# Patient Record
Sex: MALE | Race: WHITE | NOT HISPANIC OR LATINO | Employment: OTHER | URBAN - METROPOLITAN AREA
[De-identification: names, ages, dates, MRNs, and addresses within clinical notes are randomized per-mention and may not be internally consistent; named-entity substitution may affect disease eponyms.]

---

## 2017-02-28 ENCOUNTER — AMBULATORY - HEALTHEAST (OUTPATIENT)
Dept: CARDIOLOGY | Facility: CLINIC | Age: 77
End: 2017-02-28

## 2017-02-28 DIAGNOSIS — Z95.810 ICD (IMPLANTABLE CARDIOVERTER-DEFIBRILLATOR) IN PLACE: ICD-10-CM

## 2017-03-02 ENCOUNTER — OFFICE VISIT - HEALTHEAST (OUTPATIENT)
Dept: CARDIOLOGY | Facility: CLINIC | Age: 77
End: 2017-03-02

## 2017-03-02 DIAGNOSIS — I42.9 CARDIOMYOPATHY (H): ICD-10-CM

## 2017-03-02 ASSESSMENT — MIFFLIN-ST. JEOR: SCORE: 1449.69

## 2017-03-08 ENCOUNTER — COMMUNICATION - HEALTHEAST (OUTPATIENT)
Dept: CARDIOLOGY | Facility: CLINIC | Age: 77
End: 2017-03-08

## 2017-03-08 DIAGNOSIS — I42.9 CARDIOMYOPATHY (H): ICD-10-CM

## 2017-03-10 ENCOUNTER — HOSPITAL ENCOUNTER (OUTPATIENT)
Dept: CARDIOLOGY | Facility: CLINIC | Age: 77
Discharge: HOME | End: 2017-03-10
Attending: INTERNAL MEDICINE

## 2017-03-10 DIAGNOSIS — I42.9 CARDIOMYOPATHY (H): ICD-10-CM

## 2017-03-10 LAB
AORTIC ROOT: 3.8 CM
AORTIC ROOT: 3.8 CM
AR DECEL SLOPE: 2010 MM/S2
AR PEAK VELOCITY: 376 CM/S
AV REGURGITANT PEAK GRADIENT: 56.6 MMHG
AV REGURGITATION PRESSURE HALF TIME: 560 MS
BSA FOR ECHO PROCEDURE: 1.92 M2
CV BLOOD PRESSURE: NORMAL MMHG
CV ECHO HEIGHT: 70 IN
CV ECHO WEIGHT: 165 LBS
DOP CALC LVOT AREA: 4.91 CM2
DOP CALC LVOT DIAMETER: 2.5 CM
DOP CALC LVOT PEAK VEL: 71.4 CM/S
DOP CALC LVOT STROKE VOLUME: 79 CM3
DOP CALCLVOT PEAK VEL VTI: 16.1 CM
EJECTION FRACTION: 39 % (ref 55–75)
FRACTIONAL SHORTENING: 11.9 % (ref 28–44)
INTERVENTRICULAR SEPTUM IN END DIASTOLE: 1.4 CM (ref 0.6–1)
IVS/PW RATIO: 0.9
LA AREA 1: 15.4 CM2
LA AREA 2: 19.2 CM2
LEFT ATRIUM LENGTH: 4.83 CM
LEFT ATRIUM SIZE: 3.9 CM
LEFT ATRIUM TO AORTIC ROOT RATIO: 1.03 NO UNITS
LEFT ATRIUM VOLUME INDEX: 27.1 ML/M2
LEFT ATRIUM VOLUME: 52 CM3
LEFT VENTRICLE CARDIAC INDEX: 2.8 L/MIN/M2
LEFT VENTRICLE CARDIAC OUTPUT: 5.4 L/MIN
LEFT VENTRICLE DIASTOLIC VOLUME INDEX: 64.6 CM3/M2 (ref 34–74)
LEFT VENTRICLE DIASTOLIC VOLUME: 124 CM3 (ref 62–150)
LEFT VENTRICLE HEART RATE: 68 BPM
LEFT VENTRICLE MASS INDEX: 206.6 G/M2
LEFT VENTRICLE SYSTOLIC VOLUME INDEX: 39.6 CM3/M2 (ref 11–31)
LEFT VENTRICLE SYSTOLIC VOLUME: 76 CM3 (ref 21–61)
LEFT VENTRICULAR INTERNAL DIMENSION IN DIASTOLE: 5.9 CM (ref 4.2–5.8)
LEFT VENTRICULAR INTERNAL DIMENSION IN SYSTOLE: 5.2 CM (ref 2.5–4)
LEFT VENTRICULAR MASS: 396.7 G
LEFT VENTRICULAR OUTFLOW TRACT MEAN GRADIENT: 1 MMHG
LEFT VENTRICULAR OUTFLOW TRACT MEAN VELOCITY: 50.5 CM/S
LEFT VENTRICULAR OUTFLOW TRACT PEAK GRADIENT: 2 MMHG
LEFT VENTRICULAR POSTERIOR WALL IN END DIASTOLE: 1.5 CM (ref 0.6–1)
LV STROKE VOLUME INDEX: 41.1 ML/M2
MITRAL REGURGITANT VELOCITY TIME INTEGRAL: 219 CM
MITRAL VALVE E/A RATIO: 0.7
MR FLOW: 20 CM3
MR MEAN GRADIENT: 70 MMHG
MR MEAN VELOCITY: 384 CM/S
MR PEAK GRADIENT: 111.5 MMHG
MR PISA EROA: 0.1 CM2
MR PISA RADIUS: 0.5 CM
MR PISA VN NYQUIST: 30.8 CM/S
MV AVERAGE E/E' RATIO: 9.4 CM/S
MV DECELERATION TIME: 190 MS
MV E'TISSUE VEL-LAT: 6.14 CM/S
MV E'TISSUE VEL-MED: 4.68 CM/S
MV LATERAL E/E' RATIO: 8.3
MV MEDIAL E/E' RATIO: 10.9
MV PEAK A VELOCITY: 71.1 CM/S
MV PEAK E VELOCITY: 50.8 CM/S
MV REGURGITANT VOLUME: 20.1 CC
NUC REST DIASTOLIC VOLUME INDEX: 2640 LBS
NUC REST SYSTOLIC VOLUME INDEX: 70 IN
PISA MR PEAK VEL: 528 CM/S
RIGHT VENTRICULAR INTERNAL DIMENSION IN DYSTOLE: 2.7 CM
TRICUSPID REGURGITATION PEAK PRESSURE GRADIENT: 19.5 MMHG
TRICUSPID VALVE ANULAR PLANE SYSTOLIC EXCURSION: 1.1 CM
TRICUSPID VALVE PEAK REGURGITANT VELOCITY: 221 CM/S

## 2017-03-10 ASSESSMENT — MIFFLIN-ST. JEOR: SCORE: 1449.69

## 2017-06-06 ENCOUNTER — AMBULATORY - HEALTHEAST (OUTPATIENT)
Dept: CARDIOLOGY | Facility: CLINIC | Age: 77
End: 2017-06-06

## 2017-06-06 DIAGNOSIS — Z95.810 ICD (IMPLANTABLE CARDIOVERTER-DEFIBRILLATOR) IN PLACE: ICD-10-CM

## 2017-06-06 LAB — HCC DEVICE COMMENTS: NORMAL

## 2017-07-18 ENCOUNTER — AMBULATORY - HEALTHEAST (OUTPATIENT)
Dept: CARDIOLOGY | Facility: CLINIC | Age: 77
End: 2017-07-18

## 2017-07-18 DIAGNOSIS — Z95.810 ICD (IMPLANTABLE CARDIOVERTER-DEFIBRILLATOR), DUAL, IN SITU: ICD-10-CM

## 2017-07-18 LAB — HCC DEVICE COMMENTS: NORMAL

## 2017-07-18 ASSESSMENT — MIFFLIN-ST. JEOR: SCORE: 1416.12

## 2017-10-16 ENCOUNTER — AMBULATORY - HEALTHEAST (OUTPATIENT)
Dept: CARDIOLOGY | Facility: CLINIC | Age: 77
End: 2017-10-16

## 2017-10-16 DIAGNOSIS — Z95.810 ICD (IMPLANTABLE CARDIOVERTER-DEFIBRILLATOR), DUAL, IN SITU: ICD-10-CM

## 2017-10-16 LAB — HCC DEVICE COMMENTS: NORMAL

## 2017-12-29 ENCOUNTER — COMMUNICATION - HEALTHEAST (OUTPATIENT)
Dept: CARDIOLOGY | Facility: CLINIC | Age: 77
End: 2017-12-29

## 2017-12-29 DIAGNOSIS — I42.9 CARDIOMYOPATHY (H): ICD-10-CM

## 2018-01-24 ENCOUNTER — AMBULATORY - HEALTHEAST (OUTPATIENT)
Dept: CARDIOLOGY | Facility: CLINIC | Age: 78
End: 2018-01-24

## 2018-01-24 DIAGNOSIS — Z95.810 ICD (IMPLANTABLE CARDIOVERTER-DEFIBRILLATOR), DUAL, IN SITU: ICD-10-CM

## 2018-01-24 LAB — HCC DEVICE COMMENTS: NORMAL

## 2018-02-16 ENCOUNTER — RECORDS - HEALTHEAST (OUTPATIENT)
Dept: LAB | Facility: CLINIC | Age: 78
End: 2018-02-16

## 2018-02-16 LAB
ALBUMIN SERPL-MCNC: 3.3 G/DL (ref 3.5–5)
ALP SERPL-CCNC: 92 U/L (ref 45–120)
ALT SERPL W P-5'-P-CCNC: 27 U/L (ref 0–45)
ANION GAP SERPL CALCULATED.3IONS-SCNC: 9 MMOL/L (ref 5–18)
AST SERPL W P-5'-P-CCNC: 26 U/L (ref 0–40)
BILIRUB SERPL-MCNC: 1.1 MG/DL (ref 0–1)
BUN SERPL-MCNC: 15 MG/DL (ref 8–28)
CALCIUM SERPL-MCNC: 8.8 MG/DL (ref 8.5–10.5)
CHLORIDE BLD-SCNC: 108 MMOL/L (ref 98–107)
CHOLEST SERPL-MCNC: 139 MG/DL
CO2 SERPL-SCNC: 26 MMOL/L (ref 22–31)
CREAT SERPL-MCNC: 1.06 MG/DL (ref 0.7–1.3)
FASTING STATUS PATIENT QL REPORTED: YES
GFR SERPL CREATININE-BSD FRML MDRD: >60 ML/MIN/1.73M2
GLUCOSE BLD-MCNC: 104 MG/DL (ref 70–125)
HDLC SERPL-MCNC: 33 MG/DL
LDLC SERPL CALC-MCNC: 93 MG/DL
POTASSIUM BLD-SCNC: 3.6 MMOL/L (ref 3.5–5)
PROT SERPL-MCNC: 6.4 G/DL (ref 6–8)
SODIUM SERPL-SCNC: 143 MMOL/L (ref 136–145)
TRIGL SERPL-MCNC: 67 MG/DL
VIT B12 SERPL-MCNC: 218 PG/ML (ref 213–816)

## 2018-03-02 ENCOUNTER — AMBULATORY - HEALTHEAST (OUTPATIENT)
Dept: CARDIOLOGY | Facility: CLINIC | Age: 78
End: 2018-03-02

## 2018-03-02 ENCOUNTER — RECORDS - HEALTHEAST (OUTPATIENT)
Dept: ADMINISTRATIVE | Facility: OTHER | Age: 78
End: 2018-03-02

## 2018-03-08 ENCOUNTER — OFFICE VISIT - HEALTHEAST (OUTPATIENT)
Dept: CARDIOLOGY | Facility: CLINIC | Age: 78
End: 2018-03-08

## 2018-03-08 DIAGNOSIS — I25.5 ISCHEMIC CARDIOMYOPATHY: ICD-10-CM

## 2018-03-08 ASSESSMENT — MIFFLIN-ST. JEOR: SCORE: 1413.4

## 2018-03-12 ENCOUNTER — HOSPITAL ENCOUNTER (OUTPATIENT)
Dept: CARDIOLOGY | Facility: CLINIC | Age: 78
Discharge: HOME | End: 2018-03-12
Attending: INTERNAL MEDICINE

## 2018-03-12 DIAGNOSIS — I25.5 ISCHEMIC CARDIOMYOPATHY: ICD-10-CM

## 2018-03-12 LAB
AORTIC ROOT: 4.1 CM
AORTIC VALVE MEAN VELOCITY: 158 CM/S
AR DECEL SLOPE: 1730 MM/S2
AR PEAK VELOCITY: 354 CM/S
ASCENDING AORTA: 3.6 CM
AV AR END DIASTOLIC VELOCITY: 246 CM/S
AV AR END DIASTOLIC VELOCITY: 254 CM/S
AV AR END DIASTOLIC VELOCITY: 261 CM/S
AV CUSP SEPERATION: 1.1 CM
AV CUSP SEPERATION: 1.1 CM
AV DIMENSIONLESS INDEX VTI: 0.3
AV MEAN GRADIENT: 11 MMHG
AV PEAK GRADIENT: 16.6 MMHG
AV REGURGITANT PEAK GRADIENT: 50.1 MMHG
AV REGURGITATION PRESSURE HALF TIME: 654 MS
AV VALVE AREA: 1.4 CM2
AV VELOCITY RATIO: 0.3
BSA FOR ECHO PROCEDURE: 1.87 M2
CV ECHO HEIGHT: 70 IN
CV ECHO WEIGHT: 157 LBS
DOP CALC AO PEAK VEL: 204 CM/S
DOP CALC AO VTI: 42.7 CM
DOP CALC LVOT AREA: 4.15 CM2
DOP CALC LVOT DIAMETER: 2.3 CM
DOP CALC LVOT PEAK VEL: 68.9 CM/S
DOP CALC LVOT STROKE VOLUME: 58.1 CM3
DOP CALC MV VTI: 24 CM
DOP CALCLVOT PEAK VEL VTI: 14 CM
EJECTION FRACTION: 31 % (ref 55–75)
FRACTIONAL SHORTENING: 6.3 % (ref 28–44)
INTERVENTRICULAR SEPTUM IN END DIASTOLE: 1.4 CM (ref 0.6–1)
IVS/PW RATIO: 1.3
LA AREA 1: 16.8 CM2
LA AREA 2: 18.9 CM2
LEFT ATRIUM LENGTH: 5.29 CM
LEFT ATRIUM SIZE: 3.9 CM
LEFT ATRIUM TO AORTIC ROOT RATIO: 0.97 NO UNITS
LEFT ATRIUM VOLUME INDEX: 27.3 ML/M2
LEFT ATRIUM VOLUME: 51 ML
LEFT VENTRICLE CARDIAC INDEX: 2.2 L/MIN/M2
LEFT VENTRICLE CARDIAC OUTPUT: 4.1 L/MIN
LEFT VENTRICLE DIASTOLIC VOLUME INDEX: 68.4 CM3/M2 (ref 34–74)
LEFT VENTRICLE DIASTOLIC VOLUME: 128 CM3 (ref 62–150)
LEFT VENTRICLE HEART RATE: 71 BPM
LEFT VENTRICLE MASS INDEX: 192.3 G/M2
LEFT VENTRICLE SYSTOLIC VOLUME INDEX: 47.1 CM3/M2 (ref 11–31)
LEFT VENTRICLE SYSTOLIC VOLUME: 88 CM3 (ref 21–61)
LEFT VENTRICULAR INTERNAL DIMENSION IN DIASTOLE: 6.3 CM (ref 4.2–5.8)
LEFT VENTRICULAR INTERNAL DIMENSION IN SYSTOLE: 5.9 CM (ref 2.5–4)
LEFT VENTRICULAR MASS: 359.5 G
LEFT VENTRICULAR OUTFLOW TRACT MEAN GRADIENT: 1 MMHG
LEFT VENTRICULAR OUTFLOW TRACT MEAN VELOCITY: 51 CM/S
LEFT VENTRICULAR OUTFLOW TRACT PEAK GRADIENT: 2 MMHG
LEFT VENTRICULAR POSTERIOR WALL IN END DIASTOLE: 1.1 CM (ref 0.6–1)
LV STROKE VOLUME INDEX: 31.1 ML/M2
MITRAL REGURGITANT VELOCITY TIME INTEGRAL: 203 CM
MITRAL VALVE DECELERATION SLOPE: 2650 MM/S2
MITRAL VALVE E/A RATIO: 0.6
MITRAL VALVE MEAN INFLOW VELOCITY: 47.8 CM/S
MITRAL VALVE PEAK VELOCITY: 86.2 CM/S
MITRAL VALVE PRESSURE HALF-TIME: 70 MS
MR MEAN GRADIENT: 68 MMHG
MR MEAN VELOCITY: 387 CM/S
MR PEAK GRADIENT: 104.4 MMHG
MV AREA VTI: 2.42 CM2
MV AVERAGE E/E' RATIO: 8.7 CM/S
MV DECELERATION TIME: 211 MS
MV E'TISSUE VEL-LAT: 5.26 CM/S
MV E'TISSUE VEL-MED: 3.7 CM/S
MV LATERAL E/E' RATIO: 7.4
MV MEAN GRADIENT: 1 MMHG
MV MEDIAL E/E' RATIO: 10.5
MV PEAK A VELOCITY: 69.6 CM/S
MV PEAK E VELOCITY: 39 CM/S
MV PEAK GRADIENT: 3 MMHG
MV VALVE AREA BY CONTINUITY EQUATION: 2.4 CM2
MV VALVE AREA PRESSURE 1/2 METHOD: 3.1 CM2
NUC REST DIASTOLIC VOLUME INDEX: 2512 LBS
NUC REST SYSTOLIC VOLUME INDEX: 70 IN
PISA MR PEAK VEL: 511 CM/S
TRICUSPID REGURGITATION PEAK PRESSURE GRADIENT: 18.8 MMHG
TRICUSPID VALVE ANULAR PLANE SYSTOLIC EXCURSION: 1.3 CM
TRICUSPID VALVE PEAK REGURGITANT VELOCITY: 217 CM/S

## 2018-03-12 ASSESSMENT — MIFFLIN-ST. JEOR: SCORE: 1413.4

## 2018-04-30 ENCOUNTER — AMBULATORY - HEALTHEAST (OUTPATIENT)
Dept: CARDIOLOGY | Facility: CLINIC | Age: 78
End: 2018-04-30

## 2018-04-30 DIAGNOSIS — Z95.810 ICD (IMPLANTABLE CARDIOVERTER-DEFIBRILLATOR), DUAL, IN SITU: ICD-10-CM

## 2018-05-01 LAB
HCC DEVICE COMMENTS: NORMAL
HCC DEVICE IMPLANTING PROVIDER: NORMAL
HCC DEVICE MANUFACTURE: NORMAL
HCC DEVICE MODEL: NORMAL
HCC DEVICE SERIAL NUMBER: NORMAL
HCC DEVICE TYPE: NORMAL

## 2018-07-17 ENCOUNTER — AMBULATORY - HEALTHEAST (OUTPATIENT)
Dept: CARDIOLOGY | Facility: CLINIC | Age: 78
End: 2018-07-17

## 2018-07-17 DIAGNOSIS — Z95.810 ICD (IMPLANTABLE CARDIOVERTER-DEFIBRILLATOR), DUAL, IN SITU: ICD-10-CM

## 2018-07-17 ASSESSMENT — MIFFLIN-ST. JEOR: SCORE: 1386.18

## 2018-07-19 ENCOUNTER — COMMUNICATION - HEALTHEAST (OUTPATIENT)
Dept: CARDIOLOGY | Facility: CLINIC | Age: 78
End: 2018-07-19

## 2018-07-19 DIAGNOSIS — I42.9 CARDIOMYOPATHY (H): ICD-10-CM

## 2018-09-30 ENCOUNTER — AMBULATORY - HEALTHEAST (OUTPATIENT)
Dept: CARDIOLOGY | Facility: CLINIC | Age: 78
End: 2018-09-30

## 2018-09-30 DIAGNOSIS — Z95.810 ICD (IMPLANTABLE CARDIOVERTER-DEFIBRILLATOR), DUAL, IN SITU: ICD-10-CM

## 2018-10-01 ENCOUNTER — COMMUNICATION - HEALTHEAST (OUTPATIENT)
Dept: CARDIOLOGY | Facility: CLINIC | Age: 78
End: 2018-10-01

## 2018-10-01 ENCOUNTER — SURGERY - HEALTHEAST (OUTPATIENT)
Dept: CARDIOLOGY | Facility: CLINIC | Age: 78
End: 2018-10-01

## 2018-10-01 ENCOUNTER — AMBULATORY - HEALTHEAST (OUTPATIENT)
Dept: CARDIOLOGY | Facility: CLINIC | Age: 78
End: 2018-10-01

## 2018-10-01 DIAGNOSIS — Z45.02 ICD (IMPLANTABLE CARDIOVERTER-DEFIBRILLATOR) BATTERY DEPLETION: ICD-10-CM

## 2018-10-03 ENCOUNTER — COMMUNICATION - HEALTHEAST (OUTPATIENT)
Dept: CARDIOLOGY | Facility: CLINIC | Age: 78
End: 2018-10-03

## 2018-10-05 ENCOUNTER — RECORDS - HEALTHEAST (OUTPATIENT)
Dept: LAB | Facility: CLINIC | Age: 78
End: 2018-10-05

## 2018-10-05 LAB
ALBUMIN SERPL-MCNC: 3.4 G/DL (ref 3.5–5)
ALP SERPL-CCNC: 93 U/L (ref 45–120)
ALT SERPL W P-5'-P-CCNC: 23 U/L (ref 0–45)
ANION GAP SERPL CALCULATED.3IONS-SCNC: 12 MMOL/L (ref 5–18)
AST SERPL W P-5'-P-CCNC: 24 U/L (ref 0–40)
BASOPHILS # BLD AUTO: 0.1 THOU/UL (ref 0–0.2)
BASOPHILS NFR BLD AUTO: 2 % (ref 0–2)
BILIRUB SERPL-MCNC: 0.9 MG/DL (ref 0–1)
BUN SERPL-MCNC: 13 MG/DL (ref 8–28)
CALCIUM SERPL-MCNC: 9.3 MG/DL (ref 8.5–10.5)
CHLORIDE BLD-SCNC: 107 MMOL/L (ref 98–107)
CHOLEST SERPL-MCNC: 134 MG/DL
CO2 SERPL-SCNC: 25 MMOL/L (ref 22–31)
CREAT SERPL-MCNC: 0.96 MG/DL (ref 0.7–1.3)
EOSINOPHIL # BLD AUTO: 0.3 THOU/UL (ref 0–0.4)
EOSINOPHIL NFR BLD AUTO: 3 % (ref 0–6)
ERYTHROCYTE [DISTWIDTH] IN BLOOD BY AUTOMATED COUNT: 13.7 % (ref 11–14.5)
FASTING STATUS PATIENT QL REPORTED: NO
GFR SERPL CREATININE-BSD FRML MDRD: >60 ML/MIN/1.73M2
GLUCOSE BLD-MCNC: 93 MG/DL (ref 70–125)
HCT VFR BLD AUTO: 45.1 % (ref 40–54)
HDLC SERPL-MCNC: 34 MG/DL
HGB BLD-MCNC: 14.9 G/DL (ref 14–18)
LDLC SERPL CALC-MCNC: 88 MG/DL
LYMPHOCYTES # BLD AUTO: 1.6 THOU/UL (ref 0.8–4.4)
LYMPHOCYTES NFR BLD AUTO: 18 % (ref 20–40)
MCH RBC QN AUTO: 30.1 PG (ref 27–34)
MCHC RBC AUTO-ENTMCNC: 33 G/DL (ref 32–36)
MCV RBC AUTO: 91 FL (ref 80–100)
MONOCYTES # BLD AUTO: 0.6 THOU/UL (ref 0–0.9)
MONOCYTES NFR BLD AUTO: 7 % (ref 2–10)
NEUTROPHILS # BLD AUTO: 6 THOU/UL (ref 2–7.7)
NEUTROPHILS NFR BLD AUTO: 70 % (ref 50–70)
PLATELET # BLD AUTO: 234 THOU/UL (ref 140–440)
PMV BLD AUTO: 11.6 FL (ref 8.5–12.5)
POTASSIUM BLD-SCNC: 3.9 MMOL/L (ref 3.5–5)
PROT SERPL-MCNC: 6.2 G/DL (ref 6–8)
PSA SERPL-MCNC: 0.4 NG/ML (ref 0–6.5)
RBC # BLD AUTO: 4.95 MILL/UL (ref 4.4–6.2)
SODIUM SERPL-SCNC: 144 MMOL/L (ref 136–145)
TRIGL SERPL-MCNC: 61 MG/DL
VIT B12 SERPL-MCNC: 217 PG/ML (ref 213–816)
WBC: 8.6 THOU/UL (ref 4–11)

## 2018-10-08 ENCOUNTER — AMBULATORY - HEALTHEAST (OUTPATIENT)
Dept: CARDIOLOGY | Facility: CLINIC | Age: 78
End: 2018-10-08

## 2018-10-11 ENCOUNTER — AMBULATORY - HEALTHEAST (OUTPATIENT)
Dept: CARDIOLOGY | Facility: CLINIC | Age: 78
End: 2018-10-11

## 2018-10-15 ENCOUNTER — SURGERY - HEALTHEAST (OUTPATIENT)
Dept: CARDIOLOGY | Facility: CLINIC | Age: 78
End: 2018-10-15

## 2018-10-15 ASSESSMENT — MIFFLIN-ST. JEOR: SCORE: 1397.52

## 2018-10-22 ENCOUNTER — AMBULATORY - HEALTHEAST (OUTPATIENT)
Dept: CARDIOLOGY | Facility: CLINIC | Age: 78
End: 2018-10-22

## 2018-10-22 DIAGNOSIS — Z95.810 ICD (IMPLANTABLE CARDIOVERTER-DEFIBRILLATOR), DUAL, IN SITU: ICD-10-CM

## 2018-10-22 ASSESSMENT — MIFFLIN-ST. JEOR: SCORE: 1407.5

## 2019-01-22 ENCOUNTER — COMMUNICATION - HEALTHEAST (OUTPATIENT)
Dept: CARDIOLOGY | Facility: CLINIC | Age: 79
End: 2019-01-22

## 2019-01-22 DIAGNOSIS — I42.9 CARDIOMYOPATHY (H): ICD-10-CM

## 2019-01-29 ENCOUNTER — AMBULATORY - HEALTHEAST (OUTPATIENT)
Dept: CARDIOLOGY | Facility: CLINIC | Age: 79
End: 2019-01-29

## 2019-01-29 DIAGNOSIS — Z95.810 ICD (IMPLANTABLE CARDIOVERTER-DEFIBRILLATOR), DUAL, IN SITU: ICD-10-CM

## 2019-03-29 ENCOUNTER — AMBULATORY - HEALTHEAST (OUTPATIENT)
Dept: CARDIOLOGY | Facility: CLINIC | Age: 79
End: 2019-03-29

## 2019-03-29 ENCOUNTER — RECORDS - HEALTHEAST (OUTPATIENT)
Dept: ADMINISTRATIVE | Facility: OTHER | Age: 79
End: 2019-03-29

## 2019-04-03 ENCOUNTER — RECORDS - HEALTHEAST (OUTPATIENT)
Dept: ADMINISTRATIVE | Facility: OTHER | Age: 79
End: 2019-04-03

## 2019-04-03 ENCOUNTER — AMBULATORY - HEALTHEAST (OUTPATIENT)
Dept: CARDIOLOGY | Facility: CLINIC | Age: 79
End: 2019-04-03

## 2019-04-15 ENCOUNTER — AMBULATORY - HEALTHEAST (OUTPATIENT)
Dept: CARDIOLOGY | Facility: CLINIC | Age: 79
End: 2019-04-15

## 2019-04-15 DIAGNOSIS — Z95.810 ICD (IMPLANTABLE CARDIOVERTER-DEFIBRILLATOR), DUAL, IN SITU: ICD-10-CM

## 2019-04-15 ASSESSMENT — MIFFLIN-ST. JEOR: SCORE: 1381.65

## 2019-04-24 ENCOUNTER — OFFICE VISIT - HEALTHEAST (OUTPATIENT)
Dept: CARDIOLOGY | Facility: CLINIC | Age: 79
End: 2019-04-24

## 2019-04-24 DIAGNOSIS — I25.5 ISCHEMIC CARDIOMYOPATHY: ICD-10-CM

## 2019-04-24 ASSESSMENT — MIFFLIN-ST. JEOR: SCORE: 1395.25

## 2019-04-26 ENCOUNTER — COMMUNICATION - HEALTHEAST (OUTPATIENT)
Dept: CARDIOLOGY | Facility: CLINIC | Age: 79
End: 2019-04-26

## 2019-04-26 DIAGNOSIS — I42.9 CARDIOMYOPATHY (H): ICD-10-CM

## 2019-05-01 ENCOUNTER — HOSPITAL ENCOUNTER (OUTPATIENT)
Dept: CARDIOLOGY | Facility: CLINIC | Age: 79
Discharge: HOME | End: 2019-05-01
Attending: INTERNAL MEDICINE

## 2019-05-01 DIAGNOSIS — I25.5 ISCHEMIC CARDIOMYOPATHY: ICD-10-CM

## 2019-05-01 LAB
AORTIC ROOT: 3.7 CM
AORTIC VALVE MEAN VELOCITY: 155 CM/S
AR DECEL SLOPE: 1650 MM/S2
AR PEAK VELOCITY: 350 CM/S
AV DIMENSIONLESS INDEX VTI: 0.4
AV MEAN GRADIENT: 11 MMHG
AV PEAK GRADIENT: 18.7 MMHG
AV REGURGITANT PEAK GRADIENT: 49 MMHG
AV REGURGITATION PRESSURE HALF TIME: 622 MS
AV VALVE AREA: 1.5 CM2
AV VELOCITY RATIO: 0.3
BSA FOR ECHO PROCEDURE: 1.85 M2
CV BLOOD PRESSURE: ABNORMAL MMHG
CV ECHO HEIGHT: 70 IN
CV ECHO WEIGHT: 153 LBS
DOP CALC AO PEAK VEL: 216 CM/S
DOP CALC AO VTI: 46.8 CM
DOP CALC LVOT AREA: 4.15 CM2
DOP CALC LVOT DIAMETER: 2.3 CM
DOP CALC LVOT PEAK VEL: 75.2 CM/S
DOP CALC LVOT STROKE VOLUME: 69.3 CM3
DOP CALCLVOT PEAK VEL VTI: 16.7 CM
EJECTION FRACTION: 35 % (ref 55–75)
FRACTIONAL SHORTENING: 5.3 % (ref 28–44)
INTERVENTRICULAR SEPTUM IN END DIASTOLE: 1.6 CM (ref 0.6–1)
IVS/PW RATIO: 1.2
LA AREA 1: 28.8 CM2
LA AREA 2: 27.2 CM2
LEFT ATRIUM LENGTH: 5.51 CM
LEFT ATRIUM SIZE: 4.3 CM
LEFT ATRIUM TO AORTIC ROOT RATIO: 1.13 NO UNITS
LEFT ATRIUM VOLUME INDEX: 65.3 ML/M2
LEFT ATRIUM VOLUME: 120.8 ML
LEFT VENTRICLE CARDIAC INDEX: 2.2 L/MIN/M2
LEFT VENTRICLE CARDIAC OUTPUT: 4.2 L/MIN
LEFT VENTRICLE DIASTOLIC VOLUME INDEX: 133 CM3/M2 (ref 34–74)
LEFT VENTRICLE DIASTOLIC VOLUME: 246 CM3 (ref 62–150)
LEFT VENTRICLE HEART RATE: 60 BPM
LEFT VENTRICLE MASS INDEX: 203.1 G/M2
LEFT VENTRICLE SYSTOLIC VOLUME INDEX: 87 CM3/M2 (ref 11–31)
LEFT VENTRICLE SYSTOLIC VOLUME: 161 CM3 (ref 21–61)
LEFT VENTRICULAR INTERNAL DIMENSION IN DIASTOLE: 5.7 CM (ref 4.2–5.8)
LEFT VENTRICULAR INTERNAL DIMENSION IN SYSTOLE: 5.4 CM (ref 2.5–4)
LEFT VENTRICULAR MASS: 375.7 G
LEFT VENTRICULAR OUTFLOW TRACT MEAN GRADIENT: 1 MMHG
LEFT VENTRICULAR OUTFLOW TRACT MEAN VELOCITY: 51.1 CM/S
LEFT VENTRICULAR OUTFLOW TRACT PEAK GRADIENT: 2 MMHG
LEFT VENTRICULAR POSTERIOR WALL IN END DIASTOLE: 1.3 CM (ref 0.6–1)
LV STROKE VOLUME INDEX: 37.5 ML/M2
MITRAL REGURGITANT VELOCITY TIME INTEGRAL: 215 CM
MITRAL VALVE E/A RATIO: 0.6
MR FLOW: 58 CM3
MR MEAN GRADIENT: 74 MMHG
MR MEAN VELOCITY: 408 CM/S
MR PEAK GRADIENT: 118.8 MMHG
MR PISA EROA: 0.3 CM2
MR PISA RADIUS: 0.8 CM
MR PISA VN NYQUIST: 36.6 CM/S
MV AVERAGE E/E' RATIO: 9.3 CM/S
MV DECELERATION TIME: 208 MS
MV E'TISSUE VEL-LAT: 5.85 CM/S
MV E'TISSUE VEL-MED: 3.8 CM/S
MV LATERAL E/E' RATIO: 7.7
MV MEDIAL E/E' RATIO: 11.8
MV PEAK A VELOCITY: 71.3 CM/S
MV PEAK E VELOCITY: 44.9 CM/S
MV REGURGITANT VOLUME: 58 CC
NUC REST DIASTOLIC VOLUME INDEX: 2448 LBS
NUC REST SYSTOLIC VOLUME INDEX: 70 IN
PISA MR PEAK VEL: 545 CM/S
TRICUSPID REGURGITATION PEAK PRESSURE GRADIENT: 20.1 MMHG
TRICUSPID VALVE ANULAR PLANE SYSTOLIC EXCURSION: 1.6 CM
TRICUSPID VALVE PEAK REGURGITANT VELOCITY: 224 CM/S

## 2019-05-01 ASSESSMENT — MIFFLIN-ST. JEOR: SCORE: 1395.25

## 2019-07-16 ENCOUNTER — AMBULATORY - HEALTHEAST (OUTPATIENT)
Dept: CARDIOLOGY | Facility: CLINIC | Age: 79
End: 2019-07-16

## 2019-07-16 DIAGNOSIS — Z95.810 ICD (IMPLANTABLE CARDIOVERTER-DEFIBRILLATOR), DUAL, IN SITU: ICD-10-CM

## 2019-10-16 ENCOUNTER — AMBULATORY - HEALTHEAST (OUTPATIENT)
Dept: CARDIOLOGY | Facility: CLINIC | Age: 79
End: 2019-10-16

## 2019-10-16 DIAGNOSIS — Z95.810 ICD (IMPLANTABLE CARDIOVERTER-DEFIBRILLATOR), DUAL, IN SITU: ICD-10-CM

## 2019-12-06 ENCOUNTER — RECORDS - HEALTHEAST (OUTPATIENT)
Dept: LAB | Facility: CLINIC | Age: 79
End: 2019-12-06

## 2019-12-06 LAB
ALBUMIN SERPL-MCNC: 3.6 G/DL (ref 3.5–5)
ALP SERPL-CCNC: 80 U/L (ref 45–120)
ALT SERPL W P-5'-P-CCNC: 18 U/L (ref 0–45)
ANION GAP SERPL CALCULATED.3IONS-SCNC: 10 MMOL/L (ref 5–18)
AST SERPL W P-5'-P-CCNC: 17 U/L (ref 0–40)
BILIRUB SERPL-MCNC: 0.9 MG/DL (ref 0–1)
BUN SERPL-MCNC: 10 MG/DL (ref 8–28)
CALCIUM SERPL-MCNC: 9 MG/DL (ref 8.5–10.5)
CHLORIDE BLD-SCNC: 105 MMOL/L (ref 98–107)
CHOLEST SERPL-MCNC: 150 MG/DL
CO2 SERPL-SCNC: 26 MMOL/L (ref 22–31)
CREAT SERPL-MCNC: 0.96 MG/DL (ref 0.7–1.3)
FASTING STATUS PATIENT QL REPORTED: NO
GFR SERPL CREATININE-BSD FRML MDRD: >60 ML/MIN/1.73M2
GLUCOSE BLD-MCNC: 84 MG/DL (ref 70–125)
HDLC SERPL-MCNC: 36 MG/DL
LDLC SERPL CALC-MCNC: 100 MG/DL
POTASSIUM BLD-SCNC: 4 MMOL/L (ref 3.5–5)
PROT SERPL-MCNC: 6.1 G/DL (ref 6–8)
PSA SERPL-MCNC: 0.4 NG/ML (ref 0–6.5)
SODIUM SERPL-SCNC: 141 MMOL/L (ref 136–145)
TRIGL SERPL-MCNC: 70 MG/DL
VIT B12 SERPL-MCNC: <146 PG/ML (ref 213–816)

## 2019-12-09 ENCOUNTER — TRANSFERRED RECORDS (OUTPATIENT)
Dept: HEALTH INFORMATION MANAGEMENT | Facility: CLINIC | Age: 79
End: 2019-12-09

## 2019-12-31 ENCOUNTER — AMBULATORY - HEALTHEAST (OUTPATIENT)
Dept: CARDIOLOGY | Facility: CLINIC | Age: 79
End: 2019-12-31

## 2019-12-31 ENCOUNTER — COMMUNICATION - HEALTHEAST (OUTPATIENT)
Dept: CARDIOLOGY | Facility: CLINIC | Age: 79
End: 2019-12-31

## 2019-12-31 DIAGNOSIS — Z95.810 ICD (IMPLANTABLE CARDIOVERTER-DEFIBRILLATOR), DUAL, IN SITU: ICD-10-CM

## 2019-12-31 DIAGNOSIS — I25.5 ISCHEMIC CARDIOMYOPATHY: ICD-10-CM

## 2019-12-31 DIAGNOSIS — I48.91 ATRIAL FIBRILLATION (H): ICD-10-CM

## 2020-01-02 ENCOUNTER — SURGERY - HEALTHEAST (OUTPATIENT)
Dept: CARDIOLOGY | Facility: CLINIC | Age: 80
End: 2020-01-02

## 2020-01-02 ENCOUNTER — AMBULATORY - HEALTHEAST (OUTPATIENT)
Dept: CARDIOLOGY | Facility: CLINIC | Age: 80
End: 2020-01-02

## 2020-01-02 DIAGNOSIS — I42.9 CARDIOMYOPATHY (H): ICD-10-CM

## 2020-01-20 ENCOUNTER — RECORDS - HEALTHEAST (OUTPATIENT)
Dept: ADMINISTRATIVE | Facility: OTHER | Age: 80
End: 2020-01-20

## 2020-01-20 ENCOUNTER — AMBULATORY - HEALTHEAST (OUTPATIENT)
Dept: CARDIOLOGY | Facility: CLINIC | Age: 80
End: 2020-01-20

## 2020-01-22 ENCOUNTER — AMBULATORY - HEALTHEAST (OUTPATIENT)
Dept: CARDIOLOGY | Facility: CLINIC | Age: 80
End: 2020-01-22

## 2020-01-22 ENCOUNTER — RECORDS - HEALTHEAST (OUTPATIENT)
Dept: RADIOLOGY | Facility: CLINIC | Age: 80
End: 2020-01-22

## 2020-01-22 ENCOUNTER — AMBULATORY - HEALTHEAST (OUTPATIENT)
Dept: NEUROSURGERY | Facility: CLINIC | Age: 80
End: 2020-01-22

## 2020-01-22 ENCOUNTER — RECORDS - HEALTHEAST (OUTPATIENT)
Dept: ADMINISTRATIVE | Facility: OTHER | Age: 80
End: 2020-01-22

## 2020-01-22 DIAGNOSIS — Z95.810 ICD (IMPLANTABLE CARDIOVERTER-DEFIBRILLATOR), DUAL, IN SITU: ICD-10-CM

## 2020-01-22 DIAGNOSIS — I48.0 PAROXYSMAL ATRIAL FIBRILLATION (H): ICD-10-CM

## 2020-01-22 DIAGNOSIS — I25.5 ISCHEMIC CARDIOMYOPATHY: ICD-10-CM

## 2020-01-22 DIAGNOSIS — I25.118 CORONARY ARTERY DISEASE OF NATIVE HEART WITH STABLE ANGINA PECTORIS, UNSPECIFIED VESSEL OR LESION TYPE (H): ICD-10-CM

## 2020-01-27 ENCOUNTER — AMBULATORY - HEALTHEAST (OUTPATIENT)
Dept: CARDIOLOGY | Facility: CLINIC | Age: 80
End: 2020-01-27

## 2020-01-27 ENCOUNTER — OFFICE VISIT - HEALTHEAST (OUTPATIENT)
Dept: NEUROSURGERY | Facility: CLINIC | Age: 80
End: 2020-01-27

## 2020-01-27 DIAGNOSIS — Z95.810 ICD (IMPLANTABLE CARDIOVERTER-DEFIBRILLATOR), DUAL, IN SITU: ICD-10-CM

## 2020-01-27 DIAGNOSIS — R52 PAIN: ICD-10-CM

## 2020-01-27 DIAGNOSIS — I25.118 CORONARY ARTERY DISEASE OF NATIVE HEART WITH STABLE ANGINA PECTORIS, UNSPECIFIED VESSEL OR LESION TYPE (H): ICD-10-CM

## 2020-01-27 DIAGNOSIS — I48.0 PAROXYSMAL ATRIAL FIBRILLATION (H): ICD-10-CM

## 2020-01-27 ASSESSMENT — MIFFLIN-ST. JEOR
SCORE: 1386.18
SCORE: 1386.18

## 2020-01-28 LAB
ATRIAL RATE - MUSE: 68 BPM
DIASTOLIC BLOOD PRESSURE - MUSE: NORMAL
INTERPRETATION ECG - MUSE: NORMAL
P AXIS - MUSE: 62 DEGREES
PR INTERVAL - MUSE: 168 MS
QRS DURATION - MUSE: 134 MS
QT - MUSE: 484 MS
QTC - MUSE: 514 MS
R AXIS - MUSE: 78 DEGREES
SYSTOLIC BLOOD PRESSURE - MUSE: NORMAL
T AXIS - MUSE: -45 DEGREES
VENTRICULAR RATE- MUSE: 68 BPM

## 2020-01-29 ENCOUNTER — RECORDS - HEALTHEAST (OUTPATIENT)
Dept: LAB | Facility: CLINIC | Age: 80
End: 2020-01-29

## 2020-01-29 LAB
ANION GAP SERPL CALCULATED.3IONS-SCNC: 7 MMOL/L (ref 5–18)
BUN SERPL-MCNC: 16 MG/DL (ref 8–28)
CALCIUM SERPL-MCNC: 8.8 MG/DL (ref 8.5–10.5)
CHLORIDE BLD-SCNC: 109 MMOL/L (ref 98–107)
CO2 SERPL-SCNC: 28 MMOL/L (ref 22–31)
CREAT SERPL-MCNC: 0.95 MG/DL (ref 0.7–1.3)
GFR SERPL CREATININE-BSD FRML MDRD: >60 ML/MIN/1.73M2
GLUCOSE BLD-MCNC: 87 MG/DL (ref 70–125)
POTASSIUM BLD-SCNC: 3.6 MMOL/L (ref 3.5–5)
SODIUM SERPL-SCNC: 144 MMOL/L (ref 136–145)
VIT B12 SERPL-MCNC: 613 PG/ML (ref 213–816)

## 2020-01-31 ENCOUNTER — HOSPITAL ENCOUNTER (OUTPATIENT)
Dept: RADIOLOGY | Facility: HOSPITAL | Age: 80
Discharge: HOME | End: 2020-01-31
Attending: SURGERY

## 2020-01-31 DIAGNOSIS — R52 PAIN: ICD-10-CM

## 2020-02-17 ENCOUNTER — AMBULATORY - HEALTHEAST (OUTPATIENT)
Dept: CARDIOLOGY | Facility: CLINIC | Age: 80
End: 2020-02-17

## 2020-02-17 ENCOUNTER — RECORDS - HEALTHEAST (OUTPATIENT)
Dept: ADMINISTRATIVE | Facility: OTHER | Age: 80
End: 2020-02-17

## 2020-02-21 ENCOUNTER — HOSPITAL ENCOUNTER (OUTPATIENT)
Dept: PHYSICAL MEDICINE AND REHAB | Facility: CLINIC | Age: 80
Discharge: HOME | End: 2020-02-21
Attending: SURGERY

## 2020-02-21 DIAGNOSIS — M48.02 FORAMINAL STENOSIS OF CERVICAL REGION: ICD-10-CM

## 2020-02-21 DIAGNOSIS — R20.2 PARESTHESIA: ICD-10-CM

## 2020-02-21 DIAGNOSIS — M62.81 GENERALIZED MUSCLE WEAKNESS: ICD-10-CM

## 2020-02-21 DIAGNOSIS — M25.50 POLYARTHRALGIA: ICD-10-CM

## 2020-02-24 ENCOUNTER — AMBULATORY - HEALTHEAST (OUTPATIENT)
Dept: CARDIOLOGY | Facility: CLINIC | Age: 80
End: 2020-02-24

## 2020-02-24 ENCOUNTER — OFFICE VISIT - HEALTHEAST (OUTPATIENT)
Dept: PHYSICAL THERAPY | Facility: REHABILITATION | Age: 80
End: 2020-02-24

## 2020-02-24 DIAGNOSIS — I50.22 CHRONIC SYSTOLIC CONGESTIVE HEART FAILURE (H): ICD-10-CM

## 2020-02-24 DIAGNOSIS — I25.118 CORONARY ARTERY DISEASE OF NATIVE HEART WITH STABLE ANGINA PECTORIS, UNSPECIFIED VESSEL OR LESION TYPE (H): ICD-10-CM

## 2020-02-24 DIAGNOSIS — Z95.810 ICD (IMPLANTABLE CARDIOVERTER-DEFIBRILLATOR), DUAL, IN SITU: ICD-10-CM

## 2020-02-24 DIAGNOSIS — I48.0 PAROXYSMAL ATRIAL FIBRILLATION (H): ICD-10-CM

## 2020-02-24 DIAGNOSIS — M25.522 ARTHRALGIA OF LEFT UPPER ARM: ICD-10-CM

## 2020-02-24 DIAGNOSIS — M62.81 GENERALIZED MUSCLE WEAKNESS: ICD-10-CM

## 2020-02-24 DIAGNOSIS — M25.521 ARTHRALGIA OF RIGHT UPPER ARM: ICD-10-CM

## 2020-02-24 DIAGNOSIS — I71.40 ABDOMINAL AORTIC ANEURYSM (AAA) WITHOUT RUPTURE (H): ICD-10-CM

## 2020-02-24 DIAGNOSIS — Z74.09 IMPAIRED FUNCTIONAL MOBILITY, BALANCE, GAIT, AND ENDURANCE: ICD-10-CM

## 2020-02-24 DIAGNOSIS — I73.9 PVD (PERIPHERAL VASCULAR DISEASE) (H): ICD-10-CM

## 2020-02-24 DIAGNOSIS — I42.9 CARDIOMYOPATHY (H): ICD-10-CM

## 2020-02-24 DIAGNOSIS — I49.3 PVC'S (PREMATURE VENTRICULAR CONTRACTIONS): ICD-10-CM

## 2020-02-24 ASSESSMENT — MIFFLIN-ST. JEOR: SCORE: 1377.11

## 2020-02-25 ENCOUNTER — AMBULATORY - HEALTHEAST (OUTPATIENT)
Dept: LAB | Facility: HOSPITAL | Age: 80
End: 2020-02-25

## 2020-02-25 ENCOUNTER — COMMUNICATION - HEALTHEAST (OUTPATIENT)
Dept: PHYSICAL MEDICINE AND REHAB | Facility: CLINIC | Age: 80
End: 2020-02-25

## 2020-02-25 DIAGNOSIS — M25.50 POLYARTHRALGIA: ICD-10-CM

## 2020-02-25 LAB
C REACTIVE PROTEIN LHE: 0.1 MG/DL (ref 0–0.8)
ERYTHROCYTE [SEDIMENTATION RATE] IN BLOOD BY WESTERGREN METHOD: 11 MM/HR (ref 0–15)

## 2020-03-02 ENCOUNTER — OFFICE VISIT - HEALTHEAST (OUTPATIENT)
Dept: PHYSICAL THERAPY | Facility: REHABILITATION | Age: 80
End: 2020-03-02

## 2020-03-02 DIAGNOSIS — M62.81 GENERALIZED MUSCLE WEAKNESS: ICD-10-CM

## 2020-03-02 DIAGNOSIS — M25.521 ARTHRALGIA OF RIGHT UPPER ARM: ICD-10-CM

## 2020-03-02 DIAGNOSIS — M25.522 ARTHRALGIA OF LEFT UPPER ARM: ICD-10-CM

## 2020-03-02 DIAGNOSIS — Z74.09 IMPAIRED FUNCTIONAL MOBILITY, BALANCE, GAIT, AND ENDURANCE: ICD-10-CM

## 2020-03-05 ENCOUNTER — OFFICE VISIT - HEALTHEAST (OUTPATIENT)
Dept: PHYSICAL THERAPY | Facility: REHABILITATION | Age: 80
End: 2020-03-05

## 2020-03-05 DIAGNOSIS — M62.81 GENERALIZED MUSCLE WEAKNESS: ICD-10-CM

## 2020-03-05 DIAGNOSIS — Z74.09 IMPAIRED FUNCTIONAL MOBILITY, BALANCE, GAIT, AND ENDURANCE: ICD-10-CM

## 2020-03-05 DIAGNOSIS — M25.521 ARTHRALGIA OF RIGHT UPPER ARM: ICD-10-CM

## 2020-03-05 DIAGNOSIS — M25.522 ARTHRALGIA OF LEFT UPPER ARM: ICD-10-CM

## 2020-03-09 ENCOUNTER — OFFICE VISIT - HEALTHEAST (OUTPATIENT)
Dept: PHYSICAL THERAPY | Facility: REHABILITATION | Age: 80
End: 2020-03-09

## 2020-03-09 DIAGNOSIS — M62.81 GENERALIZED MUSCLE WEAKNESS: ICD-10-CM

## 2020-03-09 DIAGNOSIS — Z74.09 IMPAIRED FUNCTIONAL MOBILITY, BALANCE, GAIT, AND ENDURANCE: ICD-10-CM

## 2020-03-09 DIAGNOSIS — M25.522 ARTHRALGIA OF LEFT UPPER ARM: ICD-10-CM

## 2020-03-09 DIAGNOSIS — M25.521 ARTHRALGIA OF RIGHT UPPER ARM: ICD-10-CM

## 2020-03-11 ENCOUNTER — OFFICE VISIT - HEALTHEAST (OUTPATIENT)
Dept: PHYSICAL THERAPY | Facility: REHABILITATION | Age: 80
End: 2020-03-11

## 2020-03-11 DIAGNOSIS — Z74.09 IMPAIRED FUNCTIONAL MOBILITY, BALANCE, GAIT, AND ENDURANCE: ICD-10-CM

## 2020-03-11 DIAGNOSIS — M25.521 ARTHRALGIA OF RIGHT UPPER ARM: ICD-10-CM

## 2020-03-11 DIAGNOSIS — M62.81 GENERALIZED MUSCLE WEAKNESS: ICD-10-CM

## 2020-03-11 DIAGNOSIS — M25.522 ARTHRALGIA OF LEFT UPPER ARM: ICD-10-CM

## 2020-03-16 ENCOUNTER — OFFICE VISIT - HEALTHEAST (OUTPATIENT)
Dept: PHYSICAL THERAPY | Facility: REHABILITATION | Age: 80
End: 2020-03-16

## 2020-03-16 DIAGNOSIS — M25.522 ARTHRALGIA OF LEFT UPPER ARM: ICD-10-CM

## 2020-03-16 DIAGNOSIS — M62.81 GENERALIZED MUSCLE WEAKNESS: ICD-10-CM

## 2020-03-16 DIAGNOSIS — M25.521 ARTHRALGIA OF RIGHT UPPER ARM: ICD-10-CM

## 2020-03-16 DIAGNOSIS — Z74.09 IMPAIRED FUNCTIONAL MOBILITY, BALANCE, GAIT, AND ENDURANCE: ICD-10-CM

## 2020-03-18 ENCOUNTER — COMMUNICATION - HEALTHEAST (OUTPATIENT)
Dept: PHYSICAL THERAPY | Facility: REHABILITATION | Age: 80
End: 2020-03-18

## 2020-03-23 ENCOUNTER — COMMUNICATION - HEALTHEAST (OUTPATIENT)
Dept: PHYSICAL THERAPY | Facility: REHABILITATION | Age: 80
End: 2020-03-23

## 2020-04-20 ENCOUNTER — COMMUNICATION - HEALTHEAST (OUTPATIENT)
Dept: CARDIOLOGY | Facility: CLINIC | Age: 80
End: 2020-04-20

## 2020-04-21 ENCOUNTER — AMBULATORY - HEALTHEAST (OUTPATIENT)
Dept: CARDIOLOGY | Facility: CLINIC | Age: 80
End: 2020-04-21

## 2020-04-21 DIAGNOSIS — I48.0 PAROXYSMAL ATRIAL FIBRILLATION (H): ICD-10-CM

## 2020-04-21 DIAGNOSIS — I50.22 CHRONIC SYSTOLIC CONGESTIVE HEART FAILURE (H): ICD-10-CM

## 2020-04-21 LAB
ANION GAP SERPL CALCULATED.3IONS-SCNC: 9 MMOL/L (ref 5–18)
BUN SERPL-MCNC: 13 MG/DL (ref 8–28)
CALCIUM SERPL-MCNC: 9 MG/DL (ref 8.5–10.5)
CHLORIDE BLD-SCNC: 107 MMOL/L (ref 98–107)
CO2 SERPL-SCNC: 27 MMOL/L (ref 22–31)
CREAT SERPL-MCNC: 1.08 MG/DL (ref 0.7–1.3)
ERYTHROCYTE [DISTWIDTH] IN BLOOD BY AUTOMATED COUNT: 14.4 % (ref 11–14.5)
GFR SERPL CREATININE-BSD FRML MDRD: >60 ML/MIN/1.73M2
GLUCOSE BLD-MCNC: 73 MG/DL (ref 70–125)
HCT VFR BLD AUTO: 48.3 % (ref 40–54)
HGB BLD-MCNC: 15.1 G/DL (ref 14–18)
MCH RBC QN AUTO: 29.1 PG (ref 27–34)
MCHC RBC AUTO-ENTMCNC: 31.3 G/DL (ref 32–36)
MCV RBC AUTO: 93 FL (ref 80–100)
PLATELET # BLD AUTO: 262 THOU/UL (ref 140–440)
PMV BLD AUTO: 11.6 FL (ref 8.5–12.5)
POTASSIUM BLD-SCNC: 3.5 MMOL/L (ref 3.5–5)
RBC # BLD AUTO: 5.19 MILL/UL (ref 4.4–6.2)
SODIUM SERPL-SCNC: 143 MMOL/L (ref 136–145)
WBC: 9.2 THOU/UL (ref 4–11)

## 2020-05-01 ENCOUNTER — COMMUNICATION - HEALTHEAST (OUTPATIENT)
Dept: PHYSICAL MEDICINE AND REHAB | Facility: CLINIC | Age: 80
End: 2020-05-01

## 2020-05-12 ENCOUNTER — HOSPITAL ENCOUNTER (OUTPATIENT)
Dept: PHYSICAL MEDICINE AND REHAB | Facility: CLINIC | Age: 80
Discharge: HOME | End: 2020-05-12
Attending: PHYSICIAN ASSISTANT

## 2020-05-12 DIAGNOSIS — R20.2 PARESTHESIA: ICD-10-CM

## 2020-05-12 DIAGNOSIS — M48.02 FORAMINAL STENOSIS OF CERVICAL REGION: ICD-10-CM

## 2020-05-12 DIAGNOSIS — M25.50 POLYARTHRALGIA: ICD-10-CM

## 2020-06-01 ENCOUNTER — RECORDS - HEALTHEAST (OUTPATIENT)
Dept: ADMINISTRATIVE | Facility: OTHER | Age: 80
End: 2020-06-01

## 2020-06-03 ENCOUNTER — AMBULATORY - HEALTHEAST (OUTPATIENT)
Dept: CARDIOLOGY | Facility: CLINIC | Age: 80
End: 2020-06-03

## 2020-06-03 DIAGNOSIS — I25.5 ISCHEMIC CARDIOMYOPATHY: ICD-10-CM

## 2020-06-03 DIAGNOSIS — Z95.810 ICD (IMPLANTABLE CARDIOVERTER-DEFIBRILLATOR), DUAL, IN SITU: ICD-10-CM

## 2020-06-04 ENCOUNTER — OFFICE VISIT - HEALTHEAST (OUTPATIENT)
Dept: CARDIOLOGY | Facility: CLINIC | Age: 80
End: 2020-06-04

## 2020-06-04 DIAGNOSIS — Z95.810 ICD (IMPLANTABLE CARDIOVERTER-DEFIBRILLATOR), DUAL, IN SITU: ICD-10-CM

## 2020-06-04 DIAGNOSIS — I48.0 PAROXYSMAL ATRIAL FIBRILLATION (H): ICD-10-CM

## 2020-06-04 DIAGNOSIS — I71.40 ABDOMINAL AORTIC ANEURYSM (AAA) WITHOUT RUPTURE (H): ICD-10-CM

## 2020-06-04 DIAGNOSIS — I25.118 CORONARY ARTERY DISEASE OF NATIVE HEART WITH STABLE ANGINA PECTORIS, UNSPECIFIED VESSEL OR LESION TYPE (H): ICD-10-CM

## 2020-06-05 ENCOUNTER — OFFICE VISIT - HEALTHEAST (OUTPATIENT)
Dept: CARDIOLOGY | Facility: CLINIC | Age: 80
End: 2020-06-05

## 2020-06-05 ENCOUNTER — AMBULATORY - HEALTHEAST (OUTPATIENT)
Dept: CARDIOLOGY | Facility: CLINIC | Age: 80
End: 2020-06-05

## 2020-06-05 ENCOUNTER — COMMUNICATION - HEALTHEAST (OUTPATIENT)
Dept: CARDIOLOGY | Facility: CLINIC | Age: 80
End: 2020-06-05

## 2020-06-05 DIAGNOSIS — Z95.810 ICD (IMPLANTABLE CARDIOVERTER-DEFIBRILLATOR), DUAL, IN SITU: ICD-10-CM

## 2020-06-05 DIAGNOSIS — I48.0 PAROXYSMAL ATRIAL FIBRILLATION (H): ICD-10-CM

## 2020-06-05 DIAGNOSIS — I50.22 CHRONIC SYSTOLIC CONGESTIVE HEART FAILURE (H): ICD-10-CM

## 2020-06-05 DIAGNOSIS — I25.118 CORONARY ARTERY DISEASE OF NATIVE HEART WITH STABLE ANGINA PECTORIS, UNSPECIFIED VESSEL OR LESION TYPE (H): ICD-10-CM

## 2020-06-05 DIAGNOSIS — I71.40 ABDOMINAL AORTIC ANEURYSM (AAA) WITHOUT RUPTURE (H): ICD-10-CM

## 2020-06-05 ASSESSMENT — MIFFLIN-ST. JEOR: SCORE: 1358.97

## 2020-06-23 ENCOUNTER — HOSPITAL ENCOUNTER (OUTPATIENT)
Dept: PHYSICAL MEDICINE AND REHAB | Facility: CLINIC | Age: 80
Discharge: HOME | End: 2020-06-23
Attending: PHYSICIAN ASSISTANT

## 2020-06-23 ENCOUNTER — COMMUNICATION - HEALTHEAST (OUTPATIENT)
Dept: PHYSICAL MEDICINE AND REHAB | Facility: CLINIC | Age: 80
End: 2020-06-23

## 2020-06-23 DIAGNOSIS — R20.2 PARESTHESIA: ICD-10-CM

## 2020-06-23 DIAGNOSIS — R20.0 ARM NUMBNESS: ICD-10-CM

## 2020-06-29 ENCOUNTER — OFFICE VISIT (OUTPATIENT)
Dept: NEUROLOGY | Facility: CLINIC | Age: 80
End: 2020-06-29
Payer: MEDICARE

## 2020-06-29 ENCOUNTER — RECORDS - HEALTHEAST (OUTPATIENT)
Dept: ADMINISTRATIVE | Facility: OTHER | Age: 80
End: 2020-06-29

## 2020-06-29 VITALS — BODY MASS INDEX: 20.76 KG/M2 | WEIGHT: 145 LBS | HEIGHT: 70 IN

## 2020-06-29 DIAGNOSIS — R20.0 NUMBNESS: Primary | ICD-10-CM

## 2020-06-29 DIAGNOSIS — M54.12 CERVICAL RADICULOPATHY: ICD-10-CM

## 2020-06-29 PROCEDURE — 99205 OFFICE O/P NEW HI 60 MIN: CPT | Mod: 95 | Performed by: PSYCHIATRY & NEUROLOGY

## 2020-06-29 RX ORDER — GABAPENTIN 300 MG/1
900 CAPSULE ORAL 2 TIMES DAILY
COMMUNITY
Start: 2019-12-06

## 2020-06-29 RX ORDER — LORATADINE 10 MG/1
TABLET ORAL
COMMUNITY
Start: 2020-04-10 | End: 2020-09-04

## 2020-06-29 RX ORDER — ATORVASTATIN CALCIUM 80 MG/1
80 TABLET, FILM COATED ORAL DAILY
COMMUNITY
End: 2022-09-01

## 2020-06-29 RX ORDER — ASPIRIN 81 MG/1
81 TABLET ORAL DAILY
COMMUNITY
Start: 2020-02-05 | End: 2020-07-13

## 2020-06-29 RX ORDER — FLUTICASONE PROPIONATE 50 MCG
SPRAY, SUSPENSION (ML) NASAL
COMMUNITY
Start: 2020-04-10 | End: 2020-09-07

## 2020-06-29 RX ORDER — ACETAMINOPHEN 325 MG/1
650 TABLET ORAL EVERY 6 HOURS PRN
COMMUNITY

## 2020-06-29 RX ORDER — MAGNESIUM 200 MG
TABLET ORAL
COMMUNITY
Start: 2019-12-09 | End: 2022-10-10

## 2020-06-29 RX ORDER — SOTALOL HYDROCHLORIDE 80 MG/1
80 TABLET ORAL 2 TIMES DAILY
COMMUNITY
Start: 2020-01-22 | End: 2021-08-03

## 2020-06-29 RX ORDER — TAMSULOSIN HYDROCHLORIDE 0.4 MG/1
0.4 CAPSULE ORAL EVERY EVENING
COMMUNITY
Start: 2018-10-06

## 2020-06-29 ASSESSMENT — MIFFLIN-ST. JEOR: SCORE: 1363.97

## 2020-06-29 NOTE — NURSING NOTE
Computer video visit [email or web address]  Chief Complaint   Patient presents with     Arm Pain     Hand Pain     Martina Munoz on 6/29/2020 at 7:29 AM

## 2020-06-29 NOTE — PROGRESS NOTES
"NEUROLOGY OUTPATIENT CONSULT NOTE (VIDEO)  Jun 29, 2020     CHIEF COMPLAINT/REASON FOR VISIT/REASON FOR CONSULT  Patient presents with:  Arm Pain  Hand Pain    REASON FOR CONSULTATION- Numbness    REFERRAL SOURCE  Dr. Dewey Rowland  CC Dr. Dewey Rowland    Video Visit Consent  Patient is being evaluated via a billable video visit. The patient has been notified of following:   \"This video visit will be conducted via a call between you and your physician/provider. We have found that certain health care needs can be provided without the need for an in-person physical exam. This service lets us provide the care you need with a video conversation. If a prescription is necessary we can send it directly to your pharmacy. If lab work is needed we can place an order for that and you can then stop by our lab to have the test done at a later time.  If during the course of the call the physician/provider feels a video visit is not appropriate, you will not be charged for this service.  Physician has received verbal consent for a Video Visit from the patient? YES  Patient would like the video invitation sent by: Email/SMS    Video Visit Details  Type of service: Video Visit  Video Start Time: 8:00  Video End Time (time video stopped): 8:25  Originating Location (pt. Location): Patient's Home  Distant Location (provider location): [facility] [place]   Mode of Communication: Video Conference via [employer]      HISTORY OF PRESENT ILLNESS  Daniel Alamo is a 80 year old male who comes to the Neurology clinic for evaluation of numbness in the hand.  He reports that symptoms have been going on for several years.  He was previously evaluated and told there was nothing that can be done.  Symptoms have progressed since then.  Reports that the numbness is present in all 5 fingers mainly in the fingertips on both sides.  In addition to that he has elbow and shoulder/arm pain on both sides.  Touching the elbow " or the shoulder region does not cause any pain.  The pain is more of a pins-and-needles/tingling pain.  Denies any sharp shooting pain down the arm.  Denies any neck pain.  He is previously tried gabapentin which does help.  He did try epidural injections once several years ago and his symptoms completely resolved though they did come back 1 day later.  He denies any numbness in his feet.  Denies any balance issues.  MRI is not possible due to defibrillator.  Patient is recently had a CT of cervical spine.  Denies any weakness or any other associated symptoms though per neurosurgery notes he had a lot of atrophy in his hands.    Previous history is reviewed and this is unchanged.    PAST MEDICAL/SURGICAL HISTORY  Significant for defibrillator, anticoagulation for some heart disease is not sure, recent AAA repair, hyperlipidemia    FAMILY HISTORY  Family History   Problem Relation Age of Onset     Cancer Mother      Heart Disease Father    Negative for neurological conditions    SOCIAL HISTORY  Social History     Tobacco Use     Smoking status: Current Every Day Smoker     Packs/day: 0.50     Smokeless tobacco: Never Used   Substance Use Topics     Alcohol use: Yes     Comment: Glass of wine daily.     Drug use: Never       SYSTEMS REVIEW  Twelve-system ROS was done and other than the HPI this was negative    MEDICATIONS  acetaminophen (TYLENOL) 325 MG tablet, Take 650 mg by mouth  apixaban ANTICOAGULANT (ELIQUIS ANTICOAGULANT) 5 MG tablet, 5 mg 2 times daily   aspirin 81 MG EC tablet, Take 81 mg by mouth daily   atorvastatin (LIPITOR) 80 MG tablet, Take 80 mg by mouth daily   Cyanocobalamin (B-12) 1000 MCG SUBL, daily at 2 pm   fluticasone (FLONASE) 50 MCG/ACT nasal spray, as directed  gabapentin (NEURONTIN) 300 MG capsule, Take 900 mg by mouth 2 times daily   loratadine (CLARITIN) 10 MG tablet, 1 tab(s)  sotalol (BETAPACE) 80 MG tablet, Take 80 mg by mouth 2 times daily   tamsulosin (FLOMAX) 0.4 MG capsule, TAKE  "ONE CAPSULE BY MOUTH ONE TIME DAILY    No current facility-administered medications on file prior to visit.        PHYSICAL EXAMINATION  VITALS: Ht 1.778 m (5' 10\")   Wt 65.8 kg (145 lb)   BMI 20.81 kg/m    Exam was limited due to video encounter.    Vitals-Unable to do on video  GENERAL -Health appearing, No apparent distress  EYES- No scleral icterus, no eyelid droop, Pupils symmetric  HEENT - Normocephalic, atraumatic, Hearing grossly intact; Oral mucosa moist and pink in color. External Ears and nose intact.   Neck - soft/flexible with normal ROM on visual inspection.  PULM - Good spontaneous respiratory effort;  CV- No edema on visual inspection  MSK- Gait - see Neuro section; Strength and tone- see Neuro section; Range of motion grossly intact.  Psych- Normal mood and affect. Good judgment and insight.     Neurological  Mental status - Patient is awake and oriented. Attention span is normal. Language is fluent and follows commands appropriately.   Cranial nerves - Pupils are symmetric; EOMI, NLF symmetric  Motor - There is no pronator drift. Antigravity in all 4 ext.  Tone - No evidence of rigidity on visual inspection. No tremor.  Reflexes - Unable to do on video  Sensation - Unable to do on Video  Coordination - Finger to nose without dysmetria.   Gait and station - Romberg is negative. Gait is steady      DIAGNOSTICS  CT cervical spine (Done at [facility])  Images reviewed.  Report is not available to me.  Will request report.  Report was available later: Shows moderate spinal stenosis C4-C5.  There is mild right and moderate left C3-C4 and moderate right and severe left C4-C5 and moderate bilateral C5-C6 and moderate right and mild to moderate left C6-C7 neuroforaminal stenosis.    Patient unable to do MRI because of defibrillator    CT myelogram in 2010 showed some central stenosis at C4-C5, foraminal stenosis that is moderately severe at C5-C6 and C6-C7 on the right and on the left at C3-C4, " C5-C6, C6-C7    EMG  Comment EMG: Normal study  1. Normal needle EMG bilateral upper extremities.    Interpretation: Normal study    EMG in 2010 was negative  OUTSIDE RECORDS  Outside referral notes were reviewed and pertinent information has been summarized in the HPI.  Neurosurgery notes also reviewed.    IMPRESSION/REPORT/PLAN  Numbness  Cervical radiculopathy    This is a 80 year old male with bilateral hand numbness and degeneration of cervical spine.  Clinically patient's symptoms are suggestive of being related to possible cervical radiculopathy given that his symptoms improved completely with epidural injections.  Imaging does not completely explain the numbness that he has.  Unfortunately I am unable to examine him completely rule out other causes.  Dr. Florentino's exam was reviewed.  Patient's numbness would appear to be lower motor neuron due to the atrophy and lack of hyperreflexia.  Since epidural injections did help in the past I would recommend repeating those.  I will check blood work to look for other causes of numbness possibly peripheral neuropathy that spreading to the hands.  I can see him back after the blood work if safe from the pandemic point of view.  If all testing is negative would refer him back to the spine center for further management of cervical degeneration.      -     Vitamin B12  -     Vitamin B1 whole blood  -     TSH with free T4 reflex  -     Protein electrophoresis  -     Methylmalonic Acid  -     Lyme Disease Kiya with reflex to WB Serum  -     Glucose  -     CK total  -     Anti Nuclear Kiya IgG by IFA with Reflex    Aaron Machuca MD  Neurologist  [facility]  Tel:- [phone]    This note was dictated using voice recognition software.  Any grammatical or context distortions are unintentional and inherent to the software.

## 2020-06-29 NOTE — LETTER
"    6/29/2020         RE: Daniel Alamo  [address] [place] [address]        Dear Colleague,    Thank you for referring your patient, Daniel Alamo, to the [facility] [place]. Please see a copy of my visit note below.    NEUROLOGY OUTPATIENT CONSULT NOTE (VIDEO)  Jun 29, 2020     CHIEF COMPLAINT/REASON FOR VISIT/REASON FOR CONSULT  Patient presents with:  Arm Pain  Hand Pain    REASON FOR CONSULTATION- Numbness    REFERRAL SOURCE  Dr. Dewey Rowland  CC Dr. Dewey Rowland    Video Visit Consent  Patient is being evaluated via a billable video visit. The patient has been notified of following:   \"This video visit will be conducted via a call between you and your physician/provider. We have found that certain health care needs can be provided without the need for an in-person physical exam. This service lets us provide the care you need with a video conversation. If a prescription is necessary we can send it directly to your pharmacy. If lab work is needed we can place an order for that and you can then stop by our lab to have the test done at a later time.  If during the course of the call the physician/provider feels a video visit is not appropriate, you will not be charged for this service.  Physician has received verbal consent for a Video Visit from the patient? YES  Patient would like the video invitation sent by: Email/SMS    Video Visit Details  Type of service: Video Visit  Video Start Time: 8:00  Video End Time (time video stopped): 8:25  Originating Location (pt. Location): Patient's Home  Distant Location (provider location): [facility] [place]   Mode of Communication: Video Conference via [employer]      HISTORY OF PRESENT ILLNESS  Daniel Alamo is a 80 year old male who comes to the Neurology clinic for evaluation of numbness in the hand.  He reports that symptoms have been going on for several years.  He was previously evaluated and told there was " nothing that can be done.  Symptoms have progressed since then.  Reports that the numbness is present in all 5 fingers mainly in the fingertips on both sides.  In addition to that he has elbow and shoulder/arm pain on both sides.  Touching the elbow or the shoulder region does not cause any pain.  The pain is more of a pins-and-needles/tingling pain.  Denies any sharp shooting pain down the arm.  Denies any neck pain.  He is previously tried gabapentin which does help.  He did try epidural injections once several years ago and his symptoms completely resolved though they did come back 1 day later.  He denies any numbness in his feet.  Denies any balance issues.  MRI is not possible due to defibrillator.  Patient is recently had a CT of cervical spine.  Denies any weakness or any other associated symptoms though per neurosurgery notes he had a lot of atrophy in his hands.    Previous history is reviewed and this is unchanged.    PAST MEDICAL/SURGICAL HISTORY  Significant for defibrillator, anticoagulation for some heart disease is not sure, recent AAA repair, hyperlipidemia    FAMILY HISTORY  Family History   Problem Relation Age of Onset     Cancer Mother      Heart Disease Father    Negative for neurological conditions    SOCIAL HISTORY  Social History     Tobacco Use     Smoking status: Current Every Day Smoker     Packs/day: 0.50     Smokeless tobacco: Never Used   Substance Use Topics     Alcohol use: Yes     Comment: Glass of wine daily.     Drug use: Never       SYSTEMS REVIEW  Twelve-system ROS was done and other than the HPI this was negative    MEDICATIONS  acetaminophen (TYLENOL) 325 MG tablet, Take 650 mg by mouth  apixaban ANTICOAGULANT (ELIQUIS ANTICOAGULANT) 5 MG tablet, 5 mg 2 times daily   aspirin 81 MG EC tablet, Take 81 mg by mouth daily   atorvastatin (LIPITOR) 80 MG tablet, Take 80 mg by mouth daily   Cyanocobalamin (B-12) 1000 MCG SUBL, daily at 2 pm   fluticasone (FLONASE) 50 MCG/ACT nasal  "spray, as directed  gabapentin (NEURONTIN) 300 MG capsule, Take 900 mg by mouth 2 times daily   loratadine (CLARITIN) 10 MG tablet, 1 tab(s)  sotalol (BETAPACE) 80 MG tablet, Take 80 mg by mouth 2 times daily   tamsulosin (FLOMAX) 0.4 MG capsule, TAKE ONE CAPSULE BY MOUTH ONE TIME DAILY    No current facility-administered medications on file prior to visit.        PHYSICAL EXAMINATION  VITALS: Ht 1.778 m (5' 10\")   Wt 65.8 kg (145 lb)   BMI 20.81 kg/m    Exam was limited due to video encounter.    Vitals-Unable to do on video  GENERAL -Health appearing, No apparent distress  EYES- No scleral icterus, no eyelid droop, Pupils symmetric  HEENT - Normocephalic, atraumatic, Hearing grossly intact; Oral mucosa moist and pink in color. External Ears and nose intact.   Neck - soft/flexible with normal ROM on visual inspection.  PULM - Good spontaneous respiratory effort;  CV- No edema on visual inspection  MSK- Gait - see Neuro section; Strength and tone- see Neuro section; Range of motion grossly intact.  Psych- Normal mood and affect. Good judgment and insight.     Neurological  Mental status - Patient is awake and oriented. Attention span is normal. Language is fluent and follows commands appropriately.   Cranial nerves - Pupils are symmetric; EOMI, NLF symmetric  Motor - There is no pronator drift. Antigravity in all 4 ext.  Tone - No evidence of rigidity on visual inspection. No tremor.  Reflexes - Unable to do on video  Sensation - Unable to do on Video  Coordination - Finger to nose without dysmetria.   Gait and station - Romberg is negative. Gait is steady      DIAGNOSTICS  CT cervical spine (Done at [facility])  Images reviewed.  Report is not available to me.  Will request report.  Report was available later: Shows moderate spinal stenosis C4-C5.  There is mild right and moderate left C3-C4 and moderate right and severe left C4-C5 and moderate bilateral C5-C6 and moderate right and mild to moderate left " C6-C7 neuroforaminal stenosis.    Patient unable to do MRI because of defibrillator    CT myelogram in 2010 showed some central stenosis at C4-C5, foraminal stenosis that is moderately severe at C5-C6 and C6-C7 on the right and on the left at C3-C4, C5-C6, C6-C7    EMG  Comment EMG: Normal study  1. Normal needle EMG bilateral upper extremities.    Interpretation: Normal study    EMG in 2010 was negative  OUTSIDE RECORDS  Outside referral notes were reviewed and pertinent information has been summarized in the HPI.  Neurosurgery notes also reviewed.    IMPRESSION/REPORT/PLAN  Numbness  Cervical radiculopathy    This is a 80 year old male with bilateral hand numbness and degeneration of cervical spine.  Clinically patient's symptoms are suggestive of being related to possible cervical radiculopathy given that his symptoms improved completely with epidural injections.  Imaging does not completely explain the numbness that he has.  Unfortunately I am unable to examine him completely rule out other causes.  Dr. Florentino's exam was reviewed.  Patient's numbness would appear to be lower motor neuron due to the atrophy and lack of hyperreflexia.  Since epidural injections did help in the past I would recommend repeating those.  I will check blood work to look for other causes of numbness possibly peripheral neuropathy that spreading to the hands.  I can see him back after the blood work if safe from the pandemic point of view.  If all testing is negative would refer him back to the spine center for further management of cervical degeneration.      -     Vitamin B12  -     Vitamin B1 whole blood  -     TSH with free T4 reflex  -     Protein electrophoresis  -     Methylmalonic Acid  -     Lyme Disease Kiya with reflex to WB Serum  -     Glucose  -     CK total  -     Anti Nuclear Kiya IgG by IFA with Reflex    Aaron Machuca MD  Neurologist  [facility]  Tel:- [phone]    This note was  dictated using voice recognition software.  Any grammatical or context distortions are unintentional and inherent to the software.      Again, thank you for allowing me to participate in the care of your patient.        Sincerely,        Aaron Machuca MD

## 2020-07-09 ENCOUNTER — RECORDS - HEALTHEAST (OUTPATIENT)
Dept: LAB | Facility: HOSPITAL | Age: 80
End: 2020-07-09

## 2020-07-09 LAB
CK SERPL-CCNC: 46 U/L (ref 30–190)
FASTING STATUS PATIENT QL REPORTED: YES
GLUCOSE BLD-MCNC: 100 MG/DL (ref 70–125)
TSH SERPL DL<=0.005 MIU/L-ACNC: 0.75 UIU/ML (ref 0.3–5)
VIT B12 SERPL-MCNC: 254 PG/ML (ref 213–816)

## 2020-07-10 LAB
ALBUMIN PERCENT: 60.8 % (ref 51–67)
ALBUMIN SERPL ELPH-MCNC: 3.7 G/DL (ref 3.2–4.7)
ALPHA 1 PERCENT: 2.3 % (ref 2–4)
ALPHA 2 PERCENT: 10.8 % (ref 5–13)
ALPHA1 GLOB SERPL ELPH-MCNC: 0.1 G/DL (ref 0.1–0.3)
ALPHA2 GLOB SERPL ELPH-MCNC: 0.7 G/DL (ref 0.4–0.9)
ANA SER QL: 0.3 U
B BURGDOR IGG+IGM SER QL: 0.02 INDEX VALUE
B-GLOBULIN SERPL ELPH-MCNC: 0.7 G/DL (ref 0.7–1.2)
BETA PERCENT: 11.7 % (ref 10–17)
GAMMA GLOB SERPL ELPH-MCNC: 0.9 G/DL (ref 0.6–1.4)
GAMMA GLOBULIN PERCENT: 14.4 % (ref 9–20)
PATH ICD:: NORMAL
PROT PATTERN SERPL ELPH-IMP: NORMAL
PROT SERPL-MCNC: 6.1 G/DL (ref 6–8)
REVIEWING PATHOLOGIST: NORMAL

## 2020-07-11 LAB — METHYLMALONATE SERPL-SCNC: 0.23 UMOL/L (ref 0–0.4)

## 2020-07-12 LAB — VIT B1 PYROPHOSHATE BLD-SCNC: 137 NMOL/L (ref 70–180)

## 2020-07-13 ENCOUNTER — OFFICE VISIT (OUTPATIENT)
Dept: NEUROLOGY | Facility: CLINIC | Age: 80
End: 2020-07-13
Payer: MEDICARE

## 2020-07-13 VITALS
BODY MASS INDEX: 20.33 KG/M2 | WEIGHT: 142 LBS | DIASTOLIC BLOOD PRESSURE: 79 MMHG | HEART RATE: 65 BPM | SYSTOLIC BLOOD PRESSURE: 144 MMHG | HEIGHT: 70 IN

## 2020-07-13 DIAGNOSIS — M54.12 CERVICAL RADICULOPATHY: ICD-10-CM

## 2020-07-13 DIAGNOSIS — R20.0 NUMBNESS: Primary | ICD-10-CM

## 2020-07-13 PROCEDURE — 99214 OFFICE O/P EST MOD 30 MIN: CPT | Performed by: PSYCHIATRY & NEUROLOGY

## 2020-07-13 ASSESSMENT — MIFFLIN-ST. JEOR: SCORE: 1350.36

## 2020-07-13 NOTE — NURSING NOTE
Chief Complaint   Patient presents with     Follow Up     Numbness     Dorothy Noland on 7/13/2020 at 10:38 AM

## 2020-07-13 NOTE — LETTER
7/13/2020         RE: Daniel Alamo  [address] [facility] [address]        Dear Colleague,    Thank you for referring your patient, Daniel Alamo, to the [facility] NEUROLOGY [place]. Please see a copy of my visit note below.    NEUROLOGY OUTPATIENT PROGRESS NOTE   Jul 13, 2020     CHIEF COMPLAINT/REASON FOR VISIT/REASON FOR CONSULT  Patient presents with:  Follow Up  Numbness    REASON FOR CONSULTATION- Numbness    REFERRAL SOURCE  Dr. eDwey Rowland  CC Dr. Dewey Rowland    HISTORY OF PRESENT ILLNESS  Daniel Alamo is a 80 year old male who comes to the Neurology clinic for evaluation of numbness in the hand.  He reports that symptoms have been going on for several years.  He was previously evaluated and told there was nothing that can be done.  Symptoms have progressed since then.  Reports that the numbness is present in all 5 fingers mainly in the fingertips on both sides.  In addition to that he has elbow and shoulder/arm pain on both sides.  Touching the elbow or the shoulder region does not cause any pain.  The pain is more of a pins-and-needles/tingling pain.  Denies any sharp shooting pain down the arm.  Denies any neck pain.  He is previously tried gabapentin which does help.  He did try epidural injections once several years ago and his symptoms completely resolved though they did come back 1 day later.  He denies any numbness in his feet.  Denies any balance issues.  MRI is not possible due to defibrillator.  Patient is recently had a CT of cervical spine.  Denies any weakness or any other associated symptoms though per neurosurgery notes he had a lot of atrophy in his hands.    > Patient returns today.  Reports numbness and tingling is intermittent and is present in both hands.  Denies numbness anywhere else.  Denies numbness in his feet.  Gabapentin has not helped.  Epidural steroids only helped for 1 day.  Patient reports no weakness.  Does complain of some balance issues  "that he has to hold the railing.  Blood work did show low vitamin B12.  He does take tablets.  His primary care doctor has recommended shots.  He denies any difficulty with feeling the floor.  Reports no other new symptoms.    Previous history is reviewed and this is unchanged.    PAST MEDICAL/SURGICAL HISTORY  Significant for defibrillator, anticoagulation for some heart disease is not sure, recent AAA repair, hyperlipidemia    FAMILY HISTORY  Family History   Problem Relation Age of Onset     Cancer Mother      Heart Disease Father    Negative for neurological conditions    SOCIAL HISTORY  Social History     Tobacco Use     Smoking status: Current Every Day Smoker     Packs/day: 0.50     Smokeless tobacco: Never Used   Substance Use Topics     Alcohol use: Yes     Comment: Glass of wine daily.     Drug use: Never       SYSTEMS REVIEW  Twelve-system ROS was done and other than the HPI this was negative    MEDICATIONS  acetaminophen (TYLENOL) 325 MG tablet, Take 650 mg by mouth  apixaban ANTICOAGULANT (ELIQUIS ANTICOAGULANT) 5 MG tablet, 5 mg 2 times daily   aspirin 81 MG EC tablet, Take 81 mg by mouth daily   atorvastatin (LIPITOR) 80 MG tablet, Take 80 mg by mouth daily   Cyanocobalamin (B-12) 1000 MCG SUBL, daily at 2 pm   fluticasone (FLONASE) 50 MCG/ACT nasal spray, as directed  gabapentin (NEURONTIN) 300 MG capsule, Take 900 mg by mouth 2 times daily   loratadine (CLARITIN) 10 MG tablet, 1 tab(s)  sotalol (BETAPACE) 80 MG tablet, Take 80 mg by mouth 2 times daily   tamsulosin (FLOMAX) 0.4 MG capsule, TAKE ONE CAPSULE BY MOUTH ONE TIME DAILY    No current facility-administered medications on file prior to visit.        PHYSICAL EXAMINATION  VITALS: BP (!) 144/79   Pulse 65   Ht 1.778 m (5' 10\")   Wt 64.4 kg (142 lb)   BMI 20.37 kg/m    GENERAL: Healthy appearing, alert, no acute distress, normal habitus.  CARDIOVASCULAR: Extremities warm and well-perfused.  EYES: Funduscopic exam does not reveal any " papilledema.   NEUROLOGICAL:  Patient is awake and oriented to self, place and time.  Attention span is normal.  Memory is grossly intact.  Language is fluent and follows commands appropriately.  Appropriate fund of knowledge. Cranial nerves 2-12 are intact. There is no pronator drift.  Motor exam shows 5/5 strength in all extremities.  Tone is symmetric bilaterally in upper and lower extremities.  Reflexes are symmetric and absent in upper extremities and lower extremities. Sensory exam is grossly intact to light touch, pin prick and vibration.  Pinprick does increase in intensity as it go up.  Finger to nose and heel to shin is without dysmetria.  Romberg is negative though he is unsteady.  Gait is normal and the patient is able to do tandem walk and walk on toes and heels.         DIAGNOSTICS  CT cervical spine (Done at [facility])  Images reviewed.  Report is not available to me.  Will request report.  Report was available later: Shows moderate spinal stenosis C4-C5.  There is mild right and moderate left C3-C4 and moderate right and severe left C4-C5 and moderate bilateral C5-C6 and moderate right and mild to moderate left C6-C7 neuroforaminal stenosis.    Patient unable to do MRI because of defibrillator    CT myelogram in 2010 showed some central stenosis at C4-C5, foraminal stenosis that is moderately severe at C5-C6 and C6-C7 on the right and on the left at C3-C4, C5-C6, C6-C7    EMG  Comment EMG: Normal study  1. Normal needle EMG bilateral upper extremities.    Interpretation: Normal study    EMG in 2010 was negative  OUTSIDE RECORDS  Outside referral notes were reviewed and pertinent information has been summarized in the HPI.  Neurosurgery notes also reviewed.    Blood work  Component      Latest Ref Rng & Units 7/9/2020   Albumin %      51.0 - 67.0 % 60.8   Albumin       3.2 - 4.7 g/dL 3.7   Alpha 1 %      2.0 - 4.0 % 2.3   Alpha 1      0.1 - 0.3 g/dL 0.1   Alpha 2 %      5.0 - 13.0 % 10.8    Alpha 2      0.4 - 0.9 g/dL 0.7   % Beta      10.0 - 17.0 % 11.7   Beta      0.7 - 1.2 g/dL 0.7   Gamma Globulin %      9.0 - 20.0 % 14.4   Gamma Globulin      0.6 - 1.4 g/dL 0.9   ELP Comment       Unremarkable protein electrophoresis.   Protein, Total      6.0 - 8.0 g/dL 6.1   Path ICD:       R20.0   Interpreted By:       Clovis Avendaño MD   Glucose      70 - 125 mg/dL 100   Patient Fasting > 8hrs?       Yes   CINDY Screen [place]      <=2.9 U 0.3   Vitamin B-12      213 - 816 pg/mL 254   Vitamin B1, Whole Blood      70 - 180 nmol/L 137   TSH      0.30 - 5.00 uIU/mL 0.75   MMA Serum/Plasma, Vitamin B12 Status      0.00 - 0.40 umol/L 0.23   CK, Total      30 - 190 U/L 46   Lyme Antibody Cascade      <0.90 Index Value 0.02       IMPRESSION/REPORT/PLAN  Numbness  Cervical radiculopathy  ?  Peripheral neuropathy    This is a 80 year old male with bilateral hand numbness and degeneration of cervical spine.  Clinically patient's symptoms are suggestive of being related to possible cervical radiculopathy given that his symptoms improved completely with epidural injections.  He does have neuroforaminal stenosis on the CT myelogram.  MRI is not possible.  EMG of the upper extremities does not show any evidence of radiculopathy.  One possibility could be a peripheral neuropathy though he does not complain of numbness in his legs.  He does have absent reflexes and an unsteady Romberg.  I will check an EMG of his lower extremities to see if there is any evidence of neuropathy.  He will get B12 injections through his primary care doctor.  Would recommend repeating the epidural injection if the EMG of the lower extremities is negative.  If all testing is negative would refer him back to the spine center for further management of cervical degeneration.    -     EMG LUIS Machuca MD  Neurologist  [facility] Neurology [facility]  Tel:- [phone]    This note was dictated using voice recognition  software.  Any grammatical or context distortions are unintentional and inherent to the software.      Again, thank you for allowing me to participate in the care of your patient.        Sincerely,        Aaron Machuca MD

## 2020-07-13 NOTE — PROGRESS NOTES
NEUROLOGY OUTPATIENT PROGRESS NOTE   Jul 13, 2020     CHIEF COMPLAINT/REASON FOR VISIT/REASON FOR CONSULT  Patient presents with:  Follow Up  Numbness    REASON FOR CONSULTATION- Numbness    REFERRAL SOURCE  Dr. Dewey Rowland  CC Dr. Dewey Rowland    HISTORY OF PRESENT ILLNESS  Daniel Alamo is a 80 year old male who comes to the Neurology clinic for evaluation of numbness in the hand.  He reports that symptoms have been going on for several years.  He was previously evaluated and told there was nothing that can be done.  Symptoms have progressed since then.  Reports that the numbness is present in all 5 fingers mainly in the fingertips on both sides.  In addition to that he has elbow and shoulder/arm pain on both sides.  Touching the elbow or the shoulder region does not cause any pain.  The pain is more of a pins-and-needles/tingling pain.  Denies any sharp shooting pain down the arm.  Denies any neck pain.  He is previously tried gabapentin which does help.  He did try epidural injections once several years ago and his symptoms completely resolved though they did come back 1 day later.  He denies any numbness in his feet.  Denies any balance issues.  MRI is not possible due to defibrillator.  Patient is recently had a CT of cervical spine.  Denies any weakness or any other associated symptoms though per neurosurgery notes he had a lot of atrophy in his hands.    > Patient returns today.  Reports numbness and tingling is intermittent and is present in both hands.  Denies numbness anywhere else.  Denies numbness in his feet.  Gabapentin has not helped.  Epidural steroids only helped for 1 day.  Patient reports no weakness.  Does complain of some balance issues that he has to hold the railing.  Blood work did show low vitamin B12.  He does take tablets.  His primary care doctor has recommended shots.  He denies any difficulty with feeling the floor.  Reports no other new symptoms.    Previous history is  "reviewed and this is unchanged.    PAST MEDICAL/SURGICAL HISTORY  Significant for defibrillator, anticoagulation for some heart disease is not sure, recent AAA repair, hyperlipidemia    FAMILY HISTORY  Family History   Problem Relation Age of Onset     Cancer Mother      Heart Disease Father    Negative for neurological conditions    SOCIAL HISTORY  Social History     Tobacco Use     Smoking status: Current Every Day Smoker     Packs/day: 0.50     Smokeless tobacco: Never Used   Substance Use Topics     Alcohol use: Yes     Comment: Glass of wine daily.     Drug use: Never       SYSTEMS REVIEW  Twelve-system ROS was done and other than the HPI this was negative    MEDICATIONS  acetaminophen (TYLENOL) 325 MG tablet, Take 650 mg by mouth  apixaban ANTICOAGULANT (ELIQUIS ANTICOAGULANT) 5 MG tablet, 5 mg 2 times daily   aspirin 81 MG EC tablet, Take 81 mg by mouth daily   atorvastatin (LIPITOR) 80 MG tablet, Take 80 mg by mouth daily   Cyanocobalamin (B-12) 1000 MCG SUBL, daily at 2 pm   fluticasone (FLONASE) 50 MCG/ACT nasal spray, as directed  gabapentin (NEURONTIN) 300 MG capsule, Take 900 mg by mouth 2 times daily   loratadine (CLARITIN) 10 MG tablet, 1 tab(s)  sotalol (BETAPACE) 80 MG tablet, Take 80 mg by mouth 2 times daily   tamsulosin (FLOMAX) 0.4 MG capsule, TAKE ONE CAPSULE BY MOUTH ONE TIME DAILY    No current facility-administered medications on file prior to visit.        PHYSICAL EXAMINATION  VITALS: BP (!) 144/79   Pulse 65   Ht 1.778 m (5' 10\")   Wt 64.4 kg (142 lb)   BMI 20.37 kg/m    GENERAL: Healthy appearing, alert, no acute distress, normal habitus.  CARDIOVASCULAR: Extremities warm and well-perfused.  EYES: Funduscopic exam does not reveal any papilledema.   NEUROLOGICAL:  Patient is awake and oriented to self, place and time.  Attention span is normal.  Memory is grossly intact.  Language is fluent and follows commands appropriately.  Appropriate fund of knowledge. Cranial nerves 2-12 are " intact. There is no pronator drift.  Motor exam shows 5/5 strength in all extremities.  Tone is symmetric bilaterally in upper and lower extremities.  Reflexes are symmetric and absent in upper extremities and lower extremities. Sensory exam is grossly intact to light touch, pin prick and vibration.  Pinprick does increase in intensity as it go up.  Finger to nose and heel to shin is without dysmetria.  Romberg is negative though he is unsteady.  Gait is normal and the patient is able to do tandem walk and walk on toes and heels.         DIAGNOSTICS  CT cervical spine (Done at [facility])  Images reviewed.  Report is not available to me.  Will request report.  Report was available later: Shows moderate spinal stenosis C4-C5.  There is mild right and moderate left C3-C4 and moderate right and severe left C4-C5 and moderate bilateral C5-C6 and moderate right and mild to moderate left C6-C7 neuroforaminal stenosis.    Patient unable to do MRI because of defibrillator    CT myelogram in 2010 showed some central stenosis at C4-C5, foraminal stenosis that is moderately severe at C5-C6 and C6-C7 on the right and on the left at C3-C4, C5-C6, C6-C7    EMG  Comment EMG: Normal study  1. Normal needle EMG bilateral upper extremities.    Interpretation: Normal study    EMG in 2010 was negative  OUTSIDE RECORDS  Outside referral notes were reviewed and pertinent information has been summarized in the HPI.  Neurosurgery notes also reviewed.    Blood work  Component      Latest Ref Rng & Units 7/9/2020   Albumin %      51.0 - 67.0 % 60.8   Albumin       3.2 - 4.7 g/dL 3.7   Alpha 1 %      2.0 - 4.0 % 2.3   Alpha 1      0.1 - 0.3 g/dL 0.1   Alpha 2 %      5.0 - 13.0 % 10.8   Alpha 2      0.4 - 0.9 g/dL 0.7   % Beta      10.0 - 17.0 % 11.7   Beta      0.7 - 1.2 g/dL 0.7   Gamma Globulin %      9.0 - 20.0 % 14.4   Gamma Globulin      0.6 - 1.4 g/dL 0.9   ELP Comment       Unremarkable protein electrophoresis.   Protein,  Total      6.0 - 8.0 g/dL 6.1   Path ICD:       R20.0   Interpreted By:       Clovis Avendaño MD   Glucose      70 - 125 mg/dL 100   Patient Fasting > 8hrs?       Yes   CINDY Screen [place]      <=2.9 U 0.3   Vitamin B-12      213 - 816 pg/mL 254   Vitamin B1, Whole Blood      70 - 180 nmol/L 137   TSH      0.30 - 5.00 uIU/mL 0.75   MMA Serum/Plasma, Vitamin B12 Status      0.00 - 0.40 umol/L 0.23   CK, Total      30 - 190 U/L 46   Lyme Antibody Cascade      <0.90 Index Value 0.02       IMPRESSION/REPORT/PLAN  Numbness  Cervical radiculopathy  ?  Peripheral neuropathy    This is a 80 year old male with bilateral hand numbness and degeneration of cervical spine.  Clinically patient's symptoms are suggestive of being related to possible cervical radiculopathy given that his symptoms improved completely with epidural injections.  He does have neuroforaminal stenosis on the CT myelogram.  MRI is not possible.  EMG of the upper extremities does not show any evidence of radiculopathy.  One possibility could be a peripheral neuropathy though he does not complain of numbness in his legs.  He does have absent reflexes and an unsteady Romberg.  I will check an EMG of his lower extremities to see if there is any evidence of neuropathy.  He will get B12 injections through his primary care doctor.  Would recommend repeating the epidural injection if the EMG of the lower extremities is negative.  If all testing is negative would refer him back to the spine center for further management of cervical degeneration.    -     EMG LUIS Machuca MD  Neurologist  [facility] Neurology [facility]  Tel:- [phone]    This note was dictated using voice recognition software.  Any grammatical or context distortions are unintentional and inherent to the software.

## 2020-09-02 ENCOUNTER — AMBULATORY - HEALTHEAST (OUTPATIENT)
Dept: CARDIOLOGY | Facility: CLINIC | Age: 80
End: 2020-09-02

## 2020-09-02 DIAGNOSIS — I25.5 ISCHEMIC CARDIOMYOPATHY: ICD-10-CM

## 2020-09-02 DIAGNOSIS — Z95.810 ICD (IMPLANTABLE CARDIOVERTER-DEFIBRILLATOR), DUAL, IN SITU: ICD-10-CM

## 2020-09-04 ENCOUNTER — OFFICE VISIT (OUTPATIENT)
Dept: NEUROLOGY | Facility: CLINIC | Age: 80
End: 2020-09-04
Attending: PSYCHIATRY & NEUROLOGY
Payer: MEDICARE

## 2020-09-04 VITALS
WEIGHT: 142 LBS | HEART RATE: 55 BPM | DIASTOLIC BLOOD PRESSURE: 79 MMHG | SYSTOLIC BLOOD PRESSURE: 149 MMHG | HEIGHT: 70 IN | BODY MASS INDEX: 20.33 KG/M2

## 2020-09-04 DIAGNOSIS — R20.0 NUMBNESS: Primary | ICD-10-CM

## 2020-09-04 DIAGNOSIS — G62.9 NEUROPATHY: ICD-10-CM

## 2020-09-04 PROCEDURE — 95910 NRV CNDJ TEST 7-8 STUDIES: CPT | Performed by: PSYCHIATRY & NEUROLOGY

## 2020-09-04 PROCEDURE — 95886 MUSC TEST DONE W/N TEST COMP: CPT | Mod: RT | Performed by: PSYCHIATRY & NEUROLOGY

## 2020-09-04 PROCEDURE — 99215 OFFICE O/P EST HI 40 MIN: CPT | Performed by: PSYCHIATRY & NEUROLOGY

## 2020-09-04 ASSESSMENT — MIFFLIN-ST. JEOR: SCORE: 1350.36

## 2020-09-04 NOTE — LETTER
9/4/2020         RE: Daniel Almao  [address] [facility] [address]        Dear Colleague,    Thank you for referring your patient, Daniel Alamo, to the [facility] NEUROLOGY [place]. Please see a copy of my visit note below.    See procedure note    Again, thank you for allowing me to participate in the care of your patient.        Sincerely,        Aaron Machuca MD

## 2020-09-04 NOTE — LETTER
9/4/2020         RE: Daniel Alamo  [address] [facility] [address]        Dear Colleague,    Thank you for referring your patient, Daniel Alamo, to the [facility] NEUROLOGY [place]. Please see a copy of my visit note below.    NEUROLOGY OUTPATIENT PROGRESS NOTE   Sep 4, 2020     CHIEF COMPLAINT/REASON FOR VISIT/REASON FOR CONSULT  Patient presents with:  Follow Up  Numbness    REASON FOR CONSULTATION- Numbness    REFERRAL SOURCE  Dr. Dewey Rowland  CC Dr. Dewey Rowland    HISTORY OF PRESENT ILLNESS  Daniel Alamo is a 80 year old male who comes to the Neurology clinic for evaluation of numbness in the hand.  He reports that symptoms have been going on for several years.  He was previously evaluated and told there was nothing that can be done.  Symptoms have progressed since then.  Reports that the numbness is present in all 5 fingers mainly in the fingertips on both sides.  In addition to that he has elbow and shoulder/arm pain on both sides.  Touching the elbow or the shoulder region does not cause any pain.  The pain is more of a pins-and-needles/tingling pain.  Denies any sharp shooting pain down the arm.  Denies any neck pain.  He is previously tried gabapentin which does help.  He did try epidural injections once several years ago and his symptoms completely resolved though they did come back 1 day later.  He denies any numbness in his feet.  Denies any balance issues.  MRI is not possible due to defibrillator.  Patient is recently had a CT of cervical spine.  Denies any weakness or any other associated symptoms though per neurosurgery notes he had a lot of atrophy in his hands.    > Patient returns today.  Reports numbness and tingling is intermittent and is present in both hands.  Denies numbness anywhere else.  Denies numbness in his feet.  Gabapentin has not helped.  Epidural steroids only helped for 1 day.  Patient reports no weakness.  Does complain of some balance issues that  "he has to hold the railing.  Blood work did show low vitamin B12.  He does take tablets.  His primary care doctor has recommended shots.  He denies any difficulty with feeling the floor.  Reports no other new symptoms.    9/4/20  Patient returns to the clinic today.  Continues to complain of numbness in the hands.  Has not improved or worsened.  He did complete the EMG today.  Reports no new balance issues or any numbness in the legs.    Previous history is reviewed and this is unchanged.    PAST MEDICAL/SURGICAL HISTORY  Significant for defibrillator, anticoagulation for some heart disease is not sure, recent AAA repair, hyperlipidemia    FAMILY HISTORY  Family History   Problem Relation Age of Onset     Cancer Mother      Heart Disease Father    Negative for neurological conditions    SOCIAL HISTORY  Social History     Tobacco Use     Smoking status: Current Every Day Smoker     Packs/day: 0.50     Smokeless tobacco: Never Used   Substance Use Topics     Alcohol use: Yes     Comment: Glass of wine daily.     Drug use: Never       SYSTEMS REVIEW  Twelve-system ROS was done and other than the HPI this was negative    MEDICATIONS  acetaminophen (TYLENOL) 325 MG tablet, Take 650 mg by mouth  apixaban ANTICOAGULANT (ELIQUIS ANTICOAGULANT) 5 MG tablet, 5 mg 2 times daily   atorvastatin (LIPITOR) 80 MG tablet, Take 80 mg by mouth daily   Cyanocobalamin (B-12) 1000 MCG SUBL, daily at 2 pm   gabapentin (NEURONTIN) 300 MG capsule, Take 900 mg by mouth 2 times daily   sotalol (BETAPACE) 80 MG tablet, Take 80 mg by mouth 2 times daily   tamsulosin (FLOMAX) 0.4 MG capsule, TAKE ONE CAPSULE BY MOUTH ONE TIME DAILY    No current facility-administered medications on file prior to visit.        PHYSICAL EXAMINATION  VITALS: BP (!) 149/79   Pulse 55   Ht 1.778 m (5' 10\")   Wt 64.4 kg (142 lb)   BMI 20.37 kg/m    GENERAL: Healthy appearing, alert, no acute distress, normal habitus.  CARDIOVASCULAR: Extremities warm and " well-perfused.  EYES: Funduscopic exam does not reveal any papilledema.   NEUROLOGICAL:  Patient is awake and oriented to self, place and time.  Attention span is normal.  Memory is grossly intact.  Language is fluent and follows commands appropriately.  Appropriate fund of knowledge. Cranial nerves 2-12 are intact. There is no pronator drift.  Motor exam shows 5/5 strength in all extremities.  Tone is symmetric bilaterally in upper and lower extremities.  Reflexes are symmetric and absent in upper extremities and lower extremities. Sensory exam is grossly intact to light touch, pin prick and vibration.  Pinprick does increase in intensity as it go up.  Finger to nose and heel to shin is without dysmetria.  Romberg is negative though he is unsteady.  Gait is normal and the patient is able to do tandem walk and walk on toes and heels.     DIAGNOSTICS  CT cervical spine (Done at [facility])  Images reviewed.  Report is not available to me.  Will request report.  Report was available later: Shows moderate spinal stenosis C4-C5.  There is mild right and moderate left C3-C4 and moderate right and severe left C4-C5 and moderate bilateral C5-C6 and moderate right and mild to moderate left C6-C7 neuroforaminal stenosis.    Patient unable to do MRI because of defibrillator    CT myelogram in 2010 showed some central stenosis at C4-C5, foraminal stenosis that is moderately severe at C5-C6 and C6-C7 on the right and on the left at C3-C4, C5-C6, C6-C7    EMG BUE 2020 (Dr. Rowland)  Comment EMG: Normal study  1. Normal needle EMG bilateral upper extremities.    Interpretation: Normal study    EMG in 2010 was negative    EMG BLE 9/4/2020  CLINICAL INTERPRETATION:  This is an abnormal nerve conduction and EMG study.  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  Further clinical correlation is needed.     OUTSIDE RECORDS  Outside referral notes were reviewed and pertinent information has been  summarized in the HPI.  Neurosurgery notes also reviewed.    Blood work  Component      Latest Ref Rng & Units 7/9/2020   Albumin %      51.0 - 67.0 % 60.8   Albumin       3.2 - 4.7 g/dL 3.7   Alpha 1 %      2.0 - 4.0 % 2.3   Alpha 1      0.1 - 0.3 g/dL 0.1   Alpha 2 %      5.0 - 13.0 % 10.8   Alpha 2      0.4 - 0.9 g/dL 0.7   % Beta      10.0 - 17.0 % 11.7   Beta      0.7 - 1.2 g/dL 0.7   Gamma Globulin %      9.0 - 20.0 % 14.4   Gamma Globulin      0.6 - 1.4 g/dL 0.9   ELP Comment       Unremarkable protein electrophoresis.   Protein, Total      6.0 - 8.0 g/dL 6.1   Path ICD:       R20.0   Interpreted By:       Clovis Avendaño MD   Glucose      70 - 125 mg/dL 100   Patient Fasting > 8hrs?       Yes   CINDY Screen [place]      <=2.9 U 0.3   Vitamin B-12      213 - 816 pg/mL 254   Vitamin B1, Whole Blood      70 - 180 nmol/L 137   TSH      0.30 - 5.00 uIU/mL 0.75   MMA Serum/Plasma, Vitamin B12 Status      0.00 - 0.40 umol/L 0.23   CK, Total      30 - 190 U/L 46   Lyme Antibody Cascade      <0.90 Index Value 0.02       IMPRESSION/REPORT/PLAN  Numbness  Cervical radiculopathy  peripheral neuropathy  Low B12 level    This is a 80 year old male with bilateral hand numbness and degeneration of cervical spine.  Clinically patient's symptoms are suggestive of being related to possible cervical radiculopathy given that his symptoms improved completely with epidural injections.  He does have neuroforaminal stenosis on the CT myelogram.  MRI is not possible.  EMG of the upper extremities does not show any evidence of radiculopathy.  EMG of the lower extremities does show peripheral neuropathy.  This could be related to the patient's symptoms though I cannot say confidently.  I would encourage him to make an appointment for the epidural injection and see if that helps the numbness again.  Should follow back in the spine center.  Blood work has been negative for cause of neuropathy.  He should continue getting B12 injections  through his primary care doctor since this is on the lower end.  Can see him back in 6 months for follow-up of his neuropathy.    Over 40 minutes were spent coordinating the care for the patient today.  More than 50% of the time was spent counseling, educating the patient.  Making plan with patient.  Explaining him all the test results.    Aaron Machuca MD  Neurologist  [facility] Neurology [facility]  Tel:- [phone]    This note was dictated using voice recognition software.  Any grammatical or context distortions are unintentional and inherent to the software.      Again, thank you for allowing me to participate in the care of your patient.        Sincerely,        Aaron Mahcuca MD

## 2020-09-04 NOTE — NURSING NOTE
Chief Complaint   Patient presents with     Follow Up     Numbness     Dorothy Noland on 9/4/2020 at 10:16 AM

## 2020-09-04 NOTE — PROCEDURES
[facility]     ([facility]ly) [facility]  [address], [address]  [place] [address]  Tel: [phone]  Fax: [phone]          Full Name: Daniel Alamo Gender: Male  Patient ID: [id number] YOB: 1940      Visit Date: 9/4/2020 11:00  Age: 80 Years 6 Months Old  Interpreted By: Aaron Machuca MD   Ref Dr.: Kenna Calvillo NP  Tech:    Height: 5 feet 10 inch  Reason for referral: Evaluate bilateral lowers. c/o no compliant in lower extremities at this time.       Motor NCS      Nerve / Sites Lat Amp Dist Pablo    ms mV cm m/s   R Peroneal - EDB      Ankle 5.57 3.6 8       Fib head 13.07 3.3 28 37      Pop fossa 15.78 3.3 10 37   L Peroneal - EDB      Ankle 5.94 1.8 8       Fib head 13.80 1.7 30 38      Pop fossa 16.46 1.7 10 38   R Tibial - AH      Ankle 5.31 4.5 8       Pop fossa 16.15 4.5 40 37   L Tibial - AH      Ankle 5.16 7.7 8       Pop fossa 15.31 6.0 40 39       F  Wave      Nerve Fmin    ms   R Tibial - AH 59.53   L Tibial - AH 61.98       Sensory NCS      Nerve / Sites Onset Lat Pk Lat Amp.2-3 Dist Pablo    ms ms  V cm m/s   R Sural - Ankle (Calf)      Calf NR NR NR 14 NR   L Sural - Ankle (Calf)      Calf NR NR NR 14 NR   R Superficial peroneal - Ankle      Lat leg NR NR NR 12 NR   L Superficial peroneal - Ankle      Lat leg NR NR NR 12 NR       EMG Summary Table     Spontaneous MUAP Rcmt Note   Muscle Fib PSW Fasc IA # Amp Dur PPP Rate Type   R. Gluteus medius None None None N N N N N N N   R. Gluteus jonna None None None N N N N N N N   R. L3 paraspinal None None None N N N N N N N   R. L4 paraspinal None None None N N N N N N N   R. L5 paraspinal None None None N N N N N N N   R. S1 paraspinal None None None N N N N N N N   R. Adductor jose a None None None N N N N N N N   R. Quadriceps None None None N N N N N N N   R. Gastrocnemius (Medial head) None None None N N N N N N N   R. Tibialis anterior None None None N N N N N N N    R. Tibialis posterior None None None N N N N N N N     SUMMARY  Nerve conduction and EMG study of bilateral lower extremities shows:    Normal right peroneal distal motor latency, amplitude and borderline conduction velocity.  Normal left peroneal distal motor latency slightly decreased amplitude and conduction velocity.  Normal right tibial distal motor latency, amplitude and borderline conduction velocity.  Normal left tibial distal motor latency, amplitude and borderline conduction velocity.  Borderline bilateral tibial F latencies.  Absent bilateral sural and superficial peroneal sensory SNAP.  Monopolar needle exam is normal.      CLINICAL INTERPRETATION:  This is an abnormal nerve conduction and EMG study.  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  Further clinical correlation is needed.     Aaron Machuca MD  Neurologist  [facility] Neurology [facility]  Tel:- [phone]

## 2020-09-15 ENCOUNTER — HOSPITAL ENCOUNTER (OUTPATIENT)
Dept: PHYSICAL MEDICINE AND REHAB | Facility: CLINIC | Age: 80
Discharge: HOME | End: 2020-09-15
Attending: PHYSICIAN ASSISTANT

## 2020-09-15 ENCOUNTER — COMMUNICATION - HEALTHEAST (OUTPATIENT)
Dept: PHYSICAL MEDICINE AND REHAB | Facility: CLINIC | Age: 80
End: 2020-09-15

## 2020-09-15 ENCOUNTER — COMMUNICATION - HEALTHEAST (OUTPATIENT)
Dept: CARDIOLOGY | Facility: CLINIC | Age: 80
End: 2020-09-15

## 2020-09-15 DIAGNOSIS — M48.02 CERVICAL STENOSIS OF SPINE: ICD-10-CM

## 2020-09-15 DIAGNOSIS — M48.02 FORAMINAL STENOSIS OF CERVICAL REGION: ICD-10-CM

## 2020-09-15 DIAGNOSIS — M54.12 CERVICAL RADICULOPATHY: ICD-10-CM

## 2020-09-15 ASSESSMENT — MIFFLIN-ST. JEOR: SCORE: 1358.97

## 2020-09-29 ENCOUNTER — HOSPITAL ENCOUNTER (OUTPATIENT)
Dept: PHYSICAL MEDICINE AND REHAB | Facility: CLINIC | Age: 80
Discharge: HOME | End: 2020-09-29
Attending: PHYSICIAN ASSISTANT

## 2020-09-29 DIAGNOSIS — M54.12 CERVICAL RADICULOPATHY: ICD-10-CM

## 2020-10-01 NOTE — PROGRESS NOTES
NEUROLOGY OUTPATIENT PROGRESS NOTE   Sep 4, 2020     CHIEF COMPLAINT/REASON FOR VISIT/REASON FOR CONSULT  Patient presents with:  Follow Up  Numbness    REASON FOR CONSULTATION- Numbness    REFERRAL SOURCE  Dr. Dewey Rowland  CC Dr. Dewey Rowland    HISTORY OF PRESENT ILLNESS  Daniel Alamo is a 80 year old male who comes to the Neurology clinic for evaluation of numbness in the hand.  He reports that symptoms have been going on for several years.  He was previously evaluated and told there was nothing that can be done.  Symptoms have progressed since then.  Reports that the numbness is present in all 5 fingers mainly in the fingertips on both sides.  In addition to that he has elbow and shoulder/arm pain on both sides.  Touching the elbow or the shoulder region does not cause any pain.  The pain is more of a pins-and-needles/tingling pain.  Denies any sharp shooting pain down the arm.  Denies any neck pain.  He is previously tried gabapentin which does help.  He did try epidural injections once several years ago and his symptoms completely resolved though they did come back 1 day later.  He denies any numbness in his feet.  Denies any balance issues.  MRI is not possible due to defibrillator.  Patient is recently had a CT of cervical spine.  Denies any weakness or any other associated symptoms though per neurosurgery notes he had a lot of atrophy in his hands.    > Patient returns today.  Reports numbness and tingling is intermittent and is present in both hands.  Denies numbness anywhere else.  Denies numbness in his feet.  Gabapentin has not helped.  Epidural steroids only helped for 1 day.  Patient reports no weakness.  Does complain of some balance issues that he has to hold the railing.  Blood work did show low vitamin B12.  He does take tablets.  His primary care doctor has recommended shots.  He denies any difficulty with feeling the floor.  Reports no other new symptoms.    9/4/20  Patient returns  "to the clinic today.  Continues to complain of numbness in the hands.  Has not improved or worsened.  He did complete the EMG today.  Reports no new balance issues or any numbness in the legs.    Previous history is reviewed and this is unchanged.    PAST MEDICAL/SURGICAL HISTORY  Significant for defibrillator, anticoagulation for some heart disease is not sure, recent AAA repair, hyperlipidemia    FAMILY HISTORY  Family History   Problem Relation Age of Onset     Cancer Mother      Heart Disease Father    Negative for neurological conditions    SOCIAL HISTORY  Social History     Tobacco Use     Smoking status: Current Every Day Smoker     Packs/day: 0.50     Smokeless tobacco: Never Used   Substance Use Topics     Alcohol use: Yes     Comment: Glass of wine daily.     Drug use: Never       SYSTEMS REVIEW  Twelve-system ROS was done and other than the HPI this was negative    MEDICATIONS  acetaminophen (TYLENOL) 325 MG tablet, Take 650 mg by mouth  apixaban ANTICOAGULANT (ELIQUIS ANTICOAGULANT) 5 MG tablet, 5 mg 2 times daily   atorvastatin (LIPITOR) 80 MG tablet, Take 80 mg by mouth daily   Cyanocobalamin (B-12) 1000 MCG SUBL, daily at 2 pm   gabapentin (NEURONTIN) 300 MG capsule, Take 900 mg by mouth 2 times daily   sotalol (BETAPACE) 80 MG tablet, Take 80 mg by mouth 2 times daily   tamsulosin (FLOMAX) 0.4 MG capsule, TAKE ONE CAPSULE BY MOUTH ONE TIME DAILY    No current facility-administered medications on file prior to visit.        PHYSICAL EXAMINATION  VITALS: BP (!) 149/79   Pulse 55   Ht 1.778 m (5' 10\")   Wt 64.4 kg (142 lb)   BMI 20.37 kg/m    GENERAL: Healthy appearing, alert, no acute distress, normal habitus.  CARDIOVASCULAR: Extremities warm and well-perfused.  EYES: Funduscopic exam does not reveal any papilledema.   NEUROLOGICAL:  Patient is awake and oriented to self, place and time.  Attention span is normal.  Memory is grossly intact.  Language is fluent and follows commands appropriately.  " Appropriate fund of knowledge. Cranial nerves 2-12 are intact. There is no pronator drift.  Motor exam shows 5/5 strength in all extremities.  Tone is symmetric bilaterally in upper and lower extremities.  Reflexes are symmetric and absent in upper extremities and lower extremities. Sensory exam is grossly intact to light touch, pin prick and vibration.  Pinprick does increase in intensity as it go up.  Finger to nose and heel to shin is without dysmetria.  Romberg is negative though he is unsteady.  Gait is normal and the patient is able to do tandem walk and walk on toes and heels.     DIAGNOSTICS  CT cervical spine (Done at [facility])  Images reviewed.  Report is not available to me.  Will request report.  Report was available later: Shows moderate spinal stenosis C4-C5.  There is mild right and moderate left C3-C4 and moderate right and severe left C4-C5 and moderate bilateral C5-C6 and moderate right and mild to moderate left C6-C7 neuroforaminal stenosis.    Patient unable to do MRI because of defibrillator    CT myelogram in 2010 showed some central stenosis at C4-C5, foraminal stenosis that is moderately severe at C5-C6 and C6-C7 on the right and on the left at C3-C4, C5-C6, C6-C7    EMG BUE 2020 (Dr. Rowland)  Comment EMG: Normal study  1. Normal needle EMG bilateral upper extremities.    Interpretation: Normal study    EMG in 2010 was negative    EMG BLE 9/4/2020  CLINICAL INTERPRETATION:  This is an abnormal nerve conduction and EMG study.  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  Further clinical correlation is needed.     OUTSIDE RECORDS  Outside referral notes were reviewed and pertinent information has been summarized in the HPI.  Neurosurgery notes also reviewed.    Blood work  Component      Latest Ref Rng & Units 7/9/2020   Albumin %      51.0 - 67.0 % 60.8   Albumin       3.2 - 4.7 g/dL 3.7   Alpha 1 %      2.0 - 4.0 % 2.3   Alpha 1      0.1 - 0.3 g/dL 0.1    Alpha 2 %      5.0 - 13.0 % 10.8   Alpha 2      0.4 - 0.9 g/dL 0.7   % Beta      10.0 - 17.0 % 11.7   Beta      0.7 - 1.2 g/dL 0.7   Gamma Globulin %      9.0 - 20.0 % 14.4   Gamma Globulin      0.6 - 1.4 g/dL 0.9   ELP Comment       Unremarkable protein electrophoresis.   Protein, Total      6.0 - 8.0 g/dL 6.1   Path ICD:       R20.0   Interpreted By:       Clovis Avendaño MD   Glucose      70 - 125 mg/dL 100   Patient Fasting > 8hrs?       Yes   CINDY Screen [place]      <=2.9 U 0.3   Vitamin B-12      213 - 816 pg/mL 254   Vitamin B1, Whole Blood      70 - 180 nmol/L 137   TSH      0.30 - 5.00 uIU/mL 0.75   MMA Serum/Plasma, Vitamin B12 Status      0.00 - 0.40 umol/L 0.23   CK, Total      30 - 190 U/L 46   Lyme Antibody Cascade      <0.90 Index Value 0.02       IMPRESSION/REPORT/PLAN  Numbness  Cervical radiculopathy  peripheral neuropathy  Low B12 level    This is a 80 year old male with bilateral hand numbness and degeneration of cervical spine.  Clinically patient's symptoms are suggestive of being related to possible cervical radiculopathy given that his symptoms improved completely with epidural injections.  He does have neuroforaminal stenosis on the CT myelogram.  MRI is not possible.  EMG of the upper extremities does not show any evidence of radiculopathy.  EMG of the lower extremities does show peripheral neuropathy.  This could be related to the patient's symptoms though I cannot say confidently.  I would encourage him to make an appointment for the epidural injection and see if that helps the numbness again.  Should follow back in the spine center.  Blood work has been negative for cause of neuropathy.  He should continue getting B12 injections through his primary care doctor since this is on the lower end.  Can see him back in 6 months for follow-up of his neuropathy.    Over 40 minutes were spent coordinating the care for the patient today.  More than 50% of the time was spent counseling,  educating the patient.  Making plan with patient.  Explaining him all the test results.    Aaron Machuca MD  Neurologist  [facility]  Tel:- [phone]    This note was dictated using voice recognition software.  Any grammatical or context distortions are unintentional and inherent to the software.

## 2020-10-13 ENCOUNTER — HOSPITAL ENCOUNTER (OUTPATIENT)
Dept: PHYSICAL MEDICINE AND REHAB | Facility: CLINIC | Age: 80
Discharge: HOME | End: 2020-10-13
Attending: PHYSICIAN ASSISTANT

## 2020-10-13 DIAGNOSIS — M48.02 CERVICAL STENOSIS OF SPINE: ICD-10-CM

## 2020-10-13 DIAGNOSIS — R20.2 PARESTHESIA: ICD-10-CM

## 2020-10-13 DIAGNOSIS — M48.02 FORAMINAL STENOSIS OF CERVICAL REGION: ICD-10-CM

## 2020-10-13 ASSESSMENT — MIFFLIN-ST. JEOR: SCORE: 1358.97

## 2020-12-09 ENCOUNTER — AMBULATORY - HEALTHEAST (OUTPATIENT)
Dept: CARDIOLOGY | Facility: CLINIC | Age: 80
End: 2020-12-09

## 2020-12-09 DIAGNOSIS — I25.5 ISCHEMIC CARDIOMYOPATHY: ICD-10-CM

## 2020-12-09 DIAGNOSIS — Z95.810 ICD (IMPLANTABLE CARDIOVERTER-DEFIBRILLATOR), DUAL, IN SITU: ICD-10-CM

## 2020-12-10 ENCOUNTER — AMBULATORY - HEALTHEAST (OUTPATIENT)
Dept: CARDIOLOGY | Facility: CLINIC | Age: 80
End: 2020-12-10

## 2020-12-10 DIAGNOSIS — I50.22 CHRONIC SYSTOLIC CONGESTIVE HEART FAILURE (H): ICD-10-CM

## 2020-12-10 DIAGNOSIS — I49.3 PVC'S (PREMATURE VENTRICULAR CONTRACTIONS): ICD-10-CM

## 2020-12-10 DIAGNOSIS — I48.0 PAROXYSMAL ATRIAL FIBRILLATION (H): ICD-10-CM

## 2020-12-10 DIAGNOSIS — Z95.810 ICD (IMPLANTABLE CARDIOVERTER-DEFIBRILLATOR), DUAL, IN SITU: ICD-10-CM

## 2020-12-23 ENCOUNTER — COMMUNICATION - HEALTHEAST (OUTPATIENT)
Dept: CARDIOLOGY | Facility: CLINIC | Age: 80
End: 2020-12-23

## 2021-01-04 ENCOUNTER — HEALTH MAINTENANCE LETTER (OUTPATIENT)
Age: 81
End: 2021-01-04

## 2021-01-06 ENCOUNTER — RECORDS - HEALTHEAST (OUTPATIENT)
Dept: LAB | Facility: CLINIC | Age: 81
End: 2021-01-06

## 2021-01-06 LAB
ANION GAP SERPL CALCULATED.3IONS-SCNC: 9 MMOL/L (ref 5–18)
BUN SERPL-MCNC: 13 MG/DL (ref 8–28)
CALCIUM SERPL-MCNC: 8.6 MG/DL (ref 8.5–10.5)
CHLORIDE BLD-SCNC: 108 MMOL/L (ref 98–107)
CO2 SERPL-SCNC: 25 MMOL/L (ref 22–31)
CREAT SERPL-MCNC: 0.97 MG/DL (ref 0.7–1.3)
GFR SERPL CREATININE-BSD FRML MDRD: >60 ML/MIN/1.73M2
GLUCOSE BLD-MCNC: 88 MG/DL (ref 70–125)
POTASSIUM BLD-SCNC: 4.1 MMOL/L (ref 3.5–5)
SODIUM SERPL-SCNC: 142 MMOL/L (ref 136–145)
VIT B12 SERPL-MCNC: 214 PG/ML (ref 213–816)

## 2021-01-07 ENCOUNTER — HOSPITAL ENCOUNTER (OUTPATIENT)
Dept: CARDIOLOGY | Facility: HOSPITAL | Age: 81
Discharge: HOME | End: 2021-01-07
Attending: INTERNAL MEDICINE

## 2021-01-07 ENCOUNTER — AMBULATORY - HEALTHEAST (OUTPATIENT)
Dept: CARDIOLOGY | Facility: CLINIC | Age: 81
End: 2021-01-07

## 2021-01-07 LAB
AORTIC ROOT: 3.9 CM
AORTIC VALVE MEAN VELOCITY: 146 CM/S
AR DECEL SLOPE: 1880 MM/S2
AR PEAK VELOCITY: 406 CM/S
ASCENDING AORTA: 3.6 CM
AV DIMENSIONLESS INDEX VTI: 0.3
AV MEAN GRADIENT: 10 MMHG
AV PEAK GRADIENT: 19.5 MMHG
AV REGURGITANT PEAK GRADIENT: 65.9 MMHG
AV REGURGITATION PRESSURE HALF TIME: 556 MS
AV VALVE AREA: 1.8 CM2
AV VELOCITY RATIO: 0.3
BSA FOR ECHO PROCEDURE: 1.83 M2
CV BLOOD PRESSURE: ABNORMAL MMHG
CV ECHO HEIGHT: 70 IN
CV ECHO WEIGHT: 150 LBS
DOP CALC AO PEAK VEL: 221 CM/S
DOP CALC AO VTI: 50.9 CM
DOP CALC LVOT AREA: 5.72 CM2
DOP CALC LVOT DIAMETER: 2.7 CM
DOP CALC LVOT PEAK VEL: 72.9 CM/S
DOP CALC LVOT STROKE VOLUME: 92.1 CM3
DOP CALCLVOT PEAK VEL VTI: 16.1 CM
ECHO EJECTION FRACTION ESTIMATED: 25 %
EJECTION FRACTION: 33 % (ref 55–75)
FRACTIONAL SHORTENING: 10.8 % (ref 28–44)
INTERVENTRICULAR SEPTUM IN END DIASTOLE: 1 CM (ref 0.6–1)
IVS/PW RATIO: 0.8
LA AREA 1: 20.8 CM2
LA AREA 2: 21.5 CM2
LEFT ATRIUM LENGTH: 6.4 CM
LEFT ATRIUM SIZE: 3.4 CM
LEFT ATRIUM TO AORTIC ROOT RATIO: 0.87 NO UNITS
LEFT ATRIUM VOLUME INDEX: 32.5 ML/M2
LEFT ATRIUM VOLUME: 59.4 ML
LEFT VENTRICLE CARDIAC INDEX: 3.5 L/MIN/M2
LEFT VENTRICLE CARDIAC OUTPUT: 6.4 L/MIN
LEFT VENTRICLE DIASTOLIC VOLUME INDEX: 113.1 CM3/M2 (ref 34–74)
LEFT VENTRICLE DIASTOLIC VOLUME: 207 CM3 (ref 62–150)
LEFT VENTRICLE HEART RATE: 69 BPM
LEFT VENTRICLE MASS INDEX: 185.3 G/M2
LEFT VENTRICLE SYSTOLIC VOLUME INDEX: 76 CM3/M2 (ref 11–31)
LEFT VENTRICLE SYSTOLIC VOLUME: 139 CM3 (ref 21–61)
LEFT VENTRICULAR INTERNAL DIMENSION IN DIASTOLE: 6.5 CM (ref 4.2–5.8)
LEFT VENTRICULAR INTERNAL DIMENSION IN SYSTOLE: 5.8 CM (ref 2.5–4)
LEFT VENTRICULAR MASS: 339.1 G
LEFT VENTRICULAR OUTFLOW TRACT MEAN GRADIENT: 1 MMHG
LEFT VENTRICULAR OUTFLOW TRACT MEAN VELOCITY: 42.5 CM/S
LEFT VENTRICULAR OUTFLOW TRACT PEAK GRADIENT: 2 MMHG
LEFT VENTRICULAR POSTERIOR WALL IN END DIASTOLE: 1.3 CM (ref 0.6–1)
LV STROKE VOLUME INDEX: 50.3 ML/M2
MITRAL REGURGITANT VELOCITY TIME INTEGRAL: 260 CM
MITRAL VALVE E/A RATIO: 0.7
MR FLOW: 31 CM3
MR MEAN GRADIENT: 80 MMHG
MR MEAN VELOCITY: 418 CM/S
MR PEAK GRADIENT: 126.8 MMHG
MR PISA EROA: 0.1 CM2
MR PISA RADIUS: 0.6 CM
MR PISA VN NYQUIST: 30.8 CM/S
MV AVERAGE E/E' RATIO: 9.5 CM/S
MV DECELERATION TIME: 264 MS
MV E'TISSUE VEL-LAT: 5.56 CM/S
MV E'TISSUE VEL-MED: 3.8 CM/S
MV LATERAL E/E' RATIO: 8
MV MEDIAL E/E' RATIO: 11.7
MV PEAK A VELOCITY: 67.6 CM/S
MV PEAK E VELOCITY: 44.4 CM/S
MV REGURGITANT VOLUME: 32.2 CC
NUC REST DIASTOLIC VOLUME INDEX: 2400 LBS
NUC REST SYSTOLIC VOLUME INDEX: 70 IN
PISA MR PEAK VEL: 563 CM/S
PR MAX PG: 4 MMHG
PR PEAK VELOCITY: 102 CM/S
SARS-COV-2 PCR COMMENT: NORMAL
SARS-COV-2 RNA SPEC QL NAA+PROBE: NEGATIVE
SARS-COV-2 VIRUS SPECIMEN SOURCE: NORMAL
TRICUSPID REGURGITATION PEAK PRESSURE GRADIENT: 22.3 MMHG
TRICUSPID VALVE PEAK REGURGITANT VELOCITY: 236 CM/S

## 2021-01-07 ASSESSMENT — MIFFLIN-ST. JEOR: SCORE: 1381.65

## 2021-02-22 ENCOUNTER — IMMUNIZATION (OUTPATIENT)
Dept: NURSING | Facility: CLINIC | Age: 81
End: 2021-02-22
Payer: MEDICARE

## 2021-02-22 PROCEDURE — 91300 PR COVID VAC PFIZER DIL RECON 30 MCG/0.3 ML IM: CPT

## 2021-02-22 PROCEDURE — 0001A PR COVID VAC PFIZER DIL RECON 30 MCG/0.3 ML IM: CPT

## 2021-03-10 ENCOUNTER — AMBULATORY - HEALTHEAST (OUTPATIENT)
Dept: CARDIOLOGY | Facility: CLINIC | Age: 81
End: 2021-03-10

## 2021-03-10 DIAGNOSIS — Z95.810 ICD (IMPLANTABLE CARDIOVERTER-DEFIBRILLATOR), DUAL, IN SITU: ICD-10-CM

## 2021-03-10 DIAGNOSIS — I48.0 PAROXYSMAL ATRIAL FIBRILLATION (H): ICD-10-CM

## 2021-03-15 ENCOUNTER — IMMUNIZATION (OUTPATIENT)
Dept: NURSING | Facility: CLINIC | Age: 81
End: 2021-03-15
Attending: FAMILY MEDICINE
Payer: MEDICARE

## 2021-03-15 PROCEDURE — 91300 PR COVID VAC PFIZER DIL RECON 30 MCG/0.3 ML IM: CPT

## 2021-03-15 PROCEDURE — 0002A PR COVID VAC PFIZER DIL RECON 30 MCG/0.3 ML IM: CPT

## 2021-05-27 NOTE — PROGRESS NOTES
In clinic device check with Device RN.  Please see link for full device report.  Patient was informed of results and next follow up during today's visit.     Appointment with Dr. Kohler next week.

## 2021-05-28 NOTE — PROGRESS NOTES
[facility] Note    Assessment / Plan:    Coronary artery disease: Status post prior surgical revascularization about 25 years ago with a LIMA to the LAD and a vein graft to an obtuse marginal. Currently doing well with no anginal symptoms. Last stress test in August 2014 showed some agustin-infarct apical ischemia and an inferior infarct overall no change from prior. Continue aspirin and Toprol-XL as well as statin therapy.       Cardiomyopathy: Last ejection fraction reported at 30% March 2018. This has been stable for a number of years. He has had no recent heart failure exacerbations. His beta blockade will be continued but has been difficult to titrate up further because of his relative hypotension, bradycradia and fatigue. He is also not on an ACE inhibitor because of his hypotension.     He does have a dual-chamber ICD in place that was implanted several years ago.       Overall doing reasonably well from the cardiac standpoint. He has been asked to continue his current medical regimen, stay active and follow-up in about a year. However he knows to call sooner than that if he has any changes or worsening symptoms.     A repeat echocardiogram will be obtained to follow-up on his known cardiomyopathy, especially since he did complain of some increasing fatigue at today's visit.      Thank you for the opportunity to participate in the care of Daniel Alamo. Please do not hesitate to call with any questions or concerns regarding his cardiovascular status    ______________________________________________________________________    Subjective:    It was a pleasure to see Daniel Alamo at the [facility] in follow-up for his coronary artery disease and cardiomyopathy .       Daniel Alamo is a pleasant 79 y.o. male with with established coronary artery disease having had an inferior myocardial infarction approximately 25 years ago. He was treated at that time initially with lytic therapy  and subsequently underwent surgical revascularization receiving a LIMA to the LAD and a vein graft to an obvious marginal. His right coronary artery was not grafted, it appears, because of his infarct.       At today's visit, he states that he is generally feeling well.  He does however acknowledge some worsening fatigue, and may be a decline in his functional capacity over the past year. He denies any chest discomfort or shortness of breath with activity. He is also not noticed any palpitations, orthopnea, PND or syncope.       Overall while he has had to slow down somewhat, it appears that he is reasonably satisfied with his current functional capacity.   ______________________________________________________________________    Problem List:  Patient Active Problem List   Diagnosis     Ischemic Cardiomyopathy     Atrial Fibrillation     Dyslipidemia     Coronary artery disease of native heart with stable angina pectoris, unspecified vessel or lesion type (H)     ICD (implantable cardioverter-defibrillator), dual, in situ     Chronic systolic congestive heart failure (H)       Medical History:  Past Medical History:   Diagnosis Date     Coronary artery disease      VT (ventricular tachycardia) (H) 2004       Surgical History:  Past Surgical History:   Procedure Laterality Date     CORONARY ARTERY BYPASS GRAFT       icd       INSERT / REPLACE / REMOVE PACEMAKER       WI CABG, VEIN, SINGLE      Description: CABG (CABG);  Recorded: 10/03/2012;  Comments: LIMA to LAD, SVG to OM2, SVG to RCA       Social History:  Social History     Socioeconomic History     Marital status:      Spouse name: Not on file     Number of children: Not on file     Years of education: Not on file     Highest education level: Not on file   Occupational History     Not on file   Social Needs     Financial resource strain: Not on file     Food insecurity:     Worry: Not on file     Inability: Not on file     Transportation needs:      Medical: Not on file     Non-medical: Not on file   Tobacco Use     Smoking status: Current Every Day Smoker     Packs/day: 0.50     Years: 30.00     Pack years: 15.00     Smokeless tobacco: Never Used   Substance and Sexual Activity     Alcohol use: Yes     Alcohol/week: 0.6 oz     Types: 1 Glasses of wine per week     Drug use: No     Sexual activity: Not on file   Lifestyle     Physical activity:     Days per week: Not on file     Minutes per session: Not on file     Stress: Not on file   Relationships     Social connections:     Talks on phone: Not on file     Gets together: Not on file     Attends Rastafari service: Not on file     Active member of club or organization: Not on file     Attends meetings of clubs or organizations: Not on file     Relationship status: Not on file     Intimate partner violence:     Fear of current or ex partner: Not on file     Emotionally abused: Not on file     Physically abused: Not on file     Forced sexual activity: Not on file   Other Topics Concern     Not on file   Social History Narrative     Not on file       Review of Systems: 12 organ system review done and negative except as noted in the HPI.    Family History:  Negative for premature coronary artery disease    Allergies:  Allergies   Allergen Reactions     Carvedilol Other (See Comments)     Upset stomach       Medications:  Current Outpatient Medications   Medication Sig Dispense Refill     aspirin 325 MG tablet Take 325 mg by mouth daily.       atorvastatin (LIPITOR) 80 MG tablet Take 80 mg by mouth daily.       gabapentin (NEURONTIN) 800 MG tablet Take 800 mg by mouth 2 (two) times a day.  4     metoprolol succinate (TOPROL-XL) 25 MG TAKE ONE AND ONE-HALF TABLETS BY MOUTH AT BEDTIME  135 tablet 0     tamsulosin (FLOMAX) 0.4 mg cap Take 0.4 mg by mouth daily.  4     No current facility-administered medications for this visit.        Objective:   Vital signs:  /78 (Patient Site: Right Arm, Patient Position:  "Sitting, Cuff Size: Adult Regular)   Pulse (!) 56   Resp 16   Ht 5' 10\" (1.778 m)   Wt 153 lb (69.4 kg)   BMI 21.95 kg/m        Physical Exam:    GENERAL APPEARANCE: Alert, cooperative and in no acute distress.  HEENT: No scleral icterus. No Xanthelasma. Oral mucuos membranes pink and moist.  NECK: No JVD. Thyroid not visualized  CHEST: clear to auscultation  CARDIOVASCULAR: S1, S2 regular. No significant murmur noted.   PULSES: Radial and posterior tibial pulses are intact and symmetric.   ABDOMEN: Nontender. BS+.   EXTREMITIES: No cyanosis, clubbing or edema.  SKIN: Warm, well perfused  NEURO: Grossly nonfocal    Lab Results:  LIPIDS:  Lab Results   Component Value Date    CHOL 134 10/05/2018    CHOL 139 02/16/2018    CHOL 143 10/28/2016     Lab Results   Component Value Date    HDL 34 (L) 10/05/2018    HDL 33 (L) 02/16/2018    HDL 35 (L) 10/28/2016     Lab Results   Component Value Date    LDLCALC 88 10/05/2018    LDLCALC 93 02/16/2018    LDLCALC 92 10/28/2016     Lab Results   Component Value Date    TRIG 61 10/05/2018    TRIG 67 02/16/2018    TRIG 78 10/28/2016     No components found for: CHOLHDL    BMP:  Lab Results   Component Value Date    CREATININE 0.90 10/15/2018    BUN 12 10/15/2018     10/15/2018    K 3.8 10/15/2018     10/15/2018    CO2 29 10/15/2018         ECG independently reviewed      SHAKA ACEVEDO MD  [facility]              "

## 2021-05-28 NOTE — PATIENT INSTRUCTIONS - HE
It was good to see you in clinic today.    We are glad that you are continuing to feel well.      As we discussed, it would be safe for you to reduce your aspirin to a baby aspirin (81mg)  Daily.    You can stay on your other medications, and call anytime with questions or concerns. We will also obtain an echocardiogram to check your heart function.    Han Kohler MD

## 2021-05-29 ENCOUNTER — RECORDS - HEALTHEAST (OUTPATIENT)
Dept: ADMINISTRATIVE | Facility: CLINIC | Age: 81
End: 2021-05-29

## 2021-05-30 VITALS — HEIGHT: 70 IN | BODY MASS INDEX: 23.62 KG/M2 | WEIGHT: 165 LBS

## 2021-05-30 VITALS — WEIGHT: 165 LBS | BODY MASS INDEX: 23.62 KG/M2 | HEIGHT: 70 IN

## 2021-05-31 VITALS — HEIGHT: 70 IN | BODY MASS INDEX: 22.56 KG/M2 | WEIGHT: 157.6 LBS

## 2021-06-01 VITALS — HEIGHT: 70 IN | WEIGHT: 151 LBS | BODY MASS INDEX: 21.62 KG/M2

## 2021-06-01 VITALS — WEIGHT: 157 LBS | HEIGHT: 70 IN | BODY MASS INDEX: 22.48 KG/M2

## 2021-06-01 VITALS — WEIGHT: 157 LBS | BODY MASS INDEX: 22.48 KG/M2 | HEIGHT: 70 IN

## 2021-06-02 VITALS — HEIGHT: 70 IN | BODY MASS INDEX: 21.98 KG/M2 | WEIGHT: 153.5 LBS

## 2021-06-02 VITALS — BODY MASS INDEX: 21.9 KG/M2 | WEIGHT: 153 LBS | HEIGHT: 70 IN

## 2021-06-02 VITALS — BODY MASS INDEX: 22.29 KG/M2 | WEIGHT: 155.7 LBS | HEIGHT: 70 IN

## 2021-06-02 VITALS — WEIGHT: 150 LBS | HEIGHT: 70 IN | BODY MASS INDEX: 21.47 KG/M2

## 2021-06-03 VITALS — WEIGHT: 153 LBS | HEIGHT: 70 IN | BODY MASS INDEX: 21.9 KG/M2

## 2021-06-04 VITALS
HEIGHT: 70 IN | SYSTOLIC BLOOD PRESSURE: 128 MMHG | OXYGEN SATURATION: 98 % | BODY MASS INDEX: 21.62 KG/M2 | DIASTOLIC BLOOD PRESSURE: 80 MMHG | HEART RATE: 63 BPM | WEIGHT: 151 LBS

## 2021-06-04 VITALS — BODY MASS INDEX: 20.37 KG/M2 | WEIGHT: 142 LBS

## 2021-06-04 VITALS — BODY MASS INDEX: 20.76 KG/M2 | HEIGHT: 70 IN | WEIGHT: 145 LBS

## 2021-06-04 VITALS
RESPIRATION RATE: 16 BRPM | SYSTOLIC BLOOD PRESSURE: 130 MMHG | BODY MASS INDEX: 21.33 KG/M2 | HEIGHT: 70 IN | HEART RATE: 60 BPM | WEIGHT: 149 LBS | DIASTOLIC BLOOD PRESSURE: 72 MMHG

## 2021-06-04 VITALS
WEIGHT: 156 LBS | DIASTOLIC BLOOD PRESSURE: 88 MMHG | HEART RATE: 60 BPM | BODY MASS INDEX: 22.38 KG/M2 | SYSTOLIC BLOOD PRESSURE: 124 MMHG | RESPIRATION RATE: 14 BRPM | OXYGEN SATURATION: 98 %

## 2021-06-04 VITALS
DIASTOLIC BLOOD PRESSURE: 80 MMHG | HEART RATE: 68 BPM | SYSTOLIC BLOOD PRESSURE: 128 MMHG | BODY MASS INDEX: 21.62 KG/M2 | WEIGHT: 151 LBS | RESPIRATION RATE: 16 BRPM | HEIGHT: 70 IN

## 2021-06-04 VITALS
WEIGHT: 145 LBS | SYSTOLIC BLOOD PRESSURE: 88 MMHG | BODY MASS INDEX: 20.76 KG/M2 | RESPIRATION RATE: 16 BRPM | DIASTOLIC BLOOD PRESSURE: 58 MMHG | HEIGHT: 70 IN | HEART RATE: 60 BPM

## 2021-06-04 VITALS — BODY MASS INDEX: 21.14 KG/M2 | WEIGHT: 147.3 LBS

## 2021-06-04 NOTE — PROGRESS NOTES
Daniela Ordoñez in the absence of Dr. Kohler would you please review the following:      Remote alert from 12/30/19 indicating increase in AF since 12/29/19. Viewable EGMs confirm AF, patient asymptomatic. Patient on ASA 81mg, would you like additional anticoagulation d/t AF?      Remote report:  Type: ICD alert remote transmission for AT/AF for 6/24hrs.  Presenting rhythm: AF VS/ 60 bpm. AF undersensed by device.   Battery/lead status: stable.  Arrhythmias: since 10/16/19; 3,119 mode switch episodes all <1min, significant increase in AF since 12/29/19, 5.5 hours on 12/30/19 and 7.6 hours on 12/31/19. Pt has hx of RA lead noise, reviewed events with tech services, confirmed AF.  5 NSVT. 15 other untreated VT episodes, at times toggling the VT zone, EGM's all appear to be AF with RVR.    Comments: appears to be normal ICD function. Routed to device RN for review.   Device/lead alerts: none. OMID ADD: See telephone note regarding AF/anticoagulation, routed to Dr. Ordoñez in Dr. Kohlers absence. Scheduled patient with Dr. Harrell d/t programming restraints regarding AF and RA lead noise, unable to adjust atrial sensitivity to sense AF d/t lead noise. Tech services recommended impedance testing during noise to test RA lead integrity. AJM/JML    Plan  Apparently started on Eliquis 5 mg twice daily  During atrial fibrillation his heart rate is elevated  Has a ICD in for many years with generator replacement October 15, 2018  Ejection fraction 20 to 25%  Probably not a candidate for left atrial appendage occlusion with poor ejection fraction    Increase metoprolol to 50 mg twice daily  Follow-up with me in device clinic  No recent ECG  Wonder about starting sotalol I believe his renal function has been normal  We will see how he does with increase in metoprolol

## 2021-06-04 NOTE — PROGRESS NOTES
Pt was called, corresponding information/recommendations reviewed, verbalized understanding, has no further questions at this time, contact information was given for further concerns/questions, RX was sent to pt pharmacy and medication list updated   Pt has apt with Dr Harrell on 1/22/20  1/3/2020 8:14 AM  Gwen Spencer RN

## 2021-06-04 NOTE — TELEPHONE ENCOUNTER
Dr. Ordoñez in the absence of Dr. Kohler would you please review the following:     Remote alert from 12/30/19 indicating increase in AF since 12/29/19. Viewable EGMs confirm AF, patient asymptomatic. Patient on ASA 81mg, would you like additional anticoagulation d/t AF?     Remote report:  Type: ICD alert remote transmission for AT/AF for 6/24hrs.  Presenting rhythm: AF VS/ 60 bpm. AF undersensed by device.   Battery/lead status: stable.  Arrhythmias: since 10/16/19; 3,119 mode switch episodes all <1min, significant increase in AF since 12/29/19, 5.5 hours on 12/30/19 and 7.6 hours on 12/31/19. Pt has hx of RA lead noise, reviewed events with tech services, confirmed AF.  5 NSVT. 15 other untreated VT episodes, at times toggling the VT zone, EGM's all appear to be AF with RVR.    Comments: appears to be normal ICD function. Routed to device RN for review.   Device/lead alerts: none. OMID ADD: See telephone note regarding AF/anticoagulation, routed to Dr. Ordoñez in Dr. Kohlers absence. Scheduled patient with Dr. Harrell d/t programming restraints regarding AF and RA lead noise, unable to adjust atrial sensitivity to sense AF d/t lead noise. Tech services recommended impedance testing during noise to test RA lead integrity. AJM/SOLIS

## 2021-06-04 NOTE — TELEPHONE ENCOUNTER
Plan will be for pt to be seen by Dr. Harrell for his device issues and Afib on 1/22/20. Will have [facility] coordinator f/u with pt after that visit to see if pt would need consult with NP in Afib clinic at [facility] pending 1/22 visit plan.

## 2021-06-04 NOTE — TELEPHONE ENCOUNTER
Called pt and discussed Dr. Ordoñez's recommendations. Pt agrees. Called pts pharmacy to give information for 30 day free trial card for Eliquis which was accepted. Informed pt to check for insurance coverage for Eliquis in the new year to make sure this medication is affordable. Pt understands to check soon just in case there is an issue with cost and if so, his cardiologist can discuss any possible alternatives. Patient verbalizes understanding and agrees with plan. No further questions or concerns at this time.

## 2021-06-05 ENCOUNTER — RECORDS - HEALTHEAST (OUTPATIENT)
Dept: CARDIOLOGY | Facility: CLINIC | Age: 81
End: 2021-06-05

## 2021-06-05 VITALS
HEART RATE: 46 BPM | RESPIRATION RATE: 16 BRPM | BODY MASS INDEX: 21.52 KG/M2 | WEIGHT: 150 LBS | DIASTOLIC BLOOD PRESSURE: 62 MMHG | SYSTOLIC BLOOD PRESSURE: 148 MMHG | OXYGEN SATURATION: 96 %

## 2021-06-05 VITALS — HEIGHT: 70 IN | BODY MASS INDEX: 21.47 KG/M2 | WEIGHT: 150 LBS

## 2021-06-05 VITALS — BODY MASS INDEX: 20.76 KG/M2 | WEIGHT: 145 LBS | HEIGHT: 70 IN

## 2021-06-05 DIAGNOSIS — I25.10 CORONARY ATHEROSCLEROSIS: ICD-10-CM

## 2021-06-05 DIAGNOSIS — I42.8 OTHER PRIMARY CARDIOMYOPATHIES: ICD-10-CM

## 2021-06-05 NOTE — PROGRESS NOTES
Signed      EKG Visit     Pt here for EKG, QTc check after starting 80mg Sotalol BID on 1/23/20     EKG shows AF with HR of 68, QT of 484, and QTc of 514  QTc within acceptable limits, pt has a paced rhythm and correction allowance has been made, pts EKG was compared to previous EKG in EMR, EKG is stable, and there has been minimal change in QTc     Pt reports tolerating medication without complaint, reviewed with pt reasoning for above medication, reviewed and confirmed pt understands current dose, administration, and frequency of medication, most common potential side effects from the medication, s/s to call the clinic with and when to seek emergency medical care  Ptwas instructed to continue current medication as prescribed, results would be sent to ordering provider for review, pt will be contacted with further changes if necessary and pt verbalized understanding   Results sent to  Dr Harrell for further review and recommendations if necessary  1/27/2020 10:28 AM  Gwen Shell RN        ECG reviewed; agree; also frequent PVCs

## 2021-06-05 NOTE — PROGRESS NOTES
Patient here with wife for cervical consult. He has Bilateral pain in arms, numbness and tingling in both hands. Has relief when puts hands behind head. No EMG, no conservative treatment.  Yany Calderón, JACKIE, 3:03 PM

## 2021-06-05 NOTE — PATIENT INSTRUCTIONS - HE
Neurology Referral Placed [phone]  EMG  X-rays  Referral to spine center for conservative treatment placed. [phone]

## 2021-06-05 NOTE — PROGRESS NOTES
In clinic device check with Device RN and Dr. Harrell.  Please see link for full device report.  Patient was informed of results and next follow up during today's visit.

## 2021-06-05 NOTE — PROGRESS NOTES
The patient is a 79-year-old male.  He complains of pain in both shoulders and both elbows and both hands.  He does not have neck pain.  The pain does not sound radicular.  It does not seem to radiate from his neck down the arms but rather affects the joints as individual separate entities.  He complains of numbness and tingling in both hands.  Affecting the first 4 digits bilateral, not so much the fifth digits.  He complains of weakness in his arms and hands.  His wife says he has weak legs and shuffling gait.  His medical history is significant.  He has a defibrillator so he is not able to get MRI's.  He is on Eliquis.  He is scheduled to have repair of an abdominal aortic aneurysm next week by Dr. Holcomb at [place].  He says he has heart failure with an ejection fraction around 20 to 25%.  No lung disease, cancer, or diabetes.  On exam he looks frail.  BMI 22.  Range of motion of the neck does not send any pain into the arms.  He does not get any joint pain with joint movement or with muscle strength testing.  His joints are not swollen or red or tender or stiff.  Strength is reasonably good.  He is atrophic all over.  He is not hyperreflexic.  There is no clonus.  Toes are downgoing.  On cervical  CT he has moderate spinal canal stenosis and multilevel foramen stenosis.  Plan x-rays of both shoulders and both elbows and both hands.  Think about tests like sed rate.  Maybe later we could do CTs of his joints if necessary.  Plan neurology consult and EMG both arms looking for cervical radiculopathy or something peripheral like a carpal tunnel.  Trial of conservative treatment at the spine center.  He is going to follow-up after his aneurysm surgery.  Patient and his wife are satisfied with the plan. Total time 45 minutes, more than 50% spent counseling and/or coordinating care.

## 2021-06-05 NOTE — PROGRESS NOTES
EKG Visit    Pt here for EKG, QTc check after starting 80mg Sotalol BID on 1/23/20    EKG shows AF with HR of 68, QT of 484, and QTc of 514  QTc within acceptable limits, pt has a paced rhythm and correction allowance has been made, pts EKG was compared to previous EKG in EMR, EKG is stable, and there has been minimal change in QTc    Pt reports tolerating medication without complaint, reviewed with pt reasoning for above medication, reviewed and confirmed pt understands current dose, administration, and frequency of medication, most common potential side effects from the medication, s/s to call the clinic with and when to seek emergency medical care  Ptwas instructed to continue current medication as prescribed, results would be sent to ordering provider for review, pt will be contacted with further changes if necessary and pt verbalized understanding   Results sent to  Dr Harrell for further review and recommendations if necessary  1/27/2020 10:28 AM  Gwen Shell RN

## 2021-06-05 NOTE — PATIENT INSTRUCTIONS - HE
"Daniel Alamo    Thank you for coming to the [facility] today for a cardiac evaluation  It was my pleasure to see you today  A good contact for any questions would be Gwen Spencer  RN @ [phone]    ICD Device interrogation shows that you have had episodes atrial fibrillation some of them have lasted for  up to 7.6 hours; and  during atrial fibrillation the heart rate is elevated  Because of the risk of blood clots and strokes you have been started on blood thinner-Eliquis 5 mg twice daily  Adjust medicines stop metoprolol and start sotalol 80 mg twice daily-called to your pharmacy upon starting the sotalol, stop metoprolol  Obtain ECG on Monday to assess  for any Sotalol effects; QTC  Cholesterol panel was good on December 6, 2019  Electrolyte panel and creatinine was normal  Echocardiogram May 7, 2019 did show some decrease in the ejection fraction now at 20-25% but the report was \"similar to March 12, 2018\"  You are scheduled for endovascular stenting of the right, left iliac arteries and the aorta at [facility] on February 2; he seems stable enough from a heart standpoint to undergo the procedure and do not need any further  Pre surgical cardiac testing  Inform the surgeon about the Eliquis anticoagulation and advice about holding the medicine prior to  surgery.  Since you are not in permanent/persistent atrial fibrillation would be safe to interrupt the Eliquis prior to  the surgery  "

## 2021-06-05 NOTE — PROGRESS NOTES
"[facility] Heart Care Note    Assessment:  1.CI ICD  initial implant 2004  2. Generator replacement November 24, 2009  ICD generator replacement October 15, 2018/[facility]  3.  Coronary artery disease with previous inferior myocardial infarction  Prior bypass surgery with left internal mammary artery to LAD, vein graft to left circumflex marginal branch  4.  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; scheduled for surgery 2-      Plan:    ICD Device interrogation shows that you have had episodes atrial fibrillation some of them have lasted for  up to 7.6 hours; and  during atrial fibrillation the heart rate is elevated  Because of the risk of blood clots and strokes you have been started on blood thinner-Eliquis 5 mg twice daily  Adjust medicines stop metoprolol and start sotalol 80 mg twice daily-called to your pharmacy upon starting the sotalol, stop metoprolol  Obtain ECG on Monday to assess  for any Sotalol effects; QTC  Cholesterol panel was good on December 6, 2019  Electrolyte panel and creatinine was normal  Echocardiogram May 7, 2019 did show some decrease in the ejection fraction now at 20-25% but the report was \"similar to March 12, 2018\"  You are scheduled for endovascular stenting of the right, left iliac arteries and the aorta at [facility] on February 2; he seems stable enough from a heart standpoint to undergo the procedure and do not need any further  Pre surgical cardiac testing  Inform the surgeon about the Eliquis anticoagulation and advice about holding the medicine prior to  surgery.  Since you are not in permanent/persistent atrial fibrillation would be safe to interrupt the Eliquis prior to  the " surgery        Subjective:    I had the opportunity to see.Daniel Alamo , who is a 79 y.o. male with a known history of ischemic cardiomyopathy  During device interrogation he was noted to have episodes of atrial fibrillation.  One episode lasted 5 and half hours another 7.6 hours during atrial fibrillation there is elevated ventricular response even to the point of getting into the VT detection zone  These episodes have been asymptomatic  No prior history of atrial fibrillation, previous cardioversions or antiarrhythmic drugs  No history of anticoagulation therapy  No history of thromboembolism blood clots TIAs  Had been on metoprolol 50 mg twice daily  He did have comprehensive blood testing December 6, 2019 that showed creatinine 0.96 electrolytes were normal  Cholesterol 150   while on high-dose atorvastatin 80 mg daily    Echocardiogram May 7, 2019 showed ejection fraction 20-25% this was felt to be similar to the echocardiogram from March 12, 2018  There is mild aortic stenosis/aortic insufficiency and moderate mitral regurgitation    He is undergone extensive evaluation is noted to have left iliac artery aneurysm, right iliac artery aneurysm and infrarenal aneurysm.  He was recommended for surgery and was anticipated to have endovascular stenting at [facility]              Problem List:  Patient Active Problem List   Diagnosis     Ischemic Cardiomyopathy     Atrial Fibrillation     Dyslipidemia     Coronary artery disease of native heart with stable angina pectoris, unspecified vessel or lesion type (H)     ICD (implantable cardioverter-defibrillator), dual, in situ     Chronic systolic congestive heart failure (H)     Ischemic cardiomyopathy     Medical History:  Past Medical History:   Diagnosis Date     Coronary artery disease      VT (ventricular tachycardia) (H) 2004     Surgical History:  Past Surgical History:   Procedure Laterality Date     CORONARY ARTERY BYPASS GRAFT       icd        INSERT / REPLACE / REMOVE PACEMAKER       MT CABG, VEIN, SINGLE      Description: CABG (CABG);  Recorded: 10/03/2012;  Comments: LIMA to LAD, SVG to OM2, SVG to RCA     Social History:  Social History     Socioeconomic History     Marital status:      Spouse name: Not on file     Number of children: Not on file     Years of education: Not on file     Highest education level: Not on file   Occupational History     Not on file   Social Needs     Financial resource strain: Not on file     Food insecurity:     Worry: Not on file     Inability: Not on file     Transportation needs:     Medical: Not on file     Non-medical: Not on file   Tobacco Use     Smoking status: Current Every Day Smoker     Packs/day: 0.50     Years: 30.00     Pack years: 15.00     Smokeless tobacco: Never Used   Substance and Sexual Activity     Alcohol use: Yes     Alcohol/week: 1.0 standard drinks     Types: 1 Glasses of wine per week     Drug use: No     Sexual activity: Not on file   Lifestyle     Physical activity:     Days per week: Not on file     Minutes per session: Not on file     Stress: Not on file   Relationships     Social connections:     Talks on phone: Not on file     Gets together: Not on file     Attends [employer] service: Not on file     Active member of club or organization: Not on file     Attends meetings of clubs or organizations: Not on file     Relationship status: Not on file     Intimate partner violence:     Fear of current or ex partner: Not on file     Emotionally abused: Not on file     Physically abused: Not on file     Forced sexual activity: Not on file   Other Topics Concern     Not on file   Social History Narrative     Not on file       Review of Systems:      General: WNL  Eyes: WNL  Ears/Nose/Throat: WNL  Lungs: WNL  Heart: WNL  Stomach: WNL  Bladder: WNL  Muscle/Joints: WNL  Skin: WNL  Nervous System: WNL  Mental Health: WNL     Blood: WNL        Family History:  No family history on  file.      Allergies:  Allergies   Allergen Reactions     Carvedilol Other (See Comments)     Upset stomach     Chloride Nausea Only       Medications:  Current Outpatient Medications   Medication Sig Dispense Refill     apixaban (ELIQUIS) 5 mg Tab tablet Take 1 tablet (5 mg total) by mouth 2 (two) times a day. 60 tablet 11     atorvastatin (LIPITOR) 80 MG tablet Take 80 mg by mouth daily.       cyanocobalamin, vitamin B-12, 1,000 mcg Subl Take 1 tablet by mouth once daily sublingually       gabapentin (NEURONTIN) 800 MG tablet Take 900 mg by mouth 2 (two) times a day.   4     metoprolol succinate (TOPROL-XL) 100 MG 24 hr tablet Take 0.5 tablets (50 mg total) by mouth 2 (two) times a day. 90 tablet 3     tamsulosin (FLOMAX) 0.4 mg cap Take 0.4 mg by mouth daily.  4     aspirin 325 MG tablet Take 325 mg by mouth daily.       No current facility-administered medications for this visit.          Surgical site  ICD is well-healed to left subclavicular region    Device interrogation  Intrinsic rhythm is normal sinus rhythm with intact AV conduction  57% atrial pacing  4% ventricular pacing  Atrial lead shows some noise pacing threshold 1.7 V at 0.7 ms and sensitivity was about 1.0 mV  RV lead is excellent  Number of episodes of atrial arrhythmias were seen number these are because of the atrial noise.  There has been episodes of atrial fibrillation as well but overall atrial for burden is less than 1%  We did make some adjustments in program PPV HORTENSIA to 65 ms and atrial sensitivity was changed to 0.5 mV    Objective:   Vital signs:  /88 (Patient Site: Right Arm, Patient Position: Sitting, Cuff Size: Adult Regular)   Pulse 60   Resp 14   Wt 156 lb (70.8 kg)   SpO2 98%   BMI 22.38 kg/m        Physical Exam:    GENERAL APPEARANCE: Alert, cooperative and in no acute distress.  HEENT: No scleral icterus. No Xanthelasma. Oral mucous membranes pink and moist.  NECK: JVP  Normal cm. No Hepatojugular reflux. Thyroid not   Palpable  CHEST: clear to auscultation and percussion  CARDIOVASCULAR: S1, S2-3 out of 6 systolic apical murmur  No diastolic murmurs     Brachial, radial  pulses are intact and symmetric.   No carotid bruits noted.  ABDOMEN: Non tender. BS+. No bruits.  EXTREMITIES: No cyanosis, clubbing or edema.    Lab Results:  LIPIDS:  Lab Results   Component Value Date    CHOL 150 12/06/2019    CHOL 134 10/05/2018    CHOL 139 02/16/2018     Lab Results   Component Value Date    HDL 36 (L) 12/06/2019    HDL 34 (L) 10/05/2018    HDL 33 (L) 02/16/2018     Lab Results   Component Value Date    LDLCALC 100 12/06/2019    LDLCALC 88 10/05/2018    LDLCALC 93 02/16/2018     Lab Results   Component Value Date    TRIG 70 12/06/2019    TRIG 61 10/05/2018    TRIG 67 02/16/2018     No components found for: CHOLHDL    BMP:  Lab Results   Component Value Date    CREATININE 0.96 12/06/2019    BUN 10 12/06/2019     12/06/2019    K 4.0 12/06/2019     12/06/2019    CO2 26 12/06/2019         This note has been dictated using voice recognition software. Any grammatical or context distortions are unintentional and inherent to the software.  Chadd Harrell MD  [facility]  [phone]

## 2021-06-06 NOTE — PROGRESS NOTES
[facility] Rehabilitation Daily Progress     Patient Name: Daniel Alamo  Date: 3/9/2020  Visit #: 4  PTA visit #:    Referral Diagnosis: Generalized muscle weakness  Referring provider: Kenna Calvillo CNP  Visit Diagnosis:     ICD-10-CM    1. Generalized muscle weakness  M62.81    2. Arthralgia of left upper arm  M25.522    3. Arthralgia of right upper arm  M25.521    4. Impaired functional mobility, balance, gait, and endurance  Z74.09        Initial Eval: Daniel Alamo is a 80 y.o. male who presents to therapy today with chief complaints of pain in shoulders and arms and weakness throughout body. Onset date of sx was about 10 years ago with pain in the hands, now moving to the elbow and shoulder right more than left.  Pt reported h/o coronary artery disease, VT.  Pain symptoms are not changed with anymovement.  Functional impairments include decreased ability to tolerate walking for long, standing for long, stairs and transfers due to generalized muscle weakness.  Pt demo's signs and sx consistent with generalized muscle weakness, arm pain is not coming from cervical involvement and is unable to be reproduced with testing or palpation on this date.   No data recorded    Past Medical History:   Diagnosis Date     Coronary artery disease      VT (ventricular tachycardia) (H) 2004     Assessment:     HEP/POC compliance is  good .  Patient is benefitting from skilled physical therapy and is making steady progress toward functional goals.  Patient is appropriate to continue with skilled physical therapy intervention, as indicated by initial plan of care.   Treatment focused on review and progression of exercise. Focus on UE strengthening and arm mobility on this date.  Progressing with therapy and tolerating exercise.    Goal Status:  Pt. will demonstrate/verbalize independence in self-management of condition in : 6 weeks  Pt. will be independent with home exercise program in : 12 weeks    Pt will: improve  30sec STS by 4 or more to decrease risk of falls, within 12 visits.  Pt will: improve ability to stand/walk for greater than 5min, within 12 visits. Improve 6min walk test distance and decrease breaks needed.      Plan / Patient Education:     Continue with initial plan of care.  Progress with home program as tolerated.  Plan for next visit: nustep, start generalized strenthening, standing exercise, ask about how arms felt at end of day  Subjective:     Pain Ratin    Patient reports that he is doing okay. Arm pain hasn't really changed at all continues to be worse at night. Everything else is doing okay. Exercises are going okay.         Objective:     Presenting to clinic with rounded shoulders and forward flexed posture. Good ambulation at this time and steadiness.     Exercise #1: bridge x10 HEP  Comment #1: SLR x10 HEP  Exercise #2: Hip abduction sidelying x10 HEP  Comment #2: STS x10 HEP  Exercise #3: Scap row x10 L3 band HEP and x3 in session  Comment #3: chin tuck and push x10 HEP and in session  Exercise #4: Scaption with 3lb weights 3x10 repetitions  Comment #4: bicep curl 3x10 repetitions  Exercise #5: aguilar shoulder ER with L3 band 3x10 repetitions    Appt time: 8511-6059    Treatment Today     TREATMENT MINUTES COMMENTS   Evaluation     Self-care/ Home management     Manual therapy     Neuromuscular Re-education     Therapeutic Activity     Therapeutic Exercises 25 See flow sheet and above, reviewed and progressed exercise, standing exercise in session.    Focused on UE exercise this session.    Nu Step WL 5, 5 min avg 58   Gait training     Modality__________________                Total 30    Blank areas are intentional and mean the treatment did not include these items.       Yumiko Davison, PT, DPT  3/9/2020

## 2021-06-06 NOTE — PROGRESS NOTES
[facility] Rehabilitation Daily Progress     Patient Name: Daniel Alamo  Date: 3/11/2020  Visit #: 5  PTA visit #:    Referral Diagnosis: Generalized muscle weakness  Referring provider: Kenna Calvillo CNP  Visit Diagnosis:     ICD-10-CM    1. Generalized muscle weakness  M62.81    2. Arthralgia of left upper arm  M25.522    3. Arthralgia of right upper arm  M25.521    4. Impaired functional mobility, balance, gait, and endurance  Z74.09        Initial Eval: Daniel Alamo is a 80 y.o. male who presents to therapy today with chief complaints of pain in shoulders and arms and weakness throughout body. Onset date of sx was about 10 years ago with pain in the hands, now moving to the elbow and shoulder right more than left.  Pt reported h/o coronary artery disease, VT.  Pain symptoms are not changed with anymovement.  Functional impairments include decreased ability to tolerate walking for long, standing for long, stairs and transfers due to generalized muscle weakness.  Pt demo's signs and sx consistent with generalized muscle weakness, arm pain is not coming from cervical involvement and is unable to be reproduced with testing or palpation on this date.   No data recorded    Past Medical History:   Diagnosis Date     Coronary artery disease      VT (ventricular tachycardia) (H) 2004     Assessment:     HEP/POC compliance is  good .  Patient is benefitting from skilled physical therapy and is making steady progress toward functional goals.  Patient is appropriate to continue with skilled physical therapy intervention, as indicated by initial plan of care.   Treatment continued to focus on strengthening and balance exercise. Patient noted increase in aguilar arm pain after working muscles last session, unsure if soreness or the pain he always has. Will continue to work on arm strength x1 per week and leg strength and balance x1 per week to see how he tolerates it. Improvement to note in LE strength and balance  today. Progressing with therapy and tolerating exercise.    Goal Status:  Pt. will demonstrate/verbalize independence in self-management of condition in : 6 weeks  Pt. will be independent with home exercise program in : 12 weeks    Pt will: improve 30sec STS by 4 or more to decrease risk of falls, within 12 visits.  Pt will: improve ability to stand/walk for greater than 5min, within 12 visits. Improve 6min walk test distance and decrease breaks needed.      Plan / Patient Education:     Continue with initial plan of care.  Progress with home program as tolerated.  Plan for next visit: nustep, start generalized strenthening, standing exercise, ask about how arms felt at end of day  Subjective:     Pain Ratin    Patient reports he is doing good. Arm pain may have been a little worse with some soreness the last time he came in. He has also been busy doing more. Exercises are going good.     Objective:     Presenting to clinic with rounded shoulders and forward flexed posture. Good ambulation at this time and steadiness.     30sec STS: 10 repetitions. (initial visit: 7)    HEP  Exercise #1: bridge x10 HEP  Comment #1: SLR x10 HEP  Exercise #2: Hip abduction sidelying x10 HEP  Comment #2: STS x10 HEP  Exercise #3: Scap row x10 L3 band HEP and x3 in session  Comment #3: chin tuck and push x10 HEP and in session  Exercise #4: Scaption with 3lb weights 3x10 repetitions  Comment #4: bicep curl 3x10 repetitions  Exercise #5: aguilar shoulder ER with L3 band 3x10 repetitions    Appt time: [id number]    Treatment Today     TREATMENT MINUTES COMMENTS   Evaluation     Self-care/ Home management     Manual therapy     Neuromuscular Re-education 13 Step ups with arm swings onto foam 2x10 aguilar    Stepping onto varrying foam levels 4x6 repetitions leading each side    NBOS on foam with head turns up and down and arm reaches [id number]   Therapeutic Activity     Therapeutic Exercises 13 See flow sheet and above, reviewed and progressed  exercise, standing exercise in session.    Side stepping with L2 band 10ft x4 aguilar    Standing exercise with band:  - hip abduction and extension 2x10 aguilar  - Standing squat with band 2x10 repetitions    Nu Step WL 5, 5 min avg 58   Gait training     Modality__________________                Total 26    Blank areas are intentional and mean the treatment did not include these items.       Yumiko Davison, PT, DPT  3/11/2020

## 2021-06-06 NOTE — PROGRESS NOTES
In clinic device check with Herminia Coleman CNP.  Please see link for full device report.  Patient was informed of results and next follow up during today's visit.

## 2021-06-06 NOTE — PROGRESS NOTES
[facility] Rehabilitation Daily Progress     Patient Name: Daniel Alamo  Date: 3/5/2020  Visit #: 3  PTA visit #:    Referral Diagnosis: Generalized muscle weakness  Referring provider: Kenna Calvillo CNP  Visit Diagnosis:     ICD-10-CM    1. Generalized muscle weakness M62.81    2. Arthralgia of left upper arm M25.522    3. Arthralgia of right upper arm M25.521    4. Impaired functional mobility, balance, gait, and endurance Z74.09        Initial Eval: Daniel Alamo is a 80 y.o. male who presents to therapy today with chief complaints of pain in shoulders and arms and weakness throughout body. Onset date of sx was about 10 years ago with pain in the hands, now moving to the elbow and shoulder right more than left.  Pt reported h/o coronary artery disease, VT.  Pain symptoms are not changed with anymovement.  Functional impairments include decreased ability to tolerate walking for long, standing for long, stairs and transfers due to generalized muscle weakness.  Pt demo's signs and sx consistent with generalized muscle weakness, arm pain is not coming from cervical involvement and is unable to be reproduced with testing or palpation on this date.   No data recorded    Past Medical History:   Diagnosis Date     Coronary artery disease      VT (ventricular tachycardia) (H) 2004     Assessment:     HEP/POC compliance is  good .  Patient is benefitting from skilled physical therapy and is making steady progress toward functional goals.  Patient is appropriate to continue with skilled physical therapy intervention, as indicated by initial plan of care.   Treatment focused on review and progression of exercise. Focus of generalized strengthening and arm mobility/strengthening, standing exercise on this date. Patient had little arm pain today. Progressing with therapy and tolerating exercise.    Goal Status:  Pt. will demonstrate/verbalize independence in self-management of condition in : 6 weeks  Pt. will be  independent with home exercise program in : 12 weeks    Pt will: improve 30sec STS by 4 or more to decrease risk of falls, within 12 visits.  Pt will: improve ability to stand/walk for greater than 5min, within 12 visits. Improve 6min walk test distance and decrease breaks needed.      Plan / Patient Education:     Continue with initial plan of care.  Progress with home program as tolerated.  Plan for next visit: nustep, start generalized strenthening, standing exercise  Subjective:     Pain Ratin    Patient reports that the pain is not bad today. Patient reports that exercises are going okay no questions.     Objective:     Presenting to clinic with rounded shoulders and forward flexed posture. Good ambulation at this time and steadiness.     Last session: 120.7meters : 2 min walk test    Exercise #1: bridge x10 HEP  Comment #1: SLR x10 HEP  Exercise #2: Hip abduction sidelying x10 HEP  Comment #2: STS x10 HEP and x3 in session  Exercise #3: Scap row x10 L3 band HEP and x3 in session  Comment #3: chin tuck and push x10 HEP  Exercise #4: side stepping L1 band 3x10 sec aguilar in session      Appt time: [id number]    Treatment Today     TREATMENT MINUTES COMMENTS   Evaluation     Self-care/ Home management     Manual therapy     Neuromuscular Re-education 5 NBOS on foam with head turns right and left     Step ups onto foam x10 right and x10 left   Therapeutic Activity     Therapeutic Exercises 25 See flow sheet and above, reviewed and progressed exercise, standing exercise in session.    Nu Step WL 5, 5 min avg 58   Gait training     Modality__________________                Total 30    Blank areas are intentional and mean the treatment did not include these items.       Yumiko Davison, PT, DPT  3/5/2020

## 2021-06-06 NOTE — PROGRESS NOTES
[facility] Rehabilitation Certification Request    February 24, 2020      Patient: Daniel Alamo  MR Number: [id number]  YOB: 1940  Date of Visit: 2/24/2020      Dear Sylvia Rodriguez PA-C    Thank you for this referral.   We are seeing Daniel Alamo for Physical Therapy of generalized weakness and pain in both arms.    Medicare and/or Medicaid requires physician review and approval of the treatment plan. Please review the plan of care and verify that you agree with the therapy plan of care by co-signing this note.      Plan of Care  Authorization / Certification Start Date: 02/24/20  Authorization / Certification End Date: 05/24/20  Authorization / Certification Number of Visits: 12  Communication with: Referral Source  Patient Related Instruction: Nature of Condition;Treatment plan and rationale;Self Care instruction;Basis of treatment;Body mechanics;Posture;Next steps;Precautions;Expected outcome  Times per Week: 1-2  Number of Weeks: 6-12  Number of Visits: 6-12  Discharge Planning: patient will discharge when goals are met or patient is independent in management of condition  Therapeutic Exercise: ROM;Stretching;Strengthening  Neuromuscular Reeducation: kinesio tape;posture;balance/proprioception;TNE;core  Manual Therapy: soft tissue mobilization;myofascial release;joint mobilization;muscle energy  Modalities: electrical stimulation;TENS;hot pack;cold pack      Goals:  Pt. will demonstrate/verbalize independence in self-management of condition in : 6 weeks  Pt. will be independent with home exercise program in : 12 weeks    Pt will: improve 30sec STS by 4 or more to decrease risk of falls, within 12 visits.  Pt will: improve ability to stand/walk for greater than 5min, within 12 visits. Improve 6min walk test distance and decrease breaks needed.        If you have any questions or concerns, please don't hesitate to call.    Sincerely,      Yumiko Davison, PT, DPT        Physician  recommendation:     ___ Follow therapist's recommendation        ___ Modify therapy      *Physician co-signature indicates they certify the need for these services furnished within this plan and while under their care.      [facility] Rehabilitation   Shoulder Initial Evaluation    Patient Name: Daniel Alamo  Date of evaluation: 2/24/2020  Referral Diagnosis: Generalized muscle weakness, polyarthralgia  Referring provider: Sylvia Rodriguez PA-C  Visit Diagnosis:     ICD-10-CM    1. Generalized muscle weakness M62.81    2. Arthralgia of left upper arm M25.522    3. Arthralgia of right upper arm M25.521    4. Impaired functional mobility, balance, gait, and endurance Z74.09        Past Medical History:   Diagnosis Date     Coronary artery disease      VT (ventricular tachycardia) (H) 2004         Assessment:        Daniel Alamo is a 80 y.o. male who presents to therapy today with chief complaints of pain in shoulders and arms and weakness throughout body. Onset date of sx was about 10 years ago with pain in the hands, now moving to the elbow and shoulder right more than left.  Pt reported h/o coronary artery disease, VT.  Pain symptoms are not changed with anymovement.  Functional impairments include decreased ability to tolerate walking for long, standing for long, stairs and transfers due to generalized muscle weakness.  Pt demo's signs and sx consistent with generalized muscle weakness, arm pain is not coming from cervical involvement and is unable to be reproduced with testing or palpation on this date.     The POC is dynamic and will be modified on an ongoing basis.  Barriers to achieving goals as noted in the assessment section may affect outcome.  Prognosis to achieve goals is  good   Pt. is appropriate for skilled PT intervention as outlined in the Plan of Care (POC).  Pt. is a good candidate for skilled PT services to improve pain levels and function.  Plan of care and goals were established in  collaboration with patient.     Goals:  Pt. will demonstrate/verbalize independence in self-management of condition in : 6 weeks  Pt. will be independent with home exercise program in : 12 weeks    Pt will: improve 30sec STS by 4 or more to decrease risk of falls, within 12 visits.  Pt will: improve ability to stand/walk for greater than 5min, within 12 visits. Improve 6min walk test distance and decrease breaks needed.      Patient's expectations/goals are realistic.    Barriers to Learning or Achieving Goals:  Chronicity of the problem.  Co-morbidities or other medical factors.  .       Plan / Patient Instructions:        Plan of Care:   Authorization / Certification Start Date: 02/24/20  Authorization / Certification End Date: 05/24/20  Authorization / Certification Number of Visits: 12  Communication with: Referral Source  Patient Related Instruction: Nature of Condition;Treatment plan and rationale;Self Care instruction;Basis of treatment;Body mechanics;Posture;Next steps;Precautions;Expected outcome  Times per Week: 1-2  Number of Weeks: 6-12  Number of Visits: 6-12  Discharge Planning: patient will discharge when goals are met or patient is independent in management of condition  Therapeutic Exercise: ROM;Stretching;Strengthening  Neuromuscular Reeducation: kinesio tape;posture;balance/proprioception;TNE;core  Manual Therapy: soft tissue mobilization;myofascial release;joint mobilization;muscle energy  Modalities: electrical stimulation;TENS;hot pack;cold pack      POC and pathology of condition were reviewed with patient.  Pt. is in agreement with the Plan of Care  A Home Exercise Program (HEP) was initiated today.  Pt. was instructed in exercises by PT and patient was given a handout with detailed instructions.    Plan for next visit: 6MWT, nustep, start generalized strenthening  .   Subjective:        Daniel Alamo  Is a 80 y.o. male who presents today for new patient evaluation of chronic bilateral arm  pain and hand paresthesias.  Patient states that he has had pain for many years.  He denies any injury or event to cause the pain.  It is gradually worsening.  He states he has never been given a firm diagnosis of what is causing his pain.  He was evaluated by Dr. Florentino.  Dr. Florentino recommended a trial of conservative treatment here as well as a neurological consultation and EMG.     Patient complains of pain in both arms.  He states he does not have any neck pain.  He feels pain in right shoulders, both elbows, and both wrists.  Patient does not feel that these pains are connected.  He states that the pains feel like separate pains.  In the shoulders the pain is located at the anterior aspect of the shoulder.  In the elbows the pain is located at the lateral aspect. He states the pain in his hands feels more diffuse and involving all fingers.  He denies any aggravating factors for the pain.  He states that his pain is alleviated only sometimes by lifting his arms above his head or supporting them.  Right-sided symptoms are worse than the left.  He has numbness and tingling in both hands which she states affects all 5 fingers, but primarily fingers 1 through 4.  The numbness and tingling is worse on the right than the left.  The numbness and tingling is constant but waxes and wanes in intensity.  He cannot identify any aggravating or alleviating factors to the numbness and tingling.  He states that his hands feel cold.  He denies color changes in the hands.  Patient states that he feels generally weak all over.  He attributes this to his heart problems.  He denies difficulty with fine motor activities.  He states that he feels slight difficulty with balance which he attributes to his weakness.  He did have a slip and fall on the ice 3 or 4 weeks ago (did not initially report this)    Patient states that he does feel generally weak after his surgery last month for abdominal aortic aneurysm.  Pain comes and goes,  more at night in bed.  Keeps him awake. Both hands on head help to relieve the pain.     Patients wife reports labored breathing especially with exertion. She also reports that he walks with his head down.   Generalized weakness to note, ware out with walking and getting out of chair.     Social information:   Living Situation: with wife, in a single family home (needing help more because of congestive heart surgery)   Occupation:retired        Equipment: none    Pain Rating:3  Pain rating at best: 1  Pain rating at worst: 4  Pain description: burning  And tingling the hands, sharp at times in the elbow and shoulder. Movement does not make a difference     Functional limitations are described as occurring with:   changing sleeping positions, feel it at night, movement does not make a difference. (still able to do everything on own)    Patient reports benefit from: nothing       Objective:      Note: Items left blank indicates the item was not performed or not indicated at the time of the evaluation.    30sec STS: 7 (age gender norm 12) high risk for falling    Shoulder/Arm/ Cervical Examination  1. Generalized muscle weakness     2. Arthralgia of left upper arm     3. Arthralgia of right upper arm     4. Impaired functional mobility, balance, gait, and endurance       Involved side: Bilateral right a little more than left, right shoulder elbow and hand, left elbow and hand    Posture Observation:      General sitting posture is  fair.  General standing posture is fair.  Cervical:  Moderate forward head  Cervical Clearing: Normal    Shoulder/Elbow ROM  No change in symptoms with arm motion  Date: 02/24/20     Shoulder and Elbow ROM ( )   AROM in degrees AROM in degrees AROM in degrees    Right Left Right Left Right Left   Shoulder Flexion (0-180 ) 170 170       Shoulder Abduction (0-180 ) 170 170       Shoulder Extension (0-60 )         Shoulder ER (0-90 ) 90 90       Shoulder IR (0-70 ) 70 70       Shoulder IR/Ext          Elbow Flexion (150 ) 145 145       Elbow Extension (0 ) 0 0         Wrist motion: WNL bilaterally    Shoulder/Elbow Strength *generalized muscle weakness to note  Date: 02/24/20     Shoulder/Elbow Strength (/5)  Manual Muscle Test (MMT) MMT MMT MMT    Right Left Right Left Right Left   Shoulder Flexion 4- 4-       Supraspinatus (thumb down- resisted flexion)         Shoulder Abduction 4- 4-       Shoulder Extension         Shoulder External Rotation         Shoulder Internal Rotation         Elbow Flexion 4- 4-       Elbow Extension 4- 4-       Other:         Other:             LE strength: Generalized muscle weakness 4-/5 throughout. Hip flexion 3+/5 bilaterally    Flexibility: WNL    Palpation: non tender to palpation unable to enhance pain    Joint Assessment:  Sternoclavicular Joint Assessment: WNL  Acromioclavicular Joint Assessment: WNL  Scapulothoracic Joint Assessment: WNL.  Glenohumeral Joint Assessment:WNL.    Shoulder Special Tests     Impingement Cluster Right (+/-) Left (+/-) Rotator Cuff Tests Right (+/-) Left (+/-)   Nisa-Jatinder - - Drop Arm Sign - -   Painful Arc - - Hornblowers (ER in horiz ab)     Neer Test - - ERLS     Horizontal Adduction Test - -      Drop arm test - -      Infraspinatus Test (ER) - -      AC Tests Right (+/-) Left (+/-) IRLS (supraspinatus)     Active Compression - - Labral Tests Right (+/-) Left (+/-)   Crossbody Adduction - - Biceps Load Test II - -   AC Resisted Extension (horiad)   Jerk Test (posterior inferior)     GH Instability Tests Right (+/-) Left (+/-) Clunk Test     Sulcus Sign - - Biceps Tests Right (+/-) Left (+/-)   Apprehension - - Speed - -   Relocation   New s     Other:   Other:     Other:   Other:       Cervical Special Tests  Cervical Special Tests Right Left UE Nerve Mobility Right Left   Cervical compression - - Median nerve - -   Cervical distraction - - Ulnar nerve - -   Spurling s test - - Radial nerve - -   Shoulder abduction sign    Thoracic outlet     Deep neck flexor endurance test weakness  Lili - -   Upper cervical rotation   Adson s     Sharper-Dion   Cervical rotation lateral flexion     Alar ligament test   Other:     Other:   Other:       Exercises:  Exercise #1: bridge x10  Comment #1: SLR x10  Exercise #2: Hip abduction sidelying x10  Comment #2: STS x10  Exercise #3: Scap row x10 L2 band  Comment #3: chin tuck and push x10      Appt time: [id number]    Treatment Today    TREATMENT MINUTES COMMENTS   Evaluation 25 Low Complexity Evaluation  - Patient educated on pathology  - Discussed POC   Self-care/ Home management     Manual therapy     Neuromuscular Re-education     Therapeutic Activity     Therapeutic Exercises 27 - Education and performance of HEP, provided pictures with written instruction.  - Patient educated on pathology  - Discussed POC   Gait training     Modality__________________                Total 52    Blank areas are intentional and mean the treatment did not include these items.     PT Evaluation Code: (Please list factors)  Patient History/Comorbidities: see PMH  Examination: See above, Shoulder eval 4+ elements  Clinical Presentation: stable  Clinical Decision Making: low    Patient History/  Comorbidities Examination  (body structures and functions, activity limitations, and/or participation restrictions) Clinical Presentation Clinical Decision Making (Complexity)   No documented Comorbidities or personal factors 1-2 Elements Stable and/or uncomplicated Low   1-2 documented comorbidities or personal factor 3 Elements Evolving clinical presentation with changing characteristics Moderate   3-4 documented comorbidities or personal factors 4 or more Unstable and unpredictable High            Yumiko Davison, PT, DPT  6:53 AM

## 2021-06-06 NOTE — TELEPHONE ENCOUNTER
----- Message from Sylvia Rodriguez PA-C sent at 2/25/2020 10:14 AM CST -----  Please call the patient let him know that his lab work was normal.  We will await the results of his EMG/neurology consultation.

## 2021-06-06 NOTE — PROGRESS NOTES
[facility] Rehabilitation Daily Progress     Patient Name: Daniel Alamo  Date: 3/16/2020  Visit #: 6  PTA visit #:    Referral Diagnosis: Generalized muscle weakness  Referring provider: Kenna Calvillo CNP  Visit Diagnosis:     ICD-10-CM    1. Generalized muscle weakness  M62.81    2. Arthralgia of left upper arm  M25.522    3. Arthralgia of right upper arm  M25.521    4. Impaired functional mobility, balance, gait, and endurance  Z74.09        Initial Eval: Daniel Alamo is a 80 y.o. male who presents to therapy today with chief complaints of pain in shoulders and arms and weakness throughout body. Onset date of sx was about 10 years ago with pain in the hands, now moving to the elbow and shoulder right more than left.  Pt reported h/o coronary artery disease, VT.  Pain symptoms are not changed with anymovement.  Functional impairments include decreased ability to tolerate walking for long, standing for long, stairs and transfers due to generalized muscle weakness.  Pt demo's signs and sx consistent with generalized muscle weakness, arm pain is not coming from cervical involvement and is unable to be reproduced with testing or palpation on this date.   No data recorded    Past Medical History:   Diagnosis Date     Coronary artery disease      VT (ventricular tachycardia) (H) 2004     Assessment:     HEP/POC compliance is  good .  Patient is benefitting from skilled physical therapy and is making steady progress toward functional goals.  Patient is appropriate to continue with skilled physical therapy intervention, as indicated by initial plan of care.   Treatment continued to focus on strengthening and balance exercise. Will continue to work on arm strength x1 per week and leg strength and balance x1 per week to see how he tolerates it. Improvement to note in UE strength and balance today. Progressing with therapy and tolerating exercise.    Goal Status:  Pt. will demonstrate/verbalize independence in  self-management of condition in : 6 weeks  Pt. will be independent with home exercise program in : 12 weeks    Pt will: improve 30sec STS by 4 or more to decrease risk of falls, within 12 visits.  Pt will: improve ability to stand/walk for greater than 5min, within 12 visits. Improve 6min walk test distance and decrease breaks needed.      Plan / Patient Education:     Continue with initial plan of care.  Progress with home program as tolerated.  Plan for next visit: nustep, start generalized strenthening, standing exercise, ask about how arms felt at end of day  Subjective:     Pain Ratinpain in elbow changes day to day most of the time at night.    Patient reports the he is doing good. Tired early in the morning until 10 am, but doing good. Today elbow on right hurts a little finger still tingling.    Objective:     Presenting to clinic with rounded shoulders and forward flexed posture. Good ambulation at this time and steadiness.     30sec STS: 10 repetitions. (initial visit: 7)    Therapeutic Exercise  Exercise #1: bridge x10 HEP  Comment #1: SLR x10 HEP  Exercise #2: Hip abduction sidelying x10 HEP  Comment #2: STS x10 HEP  Exercise #3: Scap row x10 L3 band HEP and x3 in session  Comment #3: chin tuck and lift x10 HEP and x3 in session  Exercise #4: Scaption with 5lb weights 3x10 repetitions  Comment #4: bicep curl 5lb 3x10 repetitions  Exercise #5: aguilar shoulder ER with L3 band 3x10 repetitions  Comment #5: Chair push up 3x12 in session and HEP    Appt time: [phone]    Treatment Today     TREATMENT MINUTES COMMENTS   Evaluation     Self-care/ Home management     Manual therapy     Neuromuscular Re-education 10 Tandem balance [id number] aguilar    Stepping onto varrying foam levels 4x6 repetitions leading each side    NBOS on foam with head turns up and down and arm reaches [id number]   Therapeutic Activity     Therapeutic Exercises 28 See flow sheet and above, reviewed and progressed exercise, UE exercise focus this  session. Added to and progressed HEP.    Nu Step WL 5, 6 min avg 58   Gait training     Modality__________________                Total 38    Blank areas are intentional and mean the treatment did not include these items.       Yumiko Davison, PT, DPT  3/16/2020

## 2021-06-06 NOTE — PROGRESS NOTES
[facility] Rehabilitation Daily Progress     Patient Name: Daniel Alamo  Date: 3/2/2020  Visit #: 2  PTA visit #:    Referral Diagnosis: Generalized muscle weakness  Referring provider: Kenna Calvillo CNP  Visit Diagnosis:     ICD-10-CM    1. Generalized muscle weakness M62.81    2. Arthralgia of left upper arm M25.522    3. Arthralgia of right upper arm M25.521    4. Impaired functional mobility, balance, gait, and endurance Z74.09        Initial Eval: Daniel Alamo is a 80 y.o. male who presents to therapy today with chief complaints of pain in shoulders and arms and weakness throughout body. Onset date of sx was about 10 years ago with pain in the hands, now moving to the elbow and shoulder right more than left.  Pt reported h/o coronary artery disease, VT.  Pain symptoms are not changed with anymovement.  Functional impairments include decreased ability to tolerate walking for long, standing for long, stairs and transfers due to generalized muscle weakness.  Pt demo's signs and sx consistent with generalized muscle weakness, arm pain is not coming from cervical involvement and is unable to be reproduced with testing or palpation on this date.   No data recorded    Past Medical History:   Diagnosis Date     Coronary artery disease      VT (ventricular tachycardia) (H) 2004     Assessment:     HEP/POC compliance is  good .  Patient is benefitting from skilled physical therapy and is making steady progress toward functional goals.  Patient is appropriate to continue with skilled physical therapy intervention, as indicated by initial plan of care.   Treatment focused on review and progression of exercise. Focus of generalized strengthening and arm mobility/strengthening. Patient had little arm pain today. Progressing with therapy and tolerating exercise.    Goal Status:  Pt. will demonstrate/verbalize independence in self-management of condition in : 6 weeks  Pt. will be independent with home exercise  program in : 12 weeks    Pt will: improve 30sec STS by 4 or more to decrease risk of falls, within 12 visits.  Pt will: improve ability to stand/walk for greater than 5min, within 12 visits. Improve 6min walk test distance and decrease breaks needed.      Plan / Patient Education:     Continue with initial plan of care.  Progress with home program as tolerated.  Plan for next visit: 6MWT, nustep, start generalized strenthening, standing exercise  Subjective:     Pain Ratin    Patient reports pain comes and goes in the arm generally a little better. He has been doing exercises everyday, has a few questions to make sure he is doing them right.     Objective:     Presenting to clinic with rounded shoulders and forward flexed posture. Good ambulation at this time and steadiness.     120.7meters : 2 min step     Exercise #1: bridge x10  Comment #1: SLR x10  Exercise #2: Hip abduction sidelying x10  Comment #2: STS x10  Exercise #3: Scap row x10 L3 band  Comment #3: chin tuck and push x10      Appt time: [phone]    Treatment Today     TREATMENT MINUTES COMMENTS   Evaluation     Self-care/ Home management     Manual therapy     Neuromuscular Re-education     Therapeutic Activity     Therapeutic Exercises 29 See flow sheet and above, reviewed and progressed exercise    Nu Step WL 5, 5 min avg 52   Gait training     Modality__________________                Total 29    Blank areas are intentional and mean the treatment did not include these items.       Yumiko Davison, PT, DPT  3/2/2020

## 2021-06-06 NOTE — PATIENT INSTRUCTIONS - HE
Start lisinopril 2.5 mg daily    Come back for blood work in 1 month to check your kidneys.    Your next remote device interrogation is 5/18/2020, please make an appointment to see Dr. Harrell shortly after that.

## 2021-06-06 NOTE — PROGRESS NOTES
ASSESSMENT: Daniel Alamo is a 80 y.o. male with past medical history significant for ischemic cardiomyopathy, atrial fibrillation, dyslipidemia, coronary artery disease, ICD in place, currently on Eliquis and Plavix, status post recent abdominal aortic aneurysm surgery who presents today for new patient evaluation of chronic right greater than left arm pain and bilateral hand paresthesias.  Etiology of symptoms is unclear.  CT cervical spine does show advanced cervical spondylosis with multilevel foraminal stenosis, however, patient does not have any neck pain and his pain does not sound radicular.  Pain is more discrete and localized to the shoulder, elbow, and hand joints, however, x-rays of these joints were not very impressive.  Paresthesias may be related to peripheral neuropathy versus peripheral nerve entrapment such as carpal tunnel syndrome.  It is less likely he has radiculopathy.  Strength remains intact.  He does not have any hyperreflexia or signs/symptoms of myelopathy.      NDI:  16  Who 5: 14    PLAN:  A shared decision making model was used.  The patient's values and choices were respected.  The following represents what was discussed and decided upon by the physician assistant and the patient.      1.  DIAGNOSTIC TESTS: I reviewed the CT cervical spine in detail with the patient with the help of a spine model.  I also reviewed x-rays of the shoulders, elbows, and hands.  [facility] along with a consultation with Dr. Espinosa on March 23.  We had the patient sign a release of information so that that result will be routed to me.  I did order an ESR and CRP to evaluate for possible systemic inflammatory condition contributing to joint pain.  -If the EMG and lab work-up is unremarkable, we may also want to consider a vascular etiology.  In that case, I would refer him to see Dr. Farnsworth at [facility].    2.  PHYSICAL THERAPY:  Discussed the importance  of core strengthening, ROM, stretching exercises with the patient and how each of these entities is important in decreasing pain.  Explained to the patient that the purpose of physical therapy is to teach the patient a home exercise program.  These exercises need to be performed every day in order to decrease pain and prevent future occurrences of pain.   Entered a referral for the patient begin physical therapy.  I asked the physical therapist to work on general conditioning/strengthening.  Patient states that he does feel generally weak after his surgery last month for abdominal aortic aneurysm.    3.  MEDICATIONS:    -Patient is using gabapentin 900 mg 3 times daily.  He finds this helpful.  He has been on this for some time.  -Lyrica was not any more beneficial to gabapentin and it was expensive.  -Patient has been using Aleve.  I recommended he avoid NSAIDs since he is on Eliquis and Plavix.  I suggested he use Tylenol instead.    4.  INTERVENTIONS: No interventions were ordered.  Patient will trial conservative treatment and have additional work-up first.  - Given patient's other medical problems, would like to take a conservative approach with him.  -If the EMG shows cervical radiculopathy, we could try cervical epidural steroid injection.  He was told by Dr. Florentino that he is not a good surgical candidate.    5.  PATIENT EDUCATION: Patient is in agreement the above plan.  All questions were answered.  -I told the patient that if his lab work is abnormal I will refer him to rheumatology.    6.  FOLLOW-UP:   I will see the patient back in the clinic in about 6 weeks, after he has had his EMG and consultation at neurology.  If he has questions or concerns in the meantime, he should not hesitate to call.         SUBJECTIVE:  Daniel Alamo  Is a 80 y.o. male who presents today for new patient evaluation of chronic bilateral arm pain and hand paresthesias.  Patient states that he has had pain for many years.  He  denies any injury or event to cause the pain.  It is gradually worsening.  He states he has never been given a firm diagnosis of what is causing his pain.  He was evaluated by Dr. Florentino.  Dr. Florentino recommended a trial of conservative treatment here as well as a neurological consultation and EMG.    Patient complains of pain in both arms.  He states he does not have any neck pain.  He feels pain in both shoulders, both elbows, and the wrist.  Patient does not feel that these pains are connected.  He states that the pains feel like separate pains.  In the shoulders the pain is located at the anterior aspect of the shoulder.  In the elbows the pain is located at the lateral aspect of the shoulders.  He states the pain in his hands feels more diffuse and involving all fingers.  He denies any aggravating factors for the pain.  He states that his pain is alleviated by lifting his arms above his head.  Right-sided symptoms are worse than the left.  He has numbness and tingling in both hands which she states affects all 5 fingers, but primarily fingers 1 through 4.  The numbness and tingling is worse on the right than the left.  The numbness and tingling is constant but waxes and wanes in intensity.  He cannot identify any aggravating or alleviating factors to the numbness and tingling.  He states that his hands feel cold.  He denies color changes in the hands.  Patient states that he feels generally weak all over.  He attributes this to his heart problems.  He denies difficulty with fine motor activities.  He states that he feels slight difficulty with balance which he attributes to his weakness.  He did have a slip and fall on the ice 3 or 4 weeks ago.    Patient to physical therapy for his lower back about 8 years ago which did provide some relief of pain at that time.  He states that they tried traction which was not helpful.  He had an injection into his neck (he does not remember specifics).  He states that gave  him relief of his pain for 1 day.  He has never had any spine surgery.  He does not go to a chiropractor.  He takes gabapentin 900 mg 3 times daily which is helpful.  He tried Lyrica which was no more effective and much more expensive.  He states that he uses Aleve as needed.    Current Outpatient Medications on File Prior to Encounter   Medication Sig Dispense Refill     apixaban (ELIQUIS) 5 mg Tab tablet Take 1 tablet (5 mg total) by mouth 2 (two) times a day. 60 tablet 11     atorvastatin (LIPITOR) 80 MG tablet Take 80 mg by mouth daily.       cyanocobalamin, vitamin B-12, 1,000 mcg Subl Take 1 tablet by mouth once daily sublingually       sotalol (BETAPACE) 80 MG tablet Take 1 tablet (80 mg total) by mouth 2 (two) times a day. 190 tablet 5     tamsulosin (FLOMAX) 0.4 mg cap Take 0.4 mg by mouth daily.  4     [DISCONTINUED] gabapentin (NEURONTIN) 800 MG tablet Take 900 mg by mouth 2 (two) times a day.   4     aspirin 325 MG tablet Take 82 mg by mouth daily.       clopidogreL (PLAVIX) 75 mg tablet Take 75 mg by mouth daily.       gabapentin (NEURONTIN) 300 MG capsule Take 900 mg by mouth 3 (three) times a day.       naproxen sodium (ALEVE) 220 MG tablet Take 220 mg by mouth.         Allergies   Allergen Reactions     Carvedilol Other (See Comments)     Upset stomach     Chloride Nausea Only       Past Medical History:   Diagnosis Date     Coronary artery disease      VT (ventricular tachycardia) (H) 2004        Patient Active Problem List   Diagnosis     Ischemic Cardiomyopathy     Atrial Fibrillation     Dyslipidemia     Coronary artery disease of native heart with stable angina pectoris, unspecified vessel or lesion type (H)     ICD (implantable cardioverter-defibrillator), dual, in situ     Chronic systolic congestive heart failure (H)     Ischemic cardiomyopathy       Past Surgical History:   Procedure Laterality Date     CORONARY ARTERY BYPASS GRAFT       icd       INSERT / REPLACE / REMOVE PACEMAKER       [place]  CABG, VEIN, SINGLE      Description: CABG (CABG);  Recorded: 10/03/2012;  Comments: LIMA to LAD, SVG to OM2, SVG to RCA       Family history: Mother had cancer, father had heart disease    Social History     Socioeconomic History     Marital status:      Spouse name: None     Number of children: None     Years of education: None     Highest education level: None   Occupational History     None   Social Needs     Financial resource strain: None     Food insecurity:     Worry: None     Inability: None     Transportation needs:     Medical: None     Non-medical: None   Tobacco Use     Smoking status: Current Every Day Smoker     Packs/day: 0.50     Years: 30.00     Pack years: 15.00     Smokeless tobacco: Never Used   Substance and Sexual Activity     Alcohol use: Yes     Alcohol/week: 1.0 standard drinks     Types: 1 Glasses of wine per week     Drug use: No     Sexual activity: None   Lifestyle     Physical activity:     Days per week: None     Minutes per session: None     Stress: None   Relationships     Social connections:     Talks on phone: None     Gets together: None     Attends Hindu service: None     Active member of club or organization: None     Attends meetings of clubs or organizations: None     Relationship status: None     Intimate partner violence:     Fear of current or ex partner: None     Emotionally abused: None     Physically abused: None     Forced sexual activity: None   Other Topics Concern     None   Social History Narrative     None       ROS:    Specifically negative for dysphagia, imbalance, fine motor skill difficulties, bowel/bladder dysfunction, fevers,chills, appetite changes, unexplained weight loss.   Otherwise 13 systems reviewed are negative.  Please see the patient's intake questionnaire from today for details.      OBJECTIVE:  PHYSICAL EXAMINATION:  CONSTITUTIONAL:  Vital signs as above.  No acute distress.  The patient is well nourished and well groomed.  PSYCHIATRIC:   The patient is awake, alert, oriented to person, place, time and answering questions appropriately with clear speech.    HEENT:  Normocephalic, atraumatic.  Sclera clear.    SKIN: Patient has fungal infection fingernails right hand.  LYMPH NODES:  No palpable or tender anterior/posterior cervical, submandibular, or supraclavicular lymph nodes.    MUSCLE STRENGTH:  5/5 strength for the bilateral shoulder abductors, elbow flexors/extensors, wrist extensors, finger flexors/abductors.  NEURO:  CN III-XII are grossly intact.  1+ symmetric biceps, brachioradialis, triceps reflexes bilaterally.  Sensation to light touch is subjectively diminished fingers 2 through 4 bilaterally.  Negative Nails's bilaterally.  Negative Tinel sign at the carpal tunnel bilaterally.  Negative Phalen sign bilaterally.  VASCULAR:  2/4 radial pulses bilaterally.  Fingertips are bit cool bilaterally.  Capillary refill in the upper extremities is less than 1 second.  MUSCULOSKELETAL: Ambulates with a narrow-base, nonantalgic gait.  Patient does have a head forward neck posture.  No significant tenderness palpation cervical paraspinous muscles or upper trapezius muscles.  Range of motion is mildly restricted in all directions in the cervical spine.  Patient has mild tenderness to palpation about the right acromioclavicular joint.  No significant tenderness palpation about the elbows.  Resisted wrist extension does not reproduce elbow pain.  No significant tenderness palpation about the wrist or hand joints.  Patient does not have any swollen or deformed joints.  No joint redness.    RESULTS:    I reviewed the CT cervical spine from [facility] dated December 9, 2019.  This shows advanced cervical spondylosis with smooth reversal of normal cervical lordosis.  There is severe disc degeneration C2-3 throug C5-6.  There is disc osteophyte complexes at each levels resulting in moderate spinal stenosis C4-5 and mild spinal stenosis C5-6.  At  C3-4 there is mild right and moderate left foraminal stenosis.  At C4-5 there is moderate right and severe left foraminal stenosis.  At C5-6 there is moderate bilateral foraminal stenosis.  At C6-7 there is moderate right and mild to moderate left foraminal stenosis.    EXAM: XR SHOULDER LEFT 2 OR MORE VWS  LOCATION: [facility]  DATE/TIME: 1/31/2020 10:32 AM     INDICATION: Pain, unspecified  COMPARISON: None.     IMPRESSION:   Normal joint spaces and alignment. No fracture.     EXAM: XR SHOULDER RIGHT 2 OR MORE VWS  LOCATION: [facility]  DATE/TIME: 1/31/2020 10:30 AM     INDICATION: Pain, unspecified.  COMPARISON: None.     IMPRESSION:   Normal joint spaces and alignment. No fracture. Minimal degenerative changes at the AC joint.    EXAM: XR ELBOW LEFT 3 OR MORE VWS  LOCATION: [facility]  DATE/TIME: 1/31/2020 10:31 AM     INDICATION: Pain, unspecified  COMPARISON: None.     IMPRESSION:   Normal joint spaces and alignment. No fracture or joint effusion.    EXAM: XR ELBOW RIGHT 3 OR MORE VWS  LOCATION: [facility]  DATE/TIME: 1/31/2020 10:32 AM     INDICATION: Pain, unspecified  COMPARISON: None.     IMPRESSION:   Normal joint spaces and alignment. No fracture or joint effusion.    EXAM: XR HAND LEFT 3 OR MORE VWS  LOCATION: [facility]  DATE/TIME: 1/31/2020 10:30 AM     INDICATION: Pain, unspecified  COMPARISON: None.     IMPRESSION:   Normal alignment. No fracture. Moderate degenerative changes are present at base of the thumb, first CMC joint. There is also modest degenerative changes of the distal radiocarpal joint. Remainder of digits appear within normal limits for   Age.    EXAM: XR HAND RIGHT 3 OR MORE VWS  LOCATION: [facility]  DATE/TIME: 1/31/2020 10:31 AM     INDICATION: Pain, unspecified  COMPARISON: None.     IMPRESSION:   Normal joint spaces and alignment. No acute fracture. Mild degenerative changes at the first CMC joint. Tiny accessory ossicle  distal to the ulnar styloid.

## 2021-06-06 NOTE — TELEPHONE ENCOUNTER
Spoke with patient regarding the need to cancel appointments through 3/27/2020, he verbalized understanding and is aware that he is scheduled on 4/1/2020 at 930am.

## 2021-06-07 NOTE — TELEPHONE ENCOUNTER
Wellness Screening Tool  Symptom Screening:  Do you have one of the following new symptoms:    Fever or reported chills?  No    A new cough (started within the past 14 days)?  No    Shortness of breath (started within the past 14 days) ?  No     Nausea, vomiting, or diarrhea?  No    Within the past 3 weeks, have you been exposed to someone with a known positive illness below:    COVID-19 (known or suspected)?  No    Chicken pox?  No    Mealses?  No    Pertussis?  No    Patient notified of visitor restrictions: Yes  Patient's appointment status: Patient will be seen in clinic as scheduled on 4/21/20

## 2021-06-07 NOTE — TELEPHONE ENCOUNTER
Called and spoke to patient regarding need to cancel appointments on 4/1 and 4/6/2020 due to COVID-19. Informed patient that we will call to reschedule when able, patient expressed understanding.

## 2021-06-08 NOTE — PROGRESS NOTES
[facility] Heart Care Note    Assessment:    1.BSCI ICD  initial implant 2004        Generator replacement November 24, 2009       ICD generator replacement October 15, 2018/[facility]  3.  Coronary artery disease with previous inferior myocardial infarction  Prior bypass surgery with left internal mammary artery to LAD, vein graft to left circumflex marginal branch  4.  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 male with a known history of   CAD  Was seen today for adjustment of ICD setrtings to prevent pro-arrhythmia             Problem List:  Patient Active Problem List   Diagnosis     Atrial Fibrillation     Dyslipidemia     Coronary artery disease of native heart with stable angina pectoris, unspecified vessel or lesion type (H)     ICD (implantable cardioverter-defibrillator), dual, in situ     Chronic systolic congestive heart failure (H)     Ischemic cardiomyopathy     PVD (peripheral vascular disease) (H)     Abdominal aortic aneurysm (AAA) without rupture (H)     PVC's (premature ventricular contractions)     Medical History:  Past Medical History:   Diagnosis Date     Coronary artery disease      VT (ventricular  tachycardia) (H) 2004     Surgical History:  Past Surgical History:   Procedure Laterality Date     CORONARY ARTERY BYPASS GRAFT       icd       INSERT / REPLACE / REMOVE PACEMAKER       [place] CABG, VEIN, SINGLE      Description: CABG (CABG);  Recorded: 10/03/2012;  Comments: LIMA to LAD, SVG to OM2, SVG to RCA     Social History:  Social History     Socioeconomic History     Marital status:      Spouse name: Not on file     Number of children: Not on file     Years of education: Not on file     Highest education level: Not on file   Occupational History     Not on file   Social Needs     Financial resource strain: Not on file     Food insecurity     Worry: Not on file     Inability: Not on file     Transportation needs     Medical: Not on file     Non-medical: Not on file   Tobacco Use     Smoking status: Current Every Day Smoker     Packs/day: 0.50     Years: 30.00     Pack years: 15.00     Smokeless tobacco: Never Used   Substance and Sexual Activity     Alcohol use: Yes     Alcohol/week: 1.0 standard drinks     Types: 1 Glasses of wine per week     Drug use: No     Sexual activity: Not on file   Lifestyle     Physical activity     Days per week: Not on file     Minutes per session: Not on file     Stress: Not on file   Relationships     Social connections     Talks on phone: Not on file     Gets together: Not on file     Attends Mormon service: Not on file     Active member of club or organization: Not on file     Attends meetings of clubs or organizations: Not on file     Relationship status: Not on file     Intimate partner violence     Fear of current or ex partner: Not on file     Emotionally abused: Not on file     Physically abused: Not on file     Forced sexual activity: Not on file   Other Topics Concern     Not on file   Social History Narrative     Not on file       Review of Systems:                                                   Family History:  No family history on  file.      Allergies:  Allergies   Allergen Reactions     Carvedilol Other (See Comments)     GI upset     Chloride Nausea Only       Medications:  Current Outpatient Medications   Medication Sig Dispense Refill     acetaminophen (TYLENOL) 325 MG tablet Take 650 mg by mouth every 6 (six) hours as needed for pain.       apixaban (ELIQUIS) 5 mg Tab tablet Take 1 tablet (5 mg total) by mouth 2 (two) times a day. 60 tablet 11     aspirin 81 MG EC tablet Take 1 tablet (81 mg total) by mouth daily. 30 tablet 0     atorvastatin (LIPITOR) 80 MG tablet Take 80 mg by mouth daily.       cyanocobalamin, vitamin B-12, 1,000 mcg Subl Take 1 tablet by mouth once daily sublingually       fluticasone propionate (FLONASE) 50 mcg/actuation nasal spray Apply 1-2 sprays into each nostril as needed.       gabapentin (NEURONTIN) 300 MG capsule Take 900 mg by mouth 2 (two) times a day.        lisinopriL (PRINIVIL,ZESTRIL) 2.5 MG tablet Take 1 tablet (2.5 mg total) by mouth daily. 30 tablet 2     loratadine (CLARITIN) 10 mg tablet Take 10 mg by mouth daily.       naproxen sodium (ALEVE) 220 MG tablet Take 220 mg by mouth every 12 (twelve) hours as needed.        sotalol (BETAPACE) 80 MG tablet Take 1 tablet (80 mg total) by mouth 2 (two) times a day. 190 tablet 5     tamsulosin (FLOMAX) 0.4 mg cap Take 0.4 mg by mouth daily.  4     No current facility-administered medications for this visit.          Surgical site   ICD well healed     Device interrogation  Device assessment was done yesterday and showed 10 years and 6 months left on device,  72% atrial pacing  10% ventricular pacing  No atrial fibrillation  No recurrence of ventricular tachycardia none since episode April 25, 2020    Objective:   Vital signs:  There were no vitals taken for this visit.  Patient quite slender, underweight  Alert oriented appropriate  Blood pressure 90 sitting, 80 standing right and left are the same    Physical Exam:  Quite thin     GENERAL APPEARANCE:  Alert, cooperative and in no acute distress.  HEENT: No scleral icterus. No Xanthelasma. Oral mucous membranes pink and moist.  NECK: JVP normal cm. No Hepatojugular reflux. Thyroid not  Palpable  CHEST: clear to auscultation and percussion  CARDIOVASCULAR: S1, S2 without murmur    Brachial, radial  pulses are intact and symmetric.   No carotid bruits noted.  ABDOMEN: Non tender. BS+. No bruits.  EXTREMITIES: No cyanosis, clubbing or edema.    Lab Results:  LIPIDS:  Lab Results   Component Value Date    CHOL 150 12/06/2019    CHOL 134 10/05/2018    CHOL 139 02/16/2018     Lab Results   Component Value Date    HDL 36 (L) 12/06/2019    HDL 34 (L) 10/05/2018    HDL 33 (L) 02/16/2018     Lab Results   Component Value Date    LDLCALC 100 12/06/2019    LDLCALC 88 10/05/2018    LDLCALC 93 02/16/2018     Lab Results   Component Value Date    TRIG 70 12/06/2019    TRIG 61 10/05/2018    TRIG 67 02/16/2018     No components found for: CHOLHDL    BMP:  Lab Results   Component Value Date    CREATININE 1.08 04/21/2020    BUN 13 04/21/2020     04/21/2020    K 3.5 04/21/2020     04/21/2020    CO2 27 04/21/2020         This note has been dictated using voice recognition software. Any grammatical or context distortions are unintentional and inherent to the software.  Chadd Harrell MD  [facility]  [phone]

## 2021-06-08 NOTE — PATIENT INSTRUCTIONS - HE
Daniel Alamo    Thank you for coming to the [facility] today for a cardiac evaluation  It was my pleasure to see you today  A good contact for any questions would be Gwen Spencer  RN @ [phone]    Plan:  The ICD evaluation shows the battery is excellent, should last another 10 years and 6 months  72% atrial pacing  10% ventricular pacing  Because of coincidental PVCs on atrial pacing there is unnecessary ventricular pacing on the T wave that could cause abnormal rhythms so we should reprogram   We will have you come to the pacemaker clinic in [place] to make adjustments  No atrial fibrillation-rhythm has been well controlled while taking sotalol   Plavix this has been stopped and now aspirin 81 mg daily   Eliquis 5 mg twice daily  Laboratory evaluation April 21 was excellent  Continue to have device check through transtelephonic monitoring every 3 months  Return in 1 year for comprehensive cardiac arrhythmia device assessment

## 2021-06-08 NOTE — PROGRESS NOTES
Review of Systems - History obtained from the patient  General ROS: negative  Psychological ROS: negative  Ophthalmic ROS: negative  ENT ROS: negative  Allergy and Immunology ROS: negative  Hematological and Lymphatic ROS: negative  Endocrine ROS: negative  Respiratory ROS: negative  Cardiovascular ROS: negative  Gastrointestinal ROS: negative  Genito-Urinary ROS: negative  Musculoskeletal ROS: positive for - pain in arm - bilateral  Neurological ROS: positive for - dizziness and loss of balance   Dermatological ROS: negative

## 2021-06-08 NOTE — TELEPHONE ENCOUNTER
Wellness Screening Tool  Symptom Screening:  Do you have one of the following NEW symptoms:    Fever (subjective or >100.0)?  No    A new cough?  No    Shortness of breath?  No     Chills? No     New loss of taste or smell? No     Generalized body aches? No     New persistent headache? No     New sore throat? No     Nausea, vomiting, or diarrhea?  No    Within the past 3 weeks, have you been exposed to someone with a known positive illness below:    COVID-19 (known or suspected)?  No    Chicken pox?  No    Mealses?  No    Pertussis?  No    Patient notified of visitor restrictions: Yes  Pt informed to wear a mask: Yes, has mask, will wear to appt  Pt notified if they develop any symptoms listed above, prior to their appointment, they are to call the clinic directly at [phone] for further instructions.  Yes  Patient's appointment status: Patient will be seen in clinic as scheduled on 6/5/2020      Andria Auguste RN

## 2021-06-08 NOTE — PROGRESS NOTES
"Daniel Alamo is a 80 y.o. male who is being evaluated via a billable telephone visit during the COVID-19 crisis.      The patient has been notified of following:     \"This telephone visit will be conducted via a call between you and your physician/provider. We have found that certain health care needs can be provided without the need for a physical exam.  This service lets us provide the care you need with a short phone conversation.  If a prescription is necessary we can send it directly to your pharmacy.  If lab work is needed we can place an order for that and you can then stop by our lab to have the test done at a later time.    If during the course of the call the physician/provider feels a telephone visit is not appropriate, you will not be charged for this service.\"     Patient has given verbal consent to a Telephone visit? YES    Patient would like to receive their AVS by AVS Preference: Joel.        Assessment:   Daniel Alamo is a 80 y.o. y.o. male with past medical history significant for ischemic cardiomyopathy, atrial fibrillation, dyslipidemia, coronary artery disease, ICD in place, currently on Eliquis and Plavix, status post AAA surgery who has a telephone visit today (patient unable to do video visit) regarding chronic recurring left arm pain and bilateral hand paresthesias.  Etiology of symptoms is unclear.  CT cervical spine does show advanced cervical spondylosis with multilevel foraminal stenosis, however, patient does not have any neck pain and his pain does not sound typical for radicular pain.  Pain is more discrete and localized to the shoulder, elbow, and hand joints, however, x-rays of these joints were not very impressive.  Inflammatory markers were normal.  Paresthesias may be related to peripheral neuropathy versus peripheral nerve entrapment.  Patient was supposed to have [facility], but that was canceled due to the COVID-19 " pandemic.  -On previous physical exam he did not have any hyperreflexia or signs of myelopathy.       Plan:     A shared decision making plan was used.  The patient's values and choices were respected.  The following represents what was discussed and decided upon by the physician assistant and the patient.      1.  DIAGNOSTIC TESTS: I reviewed the CT cervical spine.  I also reviewed x-rays of the shoulders, elbows, and hands.  ESR and CRP were normal.  -Patient reports that he has not been rescheduled for his EMG at neurological [facility].  I entered an order for the patient to have the EMG here at the spine center.  I explained that we are currently scheduling EMGs out to June/July because of the COVID-19 outbreak.  If he is able to reschedule his appointment at [facility] before then, he can cancel his appointment here.  2.  PHYSICAL THERAPY: Patient attended 6 sessions of physical therapy in February and March 2020 Additional sessions were canceled due to COVID-19.  He did not feel the physical therapy was helpful.  He is not doing his home exercises.  3.  MEDICATIONS: No changes are made to the patient's medications.  -Patient takes gabapentin 900 mg 2 times daily.  -Lyrica was not more beneficial than gabapentin and it was expensive.  - He also takes Tylenol as needed.  -He cannot take NSAIDs since he is on blood thinners for his heart.  4.  INTERVENTIONS: No interventions were ordered.  -Given patient's medical problems, would like to take a conservative approach.  -If EMG shows carpal tunnel syndrome, we could consider carpal tunnel injections.  - If EMG were to show cervical radiculopathy, we could try cervical epidural steroid injection.  He was told by Dr. Florentino that he is not a good surgical candidate.  5.  PATIENT EDUCATION: Patient is in agreement the above plan.  All questions were answered.  -If EMG does not explain symptoms, he may need to see rheumatology.  We may also  want to consider vascular etiology.  In that case, I would refer him to see  at [facility].  6.  FOLLOW-UP: We will follow-up after the patient has had his EMG.  If there are any questions/concerns or any significant worsening of pain prior to that time, the patient is asked to call the clinic via the nurse navigation line or via [employer].       Phone call duration: 9 minutes   (Start time: 1038, End time: 1047)    Subjective:     Daniel Alamo is a 80 y.o. male who presents today for follow-up regarding chronic bilateral arm pain and hand paresthesias.  I saw the patient in consultation February 21, 2020.  At that time referred the patient to physical therapy.  He attended 6 sessions.  He did not feel this provided any improvement in his symptoms.  The intent for today's purpose was that we could review the results of his EMG and consultation at [facility], which had been scheduled for March 23.  Unfortunately, that appointment was canceled because of COVID-19.  He denies any significant change in his symptoms since he was last seen.    Patient complains of constant numbness and tingling in all 5 fingers of both hands.  He also has intermittent pain in the bilateral shoulders, elbows, and hand joints.  He denies any neck pain.  He rates his pain today as a 1 out of 10.  At its worst it is a 4 out of 10.  At its best is a 0-10.  He denies any aggravating or alleviating factors for his symptoms.  He denies any new symptoms since he was last seen.    Patient attended 6 sessions of physical therapy.  He did not feel this was helpful.  He admits he is not doing his home exercises.  He uses gabapentin 900 mg twice daily.  He uses Tylenol as needed.    I have reviewed and updated the patient's Past Medical History, Social History, Family History and Medication List.    ALLERGIES  Carvedilol and Chloride    Review of Systems:  Positive for pain worse at night.  Pertinent negatives  include no fevers, chills, unexplained weight loss, bowel incontinence, bladder incontinence, trips, stumbles, falls.  All others reviewed and are negative.     Objective:     PSYCHIATRIC:  The patient is awake, alert, oriented to person, place and time.  The patient is answering questions appropriately with clear speech.  Normal affect.    The rest of the exam is deferred as this is a telephone visit.     RESULTS:  I reviewed the CT cervical spine from [facility] dated December 9, 2019.  This shows advanced cervical spondylosis with smooth reversal of normal cervical lordosis.  There is severe disc degeneration C2-3 throug C5-6.  There is disc osteophyte complexes at each levels resulting in moderate spinal stenosis C4-5 and mild spinal stenosis C5-6.  At C3-4 there is mild right and moderate left foraminal stenosis.  At C4-5 there is moderate right and severe left foraminal stenosis.  At C5-6 there is moderate bilateral foraminal stenosis.  At C6-7 there is moderate right and mild to moderate left foraminal stenosis.     EXAM: XR SHOULDER LEFT 2 OR MORE VWS  LOCATION: [facility]  DATE/TIME: 1/31/2020 10:32 AM     INDICATION: Pain, unspecified  COMPARISON: None.     IMPRESSION:   Normal joint spaces and alignment. No fracture.      EXAM: XR SHOULDER RIGHT 2 OR MORE VWS  LOCATION: [facility]  DATE/TIME: 1/31/2020 10:30 AM     INDICATION: Pain, unspecified.  COMPARISON: None.     IMPRESSION:   Normal joint spaces and alignment. No fracture. Minimal degenerative changes at the AC joint.     EXAM: XR ELBOW LEFT 3 OR MORE VWS  LOCATION: [facility]  DATE/TIME: 1/31/2020 10:31 AM     INDICATION: Pain, unspecified  COMPARISON: None.     IMPRESSION:   Normal joint spaces and alignment. No fracture or joint effusion.     EXAM: XR ELBOW RIGHT 3 OR MORE VWS  LOCATION: [facility]  DATE/TIME: 1/31/2020 10:32 AM     INDICATION: Pain, unspecified  COMPARISON: None.     IMPRESSION:    Normal joint spaces and alignment. No fracture or joint effusion.     EXAM: XR HAND LEFT 3 OR MORE VWS  LOCATION: [facility]  DATE/TIME: 1/31/2020 10:30 AM     INDICATION: Pain, unspecified  COMPARISON: None.     IMPRESSION:   Normal alignment. No fracture. Moderate degenerative changes are present at base of the thumb, first CMC joint. There is also modest degenerative changes of the distal radiocarpal joint. Remainder of digits appear within normal limits for   Age.     EXAM: XR HAND RIGHT 3 OR MORE VWS  LOCATION: [facility]  DATE/TIME: 1/31/2020 10:31 AM     INDICATION: Pain, unspecified  COMPARISON: None.     IMPRESSION:   Normal joint spaces and alignment. No acute fracture. Mild degenerative changes at the first CMC joint. Tiny accessory ossicle distal to the ulnar styloid.

## 2021-06-08 NOTE — PATIENT INSTRUCTIONS - HE
Daniel Alamo    Thank you for coming to the [facility] today for a cardiac evaluation  It was my pleasure to see you today  A good contact for any questions would be Gwen Spencer  RN @ [phone]    We discussed rhythm and pacing characteristics with tech services and BSI specialist  Some atrial undersensing leads to not  sensing of P waves-then there is intrinsic AV conduction; happens raymundo have AP coincident with the conducted QRS; VS is blanked and  on T wave  Programmed more sensitive P; shortened post AP blanking; changed LRL to 55  Because of low BP; stop lisinopril  Follow up in 6 months

## 2021-06-09 NOTE — PROGRESS NOTES
Type: remote ICD transmission done prior to clinic appointment with Dr Kohler on 3/2/17.  Presenting rhythm: normal sinus with rare PVC, rate 81 bpm.  Battery/lead status: stable  Arrhythmias: since 11/17/16, 31 nonsustained VT episodes, 3 VT in monitor zone, 9-29 bts. 106 AT/AF episodes, all <2 minutes, V-rates >/= 120 bpm 5-10%; review of available EGMs show these to be FFRW oversensing.   Comments: normal ICD function. AP 57%,  0%.  Device/lead alerts: has Teligen capacitor alert device. prd

## 2021-06-09 NOTE — TELEPHONE ENCOUNTER
----- Message from Sylvia Rodriguez PA-C sent at 6/23/2020 11:17 AM CDT -----  Regarding: EMG results  Please call the patient let him know that I reviewed his EMG results.  EMG was normal.  I recommend a referral to a vascular specialist for further evaluation.  Please let me know once you have spoken with the patient and I will enter the referral.  Could you also find out if the patient has seen neurology?

## 2021-06-09 NOTE — TELEPHONE ENCOUNTER
Call to pt with provider's results and recommendations. Pt stated understanding. Pt states he has not seen a neurologist yet. Referral was placed in 1/2020 by Dr. Florentino. Contact information given to pt to call and schedule.     Pt also ok with vascular referral to be placed. Pt aware order will be placed and he will be contacted to schedule.

## 2021-06-09 NOTE — PROGRESS NOTES
Patient presents at the request of Sylvia Rodriguez for a bilateral upper extremity EMG.  He has right greater than left arm numbness and tingling all digits involved radiating up to his right shoulder.  Left arm symptoms radiate to the forearm.  He is right-handed.  Chronic issue.  Muscle atrophy on exam.    EMG/NCS  results: Please see scanned full report    Comment NCS: Normal study  1.  Normal nerve conduction studies bilateral upper extremities.    Comment EMG: Normal study  1.  Normal needle EMG bilateral upper extremities.    Interpretation: Normal study    1. There is no electrodiagnostic evidence of cervical radiculopathy, brachial plexopathy, or focal neuropathy in the bilateral upper extremities.  Specifically, there is no electrodiagnostic evidence of median neuropathy at the wrist in the bilateral upper extremities.    The testing was completed in its entirety by the physician.       It was our pleasure caring for your patient today, if there any questions or concerns please do not hesitate to contact us.

## 2021-06-09 NOTE — TELEPHONE ENCOUNTER
Referral entered to see Dr. Farnsworth at [facility].  Can you please fax this external order?

## 2021-06-09 NOTE — PATIENT INSTRUCTIONS - HE
Thank you for choosing the [facility] for your EMG testing.    The ordering provider will receive your final EMG results within the next few days.  Please follow up with your provider for the results and further treatment recommendations.

## 2021-06-11 NOTE — TELEPHONE ENCOUNTER
Attempted to contact pt, detailed message discussing the below with contact information was left per pt request.  9/15/2020 1:56 PM  Gwen Shell RN

## 2021-06-11 NOTE — TELEPHONE ENCOUNTER
Order placed in Epic for C7-T1 ILESI.     Phone call to patient to discuss the injection and medical clearance to hold his Eliquis. Explained his cardiologist has given the OK to hold the Eliquis and he can now get the injection. Injection requirements reviewed with patient. Stated understanding. Transferred to scheduling to make appointment.      Patient scheduled for 9/29. Transferred back to nurse navigation line. Instructed to take last dose of Eliquis 9/26 pm; holding for 9/27 & 9/28 and will resume after inj when instructed. Stated understanding.

## 2021-06-11 NOTE — TELEPHONE ENCOUNTER
Dr. Harrell,     Patient last seen by you in 6/5 for paroxysmal afib, on sotalol and eliquis.  ChadsVasc score of 4.  Recent device check 9/2 :  Type: routine remote ICD transmission.  Presenting rhythm: AP-VS and normal sinus, rate 55 bpm with frequent PACs, FFRWs noted. Rare PVCs.   Battery/lead status: stable  Arrhythmias: since 6/5/20, 1161 mode switches, longest per logbook 5.5 minutes, available EGMs suggest FFRWs (noted in past).  Anticoagulant: Eliquis'  Comments: normal ICD function.  Device/lead alerts: none. prd    Patient having a back injection and they are looking to hold elqiuis x48 hours.    Ok to hold?  Bridge?    Thanks  Olga

## 2021-06-11 NOTE — PROGRESS NOTES
Assessment:   Daniel Alamo is a 80 y.o. y.o. male with past medical history significant for ischemic cardiomyopathy, atrial fibrillation, dyslipidemia, coronary artery disease, ICD in place, currently on Eliquis, status post AAA surgery who presents today for follow-up regarding chronic right greater than left arm pain and bilateral hand paresthesias.  Etiology of symptoms is unclear.  CT cervical spine does show advanced cervical spondylosis with multilevel foraminal stenosis, however, symptoms are atypical for cervical radicular pain.   Inflammatory markers were normal.    EMG bilateral upper extremities was normal.  EMG lower extremities showed peripheral polyneuropathy.  Patient was seen by neurology and had extensive lab work-up which was normal.  Given lack of findings to support an alternative diagnosis, I think it is reasonable to consider the cervical pathology as a source of his pain.  He did have a cervical epidural steroid injection about 10 years ago which provided 100% relief of his pain but only lasted about 1 day.  Patient's neurologist suggested trying another epidural steroid injection now.  I think this could be both diagnostic and therapeutic for the patient.        Plan:      A shared decision making plan was used.  The patient's values and choices were respected.  The following represents what was discussed and decided upon by the physician assistant and the patient.       1.  DIAGNOSTIC TESTS: I reviewed the CT cervical spine.  Reviewed the EMG bilateral upper and lower extremities. I also reviewed x-rays of the shoulders, elbows, and hands.  Reviewed lab work-up by neurology. ESR and CRP were normal.    2.  PHYSICAL THERAPY: Patient attended 6 sessions of physical therapy in February and March 2020 Additional sessions were canceled due to COVID-19.  He did not feel the physical therapy was helpful.  He is not doing his home exercises.    3.  MEDICATIONS: No changes are made to the patient's  medications.  -Patient takes gabapentin 900 mg 2 times daily.  -Lyrica was not more beneficial than gabapentin and it was expensive.  - He also takes Tylenol as needed.  -He cannot take NSAIDs since he is on blood thinners for his heart.    4.  INTERVENTIONS:  I offered a C7-T1 interlaminar epidural steroid injection.  We will need to get medical clearance for the patient to hold his Eliquis.  Patient indicated he would like to proceed with this.    5.  PATIENT EDUCATION: Patient is in agreement the above plan.  All questions were answered.    6.  FOLLOW-UP: A nurse will call the patient with an update after we contact Dr. Harrell's clinic regarding medical clearance for holding Eliquis prior to his procedure.  I will see the patient back in the clinic for a 2-week post procedure follow-up after his C7-T1 interlaminar epidural steroid injection.  If he has questions or concerns in the meantime, he should not hesitate to call.       Subjective:      Daniel Alamo is a 80 y.o. male who presents today for follow-up regarding chronic bilateral arm pain and hand paresthesias.  I last had a video visit with the patient may 12, 2020.  Since then he had an EMG bilateral upper extremities which was normal.  He saw neurology who did extensive lab work-up which was normal.  They also performed an EMG bilateral lower extremities which showed peripheral polyneuropathy.     Patient complains of  pain and numbness and tingling involving both hands, primarily fingers 2 and 3 but all 5 fingers bilaterally at times.  He also has pain about the right elbow.  He previously had right shoulder pain, but he states that is improved.  He denies any significant neck pain.  He rates his pain today as a 2 out of 10.  At its worst it is a 5-10.  At its best it is a 1-10.  Patient denies any aggravating or alleviating factors for his symptoms.  He denies any new symptoms since he was last seen.  He feels generally weak.  Denies loss of bowel or  bladder control.    Patient attended 6 sessions of physical therapy.  He did not feel this was helpful.  He admits he is not doing his home exercises.  He uses gabapentin 900 mg twice daily.  He uses Tylenol as needed.     Past medical history is reviewed and is pertinent for his consultation with neurology.     ALLERGIES  Carvedilol and Chloride     Review of Systems:  Positive for numbness/tingling, weakness, pain much worse at night, difficulty with hand skills.  Negative for loss of bowel/bladder control, inability to urinate, headache, trip/stumble/falls, difficulty swallowing, fevers, unintentional weight loss.     Objective:      CONSTITUTIONAL:  Vital signs as above.  No acute distress.  The patient is well nourished and well groomed.    PSYCHIATRIC:  The patient is awake, alert, oriented to person, place and time.  The patient is answering questions appropriately with clear speech.  Normal affect.  HEENT: Normocephalic, atraumatic.  Sclera clear.  Neck is supple.  SKIN:   Skin is clean, dry, intact without rashes.  MUSCULOSKELETAL:  Cervical range of motion is moderately restricted with extension and lateral rotation bilaterally.      No significant tenderness palpation bilateral cervical paraspinous muscles or upper trapezius muscles. 5/5 strength bilateral shoulder abductors, elbow flexors/extensors, wrist extensors, finger flexors/abductors.  No tenderness palpation about the right elbow.  Right elbow range of motion is full.  NEUROLOGIC: Reflexes are 2+ bilateral biceps, triceps, brachioradialis.  Negative Nails's bilaterally.  Subjective diminished/altered sensation fingers 2 and 3 bilaterally.        RESULTS:  I reviewed the CT cervical spine from [facility] dated December 9, 2019.  This shows advanced cervical spondylosis with smooth reversal of normal cervical lordosis.  There is severe disc degeneration C2-3 throug C5-6.  There is disc osteophyte complexes at each levels resulting in  moderate spinal stenosis C4-5 and mild spinal stenosis C5-6.  At C3-4 there is mild right and moderate left foraminal stenosis.  At C4-5 there is moderate right and severe left foraminal stenosis.  At C5-6 there is moderate bilateral foraminal stenosis.  At C6-7 there is moderate right and mild to moderate left foraminal stenosis.    EMG bilateral upper extremities June 23, 2020:  Comment NCS: Normal study  1.  Normal nerve conduction studies bilateral upper extremities.     Comment EMG: Normal study  1.  Normal needle EMG bilateral upper extremities.     Interpretation: Normal study     1. There is no electrodiagnostic evidence of cervical radiculopathy, brachial plexopathy, or focal neuropathy in the bilateral upper extremities.  Specifically, there is no electrodiagnostic evidence of median neuropathy at the wrist in the bilateral upper extremities.     EXAM: XR SHOULDER LEFT 2 OR MORE VWS  LOCATION: [facility]  DATE/TIME: 1/31/2020 10:32 AM     INDICATION: Pain, unspecified  COMPARISON: None.     IMPRESSION:   Normal joint spaces and alignment. No fracture.      EXAM: XR SHOULDER RIGHT 2 OR MORE VWS  LOCATION: [facility]  DATE/TIME: 1/31/2020 10:30 AM     INDICATION: Pain, unspecified.  COMPARISON: None.     IMPRESSION:   Normal joint spaces and alignment. No fracture. Minimal degenerative changes at the AC joint.     EXAM: XR ELBOW LEFT 3 OR MORE VWS  LOCATION: [facility]  DATE/TIME: 1/31/2020 10:31 AM     INDICATION: Pain, unspecified  COMPARISON: None.     IMPRESSION:   Normal joint spaces and alignment. No fracture or joint effusion.     EXAM: XR ELBOW RIGHT 3 OR MORE VWS  LOCATION: [facility]  DATE/TIME: 1/31/2020 10:32 AM     INDICATION: Pain, unspecified  COMPARISON: None.     IMPRESSION:   Normal joint spaces and alignment. No fracture or joint effusion.     EXAM: XR HAND LEFT 3 OR MORE VWS  LOCATION: [facility]  DATE/TIME: 1/31/2020 10:30 AM     INDICATION:  Pain, unspecified  COMPARISON: None.     IMPRESSION:   Normal alignment. No fracture. Moderate degenerative changes are present at base of the thumb, first CMC joint. There is also modest degenerative changes of the distal radiocarpal joint. Remainder of digits appear within normal limits for   Age.     EXAM: XR HAND RIGHT 3 OR MORE VWS  LOCATION: [facility]  DATE/TIME: 1/31/2020 10:31 AM     INDICATION: Pain, unspecified  COMPARISON: None.     IMPRESSION:   Normal joint spaces and alignment. No acute fracture. Mild degenerative changes at the first CMC joint. Tiny accessory ossicle distal to the ulnar styloid.

## 2021-06-11 NOTE — TELEPHONE ENCOUNTER
Received voicemail from [facility], Gwen AGEE. States pt is OK to hold Eliquis prior to procedure with no bridging needed.

## 2021-06-11 NOTE — TELEPHONE ENCOUNTER
PSP:  Sylvia ALEJANDRA  Last clinic visit:  9/15/2020  Reason for call: Medical clearance  Clinical information:  Call placed to [facility] on behalf of provider from [facility]. Message was sent to care team of Dr. Harrell to request medical clearance for pt to HOLD Eliquis for 48 hours prior to recommended C7-T1 ILESI.   Once [facility] hears back if clearance is given, pt will be contacted and scheduled for appt.

## 2021-06-11 NOTE — TELEPHONE ENCOUNTER
----- Message from Maria Del Carmen Chaudhari sent at 9/15/2020  9:54 AM CDT -----  Regarding: MA/GAG PT - HOLD AND BRIDGE ON MEDICATION  General phone call:    Caller: Kevon from [facility]     Primary cardiologist: MA/GAG     Detailed reason for call: Kevon states that pt will be having cervical injection and would like to know if it's ok to hold pt's Eloquis medication 48 prior to procedure and if there should be any bridging orders. Please return call.     Best phone number: [phone], can speak to anyone.     Best time to contact: Anytime     Ok to leave a detailed message? Yes     Device? Yes    Additional Info:

## 2021-06-11 NOTE — PATIENT INSTRUCTIONS - HE
DISCHARGE INSTRUCTIONS    During office hours (8:00 a.m.- 4:00 p.m.) questions or concerns may be answered  by calling [facility] Navigation Nurses at  [phone].  Messages received after hours will be returned the following business day.      In the case of an emergency, please dial 911 or seek assistance at the nearest Emergency Room/Urgent Care facility.     All Patients:    ? You may experience an increase in your symptoms for the first 2 days (It may take anywhere between 2 days- 2 weeks for the steroid to have maximum effect).    ? You may use ice on the injection site, as frequently as 20 minutes each hour if needed.    ? You may take your pain medicine.    ? You may continue taking your regular medication after your injection. If you have had a Medial Branch Block you may resume pain medication once your pain diary is completed.    ? You may shower. No swimming, tub bath or hot tub for 48 hours.  You may remove your bandaid/bandage as soon as you are home.    ? You may resume light activities, as tolerated.    ? Resume your usual diet as tolerated.    ? It is strongly advised that you do not drive for 1-3 hours post injection.    ? If you have had oral sedation:  Do not drive for 8 hours post injection.      ? If you have had IV sedation:  Do not drive for 24 hours post injection.  Do not operate hazardous machinery or make important personal/business decisions for 24 hours.      POSSIBLE STEROID SIDE EFFECTS (If steroid/cortisone was used for your procedure)    -If you experience these symptoms, it should only last for a short period      Swelling of the legs                Skin redness (flushing)       Mouth (oral) irritation     Blood sugar (glucose) levels              Sweats                      Mood changes    Headache    Sleeplessness         POSSIBLE PROCEDURE SIDE EFFECTS  -Call the [facility] if you are concerned    Increased Pain             Increased numbness/tingling         Nausea/Vomiting            Bruising/bleeding at site        Redness or swelling                                                Difficulty walking        Weakness             Fever greater than 100.5    *In the event of a severe headache after an epidural steroid injection that is relieved by lying down, please call the [facility] to speak with a clinical staff member*

## 2021-06-12 NOTE — PATIENT INSTRUCTIONS - HE
Some options to consider:    Referral to physical therapy to work on balance, leg strengthening.    Referral to the pain clinic for long term medical management of pain.    Following up with Dr. Florentino (neurosurgeon).    Referral to orthopedics for joint pains.    Discuss increasing your gabapentin dose with your primary care provider.    If you would like me to enter any referrals please call me at [phone].    Thank you!

## 2021-06-12 NOTE — PROGRESS NOTES
Assessment:   Daniel Alamo is a 80 y.o. y.o. male with past medical history significant for ischemic cardiomyopathy, atrial fibrillation, dyslipidemia, coronary artery disease, ICD in place, currently on Eliquis, status post AAA surgery who presents today for follow-up regarding chronic right greater than left arm pain and bilateral hand paresthesias.  Etiology of symptoms is unclear.  CT cervical spine does show advanced cervical spondylosis with multilevel foraminal stenosis, however, symptoms are atypical for cervical radicular pain.   Inflammatory markers were normal.    EMG bilateral upper extremities was normal.  EMG lower extremities showed peripheral polyneuropathy.  Patient was seen by neurology and had extensive lab work-up which was normal. Patient is status post a C7/T1 ILESI September 29, 2020 which provided 50% relief of his pain but only lasted 2 days.        Plan:      A shared decision making plan was used.  The patient's values and choices were respected.  The following represents what was discussed and decided upon by the physician assistant and the patient.       1.  DIAGNOSTIC TESTS: I reviewed the CT cervical spine.  Reviewed the EMG bilateral upper and lower extremities. I also reviewed reports of x-rays of the shoulders, elbows, and hands.  Reviewed lab work-up by neurology. ESR and CRP were normal.    2.  PHYSICAL THERAPY: Patient attended 6 sessions of physical therapy in February and March 2020 Additional sessions were canceled due to COVID-19.  He did not feel the physical therapy was helpful.  He is not doing his home exercises.  -I did offer a referral back to physical therapy for general conditioning and balance work.  He declined.    3.  MEDICATIONS: No changes are made to the patient's medications.  -Patient takes gabapentin 900 mg 2 times daily.  I told the patient he could talk to his primary care provider about increasing this dose.  -Lyrica was not more beneficial than  gabapentin and it was expensive.  - He also takes Tylenol as needed.  -He cannot take NSAIDs since he is on blood thinners for his heart.    4.  INTERVENTIONS:  No additional interventions were ordered..    5.    REFERRALS:  - I offered a referral to the pain clinic for long-term medical management of his chronic pain.  He declined.  - I told the patient he could certainly follow-up with Dr. Florentino now that he has had a trial of conservative treatment.  Patient indicates he is not interested in pursuing surgery at this time.  -I also offered a referral to orthopedics to see if they may have anything else to offer.  He declined.    6.  FOLLOW-UP: Patient to follow-up with me as needed.  If he has any questions or concerns, he should not hesitate to call.     Subjective:      Daniel Alamo is a 80 y.o. male who presents today for follow-up regarding chronic bilateral arm pain and hand paresthesias.    Patient is status post a C7-T1 interlaminar epidural steroid injection September 29, 2020.  Patient reports the injection provided 50% relief of his pain but only lasted 2 days.     Patient complains of  pain involving the right elbow radiating into the hand affecting all 5 fingers with numbness and tingling.  Also has pain and numbness and tingling in the left hand affecting all 5 fingers.  He previously had some right shoulder pain but states that that is really not bothering him anymore.  He denies any significant neck pain.  Denies weakness.  Rates his pain today is a 2 out of 10.  At its worst it is a 4-10.  He denies any aggravating or alleviating factors for his pain.  He denies any new symptoms since he was last seen.    Patient attended 6 sessions of physical therapy.  He did not feel this was helpful.  He uses gabapentin 900 mg twice daily.  He uses Tylenol as needed.     Past medical history is reviewed and is unchanged.        Review of Systems:  Positive for numbness/tingling, pain much worse at night.  Negative for weakness, difficulty with hand skills, loss of bowel/bladder control, inability to urinate, headache, trip/stumble/falls, difficulty swallowing, fevers, unintentional weight loss.     Objective:      CONSTITUTIONAL:  Vital signs as above.  No acute distress.  The patient is well nourished and well groomed.    PSYCHIATRIC:  The patient is awake, alert, oriented to person, place and time.  The patient is answering questions appropriately with clear speech.  Normal affect.  HEENT: Normocephalic, atraumatic.  Sclera clear.  Neck is supple.  SKIN:   Skin is clean, dry, intact without rashes.  MUSCULOSKELETAL:   5/5 strength bilateral shoulder abductors, elbow flexors/extensors, wrist extensors, finger flexors/abductors.    NEUROLOGIC: Negative Nails's bilaterally.  Subjective diminished/altered sensation both hands in a glove distribution.        RESULTS:  I reviewed the CT cervical spine from [facility] dated December 9, 2019.  This shows advanced cervical spondylosis with smooth reversal of normal cervical lordosis.  There is severe disc degeneration C2-3 throug C5-6.  There is disc osteophyte complexes at each levels resulting in moderate spinal stenosis C4-5 and mild spinal stenosis C5-6.  At C3-4 there is mild right and moderate left foraminal stenosis.  At C4-5 there is moderate right and severe left foraminal stenosis.  At C5-6 there is moderate bilateral foraminal stenosis.  At C6-7 there is moderate right and mild to moderate left foraminal stenosis.    EMG bilateral upper extremities June 23, 2020:  Comment NCS: Normal study  1.  Normal nerve conduction studies bilateral upper extremities.     Comment EMG: Normal study  1.  Normal needle EMG bilateral upper extremities.     Interpretation: Normal study     1. There is no electrodiagnostic evidence of cervical radiculopathy, brachial plexopathy, or focal neuropathy in the bilateral upper extremities.  Specifically, there is no  electrodiagnostic evidence of median neuropathy at the wrist in the bilateral upper extremities.     EXAM: XR SHOULDER LEFT 2 OR MORE VWS  LOCATION: [facility]  DATE/TIME: 1/31/2020 10:32 AM     INDICATION: Pain, unspecified  COMPARISON: None.     IMPRESSION:   Normal joint spaces and alignment. No fracture.      EXAM: XR SHOULDER RIGHT 2 OR MORE VWS  LOCATION: [facility]  DATE/TIME: 1/31/2020 10:30 AM     INDICATION: Pain, unspecified.  COMPARISON: None.     IMPRESSION:   Normal joint spaces and alignment. No fracture. Minimal degenerative changes at the AC joint.     EXAM: XR ELBOW LEFT 3 OR MORE VWS  LOCATION: [facility]  DATE/TIME: 1/31/2020 10:31 AM     INDICATION: Pain, unspecified  COMPARISON: None.     IMPRESSION:   Normal joint spaces and alignment. No fracture or joint effusion.     EXAM: XR ELBOW RIGHT 3 OR MORE VWS  LOCATION: [facility]  DATE/TIME: 1/31/2020 10:32 AM     INDICATION: Pain, unspecified  COMPARISON: None.     IMPRESSION:   Normal joint spaces and alignment. No fracture or joint effusion.     EXAM: XR HAND LEFT 3 OR MORE VWS  LOCATION: [facility]  DATE/TIME: 1/31/2020 10:30 AM     INDICATION: Pain, unspecified  COMPARISON: None.     IMPRESSION:   Normal alignment. No fracture. Moderate degenerative changes are present at base of the thumb, first CMC joint. There is also modest degenerative changes of the distal radiocarpal joint. Remainder of digits appear within normal limits for   Age.     EXAM: XR HAND RIGHT 3 OR MORE VWS  LOCATION: [facility]  DATE/TIME: 1/31/2020 10:31 AM     INDICATION: Pain, unspecified  COMPARISON: None.     IMPRESSION:   Normal joint spaces and alignment. No acute fracture. Mild degenerative changes at the first CMC joint. Tiny accessory ossicle distal to the ulnar styloid.

## 2021-06-13 NOTE — PROGRESS NOTES
[facility]    Assessment:  1.BSCI ICD  initial implant 2004        Generator replacement November 24, 2009       ICD generator replacement October 15, 2018/[facility]AC[facility]  3.  Coronary artery disease with previous inferior myocardial infarction  Prior bypass surgery with left internal mammary artery to LAD, vein graft to left circumflex marginal branch  4.  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;       complex stenting 2-;    Plan:    ICD evaluation shows battery should last another 10 years and 6 months  Some spurious /noise on the atrial lead but unchanged and this is satisfactory and will continue to follow  Moderate amount of atrial pacing 41% but no ventricular pacing, no ventricular tachycardia  You are scheduled for a stent intervention for your vascular system at [facility] in the near future  Let them know that you are on Eliquis  Let them know that she have a defibrillator  We will obtain echocardiogram to assess left ventricular function and degree of mitral regurgitation  Reviewed medications but made no adjustments  Return in 6 months for comprehensive cardiac arrhythmia device assessment        Subjective:    I had the opportunity to see.Daniel COLLINS Cally , who is a 80 y.o. male with a known history of advanced ischemic cardiomyopathy  Condition has not changed particularly  Continues have fatigue and exercise intolerance and becomes vague with slight exertion  He has lost quite a bit of weight but has stabilized does not have much of an appetite  Evaluated for vascular insufficiency and is scheduled for other stenting of his iliac-aortic region this will be done at [facility]  Has been on Eliquis and  tolerates medicine without bleeding problems  No awareness of palpitations no syncope no shocks from ICD  Reviewed blood work from July 9, 2020  TSH normal  CINDY screen normal  Lyme screen was normal  Electrophoresis was unremarkable  Belkys panel November 20, 2020  Creatinine 1.1      Problem List:  Patient Active Problem List   Diagnosis     Atrial Fibrillation     Dyslipidemia     Coronary artery disease of native heart with stable angina pectoris, unspecified vessel or lesion type (H)     ICD (implantable cardioverter-defibrillator), dual, in situ     Chronic systolic congestive heart failure (H)     Ischemic cardiomyopathy     PVD (peripheral vascular disease) (H)     Abdominal aortic aneurysm (AAA) without rupture (H)     PVC's (premature ventricular contractions)     Medical History:  Past Medical History:   Diagnosis Date     Coronary artery disease      VT (ventricular tachycardia) (H) 2004     Surgical History:  Past Surgical History:   Procedure Laterality Date     CORONARY ARTERY BYPASS GRAFT       icd       INSERT / REPLACE / REMOVE PACEMAKER       CO CABG, VEIN, SINGLE      Description: CABG (CABG);  Recorded: 10/03/2012;  Comments: LIMA to LAD, SVG to OM2, SVG to RCA     Social History:  Social History     Socioeconomic History     Marital status:      Spouse name: Not on file     Number of children: Not on file     Years of education: Not on file     Highest education level: Not on file   Occupational History     Not on file   Social Needs     Financial resource strain: Not on file     Food insecurity     Worry: Not on file     Inability: Not on file     Transportation needs     Medical: Not on file     Non-medical: Not on file   Tobacco Use     Smoking status: Current Every Day Smoker     Packs/day: 0.50     Years: 30.00     Pack years: 15.00     Smokeless tobacco: Never Used   Substance and Sexual Activity     Alcohol use: Yes     Alcohol/week: 1.0 standard drinks     Types: 1 Glasses of wine  per week     Drug use: No     Sexual activity: Not on file   Lifestyle     Physical activity     Days per week: Not on file     Minutes per session: Not on file     Stress: Not on file   Relationships     Social connections     Talks on phone: Not on file     Gets together: Not on file     Attends [employer] service: Not on file     Active member of club or organization: Not on file     Attends meetings of clubs or organizations: Not on file     Relationship status: Not on file     Intimate partner violence     Fear of current or ex partner: Not on file     Emotionally abused: Not on file     Physically abused: Not on file     Forced sexual activity: Not on file   Other Topics Concern     Not on file   Social History Narrative     Not on file       Review of Systems:      General: WNL  Eyes: WNL  Ears/Nose/Throat: WNL  Lungs: WNL  Heart: WNL  Stomach: WNL  Bladder: WNL  Muscle/Joints: WNL  Skin: WNL  Nervous System: WNL  Mental Health: WNL     Blood: WNL        Family History:  No family history on file.      Allergies:  Allergies   Allergen Reactions     Carvedilol Other (See Comments)     GI upset     Chloride Nausea Only       Medications:  Current Outpatient Medications   Medication Sig Dispense Refill     acetaminophen (TYLENOL) 325 MG tablet Take 650 mg by mouth every 6 (six) hours as needed for pain.       apixaban (ELIQUIS) 5 mg Tab tablet Take 1 tablet (5 mg total) by mouth 2 (two) times a day. 60 tablet 11     atorvastatin (LIPITOR) 80 MG tablet Take 80 mg by mouth daily.       gabapentin (NEURONTIN) 300 MG capsule Take 900 mg by mouth 2 (two) times a day.        ketoconazole (NIZORAL) 2 % cream daily.       sotalol (BETAPACE) 80 MG tablet Take 1 tablet (80 mg total) by mouth 2 (two) times a day. 190 tablet 5     tamsulosin (FLOMAX) 0.4 mg cap Take 0.4 mg by mouth daily.  4     cyanocobalamin, vitamin B-12, 1,000 mcg Subl Take 1 tablet by mouth once daily sublingually       lisinopriL (PRINIVIL,ZESTRIL) 2.5  MG tablet Take 1 tablet (2.5 mg total) by mouth daily. 30 tablet 2     loratadine (CLARITIN) 10 mg tablet Take 10 mg by mouth daily.       naproxen sodium (ALEVE) 220 MG tablet Take 220 mg by mouth every 12 (twelve) hours as needed.        No current facility-administered medications for this visit.          Surgical site  ICD is well-healed to left subclavicular region    Device interrogation  Battery good for another 10 years 6 months  41% atrial pacing  Almost no ventricular pacing  No ventricular tachycardia  Some far field R wave wave atrial lead and also some atrial noise-unchanged from previous  No ventricular pacing on  T wave after adjustments were made previously    Objective:   Vital signs:  /62 (Patient Site: Left Arm, Patient Position: Sitting, Cuff Size: Adult Regular)   Pulse (!) 46   Resp 16   Wt 150 lb (68 kg)   SpO2 96%   BMI 21.52 kg/m      Ectopic beats   Physical Exam:      GENERAL APPEARANCE: Alert, cooperative and in no acute distress.  HEENT: No scleral icterus. No Xanthelasma. Oral mucous membranes pink and moist.  NECK: JVP normal cm. No Hepatojugular reflux. Thyroid not  Palpable  CHEST: clear to auscultation and percussion  CARDIOVASCULAR: S1, S2 3/6 apical systolic murmur     No carotid bruits noted.  ABDOMEN: Non tender. BS+. No bruits.  No abnormal pulsations  EXTREMITIES: No cyanosis, chronic nonpitting bilateral pedal edema     Lab Results:  LIPIDS:  Lab Results   Component Value Date    CHOL 150 12/06/2019    CHOL 134 10/05/2018    CHOL 139 02/16/2018     Lab Results   Component Value Date    HDL 36 (L) 12/06/2019    HDL 34 (L) 10/05/2018    HDL 33 (L) 02/16/2018     Lab Results   Component Value Date    LDLCALC 100 12/06/2019    LDLCALC 88 10/05/2018    LDLCALC 93 02/16/2018     Lab Results   Component Value Date    TRIG 70 12/06/2019    TRIG 61 10/05/2018    TRIG 67 02/16/2018     No components found for: CHOLHDL    BMP:  Lab Results   Component Value Date    CREATININE  1.08 04/21/2020    BUN 13 04/21/2020     04/21/2020    K 3.5 04/21/2020     04/21/2020    CO2 27 04/21/2020         This note has been dictated using voice recognition software. Any grammatical or context distortions are unintentional and inherent to the software.  Chadd Harrell MD  [facility] HEART [facility]  [phone]

## 2021-06-13 NOTE — PATIENT INSTRUCTIONS - HE
Daniel Alamo    Thank you for coming to the [facility] today for a cardiac evaluation  It was my pleasure to see you today  A good contact for any questions would be Gwen Spencer  RN @ [phone]    ICD evaluation shows battery should last another 10 years and 6 months  Some spurious /noise on the atrial lead but unchanged and this is satisfactory and will continue to follow  Moderate amount of atrial pacing 41% but no ventricular pacing, no ventricular tachycardia  You are scheduled for a stent intervention for your vascular system at [facility] in the near future  Let them know that you are on Eliquis  Let them know that she have a defibrillator  We will obtain echocardiogram to assess left ventricular function and degree of mitral regurgitation  Reviewed medications but made no adjustments  Return in 6 months for comprehensive cardiac arrhythmia device assessment

## 2021-06-14 NOTE — PROGRESS NOTES
Summary      Left ventricle ejection fraction is severely decreased. The estimated left ventricular ejection fraction is 25%.    The following segments are akinetic: basal inferior, basal inferolateral, basal anterolateral, mid inferior, mid inferolateral and mid anterolateral. The following segments are hypokinetic: basal anterior, basal anteroseptal, basal inferoseptal, mid anterior, mid anteroseptal, mid inferoseptal, apical anterior, apical septal, apical inferior, apical lateral and apex.    Moderate mitral regurgitation.    Left atrial volume is mildly increased.    Normal right ventricular size and systolic function.    Mild aortic stenosis. Mild aortic regurgitation.    When compared to the previous study dated 5/1/2019, no significant change.        Echo the same  MR seems similar   OK to proceed to vascular intervention/stent at [facility]  Need to alert surgeons  about ICD

## 2021-06-14 NOTE — PROGRESS NOTES
Noted.  Phone call to patient and spoke with his wife, he is having surgery at the  today.  Reviewed unchanged results, she will relay the message to him.  Reviewed contact information for further questions or concerns.

## 2021-06-14 NOTE — TELEPHONE ENCOUNTER
PC back to [facility] with [facility]  Pt is scheduled for Echo on 1/7  Discussed per OV with Dr Harrell on 12/10, pending Echo would be ok to proceed with procedure  She will be contacting back after 1/7 to determine results and clearance after his review  Larissa    ----- Message from Kaylee Calderón sent at 12/23/2020  2:22 PM CST -----    Primary cardiologist: Dr. Harrell    Detailed reason for call: [facility] is calling, patient has surgery on Jan 12 and they can't see if he was cleared for surgery from cardiology. Please advise [phone]    Device? yes    Additional Info:

## 2021-06-16 PROBLEM — I71.40 ABDOMINAL AORTIC ANEURYSM (AAA) WITHOUT RUPTURE (H): Status: ACTIVE | Noted: 2020-02-24

## 2021-06-16 PROBLEM — I49.3 PVC'S (PREMATURE VENTRICULAR CONTRACTIONS): Status: ACTIVE | Noted: 2020-02-24

## 2021-06-16 NOTE — PROGRESS NOTES
[facility] Heart Care Note    Assessment / Plan:    Coronary artery disease: Status post prior surgical revascularization about 24 years ago with a LIMA to the LAD and a vein graft to an obtuse marginal. Currently doing well with no anginal symptoms. Last stress test in August 2014 showed some agustin-infarct apical ischemia and an inferior infarct overall no change from prior. Continue aspirin and Toprol-XL as well as statin therapy.       Cardiomyopathy: Last ejection fraction reported at 40% from his stress test. This has been stable, it appears, for a number of years. He has had no recent heart failure exacerbations. His beta blockade will be continued but cannot be titrated up further because of his relative hypotension and fatigue. He is also not on an ACE inhibitor because of his hypotension. He does have a dual-chamber ICD in place that was implanted several years ago.      A repeat echocardiogram will be obtained given some reports of increasing fatigue and his known cardiomyopathy.      Overall doing reasonably well from the cardiac standpoint. He's been asked to continue his current medical regimen, stay active and follow-up in about a year. However he knows to call sooner than that if he has any changes or worsening symptoms.       Thank you for the opportunity to participate in the care of Daniel Alamo. Please do not hesitate to call with any questions or concerns regarding his cardiovascular status    ______________________________________________________________________    Subjective:    It was a pleasure to see Daniel Alamo at the [facility] in follow-up for his coronary artery disease and cardiomyopathy .       Daniel Alamo is a pleasant 78 y.o. male with with established coronary artery disease having had an inferior myocardial infarction approximately 25 years ago. He was treated at that time initially with lytic therapy and subsequently underwent surgical revascularization  receiving a LIMA to the LAD and a vein graft to an obvious marginal. His right coronary artery was not grafted, it appears, because of his infarct.       Over the years Mr. Alamo has been followed by Dr. Cabrera, and has actually done reasonably well from the cardiac standpoint. He states that he will get fatigued after walking about half a mile. However this is not new and has not changed significantly over the past several years. He denies any chest discomfort or shortness of breath with activity. He is also not noticed any palpitations, orthopnea, PND or syncope.       Overall while he has had to slow down somewhat, it appears that he is reasonably satisfied with his current functional capacity.   ______________________________________________________________________    Problem List:  Patient Active Problem List   Diagnosis     Ischemic Cardiomyopathy     Atrial Fibrillation     Dyslipidemia     Coronary Artery Disease     ICD (implantable cardioverter-defibrillator), dual, in situ       Medical History:  Past Medical History:   Diagnosis Date     Coronary artery disease        Surgical History:  Past Surgical History:   Procedure Laterality Date     CORONARY ARTERY BYPASS GRAFT       icd       INSERT / REPLACE / REMOVE PACEMAKER       PA CABG, VEIN, SINGLE      Description: CABG (CABG);  Recorded: 10/03/2012;  Comments: LIMA to LAD, SVG to OM2, SVG to RCA       Social History:  Social History     Social History     Marital status:      Spouse name: N/A     Number of children: N/A     Years of education: N/A     Occupational History     Not on file.     Social History Main Topics     Smoking status: Current Every Day Smoker     Packs/day: 0.50     Years: 30.00     Smokeless tobacco: Never Used     Alcohol use Not on file     Drug use: No     Sexual activity: Not on file     Other Topics Concern     Not on file     Social History Narrative       Review of Systems: 12 organ system review done and negative  "except as noted in the HPI.    Family History:  Negative for premature coronary artery disease    Allergies:  Allergies   Allergen Reactions     Carvedilol        Medications:  Current Outpatient Prescriptions   Medication Sig Dispense Refill     aspirin 325 MG tablet Take 325 mg by mouth daily.       atorvastatin (LIPITOR) 80 MG tablet Take 80 mg by mouth daily.       cholecalciferol, vitamin D3, 1,000 unit tablet Take 2,000 Units by mouth daily.       gabapentin (NEURONTIN) 300 MG capsule Take 600 mg by mouth 2 (two) times a day.       metoprolol succinate (TOPROL-XL) 25 MG TAKE ONE AND 1/2 TABLET BY MOUTH AT BEDTIME 135 tablet 1     No current facility-administered medications for this visit.        Objective:   Vital signs:  /62 (Patient Site: Left Arm, Patient Position: Sitting, Cuff Size: Adult Regular)  Pulse 64  Resp 18  Ht 5' 10\" (1.778 m)  Wt 157 lb (71.2 kg)  BMI 22.53 kg/m2      Physical Exam:    GENERAL APPEARANCE: Alert, cooperative and in no acute distress.  HEENT: No scleral icterus. No Xanthelasma. Oral mucuos membranes pink and moist.  NECK: No JVD. Thyroid not visualized  CHEST: clear to auscultation  CARDIOVASCULAR: S1, S2 regular. No significant murmur noted.   PULSES: Radial and posterior tibial pulses are intact and symmetric.   ABDOMEN: Nontender. BS+.   EXTREMITIES: No cyanosis, clubbing or edema.  SKIN: Warm, well perfused  NEURO: Grossly nonfocal    Lab Results:  LIPIDS:  Lab Results   Component Value Date    CHOL 139 02/16/2018    CHOL 143 10/28/2016    CHOL 140 08/05/2015     Lab Results   Component Value Date    HDL 33 (L) 02/16/2018    HDL 35 (L) 10/28/2016    HDL 34 (L) 08/05/2015     Lab Results   Component Value Date    LDLCALC 93 02/16/2018    LDLCALC 92 10/28/2016    LDLCALC 93 08/05/2015     Lab Results   Component Value Date    TRIG 67 02/16/2018    TRIG 78 10/28/2016    TRIG 63 08/05/2015     No components found for: CHOLHDL    BMP:  Lab Results   Component Value " Date    CREATININE 1.06 02/16/2018    BUN 15 02/16/2018     02/16/2018    K 3.6 02/16/2018     (H) 02/16/2018    CO2 26 02/16/2018         ECG and Cath films independently reviewed      HSAKA ACEVEDO MD  [facility]

## 2021-06-16 NOTE — TELEPHONE ENCOUNTER
Telephone Encounter by Lorene Ambrocio RN at 12/31/2019  2:27 PM     Author: Lorene Ambrocio RN Service: -- Author Type: Registered Nurse    Filed: 12/31/2019  2:28 PM Encounter Date: 12/31/2019 Status: Signed    : Lorene Ambrocio RN (Registered Nurse)       Tyrone Ordoñez MD Marshall, Alyssa J, RN Alyssa,     My sense would be in view of his CHADSVAsc of at least 3-4, to start him on apixaban 5mg two times a day, but also initiate AF clinic f/u for Watchman candidacy evaluation.     Thx!     Tyrone      Forwarding to Heydi

## 2021-06-17 NOTE — TELEPHONE ENCOUNTER
Telephone Encounter by Gwen Shell RN at 9/15/2020  1:54 PM     Author: Gwen Shell RN Service: -- Author Type: Registered Nurse    Filed: 9/15/2020  1:54 PM Encounter Date: 9/15/2020 Status: Signed    : Gwen Shell RN (Registered Nurse)       Chadd Harrell MD Westlund, Jessica T, RN    Caller: Unspecified (Today, 10:04 AM)               Okay to hold  anticoagulants per procedure specialist  No need for bridging

## 2021-06-18 NOTE — PATIENT INSTRUCTIONS - HE
"Patient Instructions by Yumiko Davison PT at 3/16/2020  9:00 AM     Author: Yumiko Davison PT Service: -- Author Type: Physical Therapist    Filed: 3/16/2020  9:28 AM Encounter Date: 3/16/2020 Status: Signed    : Yumiko Davison PT (Physical Therapist)        DIPS IN CHAIR    While sitting in a chair with arm rests, push yourself upawards so that you lift your buttocks of the chair. Then lower down controlled back to normal seated position.     If you are unable to lift yourself up, you can perform \"pressure releases\" so that you simply push to take some weight off your buttocks.    x10-15 reps, 2-3 sets              CHIN TUCK HEAD LIFT  Perform chin tuck and then raise head up 1-2 inches off surface.  Slowly lower head raise and then relax chin tuck.    Hold 5 seconds, x10, 2-3 sets              "

## 2021-06-18 NOTE — LETTER
Letter by Jackie Thompson at      Author: Jackie Thompson Service: -- Author Type: --    Filed:  Encounter Date: 1/29/2019 Status: (Other)       Daniel Alamo  [address] [address]      January 29, 2019      Dear Mr. Alamo,    RE: Remote Results    We are writing to you regarding your recent Remote ICD check from home. Your transmission was received successfully. Battery status is satisfactory at this time.     Your results are being reviewed.    Your next device appointment will be a remote check on May 1, 2019; this will occur automatically.    To schedule or reschedule, please call [phone] and press 1.    NOTE: If you would like to do an extra transmission, please call [phone] and press 3 to speak to a nurse BEFORE transmitting. This ensures that the Device Clinic staff is aware of the reason you are sending a transmission, and can follow-up with you after it has been reviewed.    We will be checking your implanted device from home (remotely) every three months unless otherwise instructed. We will need to see you in the clinic at least once a year. You may need to be seen in the clinic sooner depending on the results of your check.    Please be aware:    The follow-up schedule is like a Physician prescription.    Your remote monitor is paired to your specific implanted device.      Sincerely,    [facility] Device [facility]

## 2021-06-18 NOTE — PATIENT INSTRUCTIONS - HE
"Patient Instructions by Yumiko Davison PT at 2/24/2020  9:00 AM     Author: Yumiko Davison PT Service: -- Author Type: Physical Therapist    Filed: 2/24/2020  9:37 AM Encounter Date: 2/24/2020 Status: Signed    : Yumiko Davison PT (Physical Therapist)        BRIDGING    While lying on your back, tighten your lower abdominals, squeeze your buttocks and then raise your buttocks off the floor/bed as creating a \"Bridge\" with your body.      STRAIGHT LEG RAISE - SLR    While lying or sitting, raise up your leg with a straight knee.  Keep the opposite knee bent with the foot planted to the ground.            HIP ABDUCTION - SIDELYING    While lying on your side, slowly raise up your top leg to the side. Keep your knee straight and maintain your toes pointed forward the entire time.     The bottom leg can be bent to stabilize your body.        SIT TO STAND - NO HANDS    Start by sitting in a chair. Scoot to the edge of the seat.  Bend forward with nose over toes, then raise up to standing without using your hands for support.        ELASTIC BAND ROWS     Holding elastic band with both hands, draw back the band as you bend your elbows. Keep your elbows near the side of your body.          CHIN TUCK - SUPINE    While lying on your back, tuck your chin towards your chest and press the back of your head into the table.    Maintain contact of head with the surface you are lying on the entire time.       Look at your hand   Extend your arm out to the side, slightly backward and down   Follow your hand with your eyes   Get a nice, easy stretch (may get a few tingles...which is OK)   Return the hand to the front of the face              "

## 2021-06-19 NOTE — LETTER
Letter by Diane Shanks RDCS at      Author: Diane Shanks RDCS Service: -- Author Type: --    Filed:  Encounter Date: 10/16/2019 Status: Signed         Daniel Alamo  [address] [address]      October 16, 2019      Dear Mr. Alamo,    RE: Remote Results    We are writing to you regarding your recent Remote ICD check from home. Your transmission was received successfully. Battery status is satisfactory at this time.     Your results are showing no significant changes.    Your next device appointment will be a remote check on January 20, 2020; this will occur automatically.    To schedule or reschedule, please call [phone] and press 1.    NOTE: If you would like to do an extra transmission, please call [phone] and press 3 to speak to a nurse BEFORE transmitting. This ensures that the Device Clinic staff is aware of the reason you are sending a transmission, and can follow-up with you after it has been reviewed.    We will be checking your implanted device from home (remotely) every three months unless otherwise instructed. We will need to see you in the clinic at least once a year. You may need to be seen in the clinic sooner depending on the results of your check.    Please be aware:    The follow-up schedule is like a Physician prescription.    Your remote monitor is paired to your specific implanted device.      Sincerely,    [facility] Device Clinic

## 2021-06-19 NOTE — LETTER
Letter by Jackie Thompson at      Author: Jackie Thompson Service: -- Author Type: --    Filed:  Encounter Date: 7/16/2019 Status: (Other)         Daniel Alamo  [address] [address]      July 16, 2019      Dear Mr. Alamo,    RE: Remote Results    We are writing to you regarding your recent Remote ICD check from home. Your transmission was received successfully. Battery status is satisfactory at this time.     Your results are showing no significant changes.    Your next device appointment will be a remote check on October 16, 2019; this will occur automatically.    To schedule or reschedule, please call [phone] and press 1.    NOTE: If you would like to do an extra transmission, please call [phone] and press 3 to speak to a nurse BEFORE transmitting. This ensures that the Device Clinic staff is aware of the reason you are sending a transmission, and can follow-up with you after it has been reviewed.    We will be checking your implanted device from home (remotely) every three months unless otherwise instructed. We will need to see you in the clinic at least once a year. You may need to be seen in the clinic sooner depending on the results of your check.    Please be aware:    The follow-up schedule is like a Physician prescription.    Your remote monitor is paired to your specific implanted device.      Sincerely,    [facility] Device Clinic

## 2021-06-20 NOTE — PROGRESS NOTES
Heart Care Device Change-Out Checklist (TRACY Checklist)    Device Data  ICD and Dual    :  [employer]  Model:  E110 TELIGEN 100  Serial Number:  [id number]    Implant Date: 11/24/2009 (by Dr. Marquez)  TRACY Date:  Low Capacitor Alert received 9/29/2018  Device Diagnosis:  Ischemic Cardiomyopathy; Primary Prevention    Device Alert(s):  Yes  Description:  [employer]/Teligen low voltage capacitor failure    Lead Data  (Print Device History and attach to this document. Enter each lead  and model number.)  Last Interrogation Date: 7/17/2018      Atrium: [employer] [address]   Lead Imp:  740Ohms, Pacing Threshold:  1.6V @ 0.5 ms and Sensing Threshold:  1.5 mV      Right Ventricle: [employer] [address] [place]   Lead Imp:  576Ohms, Pacing Threshold:  0.7V @ 0.5 ms and Sensing Threshold:  >25 mV   Shock Imp: 49 Ohms    Old Leads Present/Abandoned: No and See scanned Device Summary Data sheets for Model & Serial Number    Lead Alert(s):  No    Diagnostic Information  Intrinsic Rhythm:  NSR w/PVCs    Atrial Fibrillation:  Paroxysmal Atrial-Fib  Takes Anticoagulant? No    Pacing Percentages  Atrial Pacing 56% and Ventricle Pacing 0%  Pacemaker Dependent? No    History of VT/VF therapy    ATP: No  Appropriate Shocks:  N/A      Ejection Fraction  Last EF Date:  3/12/2018    By Echocardiogram  Last EF Measurement:  31%    Plan  Scheduled for ICD generator replacement  [employer]  No DFT  Do not anticipate any new leads or upgrades

## 2021-06-20 NOTE — PROGRESS NOTES
Pre-Intra-Post education and instructions as listed below were reviewed with Patient via phone  H&P from 10/5, scanned in [facility]  10/11/2018 2:46 PM  Gwen Spencer RN

## 2021-06-20 NOTE — PROGRESS NOTES
1940  Home:[phone] (home) Cell:There is no such number on file (mobile).  Emergency Contact: Lucero Alamo [phone]    +++Important patient information for [facility]/Cath Lab staff : DEVICE ALERT/ LOW CAPACITY ALERT-CHANGE OUT WITHIN 28 DAYS+++    [facility] EP Cath Lab Procedure Order     Device Implant/Revision:  Procedure: Generator Change Out  Device Type: Dual ICD  Device Company/Device Rep Needed for Procedure: [employer]    Diagnosis:  Device at TRACY  Anticipated Case Duration:  Standard  Scheduling Needs/Timeframe:  ASAP  Anesthesia:  Conscious Sedation- NO DFT per GAG  Research Protocol:  No    [facility] EP Cath Lab Prep   Ordering Provider: Dr Harrell  Ordering Date: 10/1/2018  Orders Status: Intial order placed and Order set placed  EP NC Contact: La Cordova RN    H&P:  Pt to schedule with PMD to complete  PCP: Kenna Calvillo [facility], [phone]    Pre-op Labs: Ordered AM of procedure    Medical Records Pertinent for Procedure:  Stress test 8-25-14, Echo 2-12-18 EF 31%, EKG 3-27-13 paced and Device Implant Record Generator change w/ [employer] 11-24-09    Patient Education:    Teach with Patient: Will be completed via phone prior to procedure, and letter was also sent to pt via mail/Favery with written pre-procedural instructions.    Risks Reviewed:     Internal Cardiac Defibrillator Check    <1% embolic event of CVA or pulmonary embolism    Possible ICD system revision    Risks associated with general anesthesia will be addressed by the Anesthesiology Department    If external defibrillation is needed, 25% risk for superficial burn.      Consent: Will be obtained in [facility] day of procedure    Pre-Procedure Instructions that were Reviewed with Patient:  NPO after midnight, Remove all jewelry prior to coming in for procedure, Shower prior to arrival, Notified patient of time and date of procedure by CV , Transportation arrangements needed s/p procedure, Post-procedure follow up process, Sedation  plan/orders and Pre-procedure letter was sent to pt by CV     Pre-Procedure Medication Instructions:  Instructions given to pt regarding anticoagulants: Pt is not on an anticoagulant- N/A  Instructions given to pt regarding antiarrhythmic medication: Beta Blocker; Pt instructed to continue medication prior to procedure  Instructions for medication, other than anticoagulants/antiarrhythmics listed above, given to pt: to take all morning medications with small sips of water, with the exception of OTC supplements and MVI      Allergies   Allergen Reactions     Carvedilol        Current Outpatient Prescriptions:      aspirin 325 MG tablet, Take 325 mg by mouth daily., Disp: , Rfl:      atorvastatin (LIPITOR) 80 MG tablet, Take 80 mg by mouth daily., Disp: , Rfl:      gabapentin (NEURONTIN) 300 MG capsule, Take 600 mg by mouth 2 (two) times a day., Disp: , Rfl:      metoprolol succinate (TOPROL-XL) 25 MG, TAKE ONE AND ONE-HALF TABLETS BY MOUTH AT BEDTIME , Disp: 135 tablet, Rfl: 1    Documentation Date:10/1/2018 2:25 PM  La Cordova RN

## 2021-06-20 NOTE — LETTER
Letter by Diane Shanks RDCS at      Author: Diane Shanks RDCS Service: -- Author Type: --    Filed:  Encounter Date: 6/3/2020 Status: (Other)         Daniel Alamo  [address] [facility] [address]      Sheron 3, 2020      Dear Mr. Alamo,    RE: Remote Results    We are writing to you regarding your recent Remote ICD check from home. Your transmission was received successfully. Battery status is satisfactory at this time.     Your results are being reviewed.  You will be contacted if further information is necessary.     Your next device appointment will be a remote check on September 2, 2020; this will occur automatically.    To schedule or reschedule, please call [phone] and press 1.    NOTE: If you would like to do an extra transmission, please call [phone] and press 3 to speak to a nurse BEFORE transmitting. This ensures that the Device Clinic staff is aware of the reason you are sending a transmission, and can follow-up with you after it has been reviewed.    We will be checking your implanted device from home (remotely) every three months unless otherwise instructed. We will need to see you in the clinic at least once a year. You may need to be seen in the clinic sooner depending on the results of your check.    Please be aware:    The follow-up schedule is like a Physician prescription.    Your remote monitor is paired to your specific implanted device.      Sincerely,    [facility] Care Device Clinic

## 2021-06-20 NOTE — LETTER
Letter by Diane Shanks RDCS at      Author: Diane Shanks RDCS Service: -- Author Type: --    Filed:  Encounter Date: 9/2/2020 Status: (Other)         Daniel Alamo  [address] [facility] [address]      September 2, 2020      Dear Mr. Alamo,    RE: Remote Results    We are writing to you regarding your recent Remote ICD check from home. Your transmission was received successfully. Battery status is satisfactory at this time.     Your results are showing no significant changes.    Your next device appointment will be a remote check on December 3, 2020; this will occur automatically.    To schedule or reschedule, please call [phone] and press 1.    NOTE: If you would like to do an extra transmission, please call [phone] and press 3 to speak to a nurse BEFORE transmitting. This ensures that the Device Clinic staff is aware of the reason you are sending a transmission, and can follow-up with you after it has been reviewed.    We will be checking your implanted device from home (remotely) every three months unless otherwise instructed. We will need to see you in the clinic at least once a year. You may need to be seen in the clinic sooner depending on the results of your check.    Please be aware:    The follow-up schedule is like a Physician prescription.    Your remote monitor is paired to your specific implanted device.      Sincerely,    [facility] Device Clinic

## 2021-06-21 NOTE — LETTER
Letter by Jackie Thompson at      Author: Jackie Thompson Service: -- Author Type: --    Filed:  Encounter Date: 3/10/2021 Status: (Other)         Daniel Alamo  [address] [facility] [address]      March 10, 2021      Dear Mr. Alamo,    RE: Remote Results    We are writing to you regarding your recent Remote ICD check from home. Your transmission was received successfully. Battery status is satisfactory at this time.     Your results are showing no significant changes.    Your next device appointment will be a clinic visit.  Please call in April to schedule.      To schedule or reschedule, please call [phone] and press 1.    NOTE: If you would like to do an extra transmission, please call [phone] and press 3 to speak to a nurse BEFORE transmitting. This ensures that the Device Clinic staff is aware of the reason you are sending a transmission, and can follow-up with you after it has been reviewed.    We will be checking your implanted device from home (remotely) every three months unless otherwise instructed. We will need to see you in the clinic at least once a year. You may need to be seen in the clinic sooner depending on the results of your check.    Please be aware:    The follow-up schedule is like a Physician prescription.    Your remote monitor is paired to your specific implanted device.      Sincerely,    [facility] Heart Care Device Clinic

## 2021-06-21 NOTE — PROGRESS NOTES
"DEVICE CLINIC RN POST-OP NOTE    Reason for visit: Post-operative wound and device check; s/p ICD generator change-out.  Not MRI conditional.     Device: [employer] D153 DYNAGEN. Pre-existing leads - RA Lead: [employer] [address] [employer] Rx and RV Lead: [employer] 0157 [address], DOI 04/22/2004, by Dr. SHAE Marquez.  Procedure date: 10/15/2018  Implant Diagnosis: ICD Battery Depletion/Device at TRACY, Chronic Systolic Congestive Heart Failure, CAD/MADIT  Implanting Physician: Dr. Chadd Harrell      Assessment  Subjective: \"It's not as sore now.\"  Mr. Alamo also reports \"the swelling has gone down too.\"  Vitals:   Vitals:    10/22/18 1312   BP: 114/70   Pulse: 60   Resp: 24   Temp: 97.4  F (36.3  C)     Heart Sounds: normal  Lung Sounds: clear to auscultation bilaterally  Incision/device pocket: Clean, dry and intact with resolving ecchymosis and hematoma.  Hematoma measures approximately 8cm x 6cm, extending out from the device ~1cm.  There are no signs of infection present.  The Steri-strips were removed at today's visit and the area cleaned of residual adhesive.  Other: Mr. Alamo encouraged to apply an ice pack to the device site for ten minutes four to five times daily.      Device Findings  Interrogation of device reveals normal sensing and capture thresholds  See the Paceart Report for a full summary of the device information.    Other: Intrinsic rhythm is sinus rhythm at 63 bpm with occasional PACs and PVCs.      Patient Education  Daniel Alamo was unaccompanied today.    [facility]'s Partnership Agreement for Device Patients and Post-operative Checklist were presented and reviewed with Mr. Alamo at today's appointment.    Signs and symptoms of infection, poor wound healing, and normal device function were reviewed.  Range of motion and weight restrictions are waived as Mr. Alamo's device leads are chronic.  He was issued a [employer] NXT Communicator " remote monitor and instructed on its set-up and use for remote monitoring by the [employer] representative prior to hospital discharge. These instructions were reviewed with the patient at today s visit.  Mr. lAamo verbalizes understanding to call the Device Clinic should he have concerns about his operative site.      Plan  - Routine Remote device check on 01/29/2019    Alix Hernandez RN, MA  Device Nurse  [facility]

## 2021-06-21 NOTE — LETTER
Letter by Jackie Thompson at      Author: Jackie Thompson Service: -- Author Type: --    Filed:  Encounter Date: 12/9/2020 Status: (Other)         Daniel Alamo  [address] [address]      December 9, 2020      Dear Mr. Alamo,    RE: Remote Results    We are writing to you regarding your recent Remote ICD check from home. Your transmission was received successfully. Battery status is satisfactory at this time.     Your results are showing no significant changes.    Your next device appointment will be a remote check on March 10, 2021; this will occur automatically.    To schedule or reschedule, please call [phone] and press 1.    NOTE: If you would like to do an extra transmission, please call [phone] and press 3 to speak to a nurse BEFORE transmitting. This ensures that the Device Clinic staff is aware of the reason you are sending a transmission, and can follow-up with you after it has been reviewed.    We will be checking your implanted device from home (remotely) every three months unless otherwise instructed. We will need to see you in the clinic at least once a year. You may need to be seen in the clinic sooner depending on the results of your check.    Please be aware:    The follow-up schedule is like a Physician prescription.    Your remote monitor is paired to your specific implanted device.      Sincerely,    [facility]

## 2021-06-28 NOTE — PROGRESS NOTES
Progress Notes by Herminia Coleman CNP at 2/24/2020  2:50 PM     Author: Herminia Coleman CNP Service: -- Author Type: Nurse Practitioner    Filed: 2/24/2020  3:26 PM Encounter Date: 2/24/2020 Status: Signed    : Herminia Coleman CNP (Nurse Practitioner)         Thank you, Dr. Calvillo, for asking the [facility] team to see Mr. Daniel Alamo.      Assessment/Recommendations   Assessment:    Diagnoses and all orders for this visit:    Paroxysmal atrial fibrillation (H)  Continue Eliquis 5 mg two times a day for [id number] score of (4).  [place] increased today at 2%.  Before changing medication, will get a long-term look at sotalol.  Last interrogation showed an episode up to 7 hours, longest during this past month was 1.5 hours.  For now, continue sotalol 80 mg twice daily    ICD (implantable cardioverter-defibrillator), dual, in situ  Adjustment has been made to increase atrial sensitivity.  Possibility for undersensing.    Chronic systolic congestive heart failure (H)  Start ACE inhibitor for CHF.  2.5 mg of lisinopril daily.  He previously had fatigue from what he describes as too much medication.  He will get BMP in 1 month.    Coronary artery disease of native heart with stable angina pectoris, unspecified vessel or lesion type (H)  Asymptomatic for ACS.  He will restart aspirin when he stops Plavix.    Abdominal aortic aneurysm (AAA) without rupture (H)  Recent endovascular repair.  Patient question length of time to be on Plavix.  Review of Dr. Holcomb notes says they will discuss discontinuation at 3-month follow-up.  He was advised not to stop the Plavix until follow-up.    PVCs  Number of PVCs less on today's interrogation although that was only after 1 month.  Unlikely this is contributing to heart failure given he has had heart failure for some time.  However, multiple PVCs heard on today's exam.  Will follow PVC burden    [id number] score of 4 and on Eliquis.        History of Present Illness/Subjective    Mr. Daniel Alamo is a 80 y.o. male with PMHx for hyperlipidemia, BPH, coronary artery disease s/p CABG, chronic systolic heart failure, infrarenal aortic and iliac artery aneurysms status post recent endograft repair on 2/4/2020, ICD in situ and paroxysmal atrial fibrillation.  Patient is followed routinely by Dr. Harrell as patient had ICD implanted after bypass in 2004.  He did undergo generator replacement 11/24/2009, most recently October 15, 2018.  In January, with device interrogation he was noted to have paroxysmal A. fib with his longest episode up to 7.6 hours.  Due to elevated chads 2 Vasc score, patient was recommended to start on Eliquis 5 mg twice daily.  He was also started on sotalol 80 mg twice daily, metoprolol discontinued.  Earlier this month patient underwent aortobiiliac endograft repair of aortic aneurysm and placement of bridging stents bilaterally, placement of bilateral iliac branch devices for repair of bilateral iliac artery aneurysms.  He has been started on Plavix for 3 months, aspirin discontinued.  He has done well postoperatively.    After starting the sotalol he does have an increased burden of 2% with his longest episode 1.5 hours.  Heart rates are controlled.  He feels about the same as he did prior.  He is generally fatigued, although that is no recent change.  No chest pain, no shortness of breath.  He does have general lightheadedness.    ECG: Personally reviewed 1/27/2020 [facility] w PVC    ECHOCARDIOGRAM 5/1/2019:   1. The left ventricle is normal in size. Left ventricular systolic performance is severely reduced. The ejection fraction is estimated to be 20-25%.    2. There is moderate-severe global reduction in left ventricular systolic performance with inferior and posterior akinesis.   3. There is mild concentric increase in left ventricular wall thickness.  4. There is mild aortic stenosis.   5. There is mild aortic insufficiency.    6. There is moderate mitral insufficiency.   7. Normal right ventricular size with moderately reduced right ventricular systolic performance.   8. There is mild left atrial enlargement.     Device check shows leads stable and battery ok.  Device functioning appropriately.   Atrial pacing 43% and Ventricular pacing 2%.  AT/AFib burden 2, longest episode 1.5 hours.  No ventr arrhythmias.       Physical Examination Review of Systems   Vitals:    02/24/20 1426   BP: 130/72   Pulse: 60   Resp: 16     Body mass index is 21.38 kg/m .  Wt Readings from Last 3 Encounters:   02/24/20 149 lb (67.6 kg)   02/21/20 147 lb 4.8 oz (66.8 kg)   01/27/20 151 lb (68.5 kg)       General Appearance:   No acute distress, normal body habitus   ENT/Mouth: membranes moist, no oral lesions or bleeding gums.      EYES:  no scleral icterus, normal conjunctivae   Neck: no carotid bruits or thyromegaly   Chest/Lungs:   lungs are clear to auscultation, no rales or wheezing,    Cardiovascular:   Regular Rhythm, occasional ectopic beat. Normal first and second heart sounds with diastolic low-grade murmur; the radial and posterior tibial pulses are intact, no JVD, no edema bilaterally    Abdomen:  no organomegaly, masses, bruits, or tenderness; bowel sounds are present   Extremities: no cyanosis or clubbing   Skin: Dry, warm, intact.    Neurologic: normal  bilateral, no tremors. [place]     Psychiatric: alert and oriented x3, calm     General: WNL  Eyes: WNL  Ears/Nose/Throat: WNL  Lungs: WNL  Heart: WNL  Stomach: WNL  Bladder: WNL  Muscle/Joints: WNL  Skin: WNL  Nervous System: WNL  Mental Health: WNL     Blood: WNL     Medical History  Surgical History Family History Social History   Past Medical History:   Diagnosis Date   ? Coronary artery disease    ? VT (ventricular tachycardia) (H) 2004    Past Surgical History:   Procedure Laterality Date   ? CORONARY ARTERY BYPASS GRAFT     ? icd     ? INSERT / REPLACE / REMOVE PACEMAKER     ? [place] CABG,  VEIN, SINGLE      Description: CABG (CABG);  Recorded: 10/03/2012;  Comments: LIMA to LAD, SVG to OM2, SVG to RCA    No family history on file. Social History     Socioeconomic History   ? Marital status:      Spouse name: Not on file   ? Number of children: Not on file   ? Years of education: Not on file   ? Highest education level: Not on file   Occupational History   ? Not on file   Social Needs   ? Financial resource strain: Not on file   ? Food insecurity:     Worry: Not on file     Inability: Not on file   ? Transportation needs:     Medical: Not on file     Non-medical: Not on file   Tobacco Use   ? Smoking status: Current Every Day Smoker     Packs/day: 0.50     Years: 30.00     Pack years: 15.00   ? Smokeless tobacco: Never Used   Substance and Sexual Activity   ? Alcohol use: Yes     Alcohol/week: 1.0 standard drinks     Types: 1 Glasses of wine per week   ? Drug use: No   ? Sexual activity: Not on file   Lifestyle   ? Physical activity:     Days per week: Not on file     Minutes per session: Not on file   ? Stress: Not on file   Relationships   ? Social connections:     Talks on phone: Not on file     Gets together: Not on file     Attends Shinto service: Not on file     Active member of club or organization: Not on file     Attends meetings of clubs or organizations: Not on file     Relationship status: Not on file   ? Intimate partner violence:     Fear of current or ex partner: Not on file     Emotionally abused: Not on file     Physically abused: Not on file     Forced sexual activity: Not on file   Other Topics Concern   ? Not on file   Social History Narrative   ? Not on file          Medications  Allergies   Current Outpatient Medications   Medication Sig Dispense Refill   ? apixaban (ELIQUIS) 5 mg Tab tablet Take 1 tablet (5 mg total) by mouth 2 (two) times a day. 60 tablet 11   ? atorvastatin (LIPITOR) 80 MG tablet Take 80 mg by mouth daily.     ? clopidogreL (PLAVIX) 75 mg tablet Take  75 mg by mouth daily.     ? cyanocobalamin, vitamin B-12, 1,000 mcg Subl Take 1 tablet by mouth once daily sublingually     ? gabapentin (NEURONTIN) 300 MG capsule Take 900 mg by mouth 2 (two) times a day.      ? naproxen sodium (ALEVE) 220 MG tablet Take 220 mg by mouth.     ? sotalol (BETAPACE) 80 MG tablet Take 1 tablet (80 mg total) by mouth 2 (two) times a day. 190 tablet 5   ? tamsulosin (FLOMAX) 0.4 mg cap Take 0.4 mg by mouth daily.  4     No current facility-administered medications for this visit.     Allergies   Allergen Reactions   ? Carvedilol Other (See Comments)     GI upset   ? Chloride Nausea Only         Lab Results    Chemistry/lipid CBC Cardiac Enzymes/BNP/TSH/INR   Lab Results   Component Value Date    CHOL 150 12/06/2019    HDL 36 (L) 12/06/2019    LDLCALC 100 12/06/2019    TRIG 70 12/06/2019    CREATININE 0.95 01/29/2020    BUN 16 01/29/2020    K 3.6 01/29/2020     01/29/2020     (H) 01/29/2020    CO2 28 01/29/2020    Lab Results   Component Value Date    WBC 7.9 10/15/2018    HGB 13.6 (L) 10/15/2018    HCT 41.6 10/15/2018    MCV 92 10/15/2018     10/15/2018    Lab Results   Component Value Date    CKMB 3 03/27/2013    TROPONINI 0.01 03/28/2013    BNP 46 03/28/2013    TSH 0.73 08/12/2014    INR 1.16 (H) 03/27/2013

## 2021-06-29 NOTE — PROGRESS NOTES
"Progress Notes by Chadd Harrell MD at 6/4/2020 10:30 AM     Author: Chadd Harrell MD Service: -- Author Type: Physician    Filed: 6/4/2020 11:08 AM Encounter Date: 6/4/2020 Status: Signed    : Chadd Harrell MD (Physician)           The patient has been notified of following:     \"This telephone visit will be conducted via a call between you and your physician/provider. We have found that certain health care needs can be provided without the need for a physical exam.  This service lets us provide the care you need with a phone conversation.  If a prescription is necessary we can send it directly to your pharmacy.  If lab work is needed we can place an order for that and you can then stop by our lab to have the test done at a later time. If during the course of the call the physician/provider feels a telephone visit is not appropriate, you will not be charged for this service.\" Verbal consent has been obtained for this service by care team member:         HEART CARE PHONE ENCOUNTER        The patient has chosen to have the visit conducted as a telephone visit, to reduce risk of exposure given the current status of Coronavirus in our community. This telephone visit is being conducted via a call between the patient and physician/provider. Health care needs are being provided without a physical exam.     Assessment/Recommendations   Assessment:    1. Assessment:  1.[employer] ICD  initial implant 2004        Generator replacement November 24, 2009       ICD generator replacement October 15, 2018/TARCY  3.  Coronary artery disease with previous inferior myocardial infarction  Prior bypass surgery with left internal mammary artery to LAD, vein graft to left circumflex marginal branch  4.  LV dysfunction; ejection fraction 31% by echocardiogram March 12, 2018  Ejection fraction 20-25% by echocardiogram May 7, 2019  Sinus node dysfunction with sinus bradycardia  Paroxysmal atrial fibrillation-Sotalol "   Anticoagulation with Eliquis December 2018  Bilateral iliac artery aneurysms and infrarenal aneurysm-4.5 cm;       complex stenting 2-;   Plan:  The ICD evaluation shows the battery is excellent, should last another 10 years and 6 months  72% atrial pacing  10% ventricular pacing  Because of coincidental PVCs on atrial pacing there is unnecessary ventricular pacing on the T wave that could cause abnormal rhythms so we should reprogram this  We will have you come to the pacemaker clinic in [place] to make adjustments  No atrial fibrillation-rhythm has been well controlled while taking sotalol  Were on Plavix this has been stopped and now aspirin 81 mg daily and as well as Eliquis 5 mg twice daily  Laboratory evaluation April 21 was excellent  Continue to have device check through transtelephonic monitoring every 3 months  Return in 1 year for comprehensive cardiac arrhythmia device assessment      Follow Up Plan: Follow up in   I have reviewed the note as documented.  This accurately captures the substance of my conversation with the patient.    Total time of call between patient and provider was 21 minutes   Start Time:1030   Stop Time:1051    Additional 20 minutes to review medical records and generate report       History of Present Illness/Subjective    Daniel Alamo is a 80 y.o. male who is being evaluated via a billable telephone visit.    Daniel underwent complex stenting of his abdominal aortic aneurysm and iliac vessels on February 4, 2020  He had follow-up with his vascular surgeon in May and CT angiogram showed some leakage but otherwise the report was excellent and he was recommended for 6-month CT follow-up  Has fatigue and exercise intolerance cannot walk very far  No angina  No awareness of palpitations or arrhythmias  Denies orthopnea PND or pedal edema  Device interrogation shows a battery good for another 10 years 6 months  72% atrial pacing  10% ventricular pacing  Multiple examples of T  wave pacing; late cycle PVC coincident with atrial pacing and is blanked out resulting in ventricular pacing on the T wave and on one occasion, April 25, 2020, this induced an episode of ventricular tachycardia there was self terminating  No atrial fibrillation was identified rhythm has been quite stable since he has been on sotalol 80 mg twice daily I have reviewed and updated the patient's Past Medical History, Social History, Family History and Medication List.     Physical Examination not performed given phone encounter Review of Systems                                                Medical History  Surgical History Family History Social History   Past Medical History:   Diagnosis Date   ? Coronary artery disease    ? VT (ventricular tachycardia) (H) 2004    Past Surgical History:   Procedure Laterality Date   ? CORONARY ARTERY BYPASS GRAFT     ? icd     ? INSERT / REPLACE / REMOVE PACEMAKER     ? [place] CABG, VEIN, SINGLE      Description: CABG (CABG);  Recorded: 10/03/2012;  Comments: LIMA to LAD, SVG to OM2, SVG to RCA    No family history on file. Social History     Socioeconomic History   ? Marital status:      Spouse name: Not on file   ? Number of children: Not on file   ? Years of education: Not on file   ? Highest education level: Not on file   Occupational History   ? Not on file   Social Needs   ? Financial resource strain: Not on file   ? Food insecurity     Worry: Not on file     Inability: Not on file   ? Transportation needs     Medical: Not on file     Non-medical: Not on file   Tobacco Use   ? Smoking status: Current Every Day Smoker     Packs/day: 0.50     Years: 30.00     Pack years: 15.00   ? Smokeless tobacco: Never Used   Substance and Sexual Activity   ? Alcohol use: Yes     Alcohol/week: 1.0 standard drinks     Types: 1 Glasses of wine per week   ? Drug use: No   ? Sexual activity: Not on file   Lifestyle   ? Physical activity     Days per week: Not on file     Minutes per session:  Not on file   ? Stress: Not on file   Relationships   ? Social connections     Talks on phone: Not on file     Gets together: Not on file     Attends Amish service: Not on file     Active member of club or organization: Not on file     Attends meetings of clubs or organizations: Not on file     Relationship status: Not on file   ? Intimate partner violence     Fear of current or ex partner: Not on file     Emotionally abused: Not on file     Physically abused: Not on file     Forced sexual activity: Not on file   Other Topics Concern   ? Not on file   Social History Narrative   ? Not on file          Medications  Allergies   Current Outpatient Medications   Medication Sig Dispense Refill   ? apixaban (ELIQUIS) 5 mg Tab tablet Take 1 tablet (5 mg total) by mouth 2 (two) times a day. 60 tablet 11   ? atorvastatin (LIPITOR) 80 MG tablet Take 80 mg by mouth daily.     ? clopidogreL (PLAVIX) 75 mg tablet Take 75 mg by mouth daily.     ? cyanocobalamin, vitamin B-12, 1,000 mcg Subl Take 1 tablet by mouth once daily sublingually     ? fluticasone propionate (FLONASE) 50 mcg/actuation nasal spray Apply 1-2 sprays into each nostril as needed.     ? gabapentin (NEURONTIN) 300 MG capsule Take 900 mg by mouth 2 (two) times a day.      ? lisinopriL (PRINIVIL,ZESTRIL) 2.5 MG tablet Take 1 tablet (2.5 mg total) by mouth daily. 30 tablet 2   ? loratadine (CLARITIN) 10 mg tablet Take 10 mg by mouth daily.     ? naproxen sodium (ALEVE) 220 MG tablet Take 220 mg by mouth.     ? sotalol (BETAPACE) 80 MG tablet Take 1 tablet (80 mg total) by mouth 2 (two) times a day. 190 tablet 5   ? tamsulosin (FLOMAX) 0.4 mg cap Take 0.4 mg by mouth daily.  4     No current facility-administered medications for this visit.     Allergies   Allergen Reactions   ? Carvedilol Other (See Comments)     GI upset   ? Chloride Nausea Only         Lab Results    Chemistry/lipid CBC Cardiac Enzymes/BNP/TSH/INR   Lab Results   Component Value Date    CHOL  150 12/06/2019    HDL 36 (L) 12/06/2019    LDLCALC 100 12/06/2019    TRIG 70 12/06/2019    CREATININE 1.08 04/21/2020    BUN 13 04/21/2020    K 3.5 04/21/2020     04/21/2020     04/21/2020    CO2 27 04/21/2020    Lab Results   Component Value Date    WBC 9.2 04/21/2020    HGB 15.1 04/21/2020    HCT 48.3 04/21/2020    MCV 93 04/21/2020     04/21/2020    Lab Results   Component Value Date    CKMB 3 03/27/2013    TROPONINI 0.01 03/28/2013    BNP 46 03/28/2013    TSH 0.73 08/12/2014    INR 1.16 (H) 03/27/2013        Chadd Harrell

## 2021-06-29 NOTE — PROGRESS NOTES
Progress Notes by Chadd Harrell MD at 6/3/2020  3:26 PM     Author: Chadd Harrell MD Service: -- Author Type: Physician    Filed: 6/4/2020 11:57 AM Encounter Date: 6/3/2020 Status: Signed    : Chadd Harrell MD (Physician)        Message   Received: Yesterday   Message Contents   Andria Auguste RN Granrud, Gregory A, MD               Recommended reprogramming in note due to  on/near T waves. Would have [employer] rep present to assist. Thank you, Andria      ?able to get him in Friday AM at [place]?  I have [employer] rep reviewing records   Not sure rep needs to be present ; unless requested by EP RN

## 2021-07-03 NOTE — ADDENDUM NOTE
Addendum Note by Gwen Spencer RN at 1/2/2020  3:55 PM     Author: Gwen Spencer RN Service: -- Author Type: Registered Nurse    Filed: 1/3/2020  8:20 AM Encounter Date: 1/2/2020 Status: Signed    : Gwen Spencer RN (Registered Nurse)    Addended by: GWEN SPENCER on: 1/3/2020 08:20 AM        Modules accepted: Orders

## 2021-07-03 NOTE — ADDENDUM NOTE
Addendum Note by Sylvia Rodriguez PA-C at 5/12/2020 10:40 AM     Author: Sylvia Rodriguez PA-C Service: -- Author Type: Physician Assistant    Filed: 5/12/2020 11:52 AM Date of Service: 5/12/2020 10:40 AM Status: Signed    : Sylvia Rodriguez PA-C (Physician Assistant)    Encounter addended by: Sylvia Rodriguez PA-C on: 5/12/2020 11:52 AM      Actions taken: Clinical Note Signed

## 2021-10-10 ENCOUNTER — HEALTH MAINTENANCE LETTER (OUTPATIENT)
Age: 81
End: 2021-10-10

## 2021-10-14 ENCOUNTER — LAB REQUISITION (OUTPATIENT)
Dept: LAB | Facility: CLINIC | Age: 81
End: 2021-10-14
Payer: MEDICARE

## 2021-10-14 DIAGNOSIS — Z01.818 ENCOUNTER FOR OTHER PREPROCEDURAL EXAMINATION: ICD-10-CM

## 2021-10-14 DIAGNOSIS — E53.9 VITAMIN B DEFICIENCY, UNSPECIFIED: ICD-10-CM

## 2021-10-14 DIAGNOSIS — I25.9 CHRONIC ISCHEMIC HEART DISEASE, UNSPECIFIED: ICD-10-CM

## 2021-10-14 LAB
ANION GAP SERPL CALCULATED.3IONS-SCNC: 10 MMOL/L (ref 5–18)
BUN SERPL-MCNC: 13 MG/DL (ref 8–28)
CALCIUM SERPL-MCNC: 8.9 MG/DL (ref 8.5–10.5)
CHLORIDE BLD-SCNC: 106 MMOL/L (ref 98–107)
CO2 SERPL-SCNC: 26 MMOL/L (ref 22–31)
CREAT SERPL-MCNC: 0.83 MG/DL (ref 0.7–1.3)
ERYTHROCYTE [DISTWIDTH] IN BLOOD BY AUTOMATED COUNT: 14.8 % (ref 10–15)
GFR SERPL CREATININE-BSD FRML MDRD: 81 ML/MIN/1.73M2
GLUCOSE BLD-MCNC: 88 MG/DL (ref 70–125)
HCT VFR BLD AUTO: 42.9 % (ref 40–53)
HGB BLD-MCNC: 13.8 G/DL (ref 13.3–17.7)
MCH RBC QN AUTO: 29.9 PG (ref 26.5–33)
MCHC RBC AUTO-ENTMCNC: 32.2 G/DL (ref 31.5–36.5)
MCV RBC AUTO: 93 FL (ref 78–100)
PLATELET # BLD AUTO: 208 10E3/UL (ref 150–450)
POTASSIUM BLD-SCNC: 3.9 MMOL/L (ref 3.5–5)
RBC # BLD AUTO: 4.62 10E6/UL (ref 4.4–5.9)
SODIUM SERPL-SCNC: 142 MMOL/L (ref 136–145)
VIT B12 SERPL-MCNC: 191 PG/ML (ref 213–816)
WBC # BLD AUTO: 8.3 10E3/UL (ref 4–11)

## 2021-10-14 PROCEDURE — 85027 COMPLETE CBC AUTOMATED: CPT | Mod: ORL | Performed by: NURSE PRACTITIONER

## 2021-10-14 PROCEDURE — 82607 VITAMIN B-12: CPT | Mod: ORL | Performed by: NURSE PRACTITIONER

## 2021-10-14 PROCEDURE — U0005 INFEC AGEN DETEC AMPLI PROBE: HCPCS | Mod: ORL | Performed by: NURSE PRACTITIONER

## 2021-10-14 PROCEDURE — 80048 BASIC METABOLIC PNL TOTAL CA: CPT | Mod: ORL | Performed by: NURSE PRACTITIONER

## 2021-10-15 LAB — SARS-COV-2 RNA RESP QL NAA+PROBE: NEGATIVE

## 2021-10-18 ENCOUNTER — ANCILLARY PROCEDURE (OUTPATIENT)
Dept: CARDIOLOGY | Facility: CLINIC | Age: 81
End: 2021-10-18
Attending: INTERNAL MEDICINE
Payer: MEDICARE

## 2021-10-18 ENCOUNTER — OFFICE VISIT (OUTPATIENT)
Dept: CARDIOLOGY | Facility: CLINIC | Age: 81
End: 2021-10-18
Attending: INTERNAL MEDICINE

## 2021-10-18 VITALS
HEART RATE: 60 BPM | WEIGHT: 143 LBS | BODY MASS INDEX: 20.52 KG/M2 | SYSTOLIC BLOOD PRESSURE: 108 MMHG | DIASTOLIC BLOOD PRESSURE: 64 MMHG | RESPIRATION RATE: 16 BRPM

## 2021-10-18 DIAGNOSIS — I48.0 PAROXYSMAL ATRIAL FIBRILLATION (H): ICD-10-CM

## 2021-10-18 DIAGNOSIS — Z95.810 ICD (IMPLANTABLE CARDIOVERTER-DEFIBRILLATOR), DUAL, IN SITU: Primary | ICD-10-CM

## 2021-10-18 DIAGNOSIS — Z95.810 ICD (IMPLANTABLE CARDIOVERTER-DEFIBRILLATOR) IN PLACE: ICD-10-CM

## 2021-10-18 DIAGNOSIS — I25.5 ISCHEMIC CARDIOMYOPATHY: ICD-10-CM

## 2021-10-18 DIAGNOSIS — I71.40 ABDOMINAL AORTIC ANEURYSM (AAA) WITHOUT RUPTURE (H): ICD-10-CM

## 2021-10-18 DIAGNOSIS — I50.22 CHRONIC SYSTOLIC CONGESTIVE HEART FAILURE (H): ICD-10-CM

## 2021-10-18 LAB
MDC_IDC_EPISODE_DTM: NORMAL
MDC_IDC_EPISODE_ID: NORMAL
MDC_IDC_EPISODE_TYPE: NORMAL
MDC_IDC_LEAD_IMPLANT_DT: NORMAL
MDC_IDC_LEAD_IMPLANT_DT: NORMAL
MDC_IDC_LEAD_LOCATION: NORMAL
MDC_IDC_LEAD_LOCATION: NORMAL
MDC_IDC_LEAD_LOCATION_DETAIL_1: NORMAL
MDC_IDC_LEAD_LOCATION_DETAIL_1: NORMAL
MDC_IDC_LEAD_MFG: NORMAL
MDC_IDC_LEAD_MFG: NORMAL
MDC_IDC_LEAD_MODEL: NORMAL
MDC_IDC_LEAD_MODEL: NORMAL
MDC_IDC_LEAD_POLARITY_TYPE: NORMAL
MDC_IDC_LEAD_POLARITY_TYPE: NORMAL
MDC_IDC_LEAD_SERIAL: NORMAL
MDC_IDC_LEAD_SERIAL: NORMAL
MDC_IDC_LEAD_SPECIAL_FUNCTION: NORMAL
MDC_IDC_MSMT_BATTERY_DTM: NORMAL
MDC_IDC_MSMT_BATTERY_REMAINING_LONGEVITY: 126 MO
MDC_IDC_MSMT_BATTERY_REMAINING_PERCENTAGE: 100 %
MDC_IDC_MSMT_BATTERY_STATUS: NORMAL
MDC_IDC_MSMT_CAP_CHARGE_DTM: NORMAL
MDC_IDC_MSMT_CAP_CHARGE_TIME: 10.2 S
MDC_IDC_MSMT_CAP_CHARGE_TYPE: NORMAL
MDC_IDC_MSMT_LEADCHNL_RA_IMPEDANCE_VALUE: 773 OHM
MDC_IDC_MSMT_LEADCHNL_RA_LEAD_CHANNEL_STATUS: NORMAL
MDC_IDC_MSMT_LEADCHNL_RA_PACING_THRESHOLD_AMPLITUDE: 2 V
MDC_IDC_MSMT_LEADCHNL_RA_PACING_THRESHOLD_PULSEWIDTH: 0.7 MS
MDC_IDC_MSMT_LEADCHNL_RV_IMPEDANCE_VALUE: 523 OHM
MDC_IDC_MSMT_LEADCHNL_RV_PACING_THRESHOLD_AMPLITUDE: 1 V
MDC_IDC_MSMT_LEADCHNL_RV_PACING_THRESHOLD_PULSEWIDTH: 0.4 MS
MDC_IDC_PG_IMPLANT_DTM: NORMAL
MDC_IDC_PG_MFG: NORMAL
MDC_IDC_PG_MODEL: NORMAL
MDC_IDC_PG_SERIAL: NORMAL
MDC_IDC_PG_TYPE: NORMAL
MDC_IDC_SESS_CLINIC_NAME: NORMAL
MDC_IDC_SESS_DTM: NORMAL
MDC_IDC_SESS_TYPE: NORMAL
MDC_IDC_SET_BRADY_AT_MODE_SWITCH_MODE: NORMAL
MDC_IDC_SET_BRADY_AT_MODE_SWITCH_RATE: 170 {BEATS}/MIN
MDC_IDC_SET_BRADY_LOWRATE: 55 {BEATS}/MIN
MDC_IDC_SET_BRADY_MAX_SENSOR_RATE: 130 {BEATS}/MIN
MDC_IDC_SET_BRADY_MAX_TRACKING_RATE: 120 {BEATS}/MIN
MDC_IDC_SET_BRADY_MODE: NORMAL
MDC_IDC_SET_BRADY_PAV_DELAY_HIGH: 200 MS
MDC_IDC_SET_BRADY_PAV_DELAY_LOW: 300 MS
MDC_IDC_SET_BRADY_SAV_DELAY_HIGH: 165 MS
MDC_IDC_SET_BRADY_SAV_DELAY_LOW: 250 MS
MDC_IDC_SET_LEADCHNL_RA_PACING_AMPLITUDE: 3 V
MDC_IDC_SET_LEADCHNL_RA_PACING_POLARITY: NORMAL
MDC_IDC_SET_LEADCHNL_RA_PACING_PULSEWIDTH: 0.7 MS
MDC_IDC_SET_LEADCHNL_RA_SENSING_ADAPTATION_MODE: NORMAL
MDC_IDC_SET_LEADCHNL_RA_SENSING_POLARITY: NORMAL
MDC_IDC_SET_LEADCHNL_RA_SENSING_SENSITIVITY: 0.5 MV
MDC_IDC_SET_LEADCHNL_RV_PACING_AMPLITUDE: 1.5 V
MDC_IDC_SET_LEADCHNL_RV_PACING_POLARITY: NORMAL
MDC_IDC_SET_LEADCHNL_RV_PACING_PULSEWIDTH: 0.4 MS
MDC_IDC_SET_LEADCHNL_RV_SENSING_ADAPTATION_MODE: NORMAL
MDC_IDC_SET_LEADCHNL_RV_SENSING_POLARITY: NORMAL
MDC_IDC_SET_LEADCHNL_RV_SENSING_SENSITIVITY: 0.6 MV
MDC_IDC_SET_ZONE_DETECTION_INTERVAL: 261 MS
MDC_IDC_SET_ZONE_DETECTION_INTERVAL: 333 MS
MDC_IDC_SET_ZONE_DETECTION_INTERVAL: 400 MS
MDC_IDC_SET_ZONE_TYPE: NORMAL
MDC_IDC_SET_ZONE_VENDOR_TYPE: NORMAL
MDC_IDC_STAT_AT_BURDEN_PERCENT: 1 %
MDC_IDC_STAT_AT_DTM_END: NORMAL
MDC_IDC_STAT_AT_DTM_START: NORMAL
MDC_IDC_STAT_AT_MODE_SW_COUNT: 3602
MDC_IDC_STAT_BRADY_DTM_END: NORMAL
MDC_IDC_STAT_BRADY_DTM_START: NORMAL
MDC_IDC_STAT_BRADY_RA_PERCENT_PACED: 40 %
MDC_IDC_STAT_BRADY_RV_PERCENT_PACED: 2 %
MDC_IDC_STAT_EPISODE_RECENT_COUNT: 0
MDC_IDC_STAT_EPISODE_RECENT_COUNT: 3
MDC_IDC_STAT_EPISODE_RECENT_COUNT: 3602
MDC_IDC_STAT_EPISODE_RECENT_COUNT_DTM_END: NORMAL
MDC_IDC_STAT_EPISODE_RECENT_COUNT_DTM_START: NORMAL
MDC_IDC_STAT_EPISODE_TYPE: NORMAL
MDC_IDC_STAT_EPISODE_VENDOR_TYPE: NORMAL
MDC_IDC_STAT_TACHYTHERAPY_ATP_DELIVERED_RECENT: 0
MDC_IDC_STAT_TACHYTHERAPY_ATP_DELIVERED_TOTAL: 1
MDC_IDC_STAT_TACHYTHERAPY_RECENT_DTM_END: NORMAL
MDC_IDC_STAT_TACHYTHERAPY_RECENT_DTM_START: NORMAL
MDC_IDC_STAT_TACHYTHERAPY_SHOCKS_ABORTED_RECENT: 0
MDC_IDC_STAT_TACHYTHERAPY_SHOCKS_ABORTED_TOTAL: 0
MDC_IDC_STAT_TACHYTHERAPY_SHOCKS_DELIVERED_RECENT: 0
MDC_IDC_STAT_TACHYTHERAPY_SHOCKS_DELIVERED_TOTAL: 0
MDC_IDC_STAT_TACHYTHERAPY_TOTAL_DTM_END: NORMAL
MDC_IDC_STAT_TACHYTHERAPY_TOTAL_DTM_START: NORMAL

## 2021-10-18 PROCEDURE — 93283 PRGRMG EVAL IMPLANTABLE DFB: CPT | Performed by: INTERNAL MEDICINE

## 2021-10-18 PROCEDURE — 99214 OFFICE O/P EST MOD 30 MIN: CPT | Performed by: INTERNAL MEDICINE

## 2021-10-18 NOTE — PROGRESS NOTES
[facility] Heart Care Note  Assessment:  1.BSCI ICD  initial implant 2004        Generator replacement November 24, 2009       ICD generator replacement October 15, 2018/TRACY  3.  Coronary artery disease with previous inferior myocardial infarction  Prior bypass surgery with left internal mammary artery to LAD, vein graft to left circumflex marginal branch  4.  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 at least one portion a day  You had comprehensive blood studies on October 14, 2021-they came out good  You  Are  prepared for the vascular stenting that is scheduled for tomorrow-have held Eliquis  Continue to have device check through transtelephonic monitoring every 3 months  Return in 1 year for comprehensive cardiac arrhythmia device assessment    Subjective:    I had the opportunity to see.Daniel Alamo , who is a 81 year old male with a known history of complex heart disease  Rec is scheduled for iliac stenting has a leak-this will be done at [facility] tomorrow  Preoperative laboratory study October 14, 2021  Creatinine 0.83  Electrolytes normal  CBC normal  Echocardiogram January 7, 2021 showed ejection fraction 25%; no change compared to May 1, 2019  Daniel is lost quite a bit of weight perhaps 20 pounds-tells me he is eating okay just not much appetite-advised him to take supplements and liberalize his fat/lipid restrictions for sake of  nutrition  No chest pain  Breathing satisfactory no orthopnea PND  No pedal edema  His noted his blood pressure is bit lower than it has been although does not complain of excessively low blood pressure lightheadedness or faintness      Problem List:  Patient Active Problem List   Diagnosis     Atrial Fibrillation     Dyslipidemia     Coronary artery disease of native heart with stable angina pectoris, unspecified vessel or lesion type (H)     ICD (implantable cardioverter-defibrillator), dual, in situ     Chronic systolic congestive heart failure (H)     Ischemic cardiomyopathy     PVD (peripheral vascular disease) (H)     Abdominal aortic aneurysm (AAA) without rupture (H)     PVC's (premature ventricular contractions)     Medical History:  Past Medical History:   Diagnosis Date     Coronary artery disease      Heart disease      VT (ventricular tachycardia) (H) 2004     Surgical History:  Past Surgical History:   Procedure Laterality Date     ABDOMEN SURGERY       BYPASS GRAFT ARTERY CORONARY       C CABG, VEIN, SINGLE      Description: CABG (CABG);  Recorded: 10/03/2012;  Comments: LIMA to LAD, SVG to OM2, SVG to RCA     EYE SURGERY       INSERT / REPLACE / REMOVE PACEMAKER       OTHER SURGICAL HISTORY      icd     Social History:  Social History     Socioeconomic History     Marital status:      Spouse name: Not on file     Number of children: Not on file     Years of education: Not on file     Highest education level: Not on file   Occupational History     Not on file   Tobacco Use     Smoking status: Current Every Day Smoker     Packs/day: 0.50     Years: 30.00     Pack years: 15.00     Smokeless tobacco: Never Used   Substance and Sexual Activity     Alcohol use: Yes     Alcohol/week: 1.0 standard drinks     Comment: Glass of wine daily.     Drug use: No     Sexual activity: Not on file   Other Topics Concern     Parent/sibling w/ CABG, MI or angioplasty before 65F 55M? Not Asked   Social History  Narrative     Not on file     Social Determinants of Health     Financial Resource Strain:      Difficulty of Paying Living Expenses:    Food Insecurity:      Worried About Running Out of Food in the Last Year:      Ran Out of Food in the Last Year:    Transportation Needs:      Lack of Transportation (Medical):      Lack of Transportation (Non-Medical):    Physical Activity:      Days of Exercise per Week:      Minutes of Exercise per Session:    Stress:      Feeling of Stress :    Social Connections:      Frequency of Communication with Friends and Family:      Frequency of Social Gatherings with Friends and Family:      Attends [employer] Services:      Active Member of Clubs or Organizations:      Attends Club or Organization Meetings:      Marital Status:    Intimate Partner Violence:      Fear of Current or Ex-Partner:      Emotionally Abused:      Physically Abused:      Sexually Abused:        Review of Systems   ,         Family History:  Family History   Problem Relation Age of Onset     Cancer Mother      Heart Disease Father          Allergies:  Allergies   Allergen Reactions     Carvedilol GI Disturbance and Other (See Comments)     Upset stomach  GI upset       Chloride GI Disturbance and Nausea       Medications:  Current Outpatient Medications   Medication Sig Dispense Refill     acetaminophen (TYLENOL) 325 MG tablet Take 650 mg by mouth daily as needed        apixaban ANTICOAGULANT (ELIQUIS ANTICOAGULANT) 5 MG tablet 5 mg 2 times daily        aspirin (ASA) 81 MG EC tablet Take 81 mg by mouth daily       atorvastatin (LIPITOR) 80 MG tablet Take 80 mg by mouth daily        Cyanocobalamin (B-12) 1000 MCG SUBL daily at 2 pm        gabapentin (NEURONTIN) 300 MG capsule Take 900 mg by mouth 2 times daily        sotalol (BETAPACE) 80 MG tablet TAKE ONE TABLET BY MOUTH TWICE DAILY  180 tablet 0     tamsulosin (FLOMAX) 0.4 MG capsule TAKE ONE CAPSULE BY MOUTH ONE TIME DAILY           Surgical site  The ICD is  well-healed to left subclavicular region    Device interrogation  Intrinsic rhythm sinus bradycardia in the 50s with frequent PVCs  40% atrial pacing  2% ventricular pacing  Number atrial arrhythmias none lasting more than a minute  Normal ventricular tachycardia-a 11 beat keenan of nonsustained ventricular tachycardia noted March 16, 2021 otherwise no significant ventricular arrhythmias  Lead function satisfactory-did reprogram the blanking after V sensed from 65 to 85 ms  To prevent oversensing    Objective:   Vital signs:  /64 (BP Location: Left arm, Patient Position: Sitting, Cuff Size: Adult Regular)   Pulse 60   Resp 16   Wt 64.9 kg (143 lb)   BMI 20.52 kg/m    Patient has lost quite a bit of weight seems cachectic  Pressure 110 sitting, 100 standing heart rate is 70 and regular without ectopy    Physical Exam:        GENERAL APPEARANCE: Alert, cooperative and in no acute distress.  HEENT: No scleral icterus. No Xanthelasma. Oral mucous membranes pink and moist.  NECK: JVP  Normal cm. No Hepatojugular reflux. Thyroid not  Palpable  CHEST: clear to auscultation and percussion  CARDIOVASCULAR: S1, S2 without murmur    Brachial, radial  pulses are intact and symmetric.   No carotid bruits noted.  ABDOMEN: Non tender. BS+. No bruits.  EXTREMITIES: No cyanosis, clubbing or edema.    Lab Results:  LIPIDS:  Lab Results   Component Value Date    CHOL 150 12/06/2019    CHOL 134 10/05/2018    CHOL 139 02/16/2018     Lab Results   Component Value Date    HDL 36 (L) 12/06/2019    HDL 34 (L) 10/05/2018    HDL 33 (L) 02/16/2018     No components found for: LDLCALC  Lab Results   Component Value Date    TRIG 70 12/06/2019    TRIG 61 10/05/2018    TRIG 67 02/16/2018     No components found for: CHOLHDL    BMP:  Lab Results   Component Value Date    BUN 13 10/14/2021     10/14/2021    CO2 26 10/14/2021         This note has been dictated using voice recognition software. Any grammatical or context distortions  are unintentional and inherent to the software.  Chadd Harrell MD  [facility]  [phone]

## 2021-10-18 NOTE — PATIENT INSTRUCTIONS
Daniel Alamo    Thank you for coming to the [facility] today for a cardiac evaluation  It was my pleasure to see you today  A good contact for any questions would be Gwen Spencer  RN @ [phone]    Your defibrillator evaluation today is excellent  Battery should last more than 10 years  Heart rhythm has been well controlled  You are underweight; should take supplements such as boost, Ensure etc. at least one portion a day  You had comprehensive blood studies on October 14, 2021-they came out good  You  Are  prepared for the vascular stenting that is scheduled for tomorrow-have held Eliquis  Continue to have device check through transtelephonic monitoring every 3 months  Return in 1 year for comprehensive cardiac arrhythmia device assessment

## 2021-10-18 NOTE — LETTER
10/18/2021    Kenna Calvillo NP  [place] [address] [address] [facility] [id number]    RE: Daniel Alamo       Dear Colleague,    I had the pleasure of seeing Daniel Alamo in the [facility].    [facility] Heart Care Note  Assessment:  1.BSCI ICD  initial implant 2004        Generator replacement November 24, 2009       ICD generator replacement October 15, 2018/TRACY  3.  Coronary artery disease with previous inferior myocardial infarction  Prior bypass surgery with left internal mammary artery to LAD, vein graft to left circumflex marginal branch  4.  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 at least one portion a day  You had comprehensive blood studies on October 14, 2021-they came out good  You  Are  prepared for the vascular stenting that is scheduled for tomorrow-have held Eliquis  Continue to have device check through transtelephonic monitoring every 3 months  Return in 1 year for comprehensive cardiac arrhythmia device assessment    Subjective:    I had the opportunity to see.Daniel Alamo , who is a 81 year old male with a known history of complex heart disease  Rec is scheduled for iliac stenting has a leak-this will be done at [facility] tomorrow  Preoperative laboratory study October 14, 2021  Creatinine 0.83  Electrolytes normal  CBC normal  Echocardiogram January 7,  2021 showed ejection fraction 25%; no change compared to May 1, 2019  Daniel is lost quite a bit of weight perhaps 20 pounds-tells me he is eating okay just not much appetite-advised him to take supplements and liberalize his fat/lipid restrictions for sake of nutrition  No chest pain  Breathing satisfactory no orthopnea PND  No pedal edema  His noted his blood pressure is bit lower than it has been although does not complain of excessively low blood pressure lightheadedness or faintness      Problem List:  Patient Active Problem List   Diagnosis     Atrial Fibrillation     Dyslipidemia     Coronary artery disease of native heart with stable angina pectoris, unspecified vessel or lesion type (H)     ICD (implantable cardioverter-defibrillator), dual, in situ     Chronic systolic congestive heart failure (H)     Ischemic cardiomyopathy     PVD (peripheral vascular disease) (H)     Abdominal aortic aneurysm (AAA) without rupture (H)     PVC's (premature ventricular contractions)     Medical History:  Past Medical History:   Diagnosis Date     Coronary artery disease      Heart disease      VT (ventricular tachycardia) (H) 2004     Surgical History:  Past Surgical History:   Procedure Laterality Date     ABDOMEN SURGERY       BYPASS GRAFT ARTERY CORONARY       C CABG, VEIN, SINGLE      Description: CABG (CABG);  Recorded: 10/03/2012;  Comments: LIMA to LAD, SVG to OM2, SVG to RCA     EYE SURGERY       INSERT / REPLACE / REMOVE PACEMAKER       OTHER SURGICAL HISTORY      icd     Social History:  Social History     Socioeconomic History     Marital status:      Spouse name: Not on file     Number of children: Not on file     Years of education: Not on file     Highest education level: Not on file   Occupational History     Not on file   Tobacco Use     Smoking status: Current Every Day Smoker     Packs/day: 0.50     Years: 30.00     Pack years: 15.00     Smokeless tobacco: Never Used   Substance and Sexual Activity      Alcohol use: Yes     Alcohol/week: 1.0 standard drinks     Comment: Glass of wine daily.     Drug use: No     Sexual activity: Not on file   Other Topics Concern     Parent/sibling w/ CABG, MI or angioplasty before 65F 55M? Not Asked   Social History Narrative     Not on file     Social Determinants of Health     Financial Resource Strain:      Difficulty of Paying Living Expenses:    Food Insecurity:      Worried About Running Out of Food in the Last Year:      Ran Out of Food in the Last Year:    Transportation Needs:      Lack of Transportation (Medical):      Lack of Transportation (Non-Medical):    Physical Activity:      Days of Exercise per Week:      Minutes of Exercise per Session:    Stress:      Feeling of Stress :    Social Connections:      Frequency of Communication with Friends and Family:      Frequency of Social Gatherings with Friends and Family:      Attends [employer] Services:      Active Member of Clubs or Organizations:      Attends Club or Organization Meetings:      Marital Status:    Intimate Partner Violence:      Fear of Current or Ex-Partner:      Emotionally Abused:      Physically Abused:      Sexually Abused:        Review of Systems   ,         Family History:  Family History   Problem Relation Age of Onset     Cancer Mother      Heart Disease Father          Allergies:  Allergies   Allergen Reactions     Carvedilol GI Disturbance and Other (See Comments)     Upset stomach  GI upset       Chloride GI Disturbance and Nausea       Medications:  Current Outpatient Medications   Medication Sig Dispense Refill     acetaminophen (TYLENOL) 325 MG tablet Take 650 mg by mouth daily as needed        apixaban ANTICOAGULANT (ELIQUIS ANTICOAGULANT) 5 MG tablet 5 mg 2 times daily        aspirin (ASA) 81 MG EC tablet Take 81 mg by mouth daily       atorvastatin (LIPITOR) 80 MG tablet Take 80 mg by mouth daily        Cyanocobalamin (B-12) 1000 MCG SUBL daily at 2 pm        gabapentin (NEURONTIN)  300 MG capsule Take 900 mg by mouth 2 times daily        sotalol (BETAPACE) 80 MG tablet TAKE ONE TABLET BY MOUTH TWICE DAILY  180 tablet 0     tamsulosin (FLOMAX) 0.4 MG capsule TAKE ONE CAPSULE BY MOUTH ONE TIME DAILY           Surgical site  The ICD is well-healed to left subclavicular region    Device interrogation  Intrinsic rhythm sinus bradycardia in the 50s with frequent PVCs  40% atrial pacing  2% ventricular pacing  Number atrial arrhythmias none lasting more than a minute  Normal ventricular tachycardia-a 11 beat keenan of nonsustained ventricular tachycardia noted March 16, 2021 otherwise no significant ventricular arrhythmias  Lead function satisfactory-did reprogram the blanking after V sensed from 65 to 85 ms  To prevent oversensing    Objective:   Vital signs:  /64 (BP Location: Left arm, Patient Position: Sitting, Cuff Size: Adult Regular)   Pulse 60   Resp 16   Wt 64.9 kg (143 lb)   BMI 20.52 kg/m    Patient has lost quite a bit of weight seems cachectic  Pressure 110 sitting, 100 standing heart rate is 70 and regular without ectopy    Physical Exam:        GENERAL APPEARANCE: Alert, cooperative and in no acute distress.  HEENT: No scleral icterus. No Xanthelasma. Oral mucous membranes pink and moist.  NECK: JVP  Normal cm. No Hepatojugular reflux. Thyroid not  Palpable  CHEST: clear to auscultation and percussion  CARDIOVASCULAR: S1, S2 without murmur    Brachial, radial  pulses are intact and symmetric.   No carotid bruits noted.  ABDOMEN: Non tender. BS+. No bruits.  EXTREMITIES: No cyanosis, clubbing or edema.    Lab Results:  LIPIDS:  Lab Results   Component Value Date    CHOL 150 12/06/2019    CHOL 134 10/05/2018    CHOL 139 02/16/2018     Lab Results   Component Value Date    HDL 36 (L) 12/06/2019    HDL 34 (L) 10/05/2018    HDL 33 (L) 02/16/2018     No components found for: LDLCALC  Lab Results   Component Value Date    TRIG 70 12/06/2019    TRIG 61 10/05/2018    TRIG 67 02/16/2018      No components found for: CHOLHDL    BMP:  Lab Results   Component Value Date    BUN 13 10/14/2021     10/14/2021    CO2 26 10/14/2021         This note has been dictated using voice recognition software. Any grammatical or context distortions are unintentional and inherent to the software.  Chadd Harrell MD  [facility]  [phone]          Thank you for allowing me to participate in the care of your patient.      Sincerely,     Chadd Harrell MD, MD     [facility]  cc:   Chadd Harrell MD  [address] [place] [id number]

## 2022-01-14 ENCOUNTER — LAB REQUISITION (OUTPATIENT)
Dept: LAB | Facility: CLINIC | Age: 82
End: 2022-01-14
Payer: MEDICARE

## 2022-01-14 DIAGNOSIS — Z03.818 ENCOUNTER FOR OBSERVATION FOR SUSPECTED EXPOSURE TO OTHER BIOLOGICAL AGENTS RULED OUT: ICD-10-CM

## 2022-01-14 LAB — SARS-COV-2 RNA RESP QL NAA+PROBE: NEGATIVE

## 2022-01-14 PROCEDURE — U0003 INFECTIOUS AGENT DETECTION BY NUCLEIC ACID (DNA OR RNA); SEVERE ACUTE RESPIRATORY SYNDROME CORONAVIRUS 2 (SARS-COV-2) (CORONAVIRUS DISEASE [COVID-19]), AMPLIFIED PROBE TECHNIQUE, MAKING USE OF HIGH THROUGHPUT TECHNOLOGIES AS DESCRIBED BY CMS-2020-01-R: HCPCS | Mod: ORL | Performed by: FAMILY MEDICINE

## 2022-01-20 ENCOUNTER — LAB REQUISITION (OUTPATIENT)
Dept: LAB | Facility: CLINIC | Age: 82
End: 2022-01-20
Payer: MEDICARE

## 2022-01-20 DIAGNOSIS — E53.9 VITAMIN B DEFICIENCY, UNSPECIFIED: ICD-10-CM

## 2022-01-20 LAB — VIT B12 SERPL-MCNC: 1310 PG/ML (ref 213–816)

## 2022-01-20 PROCEDURE — 82607 VITAMIN B-12: CPT | Mod: ORL | Performed by: NURSE PRACTITIONER

## 2022-01-25 ENCOUNTER — ANCILLARY PROCEDURE (OUTPATIENT)
Dept: CARDIOLOGY | Facility: CLINIC | Age: 82
End: 2022-01-25
Attending: INTERNAL MEDICINE
Payer: MEDICARE

## 2022-01-25 ENCOUNTER — DOCUMENTATION ONLY (OUTPATIENT)
Dept: CARDIOLOGY | Facility: CLINIC | Age: 82
End: 2022-01-25

## 2022-01-25 DIAGNOSIS — Z95.810 ICD (IMPLANTABLE CARDIOVERTER-DEFIBRILLATOR) IN PLACE: ICD-10-CM

## 2022-01-25 DIAGNOSIS — I25.5 ISCHEMIC CARDIOMYOPATHY: ICD-10-CM

## 2022-01-25 LAB
MDC_IDC_EPISODE_DTM: NORMAL
MDC_IDC_EPISODE_DURATION: 1 S
MDC_IDC_EPISODE_DURATION: 1 S
MDC_IDC_EPISODE_DURATION: 11 S
MDC_IDC_EPISODE_DURATION: 13 S
MDC_IDC_EPISODE_DURATION: 27 S
MDC_IDC_EPISODE_DURATION: 31 S
MDC_IDC_EPISODE_DURATION: 4 S
MDC_IDC_EPISODE_DURATION: 4 S
MDC_IDC_EPISODE_DURATION: 7 S
MDC_IDC_EPISODE_DURATION: 71 S
MDC_IDC_EPISODE_DURATION: 9 S
MDC_IDC_EPISODE_ID: NORMAL
MDC_IDC_EPISODE_TYPE: NORMAL
MDC_IDC_EPISODE_VENDOR_TYPE: NORMAL
MDC_IDC_LEAD_IMPLANT_DT: NORMAL
MDC_IDC_LEAD_IMPLANT_DT: NORMAL
MDC_IDC_LEAD_LOCATION: NORMAL
MDC_IDC_LEAD_LOCATION: NORMAL
MDC_IDC_LEAD_LOCATION_DETAIL_1: NORMAL
MDC_IDC_LEAD_LOCATION_DETAIL_1: NORMAL
MDC_IDC_LEAD_MFG: NORMAL
MDC_IDC_LEAD_MFG: NORMAL
MDC_IDC_LEAD_MODEL: NORMAL
MDC_IDC_LEAD_MODEL: NORMAL
MDC_IDC_LEAD_POLARITY_TYPE: NORMAL
MDC_IDC_LEAD_POLARITY_TYPE: NORMAL
MDC_IDC_LEAD_SERIAL: NORMAL
MDC_IDC_LEAD_SERIAL: NORMAL
MDC_IDC_LEAD_SPECIAL_FUNCTION: NORMAL
MDC_IDC_MSMT_BATTERY_DTM: NORMAL
MDC_IDC_MSMT_BATTERY_REMAINING_LONGEVITY: 126 MO
MDC_IDC_MSMT_BATTERY_REMAINING_PERCENTAGE: 100 %
MDC_IDC_MSMT_BATTERY_STATUS: NORMAL
MDC_IDC_MSMT_CAP_CHARGE_DTM: NORMAL
MDC_IDC_MSMT_CAP_CHARGE_TIME: 10.2 S
MDC_IDC_MSMT_CAP_CHARGE_TYPE: NORMAL
MDC_IDC_MSMT_LEADCHNL_RA_IMPEDANCE_VALUE: 757 OHM
MDC_IDC_MSMT_LEADCHNL_RA_PACING_THRESHOLD_AMPLITUDE: 2 V
MDC_IDC_MSMT_LEADCHNL_RA_PACING_THRESHOLD_PULSEWIDTH: 0.7 MS
MDC_IDC_MSMT_LEADCHNL_RV_IMPEDANCE_VALUE: 526 OHM
MDC_IDC_MSMT_LEADCHNL_RV_PACING_THRESHOLD_AMPLITUDE: 1 V
MDC_IDC_MSMT_LEADCHNL_RV_PACING_THRESHOLD_PULSEWIDTH: 0.4 MS
MDC_IDC_PG_IMPLANT_DTM: NORMAL
MDC_IDC_PG_MFG: NORMAL
MDC_IDC_PG_MODEL: NORMAL
MDC_IDC_PG_SERIAL: NORMAL
MDC_IDC_PG_TYPE: NORMAL
MDC_IDC_SESS_CLINIC_NAME: NORMAL
MDC_IDC_SESS_DTM: NORMAL
MDC_IDC_SESS_TYPE: NORMAL
MDC_IDC_SET_BRADY_AT_MODE_SWITCH_MODE: NORMAL
MDC_IDC_SET_BRADY_AT_MODE_SWITCH_RATE: 170 {BEATS}/MIN
MDC_IDC_SET_BRADY_LOWRATE: 55 {BEATS}/MIN
MDC_IDC_SET_BRADY_MAX_SENSOR_RATE: 130 {BEATS}/MIN
MDC_IDC_SET_BRADY_MAX_TRACKING_RATE: 120 {BEATS}/MIN
MDC_IDC_SET_BRADY_MODE: NORMAL
MDC_IDC_SET_BRADY_PAV_DELAY_HIGH: 200 MS
MDC_IDC_SET_BRADY_PAV_DELAY_LOW: 300 MS
MDC_IDC_SET_BRADY_SAV_DELAY_HIGH: 165 MS
MDC_IDC_SET_BRADY_SAV_DELAY_LOW: 250 MS
MDC_IDC_SET_LEADCHNL_RA_PACING_AMPLITUDE: 3 V
MDC_IDC_SET_LEADCHNL_RA_PACING_POLARITY: NORMAL
MDC_IDC_SET_LEADCHNL_RA_PACING_PULSEWIDTH: 0.7 MS
MDC_IDC_SET_LEADCHNL_RA_SENSING_ADAPTATION_MODE: NORMAL
MDC_IDC_SET_LEADCHNL_RA_SENSING_POLARITY: NORMAL
MDC_IDC_SET_LEADCHNL_RA_SENSING_SENSITIVITY: 0.5 MV
MDC_IDC_SET_LEADCHNL_RV_PACING_AMPLITUDE: 1.5 V
MDC_IDC_SET_LEADCHNL_RV_PACING_POLARITY: NORMAL
MDC_IDC_SET_LEADCHNL_RV_PACING_PULSEWIDTH: 0.4 MS
MDC_IDC_SET_LEADCHNL_RV_SENSING_ADAPTATION_MODE: NORMAL
MDC_IDC_SET_LEADCHNL_RV_SENSING_POLARITY: NORMAL
MDC_IDC_SET_LEADCHNL_RV_SENSING_SENSITIVITY: 0.6 MV
MDC_IDC_SET_ZONE_DETECTION_INTERVAL: 261 MS
MDC_IDC_SET_ZONE_DETECTION_INTERVAL: 333 MS
MDC_IDC_SET_ZONE_DETECTION_INTERVAL: 400 MS
MDC_IDC_SET_ZONE_TYPE: NORMAL
MDC_IDC_SET_ZONE_VENDOR_TYPE: NORMAL
MDC_IDC_STAT_AT_BURDEN_PERCENT: 1 %
MDC_IDC_STAT_AT_DTM_END: NORMAL
MDC_IDC_STAT_AT_DTM_START: NORMAL
MDC_IDC_STAT_BRADY_DTM_END: NORMAL
MDC_IDC_STAT_BRADY_DTM_START: NORMAL
MDC_IDC_STAT_BRADY_RA_PERCENT_PACED: 33 %
MDC_IDC_STAT_BRADY_RV_PERCENT_PACED: 3 %
MDC_IDC_STAT_EPISODE_RECENT_COUNT: 0
MDC_IDC_STAT_EPISODE_RECENT_COUNT: 70
MDC_IDC_STAT_EPISODE_RECENT_COUNT_DTM_END: NORMAL
MDC_IDC_STAT_EPISODE_RECENT_COUNT_DTM_START: NORMAL
MDC_IDC_STAT_EPISODE_TYPE: NORMAL
MDC_IDC_STAT_EPISODE_VENDOR_TYPE: NORMAL
MDC_IDC_STAT_TACHYTHERAPY_ATP_DELIVERED_RECENT: 0
MDC_IDC_STAT_TACHYTHERAPY_ATP_DELIVERED_TOTAL: 1
MDC_IDC_STAT_TACHYTHERAPY_RECENT_DTM_END: NORMAL
MDC_IDC_STAT_TACHYTHERAPY_RECENT_DTM_START: NORMAL
MDC_IDC_STAT_TACHYTHERAPY_SHOCKS_ABORTED_RECENT: 0
MDC_IDC_STAT_TACHYTHERAPY_SHOCKS_ABORTED_TOTAL: 0
MDC_IDC_STAT_TACHYTHERAPY_SHOCKS_DELIVERED_RECENT: 0
MDC_IDC_STAT_TACHYTHERAPY_SHOCKS_DELIVERED_TOTAL: 0
MDC_IDC_STAT_TACHYTHERAPY_TOTAL_DTM_END: NORMAL
MDC_IDC_STAT_TACHYTHERAPY_TOTAL_DTM_START: NORMAL

## 2022-01-25 PROCEDURE — 93296 REM INTERROG EVL PM/IDS: CPT | Mod: 59 | Performed by: INTERNAL MEDICINE

## 2022-01-25 PROCEDURE — 93295 DEV INTERROG REMOTE 1/2/MLT: CPT | Performed by: INTERNAL MEDICINE

## 2022-01-25 NOTE — PROGRESS NOTES
"[facility], located within [facility]  Type: routine remote ICD transmission.   Presenting rhythm: sinus and AP-VS 55 bpm. Intermittent atrial undersensing noted.   Battery/lead status: stable.   Arrhythmias: since 10/18/21; 70 mode switch episodes, all <1min, available EGMs appear to be RA lead noise. One VT episode, self-terminated.   Anticoagulant: apixaban.   Comments: normal ICD function. Routed to device RN for review.   Device/lead alerts: none. EDaniela [facility], Device Specialist      Transmission reviewed, known Hx of RA lead noise dated back to 2019.   Per clinic notes 4/15/2019: \"Tested RA lead for noise with arm movements and pocket manipulation, able to recreate noise on RA lead with pushing arm movement, but it is not sensed by the device. Will monitor for additional RA lead noise, lead measurements stable. Reprogrammed RA sensitivity from 0.5mV to 0.6mV.\"     In-clinic check in 2/24/2020: \"Reprogrammed A-sensitivity from AGC 0.5mV to AGC 1.0mV, in attempts to cover up FFRW and RA lead noise knowing we will undersense AF, patient symptomatic with AF. \"    In-clinic check 6/5/2020: \" RA sensitivity programmed to 0.5mV from 1 mV to avoid undersensing resulting in overpacing in the atrium thus causing inappropriate , V-Blank after AP programmed to smart to also avoid inappropriate . \"     Today's remote shows increase in RA  Noise events and presenting EGM shows RA undersensing/overpacing. DDDR . RA sensitivity currently at 0.5 mV. RA lead impedance stable. Known moderately elevated RA capture thresholds at 2V @ 0.7 ms.   AP 33%  3%.     Also, Episode of VT noted on 1/23/22 at ~ midnight (episode V-90). Possibly caused by AP/ sequence prior to event.     Device report scanned in, will review with Dr. Harrell for further recommendations.    Andria Auguste, RN                    "

## 2022-01-26 ENCOUNTER — DOCUMENTATION ONLY (OUTPATIENT)
Facility: CLINIC | Age: 82
End: 2022-01-26
Payer: MEDICARE

## 2022-01-27 NOTE — PROGRESS NOTES
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

## 2022-01-30 ENCOUNTER — HEALTH MAINTENANCE LETTER (OUTPATIENT)
Age: 82
End: 2022-01-30

## 2022-02-07 ENCOUNTER — OFFICE VISIT (OUTPATIENT)
Dept: CARDIOLOGY | Facility: CLINIC | Age: 82
End: 2022-02-07
Attending: INTERNAL MEDICINE

## 2022-02-07 ENCOUNTER — ANCILLARY PROCEDURE (OUTPATIENT)
Dept: CARDIOLOGY | Facility: CLINIC | Age: 82
End: 2022-02-07
Attending: INTERNAL MEDICINE
Payer: MEDICARE

## 2022-02-07 VITALS
DIASTOLIC BLOOD PRESSURE: 70 MMHG | HEART RATE: 55 BPM | BODY MASS INDEX: 20.52 KG/M2 | WEIGHT: 143 LBS | SYSTOLIC BLOOD PRESSURE: 118 MMHG | RESPIRATION RATE: 10 BRPM

## 2022-02-07 DIAGNOSIS — I50.22 CHRONIC SYSTOLIC CONGESTIVE HEART FAILURE (H): ICD-10-CM

## 2022-02-07 DIAGNOSIS — Z95.810 ICD (IMPLANTABLE CARDIOVERTER-DEFIBRILLATOR) IN PLACE: ICD-10-CM

## 2022-02-07 DIAGNOSIS — I48.0 PAROXYSMAL ATRIAL FIBRILLATION (H): ICD-10-CM

## 2022-02-07 DIAGNOSIS — I25.5 ISCHEMIC CARDIOMYOPATHY: ICD-10-CM

## 2022-02-07 DIAGNOSIS — I25.118 CORONARY ARTERY DISEASE OF NATIVE HEART WITH STABLE ANGINA PECTORIS, UNSPECIFIED VESSEL OR LESION TYPE (H): ICD-10-CM

## 2022-02-07 DIAGNOSIS — Z95.810 ICD (IMPLANTABLE CARDIOVERTER-DEFIBRILLATOR), DUAL, IN SITU: Primary | ICD-10-CM

## 2022-02-07 LAB
MDC_IDC_EPISODE_DTM: NORMAL
MDC_IDC_EPISODE_ID: NORMAL
MDC_IDC_EPISODE_TYPE: NORMAL
MDC_IDC_LEAD_IMPLANT_DT: NORMAL
MDC_IDC_LEAD_IMPLANT_DT: NORMAL
MDC_IDC_LEAD_LOCATION: NORMAL
MDC_IDC_LEAD_LOCATION: NORMAL
MDC_IDC_LEAD_LOCATION_DETAIL_1: NORMAL
MDC_IDC_LEAD_LOCATION_DETAIL_1: NORMAL
MDC_IDC_LEAD_MFG: NORMAL
MDC_IDC_LEAD_MFG: NORMAL
MDC_IDC_LEAD_MODEL: NORMAL
MDC_IDC_LEAD_MODEL: NORMAL
MDC_IDC_LEAD_POLARITY_TYPE: NORMAL
MDC_IDC_LEAD_POLARITY_TYPE: NORMAL
MDC_IDC_LEAD_SERIAL: NORMAL
MDC_IDC_LEAD_SERIAL: NORMAL
MDC_IDC_LEAD_SPECIAL_FUNCTION: NORMAL
MDC_IDC_MSMT_BATTERY_DTM: NORMAL
MDC_IDC_MSMT_BATTERY_REMAINING_LONGEVITY: 126 MO
MDC_IDC_MSMT_BATTERY_REMAINING_PERCENTAGE: 100 %
MDC_IDC_MSMT_BATTERY_STATUS: NORMAL
MDC_IDC_MSMT_CAP_CHARGE_DTM: NORMAL
MDC_IDC_MSMT_CAP_CHARGE_TIME: 10.2 S
MDC_IDC_MSMT_CAP_CHARGE_TYPE: NORMAL
MDC_IDC_MSMT_LEADCHNL_RA_IMPEDANCE_VALUE: 757 OHM
MDC_IDC_MSMT_LEADCHNL_RA_LEAD_CHANNEL_STATUS: NORMAL
MDC_IDC_MSMT_LEADCHNL_RA_PACING_THRESHOLD_AMPLITUDE: 1.5 V
MDC_IDC_MSMT_LEADCHNL_RA_PACING_THRESHOLD_PULSEWIDTH: 0.7 MS
MDC_IDC_MSMT_LEADCHNL_RV_IMPEDANCE_VALUE: 565 OHM
MDC_IDC_MSMT_LEADCHNL_RV_PACING_THRESHOLD_AMPLITUDE: 0.8 V
MDC_IDC_MSMT_LEADCHNL_RV_PACING_THRESHOLD_PULSEWIDTH: 0.4 MS
MDC_IDC_PG_IMPLANT_DTM: NORMAL
MDC_IDC_PG_MFG: NORMAL
MDC_IDC_PG_MODEL: NORMAL
MDC_IDC_PG_SERIAL: NORMAL
MDC_IDC_PG_TYPE: NORMAL
MDC_IDC_SESS_CLINIC_NAME: NORMAL
MDC_IDC_SESS_DTM: NORMAL
MDC_IDC_SESS_TYPE: NORMAL
MDC_IDC_SET_BRADY_AT_MODE_SWITCH_MODE: NORMAL
MDC_IDC_SET_BRADY_AT_MODE_SWITCH_RATE: 170 {BEATS}/MIN
MDC_IDC_SET_BRADY_LOWRATE: 55 {BEATS}/MIN
MDC_IDC_SET_BRADY_MAX_SENSOR_RATE: 130 {BEATS}/MIN
MDC_IDC_SET_BRADY_MAX_TRACKING_RATE: 120 {BEATS}/MIN
MDC_IDC_SET_BRADY_MODE: NORMAL
MDC_IDC_SET_BRADY_PAV_DELAY_HIGH: 200 MS
MDC_IDC_SET_BRADY_PAV_DELAY_LOW: 300 MS
MDC_IDC_SET_BRADY_SAV_DELAY_HIGH: 165 MS
MDC_IDC_SET_BRADY_SAV_DELAY_LOW: 250 MS
MDC_IDC_SET_LEADCHNL_RA_PACING_AMPLITUDE: 3 V
MDC_IDC_SET_LEADCHNL_RA_PACING_POLARITY: NORMAL
MDC_IDC_SET_LEADCHNL_RA_PACING_PULSEWIDTH: 0.7 MS
MDC_IDC_SET_LEADCHNL_RA_SENSING_ADAPTATION_MODE: NORMAL
MDC_IDC_SET_LEADCHNL_RA_SENSING_POLARITY: NORMAL
MDC_IDC_SET_LEADCHNL_RA_SENSING_SENSITIVITY: 0.3 MV
MDC_IDC_SET_LEADCHNL_RV_PACING_AMPLITUDE: 1.5 V
MDC_IDC_SET_LEADCHNL_RV_PACING_POLARITY: NORMAL
MDC_IDC_SET_LEADCHNL_RV_PACING_PULSEWIDTH: 0.4 MS
MDC_IDC_SET_LEADCHNL_RV_SENSING_ADAPTATION_MODE: NORMAL
MDC_IDC_SET_LEADCHNL_RV_SENSING_POLARITY: NORMAL
MDC_IDC_SET_LEADCHNL_RV_SENSING_SENSITIVITY: 0.6 MV
MDC_IDC_SET_ZONE_DETECTION_INTERVAL: 261 MS
MDC_IDC_SET_ZONE_DETECTION_INTERVAL: 333 MS
MDC_IDC_SET_ZONE_DETECTION_INTERVAL: 400 MS
MDC_IDC_SET_ZONE_TYPE: NORMAL
MDC_IDC_SET_ZONE_VENDOR_TYPE: NORMAL
MDC_IDC_STAT_AT_BURDEN_PERCENT: 1 %
MDC_IDC_STAT_AT_DTM_END: NORMAL
MDC_IDC_STAT_AT_DTM_START: NORMAL
MDC_IDC_STAT_AT_MODE_SW_COUNT: 79
MDC_IDC_STAT_BRADY_DTM_END: NORMAL
MDC_IDC_STAT_BRADY_DTM_START: NORMAL
MDC_IDC_STAT_BRADY_RA_PERCENT_PACED: 33 %
MDC_IDC_STAT_BRADY_RV_PERCENT_PACED: 4 %
MDC_IDC_STAT_EPISODE_RECENT_COUNT: 0
MDC_IDC_STAT_EPISODE_RECENT_COUNT: 79
MDC_IDC_STAT_EPISODE_RECENT_COUNT_DTM_END: NORMAL
MDC_IDC_STAT_EPISODE_RECENT_COUNT_DTM_START: NORMAL
MDC_IDC_STAT_EPISODE_TYPE: NORMAL
MDC_IDC_STAT_EPISODE_VENDOR_TYPE: NORMAL
MDC_IDC_STAT_TACHYTHERAPY_ATP_DELIVERED_RECENT: 0
MDC_IDC_STAT_TACHYTHERAPY_ATP_DELIVERED_TOTAL: 1
MDC_IDC_STAT_TACHYTHERAPY_RECENT_DTM_END: NORMAL
MDC_IDC_STAT_TACHYTHERAPY_RECENT_DTM_START: NORMAL
MDC_IDC_STAT_TACHYTHERAPY_SHOCKS_ABORTED_RECENT: 0
MDC_IDC_STAT_TACHYTHERAPY_SHOCKS_ABORTED_TOTAL: 0
MDC_IDC_STAT_TACHYTHERAPY_SHOCKS_DELIVERED_RECENT: 0
MDC_IDC_STAT_TACHYTHERAPY_SHOCKS_DELIVERED_TOTAL: 0
MDC_IDC_STAT_TACHYTHERAPY_TOTAL_DTM_END: NORMAL
MDC_IDC_STAT_TACHYTHERAPY_TOTAL_DTM_START: NORMAL

## 2022-02-07 PROCEDURE — 93283 PRGRMG EVAL IMPLANTABLE DFB: CPT | Performed by: INTERNAL MEDICINE

## 2022-02-07 PROCEDURE — 99214 OFFICE O/P EST MOD 30 MIN: CPT | Performed by: INTERNAL MEDICINE

## 2022-02-07 NOTE — PROGRESS NOTES
[facility] Heart Care Note      1.Formerly [facility], [facility] ICD  initial implant 2004        Generator replacement November 24, 2009       ICD generator replacement October 15, 2018/TRACY  3.  Coronary artery disease with previous inferior myocardial infarction  Prior bypass surgery with left internal mammary artery to LAD, vein graft to left circumflex marginal branch  4.  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;       complex stenting 2-;    Plan:    The defibrillator evaluation today shows battery good for another 10.5 years  The only episode of ventricular arrhythmia was January 23 when you had heart rate in the 160 bpm-the device is set for a monitor zone between 150-980 bpm and if the heart rhythm persisted above that rate of 180, therapy would be delivered-  Normal ICD function  Continue current medications  Have device check through transtelephonic monitoring every 3 months  Return in 1 year for comprehensive cardiac arrhythmia device assessment      Subjective:    I had the opportunity to see.Daniel DENNIS Alamo , who is a 81 year old male with a known history of complex known advanced ischemic cardiomyopathy  Continues to have weakness and fatigue not much endurance  Overall his condition seems quite stable  No angina  No awareness of palpitations, arrhythmias no syncopal or near syncopal episodes  Denies orthopnea PND or pedal edema  Recently had B12 deficiency treated with injections seem to better  Weight stable although he is underweight.  Does not use supplements      Problem List:  Patient Active Problem List   Diagnosis     Atrial Fibrillation     Dyslipidemia     Coronary artery disease of native heart  with stable angina pectoris, unspecified vessel or lesion type (H)     ICD (implantable cardioverter-defibrillator), dual, in situ     Chronic systolic congestive heart failure (H)     Ischemic cardiomyopathy     PVD (peripheral vascular disease) (H)     Abdominal aortic aneurysm (AAA) without rupture (H)     PVC's (premature ventricular contractions)     Medical History:  Past Medical History:   Diagnosis Date     Coronary artery disease      Heart disease      VT (ventricular tachycardia) (H) 2004     Surgical History:  Past Surgical History:   Procedure Laterality Date     ABDOMEN SURGERY       BYPASS GRAFT ARTERY CORONARY       EYE SURGERY       INSERT / REPLACE / REMOVE PACEMAKER       OTHER SURGICAL HISTORY      icd     ZZC CABG, VEIN, SINGLE      Description: CABG (CABG);  Recorded: 10/03/2012;  Comments: LIMA to LAD, SVG to OM2, SVG to RCA     Social History:  Social History     Socioeconomic History     Marital status:      Spouse name: Not on file     Number of children: Not on file     Years of education: Not on file     Highest education level: Not on file   Occupational History     Not on file   Tobacco Use     Smoking status: Current Every Day Smoker     Packs/day: 0.50     Years: 30.00     Pack years: 15.00     Smokeless tobacco: Never Used   Substance and Sexual Activity     Alcohol use: Yes     Alcohol/week: 1.0 standard drink     Comment: Glass of wine daily.     Drug use: No     Sexual activity: Not on file   Other Topics Concern     Parent/sibling w/ CABG, MI or angioplasty before 65F 55M? Not Asked   Social History Narrative     Not on file     Social Determinants of Health     Financial Resource Strain: Not on file   Food Insecurity: Not on file   Transportation Needs: Not on file   Physical Activity: Not on file   Stress: Not on file   Social Connections: Not on file   Intimate Partner Violence: Not on file   Housing Stability: Not on file       Review of Systems   ,         Family  History:  Family History   Problem Relation Age of Onset     Cancer Mother      Heart Disease Father          Allergies:  Allergies   Allergen Reactions     Carvedilol GI Disturbance and Other (See Comments)     Upset stomach  GI upset       Chloride GI Disturbance and Nausea       Medications:  Current Outpatient Medications   Medication Sig Dispense Refill     acetaminophen (TYLENOL) 325 MG tablet Take 650 mg by mouth daily as needed        apixaban ANTICOAGULANT (ELIQUIS ANTICOAGULANT) 5 MG tablet 5 mg 2 times daily        aspirin (ASA) 81 MG EC tablet Take 81 mg by mouth daily       atorvastatin (LIPITOR) 80 MG tablet Take 80 mg by mouth daily        Cyanocobalamin (B-12) 1000 MCG SUBL daily at 2 pm        gabapentin (NEURONTIN) 300 MG capsule Take 900 mg by mouth 2 times daily        sotalol (BETAPACE) 80 MG tablet TAKE ONE TABLET BY MOUTH TWICE DAILY 180 tablet 2     tamsulosin (FLOMAX) 0.4 MG capsule TAKE ONE CAPSULE BY MOUTH ONE TIME DAILY           Surgical site  ICD is well-healed to left subclavicular region not much tissue overlies the device    Device interrogation  Underlying rhythm is sinus without dependency  33% atrial pacing  4% ventricular pacing  Some atrial noise especially on movement  No episodes atrial fibrillation lasting more than a minute  1 episode of ventricular tachycardia detected January 23 at 12:08 AM-162 bpm, lasted 13 seconds and was in the monitor zone so no therapy  Battery good another 10.5 years    Objective:   Vital signs:  /70 (BP Location: Left arm, Patient Position: Sitting, Cuff Size: Adult Regular)   Pulse 55   Resp 10   Wt 64.9 kg (143 lb)   BMI 20.52 kg/m        Physical Exam:    GENERAL APPEARANCE: Alert, cooperative and in no acute distress.  HEENT: No scleral icterus. No Xanthelasma. Oral mucous membranes pink and moist.  NECK: JVP normal cm. No Hepatojugular reflux. Thyroid not  Palpable  CHEST: clear to auscultation and percussion  CARDIOVASCULAR: S1, S2  without murmur    Brachial, radial  pulses are intact and symmetric.   No carotid bruits noted.  ABDOMEN: Non tender. BS+. No bruits.  EXTREMITIES: No cyanosis, clubbing or edema.    Lab Results:  LIPIDS:  Lab Results   Component Value Date    CHOL 150 12/06/2019    CHOL 134 10/05/2018    CHOL 139 02/16/2018     Lab Results   Component Value Date    HDL 36 (L) 12/06/2019    HDL 34 (L) 10/05/2018    HDL 33 (L) 02/16/2018     No components found for: LDLCALC  Lab Results   Component Value Date    TRIG 70 12/06/2019    TRIG 61 10/05/2018    TRIG 67 02/16/2018     No components found for: CHOLHDL    BMP:  Lab Results   Component Value Date    BUN 13 10/14/2021     10/14/2021    CO2 26 10/14/2021         This note has been dictated using voice recognition software. Any grammatical or context distortions are unintentional and inherent to the software.  Chadd Harrell MD  [facility]  [phone]

## 2022-02-07 NOTE — PATIENT INSTRUCTIONS
Daniel Alamo    Thank you for coming to the [facility] today for a cardiac evaluation  It was my pleasure to see you today  A good contact for any questions would be Gwen Spencer  RN @ [phone]    The defibrillator evaluation today shows battery good for another 10.5 years  The only episode of ventricular arrhythmia was January 23 when you had heart rate in the 160 bpm-the device is set for a monitor zone between 150-980 bpm and if the heart rhythm persisted above that rate of 180, therapy would be delivered-  Normal ICD function  Continue current medications  Have device check through transtelephonic monitoring every 3 months  Return in 1 year for comprehensive cardiac arrhythmia device assessment

## 2022-02-07 NOTE — LETTER
2/7/2022    Kenna Calvillo NP  [place] [address] [facility] [address]    RE: Daniel Alamo       Dear Colleague,     I had the pleasure of seeing Daniel Alamo in the [facility].  [facility] Heart Care Note      1.[employer] ICD  initial implant 2004        Generator replacement November 24, 2009       ICD generator replacement October 15, 2018/TRACY  3.  Coronary artery disease with previous inferior myocardial infarction  Prior bypass surgery with left internal mammary artery to LAD, vein graft to left circumflex marginal branch  4.  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;       complex stenting 2-;    Plan:    The defibrillator evaluation today shows battery good for another 10.5 years  The only episode of ventricular arrhythmia was January 23 when you had heart rate in the 160 bpm-the device is set for a monitor zone between 150-980 bpm and if the heart rhythm persisted above that rate of 180, therapy would be delivered-  Normal ICD function  Continue current medications  Have device check through transtelephonic monitoring every 3 months  Return in 1 year for comprehensive cardiac arrhythmia device assessment      Subjective:    I had the opportunity to see.Daniel Alamo , who is a 81 year old male with a known history of complex known advanced ischemic cardiomyopathy  Continues to have weakness and fatigue not much endurance  Overall his condition seems quite stable  No angina  No awareness of palpitations, arrhythmias no syncopal or near syncopal episodes  Denies orthopnea PND or pedal edema  Recently had B12 deficiency treated with injections  seem to better  Weight stable although he is underweight.  Does not use supplements      Problem List:  Patient Active Problem List   Diagnosis     Atrial Fibrillation     Dyslipidemia     Coronary artery disease of native heart with stable angina pectoris, unspecified vessel or lesion type (H)     ICD (implantable cardioverter-defibrillator), dual, in situ     Chronic systolic congestive heart failure (H)     Ischemic cardiomyopathy     PVD (peripheral vascular disease) (H)     Abdominal aortic aneurysm (AAA) without rupture (H)     PVC's (premature ventricular contractions)     Medical History:  Past Medical History:   Diagnosis Date     Coronary artery disease      Heart disease      VT (ventricular tachycardia) (H) 2004     Surgical History:  Past Surgical History:   Procedure Laterality Date     ABDOMEN SURGERY       BYPASS GRAFT ARTERY CORONARY       EYE SURGERY       INSERT / REPLACE / REMOVE PACEMAKER       OTHER SURGICAL HISTORY      icd     ZZC CABG, VEIN, SINGLE      Description: CABG (CABG);  Recorded: 10/03/2012;  Comments: LIMA to LAD, SVG to OM2, SVG to RCA     Social History:  Social History     Socioeconomic History     Marital status:      Spouse name: Not on file     Number of children: Not on file     Years of education: Not on file     Highest education level: Not on file   Occupational History     Not on file   Tobacco Use     Smoking status: Current Every Day Smoker     Packs/day: 0.50     Years: 30.00     Pack years: 15.00     Smokeless tobacco: Never Used   Substance and Sexual Activity     Alcohol use: Yes     Alcohol/week: 1.0 standard drink     Comment: Glass of wine daily.     Drug use: No     Sexual activity: Not on file   Other Topics Concern     Parent/sibling w/ CABG, MI or angioplasty before 65F 55M? Not Asked   Social History Narrative     Not on file     Social Determinants of Health     Financial Resource Strain: Not on file   Food Insecurity: Not on file   Transportation  Needs: Not on file   Physical Activity: Not on file   Stress: Not on file   Social Connections: Not on file   Intimate Partner Violence: Not on file   Housing Stability: Not on file       Review of Systems   ,         Family History:  Family History   Problem Relation Age of Onset     Cancer Mother      Heart Disease Father          Allergies:  Allergies   Allergen Reactions     Carvedilol GI Disturbance and Other (See Comments)     Upset stomach  GI upset       Chloride GI Disturbance and Nausea       Medications:  Current Outpatient Medications   Medication Sig Dispense Refill     acetaminophen (TYLENOL) 325 MG tablet Take 650 mg by mouth daily as needed        apixaban ANTICOAGULANT (ELIQUIS ANTICOAGULANT) 5 MG tablet 5 mg 2 times daily        aspirin (ASA) 81 MG EC tablet Take 81 mg by mouth daily       atorvastatin (LIPITOR) 80 MG tablet Take 80 mg by mouth daily        Cyanocobalamin (B-12) 1000 MCG SUBL daily at 2 pm        gabapentin (NEURONTIN) 300 MG capsule Take 900 mg by mouth 2 times daily        sotalol (BETAPACE) 80 MG tablet TAKE ONE TABLET BY MOUTH TWICE DAILY 180 tablet 2     tamsulosin (FLOMAX) 0.4 MG capsule TAKE ONE CAPSULE BY MOUTH ONE TIME DAILY           Surgical site  ICD is well-healed to left subclavicular region not much tissue overlies the device    Device interrogation  Underlying rhythm is sinus without dependency  33% atrial pacing  4% ventricular pacing  Some atrial noise especially on movement  No episodes atrial fibrillation lasting more than a minute  1 episode of ventricular tachycardia detected January 23 at 12:08 AM-162 bpm, lasted 13 seconds and was in the monitor zone so no therapy  Battery good another 10.5 years    Objective:   Vital signs:  /70 (BP Location: Left arm, Patient Position: Sitting, Cuff Size: Adult Regular)   Pulse 55   Resp 10   Wt 64.9 kg (143 lb)   BMI 20.52 kg/m        Physical Exam:    GENERAL APPEARANCE: Alert, cooperative and in no acute  distress.  HEENT: No scleral icterus. No Xanthelasma. Oral mucous membranes pink and moist.  NECK: JVP normal cm. No Hepatojugular reflux. Thyroid not  Palpable  CHEST: clear to auscultation and percussion  CARDIOVASCULAR: S1, S2 without murmur    Brachial, radial  pulses are intact and symmetric.   No carotid bruits noted.  ABDOMEN: Non tender. BS+. No bruits.  EXTREMITIES: No cyanosis, clubbing or edema.    Lab Results:  LIPIDS:  Lab Results   Component Value Date    CHOL 150 12/06/2019    CHOL 134 10/05/2018    CHOL 139 02/16/2018     Lab Results   Component Value Date    HDL 36 (L) 12/06/2019    HDL 34 (L) 10/05/2018    HDL 33 (L) 02/16/2018     No components found for: LDLCALC  Lab Results   Component Value Date    TRIG 70 12/06/2019    TRIG 61 10/05/2018    TRIG 67 02/16/2018     No components found for: CHOLHDL    BMP:  Lab Results   Component Value Date    BUN 13 10/14/2021     10/14/2021    CO2 26 10/14/2021       This note has been dictated using voice recognition software. Any grammatical or context distortions are unintentional and inherent to the software.  Chadd Harrell MD  [facility] HEART [facility]  [phone]    [facility]  cc:   Ion Kelley MD  [address] [place] [address]

## 2022-03-02 ENCOUNTER — TELEPHONE (OUTPATIENT)
Dept: CARDIOLOGY | Facility: CLINIC | Age: 82
End: 2022-03-02
Payer: MEDICARE

## 2022-03-02 NOTE — TELEPHONE ENCOUNTER
[facility] Call Center    Phone Message    May a detailed message be left on voicemail: yes     Reason for Call: Symptoms or Concerns     If patient has red-flag symptoms, warm transfer to triage line    Current symptom or concern: Bleeding from open wound on ear    Symptoms have been present for: within 24 hour(s)    Has patient previously been seen for this? No      Are there any new or worsening symptoms? No- woke up last night blood all over his face turned out to be from a wound/skin of ear took almost 1 hr to stop not bleeding anymore he wants to talk about the meds too to help with this.    Action Taken: Message routed to:  Other: Cardiology    Travel Screening: Not Applicable

## 2022-03-02 NOTE — TELEPHONE ENCOUNTER
Noted.  Phone call to patient.  He states his bleeding has stopped, he woke up in the middle of the night and found he was bleeding from a small wound on the outside of his ear.  He got the bleeding to stop and is now doing fine.    Pt with a hx of PAF, ischemic cardiomyopathy, CAD, CHF, s/p dual ICD.  Last seen by Dr. Harrell on 2-7-22.    Pt is currently taking Eliquis 5 mg bid and ASA 81 mg.     Explained rationale for taking blood thinners and discussed the risks and benefits of being on blood thinners vs being off of blood thinners.  Pt states understanding, he states that he will continue.  Answered all questions and reviewed contact information.

## 2022-03-11 ENCOUNTER — DOCUMENTATION ONLY (OUTPATIENT)
Dept: CARDIOLOGY | Facility: CLINIC | Age: 82
End: 2022-03-11
Payer: MEDICARE

## 2022-03-11 DIAGNOSIS — I25.118 CORONARY ARTERY DISEASE OF NATIVE HEART WITH STABLE ANGINA PECTORIS, UNSPECIFIED VESSEL OR LESION TYPE (H): Primary | ICD-10-CM

## 2022-03-11 NOTE — PROGRESS NOTES
Ion Kelley MD  [facility] Scheduling Registration [facility]; [facility] Ep Support [place] [facility]  Hi team,  Dr. Harrell saw this patient on February 7, 2022.  Please change the patient's follow-up schedule:   Establish care with primary cardiology (new to them) in 6 months  Follow-up with the EP nurse practitioner in device clinic in 1 year.  Thanks  Ion

## 2022-05-02 ENCOUNTER — LAB REQUISITION (OUTPATIENT)
Dept: LAB | Facility: CLINIC | Age: 82
End: 2022-05-02
Payer: MEDICARE

## 2022-05-02 DIAGNOSIS — E53.9 VITAMIN B DEFICIENCY, UNSPECIFIED: ICD-10-CM

## 2022-05-02 LAB — VIT B12 SERPL-MCNC: >2000 PG/ML (ref 213–816)

## 2022-05-02 PROCEDURE — 82607 VITAMIN B-12: CPT | Mod: ORL | Performed by: NURSE PRACTITIONER

## 2022-05-05 ENCOUNTER — ANCILLARY PROCEDURE (OUTPATIENT)
Dept: CARDIOLOGY | Facility: CLINIC | Age: 82
End: 2022-05-05
Attending: INTERNAL MEDICINE
Payer: MEDICARE

## 2022-05-05 DIAGNOSIS — Z95.810 ICD (IMPLANTABLE CARDIOVERTER-DEFIBRILLATOR) IN PLACE: ICD-10-CM

## 2022-05-05 DIAGNOSIS — I25.5 ISCHEMIC CARDIOMYOPATHY: ICD-10-CM

## 2022-05-05 LAB
MDC_IDC_EPISODE_DTM: NORMAL
MDC_IDC_EPISODE_DURATION: 10 S
MDC_IDC_EPISODE_DURATION: 137 S
MDC_IDC_EPISODE_DURATION: 14 S
MDC_IDC_EPISODE_DURATION: 15 S
MDC_IDC_EPISODE_DURATION: 151 S
MDC_IDC_EPISODE_DURATION: 21 S
MDC_IDC_EPISODE_DURATION: 23 S
MDC_IDC_EPISODE_DURATION: 7 S
MDC_IDC_EPISODE_DURATION: 7 S
MDC_IDC_EPISODE_DURATION: 8 S
MDC_IDC_EPISODE_DURATION: 9 S
MDC_IDC_EPISODE_DURATION: 94 S
MDC_IDC_EPISODE_ID: NORMAL
MDC_IDC_EPISODE_TYPE: NORMAL
MDC_IDC_EPISODE_VENDOR_TYPE: NORMAL
MDC_IDC_EPISODE_VENDOR_TYPE: NORMAL
MDC_IDC_LEAD_IMPLANT_DT: NORMAL
MDC_IDC_LEAD_IMPLANT_DT: NORMAL
MDC_IDC_LEAD_LOCATION: NORMAL
MDC_IDC_LEAD_LOCATION: NORMAL
MDC_IDC_LEAD_LOCATION_DETAIL_1: NORMAL
MDC_IDC_LEAD_LOCATION_DETAIL_1: NORMAL
MDC_IDC_LEAD_MFG: NORMAL
MDC_IDC_LEAD_MFG: NORMAL
MDC_IDC_LEAD_MODEL: NORMAL
MDC_IDC_LEAD_MODEL: NORMAL
MDC_IDC_LEAD_POLARITY_TYPE: NORMAL
MDC_IDC_LEAD_POLARITY_TYPE: NORMAL
MDC_IDC_LEAD_SERIAL: NORMAL
MDC_IDC_LEAD_SERIAL: NORMAL
MDC_IDC_LEAD_SPECIAL_FUNCTION: NORMAL
MDC_IDC_MSMT_BATTERY_DTM: NORMAL
MDC_IDC_MSMT_BATTERY_REMAINING_LONGEVITY: 120 MO
MDC_IDC_MSMT_BATTERY_REMAINING_PERCENTAGE: 100 %
MDC_IDC_MSMT_BATTERY_STATUS: NORMAL
MDC_IDC_MSMT_CAP_CHARGE_DTM: NORMAL
MDC_IDC_MSMT_CAP_CHARGE_TIME: 10.3 S
MDC_IDC_MSMT_CAP_CHARGE_TYPE: NORMAL
MDC_IDC_MSMT_LEADCHNL_RA_IMPEDANCE_VALUE: 766 OHM
MDC_IDC_MSMT_LEADCHNL_RA_PACING_THRESHOLD_AMPLITUDE: 1.5 V
MDC_IDC_MSMT_LEADCHNL_RA_PACING_THRESHOLD_PULSEWIDTH: 0.7 MS
MDC_IDC_MSMT_LEADCHNL_RV_IMPEDANCE_VALUE: 535 OHM
MDC_IDC_MSMT_LEADCHNL_RV_PACING_THRESHOLD_AMPLITUDE: 0.8 V
MDC_IDC_MSMT_LEADCHNL_RV_PACING_THRESHOLD_PULSEWIDTH: 0.4 MS
MDC_IDC_PG_IMPLANT_DTM: NORMAL
MDC_IDC_PG_MFG: NORMAL
MDC_IDC_PG_MODEL: NORMAL
MDC_IDC_PG_SERIAL: NORMAL
MDC_IDC_PG_TYPE: NORMAL
MDC_IDC_SESS_CLINIC_NAME: NORMAL
MDC_IDC_SESS_DTM: NORMAL
MDC_IDC_SESS_TYPE: NORMAL
MDC_IDC_SET_BRADY_AT_MODE_SWITCH_MODE: NORMAL
MDC_IDC_SET_BRADY_AT_MODE_SWITCH_RATE: 170 {BEATS}/MIN
MDC_IDC_SET_BRADY_LOWRATE: 55 {BEATS}/MIN
MDC_IDC_SET_BRADY_MAX_SENSOR_RATE: 130 {BEATS}/MIN
MDC_IDC_SET_BRADY_MAX_TRACKING_RATE: 120 {BEATS}/MIN
MDC_IDC_SET_BRADY_MODE: NORMAL
MDC_IDC_SET_BRADY_PAV_DELAY_HIGH: 200 MS
MDC_IDC_SET_BRADY_PAV_DELAY_LOW: 300 MS
MDC_IDC_SET_BRADY_SAV_DELAY_HIGH: 165 MS
MDC_IDC_SET_BRADY_SAV_DELAY_LOW: 250 MS
MDC_IDC_SET_LEADCHNL_RA_PACING_AMPLITUDE: 3 V
MDC_IDC_SET_LEADCHNL_RA_PACING_POLARITY: NORMAL
MDC_IDC_SET_LEADCHNL_RA_PACING_PULSEWIDTH: 0.7 MS
MDC_IDC_SET_LEADCHNL_RA_SENSING_ADAPTATION_MODE: NORMAL
MDC_IDC_SET_LEADCHNL_RA_SENSING_POLARITY: NORMAL
MDC_IDC_SET_LEADCHNL_RA_SENSING_SENSITIVITY: 0.3 MV
MDC_IDC_SET_LEADCHNL_RV_PACING_AMPLITUDE: 1.5 V
MDC_IDC_SET_LEADCHNL_RV_PACING_POLARITY: NORMAL
MDC_IDC_SET_LEADCHNL_RV_PACING_PULSEWIDTH: 0.4 MS
MDC_IDC_SET_LEADCHNL_RV_SENSING_ADAPTATION_MODE: NORMAL
MDC_IDC_SET_LEADCHNL_RV_SENSING_POLARITY: NORMAL
MDC_IDC_SET_LEADCHNL_RV_SENSING_SENSITIVITY: 0.6 MV
MDC_IDC_SET_ZONE_DETECTION_INTERVAL: 261 MS
MDC_IDC_SET_ZONE_DETECTION_INTERVAL: 333 MS
MDC_IDC_SET_ZONE_DETECTION_INTERVAL: 400 MS
MDC_IDC_SET_ZONE_TYPE: NORMAL
MDC_IDC_SET_ZONE_VENDOR_TYPE: NORMAL
MDC_IDC_STAT_AT_BURDEN_PERCENT: 1 %
MDC_IDC_STAT_AT_DTM_END: NORMAL
MDC_IDC_STAT_AT_DTM_START: NORMAL
MDC_IDC_STAT_BRADY_DTM_END: NORMAL
MDC_IDC_STAT_BRADY_DTM_START: NORMAL
MDC_IDC_STAT_BRADY_RA_PERCENT_PACED: 28 %
MDC_IDC_STAT_BRADY_RV_PERCENT_PACED: 4 %
MDC_IDC_STAT_EPISODE_RECENT_COUNT: 0
MDC_IDC_STAT_EPISODE_RECENT_COUNT: 2
MDC_IDC_STAT_EPISODE_RECENT_COUNT: 704
MDC_IDC_STAT_EPISODE_RECENT_COUNT_DTM_END: NORMAL
MDC_IDC_STAT_EPISODE_RECENT_COUNT_DTM_START: NORMAL
MDC_IDC_STAT_EPISODE_TYPE: NORMAL
MDC_IDC_STAT_EPISODE_VENDOR_TYPE: NORMAL
MDC_IDC_STAT_TACHYTHERAPY_ATP_DELIVERED_RECENT: 0
MDC_IDC_STAT_TACHYTHERAPY_ATP_DELIVERED_TOTAL: 1
MDC_IDC_STAT_TACHYTHERAPY_RECENT_DTM_END: NORMAL
MDC_IDC_STAT_TACHYTHERAPY_RECENT_DTM_START: NORMAL
MDC_IDC_STAT_TACHYTHERAPY_SHOCKS_ABORTED_RECENT: 0
MDC_IDC_STAT_TACHYTHERAPY_SHOCKS_ABORTED_TOTAL: 0
MDC_IDC_STAT_TACHYTHERAPY_SHOCKS_DELIVERED_RECENT: 0
MDC_IDC_STAT_TACHYTHERAPY_SHOCKS_DELIVERED_TOTAL: 0
MDC_IDC_STAT_TACHYTHERAPY_TOTAL_DTM_END: NORMAL
MDC_IDC_STAT_TACHYTHERAPY_TOTAL_DTM_START: NORMAL

## 2022-05-05 PROCEDURE — 93296 REM INTERROG EVL PM/IDS: CPT | Performed by: INTERNAL MEDICINE

## 2022-05-05 PROCEDURE — 93295 DEV INTERROG REMOTE 1/2/MLT: CPT | Performed by: INTERNAL MEDICINE

## 2022-08-01 DIAGNOSIS — I48.0 PAROXYSMAL ATRIAL FIBRILLATION (H): ICD-10-CM

## 2022-08-01 RX ORDER — SOTALOL HYDROCHLORIDE 80 MG/1
80 TABLET ORAL 2 TIMES DAILY
Qty: 180 TABLET | Refills: 1 | Status: SHIPPED | OUTPATIENT
Start: 2022-08-01 | End: 2022-09-01

## 2022-08-24 ENCOUNTER — ANCILLARY PROCEDURE (OUTPATIENT)
Dept: CARDIOLOGY | Facility: CLINIC | Age: 82
End: 2022-08-24
Attending: INTERNAL MEDICINE
Payer: MEDICARE

## 2022-08-24 DIAGNOSIS — Z95.810 ICD (IMPLANTABLE CARDIOVERTER-DEFIBRILLATOR) IN PLACE: ICD-10-CM

## 2022-08-24 DIAGNOSIS — I25.5 ISCHEMIC CARDIOMYOPATHY: ICD-10-CM

## 2022-08-24 PROCEDURE — 93296 REM INTERROG EVL PM/IDS: CPT | Performed by: INTERNAL MEDICINE

## 2022-08-24 PROCEDURE — 93295 DEV INTERROG REMOTE 1/2/MLT: CPT | Performed by: INTERNAL MEDICINE

## 2022-08-26 LAB
MDC_IDC_EPISODE_DTM: NORMAL
MDC_IDC_EPISODE_DURATION: 10 S
MDC_IDC_EPISODE_DURATION: 14 S
MDC_IDC_EPISODE_DURATION: 16 S
MDC_IDC_EPISODE_DURATION: 181 S
MDC_IDC_EPISODE_DURATION: 193 S
MDC_IDC_EPISODE_DURATION: 23 S
MDC_IDC_EPISODE_DURATION: 25 S
MDC_IDC_EPISODE_DURATION: 33 S
MDC_IDC_EPISODE_DURATION: 46 S
MDC_IDC_EPISODE_DURATION: 5 S
MDC_IDC_EPISODE_DURATION: 7 S
MDC_IDC_EPISODE_DURATION: 85 S
MDC_IDC_EPISODE_ID: NORMAL
MDC_IDC_EPISODE_TYPE: NORMAL
MDC_IDC_EPISODE_VENDOR_TYPE: NORMAL
MDC_IDC_EPISODE_VENDOR_TYPE: NORMAL
MDC_IDC_LEAD_IMPLANT_DT: NORMAL
MDC_IDC_LEAD_IMPLANT_DT: NORMAL
MDC_IDC_LEAD_LOCATION: NORMAL
MDC_IDC_LEAD_LOCATION: NORMAL
MDC_IDC_LEAD_LOCATION_DETAIL_1: NORMAL
MDC_IDC_LEAD_LOCATION_DETAIL_1: NORMAL
MDC_IDC_LEAD_MFG: NORMAL
MDC_IDC_LEAD_MFG: NORMAL
MDC_IDC_LEAD_MODEL: NORMAL
MDC_IDC_LEAD_MODEL: NORMAL
MDC_IDC_LEAD_POLARITY_TYPE: NORMAL
MDC_IDC_LEAD_POLARITY_TYPE: NORMAL
MDC_IDC_LEAD_SERIAL: NORMAL
MDC_IDC_LEAD_SERIAL: NORMAL
MDC_IDC_LEAD_SPECIAL_FUNCTION: NORMAL
MDC_IDC_MSMT_BATTERY_DTM: NORMAL
MDC_IDC_MSMT_BATTERY_REMAINING_LONGEVITY: 114 MO
MDC_IDC_MSMT_BATTERY_REMAINING_PERCENTAGE: 100 %
MDC_IDC_MSMT_BATTERY_STATUS: NORMAL
MDC_IDC_MSMT_CAP_CHARGE_DTM: NORMAL
MDC_IDC_MSMT_CAP_CHARGE_TIME: 10.3 S
MDC_IDC_MSMT_CAP_CHARGE_TYPE: NORMAL
MDC_IDC_MSMT_LEADCHNL_RA_IMPEDANCE_VALUE: 786 OHM
MDC_IDC_MSMT_LEADCHNL_RA_PACING_THRESHOLD_AMPLITUDE: 1.5 V
MDC_IDC_MSMT_LEADCHNL_RA_PACING_THRESHOLD_PULSEWIDTH: 0.7 MS
MDC_IDC_MSMT_LEADCHNL_RV_IMPEDANCE_VALUE: 562 OHM
MDC_IDC_MSMT_LEADCHNL_RV_PACING_THRESHOLD_AMPLITUDE: 0.8 V
MDC_IDC_MSMT_LEADCHNL_RV_PACING_THRESHOLD_PULSEWIDTH: 0.4 MS
MDC_IDC_PG_IMPLANT_DTM: NORMAL
MDC_IDC_PG_MFG: NORMAL
MDC_IDC_PG_MODEL: NORMAL
MDC_IDC_PG_SERIAL: NORMAL
MDC_IDC_PG_TYPE: NORMAL
MDC_IDC_SESS_CLINIC_NAME: NORMAL
MDC_IDC_SESS_DTM: NORMAL
MDC_IDC_SESS_TYPE: NORMAL
MDC_IDC_SET_BRADY_AT_MODE_SWITCH_MODE: NORMAL
MDC_IDC_SET_BRADY_AT_MODE_SWITCH_RATE: 170 {BEATS}/MIN
MDC_IDC_SET_BRADY_LOWRATE: 55 {BEATS}/MIN
MDC_IDC_SET_BRADY_MAX_SENSOR_RATE: 130 {BEATS}/MIN
MDC_IDC_SET_BRADY_MAX_TRACKING_RATE: 120 {BEATS}/MIN
MDC_IDC_SET_BRADY_MODE: NORMAL
MDC_IDC_SET_BRADY_PAV_DELAY_HIGH: 200 MS
MDC_IDC_SET_BRADY_PAV_DELAY_LOW: 300 MS
MDC_IDC_SET_BRADY_SAV_DELAY_HIGH: 165 MS
MDC_IDC_SET_BRADY_SAV_DELAY_LOW: 250 MS
MDC_IDC_SET_LEADCHNL_RA_PACING_AMPLITUDE: 3 V
MDC_IDC_SET_LEADCHNL_RA_PACING_POLARITY: NORMAL
MDC_IDC_SET_LEADCHNL_RA_PACING_PULSEWIDTH: 0.7 MS
MDC_IDC_SET_LEADCHNL_RA_SENSING_ADAPTATION_MODE: NORMAL
MDC_IDC_SET_LEADCHNL_RA_SENSING_POLARITY: NORMAL
MDC_IDC_SET_LEADCHNL_RA_SENSING_SENSITIVITY: 0.3 MV
MDC_IDC_SET_LEADCHNL_RV_PACING_AMPLITUDE: 1.5 V
MDC_IDC_SET_LEADCHNL_RV_PACING_POLARITY: NORMAL
MDC_IDC_SET_LEADCHNL_RV_PACING_PULSEWIDTH: 0.4 MS
MDC_IDC_SET_LEADCHNL_RV_SENSING_ADAPTATION_MODE: NORMAL
MDC_IDC_SET_LEADCHNL_RV_SENSING_POLARITY: NORMAL
MDC_IDC_SET_LEADCHNL_RV_SENSING_SENSITIVITY: 0.6 MV
MDC_IDC_SET_ZONE_DETECTION_INTERVAL: 261 MS
MDC_IDC_SET_ZONE_DETECTION_INTERVAL: 333 MS
MDC_IDC_SET_ZONE_DETECTION_INTERVAL: 400 MS
MDC_IDC_SET_ZONE_TYPE: NORMAL
MDC_IDC_SET_ZONE_VENDOR_TYPE: NORMAL
MDC_IDC_STAT_AT_BURDEN_PERCENT: 1 %
MDC_IDC_STAT_AT_DTM_END: NORMAL
MDC_IDC_STAT_AT_DTM_START: NORMAL
MDC_IDC_STAT_BRADY_DTM_END: NORMAL
MDC_IDC_STAT_BRADY_DTM_START: NORMAL
MDC_IDC_STAT_BRADY_RA_PERCENT_PACED: 27 %
MDC_IDC_STAT_BRADY_RV_PERCENT_PACED: 4 %
MDC_IDC_STAT_EPISODE_RECENT_COUNT: 0
MDC_IDC_STAT_EPISODE_RECENT_COUNT: 1663
MDC_IDC_STAT_EPISODE_RECENT_COUNT: 4
MDC_IDC_STAT_EPISODE_RECENT_COUNT_DTM_END: NORMAL
MDC_IDC_STAT_EPISODE_RECENT_COUNT_DTM_START: NORMAL
MDC_IDC_STAT_EPISODE_TYPE: NORMAL
MDC_IDC_STAT_EPISODE_VENDOR_TYPE: NORMAL
MDC_IDC_STAT_TACHYTHERAPY_ATP_DELIVERED_RECENT: 0
MDC_IDC_STAT_TACHYTHERAPY_ATP_DELIVERED_TOTAL: 1
MDC_IDC_STAT_TACHYTHERAPY_RECENT_DTM_END: NORMAL
MDC_IDC_STAT_TACHYTHERAPY_RECENT_DTM_START: NORMAL
MDC_IDC_STAT_TACHYTHERAPY_SHOCKS_ABORTED_RECENT: 0
MDC_IDC_STAT_TACHYTHERAPY_SHOCKS_ABORTED_TOTAL: 0
MDC_IDC_STAT_TACHYTHERAPY_SHOCKS_DELIVERED_RECENT: 0
MDC_IDC_STAT_TACHYTHERAPY_SHOCKS_DELIVERED_TOTAL: 0
MDC_IDC_STAT_TACHYTHERAPY_TOTAL_DTM_END: NORMAL
MDC_IDC_STAT_TACHYTHERAPY_TOTAL_DTM_START: NORMAL

## 2022-09-01 ENCOUNTER — OFFICE VISIT (OUTPATIENT)
Dept: CARDIOLOGY | Facility: CLINIC | Age: 82
End: 2022-09-01
Attending: INTERNAL MEDICINE
Payer: MEDICARE

## 2022-09-01 ENCOUNTER — PREP FOR PROCEDURE (OUTPATIENT)
Dept: CARDIOLOGY | Facility: CLINIC | Age: 82
End: 2022-09-01

## 2022-09-01 ENCOUNTER — DOCUMENTATION ONLY (OUTPATIENT)
Dept: CARDIOLOGY | Facility: CLINIC | Age: 82
End: 2022-09-01

## 2022-09-01 VITALS
BODY MASS INDEX: 20.47 KG/M2 | SYSTOLIC BLOOD PRESSURE: 102 MMHG | HEIGHT: 70 IN | HEART RATE: 68 BPM | WEIGHT: 143 LBS | RESPIRATION RATE: 16 BRPM | DIASTOLIC BLOOD PRESSURE: 66 MMHG

## 2022-09-01 DIAGNOSIS — I25.118 CORONARY ARTERY DISEASE OF NATIVE HEART WITH STABLE ANGINA PECTORIS, UNSPECIFIED VESSEL OR LESION TYPE (H): ICD-10-CM

## 2022-09-01 DIAGNOSIS — I50.20 HEART FAILURE WITH REDUCED EJECTION FRACTION, NYHA CLASS II (H): Primary | ICD-10-CM

## 2022-09-01 DIAGNOSIS — I50.20 HEART FAILURE WITH REDUCED EJECTION FRACTION, NYHA CLASS II (H): ICD-10-CM

## 2022-09-01 DIAGNOSIS — I48.0 PAROXYSMAL ATRIAL FIBRILLATION (H): Primary | ICD-10-CM

## 2022-09-01 DIAGNOSIS — R53.83 FATIGUE, UNSPECIFIED TYPE: ICD-10-CM

## 2022-09-01 DIAGNOSIS — R06.09 DYSPNEA ON EXERTION: ICD-10-CM

## 2022-09-01 LAB
ALBUMIN SERPL-MCNC: 3.5 G/DL (ref 3.5–5)
ALP SERPL-CCNC: 72 U/L (ref 45–120)
ALT SERPL W P-5'-P-CCNC: 18 U/L (ref 0–45)
ANION GAP SERPL CALCULATED.3IONS-SCNC: 9 MMOL/L (ref 5–18)
AST SERPL W P-5'-P-CCNC: 16 U/L (ref 0–40)
BILIRUB SERPL-MCNC: 0.8 MG/DL (ref 0–1)
BUN SERPL-MCNC: 16 MG/DL (ref 8–28)
CALCIUM SERPL-MCNC: 8.7 MG/DL (ref 8.5–10.5)
CHLORIDE BLD-SCNC: 109 MMOL/L (ref 98–107)
CK SERPL-CCNC: 94 U/L (ref 30–190)
CO2 SERPL-SCNC: 25 MMOL/L (ref 22–31)
CREAT SERPL-MCNC: 0.96 MG/DL (ref 0.7–1.3)
ERYTHROCYTE [DISTWIDTH] IN BLOOD BY AUTOMATED COUNT: 14.3 % (ref 10–15)
GFR SERPL CREATININE-BSD FRML MDRD: 78 ML/MIN/1.73M2
GLUCOSE BLD-MCNC: 94 MG/DL (ref 70–125)
HCT VFR BLD AUTO: 40.7 % (ref 40–53)
HGB BLD-MCNC: 13.3 G/DL (ref 13.3–17.7)
MCH RBC QN AUTO: 30.7 PG (ref 26.5–33)
MCHC RBC AUTO-ENTMCNC: 32.7 G/DL (ref 31.5–36.5)
MCV RBC AUTO: 94 FL (ref 78–100)
PLATELET # BLD AUTO: 201 10E3/UL (ref 150–450)
POTASSIUM BLD-SCNC: 3.8 MMOL/L (ref 3.5–5)
PROT SERPL-MCNC: 6.1 G/DL (ref 6–8)
RBC # BLD AUTO: 4.33 10E6/UL (ref 4.4–5.9)
SODIUM SERPL-SCNC: 143 MMOL/L (ref 136–145)
TSH SERPL DL<=0.005 MIU/L-ACNC: 0.46 UIU/ML (ref 0.3–5)
WBC # BLD AUTO: 8 10E3/UL (ref 4–11)

## 2022-09-01 PROCEDURE — 36415 COLL VENOUS BLD VENIPUNCTURE: CPT | Performed by: INTERNAL MEDICINE

## 2022-09-01 PROCEDURE — 80053 COMPREHEN METABOLIC PANEL: CPT | Performed by: INTERNAL MEDICINE

## 2022-09-01 PROCEDURE — 99214 OFFICE O/P EST MOD 30 MIN: CPT | Performed by: INTERNAL MEDICINE

## 2022-09-01 PROCEDURE — 82550 ASSAY OF CK (CPK): CPT | Performed by: INTERNAL MEDICINE

## 2022-09-01 PROCEDURE — 93000 ELECTROCARDIOGRAM COMPLETE: CPT | Performed by: INTERNAL MEDICINE

## 2022-09-01 PROCEDURE — 84443 ASSAY THYROID STIM HORMONE: CPT | Performed by: INTERNAL MEDICINE

## 2022-09-01 PROCEDURE — 85027 COMPLETE CBC AUTOMATED: CPT | Performed by: INTERNAL MEDICINE

## 2022-09-01 RX ORDER — METOPROLOL SUCCINATE 25 MG/1
25 TABLET, EXTENDED RELEASE ORAL DAILY
Qty: 90 TABLET | Refills: 11 | Status: SHIPPED | OUTPATIENT
Start: 2022-09-01 | End: 2023-03-06

## 2022-09-01 RX ORDER — SODIUM CHLORIDE 9 MG/ML
INJECTION, SOLUTION INTRAVENOUS CONTINUOUS
Status: CANCELLED | OUTPATIENT
Start: 2022-09-19

## 2022-09-01 RX ORDER — ASPIRIN 81 MG/1
243 TABLET, CHEWABLE ORAL ONCE
Status: CANCELLED | OUTPATIENT
Start: 2022-09-19

## 2022-09-01 RX ORDER — LIDOCAINE 40 MG/G
CREAM TOPICAL
Status: CANCELLED | OUTPATIENT
Start: 2022-09-01

## 2022-09-01 RX ORDER — ASPIRIN 325 MG
325 TABLET ORAL ONCE
Status: CANCELLED | OUTPATIENT
Start: 2022-09-19 | End: 2022-09-01

## 2022-09-01 RX ORDER — FENTANYL CITRATE 50 UG/ML
25 INJECTION, SOLUTION INTRAMUSCULAR; INTRAVENOUS
Status: CANCELLED | OUTPATIENT
Start: 2022-09-19

## 2022-09-01 RX ORDER — DIAZEPAM 5 MG
5 TABLET ORAL
Status: CANCELLED | OUTPATIENT
Start: 2022-09-19

## 2022-09-01 RX ORDER — SOTALOL HYDROCHLORIDE 80 MG/1
80 TABLET ORAL 2 TIMES DAILY
Qty: 180 TABLET | Refills: 1
Start: 2022-09-01 | End: 2022-11-21

## 2022-09-01 NOTE — PROGRESS NOTES
"  Thank you, Dr. Kelley, for asking the [facility] Heart Care team to see Mr. [place] Alamo to evaluate       Assessment/Recommendations   Assessment/Plan:  1. Heart failure with reduced Ejection fraction - JVP 8-10; no rales but S3 on exam with TR/MR and aortic stenosis, plan right and left cath with cors/grafts and echocardiogram, addition of metoprolol succiante 12.5mg at night, await lab results and then if no severe CAD, consider entresto  Noted QRS 148msec with RBBB pattern, hold on CRT at this time. ECG today NSR and atrial paced, with RBBB, PVC.   2. CAD s/p CABG - hold atorvasatin - could be source for diarrhea cont asp, check CBC and plan angiogrpahy  3. MR/AI/aortic stenosis-  MR related to CM - repeat echo       History of Present Illness/Subjective    Mr. Daniel Alamo is a 82 year old male with ischemic CM s/p CABG  LIIMA LAD and SVG OM (he states third bypassed) 30 years ago at [employer], last EF 25% 11/2021, afib on sotalol, PAD s/p iliac stents, with increase fatigue/dysepna, no PND/orthopnea, syncopal events but has dizzy/orthostatic symptoms, can't stand for more than 5 min, no edema, drop in appetite,, no GI/ bleeding, in 2019 last , he has diarrhea often, 3-4 stools a day.  S/p ICD APVS 959 mode swtich epsodes lastin g3 min at most, two VT episodes self terminated on interrogation 8/2022.           Physical Examination Review of Systems   /66 (BP Location: Right arm, Patient Position: Sitting, Cuff Size: Adult Regular)   Pulse 68   Resp 16   Ht 1.778 m (5' 10\")   Wt 64.9 kg (143 lb)   BMI 20.52 kg/m    Body mass index is 20.52 kg/m .  Wt Readings from Last 3 Encounters:   09/01/22 64.9 kg (143 lb)   02/07/22 64.9 kg (143 lb)   10/18/21 64.9 kg (143 lb)     [unfilled]  General Appearance:   no distress, normal body habitus   ENT/Mouth: membranes moist, no oral lesions or bleeding gums.      EYES:  no scleral icterus, normal conjunctivae   Neck: no carotid bruits or " thyromegaly   Chest/Lungs:   lungs are clear to auscultation, no rales or wheezing,  sternal scar, equal chest wall expansion    Cardiovascular:   Regular. Normal first and second heart sounds with no murmurs, rubs, or gallops; the carotid, radial and posterior tibial pulses are intact, Jugular venous pressure , edema bilaterally    Abdomen:  no organomegaly, masses, bruits, or tenderness; bowel sounds are present   Extremities: no cyanosis or clubbing   Skin: no xanthelasma, warm.    Neurologic: normal  bilateral, no tremors     Psychiatric: alert and oriented x3, calm     Review of Systems - 12 points nega other than above      Medical History  Surgical History Family History Social History   Past Medical History:   Diagnosis Date     Coronary artery disease      Heart disease      VT (ventricular tachycardia) (H) 2004    Past Surgical History:   Procedure Laterality Date     ABDOMEN SURGERY       BYPASS GRAFT ARTERY CORONARY       EYE SURGERY       INSERT / REPLACE / REMOVE PACEMAKER       OTHER SURGICAL HISTORY      icd     ZZC CABG, VEIN, SINGLE      Description: CABG (CABG);  Recorded: 10/03/2012;  Comments: LIMA to LAD, SVG to OM2, SVG to RCA    Family History   Problem Relation Age of Onset     Cancer Mother      Heart Disease Father     Social History     Socioeconomic History     Marital status:      Spouse name: Not on file     Number of children: Not on file     Years of education: Not on file     Highest education level: Not on file   Occupational History     Not on file   Tobacco Use     Smoking status: Current Every Day Smoker     Packs/day: 0.50     Years: 30.00     Pack years: 15.00     Smokeless tobacco: Never Used   Substance and Sexual Activity     Alcohol use: Yes     Alcohol/week: 1.0 standard drink     Comment: Glass of wine daily.     Drug use: No     Sexual activity: Not on file   Other Topics Concern     Parent/sibling w/ CABG, MI or angioplasty before 65F 55M? Not Asked   Social  History Narrative     Not on file     Social Determinants of Health     Financial Resource Strain: Not on file   Food Insecurity: Not on file   Transportation Needs: Not on file   Physical Activity: Not on file   Stress: Not on file   Social Connections: Not on file   Intimate Partner Violence: Not on file   Housing Stability: Not on file          Medications  Allergies   Scheduled Meds:  Continuous Infusions:  PRN Meds:. Allergies   Allergen Reactions     Carvedilol GI Disturbance and Other (See Comments)     Upset stomach  GI upset       Chloride GI Disturbance and Nausea         Lab Results    Chemistry/lipid CBC Cardiac Enzymes/BNP/TSH/INR   Lab Results   Component Value Date    CHOL 150 12/06/2019    HDL 36 (L) 12/06/2019    TRIG 70 12/06/2019    BUN 13 10/14/2021     10/14/2021    CO2 26 10/14/2021    Lab Results   Component Value Date    WBC 8.3 10/14/2021    HGB 13.8 10/14/2021    HCT 42.9 10/14/2021    MCV 93 10/14/2021     10/14/2021    Lab Results   Component Value Date    TSH 0.75 07/09/2020              Luis Hardy MD  Interventional Cardiology  [facility]

## 2022-09-01 NOTE — H&P (VIEW-ONLY)
"  Thank you, Dr. Kelley, for asking the [facility] team to see Mr. Daniel Alamo to evaluate       Assessment/Recommendations   Assessment/Plan:  1. Heart failure with reduced Ejection fraction - JVP 8-10; no rales but S3 on exam with TR/MR and aortic stenosis, plan right and left cath with cors/grafts and echocardiogram, addition of metoprolol succiante 12.5mg at night, await lab results and then if no severe CAD, consider entresto  Noted QRS 148msec with RBBB pattern, hold on CRT at this time. ECG today NSR and atrial paced, with RBBB, PVC.   2. CAD s/p CABG - hold atorvasatin - could be source for diarrhea cont asp, check CBC and plan angiogrpahy  3. MR/AI/aortic stenosis-  MR related to CM - repeat echo       History of Present Illness/Subjective    Mr. Daniel Alamo is a 82 year old male with ischemic CM s/p CABG  LIIMA LAD and SVG OM (he states third bypassed) 30 years ago at [facility], last EF 25% 11/2021, afib on sotalol, PAD s/p iliac stents, with increase fatigue/dysepna, no PND/orthopnea, syncopal events but has dizzy/orthostatic symptoms, can't stand for more than 5 min, no edema, drop in appetite,, no GI/ bleeding, in 2019 last , he has diarrhea often, 3-4 stools a day.  S/p ICD APVS 959 mode swtich epsodes lastin g3 min at most, two VT episodes self terminated on interrogation 8/2022.           Physical Examination Review of Systems   /66 (BP Location: Right arm, Patient Position: Sitting, Cuff Size: Adult Regular)   Pulse 68   Resp 16   Ht 1.778 m (5' 10\")   Wt 64.9 kg (143 lb)   BMI 20.52 kg/m    Body mass index is 20.52 kg/m .  Wt Readings from Last 3 Encounters:   09/01/22 64.9 kg (143 lb)   02/07/22 64.9 kg (143 lb)   10/18/21 64.9 kg (143 lb)     [unfilled]  General Appearance:   no distress, normal body habitus   ENT/Mouth: membranes moist, no oral lesions or bleeding gums.      EYES:  no scleral icterus, normal conjunctivae   Neck: no carotid bruits or " thyromegaly   Chest/Lungs:   lungs are clear to auscultation, no rales or wheezing,  sternal scar, equal chest wall expansion    Cardiovascular:   Regular. Normal first and second heart sounds with no murmurs, rubs, or gallops; the carotid, radial and posterior tibial pulses are intact, Jugular venous pressure , edema bilaterally    Abdomen:  no organomegaly, masses, bruits, or tenderness; bowel sounds are present   Extremities: no cyanosis or clubbing   Skin: no xanthelasma, warm.    Neurologic: normal  bilateral, no tremors     Psychiatric: alert and oriented x3, calm     Review of Systems - 12 points nega other than above      Medical History  Surgical History Family History Social History   Past Medical History:   Diagnosis Date     Coronary artery disease      Heart disease      VT (ventricular tachycardia) (H) 2004    Past Surgical History:   Procedure Laterality Date     ABDOMEN SURGERY       BYPASS GRAFT ARTERY CORONARY       EYE SURGERY       INSERT / REPLACE / REMOVE PACEMAKER       OTHER SURGICAL HISTORY      icd     ZZC CABG, VEIN, SINGLE      Description: CABG (CABG);  Recorded: 10/03/2012;  Comments: LIMA to LAD, SVG to OM2, SVG to RCA    Family History   Problem Relation Age of Onset     Cancer Mother      Heart Disease Father     Social History     Socioeconomic History     Marital status:      Spouse name: Not on file     Number of children: Not on file     Years of education: Not on file     Highest education level: Not on file   Occupational History     Not on file   Tobacco Use     Smoking status: Current Every Day Smoker     Packs/day: 0.50     Years: 30.00     Pack years: 15.00     Smokeless tobacco: Never Used   Substance and Sexual Activity     Alcohol use: Yes     Alcohol/week: 1.0 standard drink     Comment: Glass of wine daily.     Drug use: No     Sexual activity: Not on file   Other Topics Concern     Parent/sibling w/ CABG, MI or angioplasty before 65F 55M? Not Asked   Social  History Narrative     Not on file     Social Determinants of Health     Financial Resource Strain: Not on file   Food Insecurity: Not on file   Transportation Needs: Not on file   Physical Activity: Not on file   Stress: Not on file   Social Connections: Not on file   Intimate Partner Violence: Not on file   Housing Stability: Not on file          Medications  Allergies   Scheduled Meds:  Continuous Infusions:  PRN Meds:. Allergies   Allergen Reactions     Carvedilol GI Disturbance and Other (See Comments)     Upset stomach  GI upset       Chloride GI Disturbance and Nausea         Lab Results    Chemistry/lipid CBC Cardiac Enzymes/BNP/TSH/INR   Lab Results   Component Value Date    CHOL 150 12/06/2019    HDL 36 (L) 12/06/2019    TRIG 70 12/06/2019    BUN 13 10/14/2021     10/14/2021    CO2 26 10/14/2021    Lab Results   Component Value Date    WBC 8.3 10/14/2021    HGB 13.8 10/14/2021    HCT 42.9 10/14/2021    MCV 93 10/14/2021     10/14/2021    Lab Results   Component Value Date    TSH 0.75 07/09/2020              Luis Hardy MD  Interventional Cardiology  [facility]

## 2022-09-01 NOTE — PROGRESS NOTES
CJP ORDERING  Received: 9-1-22  [id number]  Adrian Chaudhari Maureen, CYNDIE  CORS POSS PCI WITH RHC/LHC WITH MY/MA     730AM ADMIT ON 9/19     H&P: 9/1 WITH CJP     ALERTS: ELIQUIS, ICD, HX OF BYPASS     COVID HOME TEST TO BE COMPLETED BY PT 1-2 DAYS PRIOR     THANKSADRIAN

## 2022-09-01 NOTE — PROGRESS NOTES
"Daniel Alamo  [address] [address]  [phone] (home)     Procedure cardiologist:  Dr. Ordoñez or Jose F  PCP:  Kenna Calvillo  H&P completed by:  Dr. Hardy 9-1-22  Admit date Mon. 9-19-22  Arrival time:  0730  Anticoagulation:  apixaban (ELIQUIS)  CPAP: No  Previous PCI: No  Bypass Grafts: Yes - CABG x 3 at [facility] on 5-8-92  Renal Issues: No  Diabetic?: No  Device?: Yes  Type:  BSCI ICD  Ambulation status: independent - \"can't walk very far\" - does not use assistive device - VERY [place] and does not wear HA's.    Reason for Visit:  Patient seen for pre-procedure education in preparation for: Right Heart Catheterization, Left Heart Catheterization and Coronary Angiogram with Possible PCI/grafts    Procedure Prep:  Primary Cardiologist note dated: 9-1-22  EKG results obtained, dated: To be completed on day of cath lab procedure  Hemogram results obtained: To be completed on day of cath lab procedure  Basic Metabolic Panel results obtained: To be completed on day of cath lab procedure  Lipid Profile results obtained: on admission  Pertinent cardiac test results: 1-7-21 echo - ECHO same day as procedure.  Orders placed per cosign required protocol 9-1-22.    COVID-19 test: home rapid antigen on 9-17-22 or 9-18-22 - pt instructed to take picture of negative results to present on admission and call nurse if results positive.    Patient Education  1. Your arrival time is 0730.  [place] [address] - Main Entrance of the Hospital  2. Please plan on being at the hospital all day.  3. At any time, emergencies and/or urgent cases may come up which could delay the start of your procedure.    COVID Testing Instructions  *Mandatory COVID Testing:   ALL Patients will need to complete a COVID test no sooner than 4 days prior to their procedure (regardless of vaccination status).      To schedule COVID testing Please call " [phone]    If you want to complete this at an outside facility please call them to find out if they will have the results within the appropriate time frame and their fax number.  You will need to provide us with that information so we can send the order.    The facility completing the test will need to fax the results to [phone]    If you are running into and issues please call us.     Pre-procedure instructions  Patient instructed to not Eat or Drink after midnight.  Patient instructed to shower the evening before or the morning of the procedure.  Patient instructed to arrange for transportation home following procedure from a responsible family member of friend (wife will be ).  No driving for at least 24 hours.  Patient instructed to have a responsible adult with them for 24 hours post-procedure.  Post-procedure follow up process.  Conscious sedation discussed.  Patient instructed on antiplatelet medication.  Received procedure education handouts in person     Pre-procedure medication instructions.  Continue medications as scheduled, with a small amount of water on the day of the procedure unless indicated. (NO Diabetic Medications or Blood Thinners)  Pt instructed not to consume Alcohol, Tobacco, Caffeine 12 hours prior to procedure.  Patient instructed to take 4-81 mg of Aspirin, in addition to Gabapentin, Sotalol morning of procedure.              Anticoagulation Medication Instructions     apixaban (ELIQUIS) - Hold 72 hours prior to procedure    Patient states understanding of procedure and agrees to proceed.    *PATIENTS RECORDS AVAILABLE IN [facility] UNLESS OTHERWISE INDICATED*      Patient Active Problem List   Diagnosis     Atrial Fibrillation     Dyslipidemia     Coronary artery disease of native heart with stable angina pectoris, unspecified vessel or lesion type (H)     ICD (implantable cardioverter-defibrillator), dual, in situ     Chronic systolic congestive heart failure (H)     Ischemic  cardiomyopathy     PVD (peripheral vascular disease) (H)     Abdominal aortic aneurysm (AAA) without rupture (H)     PVC's (premature ventricular contractions)       Current Outpatient Medications   Medication Sig Dispense Refill     acetaminophen (TYLENOL) 325 MG tablet Take 650 mg by mouth daily as needed        apixaban ANTICOAGULANT (ELIQUIS) 5 MG tablet 5 mg 2 times daily        aspirin (ASA) 81 MG EC tablet Take 81 mg by mouth daily       Cyanocobalamin (B-12) 1000 MCG SUBL daily at 2 pm  (Patient not taking: Reported on 9/1/2022)       gabapentin (NEURONTIN) 300 MG capsule Take 900 mg by mouth 2 times daily        metoprolol succinate ER (TOPROL XL) 25 MG 24 hr tablet Take 1 tablet (25 mg) by mouth daily 90 tablet 11     sotalol (BETAPACE) 80 MG tablet Take 1 tablet (80 mg) by mouth 2 times daily 180 tablet 1     tamsulosin (FLOMAX) 0.4 MG capsule TAKE ONE CAPSULE BY MOUTH ONE TIME DAILY         Allergies   Allergen Reactions     Carvedilol GI Disturbance and Other (See Comments)     Upset stomach  GI upset       Chloride GI Disturbance and Nausea       Nicole Mujica RN on 9/1/2022 at 2:12 PM

## 2022-09-01 NOTE — LETTER
"9/1/2022    Kenna Calvillo NP  [place] [address] [address] [facility] [address]    RE: Daniel Alamo       Dear Colleague,     I had the pleasure of seeing Daniel Alamo in the [facility].    Thank you, Dr. Kelley, for asking the [facility] team to see Mr. Daniel Alamo to evaluate       Assessment/Recommendations   Assessment/Plan:  1. Heart failure with reduced Ejection fraction - JVP 8-10; no rales but S3 on exam with TR/MR and aortic stenosis, plan right and left cath with cors/grafts and echocardiogram, addition of metoprolol succiante 12.5mg at night, await lab results and then if no severe CAD, consider entresto  Noted QRS 148msec with RBBB pattern, hold on CRT at this time. ECG today NSR and atrial paced, with RBBB, PVC.   2. CAD s/p CABG - hold atorvasatin - could be source for diarrhea cont asp, check CBC and plan angiogrpahy  3. MR/AI/aortic stenosis-  MR related to CM - repeat echo       History of Present Illness/Subjective    Mr. Daniel Alamo is a 82 year old male with ischemic CM s/p CABG  LIIMA LAD and SVG OM (he states third bypassed) 30 years ago at [employer], last EF 25% 11/2021, afib on sotalol, PAD s/p iliac stents, with increase fatigue/dysepna, no PND/orthopnea, syncopal events but has dizzy/orthostatic symptoms, can't stand for more than 5 min, no edema, drop in appetite,, no GI/ bleeding, in 2019 last , he has diarrhea often, 3-4 stools a day.  S/p ICD APVS 959 mode swtich epsodes lastin g3 min at most, two VT episodes self terminated on interrogation 8/2022.           Physical Examination Review of Systems   /66 (BP Location: Right arm, Patient Position: Sitting, Cuff Size: Adult Regular)   Pulse 68   Resp 16   Ht 1.778 m (5' 10\")   Wt 64.9 kg (143 lb)   BMI 20.52 kg/m    Body mass index is 20.52 kg/m .  Wt Readings from Last 3 Encounters:   09/01/22 64.9 kg (143 lb)   02/07/22 64.9 kg (143 lb)   10/18/21 64.9 kg (143 " lb)     [unfilled]  General Appearance:   no distress, normal body habitus   ENT/Mouth: membranes moist, no oral lesions or bleeding gums.      EYES:  no scleral icterus, normal conjunctivae   Neck: no carotid bruits or thyromegaly   Chest/Lungs:   lungs are clear to auscultation, no rales or wheezing,  sternal scar, equal chest wall expansion    Cardiovascular:   Regular. Normal first and second heart sounds with no murmurs, rubs, or gallops; the carotid, radial and posterior tibial pulses are intact, Jugular venous pressure , edema bilaterally    Abdomen:  no organomegaly, masses, bruits, or tenderness; bowel sounds are present   Extremities: no cyanosis or clubbing   Skin: no xanthelasma, warm.    Neurologic: normal  bilateral, no tremors     Psychiatric: alert and oriented x3, calm     Review of Systems - 12 points nega other than above      Medical History  Surgical History Family History Social History   Past Medical History:   Diagnosis Date     Coronary artery disease      Heart disease      VT (ventricular tachycardia) (H) 2004    Past Surgical History:   Procedure Laterality Date     ABDOMEN SURGERY       BYPASS GRAFT ARTERY CORONARY       EYE SURGERY       INSERT / REPLACE / REMOVE PACEMAKER       OTHER SURGICAL HISTORY      icd     ZZC CABG, VEIN, SINGLE      Description: CABG (CABG);  Recorded: 10/03/2012;  Comments: LIMA to LAD, SVG to OM2, SVG to RCA    Family History   Problem Relation Age of Onset     Cancer Mother      Heart Disease Father     Social History     Socioeconomic History     Marital status:      Spouse name: Not on file     Number of children: Not on file     Years of education: Not on file     Highest education level: Not on file   Occupational History     Not on file   Tobacco Use     Smoking status: Current Every Day Smoker     Packs/day: 0.50     Years: 30.00     Pack years: 15.00     Smokeless tobacco: Never Used   Substance and Sexual Activity     Alcohol use: Yes      Alcohol/week: 1.0 standard drink     Comment: Glass of wine daily.     Drug use: No     Sexual activity: Not on file   Other Topics Concern     Parent/sibling w/ CABG, MI or angioplasty before 65F 55M? Not Asked   Social History Narrative     Not on file     Social Determinants of Health     Financial Resource Strain: Not on file   Food Insecurity: Not on file   Transportation Needs: Not on file   Physical Activity: Not on file   Stress: Not on file   Social Connections: Not on file   Intimate Partner Violence: Not on file   Housing Stability: Not on file          Medications  Allergies   Scheduled Meds:  Continuous Infusions:  PRN Meds:. Allergies   Allergen Reactions     Carvedilol GI Disturbance and Other (See Comments)     Upset stomach  GI upset       Chloride GI Disturbance and Nausea         Lab Results    Chemistry/lipid CBC Cardiac Enzymes/BNP/TSH/INR   Lab Results   Component Value Date    CHOL 150 12/06/2019    HDL 36 (L) 12/06/2019    TRIG 70 12/06/2019    BUN 13 10/14/2021     10/14/2021    CO2 26 10/14/2021    Lab Results   Component Value Date    WBC 8.3 10/14/2021    HGB 13.8 10/14/2021    HCT 42.9 10/14/2021    MCV 93 10/14/2021     10/14/2021    Lab Results   Component Value Date    TSH 0.75 07/09/2020              Luis Hardy MD  Interventional Cardiology  [facility]    Thank you for allowing me to participate in the care of your patient.      Sincerely,     Luis Hardy MD     [facility]  cc:   Ion Kelley MD  [address]  [place] [address]

## 2022-09-02 LAB
ATRIAL RATE - MUSE: 64 BPM
DIASTOLIC BLOOD PRESSURE - MUSE: NORMAL MMHG
INTERPRETATION ECG - MUSE: NORMAL
P AXIS - MUSE: NORMAL DEGREES
PR INTERVAL - MUSE: 170 MS
QRS DURATION - MUSE: 148 MS
QT - MUSE: 514 MS
QTC - MUSE: 530 MS
R AXIS - MUSE: 91 DEGREES
SYSTOLIC BLOOD PRESSURE - MUSE: NORMAL MMHG
T AXIS - MUSE: -71 DEGREES
VENTRICULAR RATE- MUSE: 64 BPM

## 2022-09-19 ENCOUNTER — HOSPITAL ENCOUNTER (OUTPATIENT)
Facility: HOSPITAL | Age: 82
Discharge: HOME | End: 2022-09-19
Attending: INTERNAL MEDICINE | Admitting: INTERNAL MEDICINE
Payer: MEDICARE

## 2022-09-19 ENCOUNTER — HOSPITAL ENCOUNTER (OUTPATIENT)
Dept: CARDIOLOGY | Facility: HOSPITAL | Age: 82
Discharge: HOME | End: 2022-09-19
Attending: INTERNAL MEDICINE | Admitting: INTERNAL MEDICINE
Payer: MEDICARE

## 2022-09-19 VITALS
SYSTOLIC BLOOD PRESSURE: 134 MMHG | WEIGHT: 140.6 LBS | DIASTOLIC BLOOD PRESSURE: 66 MMHG | HEIGHT: 70 IN | BODY MASS INDEX: 20.13 KG/M2 | OXYGEN SATURATION: 98 % | TEMPERATURE: 97.8 F | RESPIRATION RATE: 20 BRPM | HEART RATE: 58 BPM

## 2022-09-19 DIAGNOSIS — I50.20 HEART FAILURE WITH REDUCED EJECTION FRACTION, NYHA CLASS II (H): ICD-10-CM

## 2022-09-19 DIAGNOSIS — I25.118 CORONARY ARTERY DISEASE OF NATIVE HEART WITH STABLE ANGINA PECTORIS, UNSPECIFIED VESSEL OR LESION TYPE (H): ICD-10-CM

## 2022-09-19 PROBLEM — Z98.890 STATUS POST CORONARY ANGIOGRAM: Status: ACTIVE | Noted: 2022-09-19

## 2022-09-19 LAB
ABO/RH(D): NORMAL
ANION GAP SERPL CALCULATED.3IONS-SCNC: 6 MMOL/L (ref 5–18)
ANTIBODY SCREEN: NEGATIVE
ATRIAL RATE - MUSE: 54 BPM
BASE EXCESS BLDA CALC-SCNC: 2.1 MMOL/L
BASE EXCESS BLDV CALC-SCNC: 3.3 MMOL/L
BUN SERPL-MCNC: 14 MG/DL (ref 8–28)
CALCIUM SERPL-MCNC: 9.1 MG/DL (ref 8.5–10.5)
CHLORIDE BLD-SCNC: 106 MMOL/L (ref 98–107)
CHOLEST SERPL-MCNC: 185 MG/DL
CO2 SERPL-SCNC: 28 MMOL/L (ref 22–31)
COHGB MFR BLD: 91.2 % (ref 95–96)
CREAT SERPL-MCNC: 0.95 MG/DL (ref 0.7–1.3)
DIASTOLIC BLOOD PRESSURE - MUSE: NORMAL MMHG
ERYTHROCYTE [DISTWIDTH] IN BLOOD BY AUTOMATED COUNT: 14.4 % (ref 10–15)
FASTING STATUS PATIENT QL REPORTED: YES
GFR SERPL CREATININE-BSD FRML MDRD: 79 ML/MIN/1.73M2
GLUCOSE BLD-MCNC: 94 MG/DL (ref 70–125)
HCO3 BLDA-SCNC: 26 MMOL/L (ref 23–29)
HCO3 BLDV-SCNC: 26 MMOL/L (ref 24–30)
HCT VFR BLD AUTO: 43.9 % (ref 40–53)
HDLC SERPL-MCNC: 41 MG/DL
HGB BLD-MCNC: 14.2 G/DL (ref 13.3–17.7)
INTERPRETATION ECG - MUSE: NORMAL
LDLC SERPL CALC-MCNC: 129 MG/DL
LVEF ECHO: NORMAL
MCH RBC QN AUTO: 30.6 PG (ref 26.5–33)
MCHC RBC AUTO-ENTMCNC: 32.3 G/DL (ref 31.5–36.5)
MCV RBC AUTO: 95 FL (ref 78–100)
P AXIS - MUSE: -29 DEGREES
PCO2 BLDA: 41 MM HG (ref 35–45)
PCO2 BLDV: 46 MM HG (ref 35–50)
PH BLDA: 7.42 [PH] (ref 7.37–7.44)
PH BLDV: 7.4 [PH] (ref 7.35–7.45)
PLATELET # BLD AUTO: 203 10E3/UL (ref 150–450)
PO2 BLDA: 59 MM HG (ref 75–85)
PO2 BLDV: 34 MM HG (ref 25–47)
POTASSIUM BLD-SCNC: 3.8 MMOL/L (ref 3.5–5)
PR INTERVAL - MUSE: 174 MS
QRS DURATION - MUSE: 140 MS
QT - MUSE: 482 MS
QTC - MUSE: 477 MS
R AXIS - MUSE: 106 DEGREES
RBC # BLD AUTO: 4.64 10E6/UL (ref 4.4–5.9)
SATV LHE POCT: 64.4 % (ref 70–75)
SODIUM SERPL-SCNC: 140 MMOL/L (ref 136–145)
SPECIMEN EXPIRATION DATE: NORMAL
SYSTOLIC BLOOD PRESSURE - MUSE: NORMAL MMHG
T AXIS - MUSE: -80 DEGREES
TRIGL SERPL-MCNC: 76 MG/DL
VENTRICULAR RATE- MUSE: 59 BPM
WBC # BLD AUTO: 6.5 10E3/UL (ref 4–11)

## 2022-09-19 PROCEDURE — 93461 R&L HRT ART/VENTRICLE ANGIO: CPT | Performed by: INTERNAL MEDICINE

## 2022-09-19 PROCEDURE — 255N000002 HC RX 255 OP 636: Performed by: INTERNAL MEDICINE

## 2022-09-19 PROCEDURE — 80061 LIPID PANEL: CPT | Performed by: INTERNAL MEDICINE

## 2022-09-19 PROCEDURE — 85014 HEMATOCRIT: CPT | Performed by: INTERNAL MEDICINE

## 2022-09-19 PROCEDURE — 250N000013 HC RX MED GY IP 250 OP 250 PS 637: Performed by: INTERNAL MEDICINE

## 2022-09-19 PROCEDURE — 82805 BLOOD GASES W/O2 SATURATION: CPT

## 2022-09-19 PROCEDURE — 82310 ASSAY OF CALCIUM: CPT | Performed by: INTERNAL MEDICINE

## 2022-09-19 PROCEDURE — 93010 ELECTROCARDIOGRAM REPORT: CPT | Mod: HOP | Performed by: INTERNAL MEDICINE

## 2022-09-19 PROCEDURE — 93306 TTE W/DOPPLER COMPLETE: CPT | Mod: 26 | Performed by: INTERNAL MEDICINE

## 2022-09-19 PROCEDURE — 93461 R&L HRT ART/VENTRICLE ANGIO: CPT | Mod: 26 | Performed by: INTERNAL MEDICINE

## 2022-09-19 PROCEDURE — C1769 GUIDE WIRE: HCPCS | Performed by: INTERNAL MEDICINE

## 2022-09-19 PROCEDURE — 250N000009 HC RX 250: Performed by: INTERNAL MEDICINE

## 2022-09-19 PROCEDURE — C1894 INTRO/SHEATH, NON-LASER: HCPCS | Performed by: INTERNAL MEDICINE

## 2022-09-19 PROCEDURE — 36415 COLL VENOUS BLD VENIPUNCTURE: CPT | Performed by: INTERNAL MEDICINE

## 2022-09-19 PROCEDURE — 250N000011 HC RX IP 250 OP 636: Performed by: INTERNAL MEDICINE

## 2022-09-19 PROCEDURE — 999N000054 HC STATISTIC EKG NON-CHARGEABLE

## 2022-09-19 PROCEDURE — 86901 BLOOD TYPING SEROLOGIC RH(D): CPT | Performed by: INTERNAL MEDICINE

## 2022-09-19 PROCEDURE — C1751 CATH, INF, PER/CENT/MIDLINE: HCPCS | Performed by: INTERNAL MEDICINE

## 2022-09-19 PROCEDURE — 272N000001 HC OR GENERAL SUPPLY STERILE: Performed by: INTERNAL MEDICINE

## 2022-09-19 PROCEDURE — 93005 ELECTROCARDIOGRAM TRACING: CPT

## 2022-09-19 PROCEDURE — 258N000003 HC RX IP 258 OP 636: Performed by: INTERNAL MEDICINE

## 2022-09-19 PROCEDURE — 93306 TTE W/DOPPLER COMPLETE: CPT

## 2022-09-19 RX ORDER — METOPROLOL SUCCINATE 25 MG/1
25 TABLET, EXTENDED RELEASE ORAL DAILY
Status: DISCONTINUED | OUTPATIENT
Start: 2022-09-19 | End: 2022-09-19 | Stop reason: HOSPADM

## 2022-09-19 RX ORDER — NALOXONE HYDROCHLORIDE 0.4 MG/ML
0.4 INJECTION, SOLUTION INTRAMUSCULAR; INTRAVENOUS; SUBCUTANEOUS
Status: DISCONTINUED | OUTPATIENT
Start: 2022-09-19 | End: 2022-09-19 | Stop reason: HOSPADM

## 2022-09-19 RX ORDER — NALOXONE HYDROCHLORIDE 0.4 MG/ML
0.2 INJECTION, SOLUTION INTRAMUSCULAR; INTRAVENOUS; SUBCUTANEOUS
Status: DISCONTINUED | OUTPATIENT
Start: 2022-09-19 | End: 2022-09-19 | Stop reason: HOSPADM

## 2022-09-19 RX ORDER — IODIXANOL 320 MG/ML
INJECTION, SOLUTION INTRAVASCULAR
Status: DISCONTINUED | OUTPATIENT
Start: 2022-09-19 | End: 2022-09-19 | Stop reason: HOSPADM

## 2022-09-19 RX ORDER — ATROPINE SULFATE 0.1 MG/ML
0.5 INJECTION INTRAVENOUS
Status: DISCONTINUED | OUTPATIENT
Start: 2022-09-19 | End: 2022-09-19 | Stop reason: HOSPADM

## 2022-09-19 RX ORDER — FENTANYL CITRATE 50 UG/ML
25 INJECTION, SOLUTION INTRAMUSCULAR; INTRAVENOUS
Status: DISCONTINUED | OUTPATIENT
Start: 2022-09-19 | End: 2022-09-19 | Stop reason: HOSPADM

## 2022-09-19 RX ORDER — ASPIRIN 81 MG/1
81 TABLET ORAL DAILY
Status: DISCONTINUED | OUTPATIENT
Start: 2022-09-19 | End: 2022-09-19 | Stop reason: HOSPADM

## 2022-09-19 RX ORDER — HYDRALAZINE HYDROCHLORIDE 20 MG/ML
10 INJECTION INTRAMUSCULAR; INTRAVENOUS EVERY 6 HOURS PRN
Status: DISCONTINUED | OUTPATIENT
Start: 2022-09-19 | End: 2022-09-19 | Stop reason: HOSPADM

## 2022-09-19 RX ORDER — OXYCODONE HYDROCHLORIDE 5 MG/1
5 TABLET ORAL EVERY 4 HOURS PRN
Status: DISCONTINUED | OUTPATIENT
Start: 2022-09-19 | End: 2022-09-19 | Stop reason: HOSPADM

## 2022-09-19 RX ORDER — ACETAMINOPHEN 325 MG/1
650 TABLET ORAL DAILY PRN
Status: DISCONTINUED | OUTPATIENT
Start: 2022-09-19 | End: 2022-09-19 | Stop reason: HOSPADM

## 2022-09-19 RX ORDER — ASPIRIN 325 MG
325 TABLET ORAL ONCE
Status: DISCONTINUED | OUTPATIENT
Start: 2022-09-19 | End: 2022-09-19 | Stop reason: HOSPADM

## 2022-09-19 RX ORDER — LIDOCAINE 40 MG/G
CREAM TOPICAL
Status: DISCONTINUED | OUTPATIENT
Start: 2022-09-19 | End: 2022-09-19 | Stop reason: HOSPADM

## 2022-09-19 RX ORDER — FLUMAZENIL 0.1 MG/ML
0.2 INJECTION, SOLUTION INTRAVENOUS
Status: DISCONTINUED | OUTPATIENT
Start: 2022-09-19 | End: 2022-09-19 | Stop reason: HOSPADM

## 2022-09-19 RX ORDER — SOTALOL HYDROCHLORIDE 80 MG/1
80 TABLET ORAL 2 TIMES DAILY
Status: DISCONTINUED | OUTPATIENT
Start: 2022-09-19 | End: 2022-09-19 | Stop reason: HOSPADM

## 2022-09-19 RX ORDER — TAMSULOSIN HYDROCHLORIDE 0.4 MG/1
0.4 CAPSULE ORAL DAILY
Status: DISCONTINUED | OUTPATIENT
Start: 2022-09-19 | End: 2022-09-19 | Stop reason: HOSPADM

## 2022-09-19 RX ORDER — DIAZEPAM 5 MG
5 TABLET ORAL
Status: COMPLETED | OUTPATIENT
Start: 2022-09-19 | End: 2022-09-19

## 2022-09-19 RX ORDER — OXYCODONE HYDROCHLORIDE 5 MG/1
10 TABLET ORAL EVERY 4 HOURS PRN
Status: DISCONTINUED | OUTPATIENT
Start: 2022-09-19 | End: 2022-09-19 | Stop reason: HOSPADM

## 2022-09-19 RX ORDER — ASPIRIN 81 MG/1
243 TABLET, CHEWABLE ORAL ONCE
Status: DISCONTINUED | OUTPATIENT
Start: 2022-09-19 | End: 2022-09-19 | Stop reason: HOSPADM

## 2022-09-19 RX ORDER — GABAPENTIN 300 MG/1
900 CAPSULE ORAL 2 TIMES DAILY
Status: DISCONTINUED | OUTPATIENT
Start: 2022-09-19 | End: 2022-09-19 | Stop reason: HOSPADM

## 2022-09-19 RX ORDER — ACETAMINOPHEN 325 MG/1
650 TABLET ORAL EVERY 4 HOURS PRN
Status: DISCONTINUED | OUTPATIENT
Start: 2022-09-19 | End: 2022-09-19 | Stop reason: HOSPADM

## 2022-09-19 RX ORDER — SODIUM CHLORIDE 9 MG/ML
INJECTION, SOLUTION INTRAVENOUS CONTINUOUS
Status: DISCONTINUED | OUTPATIENT
Start: 2022-09-19 | End: 2022-09-19 | Stop reason: HOSPADM

## 2022-09-19 RX ADMIN — SODIUM CHLORIDE: 9 INJECTION, SOLUTION INTRAVENOUS at 08:07

## 2022-09-19 RX ADMIN — METOPROLOL SUCCINATE 25 MG: 25 TABLET, EXTENDED RELEASE ORAL at 13:34

## 2022-09-19 RX ADMIN — HYDRALAZINE HYDROCHLORIDE 10 MG: 20 INJECTION, SOLUTION INTRAMUSCULAR; INTRAVENOUS at 13:11

## 2022-09-19 RX ADMIN — DIAZEPAM 5 MG: 5 TABLET ORAL at 10:11

## 2022-09-19 ASSESSMENT — EJECTION FRACTION
EF_VALUE: .37
EF_VALUE: .39
EF_VALUE: .21

## 2022-09-19 ASSESSMENT — ACTIVITIES OF DAILY LIVING (ADL)
ADLS_ACUITY_SCORE: 35
ADLS_ACUITY_SCORE: 35
ADLS_ACUITY_SCORE: 36
ADLS_ACUITY_SCORE: 35

## 2022-09-19 NOTE — DISCHARGE INSTRUCTIONS
You have a PET Cardiac Viability test scheduled for this Friday, September 23 at 11:00AM. You will go to   [address] [place] [address]  Check your email on file for special instructions, you will also receive a phone call this week with specific instructions about the upcoming test. If you need to reschedule, call [phone].    Interventional Cardiology  Coronary Angiogram/Angioplasty/Stent/Atherectomy Discharge Instructions -   Femoral Approach    The instructions below are to help you understand how to take care of yourself. There is also information about when to call the doctor or emergency services    **Do not stop your aspirin or platelet inhibitor unless directed by your Cardiologist.  These medications help to prevent platelets in your blood from sticking together and forming a clot.  Examples of these medications are:  Ticagrelor (Brilinta), Clopidigrel (Plavix), Prasugrel (Effient)          For the first 72 hours after your procedure:  No driving for 24 hours  Do not lift more than 15 pounds.  If you usually lift 50 pounds or more daily, please contact your cardiologist  Avoid any hard work or tiring activities, this includes, yard work, jogging, biking, sexual activity  During the day get up and walk around every 2 hours  You may return to work after 72 hours if you are feeling well and your job does not involve heavy lifting          Groin Site / Wound Care / Bleeding    You may take off the dressing on your groin the day after your procedure  Keep the area dry and clean  It is ok to shower with regular soap.  Pat dry, do not rub  You do not need to use a bandage  No tub/pool or hot tub for 1 week  If your groin starts to bleed or begins to swell suddenly after leaving the hospital, lie flat and apply firm pressure above the site for 15 minutes.  If bleeding continues, call 9-1-1  Bruising around the groin area is normal.  It may take 2-3 weeks for this to go away.  It is normal for the  bruised area to turn green and/or yellow as it is healing.  A small lump may also be present and may last 2-3 months.  Your leg may be sore or stiff for a few days.  You may take Tylenol or a pain medicine recommended by your doctor  If you have a fever over 100.4, that lasts more than one day - call your cardiologist.      Our Cardiac Rehab staff may visit briefly with you while your in the hospital.  If they miss you, someone will contact you after you are home.  You are encouraged to enroll in an Outpatient Cardiac Rehab Program       No driving for 24 hours      Your Procedural Physician was: Dr. Tyrone Ordoñez  the phone number is: [phone]      [facility] [facility]:  [phone]  If you are calling after hours, please listen to the entire voicemail, a live  will answer at the end of the message

## 2022-09-19 NOTE — PRE-PROCEDURE
GENERAL PRE-PROCEDURE:   Procedure:  Cor angio with possible intervention, [facility]  Date/Time:  9/19/2022 8:41 AM    Written consent obtained?: Yes    Risks and benefits: Risks, benefits and alternatives were discussed    Consent given by:  Patient  Patient states understanding of procedure being performed: Yes    Patient's understanding of procedure matches consent: Yes    Procedure consent matches procedure scheduled: Yes    Expected level of sedation:  Moderate  Appropriately NPO:  Yes  ASA Class:  3 (HFrEF, isch CM, CAD s/p CABG, AFib, PAD s/p iliac stents, ICD in 2018, increased fatigue/GALVEZ, ongoing tobacco use)  Mallampati  :  Grade 2- soft palate, base of uvula, tonsillar pillars, and portion of posterior pharyngeal wall visible  Lungs:  Lungs clear with good breath sounds bilaterally  Heart:  Normal heart sounds and rate  History & Physical reviewed:  History and physical reviewed and no updates needed  Statement of review:  I have reviewed the lab findings, diagnostic data, medications, and the plan for sedation

## 2022-09-19 NOTE — PROGRESS NOTES
Patient was kept comfortable during post-procedure stay.VSS. Denies pain. Right femoral access site remains dry & free from signs of bleeding. Appointment made & included in AVS, including a f/u with Dr. Hardy and a PET Cardiac Viability test scheduled for this Friday. Dr. Ordoñez was able to speak with patient and his wife post-procedure.  Pt able to eat, drink, ambulate and void without problem post-procedure.  Post-op instructions given to patient & spouse. Able to ask questions. Verbalized no concerns. Belongings returned. Discharged to home in stable condition.

## 2022-09-19 NOTE — INTERVAL H&P NOTE
I have reviewed the surgical (or preoperative) H&P that is linked to this encounter, and examined the patient. There are no significant changes    Clinical Conditions Present on Arrival:  Clinically Significant Risk Factors Present on Admission                 # Coagulation Defect: home medication list includes an anticoagulant medication      Eliquis has been on hold

## 2022-09-22 ENCOUNTER — TELEPHONE (OUTPATIENT)
Dept: CARDIOLOGY | Facility: CLINIC | Age: 82
End: 2022-09-22

## 2022-09-22 NOTE — TELEPHONE ENCOUNTER
----- Message from Luis Hardy MD sent at 9/19/2022  7:22 PM CDT -----  Regarding: RE:  Tough case go thanks and agree with your plan  ----- Message -----  From: Tyrone Ordoñez MD  Sent: 9/19/2022  12:48 PM CDT  To: Luis Hardy MD, #    Update - I looked at  TTE at the bedside, and on  limited images his mean gradient is ~13 w/ a SVI of 30, and a severely restricted valve.  My sense is that he likely has severe low flow-low gradient aortic stenosis and may benefit from TAVR.  I woul offer a valve clinic visit to go over risks/benefits, and if consistent with his goals of care, we could likely offer chuy  procedure (likely alternative access due to severe PA/extensive iliac system stents).    Thx!  Tyrone

## 2022-09-23 ENCOUNTER — ANCILLARY PROCEDURE (OUTPATIENT)
Dept: PET IMAGING | Facility: CLINIC | Age: 82
End: 2022-09-23
Attending: INTERNAL MEDICINE
Payer: MEDICARE

## 2022-09-23 DIAGNOSIS — I25.118 CORONARY ARTERY DISEASE OF NATIVE HEART WITH STABLE ANGINA PECTORIS, UNSPECIFIED VESSEL OR LESION TYPE (H): Primary | ICD-10-CM

## 2022-09-23 LAB — GLUCOSE SERPL-MCNC: 150 MG/DL (ref 70–99)

## 2022-09-24 ENCOUNTER — HEALTH MAINTENANCE LETTER (OUTPATIENT)
Age: 82
End: 2022-09-24

## 2022-09-27 ENCOUNTER — TELEPHONE (OUTPATIENT)
Dept: CARDIOLOGY | Facility: CLINIC | Age: 82
End: 2022-09-27

## 2022-09-27 NOTE — TELEPHONE ENCOUNTER
From: Rickie Hannon  Sent: 9/26/2022   3:59 PM CDT  To: CYNDIE Jacobsen,     He is all scheduled for his valve clinic consultation with Dr. Ordoñez here at the [place] location on 11/5 at 3:50pm.    Thank you,  Phan

## 2022-10-05 ENCOUNTER — TELEPHONE (OUTPATIENT)
Dept: CARDIOLOGY | Facility: CLINIC | Age: 82
End: 2022-10-05

## 2022-10-05 NOTE — TELEPHONE ENCOUNTER
Laly Swanson RN   10/3/2022  1:06 PM CDT         Tyrone Ordoñez MD Caswell, Mallory J, RN  Let's see him in clinic first, then can set up PCI to diagonal.      Thx!             Previous Messages        ----- Message -----   From: Laly Swanson RN   Sent: 9/30/2022   1:33 PM CDT   To: Tyrone Ordoñez MD      Hi Dr. Ordoñez,   I can see some back and forth on this gentleman regarding the plan between this and his recent echo. I just wanted to [place] back and see if I need to set up a staged intervention/make sure I am not missing anything! I cannot see if Dr. Hardy responded on if this should be done pre or post valve clinic appt.   Thank you,   Mal            ==Called patient and updated on message from MY. He verbalized understanding and he will keep his upcoming appts as scheduled. The plan will be further discussed during these visits. -[facility]    Tyrone Ordoñez MD   9/23/2022  5:37 PM CDT         Dalton and Luis,     PET is done and shows mostly old infarcts with some agustin-infarct ischemia in LAD territory. Theoretically, may be reaosnable to revascularize D pre=tentative TAVR.     Thx!  Tyrone

## 2022-10-05 NOTE — TELEPHONE ENCOUNTER
Encounter created for documentation. Plan after review of cardiac PET is for patient to be seen in clinic to discuss first per MY. Pt is aware that this is the plan. -srinivas

## 2022-10-10 ENCOUNTER — OFFICE VISIT (OUTPATIENT)
Dept: CARDIOLOGY | Facility: CLINIC | Age: 82
End: 2022-10-10
Payer: MEDICARE

## 2022-10-10 VITALS
WEIGHT: 143 LBS | HEIGHT: 70 IN | BODY MASS INDEX: 20.47 KG/M2 | HEART RATE: 47 BPM | SYSTOLIC BLOOD PRESSURE: 112 MMHG | DIASTOLIC BLOOD PRESSURE: 66 MMHG | RESPIRATION RATE: 16 BRPM

## 2022-10-10 DIAGNOSIS — I25.83 CORONARY ARTERIOSCLEROSIS DUE TO LIPID RICH PLAQUE: Primary | ICD-10-CM

## 2022-10-10 DIAGNOSIS — I50.20 HEART FAILURE WITH REDUCED EJECTION FRACTION, NYHA CLASS II (H): ICD-10-CM

## 2022-10-10 DIAGNOSIS — I35.0 NONRHEUMATIC AORTIC VALVE STENOSIS: ICD-10-CM

## 2022-10-10 DIAGNOSIS — Z95.1 S/P CABG (CORONARY ARTERY BYPASS GRAFT): ICD-10-CM

## 2022-10-10 DIAGNOSIS — I48.0 PAROXYSMAL ATRIAL FIBRILLATION (H): ICD-10-CM

## 2022-10-10 DIAGNOSIS — I25.5 ISCHEMIC CARDIOMYOPATHY: ICD-10-CM

## 2022-10-10 DIAGNOSIS — E78.00 PURE HYPERCHOLESTEROLEMIA: ICD-10-CM

## 2022-10-10 PROBLEM — Z98.890 STATUS POST CORONARY ANGIOGRAM: Status: RESOLVED | Noted: 2022-09-19 | Resolved: 2022-10-10

## 2022-10-10 PROCEDURE — 99214 OFFICE O/P EST MOD 30 MIN: CPT | Performed by: INTERNAL MEDICINE

## 2022-10-10 NOTE — PATIENT INSTRUCTIONS
Mr. Daniel Alamo,  It certainly was nice to meet you today.  Per our conversation you have some blockages and a bad aortic valve.  Based on PET I would not consider a repeat stent but would consider taking care of the valve.  I will plan on seeing you thereafter.  Corey Vargas

## 2022-10-10 NOTE — LETTER
10/10/2022    Kenna Calvillo, NP  [address] [facility] [address]    RE: Daniel Alamo       Dear Colleague,     I had the pleasure of seeing Daniel Alamo in the [facility].      [facility]  Heart Care Clinic Follow-up Note    Assessment & Plan        (I25.10,  I25.83) Coronary arteriosclerosis due to lipid rich plaque  (primary encounter diagnosis)  Comment: Angiography September 2022 showed normal left main, totally occluded mid LAD, circumflex normal with first obtuse marginal artery 70 to 80% stenosis, and proximal total occlusion of the right coronary artery.  PET viability study showed transmural scar involving the inferior and inferolateral wall with only some mild agustin-infarct anterolateral ischemia.  Given this, I would continue medical management.    (Z95.1) S/P CABG (coronary artery bypass graft)  Comment: Approximately 30 years ago had a LIMA to the LAD which is patent, vein graft to OM which is occluded and vein graft right coronary which is occluded.  Medical therapy as above.    (I35.0) Nonrheumatic aortic valve stenosis  Comment: Felt to be low-flow low gradient in the setting of ejection fraction of 30 to 35% with a 16mm mean gradient and valve area 0.86 cm .  Would favor TAVR considering this.    (I50.20) Heart failure with reduced ejection fraction, NYHA class II (H)  Comment: Might be causing his issues although not on diuretic.  If not pursuing valve procedure would consider diuretic therapy and checking BMP.  His wedge however on angiography was only 8 mmHg.    (I25.5) Ischemic cardiomyopathy  Comment: 30 to 35% and has ICD device in place.    (I48.0) Paroxysmal atrial fibrillation (H)  Comment: Currently a paced, on sotalol with QT corrected 477 ms, on Eliquis 5 mg twice a day, might need to consider lowering this to 2.5 mg given his weight.    (E78.00) Pure hypercholesterolemia  Comment: Total cholesterol 185 with LDL of 129, needs slightly better control  "than this.    Plan  1.  Defer to TAVR team, would pursue this.  2.  No need for diuretics just yet.  3.  Can follow with me.    Subjective  CC: 82-year-old white gentleman here as an urgent visit.  During rapid access clinic, has had generalized increased fatigue.  Might have some worsening shortness of breath as well.  No significant chest pains, palpitations, PND, orthopnea, syncope, dizziness or peripheral edema.    Medications  Current Outpatient Medications   Medication Sig Dispense Refill     acetaminophen (TYLENOL) 325 MG tablet Take 650 mg by mouth daily as needed        apixaban ANTICOAGULANT (ELIQUIS) 5 MG tablet 5 mg 2 times daily        aspirin (ASA) 81 MG EC tablet Take 81 mg by mouth daily       gabapentin (NEURONTIN) 300 MG capsule Take 900 mg by mouth 2 times daily        metoprolol succinate ER (TOPROL XL) 25 MG 24 hr tablet Take 1 tablet (25 mg) by mouth daily 90 tablet 11     sotalol (BETAPACE) 80 MG tablet Take 1 tablet (80 mg) by mouth 2 times daily 180 tablet 1     tamsulosin (FLOMAX) 0.4 MG capsule TAKE ONE CAPSULE BY MOUTH ONE TIME DAILY         Objective  /66 (BP Location: Right arm, Patient Position: Sitting, Cuff Size: Adult Regular)   Pulse (!) 47   Resp 16   Ht 1.778 m (5' 10\")   Wt 64.9 kg (143 lb)   BMI 20.52 kg/m      General Appearance:    Alert, cooperative, no distress, appears stated age   Head:    Normocephalic, without obvious abnormality, atraumatic   Throat:   Lips, mucosa, and tongue normal; teeth and gums normal   Neck:   Supple, symmetrical, trachea midline, no adenopathy;        thyroid:  No enlargement/tenderness/nodules; no carotid    bruit or JVD   Back:     Symmetric, no curvature, ROM normal, no CVA tenderness   Lungs:     Clear to auscultation bilaterally, respirations unlabored   Chest wall:    No tenderness, midline sternotomy scar as well as left-sided ICD noted   Heart:    Regular rate and rhythm, S1 and S2 normal, 2/6 mid-to-late peaking systolic " ejection murmur, no rub   or gallop   Abdomen:     Soft, non-tender, bowel sounds active all four quadrants,     no masses, no organomegaly   Extremities:   Normal, atraumatic, no cyanosis or edema   Pulses:   2+ and symmetric all extremities   Skin:   Skin color, texture, turgor normal, no rashes or lesions     Results    Lab Results personally reviewed   Lab Results   Component Value Date    CHOL 185 09/19/2022    CHOL 150 12/06/2019     Lab Results   Component Value Date    HDL 41 09/19/2022    HDL 36 (L) 12/06/2019     No components found for: LDLCALC  Lab Results   Component Value Date    TRIG 76 09/19/2022    TRIG 70 12/06/2019     Lab Results   Component Value Date    WBC 6.5 09/19/2022    HGB 14.2 09/19/2022    HCT 43.9 09/19/2022     09/19/2022     Lab Results   Component Value Date    BUN 14 09/19/2022     09/19/2022    CO2 28 09/19/2022       Reviewed electrocardiogram atrial paced ventricular sensed right bundle branch block pattern with corrected  ms            Thank you for allowing me to participate in the care of your patient.      Sincerely,     GIO GOMES MD     [facility]  cc:   Luis Hardy MD  [address]  [place] [id number]

## 2022-10-10 NOTE — PROGRESS NOTES
[facility]  Heart Care Clinic Follow-up Note    Assessment & Plan        (I25.10,  I25.83) Coronary arteriosclerosis due to lipid rich plaque  (primary encounter diagnosis)  Comment: Angiography September 2022 showed normal left main, totally occluded mid LAD, circumflex normal with first obtuse marginal artery 70 to 80% stenosis, and proximal total occlusion of the right coronary artery.  PET viability study showed transmural scar involving the inferior and inferolateral wall with only some mild agustin-infarct anterolateral ischemia.  Given this, I would continue medical management.    (Z95.1) S/P CABG (coronary artery bypass graft)  Comment: Approximately 30 years ago had a LIMA to the LAD which is patent, vein graft to OM which is occluded and vein graft right coronary which is occluded.  Medical therapy as above.    (I35.0) Nonrheumatic aortic valve stenosis  Comment: Felt to be low-flow low gradient in the setting of ejection fraction of 30 to 35% with a 16mm mean gradient and valve area 0.86 cm .  Would favor TAVR considering this.    (I50.20) Heart failure with reduced ejection fraction, NYHA class II (H)  Comment: Might be causing his issues although not on diuretic.  If not pursuing valve procedure would consider diuretic therapy and checking BMP.  His wedge however on angiography was only 8 mmHg.    (I25.5) Ischemic cardiomyopathy  Comment: 30 to 35% and has ICD device in place.    (I48.0) Paroxysmal atrial fibrillation (H)  Comment: Currently a paced, on sotalol with QT corrected 477 ms, on Eliquis 5 mg twice a day, might need to consider lowering this to 2.5 mg given his weight.    (E78.00) Pure hypercholesterolemia  Comment: Total cholesterol 185 with LDL of 129, needs slightly better control than this.    Plan  1.  Defer to TAVR team, would pursue this.  2.  No need for diuretics just yet.  3.  Can follow with me.    Subjective  CC: 82-year-old white gentleman here as an urgent visit.  During  "rapid access clinic, has had generalized increased fatigue.  Might have some worsening shortness of breath as well.  No significant chest pains, palpitations, PND, orthopnea, syncope, dizziness or peripheral edema.    Medications  Current Outpatient Medications   Medication Sig Dispense Refill     acetaminophen (TYLENOL) 325 MG tablet Take 650 mg by mouth daily as needed        apixaban ANTICOAGULANT (ELIQUIS) 5 MG tablet 5 mg 2 times daily        aspirin (ASA) 81 MG EC tablet Take 81 mg by mouth daily       gabapentin (NEURONTIN) 300 MG capsule Take 900 mg by mouth 2 times daily        metoprolol succinate ER (TOPROL XL) 25 MG 24 hr tablet Take 1 tablet (25 mg) by mouth daily 90 tablet 11     sotalol (BETAPACE) 80 MG tablet Take 1 tablet (80 mg) by mouth 2 times daily 180 tablet 1     tamsulosin (FLOMAX) 0.4 MG capsule TAKE ONE CAPSULE BY MOUTH ONE TIME DAILY         Objective  /66 (BP Location: Right arm, Patient Position: Sitting, Cuff Size: Adult Regular)   Pulse (!) 47   Resp 16   Ht 1.778 m (5' 10\")   Wt 64.9 kg (143 lb)   BMI 20.52 kg/m      General Appearance:    Alert, cooperative, no distress, appears stated age   Head:    Normocephalic, without obvious abnormality, atraumatic   Throat:   Lips, mucosa, and tongue normal; teeth and gums normal   Neck:   Supple, symmetrical, trachea midline, no adenopathy;        thyroid:  No enlargement/tenderness/nodules; no carotid    bruit or JVD   Back:     Symmetric, no curvature, ROM normal, no CVA tenderness   Lungs:     Clear to auscultation bilaterally, respirations unlabored   Chest wall:    No tenderness, midline sternotomy scar as well as left-sided ICD noted   Heart:    Regular rate and rhythm, S1 and S2 normal, 2/6 mid-to-late peaking systolic ejection murmur, no rub   or gallop   Abdomen:     Soft, non-tender, bowel sounds active all four quadrants,     no masses, no organomegaly   Extremities:   Normal, atraumatic, no cyanosis or edema   Pulses:   " 2+ and symmetric all extremities   Skin:   Skin color, texture, turgor normal, no rashes or lesions     Results    Lab Results personally reviewed   Lab Results   Component Value Date    CHOL 185 09/19/2022    CHOL 150 12/06/2019     Lab Results   Component Value Date    HDL 41 09/19/2022    HDL 36 (L) 12/06/2019     No components found for: LDLCALC  Lab Results   Component Value Date    TRIG 76 09/19/2022    TRIG 70 12/06/2019     Lab Results   Component Value Date    WBC 6.5 09/19/2022    HGB 14.2 09/19/2022    HCT 43.9 09/19/2022     09/19/2022     Lab Results   Component Value Date    BUN 14 09/19/2022     09/19/2022    CO2 28 09/19/2022       Reviewed electrocardiogram atrial paced ventricular sensed right bundle branch block pattern with corrected  ms

## 2022-11-01 ENCOUNTER — TELEPHONE (OUTPATIENT)
Dept: CARDIOLOGY | Facility: CLINIC | Age: 82
End: 2022-11-01

## 2022-11-01 ENCOUNTER — ANCILLARY PROCEDURE (OUTPATIENT)
Dept: CARDIOLOGY | Facility: CLINIC | Age: 82
End: 2022-11-01
Attending: INTERNAL MEDICINE
Payer: MEDICARE

## 2022-11-01 DIAGNOSIS — Z95.810 ICD (IMPLANTABLE CARDIOVERTER-DEFIBRILLATOR) IN PLACE: ICD-10-CM

## 2022-11-01 DIAGNOSIS — I48.0 PAROXYSMAL ATRIAL FIBRILLATION (H): Primary | ICD-10-CM

## 2022-11-01 DIAGNOSIS — I25.5 ISCHEMIC CARDIOMYOPATHY: ICD-10-CM

## 2022-11-01 NOTE — TELEPHONE ENCOUNTER
----- Message from Jackie GRACIA Jay sent at 11/1/2022  6:47 AM CDT -----  Regarding: device RN review  No charge. No Epic interface required.  Type: alert remote ICD transmission for AT/AF for 6/24hrs.   Presenting rhythm: A-V paced 60 bpm. Significant atrial lead noise noted.  Battery/lead status: Stable.  Arrhythmias: since 8/24/22; 362 mode switch episodes, available EGMs show RA lead noise.  Anticoagulant: Eliquis.  Comments: Routed to device RN for review. TAQUERIA Thompson, Device Specialist      Transmission reviewed presenting EGM difficult to tell if noise or  A. fib.  Possible A. fib as the atrial paces do not seem to be captured, followed by V pace.  Patient does have good intrinsic AV conduction when in sinus rhythm.  Question whether this is true AF.  Patient currently on Eliquis already also takes sotalol and metoprolol.       Patient has history of RA lead noise.  Was seen back in February with Dr. Harrell, and at that time RA lead sensitivity was changed from 0.5 mV to 0.3 mV, plan is not clear.    Reviewed transmission results with patient.  States he does feel a little more tired today, but does not necessarily contribute this to change in rhythm.  Reviewed with patient that it is difficult to tell if presenting rhythm shows A. fib or significant lead noise.  Patient confirms he has been compliant with Sotalol, Metoprolol and Eliquis.    Reviewed with patient that this point we should have him seen with our EP MD  and device RNs for further lead testing, rule in/out AF (if patient still in AF at upcoming appt.).  Advised patient to call with any troublesome symptoms prior to his device check.  Patient verbalized understanding and appreciates the information/followup.    Andria Auguste, RN

## 2022-11-21 DIAGNOSIS — I35.0 NONRHEUMATIC AORTIC VALVE STENOSIS: Primary | ICD-10-CM

## 2022-11-21 DIAGNOSIS — I48.0 PAROXYSMAL ATRIAL FIBRILLATION (H): ICD-10-CM

## 2022-11-21 RX ORDER — SOTALOL HYDROCHLORIDE 80 MG/1
80 TABLET ORAL 2 TIMES DAILY
Qty: 180 TABLET | Refills: 1 | Status: SHIPPED | OUTPATIENT
Start: 2022-11-21 | End: 2022-11-22

## 2022-11-22 ENCOUNTER — ANCILLARY PROCEDURE (OUTPATIENT)
Dept: CARDIOLOGY | Facility: CLINIC | Age: 82
End: 2022-11-22
Attending: INTERNAL MEDICINE
Payer: MEDICARE

## 2022-11-22 VITALS
DIASTOLIC BLOOD PRESSURE: 68 MMHG | RESPIRATION RATE: 18 BRPM | BODY MASS INDEX: 21.09 KG/M2 | OXYGEN SATURATION: 100 % | SYSTOLIC BLOOD PRESSURE: 122 MMHG | WEIGHT: 147 LBS | HEART RATE: 50 BPM

## 2022-11-22 DIAGNOSIS — I48.0 PAROXYSMAL ATRIAL FIBRILLATION (H): Primary | ICD-10-CM

## 2022-11-22 DIAGNOSIS — Z95.810 ICD (IMPLANTABLE CARDIOVERTER-DEFIBRILLATOR) IN PLACE: ICD-10-CM

## 2022-11-22 DIAGNOSIS — I49.5 SINUS NODE DYSFUNCTION (H): ICD-10-CM

## 2022-11-22 DIAGNOSIS — Z95.810 ICD (IMPLANTABLE CARDIOVERTER-DEFIBRILLATOR), DUAL, IN SITU: Primary | ICD-10-CM

## 2022-11-22 DIAGNOSIS — I50.20 HEART FAILURE WITH REDUCED EJECTION FRACTION, NYHA CLASS II (H): ICD-10-CM

## 2022-11-22 DIAGNOSIS — I48.0 PAROXYSMAL ATRIAL FIBRILLATION (H): ICD-10-CM

## 2022-11-22 PROCEDURE — 93283 PRGRMG EVAL IMPLANTABLE DFB: CPT | Performed by: INTERNAL MEDICINE

## 2022-11-22 PROCEDURE — 99214 OFFICE O/P EST MOD 30 MIN: CPT | Performed by: INTERNAL MEDICINE

## 2022-11-22 RX ORDER — SOTALOL HYDROCHLORIDE 80 MG/1
80 TABLET ORAL 2 TIMES DAILY
Qty: 180 TABLET | Refills: 3 | Status: SHIPPED | OUTPATIENT
Start: 2022-11-22 | End: 2023-05-17

## 2022-11-22 NOTE — PROGRESS NOTES
[facility]  Cardiac Electrophysiology  [address]  [place] [address]   Office: [phone]  Fax: [phone]     Cardiac Electrophysiology Follow-up Visit    Patient: Daniel Alamo   : 1940     Primary Care Provider: Kenna Calvillo NP    CHIEF COMPLAINT/REASON FOR VISIT  Dual chamber ICD in-situ ([employer], 2004, generator replacement 10/15/2018)  Chronic systolic heart failure related to ischemic cardiomyopathy (LVEF 30-35% by 2022)  Paroxysmal atrial fibrillation  Sinus node dysfunction    Assessment/Recommendations   Daniel Alamo is a 82 year old male with chronic systolic heart failure related to ischemic cardiomyopathy (LVEF 30-35% by 2022), paroxysmal atrial fibrillation, sinus node dysfunction, dual chamber ICD in-situ ([employer], 2004, generator replacement 10/15/2018), CAD with prior CABG, aortic stenosis presenting for evaluation of ICD in-situ, paroxysmal atrial fibrillation.    Dual chamber ICD in-situ - [employer], 2004, generator replacement 10/15/2018.  Device check today shows normal lead and device function - known far-field ventricular oversensing on atrial channel  - ongoing routine ICD follow-up - next planned in-clinic check in 1 year in conjunction with cardiology visit, EP follow-up as needed    Paroxysmal atrial fibrillation - 4% burden, though possibly overestimated due to far field V oversensing  [id number]  - continue sotalol 80mg twice daily - 2022 QT/QTc and Cr/GFR within acceptable limits.  Ongoing therapeutic drug monitoring  - continue metoprolol XL 25mg daily  - continue apixaban 5mg twice daily (age>80 though wt>60kg and Cr<1.5)    Chronic systolic heart failure related to ischemic cardiomyopathy - LVEF 30-35% by 2022  - continue heart failure medical therapies and evaluation regarding revascularization and aortic stenosis      Follow up: as above          History of Present Illness   Daniel Alamo is a 82 year old male with chronic systolic heart failure related to ischemic cardiomyopathy (LVEF 30-35% by 9/19/2022), paroxysmal atrial fibrillation, sinus node dysfunction, dual chamber ICD in-situ ([employer], 4/22/2004, generator replacement 10/15/2018), CAD with prior CABG, aortic stenosis presenting for evaluation of ICD in-situ, paroxysmal atrial fibrillation.    Mr. Alamo reports no issues at his ICD implantation site.  He is enrolled in remote monitoring.  He notes exertional fatigue with ascending 1 flight of stairs.  He has not had any bleeding issues on apixaban.  He denies chest pain, syncope.  He has not had recent admissions for heart failure.       Physical Examination  Review of Systems   VITALS: /68 (BP Location: Left arm, Patient Position: Sitting, Cuff Size: Adult Regular)   Pulse 50   Resp 18   Wt 66.7 kg (147 lb)   SpO2 100%   BMI 21.09 kg/m    Wt Readings from Last 3 Encounters:   10/10/22 64.9 kg (143 lb)   09/19/22 63.8 kg (140 lb 9.6 oz)   09/01/22 64.9 kg (143 lb)     CONSTITUTIONAL: well nourished, comfortable, no distress  EYES:  Conjunctivae pink, sclerae clear.    E/N/T:  Oral mucosa pink  RESPIRATORY:  Respiratory effort is normal  CARDIOVASCULAR:  normal S1 and S2  GASTROINTESTINAL:  Abdomen without masses or tenderness  EXTREMITIES:  No clubbing or cyanosis.    MUSCULOSKELETAL:  Overall grossly normal muscle strength  SKIN:  Overall, skin warm and dry, no lesions.  NEURO/PSYCH:  Oriented x 3 with normal affect.   Constitutional:  No weight loss or loss of appetite    Eyes:  No difficulty with vision, no double vision, no dry eyes  ENT:  No sore throat, difficulty swallowing; changes in hearing or tinnitus  Cardiovascular: As detailed above  Respiratory:  No cough  Musculoskeletal  No joint pain, muscle aches  Neurologic:  No syncope, lightheadedness, fainting spells   Hematologic: No easy bruising, excessive  bleeding tendency   Gastrointestinal:  No jaundice, abdominal pain or abdominal bloating  Genitourinary: No changes in urinary habits, no trouble urinating    Psychiatric: No anxiety or depression      Medical History  Surgical History   Past Medical History:   Diagnosis Date     Coronary artery disease      Heart disease      VT (ventricular tachycardia) 2004    Past Surgical History:   Procedure Laterality Date     ABDOMEN SURGERY       BYPASS GRAFT ARTERY CORONARY       CV ANGIOGRAM CORONARY GRAFT N/A 9/19/2022    Procedure: Coronary Angiogram Graft;  Surgeon: Tyrone Ordoñez MD;  Location: [facility] CATH LAB CV     CV LEFT HEART CATH N/A 9/19/2022    Procedure: Left Heart Catheterization;  Surgeon: Tyrone Ordoñez MD;  Location: [facility] CATH LAB CV     CV RIGHT HEART CATH MEASUREMENTS RECORDED N/A 9/19/2022    Procedure: Right Heart Catheterization;  Surgeon: Tyrone Ordoñez MD;  Location: [facility] CATH LAB CV     EYE SURGERY       INSERT / REPLACE / REMOVE PACEMAKER       OTHER SURGICAL HISTORY      icd     ZZC CABG, VEIN, SINGLE      Description: CABG (CABG);  Recorded: 10/03/2012;  Comments: LIMA to LAD, SVG to OM2, SVG to RCA         Family History Social History   Family History   Problem Relation Age of Onset     Cancer Mother      Heart Disease Father         Social History     Tobacco Use     Smoking status: Every Day     Packs/day: 0.50     Years: 30.00     Pack years: 15.00     Types: Cigarettes     Smokeless tobacco: Never   Substance Use Topics     Alcohol use: Yes     Alcohol/week: 1.0 standard drink     Comment: Glass of wine daily.     Drug use: No         Medications  Allergies     Current Outpatient Medications:      acetaminophen (TYLENOL) 325 MG tablet, Take 650 mg by mouth daily as needed , Disp: , Rfl:      apixaban ANTICOAGULANT (ELIQUIS) 5 MG tablet, 5 mg 2 times daily , Disp: , Rfl:      aspirin (ASA) 81 MG EC tablet, Take 81 mg by mouth daily, Disp: , Rfl:      gabapentin  (NEURONTIN) 300 MG capsule, Take 900 mg by mouth 2 times daily , Disp: , Rfl:      metoprolol succinate ER (TOPROL XL) 25 MG 24 hr tablet, Take 1 tablet (25 mg) by mouth daily, Disp: 90 tablet, Rfl: 11     sotalol (BETAPACE) 80 MG tablet, Take 1 tablet (80 mg) by mouth 2 times daily, Disp: 180 tablet, Rfl: 1     tamsulosin (FLOMAX) 0.4 MG capsule, TAKE ONE CAPSULE BY MOUTH ONE TIME DAILY, Disp: , Rfl:      Allergies   Allergen Reactions     Carvedilol GI Disturbance and Other (See Comments)     Upset stomach  GI upset       Chloride GI Disturbance and Nausea          Lab Results    Chemistry CBC Cardiac Enzymes/BNP/TSH/INR   Recent Labs   Lab Test 09/23/22  1125 09/19/22  0803   NA  --  140   POTASSIUM  --  3.8   CHLORIDE  --  106   CO2  --  28   * 94   BUN  --  14   CR  --  0.95   GFRESTIMATED  --  79   EMILIO  --  9.1     Recent Labs   Lab Test 09/19/22  0803 09/01/22  1327 10/14/21  0928   CR 0.95 0.96 0.83          Recent Labs   Lab Test 09/19/22  0803   WBC 6.5   HGB 14.2   HCT 43.9   MCV 95        Recent Labs   Lab Test 09/19/22  0803 09/01/22  1327 10/14/21  0928   HGB 14.2 13.3 13.8    No results for input(s): TROPONINI in the last 96973 hours.  No results for input(s): BNP, NTBNPI, NTBNP in the last 40290 hours.  Recent Labs   Lab Test 09/01/22  1327   TSH 0.46     No results for input(s): INR in the last 41713 hours.      Data Review    ECGs (tracings independently reviewed)  9/19/2022 - ApV2 59bpm, RBBB QRS 140ms, inferior Qw, QT/QTc 482/477ms    11/22/2022 ICD check (independently reviewed)  Normal lead and device function - FF V on RA lead noted  4% AT/AFl burden - longest 36s - some of these episodes may be related to FF V oversensing  7 NSVT episodes - longest 19 beats  26% RA pacing, 7% RV pacing  Estimated remaining battery longevity 9yrs    9/19/2022 TTE  The left ventricle is moderately dilated.  There is mild concentric left ventricular hypertrophy.  The visual ejection fraction is  30-35%.  Diastolic Doppler findings (E/E' ratio and/or other parameters) suggest left  ventricular filling pressures are increased.  It appears global hypokinesis with akinesis in inferior and inferoalateral  walls.  Normal right ventricle size and systolic function.  There is a pacemaker lead in the right ventricle.  The left atrium is moderately dilated.  The right atrium is mildly dilated.  Pacer wire in right atrium  There is mild (1+) mitral regurgitation.  There is mild (1+) aortic regurgitation.  Mild valvular aortic stenosis with mean gradient of aortic valve 16 mmHg, but  cannot exclude low flow low gradient aortic valve stenosis since the  calculated aortic valve area in 0.86 cm2.  No previous study for comparison.    9/19/2022 coronary angiography and RHC  LM:no obstruction  LAD:severe disease mid-vessel, supplying a moderate diagonal branch w/ Ca2+ 70% disease but TIMI3 antegrade flow and some flow via LIMA. Distal LAD fills via patent L-LAD  Lcx:OM1 w/ 70-80% ostial Ca2+ disease but SKIP 3 flow. Distal small OM fills via L-L bridging collaterals   RCA:100% occluded proximally     L-LAD: patent, fills the LAD territory including diseased diagonal branch, with some collaterals to Lcx and RCA territory  V-OM: occluded  V-RCA: not visualized, presumed occluded     LVEDP:21  AV gradient by pullback: 12-15  RHC:  RA:4   RV:28/4  PA:33/8 (18)  PCWP:8  SvO2:68     CO/CI:  Peggy:4.42/2.45       Cc: Corey Vargas MD, Kenna aClvillo, LINDSAY Saxena MD  11/22/2022  11:07 AM

## 2022-11-22 NOTE — LETTER
2022    Kenna Calvillo NP  [address] [facility] [facility] [address]    RE: Daniel Alamo       Dear Colleague,     I had the pleasure of seeing Daniel Alamo in the [facility].     [facility]  Cardiac Electrophysiology  [address]  [place] [address]   Office: [phone]  Fax: [phone]     Cardiac Electrophysiology Follow-up Visit    Patient: Daniel Alamo   : 1940     Primary Care Provider: Kenna Calvillo NP    CHIEF COMPLAINT/REASON FOR VISIT  Dual chamber ICD in-situ ([employer], 2004, generator replacement 10/15/2018)  Chronic systolic heart failure related to ischemic cardiomyopathy (LVEF 30-35% by 2022)  Paroxysmal atrial fibrillation  Sinus node dysfunction    Assessment/Recommendations   Daniel Alamo is a 82 year old male with chronic systolic heart failure related to ischemic cardiomyopathy (LVEF 30-35% by 2022), paroxysmal atrial fibrillation, sinus node dysfunction, dual chamber ICD in-situ ([employer], 2004, generator replacement 10/15/2018), CAD with prior CABG, aortic stenosis presenting for evaluation of ICD in-situ, paroxysmal atrial fibrillation.    Dual chamber ICD in-situ - [employer], 2004, generator replacement 10/15/2018.  Device check today shows normal lead and device function - known far-field ventricular oversensing on atrial channel  - ongoing routine ICD follow-up - next planned in-clinic check in 1 year in conjunction with cardiology visit, EP follow-up as needed    Paroxysmal atrial fibrillation - 4% burden, though possibly overestimated due to far field V oversensing  [id number]  - continue sotalol 80mg twice daily - 2022 QT/QTc and Cr/GFR within acceptable limits.  Ongoing therapeutic drug monitoring  - continue metoprolol XL 25mg daily  - continue apixaban 5mg twice daily (age>80 though wt>60kg and Cr<1.5)    Chronic systolic heart failure  related to ischemic cardiomyopathy - LVEF 30-35% by 9/19/2022  - continue heart failure medical therapies and evaluation regarding revascularization and aortic stenosis      Follow up: as above         History of Present Illness   Daniel Alamo is a 82 year old male with chronic systolic heart failure related to ischemic cardiomyopathy (LVEF 30-35% by 9/19/2022), paroxysmal atrial fibrillation, sinus node dysfunction, dual chamber ICD in-situ ([employer], 4/22/2004, generator replacement 10/15/2018), CAD with prior CABG, aortic stenosis presenting for evaluation of ICD in-situ, paroxysmal atrial fibrillation.    Mr. Alamo reports no issues at his ICD implantation site.  He is enrolled in remote monitoring.  He notes exertional fatigue with ascending 1 flight of stairs.  He has not had any bleeding issues on apixaban.  He denies chest pain, syncope.  He has not had recent admissions for heart failure.       Physical Examination  Review of Systems   VITALS: /68 (BP Location: Left arm, Patient Position: Sitting, Cuff Size: Adult Regular)   Pulse 50   Resp 18   Wt 66.7 kg (147 lb)   SpO2 100%   BMI 21.09 kg/m    Wt Readings from Last 3 Encounters:   10/10/22 64.9 kg (143 lb)   09/19/22 63.8 kg (140 lb 9.6 oz)   09/01/22 64.9 kg (143 lb)     CONSTITUTIONAL: well nourished, comfortable, no distress  EYES:  Conjunctivae pink, sclerae clear.    E/N/T:  Oral mucosa pink  RESPIRATORY:  Respiratory effort is normal  CARDIOVASCULAR:  normal S1 and S2  GASTROINTESTINAL:  Abdomen without masses or tenderness  EXTREMITIES:  No clubbing or cyanosis.    MUSCULOSKELETAL:  Overall grossly normal muscle strength  SKIN:  Overall, skin warm and dry, no lesions.  NEURO/PSYCH:  Oriented x 3 with normal affect.   Constitutional:  No weight loss or loss of appetite    Eyes:  No difficulty with vision, no double vision, no dry eyes  ENT:  No sore throat, difficulty swallowing; changes in hearing or  tinnitus  Cardiovascular: As detailed above  Respiratory:  No cough  Musculoskeletal  No joint pain, muscle aches  Neurologic:  No syncope, lightheadedness, fainting spells   Hematologic: No easy bruising, excessive bleeding tendency   Gastrointestinal:  No jaundice, abdominal pain or abdominal bloating  Genitourinary: No changes in urinary habits, no trouble urinating    Psychiatric: No anxiety or depression      Medical History  Surgical History   Past Medical History:   Diagnosis Date     Coronary artery disease      Heart disease      VT (ventricular tachycardia) 2004    Past Surgical History:   Procedure Laterality Date     ABDOMEN SURGERY       BYPASS GRAFT ARTERY CORONARY       CV ANGIOGRAM CORONARY GRAFT N/A 9/19/2022    Procedure: Coronary Angiogram Graft;  Surgeon: Tyrone Ordoñez MD;  Location: [facility] CATH LAB CV     CV LEFT HEART CATH N/A 9/19/2022    Procedure: Left Heart Catheterization;  Surgeon: Tyrone Ordoñez MD;  Location: [facility] CATH LAB CV     CV RIGHT HEART CATH MEASUREMENTS RECORDED N/A 9/19/2022    Procedure: Right Heart Catheterization;  Surgeon: Tyrone Ordoñez MD;  Location: [facility] CATH LAB CV     EYE SURGERY       INSERT / REPLACE / REMOVE PACEMAKER       OTHER SURGICAL HISTORY      icd     ZZC CABG, VEIN, SINGLE      Description: CABG (CABG);  Recorded: 10/03/2012;  Comments: LIMA to LAD, SVG to OM2, SVG to RCA         Family History Social History   Family History   Problem Relation Age of Onset     Cancer Mother      Heart Disease Father         Social History     Tobacco Use     Smoking status: Every Day     Packs/day: 0.50     Years: 30.00     Pack years: 15.00     Types: Cigarettes     Smokeless tobacco: Never   Substance Use Topics     Alcohol use: Yes     Alcohol/week: 1.0 standard drink     Comment: Glass of wine daily.     Drug use: No         Medications  Allergies     Current Outpatient Medications:      acetaminophen (TYLENOL) 325 MG tablet, Take 650 mg by  mouth daily as needed , Disp: , Rfl:      apixaban ANTICOAGULANT (ELIQUIS) 5 MG tablet, 5 mg 2 times daily , Disp: , Rfl:      aspirin (ASA) 81 MG EC tablet, Take 81 mg by mouth daily, Disp: , Rfl:      gabapentin (NEURONTIN) 300 MG capsule, Take 900 mg by mouth 2 times daily , Disp: , Rfl:      metoprolol succinate ER (TOPROL XL) 25 MG 24 hr tablet, Take 1 tablet (25 mg) by mouth daily, Disp: 90 tablet, Rfl: 11     sotalol (BETAPACE) 80 MG tablet, Take 1 tablet (80 mg) by mouth 2 times daily, Disp: 180 tablet, Rfl: 1     tamsulosin (FLOMAX) 0.4 MG capsule, TAKE ONE CAPSULE BY MOUTH ONE TIME DAILY, Disp: , Rfl:      Allergies   Allergen Reactions     Carvedilol GI Disturbance and Other (See Comments)     Upset stomach  GI upset       Chloride GI Disturbance and Nausea          Lab Results    Chemistry CBC Cardiac Enzymes/BNP/TSH/INR   Recent Labs   Lab Test 09/23/22  1125 09/19/22  0803   NA  --  140   POTASSIUM  --  3.8   CHLORIDE  --  106   CO2  --  28   * 94   BUN  --  14   CR  --  0.95   GFRESTIMATED  --  79   EMILIO  --  9.1     Recent Labs   Lab Test 09/19/22  0803 09/01/22  1327 10/14/21  0928   CR 0.95 0.96 0.83          Recent Labs   Lab Test 09/19/22  0803   WBC 6.5   HGB 14.2   HCT 43.9   MCV 95        Recent Labs   Lab Test 09/19/22  0803 09/01/22  1327 10/14/21  0928   HGB 14.2 13.3 13.8    No results for input(s): TROPONINI in the last 59182 hours.  No results for input(s): BNP, NTBNPI, NTBNP in the last 92901 hours.  Recent Labs   Lab Test 09/01/22  1327   TSH 0.46     No results for input(s): INR in the last 29155 hours.      Data Review    ECGs (tracings independently reviewed)  9/19/2022 - ApV2 59bpm, RBBB QRS 140ms, inferior Qw, QT/QTc 482/477ms    11/22/2022 ICD check (independently reviewed)  Normal lead and device function - FF V on RA lead noted  4% AT/AFl burden - longest 36s - some of these episodes may be related to FF V oversensing  7 NSVT episodes - longest 19 beats  26% RA  pacing, 7% RV pacing  Estimated remaining battery longevity 9yrs    9/19/2022 TTE  The left ventricle is moderately dilated.  There is mild concentric left ventricular hypertrophy.  The visual ejection fraction is 30-35%.  Diastolic Doppler findings (E/E' ratio and/or other parameters) suggest left  ventricular filling pressures are increased.  It appears global hypokinesis with akinesis in inferior and inferoalateral  walls.  Normal right ventricle size and systolic function.  There is a pacemaker lead in the right ventricle.  The left atrium is moderately dilated.  The right atrium is mildly dilated.  Pacer wire in right atrium  There is mild (1+) mitral regurgitation.  There is mild (1+) aortic regurgitation.  Mild valvular aortic stenosis with mean gradient of aortic valve 16 mmHg, but  cannot exclude low flow low gradient aortic valve stenosis since the  calculated aortic valve area in 0.86 cm2.  No previous study for comparison.    9/19/2022 coronary angiography and RHC  LM:no obstruction  LAD:severe disease mid-vessel, supplying a moderate diagonal branch w/ Ca2+ 70% disease but TIMI3 antegrade flow and some flow via LIMA. Distal LAD fills via patent L-LAD  Lcx:OM1 w/ 70-80% ostial Ca2+ disease but SKIP 3 flow. Distal small OM fills via L-L bridging collaterals   RCA:100% occluded proximally     L-LAD: patent, fills the LAD territory including diseased diagonal branch, with some collaterals to Lcx and RCA territory  V-OM: occluded  V-RCA: not visualized, presumed occluded     LVEDP:21  AV gradient by pullback: 12-15  RHC:  RA:4   RV:28/4  PA:33/8 (18)  PCWP:8  SvO2:68     CO/CI:  Peggy:4.42/2.45       Cc: Corey Vargas MD, Kenna Calvillo, NP    Christel Saxena MD  11/22/2022  11:07 AM          Thank you for allowing me to participate in the care of your patient.      Sincerely,     Christel Saxena MD     [facility]  cc:   Ion Kelley,  MD  [address]  [place] [address]

## 2022-11-22 NOTE — PATIENT INSTRUCTIONS
[facility]  Cardiac Electrophysiology  [address]  [place] [address]   Office: [phone]  Fax: [phone]       Thank you for seeing us in clinic today - it is a pleasure to be a part of your care team.  Below is a summary of our plan from today's visit.      You have chronic heart failure, had a defibrillator in place, and have paroxysmal atrial fibrillation.  Your defibrillator system shows normal lead and device (your right atrial lead has been known to have some far-field ventricular oversensing).  You remain on sotalol 80mg twice daily and Eliquis for atrial fibrillation.  We will plan for the following:  - continue sotalol and Eliquis  - continue with your other medications  - we will continue to monitor your defibrillator remote transmissions with plan for next in-clinic assessment in 1 year.    - continue with assessment of your aortic valve    Please do not hesitate to be in touch with our office at [phone] with any questions that may arise.      Thank you for trusting us with your care,    Christel Saxena MD  Clinical Cardiac Electrophysiology  [facility]  [address]  [place] [address]   Office: [phone]  Fax: [phone]

## 2022-11-25 ENCOUNTER — OFFICE VISIT (OUTPATIENT)
Dept: CARDIOLOGY | Facility: CLINIC | Age: 82
End: 2022-11-25
Payer: MEDICARE

## 2022-11-25 ENCOUNTER — ALLIED HEALTH/NURSE VISIT (OUTPATIENT)
Dept: CARDIOLOGY | Facility: CLINIC | Age: 82
End: 2022-11-25
Payer: MEDICARE

## 2022-11-25 ENCOUNTER — LAB (OUTPATIENT)
Dept: CARDIOLOGY | Facility: CLINIC | Age: 82
End: 2022-11-25
Payer: MEDICARE

## 2022-11-25 VITALS
DIASTOLIC BLOOD PRESSURE: 60 MMHG | HEIGHT: 70 IN | RESPIRATION RATE: 24 BRPM | SYSTOLIC BLOOD PRESSURE: 112 MMHG | HEART RATE: 69 BPM | WEIGHT: 144.4 LBS | BODY MASS INDEX: 20.67 KG/M2

## 2022-11-25 DIAGNOSIS — I35.0 NONRHEUMATIC AORTIC VALVE STENOSIS: ICD-10-CM

## 2022-11-25 DIAGNOSIS — Z95.1 S/P CABG (CORONARY ARTERY BYPASS GRAFT): ICD-10-CM

## 2022-11-25 DIAGNOSIS — I25.10 CAD (CORONARY ARTERY DISEASE): ICD-10-CM

## 2022-11-25 DIAGNOSIS — I35.0 NONRHEUMATIC AORTIC VALVE STENOSIS: Primary | ICD-10-CM

## 2022-11-25 DIAGNOSIS — I25.5 ISCHEMIC CARDIOMYOPATHY: Primary | ICD-10-CM

## 2022-11-25 LAB
ALBUMIN SERPL BCG-MCNC: 3.8 G/DL (ref 3.5–5.2)
ALP SERPL-CCNC: 86 U/L (ref 40–129)
ALT SERPL W P-5'-P-CCNC: 14 U/L (ref 10–50)
ANION GAP SERPL CALCULATED.3IONS-SCNC: 12 MMOL/L (ref 7–15)
AST SERPL W P-5'-P-CCNC: 15 U/L (ref 10–50)
BILIRUB SERPL-MCNC: 0.9 MG/DL
BUN SERPL-MCNC: 14.8 MG/DL (ref 8–23)
CALCIUM SERPL-MCNC: 9.2 MG/DL (ref 8.8–10.2)
CHLORIDE SERPL-SCNC: 105 MMOL/L (ref 98–107)
CREAT SERPL-MCNC: 1.01 MG/DL (ref 0.67–1.17)
DEPRECATED HCO3 PLAS-SCNC: 24 MMOL/L (ref 22–29)
ERYTHROCYTE [DISTWIDTH] IN BLOOD BY AUTOMATED COUNT: 13.4 % (ref 10–15)
GFR SERPL CREATININE-BSD FRML MDRD: 73 ML/MIN/1.73M2
GLUCOSE SERPL-MCNC: 92 MG/DL (ref 70–99)
HCT VFR BLD AUTO: 44.7 % (ref 40–53)
HGB BLD-MCNC: 14.6 G/DL (ref 13.3–17.7)
MCH RBC QN AUTO: 30.4 PG (ref 26.5–33)
MCHC RBC AUTO-ENTMCNC: 32.7 G/DL (ref 31.5–36.5)
MCV RBC AUTO: 93 FL (ref 78–100)
PLATELET # BLD AUTO: 252 10E3/UL (ref 150–450)
POTASSIUM SERPL-SCNC: 4.1 MMOL/L (ref 3.4–5.3)
PROT SERPL-MCNC: 6.5 G/DL (ref 6.4–8.3)
RBC # BLD AUTO: 4.8 10E6/UL (ref 4.4–5.9)
SODIUM SERPL-SCNC: 141 MMOL/L (ref 136–145)
WBC # BLD AUTO: 9.2 10E3/UL (ref 4–11)

## 2022-11-25 PROCEDURE — 80053 COMPREHEN METABOLIC PANEL: CPT

## 2022-11-25 PROCEDURE — 99214 OFFICE O/P EST MOD 30 MIN: CPT | Performed by: INTERNAL MEDICINE

## 2022-11-25 PROCEDURE — 85027 COMPLETE CBC AUTOMATED: CPT

## 2022-11-25 PROCEDURE — 36415 COLL VENOUS BLD VENIPUNCTURE: CPT

## 2022-11-25 PROCEDURE — 99207 PR NO CHARGE LOS: CPT

## 2022-11-25 PROCEDURE — 93000 ELECTROCARDIOGRAM COMPLETE: CPT | Performed by: INTERNAL MEDICINE

## 2022-11-25 NOTE — PROGRESS NOTES
HEART CARE ENCOUNTER CONSULTATON NOTE      [facility]  [phone]      Assessment/Recommendations   Assessment/Plan: 82F w/ CAD s/p CABG w/ L-LAD, V-OM, V-RCA,30 years ago at [facility], ischemic cardiomyopathy EF 25% 11/2021, aortic stenosis, TR, afib on sotalol, PAD s/p iliac stents here for w/u of progressive GALVEZ and orthostatic symptoms, found to have severe aortic stenosis and CAD.      # Aortic stenosis - he does appear to have a low flow-low gradient aortic stenosis w/ P/M 26/15 DAVI 0.86 SVI 30 and severely restricted and Ca2+ AV  - we have discussed natural history and management options of aortic stenosis and agree to proceed w/ TAVR w/u, and forego dobutamine stress echo in light of unrevascularized CAD, otherwise consistent clinical and echocardiographic picture. O note, he also had normal R-sided and pulmonary pressures, mildly elevated L-sided filling pressures, invasive AV gradient of ~15  - given his prior OHS, age, and risk factors TAVR may be a preferable option. For w/u, he will still need CTA TAVR protocol, CTS consult, Dental evaluation  - given a sizeable area of ischemia/vaibility in anterior.nterolateral wall, will attempt a PCI (possibly w/ Shockwave or Roto) to D prior to TAVR, in order to optimize risk profile of the pacing run, but given pratima branch involvement, would be judicious in when to stop, should the procedure require escalating risk    # CAD- severe native CAD w/ only L-LAD patent; native OM and a D branch may be targets for PCI, but given the extent of his long-term cardiomyopathy,   - viability assessment w/ PET showed agustin-infarct ischemia and viability in the anterolateral  wall, apex and inferoseptal wall of the left ventricle, hence will plan a PCI to native D, as per above  - he also had normal R-sided and pulmonary pressures, mildly elevated L-sided filling pressures, AV gradient of ~15.  - in the meantime, continue ASA 81mg daily  indefinitely  - continue aggressive risk factor modification           History of Present Illness/Subjective    HPI: Daniel Alamo is a 82 year old male w/ CAD s/p CABG w/ L-LAD, V-OM, V-RCA,30 years ago at [facility], ischemic cardiomyopathy EF 25% 11/2021, aortic stenosis, TR, afib on sotalol, PAD s/p iliac stents here for w/u of progressive GALVEZ and orthostatic symptoms, found to have severe aortic stenosis and CAD.    His main complaint is fatigue but wife notes SOB/GALVEZ w/ doing even minimal exertion.  He denies CP, orthopnea/PND/edema/ syncope/N/V/F/C.    PET:  1. Severely abnormal rest ammonia perfusion study with a summed rest  score of 30.  2. Perfusion abnormalities consistent with transmural myocardial  infarction in the inferior and mildly inferolateral wall.   3. There is agustin-infarct ischemia and viability in the anterolateral  wall, apex and inferoseptal wall of the left ventricle.   4. There is no viability within the inferior wall and mildly  inferolateral wall of the left ventricle.   5. Abnormal left ventricular systolic function with regional wall  motion abnormalities.  6. Left Atrium and ventricle appears dilated.   7. There is increased ammonia uptake in the lungs, consistent with  patient's known history of congestive heart failure.  8. No prior study available for comparison.     9. Images of viability were appended to the PACS system to assist in  operative localization.    Recent Echocardiogram Results:  The left ventricle is moderately dilated.  There is mild concentric left ventricular hypertrophy.  The visual ejection fraction is 30-35%.  Diastolic Doppler findings (E/E' ratio and/or other parameters) suggest left  ventricular filling pressures are increased.  It appears global hypokinesis with akinesis in inferior and inferoalateral  walls.  Normal right ventricle size and systolic function.  There is a pacemaker lead in the right ventricle.  The left atrium is moderately  "dilated.  The right atrium is mildly dilated.  Pacer wire in right atrium  There is mild (1+) mitral regurgitation.  There is mild (1+) aortic regurgitation.  Mild valvular aortic stenosis with mean gradient of aortic valve 16 mmHg, but  cannot exclude low flow low gradient aortic valve stenosis since the  calculated aortic valve area in 0.86 cm2.     No previous study for comparison.    Recent Coronary Angiogram Results:  - severe native CAD w/ only L-LAD patent; native OM and a D branch may be targets for PCI, but given the extent of his long-term cardiomyopathy, would favor viability assessment (PET may be best given his ICD), prior to subjecting him to procedural risk and need for DAPT  - normal R-sided and pulmonary pressures, mildly elevated L-sided filling pressures, AV gradient of ~15. Overall, likely progressive severe cardiomyopathy, but given low EF can't rule out low flow-low gradient aortic stenosis - agree with an echo, as planned  - in the meantime, continue ASA 81mg daily indefinitely  - continue aggressive risk factor modification         Physical Examination  Review of Systems   Vitals: /60 (BP Location: Left arm, Patient Position: Sitting, Cuff Size: Adult Regular)   Pulse 69   Resp 24   Ht 1.778 m (5' 10\")   Wt 65.5 kg (144 lb 6.4 oz)   BMI 20.72 kg/m    BMI= Body mass index is 20.72 kg/m .  Wt Readings from Last 3 Encounters:   11/25/22 65.5 kg (144 lb 6.4 oz)   11/22/22 66.7 kg (147 lb)   10/10/22 64.9 kg (143 lb)       General Appearance:   no distress, normal body habitus   ENT/Mouth: membranes moist, no oral lesions or bleeding gums.      EYES:  no scleral icterus, normal conjunctivae   Neck: no carotid bruits or thyromegaly   Chest/Lungs:   lungs are clear to auscultation, no rales or wheezing, + sternal scar, equal chest wall expansion    Cardiovascular:   Regular. Normal first and second heart sounds with 2/6 systolic murmurs, rubs, or gallops; the carotid, radial and posterior " tibial pulses are intact, Jugular venous pressure 6, no edema bilaterally    Abdomen:  no organomegaly, masses, bruits, or tenderness; bowel sounds are present   Extremities: no cyanosis or clubbing   Skin: no xanthelasma, warm.    Neurologic: normal  bilateral, no tremors     Psychiatric: alert and oriented x3, calm        Please refer above for cardiac ROS details.        Medical History  Surgical History Family History Social History   Past Medical History:   Diagnosis Date     Coronary artery disease      Heart disease      VT (ventricular tachycardia) 2004     Past Surgical History:   Procedure Laterality Date     ABDOMEN SURGERY       BYPASS GRAFT ARTERY CORONARY       CV ANGIOGRAM CORONARY GRAFT N/A 9/19/2022    Procedure: Coronary Angiogram Graft;  Surgeon: Tyrone Ordoñez MD;  Location: [facility] CATH LAB CV     CV LEFT HEART CATH N/A 9/19/2022    Procedure: Left Heart Catheterization;  Surgeon: Tyrone Ordoñez MD;  Location: [facility] CATH LAB CV     CV RIGHT HEART CATH MEASUREMENTS RECORDED N/A 9/19/2022    Procedure: Right Heart Catheterization;  Surgeon: Tyrone Ordoñez MD;  Location: [facility] CATH LAB CV     EYE SURGERY       INSERT / REPLACE / REMOVE PACEMAKER       OTHER SURGICAL HISTORY      icd     ZZC CABG, VEIN, SINGLE      Description: CABG (CABG);  Recorded: 10/03/2012;  Comments: LIMA to LAD, SVG to OM2, SVG to RCA     Family History   Problem Relation Age of Onset     Cancer Mother      Heart Disease Father         Social History     Socioeconomic History     Marital status:      Spouse name: Not on file     Number of children: Not on file     Years of education: Not on file     Highest education level: Not on file   Occupational History     Not on file   Tobacco Use     Smoking status: Every Day     Packs/day: 0.50     Years: 30.00     Pack years: 15.00     Types: Cigarettes     Smokeless tobacco: Never   Vaping Use     Vaping Use: Never used   Substance and Sexual Activity      Alcohol use: Yes     Alcohol/week: 1.0 standard drink     Comment: Glass of wine daily.     Drug use: No     Sexual activity: Not Currently   Other Topics Concern     Parent/sibling w/ CABG, MI or angioplasty before 65F 55M? Not Asked   Social History Narrative     Not on file     Social Determinants of Health     Financial Resource Strain: Not on file   Food Insecurity: Not on file   Transportation Needs: Not on file   Physical Activity: Not on file   Stress: Not on file   Social Connections: Not on file   Intimate Partner Violence: Not on file   Housing Stability: Not on file           Medications  Allergies   Current Outpatient Medications   Medication Sig Dispense Refill     acetaminophen (TYLENOL) 325 MG tablet Take 650 mg by mouth daily as needed        apixaban ANTICOAGULANT (ELIQUIS) 5 MG tablet 5 mg 2 times daily        aspirin (ASA) 81 MG EC tablet Take 81 mg by mouth daily       gabapentin (NEURONTIN) 300 MG capsule Take 900 mg by mouth 2 times daily        metoprolol succinate ER (TOPROL XL) 25 MG 24 hr tablet Take 1 tablet (25 mg) by mouth daily 90 tablet 11     sotalol (BETAPACE) 80 MG tablet Take 1 tablet (80 mg) by mouth 2 times daily 180 tablet 3     tamsulosin (FLOMAX) 0.4 MG capsule TAKE ONE CAPSULE BY MOUTH ONE TIME DAILY         Allergies   Allergen Reactions     Carvedilol GI Disturbance and Other (See Comments)     Upset stomach  GI upset       Chloride GI Disturbance and Nausea          Lab Results    Chemistry/lipid CBC Cardiac Enzymes/BNP/TSH/INR   Recent Labs   Lab Test 09/19/22  0803   CHOL 185   HDL 41      TRIG 76     Recent Labs   Lab Test 09/19/22  0803 12/06/19  1134 10/05/18  0930    100 88     Recent Labs   Lab Test 09/23/22  1125 09/19/22  0803   NA  --  140   POTASSIUM  --  3.8   CHLORIDE  --  106   CO2  --  28   * 94   BUN  --  14   CR  --  0.95   GFRESTIMATED  --  79   EMILIO  --  9.1     Recent Labs   Lab Test 09/19/22  0803 09/01/22  1327 10/14/21  0928    CR 0.95 0.96 0.83     No results for input(s): A1C in the last 75421 hours.       Recent Labs   Lab Test 09/19/22  0803   WBC 6.5   HGB 14.2   HCT 43.9   MCV 95        Recent Labs   Lab Test 09/19/22  0803 09/01/22  1327 10/14/21  0928   HGB 14.2 13.3 13.8    No results for input(s): TROPONINI in the last 30402 hours.  No results for input(s): BNP, NTBNPI, NTBNP in the last 51761 hours.  Recent Labs   Lab Test 09/01/22  1327   TSH 0.46     No results for input(s): INR in the last 44574 hours.     Tyrone Ordoñez MD

## 2022-11-25 NOTE — H&P (VIEW-ONLY)
HEART CARE ENCOUNTER CONSULTATON NOTE      [facility]  [phone]      Assessment/Recommendations   Assessment/Plan: 82F w/ CAD s/p CABG w/ L-LAD, V-OM, V-RCA,30 years ago at [facility], ischemic cardiomyopathy EF 25% 11/2021, aortic stenosis, TR, afib on sotalol, PAD s/p iliac stents here for w/u of progressive GALVEZ and orthostatic symptoms, found to have severe aortic stenosis and CAD.      # Aortic stenosis - he does appear to have a low flow-low gradient aortic stenosis w/ P/M 26/15 DAVI 0.86 SVI 30 and severely restricted and Ca2+ AV  - we have discussed natural history and management options of aortic stenosis and agree to proceed w/ TAVR w/u, and forego dobutamine stress echo in light of unrevascularized CAD, otherwise consistent clinical and echocardiographic picture. O note, he also had normal R-sided and pulmonary pressures, mildly elevated L-sided filling pressures, invasive AV gradient of ~15  - given his prior OHS, age, and risk factors TAVR may be a preferable option. For w/u, he will still need CTA TAVR protocol, CTS consult, Dental evaluation  - given a sizeable area of ischemia/vaibility in anterior.nterolateral wall, will attempt a PCI (possibly w/ Shockwave or Roto) to D prior to TAVR, in order to optimize risk profile of the pacing run, but given pratima branch involvement, would be judicious in when to stop, should the procedure require escalating risk    # CAD- severe native CAD w/ only L-LAD patent; native OM and a D branch may be targets for PCI, but given the extent of his long-term cardiomyopathy,   - viability assessment w/ PET showed agustin-infarct ischemia and viability in the anterolateral  wall, apex and inferoseptal wall of the left ventricle, hence will plan a PCI to native D, as per above  - he also had normal R-sided and pulmonary pressures, mildly elevated L-sided filling pressures, AV gradient of ~15.  - in the meantime, continue ASA 81mg daily  indefinitely  - continue aggressive risk factor modification           History of Present Illness/Subjective    HPI: Daniel Alamo is a 82 year old male w/ CAD s/p CABG w/ L-LAD, V-OM, V-RCA,30 years ago at [facility], ischemic cardiomyopathy EF 25% 11/2021, aortic stenosis, TR, afib on sotalol, PAD s/p iliac stents here for w/u of progressive GALVEZ and orthostatic symptoms, found to have severe aortic stenosis and CAD.    His main complaint is fatigue but wife notes SOB/GALVEZ w/ doing even minimal exertion.  He denies CP, orthopnea/PND/edema/ syncope/N/V/F/C.    PET:  1. Severely abnormal rest ammonia perfusion study with a summed rest  score of 30.  2. Perfusion abnormalities consistent with transmural myocardial  infarction in the inferior and mildly inferolateral wall.   3. There is agustin-infarct ischemia and viability in the anterolateral  wall, apex and inferoseptal wall of the left ventricle.   4. There is no viability within the inferior wall and mildly  inferolateral wall of the left ventricle.   5. Abnormal left ventricular systolic function with regional wall  motion abnormalities.  6. Left Atrium and ventricle appears dilated.   7. There is increased ammonia uptake in the lungs, consistent with  patient's known history of congestive heart failure.  8. No prior study available for comparison.     9. Images of viability were appended to the PACS system to assist in  operative localization.    Recent Echocardiogram Results:  The left ventricle is moderately dilated.  There is mild concentric left ventricular hypertrophy.  The visual ejection fraction is 30-35%.  Diastolic Doppler findings (E/E' ratio and/or other parameters) suggest left  ventricular filling pressures are increased.  It appears global hypokinesis with akinesis in inferior and inferoalateral  walls.  Normal right ventricle size and systolic function.  There is a pacemaker lead in the right ventricle.  The left atrium is moderately  "dilated.  The right atrium is mildly dilated.  Pacer wire in right atrium  There is mild (1+) mitral regurgitation.  There is mild (1+) aortic regurgitation.  Mild valvular aortic stenosis with mean gradient of aortic valve 16 mmHg, but  cannot exclude low flow low gradient aortic valve stenosis since the  calculated aortic valve area in 0.86 cm2.     No previous study for comparison.    Recent Coronary Angiogram Results:  - severe native CAD w/ only L-LAD patent; native OM and a D branch may be targets for PCI, but given the extent of his long-term cardiomyopathy, would favor viability assessment (PET may be best given his ICD), prior to subjecting him to procedural risk and need for DAPT  - normal R-sided and pulmonary pressures, mildly elevated L-sided filling pressures, AV gradient of ~15. Overall, likely progressive severe cardiomyopathy, but given low EF can't rule out low flow-low gradient aortic stenosis - agree with an echo, as planned  - in the meantime, continue ASA 81mg daily indefinitely  - continue aggressive risk factor modification         Physical Examination  Review of Systems   Vitals: /60 (BP Location: Left arm, Patient Position: Sitting, Cuff Size: Adult Regular)   Pulse 69   Resp 24   Ht 1.778 m (5' 10\")   Wt 65.5 kg (144 lb 6.4 oz)   BMI 20.72 kg/m    BMI= Body mass index is 20.72 kg/m .  Wt Readings from Last 3 Encounters:   11/25/22 65.5 kg (144 lb 6.4 oz)   11/22/22 66.7 kg (147 lb)   10/10/22 64.9 kg (143 lb)       General Appearance:   no distress, normal body habitus   ENT/Mouth: membranes moist, no oral lesions or bleeding gums.      EYES:  no scleral icterus, normal conjunctivae   Neck: no carotid bruits or thyromegaly   Chest/Lungs:   lungs are clear to auscultation, no rales or wheezing, + sternal scar, equal chest wall expansion    Cardiovascular:   Regular. Normal first and second heart sounds with 2/6 systolic murmurs, rubs, or gallops; the carotid, radial and posterior " tibial pulses are intact, Jugular venous pressure 6, no edema bilaterally    Abdomen:  no organomegaly, masses, bruits, or tenderness; bowel sounds are present   Extremities: no cyanosis or clubbing   Skin: no xanthelasma, warm.    Neurologic: normal  bilateral, no tremors     Psychiatric: alert and oriented x3, calm        Please refer above for cardiac ROS details.        Medical History  Surgical History Family History Social History   Past Medical History:   Diagnosis Date     Coronary artery disease      Heart disease      VT (ventricular tachycardia) 2004     Past Surgical History:   Procedure Laterality Date     ABDOMEN SURGERY       BYPASS GRAFT ARTERY CORONARY       CV ANGIOGRAM CORONARY GRAFT N/A 9/19/2022    Procedure: Coronary Angiogram Graft;  Surgeon: Tyrone Ordoñez MD;  Location: [facility] CATH LAB CV     CV LEFT HEART CATH N/A 9/19/2022    Procedure: Left Heart Catheterization;  Surgeon: Tyrone Ordoñez MD;  Location: [facility] CATH LAB CV     CV RIGHT HEART CATH MEASUREMENTS RECORDED N/A 9/19/2022    Procedure: Right Heart Catheterization;  Surgeon: Tyrone Ordoñez MD;  Location: [facility] CATH LAB CV     EYE SURGERY       INSERT / REPLACE / REMOVE PACEMAKER       OTHER SURGICAL HISTORY      icd     ZZC CABG, VEIN, SINGLE      Description: CABG (CABG);  Recorded: 10/03/2012;  Comments: LIMA to LAD, SVG to OM2, SVG to RCA     Family History   Problem Relation Age of Onset     Cancer Mother      Heart Disease Father         Social History     Socioeconomic History     Marital status:      Spouse name: Not on file     Number of children: Not on file     Years of education: Not on file     Highest education level: Not on file   Occupational History     Not on file   Tobacco Use     Smoking status: Every Day     Packs/day: 0.50     Years: 30.00     Pack years: 15.00     Types: Cigarettes     Smokeless tobacco: Never   Vaping Use     Vaping Use: Never used   Substance and Sexual Activity      Alcohol use: Yes     Alcohol/week: 1.0 standard drink     Comment: Glass of wine daily.     Drug use: No     Sexual activity: Not Currently   Other Topics Concern     Parent/sibling w/ CABG, MI or angioplasty before 65F 55M? Not Asked   Social History Narrative     Not on file     Social Determinants of Health     Financial Resource Strain: Not on file   Food Insecurity: Not on file   Transportation Needs: Not on file   Physical Activity: Not on file   Stress: Not on file   Social Connections: Not on file   Intimate Partner Violence: Not on file   Housing Stability: Not on file           Medications  Allergies   Current Outpatient Medications   Medication Sig Dispense Refill     acetaminophen (TYLENOL) 325 MG tablet Take 650 mg by mouth daily as needed        apixaban ANTICOAGULANT (ELIQUIS) 5 MG tablet 5 mg 2 times daily        aspirin (ASA) 81 MG EC tablet Take 81 mg by mouth daily       gabapentin (NEURONTIN) 300 MG capsule Take 900 mg by mouth 2 times daily        metoprolol succinate ER (TOPROL XL) 25 MG 24 hr tablet Take 1 tablet (25 mg) by mouth daily 90 tablet 11     sotalol (BETAPACE) 80 MG tablet Take 1 tablet (80 mg) by mouth 2 times daily 180 tablet 3     tamsulosin (FLOMAX) 0.4 MG capsule TAKE ONE CAPSULE BY MOUTH ONE TIME DAILY         Allergies   Allergen Reactions     Carvedilol GI Disturbance and Other (See Comments)     Upset stomach  GI upset       Chloride GI Disturbance and Nausea          Lab Results    Chemistry/lipid CBC Cardiac Enzymes/BNP/TSH/INR   Recent Labs   Lab Test 09/19/22  0803   CHOL 185   HDL 41      TRIG 76     Recent Labs   Lab Test 09/19/22  0803 12/06/19  1134 10/05/18  0930    100 88     Recent Labs   Lab Test 09/23/22  1125 09/19/22  0803   NA  --  140   POTASSIUM  --  3.8   CHLORIDE  --  106   CO2  --  28   * 94   BUN  --  14   CR  --  0.95   GFRESTIMATED  --  79   EMILIO  --  9.1     Recent Labs   Lab Test 09/19/22  0803 09/01/22  1327 10/14/21  0928    CR 0.95 0.96 0.83     No results for input(s): A1C in the last 60586 hours.       Recent Labs   Lab Test 09/19/22  0803   WBC 6.5   HGB 14.2   HCT 43.9   MCV 95        Recent Labs   Lab Test 09/19/22  0803 09/01/22  1327 10/14/21  0928   HGB 14.2 13.3 13.8    No results for input(s): TROPONINI in the last 51616 hours.  No results for input(s): BNP, NTBNPI, NTBNP in the last 18131 hours.  Recent Labs   Lab Test 09/01/22  1327   TSH 0.46     No results for input(s): INR in the last 59664 hours.     Tyrone Ordoñez MD

## 2022-11-25 NOTE — LETTER
11/25/2022    Kenna Calvillo, NP  [address] [facility] [place] [address]    RE: Daniel Alamo       Dear Colleague,     I had the pleasure of seeing Daniel Alamo in the [facility].    HEART CARE ENCOUNTER CONSULTATON NOTE      [facility]  [phone]      Assessment/Recommendations   Assessment/Plan: 82F w/ CAD s/p CABG w/ L-LAD, V-OM, V-RCA,30 years ago at [facility], ischemic cardiomyopathy EF 25% 11/2021, aortic stenosis, TR, afib on sotalol, PAD s/p iliac stents here for w/u of progressive GALVEZ and orthostatic symptoms, found to have severe aortic stenosis and CAD.      # Aortic stenosis - he does appear to have a low flow-low gradient aortic stenosis w/ P/M 26/15 DAVI 0.86 SVI 30 and severely restricted and Ca2+ AV  - we have discussed natural history and management options of aortic stenosis and agree to proceed w/ TAVR w/u, and forego dobutamine stress echo in light of unrevascularized CAD, otherwise consistent clinical and echocardiographic picture. O note, he also had normal R-sided and pulmonary pressures, mildly elevated L-sided filling pressures, invasive AV gradient of ~15  - given his prior OHS, age, and risk factors TAVR may be a preferable option. For w/u, he will still need CTA TAVR protocol, CTS consult, Dental evaluation  - given a sizeable area of ischemia/vaibility in anterior.nterolateral wall, will attempt a PCI (possibly w/ Shockwave or Roto) to D prior to TAVR, in order to optimize risk profile of the pacing run, but given pratima branch involvement, would be judicious in when to stop, should the procedure require escalating risk    # CAD- severe native CAD w/ only L-LAD patent; native OM and a D branch may be targets for PCI, but given the extent of his long-term cardiomyopathy,   - viability assessment w/ PET showed agustin-infarct ischemia and viability in the anterolateral  wall, apex and inferoseptal wall of the left ventricle, hence will plan a PCI  to native D, as per above  - he also had normal R-sided and pulmonary pressures, mildly elevated L-sided filling pressures, AV gradient of ~15.  - in the meantime, continue ASA 81mg daily indefinitely  - continue aggressive risk factor modification           History of Present Illness/Subjective    HPI: Daniel Alamo is a 82 year old male w/ CAD s/p CABG w/ L-LAD, V-OM, V-RCA,30 years ago at [facility], ischemic cardiomyopathy EF 25% 11/2021, aortic stenosis, TR, afib on sotalol, PAD s/p iliac stents here for w/u of progressive GALVEZ and orthostatic symptoms, found to have severe aortic stenosis and CAD.    His main complaint is fatigue but wife notes SOB/GALVEZ w/ doing even minimal exertion.  He denies CP, orthopnea/PND/edema/ syncope/N/V/F/C.    PET:  1. Severely abnormal rest ammonia perfusion study with a summed rest  score of 30.  2. Perfusion abnormalities consistent with transmural myocardial  infarction in the inferior and mildly inferolateral wall.   3. There is agustin-infarct ischemia and viability in the anterolateral  wall, apex and inferoseptal wall of the left ventricle.   4. There is no viability within the inferior wall and mildly  inferolateral wall of the left ventricle.   5. Abnormal left ventricular systolic function with regional wall  motion abnormalities.  6. Left Atrium and ventricle appears dilated.   7. There is increased ammonia uptake in the lungs, consistent with  patient's known history of congestive heart failure.  8. No prior study available for comparison.     9. Images of viability were appended to the PACS system to assist in  operative localization.    Recent Echocardiogram Results:  The left ventricle is moderately dilated.  There is mild concentric left ventricular hypertrophy.  The visual ejection fraction is 30-35%.  Diastolic Doppler findings (E/E' ratio and/or other parameters) suggest left  ventricular filling pressures are increased.  It appears global hypokinesis with  "akinesis in inferior and inferoalateral  walls.  Normal right ventricle size and systolic function.  There is a pacemaker lead in the right ventricle.  The left atrium is moderately dilated.  The right atrium is mildly dilated.  Pacer wire in right atrium  There is mild (1+) mitral regurgitation.  There is mild (1+) aortic regurgitation.  Mild valvular aortic stenosis with mean gradient of aortic valve 16 mmHg, but  cannot exclude low flow low gradient aortic valve stenosis since the  calculated aortic valve area in 0.86 cm2.     No previous study for comparison.    Recent Coronary Angiogram Results:  - severe native CAD w/ only L-LAD patent; native OM and a D branch may be targets for PCI, but given the extent of his long-term cardiomyopathy, would favor viability assessment (PET may be best given his ICD), prior to subjecting him to procedural risk and need for DAPT  - normal R-sided and pulmonary pressures, mildly elevated L-sided filling pressures, AV gradient of ~15. Overall, likely progressive severe cardiomyopathy, but given low EF can't rule out low flow-low gradient aortic stenosis - agree with an echo, as planned  - in the meantime, continue ASA 81mg daily indefinitely  - continue aggressive risk factor modification         Physical Examination  Review of Systems   Vitals: /60 (BP Location: Left arm, Patient Position: Sitting, Cuff Size: Adult Regular)   Pulse 69   Resp 24   Ht 1.778 m (5' 10\")   Wt 65.5 kg (144 lb 6.4 oz)   BMI 20.72 kg/m    BMI= Body mass index is 20.72 kg/m .  Wt Readings from Last 3 Encounters:   11/25/22 65.5 kg (144 lb 6.4 oz)   11/22/22 66.7 kg (147 lb)   10/10/22 64.9 kg (143 lb)       General Appearance:   no distress, normal body habitus   ENT/Mouth: membranes moist, no oral lesions or bleeding gums.      EYES:  no scleral icterus, normal conjunctivae   Neck: no carotid bruits or thyromegaly   Chest/Lungs:   lungs are clear to auscultation, no rales or wheezing, + " sternal scar, equal chest wall expansion    Cardiovascular:   Regular. Normal first and second heart sounds with 2/6 systolic murmurs, rubs, or gallops; the carotid, radial and posterior tibial pulses are intact, Jugular venous pressure 6, no edema bilaterally    Abdomen:  no organomegaly, masses, bruits, or tenderness; bowel sounds are present   Extremities: no cyanosis or clubbing   Skin: no xanthelasma, warm.    Neurologic: normal  bilateral, no tremors     Psychiatric: alert and oriented x3, calm        Please refer above for cardiac ROS details.        Medical History  Surgical History Family History Social History   Past Medical History:   Diagnosis Date     Coronary artery disease      Heart disease      VT (ventricular tachycardia) 2004     Past Surgical History:   Procedure Laterality Date     ABDOMEN SURGERY       BYPASS GRAFT ARTERY CORONARY       CV ANGIOGRAM CORONARY GRAFT N/A 9/19/2022    Procedure: Coronary Angiogram Graft;  Surgeon: Tyrone Ordoñez MD;  Location: [facility] CATH LAB CV     CV LEFT HEART CATH N/A 9/19/2022    Procedure: Left Heart Catheterization;  Surgeon: Tyrone Ordoñez MD;  Location: [facility] CATH LAB CV     CV RIGHT HEART CATH MEASUREMENTS RECORDED N/A 9/19/2022    Procedure: Right Heart Catheterization;  Surgeon: Tyrone Ordoñez MD;  Location: [facility] CATH LAB CV     EYE SURGERY       INSERT / REPLACE / REMOVE PACEMAKER       OTHER SURGICAL HISTORY      icd     ZZC CABG, VEIN, SINGLE      Description: CABG (CABG);  Recorded: 10/03/2012;  Comments: LIMA to LAD, SVG to OM2, SVG to RCA     Family History   Problem Relation Age of Onset     Cancer Mother      Heart Disease Father         Social History     Socioeconomic History     Marital status:      Spouse name: Not on file     Number of children: Not on file     Years of education: Not on file     Highest education level: Not on file   Occupational History     Not on file   Tobacco Use     Smoking status:  Every Day     Packs/day: 0.50     Years: 30.00     Pack years: 15.00     Types: Cigarettes     Smokeless tobacco: Never   Vaping Use     Vaping Use: Never used   Substance and Sexual Activity     Alcohol use: Yes     Alcohol/week: 1.0 standard drink     Comment: Glass of wine daily.     Drug use: No     Sexual activity: Not Currently   Other Topics Concern     Parent/sibling w/ CABG, MI or angioplasty before 65F 55M? Not Asked   Social History Narrative     Not on file     Social Determinants of Health     Financial Resource Strain: Not on file   Food Insecurity: Not on file   Transportation Needs: Not on file   Physical Activity: Not on file   Stress: Not on file   Social Connections: Not on file   Intimate Partner Violence: Not on file   Housing Stability: Not on file           Medications  Allergies   Current Outpatient Medications   Medication Sig Dispense Refill     acetaminophen (TYLENOL) 325 MG tablet Take 650 mg by mouth daily as needed        apixaban ANTICOAGULANT (ELIQUIS) 5 MG tablet 5 mg 2 times daily        aspirin (ASA) 81 MG EC tablet Take 81 mg by mouth daily       gabapentin (NEURONTIN) 300 MG capsule Take 900 mg by mouth 2 times daily        metoprolol succinate ER (TOPROL XL) 25 MG 24 hr tablet Take 1 tablet (25 mg) by mouth daily 90 tablet 11     sotalol (BETAPACE) 80 MG tablet Take 1 tablet (80 mg) by mouth 2 times daily 180 tablet 3     tamsulosin (FLOMAX) 0.4 MG capsule TAKE ONE CAPSULE BY MOUTH ONE TIME DAILY         Allergies   Allergen Reactions     Carvedilol GI Disturbance and Other (See Comments)     Upset stomach  GI upset       Chloride GI Disturbance and Nausea          Lab Results    Chemistry/lipid CBC Cardiac Enzymes/BNP/TSH/INR   Recent Labs   Lab Test 09/19/22  0803   CHOL 185   HDL 41      TRIG 76     Recent Labs   Lab Test 09/19/22  0803 12/06/19  1134 10/05/18  0930    100 88     Recent Labs   Lab Test 09/23/22  1125 09/19/22  0803   NA  --  140   POTASSIUM  --   3.8   CHLORIDE  --  106   CO2  --  28   * 94   BUN  --  14   CR  --  0.95   GFRESTIMATED  --  79   EMILIO  --  9.1     Recent Labs   Lab Test 09/19/22  0803 09/01/22  1327 10/14/21  0928   CR 0.95 0.96 0.83     No results for input(s): A1C in the last 40017 hours.       Recent Labs   Lab Test 09/19/22  0803   WBC 6.5   HGB 14.2   HCT 43.9   MCV 95        Recent Labs   Lab Test 09/19/22  0803 09/01/22  1327 10/14/21  0928   HGB 14.2 13.3 13.8    No results for input(s): TROPONINI in the last 40397 hours.  No results for input(s): BNP, NTBNPI, NTBNP in the last 27301 hours.  Recent Labs   Lab Test 09/01/22  1327   TSH 0.46     No results for input(s): INR in the last 92659 hours.     Tyrone Ordoñez MD    Thank you for allowing me to participate in the care of your patient.      Sincerely,     Tyrone Ordoñez MD   [facility]  cc: No referring provider defined for this encounter.

## 2022-11-28 LAB
ATRIAL RATE - MUSE: 59 BPM
DIASTOLIC BLOOD PRESSURE - MUSE: NORMAL MMHG
INTERPRETATION ECG - MUSE: NORMAL
P AXIS - MUSE: NORMAL DEGREES
PR INTERVAL - MUSE: 164 MS
QRS DURATION - MUSE: 154 MS
QT - MUSE: 510 MS
QTC - MUSE: 504 MS
R AXIS - MUSE: 102 DEGREES
SYSTOLIC BLOOD PRESSURE - MUSE: NORMAL MMHG
T AXIS - MUSE: -44 DEGREES
VENTRICULAR RATE- MUSE: 59 BPM

## 2022-12-06 ENCOUNTER — TELEPHONE (OUTPATIENT)
Dept: CARDIOLOGY | Facility: CLINIC | Age: 82
End: 2022-12-06

## 2022-12-06 NOTE — TELEPHONE ENCOUNTER
Received VM from patient, would like to set up his TAVR work up appts. Msg sent to  with request to set up PCI, CTA TAVR and CV surgery consult.     Clara Samson RN on 12/6/2022 at 1:23 PM

## 2022-12-09 ENCOUNTER — ANCILLARY PROCEDURE (OUTPATIENT)
Dept: CARDIOLOGY | Facility: CLINIC | Age: 82
End: 2022-12-09
Attending: INTERNAL MEDICINE
Payer: MEDICARE

## 2022-12-09 DIAGNOSIS — I48.0 PAROXYSMAL ATRIAL FIBRILLATION (H): ICD-10-CM

## 2022-12-09 DIAGNOSIS — Z95.810 ICD (IMPLANTABLE CARDIOVERTER-DEFIBRILLATOR) IN PLACE: ICD-10-CM

## 2022-12-13 ENCOUNTER — TELEPHONE (OUTPATIENT)
Dept: CARDIOLOGY | Facility: CLINIC | Age: 82
End: 2022-12-13

## 2022-12-13 ENCOUNTER — PREP FOR PROCEDURE (OUTPATIENT)
Dept: CARDIOLOGY | Facility: CLINIC | Age: 82
End: 2022-12-13

## 2022-12-13 ENCOUNTER — ANCILLARY PROCEDURE (OUTPATIENT)
Dept: CARDIOLOGY | Facility: CLINIC | Age: 82
End: 2022-12-13
Attending: INTERNAL MEDICINE
Payer: MEDICARE

## 2022-12-13 DIAGNOSIS — Z95.810 ICD (IMPLANTABLE CARDIOVERTER-DEFIBRILLATOR) IN PLACE: ICD-10-CM

## 2022-12-13 DIAGNOSIS — I48.0 PAROXYSMAL ATRIAL FIBRILLATION (H): ICD-10-CM

## 2022-12-13 DIAGNOSIS — I25.10 CAD (CORONARY ARTERY DISEASE): Primary | ICD-10-CM

## 2022-12-13 RX ORDER — DIAZEPAM 5 MG
5 TABLET ORAL
Status: CANCELLED | OUTPATIENT
Start: 2022-12-19

## 2022-12-13 RX ORDER — ASPIRIN 81 MG/1
243 TABLET, CHEWABLE ORAL ONCE
Status: CANCELLED | OUTPATIENT
Start: 2022-12-19

## 2022-12-13 RX ORDER — FENTANYL CITRATE 50 UG/ML
25 INJECTION, SOLUTION INTRAMUSCULAR; INTRAVENOUS
Status: CANCELLED | OUTPATIENT
Start: 2022-12-19

## 2022-12-13 RX ORDER — LIDOCAINE 40 MG/G
CREAM TOPICAL
Status: CANCELLED | OUTPATIENT
Start: 2022-12-13

## 2022-12-13 RX ORDER — SODIUM CHLORIDE 9 MG/ML
INJECTION, SOLUTION INTRAVENOUS CONTINUOUS
Status: CANCELLED | OUTPATIENT
Start: 2022-12-19

## 2022-12-13 RX ORDER — ASPIRIN 325 MG
325 TABLET ORAL ONCE
Status: CANCELLED | OUTPATIENT
Start: 2022-12-19 | End: 2022-12-13

## 2022-12-13 NOTE — TELEPHONE ENCOUNTER
Pre-Procedure Angiogram Education     Daniel Alamo  [address] [facility] [address]  [phone] (home)     Procedure cardiologist:  Dr. Kohler  PCP:  Kenna Calvillo  H&P completed by:  Dr. Ordoñez, 11/25/22  Admit date 12/19/22  Arrival time:  0630  Anticoagulation: YES- Eliquis for Afib   CPAP: No  Previous PCI: No  Bypass Grafts: No  Renal Issues: No  Diabetic?: No  Device?: Yes  Type:  ICD- [employer]   Covid-19 testing- no longer required. Explained to patient if he develops symptoms he will need to complete a home test to verify he is negative. If positive instructed to call the valve team immediately.     Angiogram Teaching    Reason for Visit:  Telephone call to discuss pre-procedure education in preparation for: coronary angiogram with percutaneous coronary intervention (PCI to diagonal with possible shockwave)     Procedure Prep:  Primary Cardiologist note dated: Dr. Vargas 10/10/22, seen in November with Dr. Ordoñez  EKG results obtained, dated: to be obtained date of procedure   BMP, CBC, T&S ordered- to be drawn 12/16/22. Pt instructed no need to fast.     Patient Education  Patient states understanding of procedure and risks and agrees to proceed.    Pre-procedure instructions  Patient instructed to be NPO after midnight.  Patient instructed to shower the evening before or the morning of the procedure.  Leave all valuables at home (jewlery, rings, watches, large amounts of money).  Patient understands there are two visitors allowed during patients stay. Visitor will need to wear a mask their entire stay and remain in the restricted area per guidelines.   Patient instructed to arrange for transportation home following procedure from a responsible family member of friend. No driving for at least 24 hours post-procedure.  Patient instructed to have a responsible adult with them for 24 hours post-procedure.  Post-procedure follow up process.  Conscious sedation  discussed.    Pre-procedure medication instructions  Patient instructed on antiplatelet medication.  Continue medications as scheduled up until the date of procedure unless indicated below.   Patient instructed to take 325 mg of Aspirin am of procedure: Yes  Other medication: instructed to only take gabapentin, sotalol and metoprolol the a.m. of the procedure.  Instructed patient to hold all other prescription medications, OTC meds, supplements and vitamins the day of procedure.     Pt instructed to hold anticoagulation Eliquis beginning on 12/16/22 (last dose 12/15/22).    *PATIENTS RECORDS AVAILABLE IN [facility] UNLESS OTHERWISE INDICATED*      Patient Active Problem List   Diagnosis     Paroxysmal atrial fibrillation (H)     Pure hypercholesterolemia     Coronary arteriosclerosis due to lipid rich plaque     ICD (implantable cardioverter-defibrillator), dual, in situ     Ischemic cardiomyopathy     PVD (peripheral vascular disease) (H)     Abdominal aortic aneurysm (AAA) without rupture     PVC's (premature ventricular contractions)     Heart failure with reduced ejection fraction, NYHA class II (H)     S/P CABG (coronary artery bypass graft)     Nonrheumatic aortic valve stenosis     Sinus node dysfunction (H)       Current Outpatient Medications   Medication Sig Dispense Refill     acetaminophen (TYLENOL) 325 MG tablet Take 650 mg by mouth daily as needed        apixaban ANTICOAGULANT (ELIQUIS) 5 MG tablet 5 mg 2 times daily        aspirin (ASA) 81 MG EC tablet Take 81 mg by mouth daily       gabapentin (NEURONTIN) 300 MG capsule Take 900 mg by mouth 2 times daily        metoprolol succinate ER (TOPROL XL) 25 MG 24 hr tablet Take 1 tablet (25 mg) by mouth daily 90 tablet 11     sotalol (BETAPACE) 80 MG tablet Take 1 tablet (80 mg) by mouth 2 times daily 180 tablet 3     tamsulosin (FLOMAX) 0.4 MG capsule TAKE ONE CAPSULE BY MOUTH ONE TIME DAILY         Allergies   Allergen Reactions     Carvedilol GI Disturbance and  Other (See Comments)     Upset stomach  GI upset       Chloride GI Disturbance and Nausea     Discussed with patient over the phone. Detailed instructions sent via [employer]. Pt aware. Will contact writer with any additional questions.     Orders placed for procedure.     Clara Samson RN on 12/13/2022 at 10:31 AM

## 2022-12-13 NOTE — TELEPHONE ENCOUNTER
Remote report alert 12/9/22:   No charge. No Epic interface required.  Type: alert remote ICD transmission for atrial arrhythmia at least 6 of 24 hours.  Presenting rhythm: fine AF vs atrial lead noise, ventricular sensing and pacing rate 70 bpm.  Battery/lead status: stable  Arrhythmias: since 11/22/22, 244 mode switches, EGMs show some of these to be triggered by FFRWs, but EGMs from past 24 hrs appear to be fine AF vs RA lead noise, total of 9 hrs. No VT/VF detected.  Anticoagulant: Eliquis  Comments: normal ICD function. Routed to device RNDaniela Shanks, Device Specialist  ---------------------------------------------------    Repeat remote report 12/13/22:    Encounter Type: Courtesy remote check for: Presenting EGM, see if still in AF   Device: BSCi (D) ICD -[facility]  Presenting: A.fib (very fine/small amplitude) with /VS; intermittent significant Undersensing of AF noted. HR 70-100s  Findings: Remains in AF since 12/9/22. V-rates controlled, On Eliquis.   Plan: Routed to Dr. Saxena for review. See note in Epic. Sulma AGEE   ------------------------------------------------------------    Known Hx of RA noise, reproducible in past when Left arm crosses over (per OV 2/7/22). Known FFRW issues in past and current remotes.   Also Hx of PAF on Sotalol and Eliquis- current episode of AF in progress since 12/9/22 with good ventricular rate control. Significant undersensing of AF noted at times due to RA sensitivity being at 0.3 mV. Also had about 14 days of AF noted on AF burden graph back in November.      Patient is unaware of any A.fib episodes. Confirms compliance with Eliquis and Sotalol at this time. Advised patient to call with any changes in symptoms, such as activity intolerance, prolonged troublesome palpitations, s/s of Fluid retention etc. Patient verbalized understanding.     Of note: patient has upcoming Angiogram with Dr. Kohler on 12/19/22, and is to hold Eliquis starting this Friday, 12/16/22.      Presenting EGM 12/13/22:

## 2022-12-14 LAB
MDC_IDC_EPISODE_DTM: NORMAL
MDC_IDC_EPISODE_DURATION: 1023 S
MDC_IDC_EPISODE_DURATION: 1109 S
MDC_IDC_EPISODE_DURATION: 115 S
MDC_IDC_EPISODE_DURATION: 1200 S
MDC_IDC_EPISODE_DURATION: 122 S
MDC_IDC_EPISODE_DURATION: 13 S
MDC_IDC_EPISODE_DURATION: 14 S
MDC_IDC_EPISODE_DURATION: 141 S
MDC_IDC_EPISODE_DURATION: 162 S
MDC_IDC_EPISODE_DURATION: 170 S
MDC_IDC_EPISODE_DURATION: 196 S
MDC_IDC_EPISODE_DURATION: 23 S
MDC_IDC_EPISODE_DURATION: 241 S
MDC_IDC_EPISODE_DURATION: 245 S
MDC_IDC_EPISODE_DURATION: 337 S
MDC_IDC_EPISODE_DURATION: 423 S
MDC_IDC_EPISODE_DURATION: 6 S
MDC_IDC_EPISODE_DURATION: 6 S
MDC_IDC_EPISODE_DURATION: 847 S
MDC_IDC_EPISODE_DURATION: 932 S
MDC_IDC_EPISODE_ID: NORMAL
MDC_IDC_EPISODE_TYPE: NORMAL
MDC_IDC_LEAD_IMPLANT_DT: NORMAL
MDC_IDC_LEAD_LOCATION: NORMAL
MDC_IDC_LEAD_LOCATION_DETAIL_1: NORMAL
MDC_IDC_LEAD_MFG: NORMAL
MDC_IDC_LEAD_MODEL: NORMAL
MDC_IDC_LEAD_POLARITY_TYPE: NORMAL
MDC_IDC_LEAD_SERIAL: NORMAL
MDC_IDC_LEAD_SPECIAL_FUNCTION: NORMAL
MDC_IDC_LEAD_SPECIAL_FUNCTION: NORMAL
MDC_IDC_MSMT_BATTERY_DTM: NORMAL
MDC_IDC_MSMT_BATTERY_DTM: NORMAL
MDC_IDC_MSMT_BATTERY_REMAINING_LONGEVITY: 108 MO
MDC_IDC_MSMT_BATTERY_REMAINING_LONGEVITY: 108 MO
MDC_IDC_MSMT_BATTERY_REMAINING_PERCENTAGE: 100 %
MDC_IDC_MSMT_BATTERY_REMAINING_PERCENTAGE: 100 %
MDC_IDC_MSMT_BATTERY_STATUS: NORMAL
MDC_IDC_MSMT_BATTERY_STATUS: NORMAL
MDC_IDC_MSMT_CAP_CHARGE_DTM: NORMAL
MDC_IDC_MSMT_CAP_CHARGE_DTM: NORMAL
MDC_IDC_MSMT_CAP_CHARGE_TIME: 10.4 S
MDC_IDC_MSMT_CAP_CHARGE_TIME: 10.4 S
MDC_IDC_MSMT_CAP_CHARGE_TYPE: NORMAL
MDC_IDC_MSMT_CAP_CHARGE_TYPE: NORMAL
MDC_IDC_MSMT_LEADCHNL_RA_IMPEDANCE_VALUE: 742 OHM
MDC_IDC_MSMT_LEADCHNL_RA_IMPEDANCE_VALUE: 771 OHM
MDC_IDC_MSMT_LEADCHNL_RA_PACING_THRESHOLD_AMPLITUDE: 1.6 V
MDC_IDC_MSMT_LEADCHNL_RA_PACING_THRESHOLD_AMPLITUDE: 1.6 V
MDC_IDC_MSMT_LEADCHNL_RA_PACING_THRESHOLD_PULSEWIDTH: 0.7 MS
MDC_IDC_MSMT_LEADCHNL_RA_PACING_THRESHOLD_PULSEWIDTH: 0.7 MS
MDC_IDC_MSMT_LEADCHNL_RV_IMPEDANCE_VALUE: 512 OHM
MDC_IDC_MSMT_LEADCHNL_RV_IMPEDANCE_VALUE: 517 OHM
MDC_IDC_MSMT_LEADCHNL_RV_PACING_THRESHOLD_AMPLITUDE: 0.8 V
MDC_IDC_MSMT_LEADCHNL_RV_PACING_THRESHOLD_AMPLITUDE: 0.8 V
MDC_IDC_MSMT_LEADCHNL_RV_PACING_THRESHOLD_PULSEWIDTH: 0.4 MS
MDC_IDC_MSMT_LEADCHNL_RV_PACING_THRESHOLD_PULSEWIDTH: 0.4 MS
MDC_IDC_PG_IMPLANT_DTM: NORMAL
MDC_IDC_PG_IMPLANT_DTM: NORMAL
MDC_IDC_PG_MFG: NORMAL
MDC_IDC_PG_MFG: NORMAL
MDC_IDC_PG_MODEL: NORMAL
MDC_IDC_PG_MODEL: NORMAL
MDC_IDC_PG_SERIAL: NORMAL
MDC_IDC_PG_SERIAL: NORMAL
MDC_IDC_PG_TYPE: NORMAL
MDC_IDC_PG_TYPE: NORMAL
MDC_IDC_SESS_CLINIC_NAME: NORMAL
MDC_IDC_SESS_CLINIC_NAME: NORMAL
MDC_IDC_SESS_DTM: NORMAL
MDC_IDC_SESS_DTM: NORMAL
MDC_IDC_SESS_TYPE: NORMAL
MDC_IDC_SESS_TYPE: NORMAL
MDC_IDC_SET_BRADY_AT_MODE_SWITCH_MODE: NORMAL
MDC_IDC_SET_BRADY_AT_MODE_SWITCH_MODE: NORMAL
MDC_IDC_SET_BRADY_AT_MODE_SWITCH_RATE: 170 {BEATS}/MIN
MDC_IDC_SET_BRADY_AT_MODE_SWITCH_RATE: 170 {BEATS}/MIN
MDC_IDC_SET_BRADY_LOWRATE: 55 {BEATS}/MIN
MDC_IDC_SET_BRADY_LOWRATE: 55 {BEATS}/MIN
MDC_IDC_SET_BRADY_MAX_SENSOR_RATE: 130 {BEATS}/MIN
MDC_IDC_SET_BRADY_MAX_SENSOR_RATE: 130 {BEATS}/MIN
MDC_IDC_SET_BRADY_MAX_TRACKING_RATE: 120 {BEATS}/MIN
MDC_IDC_SET_BRADY_MAX_TRACKING_RATE: 120 {BEATS}/MIN
MDC_IDC_SET_BRADY_MODE: NORMAL
MDC_IDC_SET_BRADY_MODE: NORMAL
MDC_IDC_SET_BRADY_PAV_DELAY_HIGH: 200 MS
MDC_IDC_SET_BRADY_PAV_DELAY_HIGH: 200 MS
MDC_IDC_SET_BRADY_PAV_DELAY_LOW: 300 MS
MDC_IDC_SET_BRADY_PAV_DELAY_LOW: 300 MS
MDC_IDC_SET_BRADY_SAV_DELAY_HIGH: 165 MS
MDC_IDC_SET_BRADY_SAV_DELAY_HIGH: 165 MS
MDC_IDC_SET_BRADY_SAV_DELAY_LOW: 250 MS
MDC_IDC_SET_BRADY_SAV_DELAY_LOW: 250 MS
MDC_IDC_SET_LEADCHNL_RA_PACING_AMPLITUDE: 3 V
MDC_IDC_SET_LEADCHNL_RA_PACING_AMPLITUDE: 3 V
MDC_IDC_SET_LEADCHNL_RA_PACING_POLARITY: NORMAL
MDC_IDC_SET_LEADCHNL_RA_PACING_POLARITY: NORMAL
MDC_IDC_SET_LEADCHNL_RA_PACING_PULSEWIDTH: 0.7 MS
MDC_IDC_SET_LEADCHNL_RA_PACING_PULSEWIDTH: 0.7 MS
MDC_IDC_SET_LEADCHNL_RA_SENSING_ADAPTATION_MODE: NORMAL
MDC_IDC_SET_LEADCHNL_RA_SENSING_ADAPTATION_MODE: NORMAL
MDC_IDC_SET_LEADCHNL_RA_SENSING_POLARITY: NORMAL
MDC_IDC_SET_LEADCHNL_RA_SENSING_POLARITY: NORMAL
MDC_IDC_SET_LEADCHNL_RA_SENSING_SENSITIVITY: 0.3 MV
MDC_IDC_SET_LEADCHNL_RA_SENSING_SENSITIVITY: 0.3 MV
MDC_IDC_SET_LEADCHNL_RV_PACING_AMPLITUDE: 1.5 V
MDC_IDC_SET_LEADCHNL_RV_PACING_AMPLITUDE: 1.5 V
MDC_IDC_SET_LEADCHNL_RV_PACING_POLARITY: NORMAL
MDC_IDC_SET_LEADCHNL_RV_PACING_POLARITY: NORMAL
MDC_IDC_SET_LEADCHNL_RV_PACING_PULSEWIDTH: 0.4 MS
MDC_IDC_SET_LEADCHNL_RV_PACING_PULSEWIDTH: 0.4 MS
MDC_IDC_SET_LEADCHNL_RV_SENSING_ADAPTATION_MODE: NORMAL
MDC_IDC_SET_LEADCHNL_RV_SENSING_ADAPTATION_MODE: NORMAL
MDC_IDC_SET_LEADCHNL_RV_SENSING_POLARITY: NORMAL
MDC_IDC_SET_LEADCHNL_RV_SENSING_POLARITY: NORMAL
MDC_IDC_SET_LEADCHNL_RV_SENSING_SENSITIVITY: 0.6 MV
MDC_IDC_SET_LEADCHNL_RV_SENSING_SENSITIVITY: 0.6 MV
MDC_IDC_SET_ZONE_DETECTION_INTERVAL: 261 MS
MDC_IDC_SET_ZONE_DETECTION_INTERVAL: 261 MS
MDC_IDC_SET_ZONE_DETECTION_INTERVAL: 333 MS
MDC_IDC_SET_ZONE_DETECTION_INTERVAL: 333 MS
MDC_IDC_SET_ZONE_DETECTION_INTERVAL: 400 MS
MDC_IDC_SET_ZONE_DETECTION_INTERVAL: 400 MS
MDC_IDC_SET_ZONE_TYPE: NORMAL
MDC_IDC_SET_ZONE_VENDOR_TYPE: NORMAL
MDC_IDC_STAT_AT_BURDEN_PERCENT: 23 %
MDC_IDC_STAT_AT_BURDEN_PERCENT: 4 %
MDC_IDC_STAT_AT_DTM_END: NORMAL
MDC_IDC_STAT_AT_DTM_END: NORMAL
MDC_IDC_STAT_AT_DTM_START: NORMAL
MDC_IDC_STAT_AT_DTM_START: NORMAL
MDC_IDC_STAT_BRADY_DTM_END: NORMAL
MDC_IDC_STAT_BRADY_DTM_END: NORMAL
MDC_IDC_STAT_BRADY_DTM_START: NORMAL
MDC_IDC_STAT_BRADY_DTM_START: NORMAL
MDC_IDC_STAT_BRADY_RA_PERCENT_PACED: 18 %
MDC_IDC_STAT_BRADY_RA_PERCENT_PACED: 28 %
MDC_IDC_STAT_BRADY_RV_PERCENT_PACED: 11 %
MDC_IDC_STAT_BRADY_RV_PERCENT_PACED: 22 %
MDC_IDC_STAT_EPISODE_RECENT_COUNT: 0
MDC_IDC_STAT_EPISODE_RECENT_COUNT: 244
MDC_IDC_STAT_EPISODE_RECENT_COUNT: 585
MDC_IDC_STAT_EPISODE_RECENT_COUNT_DTM_END: NORMAL
MDC_IDC_STAT_EPISODE_RECENT_COUNT_DTM_START: NORMAL
MDC_IDC_STAT_EPISODE_TYPE: NORMAL
MDC_IDC_STAT_EPISODE_VENDOR_TYPE: NORMAL
MDC_IDC_STAT_TACHYTHERAPY_ATP_DELIVERED_RECENT: 0
MDC_IDC_STAT_TACHYTHERAPY_ATP_DELIVERED_RECENT: 0
MDC_IDC_STAT_TACHYTHERAPY_ATP_DELIVERED_TOTAL: 1
MDC_IDC_STAT_TACHYTHERAPY_ATP_DELIVERED_TOTAL: 1
MDC_IDC_STAT_TACHYTHERAPY_RECENT_DTM_END: NORMAL
MDC_IDC_STAT_TACHYTHERAPY_RECENT_DTM_END: NORMAL
MDC_IDC_STAT_TACHYTHERAPY_RECENT_DTM_START: NORMAL
MDC_IDC_STAT_TACHYTHERAPY_RECENT_DTM_START: NORMAL
MDC_IDC_STAT_TACHYTHERAPY_SHOCKS_ABORTED_RECENT: 0
MDC_IDC_STAT_TACHYTHERAPY_SHOCKS_ABORTED_RECENT: 0
MDC_IDC_STAT_TACHYTHERAPY_SHOCKS_ABORTED_TOTAL: 0
MDC_IDC_STAT_TACHYTHERAPY_SHOCKS_ABORTED_TOTAL: 0
MDC_IDC_STAT_TACHYTHERAPY_SHOCKS_DELIVERED_RECENT: 0
MDC_IDC_STAT_TACHYTHERAPY_SHOCKS_DELIVERED_RECENT: 0
MDC_IDC_STAT_TACHYTHERAPY_SHOCKS_DELIVERED_TOTAL: 0
MDC_IDC_STAT_TACHYTHERAPY_SHOCKS_DELIVERED_TOTAL: 0
MDC_IDC_STAT_TACHYTHERAPY_TOTAL_DTM_END: NORMAL
MDC_IDC_STAT_TACHYTHERAPY_TOTAL_DTM_END: NORMAL
MDC_IDC_STAT_TACHYTHERAPY_TOTAL_DTM_START: NORMAL
MDC_IDC_STAT_TACHYTHERAPY_TOTAL_DTM_START: NORMAL

## 2022-12-15 LAB
ABO/RH(D): NORMAL
ANTIBODY SCREEN: NEGATIVE
MDC_IDC_EPISODE_DTM: NORMAL
MDC_IDC_EPISODE_DURATION: 13 S
MDC_IDC_EPISODE_DURATION: 137 S
MDC_IDC_EPISODE_DURATION: 181 S
MDC_IDC_EPISODE_DURATION: 2 S
MDC_IDC_EPISODE_DURATION: 22 S
MDC_IDC_EPISODE_DURATION: 25 S
MDC_IDC_EPISODE_DURATION: 26 S
MDC_IDC_EPISODE_DURATION: 30 S
MDC_IDC_EPISODE_DURATION: 37 S
MDC_IDC_EPISODE_DURATION: 6 S
MDC_IDC_EPISODE_ID: NORMAL
MDC_IDC_EPISODE_TYPE: NORMAL
MDC_IDC_LEAD_IMPLANT_DT: NORMAL
MDC_IDC_LEAD_LOCATION: NORMAL
MDC_IDC_LEAD_LOCATION_DETAIL_1: NORMAL
MDC_IDC_LEAD_MFG: NORMAL
MDC_IDC_LEAD_MODEL: NORMAL
MDC_IDC_LEAD_POLARITY_TYPE: NORMAL
MDC_IDC_LEAD_SERIAL: NORMAL
MDC_IDC_LEAD_SPECIAL_FUNCTION: NORMAL
MDC_IDC_LEAD_SPECIAL_FUNCTION: NORMAL
MDC_IDC_MSMT_BATTERY_DTM: NORMAL
MDC_IDC_MSMT_BATTERY_DTM: NORMAL
MDC_IDC_MSMT_BATTERY_REMAINING_LONGEVITY: 108 MO
MDC_IDC_MSMT_BATTERY_REMAINING_LONGEVITY: 114 MO
MDC_IDC_MSMT_BATTERY_REMAINING_PERCENTAGE: 100 %
MDC_IDC_MSMT_BATTERY_REMAINING_PERCENTAGE: 100 %
MDC_IDC_MSMT_BATTERY_STATUS: NORMAL
MDC_IDC_MSMT_BATTERY_STATUS: NORMAL
MDC_IDC_MSMT_CAP_CHARGE_DTM: NORMAL
MDC_IDC_MSMT_CAP_CHARGE_DTM: NORMAL
MDC_IDC_MSMT_CAP_CHARGE_TIME: 10.4 S
MDC_IDC_MSMT_CAP_CHARGE_TIME: 10.4 S
MDC_IDC_MSMT_CAP_CHARGE_TYPE: NORMAL
MDC_IDC_MSMT_CAP_CHARGE_TYPE: NORMAL
MDC_IDC_MSMT_LEADCHNL_RA_IMPEDANCE_VALUE: 790 OHM
MDC_IDC_MSMT_LEADCHNL_RA_IMPEDANCE_VALUE: 796 OHM
MDC_IDC_MSMT_LEADCHNL_RA_IMPEDANCE_VALUE: 796 OHM
MDC_IDC_MSMT_LEADCHNL_RA_LEAD_CHANNEL_STATUS: NORMAL
MDC_IDC_MSMT_LEADCHNL_RA_PACING_THRESHOLD_AMPLITUDE: 1.5 V
MDC_IDC_MSMT_LEADCHNL_RA_PACING_THRESHOLD_AMPLITUDE: 1.6 V
MDC_IDC_MSMT_LEADCHNL_RA_PACING_THRESHOLD_AMPLITUDE: 1.8 V
MDC_IDC_MSMT_LEADCHNL_RA_PACING_THRESHOLD_PULSEWIDTH: 0.7 MS
MDC_IDC_MSMT_LEADCHNL_RA_SENSING_INTR_AMPL: 1.1 MV
MDC_IDC_MSMT_LEADCHNL_RV_IMPEDANCE_VALUE: 564 OHM
MDC_IDC_MSMT_LEADCHNL_RV_IMPEDANCE_VALUE: 579 OHM
MDC_IDC_MSMT_LEADCHNL_RV_IMPEDANCE_VALUE: 579 OHM
MDC_IDC_MSMT_LEADCHNL_RV_PACING_THRESHOLD_AMPLITUDE: 0.8 V
MDC_IDC_MSMT_LEADCHNL_RV_PACING_THRESHOLD_PULSEWIDTH: 0.4 MS
MDC_IDC_MSMT_LEADCHNL_RV_SENSING_INTR_AMPL: 25 MV
MDC_IDC_PG_IMPLANT_DTM: NORMAL
MDC_IDC_PG_IMPLANT_DTM: NORMAL
MDC_IDC_PG_MFG: NORMAL
MDC_IDC_PG_MFG: NORMAL
MDC_IDC_PG_MODEL: NORMAL
MDC_IDC_PG_MODEL: NORMAL
MDC_IDC_PG_SERIAL: NORMAL
MDC_IDC_PG_SERIAL: NORMAL
MDC_IDC_PG_TYPE: NORMAL
MDC_IDC_PG_TYPE: NORMAL
MDC_IDC_SESS_CLINIC_NAME: NORMAL
MDC_IDC_SESS_CLINIC_NAME: NORMAL
MDC_IDC_SESS_DTM: NORMAL
MDC_IDC_SESS_DTM: NORMAL
MDC_IDC_SESS_TYPE: NORMAL
MDC_IDC_SESS_TYPE: NORMAL
MDC_IDC_SET_BRADY_AT_MODE_SWITCH_MODE: NORMAL
MDC_IDC_SET_BRADY_AT_MODE_SWITCH_MODE: NORMAL
MDC_IDC_SET_BRADY_AT_MODE_SWITCH_RATE: 170 {BEATS}/MIN
MDC_IDC_SET_BRADY_AT_MODE_SWITCH_RATE: 170 {BEATS}/MIN
MDC_IDC_SET_BRADY_LOWRATE: 55 {BEATS}/MIN
MDC_IDC_SET_BRADY_LOWRATE: 55 {BEATS}/MIN
MDC_IDC_SET_BRADY_MAX_SENSOR_RATE: 130 {BEATS}/MIN
MDC_IDC_SET_BRADY_MAX_SENSOR_RATE: 130 {BEATS}/MIN
MDC_IDC_SET_BRADY_MAX_TRACKING_RATE: 120 {BEATS}/MIN
MDC_IDC_SET_BRADY_MAX_TRACKING_RATE: 120 {BEATS}/MIN
MDC_IDC_SET_BRADY_MODE: NORMAL
MDC_IDC_SET_BRADY_MODE: NORMAL
MDC_IDC_SET_BRADY_PAV_DELAY_HIGH: 200 MS
MDC_IDC_SET_BRADY_PAV_DELAY_HIGH: 200 MS
MDC_IDC_SET_BRADY_PAV_DELAY_LOW: 300 MS
MDC_IDC_SET_BRADY_PAV_DELAY_LOW: 300 MS
MDC_IDC_SET_BRADY_SAV_DELAY_HIGH: 165 MS
MDC_IDC_SET_BRADY_SAV_DELAY_HIGH: 165 MS
MDC_IDC_SET_BRADY_SAV_DELAY_LOW: 250 MS
MDC_IDC_SET_BRADY_SAV_DELAY_LOW: 250 MS
MDC_IDC_SET_LEADCHNL_RA_PACING_AMPLITUDE: 3 V
MDC_IDC_SET_LEADCHNL_RA_PACING_AMPLITUDE: 3 V
MDC_IDC_SET_LEADCHNL_RA_PACING_POLARITY: NORMAL
MDC_IDC_SET_LEADCHNL_RA_PACING_POLARITY: NORMAL
MDC_IDC_SET_LEADCHNL_RA_PACING_PULSEWIDTH: 0.7 MS
MDC_IDC_SET_LEADCHNL_RA_PACING_PULSEWIDTH: 0.7 MS
MDC_IDC_SET_LEADCHNL_RA_SENSING_ADAPTATION_MODE: NORMAL
MDC_IDC_SET_LEADCHNL_RA_SENSING_ADAPTATION_MODE: NORMAL
MDC_IDC_SET_LEADCHNL_RA_SENSING_POLARITY: NORMAL
MDC_IDC_SET_LEADCHNL_RA_SENSING_POLARITY: NORMAL
MDC_IDC_SET_LEADCHNL_RA_SENSING_SENSITIVITY: 0.3 MV
MDC_IDC_SET_LEADCHNL_RA_SENSING_SENSITIVITY: 0.3 MV
MDC_IDC_SET_LEADCHNL_RV_PACING_AMPLITUDE: 1.5 V
MDC_IDC_SET_LEADCHNL_RV_PACING_AMPLITUDE: 1.5 V
MDC_IDC_SET_LEADCHNL_RV_PACING_POLARITY: NORMAL
MDC_IDC_SET_LEADCHNL_RV_PACING_POLARITY: NORMAL
MDC_IDC_SET_LEADCHNL_RV_PACING_PULSEWIDTH: 0.4 MS
MDC_IDC_SET_LEADCHNL_RV_PACING_PULSEWIDTH: 0.4 MS
MDC_IDC_SET_LEADCHNL_RV_SENSING_ADAPTATION_MODE: NORMAL
MDC_IDC_SET_LEADCHNL_RV_SENSING_ADAPTATION_MODE: NORMAL
MDC_IDC_SET_LEADCHNL_RV_SENSING_POLARITY: NORMAL
MDC_IDC_SET_LEADCHNL_RV_SENSING_POLARITY: NORMAL
MDC_IDC_SET_LEADCHNL_RV_SENSING_SENSITIVITY: 0.6 MV
MDC_IDC_SET_LEADCHNL_RV_SENSING_SENSITIVITY: 0.6 MV
MDC_IDC_SET_ZONE_DETECTION_INTERVAL: 261 MS
MDC_IDC_SET_ZONE_DETECTION_INTERVAL: 261 MS
MDC_IDC_SET_ZONE_DETECTION_INTERVAL: 333 MS
MDC_IDC_SET_ZONE_DETECTION_INTERVAL: 333 MS
MDC_IDC_SET_ZONE_DETECTION_INTERVAL: 400 MS
MDC_IDC_SET_ZONE_DETECTION_INTERVAL: 400 MS
MDC_IDC_SET_ZONE_TYPE: NORMAL
MDC_IDC_SET_ZONE_VENDOR_TYPE: NORMAL
MDC_IDC_STAT_AT_BURDEN_PERCENT: 1 %
MDC_IDC_STAT_AT_BURDEN_PERCENT: 4 %
MDC_IDC_STAT_AT_DTM_END: NORMAL
MDC_IDC_STAT_AT_DTM_END: NORMAL
MDC_IDC_STAT_AT_DTM_START: NORMAL
MDC_IDC_STAT_AT_DTM_START: NORMAL
MDC_IDC_STAT_BRADY_DTM_END: NORMAL
MDC_IDC_STAT_BRADY_DTM_END: NORMAL
MDC_IDC_STAT_BRADY_DTM_START: NORMAL
MDC_IDC_STAT_BRADY_DTM_START: NORMAL
MDC_IDC_STAT_BRADY_RA_PERCENT_PACED: 26 %
MDC_IDC_STAT_BRADY_RA_PERCENT_PACED: 27 %
MDC_IDC_STAT_BRADY_RV_PERCENT_PACED: 5 %
MDC_IDC_STAT_BRADY_RV_PERCENT_PACED: 7 %
MDC_IDC_STAT_EPISODE_RECENT_COUNT: 0
MDC_IDC_STAT_EPISODE_RECENT_COUNT: 2729
MDC_IDC_STAT_EPISODE_RECENT_COUNT: 4
MDC_IDC_STAT_EPISODE_RECENT_COUNT: 5690
MDC_IDC_STAT_EPISODE_RECENT_COUNT: 7
MDC_IDC_STAT_EPISODE_RECENT_COUNT_DTM_END: NORMAL
MDC_IDC_STAT_EPISODE_RECENT_COUNT_DTM_START: NORMAL
MDC_IDC_STAT_EPISODE_TYPE: NORMAL
MDC_IDC_STAT_EPISODE_VENDOR_TYPE: NORMAL
MDC_IDC_STAT_TACHYTHERAPY_ATP_DELIVERED_RECENT: 0
MDC_IDC_STAT_TACHYTHERAPY_ATP_DELIVERED_RECENT: 0
MDC_IDC_STAT_TACHYTHERAPY_ATP_DELIVERED_TOTAL: 1
MDC_IDC_STAT_TACHYTHERAPY_ATP_DELIVERED_TOTAL: 1
MDC_IDC_STAT_TACHYTHERAPY_RECENT_DTM_END: NORMAL
MDC_IDC_STAT_TACHYTHERAPY_RECENT_DTM_END: NORMAL
MDC_IDC_STAT_TACHYTHERAPY_RECENT_DTM_START: NORMAL
MDC_IDC_STAT_TACHYTHERAPY_RECENT_DTM_START: NORMAL
MDC_IDC_STAT_TACHYTHERAPY_SHOCKS_ABORTED_RECENT: 0
MDC_IDC_STAT_TACHYTHERAPY_SHOCKS_ABORTED_RECENT: 0
MDC_IDC_STAT_TACHYTHERAPY_SHOCKS_ABORTED_TOTAL: 0
MDC_IDC_STAT_TACHYTHERAPY_SHOCKS_ABORTED_TOTAL: 0
MDC_IDC_STAT_TACHYTHERAPY_SHOCKS_DELIVERED_RECENT: 0
MDC_IDC_STAT_TACHYTHERAPY_SHOCKS_DELIVERED_RECENT: 0
MDC_IDC_STAT_TACHYTHERAPY_SHOCKS_DELIVERED_TOTAL: 0
MDC_IDC_STAT_TACHYTHERAPY_SHOCKS_DELIVERED_TOTAL: 0
MDC_IDC_STAT_TACHYTHERAPY_TOTAL_DTM_END: NORMAL
MDC_IDC_STAT_TACHYTHERAPY_TOTAL_DTM_END: NORMAL
MDC_IDC_STAT_TACHYTHERAPY_TOTAL_DTM_START: NORMAL
MDC_IDC_STAT_TACHYTHERAPY_TOTAL_DTM_START: NORMAL
SPECIMEN EXPIRATION DATE: NORMAL

## 2022-12-16 ENCOUNTER — LAB (OUTPATIENT)
Dept: CARDIOLOGY | Facility: CLINIC | Age: 82
End: 2022-12-16
Payer: MEDICARE

## 2022-12-16 DIAGNOSIS — I25.10 CAD (CORONARY ARTERY DISEASE): ICD-10-CM

## 2022-12-16 LAB
ANION GAP SERPL CALCULATED.3IONS-SCNC: 7 MMOL/L (ref 7–15)
BUN SERPL-MCNC: 16.5 MG/DL (ref 8–23)
CALCIUM SERPL-MCNC: 9 MG/DL (ref 8.8–10.2)
CHLORIDE SERPL-SCNC: 108 MMOL/L (ref 98–107)
CREAT SERPL-MCNC: 1.13 MG/DL (ref 0.67–1.17)
DEPRECATED HCO3 PLAS-SCNC: 27 MMOL/L (ref 22–29)
ERYTHROCYTE [DISTWIDTH] IN BLOOD BY AUTOMATED COUNT: 13.5 % (ref 10–15)
GFR SERPL CREATININE-BSD FRML MDRD: 64 ML/MIN/1.73M2
GLUCOSE SERPL-MCNC: 93 MG/DL (ref 70–99)
HCT VFR BLD AUTO: 45.4 % (ref 40–53)
HGB BLD-MCNC: 14.5 G/DL (ref 13.3–17.7)
MCH RBC QN AUTO: 30.1 PG (ref 26.5–33)
MCHC RBC AUTO-ENTMCNC: 31.9 G/DL (ref 31.5–36.5)
MCV RBC AUTO: 94 FL (ref 78–100)
PLATELET # BLD AUTO: 260 10E3/UL (ref 150–450)
POTASSIUM SERPL-SCNC: 5.2 MMOL/L (ref 3.4–5.3)
RBC # BLD AUTO: 4.82 10E6/UL (ref 4.4–5.9)
SODIUM SERPL-SCNC: 142 MMOL/L (ref 136–145)
WBC # BLD AUTO: 8.3 10E3/UL (ref 4–11)

## 2022-12-16 PROCEDURE — 80048 BASIC METABOLIC PNL TOTAL CA: CPT

## 2022-12-16 PROCEDURE — 36415 COLL VENOUS BLD VENIPUNCTURE: CPT

## 2022-12-16 PROCEDURE — 86850 RBC ANTIBODY SCREEN: CPT

## 2022-12-16 PROCEDURE — 85027 COMPLETE CBC AUTOMATED: CPT

## 2022-12-16 PROCEDURE — 86900 BLOOD TYPING SEROLOGIC ABO: CPT

## 2022-12-16 PROCEDURE — 86901 BLOOD TYPING SEROLOGIC RH(D): CPT

## 2022-12-19 ENCOUNTER — HOSPITAL ENCOUNTER (OUTPATIENT)
Facility: HOSPITAL | Age: 82
Discharge: HOME | End: 2022-12-20
Attending: INTERNAL MEDICINE | Admitting: INTERNAL MEDICINE
Payer: MEDICARE

## 2022-12-19 DIAGNOSIS — I25.10 CAD (CORONARY ARTERY DISEASE): ICD-10-CM

## 2022-12-19 DIAGNOSIS — I25.5 ISCHEMIC CARDIOMYOPATHY: ICD-10-CM

## 2022-12-19 DIAGNOSIS — Z95.1 S/P CABG (CORONARY ARTERY BYPASS GRAFT): Primary | ICD-10-CM

## 2022-12-19 DIAGNOSIS — I25.10 CORONARY ARTERY DISEASE INVOLVING NATIVE CORONARY ARTERY OF NATIVE HEART WITHOUT ANGINA PECTORIS: ICD-10-CM

## 2022-12-19 PROBLEM — Z98.890 STATUS POST CORONARY ANGIOGRAM: Status: ACTIVE | Noted: 2022-12-19

## 2022-12-19 LAB
ACT BLD: 292 SECONDS (ref 74–150)
ATRIAL RATE - MUSE: 65 BPM
DIASTOLIC BLOOD PRESSURE - MUSE: NORMAL MMHG
INTERPRETATION ECG - MUSE: NORMAL
P AXIS - MUSE: NORMAL DEGREES
PR INTERVAL - MUSE: 212 MS
QRS DURATION - MUSE: 156 MS
QT - MUSE: 518 MS
QTC - MUSE: 534 MS
R AXIS - MUSE: 105 DEGREES
SYSTOLIC BLOOD PRESSURE - MUSE: NORMAL MMHG
T AXIS - MUSE: 270 DEGREES
VENTRICULAR RATE- MUSE: 64 BPM

## 2022-12-19 PROCEDURE — 92920 PRQ TRLUML C ANGIOP 1ART&/BR: CPT | Performed by: INTERNAL MEDICINE

## 2022-12-19 PROCEDURE — 92920 PRQ TRLUML C ANGIOP 1ART&/BR: CPT | Mod: LD | Performed by: INTERNAL MEDICINE

## 2022-12-19 PROCEDURE — C1894 INTRO/SHEATH, NON-LASER: HCPCS | Performed by: INTERNAL MEDICINE

## 2022-12-19 PROCEDURE — C1761 HC OR CATH, TRANS INTRA LITHO/CORONARY: HCPCS | Performed by: INTERNAL MEDICINE

## 2022-12-19 PROCEDURE — 99222 1ST HOSP IP/OBS MODERATE 55: CPT | Performed by: FAMILY MEDICINE

## 2022-12-19 PROCEDURE — 0715T HC PRQ TRLUML CORONARY LITHOTRIPSY: CPT | Performed by: INTERNAL MEDICINE

## 2022-12-19 PROCEDURE — 210N000001 HC R&B IMCU HEART CARE

## 2022-12-19 PROCEDURE — 250N000013 HC RX MED GY IP 250 OP 250 PS 637: Performed by: INTERNAL MEDICINE

## 2022-12-19 PROCEDURE — C1725 CATH, TRANSLUMIN NON-LASER: HCPCS | Performed by: INTERNAL MEDICINE

## 2022-12-19 PROCEDURE — 93010 ELECTROCARDIOGRAM REPORT: CPT | Mod: HOP | Performed by: INTERNAL MEDICINE

## 2022-12-19 PROCEDURE — 85347 COAGULATION TIME ACTIVATED: CPT

## 2022-12-19 PROCEDURE — 93005 ELECTROCARDIOGRAM TRACING: CPT

## 2022-12-19 PROCEDURE — 258N000003 HC RX IP 258 OP 636: Performed by: INTERNAL MEDICINE

## 2022-12-19 PROCEDURE — C1887 CATHETER, GUIDING: HCPCS | Performed by: INTERNAL MEDICINE

## 2022-12-19 PROCEDURE — 0715T PR PERCUTANEOUS TRANSLUMINAL CORONARY LITHOTRIPSY: CPT | Performed by: INTERNAL MEDICINE

## 2022-12-19 PROCEDURE — 250N000011 HC RX IP 250 OP 636: Performed by: INTERNAL MEDICINE

## 2022-12-19 PROCEDURE — C1769 GUIDE WIRE: HCPCS | Performed by: INTERNAL MEDICINE

## 2022-12-19 PROCEDURE — 250N000009 HC RX 250: Performed by: INTERNAL MEDICINE

## 2022-12-19 PROCEDURE — 999N000054 HC STATISTIC EKG NON-CHARGEABLE

## 2022-12-19 PROCEDURE — 255N000002 HC RX 255 OP 636: Performed by: INTERNAL MEDICINE

## 2022-12-19 PROCEDURE — 272N000001 HC OR GENERAL SUPPLY STERILE: Performed by: INTERNAL MEDICINE

## 2022-12-19 RX ORDER — ACETAMINOPHEN 325 MG/1
650 TABLET ORAL EVERY 4 HOURS PRN
Status: DISCONTINUED | OUTPATIENT
Start: 2022-12-19 | End: 2022-12-20 | Stop reason: HOSPADM

## 2022-12-19 RX ORDER — METOPROLOL SUCCINATE 25 MG/1
25 TABLET, EXTENDED RELEASE ORAL DAILY
Status: DISCONTINUED | OUTPATIENT
Start: 2022-12-19 | End: 2022-12-20 | Stop reason: HOSPADM

## 2022-12-19 RX ORDER — OXYCODONE HYDROCHLORIDE 5 MG/1
5 TABLET ORAL EVERY 4 HOURS PRN
Status: DISCONTINUED | OUTPATIENT
Start: 2022-12-19 | End: 2022-12-20 | Stop reason: HOSPADM

## 2022-12-19 RX ORDER — ATORVASTATIN CALCIUM 40 MG/1
40 TABLET, FILM COATED ORAL DAILY
Status: DISCONTINUED | OUTPATIENT
Start: 2022-12-19 | End: 2022-12-19

## 2022-12-19 RX ORDER — FLUMAZENIL 0.1 MG/ML
0.2 INJECTION, SOLUTION INTRAVENOUS
Status: ACTIVE | OUTPATIENT
Start: 2022-12-19 | End: 2022-12-19

## 2022-12-19 RX ORDER — ASPIRIN 325 MG
325 TABLET ORAL ONCE
Status: COMPLETED | OUTPATIENT
Start: 2022-12-19 | End: 2022-12-19

## 2022-12-19 RX ORDER — ATROPINE SULFATE 0.1 MG/ML
0.5 INJECTION INTRAVENOUS
Status: ACTIVE | OUTPATIENT
Start: 2022-12-19 | End: 2022-12-19

## 2022-12-19 RX ORDER — FENTANYL CITRATE 50 UG/ML
25 INJECTION, SOLUTION INTRAMUSCULAR; INTRAVENOUS
Status: DISCONTINUED | OUTPATIENT
Start: 2022-12-19 | End: 2022-12-19 | Stop reason: HOSPADM

## 2022-12-19 RX ORDER — ATORVASTATIN CALCIUM 40 MG/1
40 TABLET, FILM COATED ORAL EVERY EVENING
Status: DISCONTINUED | OUTPATIENT
Start: 2022-12-19 | End: 2022-12-20 | Stop reason: HOSPADM

## 2022-12-19 RX ORDER — HEPARIN SODIUM 1000 [USP'U]/ML
INJECTION, SOLUTION INTRAVENOUS; SUBCUTANEOUS
Status: DISCONTINUED | OUTPATIENT
Start: 2022-12-19 | End: 2022-12-19 | Stop reason: HOSPADM

## 2022-12-19 RX ORDER — CLOPIDOGREL BISULFATE 75 MG/1
TABLET ORAL
Status: DISCONTINUED | OUTPATIENT
Start: 2022-12-19 | End: 2022-12-19 | Stop reason: HOSPADM

## 2022-12-19 RX ORDER — OXYCODONE HYDROCHLORIDE 5 MG/1
10 TABLET ORAL EVERY 4 HOURS PRN
Status: DISCONTINUED | OUTPATIENT
Start: 2022-12-19 | End: 2022-12-20 | Stop reason: HOSPADM

## 2022-12-19 RX ORDER — IODIXANOL 320 MG/ML
INJECTION, SOLUTION INTRAVASCULAR
Status: DISCONTINUED | OUTPATIENT
Start: 2022-12-19 | End: 2022-12-19 | Stop reason: HOSPADM

## 2022-12-19 RX ORDER — NALOXONE HYDROCHLORIDE 0.4 MG/ML
0.2 INJECTION, SOLUTION INTRAMUSCULAR; INTRAVENOUS; SUBCUTANEOUS
Status: ACTIVE | OUTPATIENT
Start: 2022-12-19 | End: 2022-12-19

## 2022-12-19 RX ORDER — HYDRALAZINE HYDROCHLORIDE 20 MG/ML
10 INJECTION INTRAMUSCULAR; INTRAVENOUS
Status: COMPLETED | OUTPATIENT
Start: 2022-12-19 | End: 2022-12-19

## 2022-12-19 RX ORDER — FENTANYL CITRATE 50 UG/ML
25 INJECTION, SOLUTION INTRAMUSCULAR; INTRAVENOUS
Status: DISCONTINUED | OUTPATIENT
Start: 2022-12-19 | End: 2022-12-20 | Stop reason: HOSPADM

## 2022-12-19 RX ORDER — NALOXONE HYDROCHLORIDE 0.4 MG/ML
0.4 INJECTION, SOLUTION INTRAMUSCULAR; INTRAVENOUS; SUBCUTANEOUS
Status: ACTIVE | OUTPATIENT
Start: 2022-12-19 | End: 2022-12-19

## 2022-12-19 RX ORDER — ATORVASTATIN CALCIUM 40 MG/1
40 TABLET, FILM COATED ORAL DAILY
Qty: 90 TABLET | Refills: 3 | Status: SHIPPED | OUTPATIENT
Start: 2022-12-19 | End: 2022-12-23

## 2022-12-19 RX ORDER — SODIUM CHLORIDE 9 MG/ML
INJECTION, SOLUTION INTRAVENOUS CONTINUOUS
Status: DISCONTINUED | OUTPATIENT
Start: 2022-12-19 | End: 2022-12-19 | Stop reason: HOSPADM

## 2022-12-19 RX ORDER — DIAZEPAM 5 MG
5 TABLET ORAL
Status: COMPLETED | OUTPATIENT
Start: 2022-12-19 | End: 2022-12-19

## 2022-12-19 RX ORDER — GABAPENTIN 300 MG/1
900 CAPSULE ORAL 2 TIMES DAILY
Status: DISCONTINUED | OUTPATIENT
Start: 2022-12-19 | End: 2022-12-20 | Stop reason: HOSPADM

## 2022-12-19 RX ORDER — ASPIRIN 81 MG/1
243 TABLET, CHEWABLE ORAL ONCE
Status: COMPLETED | OUTPATIENT
Start: 2022-12-19 | End: 2022-12-19

## 2022-12-19 RX ORDER — GABAPENTIN 300 MG/1
300 CAPSULE ORAL DAILY PRN
COMMUNITY
End: 2023-01-30

## 2022-12-19 RX ORDER — TAMSULOSIN HYDROCHLORIDE 0.4 MG/1
0.4 CAPSULE ORAL DAILY
Status: DISCONTINUED | OUTPATIENT
Start: 2022-12-19 | End: 2022-12-20 | Stop reason: HOSPADM

## 2022-12-19 RX ORDER — ACETAMINOPHEN 325 MG/1
650 TABLET ORAL DAILY PRN
Status: DISCONTINUED | OUTPATIENT
Start: 2022-12-19 | End: 2022-12-20 | Stop reason: HOSPADM

## 2022-12-19 RX ORDER — LIDOCAINE 40 MG/G
CREAM TOPICAL
Status: DISCONTINUED | OUTPATIENT
Start: 2022-12-19 | End: 2022-12-19 | Stop reason: HOSPADM

## 2022-12-19 RX ORDER — SODIUM CHLORIDE 9 MG/ML
75 INJECTION, SOLUTION INTRAVENOUS CONTINUOUS
Status: ACTIVE | OUTPATIENT
Start: 2022-12-19 | End: 2022-12-19

## 2022-12-19 RX ORDER — SOTALOL HYDROCHLORIDE 80 MG/1
80 TABLET ORAL 2 TIMES DAILY
Status: DISCONTINUED | OUTPATIENT
Start: 2022-12-19 | End: 2022-12-20 | Stop reason: HOSPADM

## 2022-12-19 RX ADMIN — METOPROLOL SUCCINATE 25 MG: 25 TABLET, EXTENDED RELEASE ORAL at 13:55

## 2022-12-19 RX ADMIN — SODIUM CHLORIDE: 9 INJECTION, SOLUTION INTRAVENOUS at 07:45

## 2022-12-19 RX ADMIN — HYDRALAZINE HYDROCHLORIDE 10 MG: 20 INJECTION INTRAMUSCULAR; INTRAVENOUS at 16:45

## 2022-12-19 RX ADMIN — DIAZEPAM 5 MG: 5 TABLET ORAL at 08:20

## 2022-12-19 RX ADMIN — TAMSULOSIN HYDROCHLORIDE 0.4 MG: 0.4 CAPSULE ORAL at 17:34

## 2022-12-19 RX ADMIN — SOTALOL HYDROCHLORIDE 80 MG: 80 TABLET ORAL at 20:58

## 2022-12-19 RX ADMIN — APIXABAN 5 MG: 5 TABLET, FILM COATED ORAL at 17:34

## 2022-12-19 RX ADMIN — GABAPENTIN 900 MG: 300 CAPSULE ORAL at 19:10

## 2022-12-19 ASSESSMENT — ACTIVITIES OF DAILY LIVING (ADL)
TOILETING_ISSUES: NO
ADLS_ACUITY_SCORE: 22
DOING_ERRANDS_INDEPENDENTLY_DIFFICULTY: NO
DRESSING/BATHING_DIFFICULTY: NO
DIFFICULTY_EATING/SWALLOWING: NO
FALL_HISTORY_WITHIN_LAST_SIX_MONTHS: NO
ADLS_ACUITY_SCORE: 35
ADLS_ACUITY_SCORE: 22
ADLS_ACUITY_SCORE: 35
CHANGE_IN_FUNCTIONAL_STATUS_SINCE_ONSET_OF_CURRENT_ILLNESS/INJURY: NO
ADLS_ACUITY_SCORE: 35
WEAR_GLASSES_OR_BLIND: YES
VISION_MANAGEMENT: GLASSES
ADLS_ACUITY_SCORE: 22
CONCENTRATING,_REMEMBERING_OR_MAKING_DECISIONS_DIFFICULTY: NO
WALKING_OR_CLIMBING_STAIRS_DIFFICULTY: NO
ADLS_ACUITY_SCORE: 35

## 2022-12-19 NOTE — DISCHARGE INSTRUCTIONS
Interventional Cardiology  Coronary Angiogram/Angioplasty/Stent/Atherectomy Discharge  Instructions -   Radial (wrist) Approach     The instructions below are to help you understand how to take care of yourself. There is also information about when to call the doctor or emergency services.    **Do not stop your aspirin or platelet inhibitor unless directed by your Cardiologist.  These medications help to prevent platelets in your blood from sticking together and forming a clot.   Examples of these medications are: Ticagrelor (Brilinta), Clopidogrel (Plavix), Prasugrel (Effient)    For 24 hours after procedure:  Do not subject hand/arm to any forceful movements for 24 hours, such as supporting weight when rising from a chair or bed.  Do not drive a car for 24 hours.  The dressing on the puncture site may be removed after 24 hours and left open to air. If minor oozing, you may apply a Band-Aid and remove after 12 hours.   You may shower on the day after your procedure. Do not take a tub bath or wash dishes (no soaking wrist) with the puncture site in water for 3 days after the procedure.    For 48 hours following the procedure:  Do not operate a lawnmower, motorcycle, chainsaw or all-terrain vehicle.  Do not lift anything heavier than 5-10 pounds with affected arm for 5 days.  Avoid excessive bending (flexion/extension) wrist movement.  Do not engage in vigorous exercise (i.e. tennis, golf) using the affected arm for 5 days after discharge.  You may return to work in 72 hours if no complications and no heavy lifting.    If bleeding should occur following discharge:  Sit down and apply firm pressure with your thumb against the puncture site and fingers against back of wrist for 10 minutes.  If the bleeding stops, continue to rest, keeping your wrist still for 2 hours. Notify your doctor as soon as possible.  If bleeding does not stop after 10 minutes or if there is a large amount of bleeding or spurting, call 911  immediately. Do not drive yourself to the hospital.           Contact the Heart Clinic at [phone] if you develop:  Fever over 100.4, that lasts more than one day  Redness, heat, or pus at the puncture site  Change in color or temperature of the hand or arm    Expect mild tingling of hand and tenderness at the puncture site for up to 3 days. You may take Tylenol or a pain medicine recommended by your doctor.                         Your Procedural Physician was: Dr. Tyrone Ordoñez  the phone number is: [phone]    [facility]:  [phone]  If you are calling after hours, please listen to the entire voicemail, a live  will answer at the end of the message

## 2022-12-19 NOTE — Clinical Note
The first balloon was inserted into the left anterior descending and left anterior descending diagonal.Max pressure = 12 jeronimo. Total duration = 10 seconds.

## 2022-12-19 NOTE — Clinical Note
The ECG shows a paced rhythm and a sinus rhythm. Pacer turned on. The patient has permanent pacemaker. ECG rate  = 63 bpm.

## 2022-12-19 NOTE — Clinical Note
The first balloon was inserted into the left anterior descending and left anterior descending diagonal.Max pressure = 16 jeronimo. Total duration = 18 seconds.     Scoreflex 2.0 x 10.

## 2022-12-19 NOTE — Clinical Note
The first balloon was inserted into the left anterior descending and left anterior descending diagonal.Max pressure = 12 jeronimo. Total duration = 18 seconds.     Scoreflex NC 2.0 x 10.

## 2022-12-19 NOTE — Clinical Note
The first balloon was inserted into the left anterior descending and left anterior descending diagonal.Max pressure = 12 jeronimo. Total duration = 12 seconds.

## 2022-12-19 NOTE — PHARMACY-ADMISSION MEDICATION HISTORY
Pharmacy Note - Admission Medication History   ______________________________________________________________________    Prior To Admission (PTA) med list completed and updated in EMR.       PTA Med List   Medication Sig Last Dose     acetaminophen (TYLENOL) 325 MG tablet Take 650 mg by mouth every 6 hours as needed for pain Unknown     apixaban ANTICOAGULANT (ELIQUIS) 5 MG tablet 5 mg 2 times daily  12/15/2022     aspirin (ASA) 81 MG EC tablet Take 81 mg by mouth daily 12/19/2022 at 324 mg     atorvastatin (LIPITOR) 40 MG tablet Take 1 tablet (40 mg) by mouth daily      gabapentin (NEURONTIN) 300 MG capsule Take 300 mg by mouth daily as needed for neuropathic pain (midday in addition to scheduled doses) Past Week     gabapentin (NEURONTIN) 300 MG capsule Take 900 mg by mouth 2 times daily  12/19/2022 at am     metoprolol succinate ER (TOPROL XL) 25 MG 24 hr tablet Take 1 tablet (25 mg) by mouth daily 12/18/2022 at pm     sotalol (BETAPACE) 80 MG tablet Take 1 tablet (80 mg) by mouth 2 times daily 12/19/2022 at am     tamsulosin (FLOMAX) 0.4 MG capsule 0.4 mg daily 12/18/2022 at pm       Information source(s): Patient, Clinic records and [facility]    Method of interview communication: in-person    Patient was asked about OTC/herbal products specifically.  PTA med list reflects this.    Based on the pharmacist's assessment, the PTA med list information appears reliable    Allergies were reviewed, assessed, and updated with the patient.      Patient does not use any multi-dose medications prior to admission.     Thank you for the opportunity to participate in the care of this patient.      Michelle Gore RPH     12/19/2022     11:50 AM

## 2022-12-19 NOTE — Clinical Note
The first balloon was inserted into the left anterior descending and left anterior descending diagonal.Max pressure = 12 jeronimo. Total duration = 14 seconds.

## 2022-12-19 NOTE — Clinical Note
The first balloon was inserted into the left anterior descending and left anterior descending diagonal.Max pressure = 3 jeronimo. Total duration = 10 seconds.     Max pressure = 3 jeronimo. Total duration = 10 seconds.    Balloon reinflated a second time: Max pressure = 3 jeronimo. Total duration = 10 seconds.  Balloon reinflated a third time: Max pressure = 3 jeronimo. Total duration = 10 seconds.  Balloon reinflated a fourth time: Max pressure = 3 jeronimo. T otal duration = 10 seconds.  Balloon reinflated a fourth time:

## 2022-12-19 NOTE — PRE-PROCEDURE
GENERAL PRE-PROCEDURE:   Procedure:  Coronary angiogram with possible PCI  Date/Time:  12/19/2022 7:23 AM    Written consent obtained?: Yes    Risks and benefits: Risks, benefits and alternatives were discussed    Consent given by:  Patient  Patient states understanding of procedure being performed: Yes    Patient's understanding of procedure matches consent: Yes    Procedure consent matches procedure scheduled: Yes    Expected level of sedation:  Moderate  Appropriately NPO:  Yes  ASA Class:  4 (severe low flow, low gradient AS, CAD; s/p CABG (L-LAD, V-OM, V-RCA), ICM; EF 25%, PAD; s/p iliac PCI)  Mallampati  :  Grade 2- soft palate, base of uvula, tonsillar pillars, and portion of posterior pharyngeal wall visible  Lungs:  Other (comment)  Lung exam comment:  Prolonged expiratory phase  Heart:  Systolic murmur  History & Physical reviewed:  History and physical reviewed and no updates needed  Statement of review:  I have reviewed the lab findings, diagnostic data, medications, and the plan for sedation

## 2022-12-19 NOTE — PROGRESS NOTES
Admitted to [facility] for Coronary angiogram. Pt and his wife both state a good understanding and agree to proceed. Prepped and ready.

## 2022-12-20 VITALS
TEMPERATURE: 99 F | BODY MASS INDEX: 20.11 KG/M2 | HEART RATE: 68 BPM | WEIGHT: 140.5 LBS | DIASTOLIC BLOOD PRESSURE: 68 MMHG | SYSTOLIC BLOOD PRESSURE: 151 MMHG | OXYGEN SATURATION: 92 % | RESPIRATION RATE: 18 BRPM | HEIGHT: 70 IN

## 2022-12-20 PROCEDURE — 250N000013 HC RX MED GY IP 250 OP 250 PS 637: Performed by: INTERNAL MEDICINE

## 2022-12-20 PROCEDURE — 99203 OFFICE O/P NEW LOW 30 MIN: CPT | Performed by: INTERNAL MEDICINE

## 2022-12-20 PROCEDURE — 99214 OFFICE O/P EST MOD 30 MIN: CPT | Performed by: NURSE PRACTITIONER

## 2022-12-20 RX ORDER — CLOPIDOGREL BISULFATE 75 MG/1
75 TABLET ORAL DAILY
Qty: 30 TABLET | Refills: 3 | Status: SHIPPED | OUTPATIENT
Start: 2022-12-20 | End: 2023-01-10

## 2022-12-20 RX ORDER — LISINOPRIL 5 MG/1
2.5 TABLET ORAL DAILY
Qty: 30 TABLET | Refills: 3 | Status: SHIPPED | OUTPATIENT
Start: 2022-12-20 | End: 2023-01-16

## 2022-12-20 RX ADMIN — APIXABAN 5 MG: 5 TABLET, FILM COATED ORAL at 08:17

## 2022-12-20 RX ADMIN — METOPROLOL SUCCINATE 25 MG: 25 TABLET, EXTENDED RELEASE ORAL at 08:16

## 2022-12-20 RX ADMIN — SOTALOL HYDROCHLORIDE 80 MG: 80 TABLET ORAL at 08:16

## 2022-12-20 RX ADMIN — GABAPENTIN 900 MG: 300 CAPSULE ORAL at 08:16

## 2022-12-20 ASSESSMENT — ACTIVITIES OF DAILY LIVING (ADL)
ADLS_ACUITY_SCORE: 22

## 2022-12-20 NOTE — CONSULTS
[facility]  Consult Note - Hospitalist Service  Date of Admission:  12/19/2022  Consult Requested by: Han Kohler    Reason for Consult: Medical comanagement post cardiac catheterization    Assessment & Plan     CAD s/p CABG 2004  Ischemic cardiomyopathy  Chronic systolic CHF (LVEF 25%)  Paroxysmal atrial fibrillation  S/p PPM  Severe aortic stenosis  S/p cardiac cath 12/19/2022 with Rotablator/lithotripsy attempt for Diagonal disease in D1.  See Cardiology note for specific details.  LVEF 25% on Echo 11/2021, severe aortic stenosis.  Undergoing work-up for TAVR per Cardiology  -Management plan per Cardiology  -Patient declined starting atorvastatin  -DAPT with aspirin, Plavix per Cardiology    PAD s/p EVAR/bilateral iliac branch endoprosthesis 2/4/2020  Persistent endoleak type II  -Follows with [facility]    BPH  -Continue Flomax       The patient's care was discussed with the Bedside Nurse and Patient.    YURIY FERRO MD  [facility]  Securely message with the Vocera Web Console (learn more here)  Text page via [employer] Paging/Directory   Hospitalist Service    Clinically Significant Risk Factors Present on Admission               # Drug Induced Coagulation Defect: home medication list includes an anticoagulant medication     # Chronic systolic heart failure: echo within the past year with EF <40%   # Acute Respiratory Failure: Documented O2 saturation < 91%.  Continue supplemental oxygen as needed             ______________________________________________________________________    Chief Complaint   Shortness of breath, cardiac evaluation for CAD    History is obtained from the patient    History of Present Illness   Daniel Alamo is a 82 year old male PMHx of CAD s/p CABG 2004, ischemic cardiomyopathy, chronic systolic CHF (LVEF 25%), severe aortic stenosis, hypertension, BPH, PAD s/p EVAR, CVA, migraines, seizure disorder and depression.  Patient  admitted by Cardiology to undergo cardiac catheterization today for an attempt to treat D1 disease with PCI with Rotablator, lithotripsy before TAVR.  Please see Cardiology report for specific details.  Postprocedure, patient is alert, conversant, reports no chest pain.  Reports stable chronic shortness of breath as previously, normal vitals on room air with pulse ox in the mid 90s.    Review of Systems   The 10 point Review of Systems is negative other than noted in the HPI    Past Medical History    I have reviewed this patient's medical history and updated it with pertinent information if needed.   Past Medical History:   Diagnosis Date     Coronary artery disease      Heart disease      VT (ventricular tachycardia) 2004       Past Surgical History   I have reviewed this patient's surgical history and updated it with pertinent information if needed.  Past Surgical History:   Procedure Laterality Date     ABDOMEN SURGERY       BYPASS GRAFT ARTERY CORONARY       CV ANGIOGRAM CORONARY GRAFT N/A 9/19/2022    Procedure: Coronary Angiogram Graft;  Surgeon: Tyrone Ordoñez MD;  Location: [facility] CATH LAB CV     CV LEFT HEART CATH N/A 9/19/2022    Procedure: Left Heart Catheterization;  Surgeon: Tyrone Ordoñez MD;  Location: [facility] CATH LAB CV     CV RIGHT HEART CATH MEASUREMENTS RECORDED N/A 9/19/2022    Procedure: Right Heart Catheterization;  Surgeon: Tyrone Ordoñez MD;  Location: [facility] CATH LAB CV     EYE SURGERY       INSERT / REPLACE / REMOVE PACEMAKER       OTHER SURGICAL HISTORY      icd     ZZC CABG, VEIN, SINGLE      Description: CABG (CABG);  Recorded: 10/03/2012;  Comments: LIMA to LAD, SVG to OM2, SVG to RCA       Social History   I have reviewed this patient's social history and updated it with pertinent information if needed.  Social History     Tobacco Use     Smoking status: Every Day     Packs/day: 0.50     Years: 30.00     Pack years: 15.00     Types: Cigarettes     Smokeless tobacco:  Never   Vaping Use     Vaping Use: Never used   Substance Use Topics     Alcohol use: Yes     Alcohol/week: 1.0 standard drink     Comment: Glass of wine daily.     Drug use: No       Family History   I have reviewed this patient's family history and updated it with pertinent information if needed.  Family History   Problem Relation Age of Onset     Cancer Mother      Heart Disease Father        Medications   I have reviewed this patient's current medications  Medications Prior to Admission   Medication Sig Dispense Refill Last Dose     acetaminophen (TYLENOL) 325 MG tablet Take 650 mg by mouth every 6 hours as needed for pain   Unknown     apixaban ANTICOAGULANT (ELIQUIS) 5 MG tablet 5 mg 2 times daily    12/15/2022     aspirin (ASA) 81 MG EC tablet Take 81 mg by mouth daily   12/19/2022 at 324 mg     gabapentin (NEURONTIN) 300 MG capsule Take 300 mg by mouth daily as needed for neuropathic pain (midday in addition to scheduled doses)   Past Week     gabapentin (NEURONTIN) 300 MG capsule Take 900 mg by mouth 2 times daily    12/19/2022 at am     metoprolol succinate ER (TOPROL XL) 25 MG 24 hr tablet Take 1 tablet (25 mg) by mouth daily 90 tablet 11 12/18/2022 at pm     sotalol (BETAPACE) 80 MG tablet Take 1 tablet (80 mg) by mouth 2 times daily 180 tablet 3 12/19/2022 at am     tamsulosin (FLOMAX) 0.4 MG capsule 0.4 mg daily   12/18/2022 at pm       Allergies   Allergies   Allergen Reactions     Atorvastatin Diarrhea     Carvedilol GI Disturbance and Other (See Comments)     Upset stomach  GI upset       Chloride GI Disturbance and Nausea       Physical Exam   Vital Signs: Temp: 98.3  F (36.8  C) Temp src: Oral BP: 129/70 Pulse: 70   Resp: 24 SpO2: 93 % O2 Device: None (Room air) Oxygen Delivery: 2 LPM  Weight: 140 lbs 0 oz  GENERAL: Alert, oriented, conversant, in no distress.   EYES: Normal conjunctiva. Sclera anicteric.   NECK: Supple, no lymph adenopathy. JVP is not distended.   LUNGS: Clear to auscultation. No  ronchi or crackles. Equal air entry bilaterally.   HEART: S1 S2, Rate and rhythm is regular.  Systolic murmur at LSB  ABDOMEN: Soft, nontender, no distension. Bowel sounds are positive. No guarding or rebound  EXTREMITIES: No pitting edema  SKIN: No rash or ulcers.   NEUROLOGIC: Alert and oriented x3. Speech fluent and normal. Clear mentation. Motor, sensory and cranial exam is grossly intact andsymmetric.   PSYCHIATRIC: Normal affect and cognition       Data   Results for orders placed or performed during the hospital encounter of 12/19/22   Activated clotting time celite, POCT     Status: Abnormal   Result Value Ref Range    Activated Clotting Time (Celite) POCT 292 (H) 74 - 150 seconds   ECG 12-Lead with [place] - Hospital locations:  [facility]     Status: None   Result Value Ref Range    Systolic Blood Pressure  mmHg    Diastolic Blood Pressure  mmHg    Ventricular Rate 64 BPM    Atrial Rate 65 BPM    PA Interval 212 ms    QRS Duration 156 ms     ms    QTc 534 ms    P Axis  degrees    R AXIS 105 degrees    T Axis 270 degrees    Interpretation ECG       Sinus rhythm with sinus arrhythmia with 1st degree A-V block with occasional Premature ventricular complexes  intermittent atrial pacing  Right bundle branch block  Inferior infarct (cited on or before 27-MAR-2013)  Marked T wave abnormality, consider lateral ischemia  Abnormal ECG  When compared with ECG of 25-NOV-2022 15:45,  Sinus rhythm has replaced Electronic atrial pacemaker  T wave inversion more evident in Anterolateral leads  Confirmed by DAMON YEE MD LOC:[facility] ([id number]) on 12/19/2022 10:09:23 AM     Cardiac Catheterization     Status: None    Narrative    Images from the original result were not included.  Daniel Alamo is a 82 year old old male w/ CAD s/p CABG w/ L-LAD, V-OM,   V-RCA, 30 years ago at [facility], ischemic cardiomyopathy EF 25%   11/2021, aortic stenosis, TR, afib on sotalol, PAD s/p iliac stents here   for w/u of progressive  GALVEZ and orthostatic symptoms, found to have severe   aortic stenosis and CAD and here for PCI to D1.     - given area of ischemia/vaibility in anterior/anterolateral wall,   proceeded w/ PCI w/ cuting balloon angioplasty and Shockwave lithotripsy   in order to optimize risk profile of the pacing run, but given only branch   involvement, we chose to stop, as stenting/Roto would require escalating   risk  - proceed w/ TAVR w/u as planned   - continue ASA 81mg daily indefinitely, clopidogrel 600mg once, followed   by 75mg daily for at least 12 months, add atorva 40  - continue aggressive risk factor modification          Findings:  LM:no obstruction  LAD:severe diffuse Ca2+ disease in mLAD into D1. Occluded dLAD  Lcx:severe mid-vessel lesion  RCA:not injected    Access:  R Radial artery    PCI:  LMT was engaged w/ a 6F EBU 3.5 Guide catheter and the lesion in LAD was   wired w/ a Forte wire, then ballooned w/ a 2/0x12mm NC Emerge, a 2.0x10mm   ScoreFlex balloon, and a 2.5x12mm Shockwave balloon at 3 jeronimo inflations.   We were able to achieve balloon expansion but significant refractory   re-narrowing after balloon removal due to severe bulky Ca2+. Given only   branch involvement and escalating risk, we chose to stop rather than risk   stent under-deployment. Final angiography showed no dissection or   perforation and a SKIP 3 flow.    Closure:   Vasc Band

## 2022-12-20 NOTE — PROVIDER NOTIFICATION
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 Also, he refused his lipitor, he says it gives him diarrhea

## 2022-12-20 NOTE — UTILIZATION REVIEW
Admission Status; Secondary Review Determination   Under the authority of the Utilization Management Committee, the utilization review process indicated a secondary review on Daniel Alamo. The review outcome is based on review of the medical records, discussions with staff, and applying clinical experience noted on the date of the review.   (x) Outpatient Status with extended recovery is appropriate - This patient does not meet hospital inpatient criteria. If this patient's primary payer is Medicare and was admitted as an inpatient, Condition Code 44 should be used and patient status changed to outpatient recovery/Postprocedureal Care.    RATIONALE FOR DETERMINATION   82 yr old male with hx prior CABG presented for angiogram as part of evaluation for TAVR.  Treated with cutting balloon angioplasty and shockwave lithotripsy.  Patient did well with procedure and discharging after one night.  Surgery is not in CMS IPO list.    Patient was admitted to the hospital after the procedure. Patient has Medicare and the procedure is not on the CMS inpatient list. No documented complications or unexpected recovery. Patient can be safely  monitored for bleeding and recover in outpatient/extended recovery setting.  Dr. Kohler/Lacie CNP  notified of this determination and agreeable to change in status.  The severity of illness, intensity of service provided, expected LOS and risk for adverse outcome doesn't meet inpatient hospital admission.   The information on this document is developed by the utilization review team in order for the business office to ensure compliance. This only denotes the appropriateness of proper admission status and does not reflect the quality of care rendered.   The definitions of Inpatient Status and Observation Status used in making the determination above are those provided in the CMS Coverage Manual, Chapter 1 and Chapter 6, section 70.4.   Sincerely,   Loreta Cheng MD  Utilization  Review  Physician Advisor  [facility]

## 2022-12-20 NOTE — DISCHARGE SUMMARY
[facility] MEDICINE  DISCHARGE SUMMARY     Primary Care Physician: Kenna Calvillo  Admission Date: 12/19/2022   Discharge Provider: Sujatha Kearney MD Discharge Date: 12/20/2022   Diet:   Active Diet and Nourishment Order   Procedures     Diet    Cardiac    Code Status: DNR/DNI   Activity: DCACTIVITY: Activity as tolerated        Condition at Discharge: Stable     REASON FOR PRESENTATION(See Admission Note for Details)     PreTAVR coronary angiogram     PRINCIPAL & ACTIVE DISCHARGE DIAGNOSES     Principal Problem:    Coronary arteriosclerosis due to lipid rich plaque  Active Problems:    Paroxysmal atrial fibrillation (H)    Pure hypercholesterolemia    ICD (implantable cardioverter-defibrillator), dual, in situ    Ischemic cardiomyopathy    PVD (peripheral vascular disease) (H)    Abdominal aortic aneurysm (AAA) without rupture    Heart failure with reduced ejection fraction, NYHA class II (H)    S/P CABG (coronary artery bypass graft)    Nonrheumatic aortic valve stenosis    Sinus node dysfunction (H)    Status post coronary angiogram      PENDING LABS     Unresulted Labs Ordered in the Past 30 Days of this Admission     No orders found for last 31 day(s).            PROCEDURES ( this hospitalization only)      Procedure(s):  Percutaneous Transluminal Angioplasty  Percutaneous Coronary Intervention - Lithotripsy    RECOMMENDATIONS TO OUTPATIENT PROVIDER FOR F/U VISIT     Follow-up Appointments     Follow-up and recommended labs and tests       Follow up with primary care provider, Justin Rivera CNP and have repeat   blood work to monitor kidney function at that time.               DISPOSITION     Home    SUMMARY OF HOSPITAL COURSE:      Daniel Alamo is a 83 yo male with history of severe aortic stenosis, HTN, atrial fibrillation on NOAC, CAD s/p CABG, ICMP LV EF 25%, PAD s/p iliac PCI who underwent preTAVR coronary angiography It demonstrated severely calcified  disease in the mid LAD to first diagonal branch, occluded distal LAD, and severe mid vessel circumflex disease. Patient was treated with cutting balloon angioplasty and shockwave lithotripsy however due to high calcium burden roto PCI was avoided due to risk of complication and possibility of stent under deployment.  Patient is doing well postprocedure and will be discharged home.        Discharge Medications with Med changes:     Discharge Medication List as of 12/20/2022 10:00 AM      START taking these medications    Details   atorvastatin (LIPITOR) 40 MG tablet Take 1 tablet (40 mg) by mouth daily, Disp-90 tablet, R-3, E-Prescribe      clopidogrel (PLAVIX) 75 MG tablet Take 1 tablet (75 mg) by mouth daily, Disp-30 tablet, R-3, E-Prescribe      lisinopril (ZESTRIL) 5 MG tablet Take 0.5 tablets (2.5 mg) by mouth daily, Disp-30 tablet, R-3, E-Prescribe         CONTINUE these medications which have NOT CHANGED    Details   acetaminophen (TYLENOL) 325 MG tablet Take 650 mg by mouth every 6 hours as needed for pain, Historical      apixaban ANTICOAGULANT (ELIQUIS) 5 MG tablet 5 mg 2 times daily , Historical      aspirin (ASA) 81 MG EC tablet Take 81 mg by mouth daily, Historical      !! gabapentin (NEURONTIN) 300 MG capsule Take 300 mg by mouth daily as needed for neuropathic pain (midday in addition to scheduled doses), Historical      !! gabapentin (NEURONTIN) 300 MG capsule Take 900 mg by mouth 2 times daily , Historical      metoprolol succinate ER (TOPROL XL) 25 MG 24 hr tablet Take 1 tablet (25 mg) by mouth daily, Disp-90 tablet, R-11, E-Prescribe      sotalol (BETAPACE) 80 MG tablet Take 1 tablet (80 mg) by mouth 2 times daily, Disp-180 tablet, R-3, E-Prescribe      tamsulosin (FLOMAX) 0.4 MG capsule 0.4 mg daily, Historical       !! - Potential duplicate medications found. Please discuss with provider.                Rationale for medication changes:      Statin and plavix started for obstructive CAD  Low dose  ACEI initiated due to cardiomyopathy         Consults       PHARMACY IP CONSULT  PHARMACY IP CONSULT  HOSPITALIST IP CONSULT  SMOKING CESSATION PROGRAM IP CONSULT    Immunizations given this encounter     Most Recent Immunizations   Administered Date(s) Administered     COVID-19 Vaccine 12+ (Pfizer) 10/29/2021     COVID-19 Vaccine Bivalent Booster 12+ (Pfizer) 10/12/2022           Anticoagulation Information      Recent INR results: No results for input(s): INR in the last 168 hours.  Warfarin doses (if applicable) or name of other anticoagulant: Eliquis      SIGNIFICANT IMAGING FINDINGS     No results found for this visit on 12/19/22.    SIGNIFICANT LABORATORY FINDINGS     Most Recent 3 CBC's:Recent Labs   Lab Test 12/16/22  1029 11/25/22  1549 09/19/22  0803   WBC 8.3 9.2 6.5   HGB 14.5 14.6 14.2   MCV 94 93 95    252 203     Most Recent 3 BMP's:Recent Labs   Lab Test 12/16/22  1029 11/25/22  1549 09/23/22  1125 09/19/22  0803    141  --  140   POTASSIUM 5.2 4.1  --  3.8   CHLORIDE 108* 105  --  106   CO2 27 24  --  28   BUN 16.5 14.8  --  14   CR 1.13 1.01  --  0.95   ANIONGAP 7 12  --  6   EMILIO 9.0 9.2  --  9.1   GLC 93 92 150* 94           Discharge Orders        Basic metabolic panel  (Ca, Cl, CO2, Creat, Gluc, K, Na, BUN)     Reason for your hospital stay    Coronary artery intervention     Follow-up and recommended labs and tests     Follow up with primary care provider, Justin Rivera CNP and have repeat blood work to monitor kidney function at that time.     Activity    Your activity upon discharge: no lifting or strenuous exercise for 5 days     Reason for your hospital stay    Coronary angiogram     Diet    Follow this diet upon discharge: Orders Placed This Encounter      Heart Healthy diet (low fat, low cholesterol, low sodium)       Examination   Physical Exam   Temp:  [98.5  F (36.9  C)-99  F (37.2  C)] 99  F (37.2  C)  Pulse:  [60-74] 68  Resp:  [18-20] 18  BP: (135-151)/(63-71)  151/68  SpO2:  [92 %-95 %] 92 %  Wt Readings from Last 1 Encounters:   12/20/22 63.7 kg (140 lb 8 oz)     Gen: NAD  CV: Regular, normal S1S2.  2/6 systolic murmur.    Lungs: Clear  Extremities: No edema     Please see EMR for more detailed significant labs, imaging, consultant notes etc.    ISujatha MD, personally saw the patient today and spent less than or equal to 30 minutes discharging this patient.    Sujatha Kearney MD  [facility]    CC:Kenna Calvillo

## 2022-12-20 NOTE — PLAN OF CARE
Goal Outcome Evaluation:      Plan of Care Reviewed With: patient    Overall Patient Progress: improvingOverall Patient Progress: improving     Doing well overnight, denied pain, nausea, or dyspnea. Right wrist access site WDL. Telemetry showing Sinus stefanie with occ. PAC's and PVC's. VSS, Continue to monitor.

## 2022-12-20 NOTE — PROGRESS NOTES
[facility]  [phone]    Assessment/Recommendations   ASSESSMENT:    Coronary artery disease- hx of prior CABG (LIMA-LAD, SVG-OM, SVG-RCA), s/p attempted PCI but due to heavily calcified vessel and only branch involvement; PCI was avoided due to increased risk of complication    Severe low flow, low gradient Aortic Stenosis- awaiting TAVR    HTN- sub-optimal control; SBP 150s-170s/60s-70s    ICM- EF 30-35%    Paroxysmal atrial fibrillation- on AAD therapy with sotalol, currently in SR, on NOAC for CVA prevention    PAD- s/p iliac stent    PLAN:    OK to discharge from a cardiology standpoint    Continue TAVR work up    Continue ASA, Plavix and Eliquis- continue to monitor for bleeding    Initiate low dose ACE-I due to EF <40%, sub-optimal SBP and afterload reduction with severe aortic stenosis.     Activity restrictions and reportable s/s were discussed with the patient who verbalizes understanding and denies needs at discharge    Cardiology follow up is arranged on 12/28/2022 with Justin Rivera CNP at at 0830 at [facility]    Re-check BMP at follow up visit    Thank you for the opportunity to participate in the care of this patient       History of Present Illness/Subjective    HPI: Daniel Alamo is a 82 year old male with history of severe low-flow, low gradient aortic stenosis, HTN, atrial fibrillation; on NOAC, CAD; s/p CABG) LIMA-LAD, SVG-OM, SVG-RCA), ICM; EF 25%, PAD; s/p iliac PCI who underwent coronary angiography prior to transcatheter aortic valve replacement which demonstrated severely calcified disease in the mid LAD to first diagonal branch, occluded distal LAD, and severe mid vessel circumflex disease. Daniel was intially treated with cutting balloon angioplasty and shockwave lithotripsy however due to high calcium burden with re-narrowing once balloon was removed and only branch involvement, roto PCI was avoided due to risk of complication and possibility of  "stent under deployment.     CORONARY ANGIOGRAM 12/19/2022  Conclusion    Daniel Alamo is a 82 year old old male w/ CAD s/p CABG w/ L-LAD, V-OM, V-RCA, 30 years ago at [facility], ischemic cardiomyopathy EF 25% 11/2021, aortic stenosis, TR, afib on sotalol, PAD s/p iliac stents here for w/u of progressive GALVEZ and orthostatic symptoms, found to have severe aortic stenosis and CAD and here for PCI to D1.     - given area of ischemia/vaibility in anterior/anterolateral wall, proceeded w/ PCI w/ cuting balloon angioplasty and Shockwave lithotripsy in order to optimize risk profile of the pacing run, but given only branch involvement, we chose to stop, as stenting/Roto would require escalating risk  - proceed w/ TAVR w/u as planned   - continue ASA 81mg daily indefinitely, clopidogrel 600mg once, followed by 75mg daily for at least 12 months, add atorva 40  - continue aggressive risk factor modification     Findings:  LM:no obstruction  LAD:severe diffuse Ca2+ disease in mLAD into D1. Occluded dLAD  Lcx:severe mid-vessel lesion  RCA:not injected     Access:  R Radial artery     PCI:  LMT was engaged w/ a 6F EBU 3.5 Guide catheter and the lesion in LAD was wired w/ a Forte wire, then ballooned w/ a 2/0x12mm NC Emerge, a 2.0x10mm ScoreFlex balloon, and a 2.5x12mm Shockwave balloon at 3 jeronimo inflations. We were able to achieve balloon expansion but significant refractory re-narrowing after balloon removal due to severe bulky Ca2+. Given only branch involvement and escalating risk, we chose to stop rather than risk stent under-deployment. Final angiography showed no dissection or perforation and a SKIP 3 flow.     Closure:   Vasc Band       Physical Examination  Past 24h events   Vitals: BP (!) 143/71 (BP Location: Left arm)   Pulse 64   Temp 98.8  F (37.1  C) (Oral)   Resp 20   Ht 1.778 m (5' 10\")   Wt 63.7 kg (140 lb 8 oz)   SpO2 94%   BMI 20.16 kg/m    BMI= Body mass index is 20.16 kg/m .  Wt Readings from " Last 3 Encounters:   12/20/22 63.7 kg (140 lb 8 oz)   11/25/22 65.5 kg (144 lb 6.4 oz)   11/22/22 66.7 kg (147 lb)     General Appearance:   no distress, thin body habitus   ENT/Mouth: membranes moist, no oral lesions or bleeding gums.      EYES:  no scleral icterus, normal conjunctivae   Neck: no carotid bruits or thyromegaly   Chest/Lungs:   lungs are clear and diminished to auscultation, prolonged expiratory phase, no rales or wheezing, equal chest wall expansion    Cardiovascular:   Regular. Distant first and second heart sounds with II/VI systolic ejection murmur, no rubs, or gallops; the carotid, radial and posterior tibial pulses are intact, Jugular venous pressure not elevated, no significant edema.   Abdomen:  no organomegaly, masses, bruits, or tenderness; bowel sounds are present   Extremities: no cyanosis or clubbing. Rt radial vascular access site with mild soft ecchymosis. No hematoma. No drainage. No warmth or redness.    Skin: no xanthelasma, warm.    Neurologic: normal  bilateral, no tremors     Psychiatric: alert and oriented x3, calm          2 episodes of NSVT on tele during sleeping hours.   Patient denies chest pain or SOB.   SBP elevated 150s-170s.      Medical History  Surgical History Family History Social History   Past Medical History:   Diagnosis Date     Coronary artery disease      Heart disease      VT (ventricular tachycardia) 2004     Past Surgical History:   Procedure Laterality Date     ABDOMEN SURGERY       BYPASS GRAFT ARTERY CORONARY       CV ANGIOGRAM CORONARY GRAFT N/A 9/19/2022    Procedure: Coronary Angiogram Graft;  Surgeon: Tyrone Ordoñez MD;  Location: [facility] CATH LAB CV     CV LEFT HEART CATH N/A 9/19/2022    Procedure: Left Heart Catheterization;  Surgeon: Tyrone Ordoñez MD;  Location: [facility] LAB CV     CV RIGHT HEART CATH MEASUREMENTS RECORDED N/A 9/19/2022    Procedure: Right Heart Catheterization;  Surgeon: Tyrone Ordoñez MD;  Location:   [facility] CATH LAB CV     EYE SURGERY       INSERT / REPLACE / REMOVE PACEMAKER       OTHER SURGICAL HISTORY      icd     ZZC CABG, VEIN, SINGLE      Description: CABG (CABG);  Recorded: 10/03/2012;  Comments: LIMA to LAD, SVG to OM2, SVG to RCA     Family History   Problem Relation Age of Onset     Cancer Mother      Heart Disease Father         Social History     Socioeconomic History     Marital status:      Spouse name: Not on file     Number of children: Not on file     Years of education: Not on file     Highest education level: Not on file   Occupational History     Not on file   Tobacco Use     Smoking status: Every Day     Packs/day: 0.50     Years: 30.00     Pack years: 15.00     Types: Cigarettes     Smokeless tobacco: Never   Vaping Use     Vaping Use: Never used   Substance and Sexual Activity     Alcohol use: Yes     Alcohol/week: 1.0 standard drink     Comment: Glass of wine daily.     Drug use: No     Sexual activity: Not Currently   Other Topics Concern     Parent/sibling w/ CABG, MI or angioplasty before 65F 55M? Not Asked   Social History Narrative     Not on file     Social Determinants of Health     Financial Resource Strain: Not on file   Food Insecurity: Not on file   Transportation Needs: Not on file   Physical Activity: Not on file   Stress: Not on file   Social Connections: Not on file   Intimate Partner Violence: Not on file   Housing Stability: Not on file           Medications  Allergies   Current Outpatient Medications   Medication Sig Dispense Refill     atorvastatin (LIPITOR) 40 MG tablet Take 1 tablet (40 mg) by mouth daily 90 tablet 3       Allergies   Allergen Reactions     Atorvastatin Diarrhea     Carvedilol GI Disturbance and Other (See Comments)     Upset stomach  GI upset       Chloride GI Disturbance and Nausea          Lab Results    Chemistry/lipid CBC Cardiac Enzymes/BNP/TSH/INR   Recent Labs   Lab Test 09/19/22  0803   CHOL 185   HDL 41      TRIG 76     Recent  Labs   Lab Test 09/19/22  0803 12/06/19  1134 10/05/18  0930    100 88     Recent Labs   Lab Test 12/16/22  1029      POTASSIUM 5.2   CHLORIDE 108*   CO2 27   GLC 93   BUN 16.5   CR 1.13   GFRESTIMATED 64   EMILIO 9.0     Recent Labs   Lab Test 12/16/22  1029 11/25/22  1549 09/19/22  0803   CR 1.13 1.01 0.95     No results for input(s): A1C in the last 25685 hours.       Recent Labs   Lab Test 12/16/22  1029   WBC 8.3   HGB 14.5   HCT 45.4   MCV 94        Recent Labs   Lab Test 12/16/22  1029 11/25/22  1549 09/19/22  0803   HGB 14.5 14.6 14.2    No results for input(s): TROPONINI in the last 40328 hours.  No results for input(s): BNP, NTBNPI, NTBNP in the last 84845 hours.  Recent Labs   Lab Test 09/01/22  1327   TSH 0.46     No results for input(s): INR in the last 17329 hours.     Barbie Medellin, CNP

## 2022-12-20 NOTE — PLAN OF CARE
Problem: Plan of Care - These are the overarching goals to be used throughout the patient stay.    Goal: Optimal Comfort and Wellbeing  Outcome: Progressing   Goal Outcome Evaluation:    Patient arrived to P3 unit after right radial angiogram. Site is not swollen, minimal bruising, and Band-Aid in place. Calm and pleasant. Denies pain. AxO x4, however very hard of hearing.

## 2022-12-20 NOTE — PROGRESS NOTES
Patient was kept comfortable during post-procedure stay.VSS. Denies pain. Right radial access site remains dry & free from signs of bleeding.  Pt able to eat, drink, ambulate and void post-procedure. Pt does report continued dyspnea on exertion that he has been progressively worsening recently.     Appointments made & included in AVS. Dr. Ordoñez was able to speak with patient and his wife post-procedure.  Belongings returned. Report called to Jinny, pt Transferred to  in stable condition.    Paged hospitalist regarding consult. Also discussed new prescription for lipitor, however pt now states he used to be on lipitor but it was discontinued due to problems with diarrhea. Pt declines lipitor for now.

## 2022-12-21 ENCOUNTER — TELEPHONE (OUTPATIENT)
Dept: CARDIOLOGY | Facility: CLINIC | Age: 82
End: 2022-12-21

## 2022-12-21 DIAGNOSIS — Z95.1 S/P CABG (CORONARY ARTERY BYPASS GRAFT): ICD-10-CM

## 2022-12-23 RX ORDER — ATORVASTATIN CALCIUM 20 MG/1
20 TABLET, FILM COATED ORAL DAILY
Qty: 90 TABLET | Refills: 3 | Status: SHIPPED | OUTPATIENT
Start: 2022-12-23 | End: 2023-01-10 | Stop reason: SINTOL

## 2022-12-23 NOTE — TELEPHONE ENCOUNTER
----- Message -----  From: Tyrone Ordoñez MD  Sent: 12/22/2022   6:58 AM CST  To: Priscila Garcia RN    We can try atorva 20, and let's see how this works.    ==  Phone call to patient. Reviewed response and recommendations per Dr. Ordoñez. He verbalized understanding and agreement with plan. No further questions or concerns.   Med list updated. -ejb

## 2023-01-10 ENCOUNTER — OFFICE VISIT (OUTPATIENT)
Dept: CARDIOLOGY | Facility: CLINIC | Age: 83
End: 2023-01-10
Payer: MEDICARE

## 2023-01-10 VITALS
WEIGHT: 145 LBS | RESPIRATION RATE: 18 BRPM | OXYGEN SATURATION: 98 % | DIASTOLIC BLOOD PRESSURE: 70 MMHG | HEART RATE: 55 BPM | BODY MASS INDEX: 20.81 KG/M2 | SYSTOLIC BLOOD PRESSURE: 128 MMHG

## 2023-01-10 DIAGNOSIS — I25.83 CORONARY ARTERIOSCLEROSIS DUE TO LIPID RICH PLAQUE: Primary | ICD-10-CM

## 2023-01-10 DIAGNOSIS — Z95.810 ICD (IMPLANTABLE CARDIOVERTER-DEFIBRILLATOR), DUAL, IN SITU: ICD-10-CM

## 2023-01-10 DIAGNOSIS — E78.00 PURE HYPERCHOLESTEROLEMIA: ICD-10-CM

## 2023-01-10 DIAGNOSIS — I35.0 NONRHEUMATIC AORTIC VALVE STENOSIS: ICD-10-CM

## 2023-01-10 DIAGNOSIS — Z95.1 S/P CABG (CORONARY ARTERY BYPASS GRAFT): ICD-10-CM

## 2023-01-10 DIAGNOSIS — I48.0 PAROXYSMAL ATRIAL FIBRILLATION (H): ICD-10-CM

## 2023-01-10 DIAGNOSIS — I25.5 ISCHEMIC CARDIOMYOPATHY: ICD-10-CM

## 2023-01-10 DIAGNOSIS — I50.20 HEART FAILURE WITH REDUCED EJECTION FRACTION, NYHA CLASS II (H): ICD-10-CM

## 2023-01-10 DIAGNOSIS — Z98.890 STATUS POST CORONARY ANGIOGRAM: ICD-10-CM

## 2023-01-10 LAB
ANION GAP SERPL CALCULATED.3IONS-SCNC: 8 MMOL/L (ref 7–15)
BUN SERPL-MCNC: 13.2 MG/DL (ref 8–23)
CALCIUM SERPL-MCNC: 9.1 MG/DL (ref 8.8–10.2)
CHLORIDE SERPL-SCNC: 104 MMOL/L (ref 98–107)
CREAT SERPL-MCNC: 1.08 MG/DL (ref 0.67–1.17)
DEPRECATED HCO3 PLAS-SCNC: 28 MMOL/L (ref 22–29)
GFR SERPL CREATININE-BSD FRML MDRD: 68 ML/MIN/1.73M2
GLUCOSE SERPL-MCNC: 84 MG/DL (ref 70–99)
POTASSIUM SERPL-SCNC: 4.4 MMOL/L (ref 3.4–5.3)
SODIUM SERPL-SCNC: 140 MMOL/L (ref 136–145)

## 2023-01-10 PROCEDURE — 36415 COLL VENOUS BLD VENIPUNCTURE: CPT | Performed by: NURSE PRACTITIONER

## 2023-01-10 PROCEDURE — 80048 BASIC METABOLIC PNL TOTAL CA: CPT | Performed by: NURSE PRACTITIONER

## 2023-01-10 PROCEDURE — 99215 OFFICE O/P EST HI 40 MIN: CPT | Performed by: NURSE PRACTITIONER

## 2023-01-10 RX ORDER — FLUTICASONE PROPIONATE 50 MCG
1 SPRAY, SUSPENSION (ML) NASAL PRN
COMMUNITY
End: 2024-07-25

## 2023-01-10 RX ORDER — CLOPIDOGREL BISULFATE 75 MG/1
75 TABLET ORAL DAILY
Qty: 90 TABLET | Refills: 3 | Status: SHIPPED | OUTPATIENT
Start: 2023-01-10 | End: 2023-02-18

## 2023-01-10 RX ORDER — ROSUVASTATIN CALCIUM 10 MG/1
10 TABLET, COATED ORAL DAILY
Qty: 30 TABLET | Refills: 0 | Status: SHIPPED | OUTPATIENT
Start: 2023-01-10 | End: 2023-02-28

## 2023-01-10 NOTE — PROGRESS NOTES
Assessment/Recommendations   Assessment:    1.  Coronary artery disease with history of prior CABG with LIMA to LAD, SVG to OM, and SVG to RCA: Coronary angiogram in September 2022 showed severe mid vessel disease with 70% calcified disease and diagonal 1 with distal LAD fills via patent LIMA to the LAD, 70 to 80% stenosis in OM1 with distal small OM fills via L to L bridging collaterals, 100% occluded proximal RCA.  Vein graft to the OM was found occluded and vein graft to RCA is not visualized and presumed occluded.    Patient returned to Cath Lab on 12/19/2022 and underwent PCI with Cutting Balloon angioplasty and shockwave lithotripsy to LAD/diagonal 1 (see angio result for detail).    Patient was recommended to stay on  dual antiplatelet therapy with ASA 81 mg indefinitely and Clopidogrel (Plavix) 75 mg daily for 12 months.  However, patient is also on Eliquis.    Patient denies chest pain but his dyspnea on exertion fatigue is unchanged.    2.  Dyslipidemia with LDL goal <70/Obesity with a BMI of: Daniel Alamo is on moderate intensity statin therapy with atorvastatin 20 mg daily-he reports diarrhea and prefers alternative therapy. Most recent LDL is 129.  Most recent AST/ALT are stable.    3.  Ischemic cardiomyopathy with systolic dysfunction with LVEF of 30 to 35% per echo in September 2022, NYHA class II-III: He is well compensated with no evidence of fluid tension on exam except he does report shortness of breath with minimal exertion specially walking up incline and also reports fatigue.     Heart failure regimen includes   - Beta-blocker therapy with metoprolol succinate 25 mg daily in a.m.   -ACE inhibitor therapy with lisinopril 2.5 mg daily   - Not on aldosterone blocker therapy    -Not on diuretic therapy    3.  Hypertension: His blood pressure is stable at 128/70.  Heart rate stable in mid 50s.    4.  Severe aortic stenosis per echo in September 2022: Patient had a consultation in valve  clinic with Dr. Ordoñez on 11/25/2022.  His TAVR work-up is in progress.  He is scheduled to see Dr. Rich on 1/18/2023.    Plan:  - We discussed the importance of antiplatelet therapy and talking with his cardiologist prior to stopping these medications for any reason.      -Will  discuss with Dr. Ordoñez about the duration for triple therapy and will have his nurse follow-up with patient.    - Encouraged to seek medical attention if develop chest pain or worsening shortness of breath or syncope.    -Stop atorvastatin and start rosuvastatin 10 mg daily.  Instructed to call or send update through [employer] message if any side effects    - Fasting lipid profile check in 8 weeks. Order placed    -We will hold off on cardiac rehab until TAVR is completed    -BMP pending.  If stable, will consider increasing his lisinopril.  Instructed that he may take his metoprolol succinate at bedtime and see if this helps with fatigue.    Follow up with Dr. Rich as scheduled on 1/18/2023     History of Present Illness/Subjective    Mr. Daniel Alamo is a 82 year old male with a past medical history of coronary disease with remote history of CABG with LIMA to LAD, vein graft to OM, and vein graft RCA done in out-of-hospital, ischemic cardiomyopathy with systolic dysfunction with status post ICD, aortic stenosis, atrial fibrillation, abdominal aortic aneurysm without rupture, pure hypercholesterolemia, atrial fibrillation on sotalol, peripheral artery disease with status post iliac stents and now status post PCI with balloon angioplasty and shockwave lithotripsy to LAD/diagonal 1 on 12/19/2022 who is seen at [facility] for post coronary intervention follow up.     Today, Daniel is here accompanied by his wife, Lucero.  He states that he feels about the same since his recent coronary intervention.  Ports occasional lightheadedness and dizziness he continues to have shortness of breath with  minimal exertion specially walking up incline.  He reports fatigue.  He denies shortness of breath, orthopnea, PND, palpitations, chest pain, abdominal fullness/bloating and lower extremity edema.  He does not report any bleeding complications.  Patient states that in the past he used to be on atorvastatin 80 mg daily which made his diarrhea worse.  He was resumed on a lower dose but still had issue with diarrhea.  He also states that he had issue with a low blood pressure when he was on lisinopril 30 mg daily.  He is currently on lisinopril 2.5 mg daily but tolerating without any adverse effect.    Coronary Angiogram from 12/19/22: reviewed:  Conclusion  Daniel Alamo is a 82 year old old male w/ CAD s/p CABG w/ L-LAD, V-OM, V-RCA, 30 years ago at [facility], ischemic cardiomyopathy EF 25% 11/2021, aortic stenosis, TR, afib on sotalol, PAD s/p iliac stents here for w/u of progressive GALVEZ and orthostatic symptoms, found to have severe aortic stenosis and CAD and here for PCI to D1.     - given area of ischemia/vaibility in anterior/anterolateral wall, proceeded w/ PCI w/ cuting balloon angioplasty and Shockwave lithotripsy in order to optimize risk profile of the pacing run, but given only branch involvement, we chose to stop, as stenting/Roto would require escalating risk  - proceed w/ TAVR w/u as planned   - continue ASA 81mg daily indefinitely, clopidogrel 600mg once, followed by 75mg daily for at least 12 months, add atorva 40  - continue aggressive risk factor modification     Findings:  LM:no obstruction  LAD:severe diffuse Ca2+ disease in mLAD into D1. Occluded dLAD  Lcx:severe mid-vessel lesion  RCA:not injected     Access:  R Radial artery     PCI:  LMT was engaged w/ a 6F EBU 3.5 Guide catheter and the lesion in LAD was wired w/ a Forte wire, then ballooned w/ a 2/0x12mm NC Emerge, a 2.0x10mm ScoreFlex balloon, and a 2.5x12mm Shockwave balloon at 3 jeronimo inflations. We were able to achieve balloon  expansion but significant refractory re-narrowing after balloon removal due to severe bulky Ca2+. Given only branch involvement and escalating risk, we chose to stop rather than risk stent under-deployment. Final angiography showed no dissection or perforation and a SKIP 3 flow.     Closure:   Vasc Band    Echocardiogram done in September 2022-reviewed  Interpretation Summary     The left ventricle is moderately dilated.  There is mild concentric left ventricular hypertrophy.  The visual ejection fraction is 30-35%.  Diastolic Doppler findings (E/E' ratio and/or other parameters) suggest left  ventricular filling pressures are increased.  It appears global hypokinesis with akinesis in inferior and inferoalateral  walls.  Normal right ventricle size and systolic function.  There is a pacemaker lead in the right ventricle.  The left atrium is moderately dilated.  The right atrium is mildly dilated.  Pacer wire in right atrium  There is mild (1+) mitral regurgitation.  There is mild (1+) aortic regurgitation.  Mild valvular aortic stenosis with mean gradient of aortic valve 16 mmHg, but  cannot exclude low flow low gradient aortic valve stenosis since the  calculated aortic valve area in 0.86 cm2.     No previous study for comparison.     Physical Examination Review of Systems   /70 (BP Location: Left arm, Patient Position: Sitting, Cuff Size: Adult Regular)   Pulse 55   Resp 18   Wt 65.8 kg (145 lb)   SpO2 98%   BMI 20.81 kg/m    Body mass index is 20.81 kg/m .  Wt Readings from Last 3 Encounters:   01/10/23 65.8 kg (145 lb)   12/20/22 63.7 kg (140 lb 8 oz)   11/25/22 65.5 kg (144 lb 6.4 oz)     General Appearance:   no distress, normal body habitus   ENT/Mouth: membranes moist, no oral lesions or bleeding gums.      EYES:  no scleral icterus, normal conjunctivae   Neck: no carotid bruits or thyromegaly   Chest/Lungs:   lungs are clear to auscultation, no rales or wheezing, equal chest wall expansion     Cardiovascular:   Normal first and second heart sounds with 2/6 systolic murmurs, rubs, or gallops; the carotid, radial and posterior tibial pulses are intact, Jugular venous pressure flat with no edema bilaterally    Abdomen:  no organomegaly, masses, bruits, or tenderness; bowel sounds are present   Extremities  Puncture Site: no cyanosis or clubbing  Right radial site is soft and intact.  Radial pulses and Pedal pulses intact and symmetrical.  CMS intact.   Skin: no xanthelasma, warm.    Neurologic: normal  bilateral, no tremors     Psychiatric: alert and oriented x3, calm                                                        Negative unless noted in HPI     Medical History  Surgical History Family History Social History   Past Medical History:   Diagnosis Date     Coronary artery disease      Heart disease      VT (ventricular tachycardia) 2004    Past Surgical History:   Procedure Laterality Date     ABDOMEN SURGERY       BYPASS GRAFT ARTERY CORONARY       CV ANGIOGRAM CORONARY GRAFT N/A 9/19/2022    Procedure: Coronary Angiogram Graft;  Surgeon: Tyrone Ordoñez MD;  Location: [facility]     CV CORONARY LITHOTRIPSY PCI N/A 12/19/2022    Procedure: Percutaneous Coronary Intervention - Lithotripsy;  Surgeon: Tyrone Ordoñez MD;  Location: [facility]     CV LEFT HEART CATH N/A 9/19/2022    Procedure: Left Heart Catheterization;  Surgeon: Tyrone Ordoñez MD;  Location: [facility]     CV PCI ANGIOPLASTY N/A 12/19/2022    Procedure: Percutaneous Transluminal Angioplasty;  Surgeon: Tyrone Ordoñez MD;  Location: [facility]     CV RIGHT HEART CATH MEASUREMENTS RECORDED N/A 9/19/2022    Procedure: Right Heart Catheterization;  Surgeon: Tyrone Ordoñez MD;  Location: [facility]     EYE SURGERY       INSERT / REPLACE / REMOVE PACEMAKER       OTHER SURGICAL HISTORY      icd     ZZC CABG, VEIN, SINGLE      Description: CABG (CABG);  Recorded:  10/03/2012;  Comments: LIMA to LAD, SVG to OM2, SVG to RCA    Family History   Problem Relation Age of Onset     Cancer Mother      Heart Disease Father     Social History     Socioeconomic History     Marital status:      Spouse name: Not on file     Number of children: Not on file     Years of education: Not on file     Highest education level: Not on file   Occupational History     Not on file   Tobacco Use     Smoking status: Every Day     Packs/day: 0.50     Years: 30.00     Pack years: 15.00     Types: Cigarettes     Smokeless tobacco: Never   Vaping Use     Vaping Use: Never used   Substance and Sexual Activity     Alcohol use: Yes     Alcohol/week: 1.0 standard drink     Comment: Glass of wine daily.     Drug use: No     Sexual activity: Not Currently   Other Topics Concern     Parent/sibling w/ CABG, MI or angioplasty before 65F 55M? Not Asked   Social History Narrative     Not on file     Social Determinants of Health     Financial Resource Strain: Not on file   Food Insecurity: Not on file   Transportation Needs: Not on file   Physical Activity: Not on file   Stress: Not on file   Social Connections: Not on file   Intimate Partner Violence: Not on file   Housing Stability: Not on file          Medications  Allergies   Current Outpatient Medications   Medication Sig Dispense Refill     acetaminophen (TYLENOL) 325 MG tablet Take 650 mg by mouth every 6 hours as needed for pain       apixaban ANTICOAGULANT (ELIQUIS) 5 MG tablet Take 5 mg by mouth 2 times daily       aspirin (ASA) 81 MG EC tablet Take 81 mg by mouth daily       clopidogrel (PLAVIX) 75 MG tablet Take 1 tablet (75 mg) by mouth daily 90 tablet 3     fluticasone (FLONASE) 50 MCG/ACT nasal spray 1 spray(s) in each nostril once a day for 30 day(s)       gabapentin (NEURONTIN) 300 MG capsule Take 300 mg by mouth daily as needed for neuropathic pain (midday in addition to scheduled doses)       gabapentin (NEURONTIN) 300 MG capsule Take 900 mg  by mouth 2 times daily        lisinopril (ZESTRIL) 5 MG tablet Take 0.5 tablets (2.5 mg) by mouth daily 30 tablet 3     metoprolol succinate ER (TOPROL XL) 25 MG 24 hr tablet Take 1 tablet (25 mg) by mouth daily 90 tablet 11     rosuvastatin (CRESTOR) 10 MG tablet Take 1 tablet (10 mg) by mouth daily 30 tablet 0     sotalol (BETAPACE) 80 MG tablet Take 1 tablet (80 mg) by mouth 2 times daily 180 tablet 3     tamsulosin (FLOMAX) 0.4 MG capsule 0.4 mg daily      Allergies   Allergen Reactions     Atorvastatin Diarrhea     Carvedilol GI Disturbance and Other (See Comments)     Upset stomach  GI upset       Chloride GI Disturbance and Nausea         Lab Results    Chemistry/lipid CBC Cardiac Enzymes/BNP/TSH/INR   Lab Results   Component Value Date    CHOL 185 09/19/2022    HDL 41 09/19/2022    TRIG 76 09/19/2022    BUN 16.5 12/16/2022     12/16/2022    CO2 27 12/16/2022    Lab Results   Component Value Date    WBC 8.3 12/16/2022    HGB 14.5 12/16/2022    HCT 45.4 12/16/2022    MCV 94 12/16/2022     12/16/2022    Lab Results   Component Value Date    TSH 0.46 09/01/2022        50 minutes spent on the date of encounter doing chart review, review of test results, interpretation with above tests, patient visit and documentation.        This note has been dictated using voice recognition software. Any grammatical, typographical, or context distortions are unintentional and inherent to the software

## 2023-01-10 NOTE — PATIENT INSTRUCTIONS
Daniel Alamo,    It was a pleasure to see you today at the [facility].     My recommendations after this visit include:    -Stop atorvastatin    -Start rosuvastatin 10 mg daily.  Please call or send me a [employer] message if you develop any muscle or joint pain or muscle weakness or any new side effects.    -We will follow-up with you once your lab results back    - Please seek medical attention if you develop chest pain or worsening shortness of breath or similar symptoms you experienced prior to recent cardiac event    - Please get your lipid level test in 8 weeks. Make sure to fast for 8-12 hours prior to lab work. Okay to have plain water, black coffee or tea without creamer and sugar    -CT scan as scheduled    - Follow up with Dr. Rich as scheduled     - Please call my nurse Susannah AGEE at [phone], if you have any questions or concerns    Justin Rivera CNP    Medication     Take all your medications as prescribed  Do not stop any medications without talking with a healthcare provider    Exercise      Physical activity is important for overall health  Set a goal of 150 minutes of exercise each week  For example, 30 minutes of exercise 5 days each week.    These 30 minutes can be broken into shorter periods of 15 minutes twice daily or 10 minutes three times daily  Start any exercise program slowly and work towards the goal of 150 minutes each week  For example, you may start with 10 minutes and plan to add a few minutes each week as you get stronger   Examples of exercise include walking, swimming, or biking  Remember to stretch and stay hydrated with exercise    Diet     A heart healthy diet includes:  A variety of fruits and vegetables  Whole grains  Low-fat dairy (fat-free, 1% fat, and low-fat)  Lean meats and poultry without skin   Fish (eat fish 2 times each week)  Nuts  Limit saturated fat to about 13 grams each day (based on a 2000 calorie diet)  Limit red meat  Limit sugars  (sweets and sugary beverages)  Limit your portion sizes  Do not add salt to your food when cooking or at the table  Limit alcohol intake (no more than 1 drink each day for women or 2 drinks each day for men)    Weight Loss     Work on losing weight with diet and exercise  You BMI (body mass index) should be between 18.5-24.9  This is a calculation of your weight and height  Please ask your healthcare provider for your BMI    Manage Other Chronic Health Conditions     Control cholesterol  Eat a diet low in saturated fat  Exercise   Take a statin medication as prescribed  Manage blood pressure  Eat a diet low in sodium  Exercise  Reduce stress  Lose weight   Take blood pressure medications as prescribed  Control blood sugars if diabetic  Monitor sugars and carbohydrates in your diet  Lose weight   Take diabetes medications as prescribed  Follow-up with your primary care provider to make sure your blood sugars are well controlled    Stress Reduction     Find time each day to relax  Reading, listening to music, yoga, meditation, exercise, spending time with friends and family, volunteering   Get 6-8 hours of sleep each night    Smoking Cessation     Smoking causes numerous health problems including coronary artery disease  It is never too late to quit  Set realistic goals for quitting  Decrease the number of cigarettes used each week  Use nicotine gum or patches to help you quit    Information from the [employer].  Please visit their website at www.heart.org

## 2023-01-10 NOTE — LETTER
1/10/2023    Kenna Calvillo, NP  [address] [facility] [address]    RE: Daniel Alamo       Dear Colleague,     I had the pleasure of seeing Daniel Alamo in the [facility].          Assessment/Recommendations   Assessment:    1.  Coronary artery disease with history of prior CABG with LIMA to LAD, SVG to OM, and SVG to RCA: Coronary angiogram in September 2022 showed severe mid vessel disease with 70% calcified disease and diagonal 1 with distal LAD fills via patent LIMA to the LAD, 70 to 80% stenosis in OM1 with distal small OM fills via L to L bridging collaterals, 100% occluded proximal RCA.  Vein graft to the OM was found occluded and vein graft to RCA is not visualized and presumed occluded.    Patient returned to Cath Lab on 12/19/2022 and underwent PCI with Cutting Balloon angioplasty and shockwave lithotripsy to LAD/diagonal 1 (see angio result for detail).    Patient was recommended to stay on  dual antiplatelet therapy with ASA 81 mg indefinitely and Clopidogrel (Plavix) 75 mg daily for 12 months.  However, patient is also on Eliquis.    Patient denies chest pain but his dyspnea on exertion fatigue is unchanged.    2.  Dyslipidemia with LDL goal <70/Obesity with a BMI of: Daniel Alamo is on moderate intensity statin therapy with atorvastatin 20 mg daily-he reports diarrhea and prefers alternative therapy. Most recent LDL is 129.  Most recent AST/ALT are stable.    3.  Ischemic cardiomyopathy with systolic dysfunction with LVEF of 30 to 35% per echo in September 2022, NYHA class II-III: He is well compensated with no evidence of fluid tension on exam except he does report shortness of breath with minimal exertion specially walking up incline and also reports fatigue.     Heart failure regimen includes   - Beta-blocker therapy with metoprolol succinate 25 mg daily in a.m.   -ACE inhibitor therapy with lisinopril 2.5 mg daily   - Not on aldosterone blocker therapy    -Not on  diuretic therapy    3.  Hypertension: His blood pressure is stable at 128/70.  Heart rate stable in mid 50s.    4.  Severe aortic stenosis per echo in September 2022: Patient had a consultation in valve clinic with Dr. Ordoñez on 11/25/2022.  His TAVR work-up is in progress.  He is scheduled to see Dr. Rich on 1/18/2023.    Plan:  - We discussed the importance of antiplatelet therapy and talking with his cardiologist prior to stopping these medications for any reason.      -Will  discuss with Dr. Ordoñez about the duration for triple therapy and will have his nurse follow-up with patient.    - Encouraged to seek medical attention if develop chest pain or worsening shortness of breath or syncope.    -Stop atorvastatin and start rosuvastatin 10 mg daily.  Instructed to call or send update through [employer] message if any side effects    - Fasting lipid profile check in 8 weeks. Order placed    -We will hold off on cardiac rehab until TAVR is completed    -BMP pending.  If stable, will consider increasing his lisinopril.  Instructed that he may take his metoprolol succinate at bedtime and see if this helps with fatigue.    Follow up with Dr. Rich as scheduled on 1/18/2023     History of Present Illness/Subjective    Mr. Daniel Alamo is a 82 year old male with a past medical history of coronary disease with remote history of CABG with LIMA to LAD, vein graft to OM, and vein graft RCA done in out-of-hospital, ischemic cardiomyopathy with systolic dysfunction with status post ICD, aortic stenosis, atrial fibrillation, abdominal aortic aneurysm without rupture, pure hypercholesterolemia, atrial fibrillation on sotalol, peripheral artery disease with status post iliac stents and now status post PCI with balloon angioplasty and shockwave lithotripsy to LAD/diagonal 1 on 12/19/2022 who is seen at [facility] for post coronary intervention follow up.     Today, Daniel is here  accompanied by his wife, Lucero.  He states that he feels about the same since his recent coronary intervention.  Ports occasional lightheadedness and dizziness he continues to have shortness of breath with minimal exertion specially walking up incline.  He reports fatigue.  He denies shortness of breath, orthopnea, PND, palpitations, chest pain, abdominal fullness/bloating and lower extremity edema.  He does not report any bleeding complications.  Patient states that in the past he used to be on atorvastatin 80 mg daily which made his diarrhea worse.  He was resumed on a lower dose but still had issue with diarrhea.  He also states that he had issue with a low blood pressure when he was on lisinopril 30 mg daily.  He is currently on lisinopril 2.5 mg daily but tolerating without any adverse effect.    Coronary Angiogram from 12/19/22: reviewed:  Conclusion  Daniel Alamo is a 82 year old old male w/ CAD s/p CABG w/ L-LAD, V-OM, V-RCA, 30 years ago at [facility], ischemic cardiomyopathy EF 25% 11/2021, aortic stenosis, TR, afib on sotalol, PAD s/p iliac stents here for w/u of progressive GALVEZ and orthostatic symptoms, found to have severe aortic stenosis and CAD and here for PCI to D1.     - given area of ischemia/vaibility in anterior/anterolateral wall, proceeded w/ PCI w/ cuting balloon angioplasty and Shockwave lithotripsy in order to optimize risk profile of the pacing run, but given only branch involvement, we chose to stop, as stenting/Roto would require escalating risk  - proceed w/ TAVR w/u as planned   - continue ASA 81mg daily indefinitely, clopidogrel 600mg once, followed by 75mg daily for at least 12 months, add atorva 40  - continue aggressive risk factor modification     Findings:  LM:no obstruction  LAD:severe diffuse Ca2+ disease in mLAD into D1. Occluded dLAD  Lcx:severe mid-vessel lesion  RCA:not injected     Access:  R Radial artery     PCI:  LMT was engaged w/ a 6F EBU 3.5 Guide catheter and  the lesion in LAD was wired w/ a Forte wire, then ballooned w/ a 2/0x12mm NC Emerge, a 2.0x10mm ScoreFlex balloon, and a 2.5x12mm Shockwave balloon at 3 jeronimo inflations. We were able to achieve balloon expansion but significant refractory re-narrowing after balloon removal due to severe bulky Ca2+. Given only branch involvement and escalating risk, we chose to stop rather than risk stent under-deployment. Final angiography showed no dissection or perforation and a SKIP 3 flow.     Closure:   Vasc Band    Echocardiogram done in September 2022-reviewed  Interpretation Summary     The left ventricle is moderately dilated.  There is mild concentric left ventricular hypertrophy.  The visual ejection fraction is 30-35%.  Diastolic Doppler findings (E/E' ratio and/or other parameters) suggest left  ventricular filling pressures are increased.  It appears global hypokinesis with akinesis in inferior and inferoalateral  walls.  Normal right ventricle size and systolic function.  There is a pacemaker lead in the right ventricle.  The left atrium is moderately dilated.  The right atrium is mildly dilated.  Pacer wire in right atrium  There is mild (1+) mitral regurgitation.  There is mild (1+) aortic regurgitation.  Mild valvular aortic stenosis with mean gradient of aortic valve 16 mmHg, but  cannot exclude low flow low gradient aortic valve stenosis since the  calculated aortic valve area in 0.86 cm2.     No previous study for comparison.     Physical Examination Review of Systems   /70 (BP Location: Left arm, Patient Position: Sitting, Cuff Size: Adult Regular)   Pulse 55   Resp 18   Wt 65.8 kg (145 lb)   SpO2 98%   BMI 20.81 kg/m    Body mass index is 20.81 kg/m .  Wt Readings from Last 3 Encounters:   01/10/23 65.8 kg (145 lb)   12/20/22 63.7 kg (140 lb 8 oz)   11/25/22 65.5 kg (144 lb 6.4 oz)     General Appearance:   no distress, normal body habitus   ENT/Mouth: membranes moist, no oral lesions or bleeding  gums.      EYES:  no scleral icterus, normal conjunctivae   Neck: no carotid bruits or thyromegaly   Chest/Lungs:   lungs are clear to auscultation, no rales or wheezing, equal chest wall expansion    Cardiovascular:   Normal first and second heart sounds with 2/6 systolic murmurs, rubs, or gallops; the carotid, radial and posterior tibial pulses are intact, Jugular venous pressure flat with no edema bilaterally    Abdomen:  no organomegaly, masses, bruits, or tenderness; bowel sounds are present   Extremities  Puncture Site: no cyanosis or clubbing  Right radial site is soft and intact.  Radial pulses and Pedal pulses intact and symmetrical.  CMS intact.   Skin: no xanthelasma, warm.    Neurologic: normal  bilateral, no tremors     Psychiatric: alert and oriented x3, calm                                                        Negative unless noted in HPI     Medical History  Surgical History Family History Social History   Past Medical History:   Diagnosis Date     Coronary artery disease      Heart disease      VT (ventricular tachycardia) 2004    Past Surgical History:   Procedure Laterality Date     ABDOMEN SURGERY       BYPASS GRAFT ARTERY CORONARY       CV ANGIOGRAM CORONARY GRAFT N/A 9/19/2022    Procedure: Coronary Angiogram Graft;  Surgeon: Tyrone Ordoñez MD;  Location: [facility]     CV CORONARY LITHOTRIPSY PCI N/A 12/19/2022    Procedure: Percutaneous Coronary Intervention - Lithotripsy;  Surgeon: Tyrone Ordoñez MD;  Location: [facility]     CV LEFT HEART CATH N/A 9/19/2022    Procedure: Left Heart Catheterization;  Surgeon: Tyrone Ordoñez MD;  Location: [facility]     CV PCI ANGIOPLASTY N/A 12/19/2022    Procedure: Percutaneous Transluminal Angioplasty;  Surgeon: Tyrone Ordoñez MD;  Location: [facility]     CV RIGHT HEART CATH MEASUREMENTS RECORDED N/A 9/19/2022    Procedure: Right Heart Catheterization;  Surgeon: Tyrone Ordoñez MD;   Location: [facility] CV     EYE SURGERY       INSERT / REPLACE / REMOVE PACEMAKER       OTHER SURGICAL HISTORY      icd     ZZC CABG, VEIN, SINGLE      Description: CABG (CABG);  Recorded: 10/03/2012;  Comments: LIMA to LAD, SVG to OM2, SVG to RCA    Family History   Problem Relation Age of Onset     Cancer Mother      Heart Disease Father     Social History     Socioeconomic History     Marital status:      Spouse name: Not on file     Number of children: Not on file     Years of education: Not on file     Highest education level: Not on file   Occupational History     Not on file   Tobacco Use     Smoking status: Every Day     Packs/day: 0.50     Years: 30.00     Pack years: 15.00     Types: Cigarettes     Smokeless tobacco: Never   Vaping Use     Vaping Use: Never used   Substance and Sexual Activity     Alcohol use: Yes     Alcohol/week: 1.0 standard drink     Comment: Glass of wine daily.     Drug use: No     Sexual activity: Not Currently   Other Topics Concern     Parent/sibling w/ CABG, MI or angioplasty before 65F 55M? Not Asked   Social History Narrative     Not on file     Social Determinants of Health     Financial Resource Strain: Not on file   Food Insecurity: Not on file   Transportation Needs: Not on file   Physical Activity: Not on file   Stress: Not on file   Social Connections: Not on file   Intimate Partner Violence: Not on file   Housing Stability: Not on file          Medications  Allergies   Current Outpatient Medications   Medication Sig Dispense Refill     acetaminophen (TYLENOL) 325 MG tablet Take 650 mg by mouth every 6 hours as needed for pain       apixaban ANTICOAGULANT (ELIQUIS) 5 MG tablet Take 5 mg by mouth 2 times daily       aspirin (ASA) 81 MG EC tablet Take 81 mg by mouth daily       clopidogrel (PLAVIX) 75 MG tablet Take 1 tablet (75 mg) by mouth daily 90 tablet 3     fluticasone (FLONASE) 50 MCG/ACT nasal spray 1 spray(s) in each nostril once a day for 30 day(s)        gabapentin (NEURONTIN) 300 MG capsule Take 300 mg by mouth daily as needed for neuropathic pain (midday in addition to scheduled doses)       gabapentin (NEURONTIN) 300 MG capsule Take 900 mg by mouth 2 times daily        lisinopril (ZESTRIL) 5 MG tablet Take 0.5 tablets (2.5 mg) by mouth daily 30 tablet 3     metoprolol succinate ER (TOPROL XL) 25 MG 24 hr tablet Take 1 tablet (25 mg) by mouth daily 90 tablet 11     rosuvastatin (CRESTOR) 10 MG tablet Take 1 tablet (10 mg) by mouth daily 30 tablet 0     sotalol (BETAPACE) 80 MG tablet Take 1 tablet (80 mg) by mouth 2 times daily 180 tablet 3     tamsulosin (FLOMAX) 0.4 MG capsule 0.4 mg daily      Allergies   Allergen Reactions     Atorvastatin Diarrhea     Carvedilol GI Disturbance and Other (See Comments)     Upset stomach  GI upset       Chloride GI Disturbance and Nausea         Lab Results    Chemistry/lipid CBC Cardiac Enzymes/BNP/TSH/INR   Lab Results   Component Value Date    CHOL 185 09/19/2022    HDL 41 09/19/2022    TRIG 76 09/19/2022    BUN 16.5 12/16/2022     12/16/2022    CO2 27 12/16/2022    Lab Results   Component Value Date    WBC 8.3 12/16/2022    HGB 14.5 12/16/2022    HCT 45.4 12/16/2022    MCV 94 12/16/2022     12/16/2022    Lab Results   Component Value Date    TSH 0.46 09/01/2022        50 minutes spent on the date of encounter doing chart review, review of test results, interpretation with above tests, patient visit and documentation.        This note has been dictated using voice recognition software. Any grammatical, typographical, or context distortions are unintentional and inherent to the software          Thank you for allowing me to participate in the care of your patient.      Sincerely,     CAILIN Singh [facility]  cc:   No referring provider defined for this encounter.

## 2023-01-12 ENCOUNTER — HOSPITAL ENCOUNTER (OUTPATIENT)
Dept: CT IMAGING | Facility: CLINIC | Age: 83
Discharge: HOME | End: 2023-01-12
Attending: INTERNAL MEDICINE | Admitting: INTERNAL MEDICINE
Payer: MEDICARE

## 2023-01-12 DIAGNOSIS — I35.0 NONRHEUMATIC AORTIC VALVE STENOSIS: ICD-10-CM

## 2023-01-12 PROCEDURE — 99207 CT ANGIOGRAM TAVR: CPT | Mod: 26 | Performed by: INTERNAL MEDICINE

## 2023-01-12 PROCEDURE — 250N000011 HC RX IP 250 OP 636: Performed by: INTERNAL MEDICINE

## 2023-01-12 PROCEDURE — 74174 CTA ABD&PLVS W/CONTRAST: CPT | Mod: MG

## 2023-01-12 RX ORDER — IOPAMIDOL 755 MG/ML
100 INJECTION, SOLUTION INTRAVASCULAR ONCE
Status: COMPLETED | OUTPATIENT
Start: 2023-01-12 | End: 2023-01-12

## 2023-01-12 RX ADMIN — IOPAMIDOL 100 ML: 755 INJECTION, SOLUTION INTRAVENOUS at 06:50

## 2023-01-16 ENCOUNTER — TELEPHONE (OUTPATIENT)
Dept: CARDIOLOGY | Facility: CLINIC | Age: 83
End: 2023-01-16

## 2023-01-16 DIAGNOSIS — I25.5 ISCHEMIC CARDIOMYOPATHY: Primary | ICD-10-CM

## 2023-01-16 RX ORDER — LISINOPRIL 5 MG/1
5 TABLET ORAL DAILY
Qty: 30 TABLET | Refills: 3
Start: 2023-01-16 | End: 2023-01-19

## 2023-01-16 NOTE — TELEPHONE ENCOUNTER
Called patient and updated on message below. He notes that he already started 5 mg of lisinopril after visit with CY. Med list update, BMP ordered and message to schedulers to arrange. He was updated on message regarding triple therapy and to remain on ideally for 3 months. If even mild bleeding, he should let us know. He verbalized understanding. Msg to schedulers to please arrange BMP. -[facility]

## 2023-01-16 NOTE — TELEPHONE ENCOUNTER
Justin Rivera APRN CNP Yanavitski, Marat, MD  Cc: Laly Swanson, RN  Sounds good.   Thank you Dr. Campbell!     Hi Mal,   Could you please follow-up with recommendation.   Also please let him know his kidney function stable.   If he is willing, increase his lisinopril from 2.5 mg to 5 mg daily and monitor for any lightheadedness or dizziness or any new side effects.   Repeat BMP in 1 to 2 weeks.   Thank you!   CY           Previous Messages     ----- Message -----   From: Tyrone Ordoñez MD   Sent: 1/11/2023   9:05 AM CST   To: CAILIN Singh CNP,     If no bleeding issues, ideally 3 mos. If has even minor bleeding, can do 6 weeks.     Thx!   Tyrone   ----- Message -----   From: Justin Rivera APRN CNP   Sent: 1/10/2023   2:31 PM CST   To: Tyrone Ordoñez MD     Hi Dr. Ordoñez,   Saw patient today for post PCI/balloon angioplasty and shockwave lithotripsy to LAD/D1 on 12/19/2022.   He is currently on aspirin 81 Mg, clopidogrel, and Eliquis.   When would you like him to stop ASA?   CY

## 2023-01-18 ENCOUNTER — TELEPHONE (OUTPATIENT)
Dept: CARDIOLOGY | Facility: CLINIC | Age: 83
End: 2023-01-18

## 2023-01-18 ENCOUNTER — OFFICE VISIT (OUTPATIENT)
Dept: CARDIOLOGY | Facility: CLINIC | Age: 83
End: 2023-01-18
Payer: MEDICARE

## 2023-01-18 VITALS
BODY MASS INDEX: 20.95 KG/M2 | DIASTOLIC BLOOD PRESSURE: 80 MMHG | SYSTOLIC BLOOD PRESSURE: 118 MMHG | HEART RATE: 64 BPM | WEIGHT: 146 LBS | RESPIRATION RATE: 14 BRPM

## 2023-01-18 DIAGNOSIS — Z95.1 S/P CABG (CORONARY ARTERY BYPASS GRAFT): ICD-10-CM

## 2023-01-18 DIAGNOSIS — I35.0 NONRHEUMATIC AORTIC VALVE STENOSIS: Primary | ICD-10-CM

## 2023-01-18 DIAGNOSIS — I25.5 ISCHEMIC CARDIOMYOPATHY: ICD-10-CM

## 2023-01-18 DIAGNOSIS — Z95.810 ICD (IMPLANTABLE CARDIOVERTER-DEFIBRILLATOR), DUAL, IN SITU: ICD-10-CM

## 2023-01-18 DIAGNOSIS — I73.9 PVD (PERIPHERAL VASCULAR DISEASE) (H): ICD-10-CM

## 2023-01-18 PROCEDURE — 99203 OFFICE O/P NEW LOW 30 MIN: CPT | Performed by: THORACIC SURGERY (CARDIOTHORACIC VASCULAR SURGERY)

## 2023-01-18 NOTE — PROGRESS NOTES
ASKED BY REFERRING PHYSICIAN: Dr. Ordoñez to evaluate this patient for open surgical versus transcatheter aortic valve replacement.    CHIEF COMPLAINT: Shortness of breath with any exertion.    HPI: Daniel is a 82 year old male who presents with severe symptomatic aortic stenosis.  He underwent a CABG x 3 30 years ago at [facility].  He now has an ischemic cardiomyopathy with an LV EF of about 25%.  Additionally he underwent a PCI of the LAD in December 2022.  He has diffuse disease in his LAD.  Additionally he has undergone stenting of an abdominal aortic aneurysm that extends into both iliacs.    PAST MEDICAL HISTORY:  Past Medical History:   Diagnosis Date     Coronary artery disease      Heart disease      VT (ventricular tachycardia) 2004       PAST SURGICAL HISTORY:  Past Surgical History:   Procedure Laterality Date     ABDOMEN SURGERY       BYPASS GRAFT ARTERY CORONARY       CV ANGIOGRAM CORONARY GRAFT N/A 9/19/2022    Procedure: Coronary Angiogram Graft;  Surgeon: Tyrone Ordoñez MD;  Location: [facility]     CV CORONARY LITHOTRIPSY PCI N/A 12/19/2022    Procedure: Percutaneous Coronary Intervention - Lithotripsy;  Surgeon: Tyrone Ordoñez MD;  Location: [facility]     CV LEFT HEART CATH N/A 9/19/2022    Procedure: Left Heart Catheterization;  Surgeon: Tyrone Ordoñez MD;  Location: [facility]     CV PCI ANGIOPLASTY N/A 12/19/2022    Procedure: Percutaneous Transluminal Angioplasty;  Surgeon: Tyrone Ordoñez MD;  Location: [facility]      CV RIGHT HEART CATH MEASUREMENTS RECORDED N/A 9/19/2022    Procedure: Right Heart Catheterization;  Surgeon: Tyrone Ordoñez MD;  Location: [facility]     EYE SURGERY       INSERT / REPLACE / REMOVE PACEMAKER       OTHER SURGICAL HISTORY      icd     ZZC CABG, VEIN, SINGLE      Description: CABG (CABG);  Recorded: 10/03/2012;  Comments: LIMA to LAD, SVG to OM2, SVG to RCA       FAMILY HISTORY:   Family History    Problem Relation Age of Onset     Cancer Mother      Heart Disease Father        SOCIAL HISTORY:  Social History     Socioeconomic History     Marital status:      Spouse name: Not on file     Number of children: Not on file     Years of education: Not on file     Highest education level: Not on file   Occupational History     Not on file   Tobacco Use     Smoking status: Every Day     Packs/day: 0.50     Years: 30.00     Pack years: 15.00     Types: Cigarettes     Smokeless tobacco: Never   Vaping Use     Vaping Use: Never used   Substance and Sexual Activity     Alcohol use: Yes     Alcohol/week: 1.0 standard drink     Comment: Glass of wine daily.     Drug use: No     Sexual activity: Not Currently   Other Topics Concern     Parent/sibling w/ CABG, MI or angioplasty before 65F 55M? Not Asked   Social History Narrative     Not on file     Social Determinants of Health     Financial Resource Strain: Not on file   Food Insecurity: Not on file   Transportation Needs: Not on file   Physical Activity: Not on file   Stress: Not on file   Social Connections: Not on file   Intimate Partner Violence: Not on file   Housing Stability: Not on file        ALLERGIES:   Allergies   Allergen Reactions     Atorvastatin Diarrhea     Carvedilol Other (See Comments) and GI Disturbance     Upset stomach; GI upset       Chloride GI Disturbance and Nausea       CURRENT MEDICATIONS:   Prescription Medications as of 1/18/2023       Rx Number Disp Refills Start End Last Dispensed Date Next Fill Date Owning Pharmacy    acetaminophen (TYLENOL) 325 MG tablet        [facility] [address] [place] [address] [[facility], [address]    Sig: Take 650 mg by mouth every 6 hours as needed for pain    Class: Historical    Route: Oral    apixaban ANTICOAGULANT (ELIQUIS) 5 MG tablet    12/31/2019    [facility] [address] [place] [address] [[address]    Sig: Take 5 mg by mouth 2 times daily    Class: Historical    Route: Oral     aspirin (ASA) 81 MG EC tablet        [facility] [address] [place] [address] [[place]], [address]    Sig: Take 81 mg by mouth daily    Class: Historical    Route: Oral    clopidogrel (PLAVIX) 75 MG tablet  90 tablet 3 1/10/2023    [facility] [address] [place] [address] [[place]], [address]    Sig: Take 1 tablet (75 mg) by mouth daily    Class: E-Prescribe    Route: Oral    fluticasone (FLONASE) 50 MCG/ACT nasal spray            Si spray(s) in each nostril once a day for 30 day(s)    Class: Historical    gabapentin (NEURONTIN) 300 MG capsule            Sig: Take 300 mg by mouth daily as needed for neuropathic pain (midday in addition to scheduled doses)    Class: Historical    Route: Oral    gabapentin (NEURONTIN) 300 MG capsule    2019    [facility] [address] [place] [address] [[place]], [address]    Sig: Take 900 mg by mouth 2 times daily     Class: Historical    Route: Oral    lisinopril (ZESTRIL) 5 MG tablet  30 tablet 3 2023        Sig: Take 1 tablet (5 mg) by mouth daily    Class: No Print Out    Route: Oral    metoprolol succinate ER (TOPROL XL) 25 MG 24 hr tablet  90 tablet 11 2022    [facility] [address] [place] [address] [[place]], [address]    Sig: Take 1 tablet (25 mg) by mouth daily    Class: E-Prescribe    Route: Oral    rosuvastatin (CRESTOR) 10 MG tablet  30 tablet 0 1/10/2023    [facility] [address] [place] [address] [[place]], [address]    Sig: Take 1 tablet (10 mg) by mouth daily    Class: E-Prescribe    Route: Oral    sotalol (BETAPACE) 80 MG tablet  180 tablet 3 2022    [facility] [address] [place] [address] [[place]], [address]    Sig: Take 1 tablet (80 mg) by mouth 2 times daily    Class: E-Prescribe    Route: Oral    tamsulosin (FLOMAX) 0.4 MG capsule    10/6/2018    [facility] [address] [place] [address] [[place]], [address]    Sig: Take 0.4 mg by mouth daily    Class: Historical    Route: Oral          REVIEW OF  SYSTEMS:   Gen: denies frequent headaches, double/blurry vision, insomnia, fatigue, unexplained weight loss/gain. No previous anesthesia reactions.  CV: GALVEZ, denies chest pain, palpitations, peripheral edema.   Pulm: denies shortness of breath, asthma or wheezing  GI/: denies liver or kidney problems, blood in stool or BRBPR, difficulty urinating  Endo: denies thyroid problems or Diabetes  Heme/Onc: denies bleeding or clotting disorders, no family problems with bleeding/clotting diorders  MS: no weakness, tremors or gait instability  Neuro: denies depression, memory problems, no dysesthesias, no previous strokes, no migraines, no dysphagia  Skin: No petechiae, purpura or rash.    PHYSICAL EXAMINATION:   /80 (BP Location: Left arm, Patient Position: Sitting, Cuff Size: Adult Regular)   Pulse 64   Resp 14   Wt 66.2 kg (146 lb)   BMI 20.95 kg/m    General: alert and oriented x 3, pleasant, no acute distress  CV: S1 S2, grade II/VI systolic ejection  Murmur heard best over the right upper sternal border, but no rubs or gallops, regular rate and rhythm, no peripheral edema, no carotid or abdominal bruits, pulses in upper and lower extremities palpable  Pulm: bilateral breath sounds, clear to auscultation, easy work of breathing  GI: (+) bowel sounds, soft non-tender and non-distended  : voiding without problems  MS: moves all extremities x 4,  5+/5+ equal strength bilaterally  Neuro: pupils equal round and reactive to light, cranial nerves, II-XII grossly intact, no gross neurologic deficits noted    LABS: Pending    PROCEDURES/IMAGING:  Chest X-Ray: Not done  Angio: Vein grafts to OM and RCA are occluded.  Diffuse disease in LAD with patent LIMA  Echo: LV EF is 30% with low flow low gradient aortic stenosis.  Calculated DAVI is 0.86 cm squared.  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  He has undergone a previous CABG x 3.and both vein grafts are down.  I believe that he is a better candidate to undergo a transcatheter aortic valve replacement given his age and the fact that he would be a redo and his other medical co-morbidities.  He and his wife understand that the risks for a TAVR include: bleeding, infection, stroke, heart block requiring a pacemaker, cardiac perforation, aortic root rupture, aortic dissection, and an operative mortality of 1 to 2 percent.  They accept these risks and are agreeable with the plan of having a TAVR on 1-.  It will be a left subclavian approach.    1. Complete outpatient work-up  2. TAVR on 1-.       Approximately 30 minutes were spent with the patient in clinic at this visit.    CC  Patient Care Team:  Kenna Calvillo NP as PCP - General (Family Practice)  Tyrone Ordoñez MD as Assigned Heart and Vascular Provider

## 2023-01-18 NOTE — TELEPHONE ENCOUNTER
Patient in to see Dr. Rich for CV surgery consult pre-TAVR. Patient had previously been slotted for potential procedure day mid-February. Valve team had a spot open up on 1/31/23. Confirmed with patient and wife they would like to move forward with this procedure day. Discussed he will be subclavian access so he will be first case that morning at 0530 AM.     Patient and wife agreeable and would like to move forward with TAVR date of 1/31.  will call to set up pre op appt.     Thien Samson RN BSN  Structural Heart Coordinator   [facility]  [phone]

## 2023-01-18 NOTE — LETTER
1/18/2023    Kenna Calvillo, NP  [address] [facility] [address]    RE: Daniel Alamo       Dear Colleague,     I had the pleasure of seeing Daniel Alamo in the [facility].  ASKED BY REFERRING PHYSICIAN: Dr. Ordoñez to evaluate this patient for open surgical versus transcatheter aortic valve replacement.    CHIEF COMPLAINT: Shortness of breath with any exertion.    HPI: Daniel is a 82 year old male who presents with severe symptomatic aortic stenosis.  He underwent a CABG x 3 30 years ago at [place].  He now has an ischemic cardiomyopathy with an LV EF of about 25%.  Additionally he underwent a PCI of the LAD in December 2022.  He has diffuse disease in his LAD.  Additionally he has undergone stenting of an abdominal aortic aneurysm that extends into both iliacs.    PAST MEDICAL HISTORY:  Past Medical History:   Diagnosis Date     Coronary artery disease      Heart disease      VT (ventricular tachycardia) 2004       PAST SURGICAL HISTORY:  Past Surgical History:   Procedure Laterality Date     ABDOMEN SURGERY       BYPASS GRAFT ARTERY CORONARY       CV ANGIOGRAM CORONARY GRAFT N/A 9/19/2022    Procedure: Coronary Angiogram Graft;  Surgeon: Tyrone Ordoñez MD;  Location: [facility]     CV CORONARY LITHOTRIPSY PCI N/A 12/19/2022    Procedure: Percutaneous Coronary Intervention - Lithotripsy;  Surgeon: Tyrone Ordoñez MD;  Location: [facility]      CV LEFT HEART CATH N/A 9/19/2022    Procedure: Left Heart Catheterization;  Surgeon: Tyrone Ordoñez MD;  Location: [facility]     CV PCI ANGIOPLASTY N/A 12/19/2022    Procedure: Percutaneous Transluminal Angioplasty;  Surgeon: Tyrone Ordoñez MD;  Location: [facility]     CV RIGHT HEART CATH MEASUREMENTS RECORDED N/A 9/19/2022    Procedure: Right Heart Catheterization;  Surgeon: Tyrone Ordoñez MD;  Location: [facility]     EYE SURGERY       INSERT / REPLACE / REMOVE PACEMAKER        OTHER SURGICAL HISTORY      icd     ZZC CABG, VEIN, SINGLE      Description: CABG (CABG);  Recorded: 10/03/2012;  Comments: LIMA to LAD, SVG to OM2, SVG to RCA       FAMILY HISTORY:   Family History   Problem Relation Age of Onset     Cancer Mother      Heart Disease Father        SOCIAL HISTORY:  Social History     Socioeconomic History     Marital status:      Spouse name: Not on file     Number of children: Not on file     Years of education: Not on file     Highest education level: Not on file   Occupational History     Not on file   Tobacco Use     Smoking status: Every Day     Packs/day: 0.50     Years: 30.00     Pack years: 15.00     Types: Cigarettes     Smokeless tobacco: Never   Vaping Use     Vaping Use: Never used   Substance and Sexual Activity     Alcohol use: Yes     Alcohol/week: 1.0 standard drink     Comment: Glass of wine daily.     Drug use: No     Sexual activity: Not Currently   Other Topics Concern     Parent/sibling w/ CABG, MI or angioplasty before 65F 55M? Not Asked   Social History Narrative     Not on file     Social Determinants of Health     Financial Resource Strain: Not on file   Food Insecurity: Not on file   Transportation Needs: Not on file   Physical Activity: Not on file   Stress: Not on file   Social Connections: Not on file   Intimate Partner Violence: Not on file   Housing Stability: Not on file        ALLERGIES:   Allergies   Allergen Reactions     Atorvastatin Diarrhea     Carvedilol Other (See Comments) and GI Disturbance     Upset stomach; GI upset       Chloride GI Disturbance and Nausea       CURRENT MEDICATIONS:   Prescription Medications as of 1/18/2023       Rx Number Disp Refills Start End Last Dispensed Date Next Fill Date Owning Pharmacy    acetaminophen (TYLENOL) 325 MG tablet        [facility] [id number] [facility] [[facility], [address]    Sig: Take 650 mg by mouth every 6 hours as needed for pain    Class: Historical    Route: Oral    apixaban  ANTICOAGULANT (ELIQUIS) 5 MG tablet    2019    [facility] [address] [place] [address] [[place]], [address]    Sig: Take 5 mg by mouth 2 times daily    Class: Historical    Route: Oral    aspirin (ASA) 81 MG EC tablet        [facility] [address] [place] [address] [[place]], [address]    Sig: Take 81 mg by mouth daily    Class: Historical    Route: Oral    clopidogrel (PLAVIX) 75 MG tablet  90 tablet 3 1/10/2023    [facility] [address] [place] [address]], [address]    Sig: Take 1 tablet (75 mg) by mouth daily    Class: E-Prescribe    Route: Oral    fluticasone (FLONASE) 50 MCG/ACT nasal spray            Si spray(s) in each nostril once a day for 30 day(s)    Class: Historical    gabapentin (NEURONTIN) 300 MG capsule            Sig: Take 300 mg by mouth daily as needed for neuropathic pain (midday in addition to scheduled doses)    Class: Historical    Route: Oral    gabapentin (NEURONTIN) 300 MG capsule    2019    [facility] [address] [place] [address], [address]    Sig: Take 900 mg by mouth 2 times daily     Class: Historical    Route: Oral    lisinopril (ZESTRIL) 5 MG tablet  30 tablet 3 2023        Sig: Take 1 tablet (5 mg) by mouth daily    Class: No Print Out    Route: Oral    metoprolol succinate ER (TOPROL XL) 25 MG 24 hr tablet  90 tablet 11 2022    [facility] [address] [place] [address] [[place]], [address]    Sig: Take 1 tablet (25 mg) by mouth daily    Class: E-Prescribe    Route: Oral    rosuvastatin (CRESTOR) 10 MG tablet  30 tablet 0 1/10/2023    [facility] [address] [place] [address] [[place]], [address]    Sig: Take 1 tablet (10 mg) by mouth daily    Class: E-Prescribe    Route: Oral    sotalol (BETAPACE) 80 MG tablet  180 tablet 3 2022    [facility] [address] [place] [address] [[place]], [address]    Sig: Take 1 tablet (80 mg) by mouth 2 times daily    Class: E-Prescribe    Route: Oral    tamsulosin  (FLOMAX) 0.4 MG capsule    10/6/2018    [facility] [id number] [facility] [[place]], [address]    Sig: Take 0.4 mg by mouth daily    Class: Historical    Route: Oral          REVIEW OF SYSTEMS:   Gen: denies frequent headaches, double/blurry vision, insomnia, fatigue, unexplained weight loss/gain. No previous anesthesia reactions.  CV: GALVEZ, denies chest pain, palpitations, peripheral edema.   Pulm: denies shortness of breath, asthma or wheezing  GI/: denies liver or kidney problems, blood in stool or BRBPR, difficulty urinating  Endo: denies thyroid problems or Diabetes  Heme/Onc: denies bleeding or clotting disorders, no family problems with bleeding/clotting diorders  MS: no weakness, tremors or gait instability  Neuro: denies depression, memory problems, no dysesthesias, no previous strokes, no migraines, no dysphagia  Skin: No petechiae, purpura or rash.    PHYSICAL EXAMINATION:   /80 (BP Location: Left arm, Patient Position: Sitting, Cuff Size: Adult Regular)   Pulse 64   Resp 14   Wt 66.2 kg (146 lb)   BMI 20.95 kg/m    General: alert and oriented x 3, pleasant, no acute distress  CV: S1 S2, grade II/VI systolic ejection  Murmur heard best over the right upper sternal border, but no rubs or gallops, regular rate and rhythm, no peripheral edema, no carotid or abdominal bruits, pulses in upper and lower extremities palpable  Pulm: bilateral breath sounds, clear to auscultation, easy work of breathing  GI: (+) bowel sounds, soft non-tender and non-distended  : voiding without problems  MS: moves all extremities x 4,  5+/5+ equal strength bilaterally  Neuro: pupils equal round and reactive to light, cranial nerves, II-XII grossly intact, no gross neurologic deficits noted    LABS: Pending    PROCEDURES/IMAGING:  Chest X-Ray: Not done  Angio: Vein grafts to OM and RCA are occluded.  Diffuse disease in LAD with patent LIMA  Echo: LV EF is 30% with low flow low gradient aortic stenosis.   Calculated DAVI is 0.86 cm squared.  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  He has undergone a previous CABG x 3.and both vein grafts are down.  I believe that he is a better candidate to undergo a transcatheter aortic valve replacement given his age and the fact that he would be a redo and his other medical co-morbidities.  He and his wife understand that the risks for a TAVR include: bleeding, infection, stroke, heart block requiring a pacemaker, cardiac perforation, aortic root rupture, aortic dissection, and an operative mortality of 1 to 2 percent.  They accept these risks and are agreeable with the plan of having a TAVR on 1-.  It will be a left subclavian approach.    1. Complete outpatient work-up  2. TAVR on 1-.       Approximately 30 minutes were spent with the patient in clinic at this visit.    CC  Patient Care Team:  Kenna Calvillo NP as PCP - General (Family Practice)  Tyrone Ordoñez MD as Assigned Heart and Vascular Provider    Thank you for allowing me to participate in the care of your patient.      Sincerely,     Shanae Rich MD     [facility]  cc:   No referring provider defined for this encounter.

## 2023-01-19 ENCOUNTER — TELEPHONE (OUTPATIENT)
Dept: CARDIOLOGY | Facility: CLINIC | Age: 83
End: 2023-01-19
Payer: MEDICARE

## 2023-01-19 DIAGNOSIS — I25.5 ISCHEMIC CARDIOMYOPATHY: ICD-10-CM

## 2023-01-19 RX ORDER — LISINOPRIL 5 MG/1
5 TABLET ORAL DAILY
Qty: 30 TABLET | Refills: 3 | Status: ON HOLD | OUTPATIENT
Start: 2023-01-19 | End: 2023-02-01

## 2023-01-19 NOTE — TELEPHONE ENCOUNTER
----- Message from Peyton Sadler sent at 1/18/2023  2:53 PM CST -----  Regarding: Medication refill-  Neri Jackman,     Patient was in today and will need a refill on his lisinopril, He is now taking 5mg   And running low.     I am not sure if there is anything else I need to provide.     Thank you,   Peyton @ [facility]

## 2023-01-24 ENCOUNTER — TELEPHONE (OUTPATIENT)
Dept: CARDIOLOGY | Facility: CLINIC | Age: 83
End: 2023-01-24
Payer: MEDICARE

## 2023-01-24 NOTE — TELEPHONE ENCOUNTER
Called patient to inform him he should begin HOLDING his Eliquis this week on Thursday 1/26/23 until after his TAVR procedure next week on 1/31. Patient to take all other medications as normal. We will review what medications he can take in the morning of his procedure when patient is seen at the heart clinic 1/30 for his pre op exam. Pt verbalized understanding and agreed to plan.     Clara Samson RN on 1/24/2023 at 4:19 PM

## 2023-01-27 DIAGNOSIS — I50.20 HEART FAILURE WITH REDUCED EJECTION FRACTION, NYHA CLASS II (H): Primary | ICD-10-CM

## 2023-01-27 DIAGNOSIS — I35.0 NONRHEUMATIC AORTIC VALVE STENOSIS: ICD-10-CM

## 2023-01-29 PROBLEM — I35.0 SEVERE AORTIC STENOSIS: Status: ACTIVE | Noted: 2022-10-10

## 2023-01-29 LAB
ABO/RH(D): NORMAL
ANTIBODY SCREEN: NEGATIVE
SPECIMEN EXPIRATION DATE: NORMAL

## 2023-01-30 ENCOUNTER — PREP FOR PROCEDURE (OUTPATIENT)
Dept: CARDIOLOGY | Facility: CLINIC | Age: 83
End: 2023-01-30

## 2023-01-30 ENCOUNTER — LAB (OUTPATIENT)
Dept: CARDIOLOGY | Facility: CLINIC | Age: 83
End: 2023-01-30
Payer: MEDICARE

## 2023-01-30 ENCOUNTER — OFFICE VISIT (OUTPATIENT)
Dept: CARDIOLOGY | Facility: CLINIC | Age: 83
End: 2023-01-30
Payer: MEDICARE

## 2023-01-30 ENCOUNTER — ANESTHESIA EVENT (OUTPATIENT)
Dept: CARDIOLOGY | Facility: HOSPITAL | Age: 83
DRG: 267 | End: 2023-01-30
Payer: MEDICARE

## 2023-01-30 ENCOUNTER — ALLIED HEALTH/NURSE VISIT (OUTPATIENT)
Dept: CARDIOLOGY | Facility: CLINIC | Age: 83
End: 2023-01-30
Payer: MEDICARE

## 2023-01-30 VITALS
DIASTOLIC BLOOD PRESSURE: 84 MMHG | RESPIRATION RATE: 16 BRPM | BODY MASS INDEX: 20.47 KG/M2 | WEIGHT: 143 LBS | HEART RATE: 60 BPM | SYSTOLIC BLOOD PRESSURE: 151 MMHG | HEIGHT: 70 IN

## 2023-01-30 DIAGNOSIS — Z95.1 S/P CABG (CORONARY ARTERY BYPASS GRAFT): ICD-10-CM

## 2023-01-30 DIAGNOSIS — I48.0 PAROXYSMAL ATRIAL FIBRILLATION (H): ICD-10-CM

## 2023-01-30 DIAGNOSIS — E78.00 PURE HYPERCHOLESTEROLEMIA: ICD-10-CM

## 2023-01-30 DIAGNOSIS — I25.5 ISCHEMIC CARDIOMYOPATHY: ICD-10-CM

## 2023-01-30 DIAGNOSIS — I10 PRIMARY HYPERTENSION: ICD-10-CM

## 2023-01-30 DIAGNOSIS — I35.0 SEVERE AORTIC STENOSIS: Primary | ICD-10-CM

## 2023-01-30 DIAGNOSIS — I50.20 HEART FAILURE WITH REDUCED EJECTION FRACTION, NYHA CLASS II (H): ICD-10-CM

## 2023-01-30 DIAGNOSIS — I25.83 CORONARY ARTERIOSCLEROSIS DUE TO LIPID RICH PLAQUE: Primary | ICD-10-CM

## 2023-01-30 DIAGNOSIS — I50.22 CHRONIC SYSTOLIC CONGESTIVE HEART FAILURE (H): ICD-10-CM

## 2023-01-30 DIAGNOSIS — I35.0 SEVERE AORTIC STENOSIS: ICD-10-CM

## 2023-01-30 DIAGNOSIS — I35.0 NONRHEUMATIC AORTIC VALVE STENOSIS: ICD-10-CM

## 2023-01-30 LAB
ALBUMIN SERPL BCG-MCNC: 3.8 G/DL (ref 3.5–5.2)
ALP SERPL-CCNC: 84 U/L (ref 40–129)
ALT SERPL W P-5'-P-CCNC: 18 U/L (ref 10–50)
ANION GAP SERPL CALCULATED.3IONS-SCNC: 7 MMOL/L (ref 7–15)
AST SERPL W P-5'-P-CCNC: 19 U/L (ref 10–50)
ATRIAL RATE - MUSE: 60 BPM
BASOPHILS # BLD AUTO: 0.2 10E3/UL (ref 0–0.2)
BASOPHILS NFR BLD AUTO: 2 %
BILIRUB SERPL-MCNC: 0.7 MG/DL
BUN SERPL-MCNC: 14.1 MG/DL (ref 8–23)
CALCIUM SERPL-MCNC: 9.1 MG/DL (ref 8.8–10.2)
CHLORIDE SERPL-SCNC: 105 MMOL/L (ref 98–107)
CREAT SERPL-MCNC: 1.02 MG/DL (ref 0.67–1.17)
DEPRECATED HCO3 PLAS-SCNC: 28 MMOL/L (ref 22–29)
DIASTOLIC BLOOD PRESSURE - MUSE: NORMAL MMHG
EOSINOPHIL # BLD AUTO: 0.2 10E3/UL (ref 0–0.7)
EOSINOPHIL NFR BLD AUTO: 2 %
ERYTHROCYTE [DISTWIDTH] IN BLOOD BY AUTOMATED COUNT: 13.5 % (ref 10–15)
GFR SERPL CREATININE-BSD FRML MDRD: 72 ML/MIN/1.73M2
GLUCOSE SERPL-MCNC: 87 MG/DL (ref 70–99)
HCT VFR BLD AUTO: 45.6 % (ref 40–53)
HGB BLD-MCNC: 14.4 G/DL (ref 13.3–17.7)
IMM GRANULOCYTES # BLD: 0 10E3/UL
IMM GRANULOCYTES NFR BLD: 1 %
INR PPP: 1.11 (ref 0.85–1.15)
INTERPRETATION ECG - MUSE: NORMAL
LYMPHOCYTES # BLD AUTO: 1.5 10E3/UL (ref 0.8–5.3)
LYMPHOCYTES NFR BLD AUTO: 18 %
MAGNESIUM SERPL-MCNC: 1.7 MG/DL (ref 1.7–2.3)
MCH RBC QN AUTO: 29.6 PG (ref 26.5–33)
MCHC RBC AUTO-ENTMCNC: 31.6 G/DL (ref 31.5–36.5)
MCV RBC AUTO: 94 FL (ref 78–100)
MONOCYTES # BLD AUTO: 0.7 10E3/UL (ref 0–1.3)
MONOCYTES NFR BLD AUTO: 8 %
NEUTROPHILS # BLD AUTO: 5.7 10E3/UL (ref 1.6–8.3)
NEUTROPHILS NFR BLD AUTO: 69 %
NRBC # BLD AUTO: 0 10E3/UL
NRBC BLD AUTO-RTO: 0 /100
NT-PROBNP SERPL-MCNC: 2798 PG/ML (ref 0–1800)
P AXIS - MUSE: NORMAL DEGREES
PLATELET # BLD AUTO: 212 10E3/UL (ref 150–450)
POTASSIUM SERPL-SCNC: 4.3 MMOL/L (ref 3.4–5.3)
PR INTERVAL - MUSE: 170 MS
PROT SERPL-MCNC: 6.7 G/DL (ref 6.4–8.3)
QRS DURATION - MUSE: 138 MS
QT - MUSE: 498 MS
QTC - MUSE: 498 MS
R AXIS - MUSE: 103 DEGREES
RBC # BLD AUTO: 4.87 10E6/UL (ref 4.4–5.9)
SODIUM SERPL-SCNC: 140 MMOL/L (ref 136–145)
SYSTOLIC BLOOD PRESSURE - MUSE: NORMAL MMHG
T AXIS - MUSE: -71 DEGREES
VENTRICULAR RATE- MUSE: 60 BPM
WBC # BLD AUTO: 8.2 10E3/UL (ref 4–11)

## 2023-01-30 PROCEDURE — 93000 ELECTROCARDIOGRAM COMPLETE: CPT | Performed by: INTERNAL MEDICINE

## 2023-01-30 PROCEDURE — 99215 OFFICE O/P EST HI 40 MIN: CPT | Performed by: NURSE PRACTITIONER

## 2023-01-30 PROCEDURE — 83880 ASSAY OF NATRIURETIC PEPTIDE: CPT

## 2023-01-30 PROCEDURE — 99207 PR NO CHARGE LOS: CPT

## 2023-01-30 PROCEDURE — 86900 BLOOD TYPING SEROLOGIC ABO: CPT

## 2023-01-30 PROCEDURE — 85610 PROTHROMBIN TIME: CPT

## 2023-01-30 PROCEDURE — 36415 COLL VENOUS BLD VENIPUNCTURE: CPT

## 2023-01-30 PROCEDURE — 85025 COMPLETE CBC W/AUTO DIFF WBC: CPT

## 2023-01-30 PROCEDURE — 83735 ASSAY OF MAGNESIUM: CPT

## 2023-01-30 PROCEDURE — 86850 RBC ANTIBODY SCREEN: CPT

## 2023-01-30 PROCEDURE — 80053 COMPREHEN METABOLIC PANEL: CPT

## 2023-01-30 PROCEDURE — 86901 BLOOD TYPING SEROLOGIC RH(D): CPT

## 2023-01-30 RX ORDER — ASPIRIN 325 MG
325 TABLET ORAL ONCE
Status: CANCELLED | OUTPATIENT
Start: 2023-01-30 | End: 2023-01-30

## 2023-01-30 RX ORDER — FUROSEMIDE 20 MG
20 TABLET ORAL DAILY
Qty: 30 TABLET | Refills: 0 | Status: SHIPPED | OUTPATIENT
Start: 2023-01-30 | End: 2023-02-06

## 2023-01-30 RX ORDER — SODIUM CHLORIDE 9 MG/ML
INJECTION, SOLUTION INTRAVENOUS CONTINUOUS
Status: CANCELLED | OUTPATIENT
Start: 2023-01-30

## 2023-01-30 RX ORDER — LIDOCAINE 40 MG/G
CREAM TOPICAL
Status: CANCELLED | OUTPATIENT
Start: 2023-01-30

## 2023-01-30 RX ORDER — CEFAZOLIN SODIUM 2 G/100ML
2 INJECTION, SOLUTION INTRAVENOUS
Status: CANCELLED | OUTPATIENT
Start: 2023-01-30

## 2023-01-30 RX ORDER — LISINOPRIL 2.5 MG/1
2.5 TABLET ORAL DAILY
Qty: 30 TABLET | Refills: 0 | Status: SHIPPED | OUTPATIENT
Start: 2023-01-30 | End: 2023-02-06

## 2023-01-30 NOTE — LETTER
1/30/2023    Kenna Calvillo, NP  [address] [facility] [address]    RE: Daniel Alamo       Dear Colleague,     I had the pleasure of seeing Daniel Alamo in the [facility].  HEART CARE ENCOUNTER NOTE       [facility]  [phone]    Assessment/Recommendations   Problem List Items Addressed This Visit        Endocrine    Pure hypercholesterolemia    Relevant Orders    Lipid Profile       Circulatory    Paroxysmal atrial fibrillation (H)    Coronary arteriosclerosis due to lipid rich plaque - Primary    Relevant Orders    Lipid Profile    Ischemic cardiomyopathy    Relevant Medications    lisinopril (ZESTRIL) 2.5 MG tablet    Other Relevant Orders    Basic metabolic panel    Heart failure with reduced ejection fraction, NYHA class II (H)    Relevant Orders    Basic metabolic panel    Severe aortic stenosis       Other    S/P CABG (coronary artery bypass graft)   Other Visit Diagnoses     Primary hypertension        Relevant Medications    lisinopril (ZESTRIL) 2.5 MG tablet    Other Relevant Orders    Basic metabolic panel          1.  Aortic Stenosis: This has become severe and is now causing dyspnea on action with minimal exertion specially walking up incline and occasional Dizziness .  He has seen a decline in his functional status.  He has been evaluated by a multidisciplinary team and has been deemed a good candidate for transcatheter aortic valve replacement.  He has now undergone all the required workup for TAVR and wishes to proceed.       We discussed the procedure in detail,  including post-procedure care, restrictions and follow up.   He understands that there is a 4-5% risk of bleeding, infection, stroke, heart block requiring permanent pacemaker, cardiac perforation, aortic root rupture, dissection and death.  Patient also understands that if something unexpected happens, the procedure may need to be stopped or changed to help him.     All questions were  answered   Consents were signed and witnessed by me.    In the event of aortic rupture or aortic dissection, patient does not want urgent sternotomy and CPB to save his life. However, patient is open to have peripheral vessel repair or subxiphoid window for pericardial effusion.     Patient denies any trouble with general anesthesia in the past.  Lab work today showed INR 1.111, WBC 8.2, hemoglobin 14.4 and platelet 212    The plan will be done under general anesthesia.  We will use the Edward CHANTAL 29 S3 valve via left subclavian approach.   Daniel DENNIS Cally will report tomorrow morning for the procedure and all instructions regarding times and medications were given by TAVR coordinator RN.     2.   Ischemic cardiomyopathy with systolic dysfunction with LVEF of 30 to 35% per echo in September 2022, NYHA class II-III with status post ICD: He is well compensated with no evidence of fluid tension on exam except he continues to get short of breath easily with minimal exertion specially walking up incline.    His NT proBNP is elevated at 2798.  Magnesium stable at 1.7.  Kidney function stable with sodium level 140, potassium 4.3, and creatinine 1.02     Current heart failure regimen includes   - Beta-blocker therapy with metoprolol succinate 25 mg daily in in the evening   -ACE inhibitor therapy with lisinopril 5 mg daily in a.m.   - Not on aldosterone blocker therapy    -Not on diuretic therapy    Given elevated NT proBNP level, recommended patient to start on furosemide 20 mg daily.  We will hold off on increasing his lisinopril for now.  Repeat BMP in a week.  Order placed.    3. Coronary artery disease with history of prior CABG with LIMA to LAD, SVG to OM, and SVG to RCA: Coronary angiogram in September 2022 showed severe mid vessel disease with 70% calcified disease and diagonal 1 with distal LAD fills via patent LIMA to the LAD, 70 to 80% stenosis in OM1 with distal small OM fills via L to L bridging collaterals,  100% occluded proximal RCA.  Vein graft to the OM was found occluded and vein graft to RCA is not visualized and presumed occluded.     Patient returned to Cath Lab on 12/19/2022 and underwent PCI with Cutting Balloon angioplasty and shockwave lithotripsy to LAD/diagonal 1 (see angio result for detail).    He denies chest pain.  He is on triple therapy with aspirin, Plavix and Eliquis.  Eliquis is currently on hold for upcoming procedure.  Post PCI recommendation was to continue on triple therapy for 3 months if no bleeding complications.    He is on triple therapy with clopidogrel (Plavix) 75 mg daily for 12 months.  However, patient is also on Eliquis.     4.  Dyslipidemia with LDL goal <70/Obesity with a BMI of: Daniel Alamo is on moderate intensity statin therapy rosuvastatin 10 mg daily.  He was switched from atorvastatin to rosuvastatin per patient's request due to issue with diarrhea.  He states diarrhea is better.   Most recent LDL is 129.  Most recent AST/ALT are stable.  Recommended repeat fasting lipid profile during next follow-up visit with valve clinic in March-order placed.     5.  Hypertension: Blood pressure is mildly elevated at 151/84.  On Metroprolol and lisinopril.  Adding furosemide today.  Consider increasing lisinopril if blood pressure continues to remain elevated.       History of Present Illness/Subjective    Daniel Alamo is a 82 year old male who comes in today for history and physical prior to TAVR (transcatheter aortic valve replacement).     Mr. Daniel Alamo has a past medical history of coronary disease with remote history of CABG with LIMA to LAD, vein graft to OM, and vein graft RCA done in out-of-hospital, ischemic cardiomyopathy with systolic dysfunction with status post ICD, aortic stenosis, atrial fibrillation, abdominal aortic aneurysm without rupture, pure hypercholesterolemia, atrial fibrillation on sotalol, peripheral artery disease with status post iliac stents and  now status post PCI with balloon angioplasty and shockwave lithotripsy to LAD/diagonal 1 on 12/19/2022.    Patient was noted to have severe aortic stenosis per echo in September 2022 Patient had a consultation in valve clinic with Dr. Ordoñez on 11/25/2022.  He saw Dr. Rich on 1/18/2023.      During last clinic visit for post PCI follow-up in December, patient reported that he continues to have shortness of breath with minimal exertion specially walking up incline.  He also reported occasional lightheadedness and dizziness.    Today, Daniel denies chest discomfort, palpitations, shortness of breath, paroxysmal nocturnal dyspnea, orthopnea, lightheadedness,  pre-syncope, or syncope.  He denies any fever, chills, any weight loss or weight gain, changes in appetite, nausea or vomiting, sore throat, cough, body ache or any upper respiratory illness.  He denies history of COPD asthma or sleep apnea.    Medical, surgical, family, social history, and medications were reviewed and updated as necessary.    ECG (personally reviewed): 1/29/2023 shows atrial pacemaker with ventricular rate 60 bpm,  MS, QRS 1938 MS, QT/QTc 490/198 MS.    Echocardiogram done in September 2022-reviewed  Interpretation Summary     The left ventricle is moderately dilated.  There is mild concentric left ventricular hypertrophy.  The visual ejection fraction is 30-35%.  Diastolic Doppler findings (E/E' ratio and/or other parameters) suggest left  ventricular filling pressures are increased.  It appears global hypokinesis with akinesis in inferior and inferoalateral  walls.  Normal right ventricle size and systolic function.  There is a pacemaker lead in the right ventricle.  The left atrium is moderately dilated.  The right atrium is mildly dilated.  Pacer wire in right atrium  There is mild (1+) mitral regurgitation.  There is mild (1+) aortic regurgitation.  Mild valvular aortic stenosis with mean gradient of aortic valve 16 mmHg,  but  cannot exclude low flow low gradient aortic valve stenosis since the  calculated aortic valve area in 0.86 cm2.     No previous study for comparison.    Coronary Angiogram from 12/19/22: reviewed:  Conclusion  Daniel Alamo is a 82 year old old male w/ CAD s/p CABG w/ L-LAD, V-OM, V-RCA, 30 years ago at [facility], ischemic cardiomyopathy EF 25% 11/2021, aortic stenosis, TR, afib on sotalol, PAD s/p iliac stents here for w/u of progressive GALVEZ and orthostatic symptoms, found to have severe aortic stenosis and CAD and here for PCI to D1.     - given area of ischemia/vaibility in anterior/anterolateral wall, proceeded w/ PCI w/ cuting balloon angioplasty and Shockwave lithotripsy in order to optimize risk profile of the pacing run, but given only branch involvement, we chose to stop, as stenting/Roto would require escalating risk  - proceed w/ TAVR w/u as planned   - continue ASA 81mg daily indefinitely, clopidogrel 600mg once, followed by 75mg daily for at least 12 months, add atorva 40  - continue aggressive risk factor modification     Findings:  LM:no obstruction  LAD:severe diffuse Ca2+ disease in mLAD into D1. Occluded dLAD  Lcx:severe mid-vessel lesion  RCA:not injected     Access:  R Radial artery     PCI:  LMT was engaged w/ a 6F EBU 3.5 Guide catheter and the lesion in LAD was wired w/ a Forte wire, then ballooned w/ a 2/0x12mm NC Emerge, a 2.0x10mm ScoreFlex balloon, and a 2.5x12mm Shockwave balloon at 3 jeronimo inflations. We were able to achieve balloon expansion but significant refractory re-narrowing after balloon removal due to severe bulky Ca2+. Given only branch involvement and escalating risk, we chose to stop rather than risk stent under-deployment. Final angiography showed no dissection or perforation and a SKIP 3 flow.     Closure:   Vasc Band          Physical Examination Review of Systems   Vitals: BP (!) 151/84 (BP Location: Left arm, Patient Position: Sitting, Cuff Size: Adult Regular)  "  Pulse 60   Resp 16   Ht 1.778 m (5' 10\")   Wt 64.9 kg (143 lb)   BMI 20.52 kg/m    BMI= Body mass index is 20.52 kg/m .  Wt Readings from Last 3 Encounters:   01/30/23 64.9 kg (143 lb)   01/18/23 66.2 kg (146 lb)   01/10/23 65.8 kg (145 lb)       General Appearance:   Alert, cooperative and in no acute distress   ENT/Mouth: membranes moist, no oral lesions or bleeding gums.      EYES:  no scleral icterus, normal conjunctivae   Neck: No carotid bruits.  Thyroid not visualized   Chest/Lungs:   lungs are clear to auscultation, no rales or wheezing   Cardiovascular:    Heart rate regular. Normal first and second heart sounds with 3/6 systolic murmur.  No rubs or gallops; the carotid, radial and posterior tibial pulses are intact, no edema bilaterally    Abdomen:  Soft and nontender. Bowel sounds are present in all quadrants   Extremities: no cyanosis or clubbing   Skin: no xanthelasma, warm.    Neurologic: normal gait, normal  bilateral, no tremors   Psychiatric: Normal mood and affect       Please refer above for cardiac ROS details.      Medical History  Surgical History Family History Social History   Past Medical History:   Diagnosis Date     Coronary artery disease      Heart disease      VT (ventricular tachycardia) 2004     Past Surgical History:   Procedure Laterality Date     ABDOMEN SURGERY       BYPASS GRAFT ARTERY CORONARY       CV ANGIOGRAM CORONARY GRAFT N/A 9/19/2022    Procedure: Coronary Angiogram Graft;  Surgeon: Tyrone Ordoñez MD;  Location: [facility] CV     CV CORONARY LITHOTRIPSY PCI N/A 12/19/2022    Procedure: Percutaneous Coronary Intervention - Lithotripsy;  Surgeon: Tyrone Ordoñez MD;  Location: [facility] LAB CV     CV LEFT HEART CATH N/A 9/19/2022    Procedure: Left Heart Catheterization;  Surgeon: Tyrone Ordoñez MD;  Location: [facility] LAB CV     CV PCI ANGIOPLASTY N/A 12/19/2022    Procedure: Percutaneous Transluminal Angioplasty;  Surgeon: Tiago" MD Tyrone;  Location: [facility] CATH LAB CV     CV RIGHT HEART CATH MEASUREMENTS RECORDED N/A 9/19/2022    Procedure: Right Heart Catheterization;  Surgeon: Tyrone Ordoñez MD;  Location: [facility] CATH LAB CV     EYE SURGERY       INSERT / REPLACE / REMOVE PACEMAKER       OTHER SURGICAL HISTORY      icd     ZZC CABG, VEIN, SINGLE      Description: CABG (CABG);  Recorded: 10/03/2012;  Comments: LIMA to LAD, SVG to OM2, SVG to RCA     Family History   Problem Relation Age of Onset     Cancer Mother      Heart Disease Father     Social History     Socioeconomic History     Marital status:      Spouse name: Not on file     Number of children: Not on file     Years of education: Not on file     Highest education level: Not on file   Occupational History     Not on file   Tobacco Use     Smoking status: Every Day     Packs/day: 0.50     Years: 30.00     Pack years: 15.00     Types: Cigarettes     Smokeless tobacco: Never   Vaping Use     Vaping Use: Never used   Substance and Sexual Activity     Alcohol use: Yes     Alcohol/week: 1.0 standard drink     Comment: Glass of wine daily.     Drug use: No     Sexual activity: Not Currently   Other Topics Concern     Parent/sibling w/ CABG, MI or angioplasty before 65F 55M? Not Asked   Social History Narrative     Not on file     Social Determinants of Health     Financial Resource Strain: Not on file   Food Insecurity: Not on file   Transportation Needs: Not on file   Physical Activity: Not on file   Stress: Not on file   Social Connections: Not on file   Intimate Partner Violence: Not on file   Housing Stability: Not on file          Medications  Allergies   Current Outpatient Medications   Medication Sig Dispense Refill     acetaminophen (TYLENOL) 325 MG tablet Take 650 mg by mouth every 6 hours as needed for pain       apixaban ANTICOAGULANT (ELIQUIS) 5 MG tablet Take 5 mg by mouth 2 times daily       aspirin (ASA) 81 MG EC tablet Take 81 mg by mouth daily        clopidogrel (PLAVIX) 75 MG tablet Take 1 tablet (75 mg) by mouth daily 90 tablet 3     fluticasone (FLONASE) 50 MCG/ACT nasal spray Spray 1 spray into both nostrils as needed for rhinitis or allergies       gabapentin (NEURONTIN) 300 MG capsule Take 900 mg by mouth 2 times daily        lisinopril (ZESTRIL) 2.5 MG tablet Take 1 tablet (2.5 mg) by mouth daily Take 2.5 mg + 5 mg =7.5 mg daily in AM 30 tablet 0     lisinopril (ZESTRIL) 5 MG tablet Take 1 tablet (5 mg) by mouth daily 30 tablet 3     metoprolol succinate ER (TOPROL XL) 25 MG 24 hr tablet Take 1 tablet (25 mg) by mouth daily (Patient taking differently: Take 25 mg by mouth every evening) 90 tablet 11     rosuvastatin (CRESTOR) 10 MG tablet Take 1 tablet (10 mg) by mouth daily 30 tablet 0     sotalol (BETAPACE) 80 MG tablet Take 1 tablet (80 mg) by mouth 2 times daily 180 tablet 3     tamsulosin (FLOMAX) 0.4 MG capsule Take 0.4 mg by mouth daily      Allergies   Allergen Reactions     Atorvastatin Diarrhea     Carvedilol Other (See Comments) and GI Disturbance     Upset stomach; GI upset       Chloride GI Disturbance and Nausea         Lab Results    Chemistry/lipid CBC Cardiac Enzymes/BNP/TSH/INR   Recent Labs   Lab Test 09/19/22  0803   CHOL 185   HDL 41      TRIG 76     Recent Labs   Lab Test 09/19/22  0803 12/06/19  1134 10/05/18  0930    100 88     Recent Labs   Lab Test 01/10/23  1000      POTASSIUM 4.4   CHLORIDE 104   CO2 28   GLC 84   BUN 13.2   CR 1.08   GFRESTIMATED 68   EMILIO 9.1     Recent Labs   Lab Test 01/10/23  1000 12/16/22  1029 11/25/22  1549   CR 1.08 1.13 1.01     No results for input(s): A1C in the last 83236 hours. Recent Labs   Lab Test 12/16/22  1029   WBC 8.3   HGB 14.5   HCT 45.4   MCV 94        Recent Labs   Lab Test 12/16/22  1029 11/25/22  1549 09/19/22  0803   HGB 14.5 14.6 14.2    No results for input(s): TROPONINI in the last 94651 hours.  No results for input(s): BNP, NTBNPI, NTBNP in the last  67935 hours.  Recent Labs   Lab Test 09/01/22  1327   TSH 0.46     No results for input(s): INR in the last 51134 hours.     47 minutes spent on the date of encounter doing education, consent signing, chart prep/review, review of test results, interpretation with above tests, patient visit, documentation and discussion with other provider.      This note has been dictated using voice recognition software. Any grammatical or context distortions are unintentional and inherent to the software.            Thank you for allowing me to participate in the care of your patient.      Sincerely,     CAILIN Singh [facility]  cc:   No referring provider defined for this encounter.

## 2023-01-30 NOTE — H&P (VIEW-ONLY)
HEART CARE ENCOUNTER NOTE       [facility]  [phone]    Assessment/Recommendations   Problem List Items Addressed This Visit        Endocrine    Pure hypercholesterolemia    Relevant Orders    Lipid Profile       Circulatory    Paroxysmal atrial fibrillation (H)    Coronary arteriosclerosis due to lipid rich plaque - Primary    Relevant Orders    Lipid Profile    Ischemic cardiomyopathy    Relevant Medications    lisinopril (ZESTRIL) 2.5 MG tablet    Other Relevant Orders    Basic metabolic panel    Heart failure with reduced ejection fraction, NYHA class II (H)    Relevant Orders    Basic metabolic panel    Severe aortic stenosis       Other    S/P CABG (coronary artery bypass graft)   Other Visit Diagnoses     Primary hypertension        Relevant Medications    lisinopril (ZESTRIL) 2.5 MG tablet    Other Relevant Orders    Basic metabolic panel          1.  Aortic Stenosis: This has become severe and is now causing dyspnea on action with minimal exertion specially walking up incline and occasional Dizziness .  He has seen a decline in his functional status.  He has been evaluated by a multidisciplinary team and has been deemed a good candidate for transcatheter aortic valve replacement.  He has now undergone all the required workup for TAVR and wishes to proceed.       We discussed the procedure in detail,  including post-procedure care, restrictions and follow up.   He understands that there is a 4-5% risk of bleeding, infection, stroke, heart block requiring permanent pacemaker, cardiac perforation, aortic root rupture, dissection and death.  Patient also understands that if something unexpected happens, the procedure may need to be stopped or changed to help him.     All questions were answered   Consents were signed and witnessed by me.    In the event of aortic rupture or aortic dissection, patient does not want urgent sternotomy and CPB to save his life. However, patient is open to  have peripheral vessel repair or subxiphoid window for pericardial effusion.     Patient denies any trouble with general anesthesia in the past.  Lab work today showed INR 1.111, WBC 8.2, hemoglobin 14.4 and platelet 212    The plan will be done under general anesthesia.  We will use the Edward CHANTAL 29 S3 valve via left subclavian approach.   Daniel Alamo will report tomorrow morning for the procedure and all instructions regarding times and medications were given by TAVR coordinator RN.     2.   Ischemic cardiomyopathy with systolic dysfunction with LVEF of 30 to 35% per echo in September 2022, NYHA class II-III with status post ICD: He is well compensated with no evidence of fluid tension on exam except he continues to get short of breath easily with minimal exertion specially walking up incline.    His NT proBNP is elevated at 2798.  Magnesium stable at 1.7.  Kidney function stable with sodium level 140, potassium 4.3, and creatinine 1.02     Current heart failure regimen includes   - Beta-blocker therapy with metoprolol succinate 25 mg daily in in the evening   -ACE inhibitor therapy with lisinopril 5 mg daily in a.m.   - Not on aldosterone blocker therapy    -Not on diuretic therapy    Given elevated NT proBNP level, recommended patient to start on furosemide 20 mg daily.  We will hold off on increasing his lisinopril for now.  Repeat BMP in a week.  Order placed.    3. Coronary artery disease with history of prior CABG with LIMA to LAD, SVG to OM, and SVG to RCA: Coronary angiogram in September 2022 showed severe mid vessel disease with 70% calcified disease and diagonal 1 with distal LAD fills via patent LIMA to the LAD, 70 to 80% stenosis in OM1 with distal small OM fills via L to L bridging collaterals, 100% occluded proximal RCA.  Vein graft to the OM was found occluded and vein graft to RCA is not visualized and presumed occluded.     Patient returned to Cath Lab on 12/19/2022 and underwent PCI with  Cutting Balloon angioplasty and shockwave lithotripsy to LAD/diagonal 1 (see angio result for detail).    He denies chest pain.  He is on triple therapy with aspirin, Plavix and Eliquis.  Eliquis is currently on hold for upcoming procedure.  Post PCI recommendation was to continue on triple therapy for 3 months if no bleeding complications.    He is on triple therapy with clopidogrel (Plavix) 75 mg daily for 12 months.  However, patient is also on Eliquis.     4.  Dyslipidemia with LDL goal <70/Obesity with a BMI of: Daniel Alamo is on moderate intensity statin therapy rosuvastatin 10 mg daily.  He was switched from atorvastatin to rosuvastatin per patient's request due to issue with diarrhea.  He states diarrhea is better.   Most recent LDL is 129.  Most recent AST/ALT are stable.  Recommended repeat fasting lipid profile during next follow-up visit with valve clinic in March-order placed.     5.  Hypertension: Blood pressure is mildly elevated at 151/84.  On Metroprolol and lisinopril.  Adding furosemide today.  Consider increasing lisinopril if blood pressure continues to remain elevated.       History of Present Illness/Subjective    Daniel Alamo is a 82 year old male who comes in today for history and physical prior to TAVR (transcatheter aortic valve replacement).     Mr. Daniel Alamo has a past medical history of coronary disease with remote history of CABG with LIMA to LAD, vein graft to OM, and vein graft RCA done in out-of-hospital, ischemic cardiomyopathy with systolic dysfunction with status post ICD, aortic stenosis, atrial fibrillation, abdominal aortic aneurysm without rupture, pure hypercholesterolemia, atrial fibrillation on sotalol, peripheral artery disease with status post iliac stents and now status post PCI with balloon angioplasty and shockwave lithotripsy to LAD/diagonal 1 on 12/19/2022.    Patient was noted to have severe aortic stenosis per echo in September 2022 Patient had a  consultation in valve clinic with Dr. Ordoñez on 11/25/2022.  He saw Dr. Rich on 1/18/2023.      During last clinic visit for post PCI follow-up in December, patient reported that he continues to have shortness of breath with minimal exertion specially walking up incline.  He also reported occasional lightheadedness and dizziness.    Today, Daniel denies chest discomfort, palpitations, shortness of breath, paroxysmal nocturnal dyspnea, orthopnea, lightheadedness,  pre-syncope, or syncope.  He denies any fever, chills, any weight loss or weight gain, changes in appetite, nausea or vomiting, sore throat, cough, body ache or any upper respiratory illness.  He denies history of COPD asthma or sleep apnea.    Medical, surgical, family, social history, and medications were reviewed and updated as necessary.    ECG (personally reviewed): 1/29/2023 shows atrial pacemaker with ventricular rate 60 bpm,  MS, QRS 1938 MS, QT/QTc 490/198 MS.    Echocardiogram done in September 2022-reviewed  Interpretation Summary     The left ventricle is moderately dilated.  There is mild concentric left ventricular hypertrophy.  The visual ejection fraction is 30-35%.  Diastolic Doppler findings (E/E' ratio and/or other parameters) suggest left  ventricular filling pressures are increased.  It appears global hypokinesis with akinesis in inferior and inferoalateral  walls.  Normal right ventricle size and systolic function.  There is a pacemaker lead in the right ventricle.  The left atrium is moderately dilated.  The right atrium is mildly dilated.  Pacer wire in right atrium  There is mild (1+) mitral regurgitation.  There is mild (1+) aortic regurgitation.  Mild valvular aortic stenosis with mean gradient of aortic valve 16 mmHg, but  cannot exclude low flow low gradient aortic valve stenosis since the  calculated aortic valve area in 0.86 cm2.     No previous study for comparison.    Coronary Angiogram from 12/19/22:  "reviewed:  Conclusion  Daniel Alamo is a 82 year old old male w/ CAD s/p CABG w/ L-LAD, V-OM, V-RCA, 30 years ago at [facility], ischemic cardiomyopathy EF 25% 11/2021, aortic stenosis, TR, afib on sotalol, PAD s/p iliac stents here for w/u of progressive GALVEZ and orthostatic symptoms, found to have severe aortic stenosis and CAD and here for PCI to D1.     - given area of ischemia/vaibility in anterior/anterolateral wall, proceeded w/ PCI w/ cuting balloon angioplasty and Shockwave lithotripsy in order to optimize risk profile of the pacing run, but given only branch involvement, we chose to stop, as stenting/Roto would require escalating risk  - proceed w/ TAVR w/u as planned   - continue ASA 81mg daily indefinitely, clopidogrel 600mg once, followed by 75mg daily for at least 12 months, add atorva 40  - continue aggressive risk factor modification     Findings:  LM:no obstruction  LAD:severe diffuse Ca2+ disease in mLAD into D1. Occluded dLAD  Lcx:severe mid-vessel lesion  RCA:not injected     Access:  R Radial artery     PCI:  LMT was engaged w/ a 6F EBU 3.5 Guide catheter and the lesion in LAD was wired w/ a Forte wire, then ballooned w/ a 2/0x12mm NC Emerge, a 2.0x10mm ScoreFlex balloon, and a 2.5x12mm Shockwave balloon at 3 jeronimo inflations. We were able to achieve balloon expansion but significant refractory re-narrowing after balloon removal due to severe bulky Ca2+. Given only branch involvement and escalating risk, we chose to stop rather than risk stent under-deployment. Final angiography showed no dissection or perforation and a SKIP 3 flow.     Closure:   Vasc Band          Physical Examination Review of Systems   Vitals: BP (!) 151/84 (BP Location: Left arm, Patient Position: Sitting, Cuff Size: Adult Regular)   Pulse 60   Resp 16   Ht 1.778 m (5' 10\")   Wt 64.9 kg (143 lb)   BMI 20.52 kg/m    BMI= Body mass index is 20.52 kg/m .  Wt Readings from Last 3 Encounters:   01/30/23 64.9 kg (143 lb) "   01/18/23 66.2 kg (146 lb)   01/10/23 65.8 kg (145 lb)       General Appearance:   Alert, cooperative and in no acute distress   ENT/Mouth: membranes moist, no oral lesions or bleeding gums.      EYES:  no scleral icterus, normal conjunctivae   Neck: No carotid bruits.  Thyroid not visualized   Chest/Lungs:   lungs are clear to auscultation, no rales or wheezing   Cardiovascular:    Heart rate regular. Normal first and second heart sounds with 3/6 systolic murmur.  No rubs or gallops; the carotid, radial and posterior tibial pulses are intact, no edema bilaterally    Abdomen:  Soft and nontender. Bowel sounds are present in all quadrants   Extremities: no cyanosis or clubbing   Skin: no xanthelasma, warm.    Neurologic: normal gait, normal  bilateral, no tremors   Psychiatric: Normal mood and affect       Please refer above for cardiac ROS details.      Medical History  Surgical History Family History Social History   Past Medical History:   Diagnosis Date     Coronary artery disease      Heart disease      VT (ventricular tachycardia) 2004     Past Surgical History:   Procedure Laterality Date     ABDOMEN SURGERY       BYPASS GRAFT ARTERY CORONARY       CV ANGIOGRAM CORONARY GRAFT N/A 9/19/2022    Procedure: Coronary Angiogram Graft;  Surgeon: Tyrone Ordoñez MD;  Location: [facility]     CV CORONARY LITHOTRIPSY PCI N/A 12/19/2022    Procedure: Percutaneous Coronary Intervention - Lithotripsy;  Surgeon: Tyrone Ordoñez MD;  Location: [facility]     CV LEFT HEART CATH N/A 9/19/2022    Procedure: Left Heart Catheterization;  Surgeon: Tyrone Ordoñez MD;  Location: [facility] CV     CV PCI ANGIOPLASTY N/A 12/19/2022    Procedure: Percutaneous Transluminal Angioplasty;  Surgeon: Tyrone Ordoñez MD;  Location: [facility]     CV RIGHT HEART CATH MEASUREMENTS RECORDED N/A 9/19/2022    Procedure: Right Heart Catheterization;  Surgeon: Tyrone Ordoñez MD;  Location:  [facility] CATH LAB CV     EYE SURGERY       INSERT / REPLACE / REMOVE PACEMAKER       OTHER SURGICAL HISTORY      icd     ZZC CABG, VEIN, SINGLE      Description: CABG (CABG);  Recorded: 10/03/2012;  Comments: LIMA to LAD, SVG to OM2, SVG to RCA     Family History   Problem Relation Age of Onset     Cancer Mother      Heart Disease Father     Social History     Socioeconomic History     Marital status:      Spouse name: Not on file     Number of children: Not on file     Years of education: Not on file     Highest education level: Not on file   Occupational History     Not on file   Tobacco Use     Smoking status: Every Day     Packs/day: 0.50     Years: 30.00     Pack years: 15.00     Types: Cigarettes     Smokeless tobacco: Never   Vaping Use     Vaping Use: Never used   Substance and Sexual Activity     Alcohol use: Yes     Alcohol/week: 1.0 standard drink     Comment: Glass of wine daily.     Drug use: No     Sexual activity: Not Currently   Other Topics Concern     Parent/sibling w/ CABG, MI or angioplasty before 65F 55M? Not Asked   Social History Narrative     Not on file     Social Determinants of Health     Financial Resource Strain: Not on file   Food Insecurity: Not on file   Transportation Needs: Not on file   Physical Activity: Not on file   Stress: Not on file   Social Connections: Not on file   Intimate Partner Violence: Not on file   Housing Stability: Not on file          Medications  Allergies   Current Outpatient Medications   Medication Sig Dispense Refill     acetaminophen (TYLENOL) 325 MG tablet Take 650 mg by mouth every 6 hours as needed for pain       apixaban ANTICOAGULANT (ELIQUIS) 5 MG tablet Take 5 mg by mouth 2 times daily       aspirin (ASA) 81 MG EC tablet Take 81 mg by mouth daily       clopidogrel (PLAVIX) 75 MG tablet Take 1 tablet (75 mg) by mouth daily 90 tablet 3     fluticasone (FLONASE) 50 MCG/ACT nasal spray Spray 1 spray into both nostrils as needed for rhinitis or  allergies       gabapentin (NEURONTIN) 300 MG capsule Take 900 mg by mouth 2 times daily        lisinopril (ZESTRIL) 2.5 MG tablet Take 1 tablet (2.5 mg) by mouth daily Take 2.5 mg + 5 mg =7.5 mg daily in AM 30 tablet 0     lisinopril (ZESTRIL) 5 MG tablet Take 1 tablet (5 mg) by mouth daily 30 tablet 3     metoprolol succinate ER (TOPROL XL) 25 MG 24 hr tablet Take 1 tablet (25 mg) by mouth daily (Patient taking differently: Take 25 mg by mouth every evening) 90 tablet 11     rosuvastatin (CRESTOR) 10 MG tablet Take 1 tablet (10 mg) by mouth daily 30 tablet 0     sotalol (BETAPACE) 80 MG tablet Take 1 tablet (80 mg) by mouth 2 times daily 180 tablet 3     tamsulosin (FLOMAX) 0.4 MG capsule Take 0.4 mg by mouth daily      Allergies   Allergen Reactions     Atorvastatin Diarrhea     Carvedilol Other (See Comments) and GI Disturbance     Upset stomach; GI upset       Chloride GI Disturbance and Nausea         Lab Results    Chemistry/lipid CBC Cardiac Enzymes/BNP/TSH/INR   Recent Labs   Lab Test 09/19/22  0803   CHOL 185   HDL 41      TRIG 76     Recent Labs   Lab Test 09/19/22  0803 12/06/19  1134 10/05/18  0930    100 88     Recent Labs   Lab Test 01/10/23  1000      POTASSIUM 4.4   CHLORIDE 104   CO2 28   GLC 84   BUN 13.2   CR 1.08   GFRESTIMATED 68   EMILIO 9.1     Recent Labs   Lab Test 01/10/23  1000 12/16/22  1029 11/25/22  1549   CR 1.08 1.13 1.01     No results for input(s): A1C in the last 20821 hours. Recent Labs   Lab Test 12/16/22  1029   WBC 8.3   HGB 14.5   HCT 45.4   MCV 94        Recent Labs   Lab Test 12/16/22  1029 11/25/22  1549 09/19/22  0803   HGB 14.5 14.6 14.2    No results for input(s): TROPONINI in the last 02023 hours.  No results for input(s): BNP, NTBNPI, NTBNP in the last 39029 hours.  Recent Labs   Lab Test 09/01/22  1327   TSH 0.46     No results for input(s): INR in the last 25225 hours.     47 minutes spent on the date of encounter doing education, consent  signing, chart prep/review, review of test results, interpretation with above tests, patient visit, documentation and discussion with other provider.      This note has been dictated using voice recognition software. Any grammatical or context distortions are unintentional and inherent to the software.

## 2023-01-30 NOTE — PROGRESS NOTES
===View-only below this line===  ----- Message -----  From: Justin Rivera APRN CNP  Sent: 1/30/2023  11:46 AM CST  To: Clara Samson RN, Josephine Lopez PA-C, *    Tejas Neumann,  I see that his NT proBNP is elevated.  He is euvolemic on exam.  He denies worsening shortness of breath since I last saw him with no shortness of breath at rest, PND or orthopnea.  His blood pressure is elevated today and I increased his lisinopril from 5 mg to 7.5 mg daily.  If he is willing, we can start him on furosemide 20 mg daily starting today and keep him on current dose of lisinopril 5 mg daily.  Thank you!  CY

## 2023-01-30 NOTE — PROGRESS NOTES
TAVR Pre-Op RN Visit     Patient in to see RN for Pre-TAVR visit on 1/30/2023    All pre-procedure labs drawn: Yes    EKG obtained: Yes    Labs reviewed: No- pending at time of visit. Will be reviewed by writer and provider Justin CARROLL test- N/A patient not symptomatic, no longer required     STS score: 3%      Patient instructed on the following medications:   Lisinopril, plavix. Can take gabapentin if absolutely needed.     Loading dose of ASA to be given in pre-procedure/CSC.     Patient given instructions on bathing including no use of deodorant/lotions/creams/powders.      Patient has advanced directive: previous code status documented from 12/20/22. No document on file. Will be discussed with patient prior to procedure. Pt has indicated he is okay with PARTIAL BAIL OUT. NO CPB OR STERNOTOMY.     Education was given to patient regarding what to expect pre-procedure.     Patient was informed procedure will be done at [facility]: Main Entrance at [address] [place] [address]; [place] [address] and their arrival time is at 0530 AM.    TAVR and blood consents signed at the time of the appt: Yes; scanned into Epic.     All questions from patient and family were answered by RN.    Patient presented to clinic independently today, his wife will be with him tomorrow for his procedure.     Orders placed.     Thien Samson, RN BSN- Structural Heart Coordinator   Structural Heart Coordinator  [facility]   [phone]

## 2023-01-30 NOTE — PROGRESS NOTES
HEART CARE ENCOUNTER NOTE       [facility]  [phone]    Assessment/Recommendations   Problem List Items Addressed This Visit        Endocrine    Pure hypercholesterolemia    Relevant Orders    Lipid Profile       Circulatory    Paroxysmal atrial fibrillation (H)    Coronary arteriosclerosis due to lipid rich plaque - Primary    Relevant Orders    Lipid Profile    Ischemic cardiomyopathy    Relevant Medications    lisinopril (ZESTRIL) 2.5 MG tablet    Other Relevant Orders    Basic metabolic panel    Heart failure with reduced ejection fraction, NYHA class II (H)    Relevant Orders    Basic metabolic panel    Severe aortic stenosis       Other    S/P CABG (coronary artery bypass graft)   Other Visit Diagnoses     Primary hypertension        Relevant Medications    lisinopril (ZESTRIL) 2.5 MG tablet    Other Relevant Orders    Basic metabolic panel          1.  Aortic Stenosis: This has become severe and is now causing dyspnea on action with minimal exertion specially walking up incline and occasional Dizziness .  He has seen a decline in his functional status.  He has been evaluated by a multidisciplinary team and has been deemed a good candidate for transcatheter aortic valve replacement.  He has now undergone all the required workup for TAVR and wishes to proceed.       We discussed the procedure in detail,  including post-procedure care, restrictions and follow up.   He understands that there is a 4-5% risk of bleeding, infection, stroke, heart block requiring permanent pacemaker, cardiac perforation, aortic root rupture, dissection and death.  Patient also understands that if something unexpected happens, the procedure may need to be stopped or changed to help him.     All questions were answered   Consents were signed and witnessed by me.    In the event of aortic rupture or aortic dissection, patient does not want urgent sternotomy and CPB to save his life. However, patient is open to  have peripheral vessel repair or subxiphoid window for pericardial effusion.     Patient denies any trouble with general anesthesia in the past.  Lab work today showed INR 1.111, WBC 8.2, hemoglobin 14.4 and platelet 212    The plan will be done under general anesthesia.  We will use the Edward CHANTAL 29 S3 valve via left subclavian approach.   Daniel Alamo will report tomorrow morning for the procedure and all instructions regarding times and medications were given by TAVR coordinator RN.     2.   Ischemic cardiomyopathy with systolic dysfunction with LVEF of 30 to 35% per echo in September 2022, NYHA class II-III with status post ICD: He is well compensated with no evidence of fluid tension on exam except he continues to get short of breath easily with minimal exertion specially walking up incline.    His NT proBNP is elevated at 2798.  Magnesium stable at 1.7.  Kidney function stable with sodium level 140, potassium 4.3, and creatinine 1.02     Current heart failure regimen includes   - Beta-blocker therapy with metoprolol succinate 25 mg daily in in the evening   -ACE inhibitor therapy with lisinopril 5 mg daily in a.m.   - Not on aldosterone blocker therapy    -Not on diuretic therapy    Given elevated NT proBNP level, recommended patient to start on furosemide 20 mg daily.  We will hold off on increasing his lisinopril for now.  Repeat BMP in a week.  Order placed.    3. Coronary artery disease with history of prior CABG with LIMA to LAD, SVG to OM, and SVG to RCA: Coronary angiogram in September 2022 showed severe mid vessel disease with 70% calcified disease and diagonal 1 with distal LAD fills via patent LIMA to the LAD, 70 to 80% stenosis in OM1 with distal small OM fills via L to L bridging collaterals, 100% occluded proximal RCA.  Vein graft to the OM was found occluded and vein graft to RCA is not visualized and presumed occluded.     Patient returned to Cath Lab on 12/19/2022 and underwent PCI with  Cutting Balloon angioplasty and shockwave lithotripsy to LAD/diagonal 1 (see angio result for detail).    He denies chest pain.  He is on triple therapy with aspirin, Plavix and Eliquis.  Eliquis is currently on hold for upcoming procedure.  Post PCI recommendation was to continue on triple therapy for 3 months if no bleeding complications.    He is on triple therapy with clopidogrel (Plavix) 75 mg daily for 12 months.  However, patient is also on Eliquis.     4.  Dyslipidemia with LDL goal <70/Obesity with a BMI of: Daniel Alamo is on moderate intensity statin therapy rosuvastatin 10 mg daily.  He was switched from atorvastatin to rosuvastatin per patient's request due to issue with diarrhea.  He states diarrhea is better.   Most recent LDL is 129.  Most recent AST/ALT are stable.  Recommended repeat fasting lipid profile during next follow-up visit with valve clinic in March-order placed.     5.  Hypertension: Blood pressure is mildly elevated at 151/84.  On Metroprolol and lisinopril.  Adding furosemide today.  Consider increasing lisinopril if blood pressure continues to remain elevated.       History of Present Illness/Subjective    Daniel Alamo is a 82 year old male who comes in today for history and physical prior to TAVR (transcatheter aortic valve replacement).     Mr. Daniel Alamo has a past medical history of coronary disease with remote history of CABG with LIMA to LAD, vein graft to OM, and vein graft RCA done in out-of-hospital, ischemic cardiomyopathy with systolic dysfunction with status post ICD, aortic stenosis, atrial fibrillation, abdominal aortic aneurysm without rupture, pure hypercholesterolemia, atrial fibrillation on sotalol, peripheral artery disease with status post iliac stents and now status post PCI with balloon angioplasty and shockwave lithotripsy to LAD/diagonal 1 on 12/19/2022.    Patient was noted to have severe aortic stenosis per echo in September 2022 Patient had a  consultation in valve clinic with Dr. Ordoñez on 11/25/2022.  He saw Dr. Rich on 1/18/2023.      During last clinic visit for post PCI follow-up in December, patient reported that he continues to have shortness of breath with minimal exertion specially walking up incline.  He also reported occasional lightheadedness and dizziness.    Today, Daniel denies chest discomfort, palpitations, shortness of breath, paroxysmal nocturnal dyspnea, orthopnea, lightheadedness,  pre-syncope, or syncope.  He denies any fever, chills, any weight loss or weight gain, changes in appetite, nausea or vomiting, sore throat, cough, body ache or any upper respiratory illness.  He denies history of COPD asthma or sleep apnea.    Medical, surgical, family, social history, and medications were reviewed and updated as necessary.    ECG (personally reviewed): 1/29/2023 shows atrial pacemaker with ventricular rate 60 bpm,  MS, QRS 1938 MS, QT/QTc 490/198 MS.    Echocardiogram done in September 2022-reviewed  Interpretation Summary     The left ventricle is moderately dilated.  There is mild concentric left ventricular hypertrophy.  The visual ejection fraction is 30-35%.  Diastolic Doppler findings (E/E' ratio and/or other parameters) suggest left  ventricular filling pressures are increased.  It appears global hypokinesis with akinesis in inferior and inferoalateral  walls.  Normal right ventricle size and systolic function.  There is a pacemaker lead in the right ventricle.  The left atrium is moderately dilated.  The right atrium is mildly dilated.  Pacer wire in right atrium  There is mild (1+) mitral regurgitation.  There is mild (1+) aortic regurgitation.  Mild valvular aortic stenosis with mean gradient of aortic valve 16 mmHg, but  cannot exclude low flow low gradient aortic valve stenosis since the  calculated aortic valve area in 0.86 cm2.     No previous study for comparison.    Coronary Angiogram from 12/19/22:  "reviewed:  Conclusion  Daniel Alamo is a 82 year old old male w/ CAD s/p CABG w/ L-LAD, V-OM, V-RCA, [facility] years ago at [facility], ischemic cardiomyopathy EF 25% 11/2021, aortic stenosis, TR, afib on sotalol, PAD s/p iliac stents here for w/u of progressive GALVEZ and orthostatic symptoms, found to have severe aortic stenosis and CAD and here for PCI to D1.     - given area of ischemia/vaibility in anterior/anterolateral wall, proceeded w/ PCI w/ cuting balloon angioplasty and Shockwave lithotripsy in order to optimize risk profile of the pacing run, but given only branch involvement, we chose to stop, as stenting/Roto would require escalating risk  - proceed w/ TAVR w/u as planned   - continue ASA 81mg daily indefinitely, clopidogrel 600mg once, followed by 75mg daily for at least 12 months, add atorva 40  - continue aggressive risk factor modification     Findings:  LM:no obstruction  LAD:severe diffuse Ca2+ disease in mLAD into D1. Occluded dLAD  Lcx:severe mid-vessel lesion  RCA:not injected     Access:  R Radial artery     PCI:  LMT was engaged w/ a 6F EBU 3.5 Guide catheter and the lesion in LAD was wired w/ a Forte wire, then ballooned w/ a 2/0x12mm NC Emerge, a 2.0x10mm ScoreFlex balloon, and a 2.5x12mm Shockwave balloon at 3 jeronimo inflations. We were able to achieve balloon expansion but significant refractory re-narrowing after balloon removal due to severe bulky Ca2+. Given only branch involvement and escalating risk, we chose to stop rather than risk stent under-deployment. Final angiography showed no dissection or perforation and a SKIP 3 flow.     Closure:   Vasc Band          Physical Examination Review of Systems   Vitals: BP (!) 151/84 (BP Location: Left arm, Patient Position: Sitting, Cuff Size: Adult Regular)   Pulse 60   Resp 16   Ht 1.778 m (5' 10\")   Wt 64.9 kg (143 lb)   BMI 20.52 kg/m    BMI= Body mass index is 20.52 kg/m .  Wt Readings from Last 3 Encounters:   01/30/23 64.9 kg (143 lb) "   01/18/23 66.2 kg (146 lb)   01/10/23 65.8 kg (145 lb)       General Appearance:   Alert, cooperative and in no acute distress   ENT/Mouth: membranes moist, no oral lesions or bleeding gums.      EYES:  no scleral icterus, normal conjunctivae   Neck: No carotid bruits.  Thyroid not visualized   Chest/Lungs:   lungs are clear to auscultation, no rales or wheezing   Cardiovascular:    Heart rate regular. Normal first and second heart sounds with 3/6 systolic murmur.  No rubs or gallops; the carotid, radial and posterior tibial pulses are intact, no edema bilaterally    Abdomen:  Soft and nontender. Bowel sounds are present in all quadrants   Extremities: no cyanosis or clubbing   Skin: no xanthelasma, warm.    Neurologic: normal gait, normal  bilateral, no tremors   Psychiatric: Normal mood and affect       Please refer above for cardiac ROS details.      Medical History  Surgical History Family History Social History   Past Medical History:   Diagnosis Date     Coronary artery disease      Heart disease      VT (ventricular tachycardia) 2004     Past Surgical History:   Procedure Laterality Date     ABDOMEN SURGERY       BYPASS GRAFT ARTERY CORONARY       CV ANGIOGRAM CORONARY GRAFT N/A 9/19/2022    Procedure: Coronary Angiogram Graft;  Surgeon: Tyrone Ordoñez MD;  Location: [facility]     CV CORONARY LITHOTRIPSY PCI N/A 12/19/2022    Procedure: Percutaneous Coronary Intervention - Lithotripsy;  Surgeon: Tyrone Ordoñez MD;  Location: [facility]     CV LEFT HEART CATH N/A 9/19/2022    Procedure: Left Heart Catheterization;  Surgeon: Tyrone Ordoñez MD;  Location: [facility] CV     CV PCI ANGIOPLASTY N/A 12/19/2022    Procedure: Percutaneous Transluminal Angioplasty;  Surgeon: Tyrone Ordoñez MD;  Location: [facility]     CV RIGHT HEART CATH MEASUREMENTS RECORDED N/A 9/19/2022    Procedure: Right Heart Catheterization;  Surgeon: Tyrone Ordoñez MD;  Location:  [facility] CATH LAB CV     EYE SURGERY       INSERT / REPLACE / REMOVE PACEMAKER       OTHER SURGICAL HISTORY      icd     ZZC CABG, VEIN, SINGLE      Description: CABG (CABG);  Recorded: 10/03/2012;  Comments: LIMA to LAD, SVG to OM2, SVG to RCA     Family History   Problem Relation Age of Onset     Cancer Mother      Heart Disease Father     Social History     Socioeconomic History     Marital status:      Spouse name: Not on file     Number of children: Not on file     Years of education: Not on file     Highest education level: Not on file   Occupational History     Not on file   Tobacco Use     Smoking status: Every Day     Packs/day: 0.50     Years: 30.00     Pack years: 15.00     Types: Cigarettes     Smokeless tobacco: Never   Vaping Use     Vaping Use: Never used   Substance and Sexual Activity     Alcohol use: Yes     Alcohol/week: 1.0 standard drink     Comment: Glass of wine daily.     Drug use: No     Sexual activity: Not Currently   Other Topics Concern     Parent/sibling w/ CABG, MI or angioplasty before 65F 55M? Not Asked   Social History Narrative     Not on file     Social Determinants of Health     Financial Resource Strain: Not on file   Food Insecurity: Not on file   Transportation Needs: Not on file   Physical Activity: Not on file   Stress: Not on file   Social Connections: Not on file   Intimate Partner Violence: Not on file   Housing Stability: Not on file          Medications  Allergies   Current Outpatient Medications   Medication Sig Dispense Refill     acetaminophen (TYLENOL) 325 MG tablet Take 650 mg by mouth every 6 hours as needed for pain       apixaban ANTICOAGULANT (ELIQUIS) 5 MG tablet Take 5 mg by mouth 2 times daily       aspirin (ASA) 81 MG EC tablet Take 81 mg by mouth daily       clopidogrel (PLAVIX) 75 MG tablet Take 1 tablet (75 mg) by mouth daily 90 tablet 3     fluticasone (FLONASE) 50 MCG/ACT nasal spray Spray 1 spray into both nostrils as needed for rhinitis or  allergies       gabapentin (NEURONTIN) 300 MG capsule Take 900 mg by mouth 2 times daily        lisinopril (ZESTRIL) 2.5 MG tablet Take 1 tablet (2.5 mg) by mouth daily Take 2.5 mg + 5 mg =7.5 mg daily in AM 30 tablet 0     lisinopril (ZESTRIL) 5 MG tablet Take 1 tablet (5 mg) by mouth daily 30 tablet 3     metoprolol succinate ER (TOPROL XL) 25 MG 24 hr tablet Take 1 tablet (25 mg) by mouth daily (Patient taking differently: Take 25 mg by mouth every evening) 90 tablet 11     rosuvastatin (CRESTOR) 10 MG tablet Take 1 tablet (10 mg) by mouth daily 30 tablet 0     sotalol (BETAPACE) 80 MG tablet Take 1 tablet (80 mg) by mouth 2 times daily 180 tablet 3     tamsulosin (FLOMAX) 0.4 MG capsule Take 0.4 mg by mouth daily      Allergies   Allergen Reactions     Atorvastatin Diarrhea     Carvedilol Other (See Comments) and GI Disturbance     Upset stomach; GI upset       Chloride GI Disturbance and Nausea         Lab Results    Chemistry/lipid CBC Cardiac Enzymes/BNP/TSH/INR   Recent Labs   Lab Test 09/19/22  0803   CHOL 185   HDL 41      TRIG 76     Recent Labs   Lab Test 09/19/22  0803 12/06/19  1134 10/05/18  0930    100 88     Recent Labs   Lab Test 01/10/23  1000      POTASSIUM 4.4   CHLORIDE 104   CO2 28   GLC 84   BUN 13.2   CR 1.08   GFRESTIMATED 68   EMILIO 9.1     Recent Labs   Lab Test 01/10/23  1000 12/16/22  1029 11/25/22  1549   CR 1.08 1.13 1.01     No results for input(s): A1C in the last 40748 hours. Recent Labs   Lab Test 12/16/22  1029   WBC 8.3   HGB 14.5   HCT 45.4   MCV 94        Recent Labs   Lab Test 12/16/22  1029 11/25/22  1549 09/19/22  0803   HGB 14.5 14.6 14.2    No results for input(s): TROPONINI in the last 72204 hours.  No results for input(s): BNP, NTBNPI, NTBNP in the last 00939 hours.  Recent Labs   Lab Test 09/01/22  1327   TSH 0.46     No results for input(s): INR in the last 73830 hours.     47 minutes spent on the date of encounter doing education, consent  signing, chart prep/review, review of test results, interpretation with above tests, patient visit, documentation and discussion with other provider.      This note has been dictated using voice recognition software. Any grammatical or context distortions are unintentional and inherent to the software.

## 2023-01-30 NOTE — PROGRESS NOTES
Spoke to patient on the phone at 1:15pm. Discussed due to staff changes arrival time will now be 0830AM. Discussed elevated BNP and adding in lasix 20 mg once daily per Chime. Pt ok with this. Verbalized understanding he will take one dose today and he will NOT take it in the AM before procedure. Will be given further instructions on how much/often to take this while he is hospitalized and when he discharges. Pt verbalized understanding and agreed to plan.     Clara Samson RN on 1/30/2023 at 1:21 PM

## 2023-01-30 NOTE — PATIENT INSTRUCTIONS
Daniel Alamo,    It was a pleasure to see you today at the [facility].     My recommendations after this visit include:    -Report to [facility] Tuesday morning at the instructed time    - Increase Lisinopril from 5 mg to 7.5 mg daily to improve your blood pressure and protect your heart pumping    - Please call if you develop lightheaded or dizziness    - Repeat BMP during next follow up JUSTYN Sanchez on 2/8/23 in Valve clinic- order placed    -Please get your fasting lipid profile during your next appointment in valve clinic on 3/6/23. Order placed. Make sure to fast for 8-12 hours prior to lab work (okay to have plain water, coffee or tea with no added flavor, milk or sugar).      If you have any questions or concerns, please call using the numbers below:    Valve Clinic Pool  [phone]    After Hours/Scheduling  [phone]      Otherwise you can call the nurse directly at:    Heydi Clark RN  Valve Clinic Coordinator  [phone]

## 2023-01-30 NOTE — PROGRESS NOTES
Msg received from CV Surgery team. Team is down a CV EVELIN tomorrow and case order will need to be adjusted. Pt will need to be third case with arrival time of 0830.     Clara Samson RN on 1/30/2023 at 1:07 PM

## 2023-01-31 ENCOUNTER — HOSPITAL ENCOUNTER (INPATIENT)
Facility: HOSPITAL | Age: 83
LOS: 1 days | Discharge: HOME | DRG: 267 | End: 2023-02-01
Attending: INTERNAL MEDICINE | Admitting: INTERNAL MEDICINE
Payer: MEDICARE

## 2023-01-31 ENCOUNTER — HOSPITAL ENCOUNTER (OUTPATIENT)
Dept: CARDIOLOGY | Facility: HOSPITAL | Age: 83
Discharge: HOME | DRG: 267 | End: 2023-01-31
Attending: INTERNAL MEDICINE | Admitting: INTERNAL MEDICINE
Payer: MEDICARE

## 2023-01-31 ENCOUNTER — ANESTHESIA (OUTPATIENT)
Dept: CARDIOLOGY | Facility: HOSPITAL | Age: 83
DRG: 267 | End: 2023-01-31
Payer: MEDICARE

## 2023-01-31 DIAGNOSIS — I35.0 SEVERE AORTIC STENOSIS: ICD-10-CM

## 2023-01-31 DIAGNOSIS — I25.5 ISCHEMIC CARDIOMYOPATHY: ICD-10-CM

## 2023-01-31 DIAGNOSIS — I35.0 NONRHEUMATIC AORTIC VALVE STENOSIS: ICD-10-CM

## 2023-01-31 DIAGNOSIS — Z95.2 S/P TAVR (TRANSCATHETER AORTIC VALVE REPLACEMENT): Primary | ICD-10-CM

## 2023-01-31 LAB
ACT BLD: 143 SECONDS (ref 74–150)
ACT BLD: 169 SECONDS (ref 74–150)
ACT BLD: 220 SECONDS (ref 74–150)
ACT BLD: 220 SECONDS (ref 74–150)
ACT BLD: 233 SECONDS (ref 74–150)
ACT BLD: 246 SECONDS (ref 74–150)
ALBUMIN SERPL BCG-MCNC: 3.2 G/DL (ref 3.5–5.2)
ALP SERPL-CCNC: 78 U/L (ref 40–129)
ALT SERPL W P-5'-P-CCNC: 13 U/L (ref 10–50)
ANION GAP SERPL CALCULATED.3IONS-SCNC: 7 MMOL/L (ref 7–15)
AST SERPL W P-5'-P-CCNC: 16 U/L (ref 10–50)
BILIRUB SERPL-MCNC: 0.6 MG/DL
BLD PROD TYP BPU: NORMAL
BLD PROD TYP BPU: NORMAL
BLOOD COMPONENT TYPE: NORMAL
BLOOD COMPONENT TYPE: NORMAL
BUN SERPL-MCNC: 13.5 MG/DL (ref 8–23)
CALCIUM SERPL-MCNC: 8.2 MG/DL (ref 8.8–10.2)
CHLORIDE SERPL-SCNC: 107 MMOL/L (ref 98–107)
CODING SYSTEM: NORMAL
CODING SYSTEM: NORMAL
CREAT SERPL-MCNC: 0.97 MG/DL (ref 0.67–1.17)
CROSSMATCH: NORMAL
CROSSMATCH: NORMAL
DEPRECATED HCO3 PLAS-SCNC: 26 MMOL/L (ref 22–29)
ERYTHROCYTE [DISTWIDTH] IN BLOOD BY AUTOMATED COUNT: 13.5 % (ref 10–15)
GFR SERPL CREATININE-BSD FRML MDRD: 77 ML/MIN/1.73M2
GLUCOSE SERPL-MCNC: 96 MG/DL (ref 70–99)
HCT VFR BLD AUTO: 39 % (ref 40–53)
HGB BLD-MCNC: 12.6 G/DL (ref 13.3–17.7)
ISSUE DATE AND TIME: NORMAL
ISSUE DATE AND TIME: NORMAL
LVEF ECHO: NORMAL
MAGNESIUM SERPL-MCNC: 1.5 MG/DL (ref 1.7–2.3)
MCH RBC QN AUTO: 30.1 PG (ref 26.5–33)
MCHC RBC AUTO-ENTMCNC: 32.3 G/DL (ref 31.5–36.5)
MCV RBC AUTO: 93 FL (ref 78–100)
PLATELET # BLD AUTO: 168 10E3/UL (ref 150–450)
POTASSIUM SERPL-SCNC: 4.1 MMOL/L (ref 3.4–5.3)
PROT SERPL-MCNC: 5.7 G/DL (ref 6.4–8.3)
RBC # BLD AUTO: 4.18 10E6/UL (ref 4.4–5.9)
SODIUM SERPL-SCNC: 140 MMOL/L (ref 136–145)
UNIT ABO/RH: NORMAL
UNIT ABO/RH: NORMAL
UNIT NUMBER: NORMAL
UNIT NUMBER: NORMAL
UNIT STATUS: NORMAL
UNIT STATUS: NORMAL
UNIT TYPE ISBT: 5100
UNIT TYPE ISBT: 5100
WBC # BLD AUTO: 10.6 10E3/UL (ref 4–11)

## 2023-01-31 PROCEDURE — 03U40KZ SUPPLEMENT LEFT SUBCLAVIAN ARTERY WITH NONAUTOLOGOUS TISSUE SUBSTITUTE, OPEN APPROACH: ICD-10-PCS | Performed by: INTERNAL MEDICINE

## 2023-01-31 PROCEDURE — 33363 REPLACE AORTIC VALVE OPEN: CPT | Performed by: INTERNAL MEDICINE

## 2023-01-31 PROCEDURE — 02RF38Z REPLACEMENT OF AORTIC VALVE WITH ZOOPLASTIC TISSUE, PERCUTANEOUS APPROACH: ICD-10-PCS | Performed by: INTERNAL MEDICINE

## 2023-01-31 PROCEDURE — 93325 DOPPLER ECHO COLOR FLOW MAPG: CPT

## 2023-01-31 PROCEDURE — 36415 COLL VENOUS BLD VENIPUNCTURE: CPT | Performed by: INTERNAL MEDICINE

## 2023-01-31 PROCEDURE — 258N000003 HC RX IP 258 OP 636: Performed by: INTERNAL MEDICINE

## 2023-01-31 PROCEDURE — 255N000002 HC RX 255 OP 636: Performed by: INTERNAL MEDICINE

## 2023-01-31 PROCEDURE — 85347 COAGULATION TIME ACTIVATED: CPT

## 2023-01-31 PROCEDURE — C1769 GUIDE WIRE: HCPCS | Performed by: INTERNAL MEDICINE

## 2023-01-31 PROCEDURE — 93005 ELECTROCARDIOGRAM TRACING: CPT

## 2023-01-31 PROCEDURE — C1763 CONN TISS, NON-HUMAN: HCPCS | Performed by: INTERNAL MEDICINE

## 2023-01-31 PROCEDURE — 258N000003 HC RX IP 258 OP 636: Performed by: NURSE ANESTHETIST, CERTIFIED REGISTERED

## 2023-01-31 PROCEDURE — 33363 REPLACE AORTIC VALVE OPEN: CPT | Mod: 62 | Performed by: INTERNAL MEDICINE

## 2023-01-31 PROCEDURE — 03743ZZ DILATION OF LEFT SUBCLAVIAN ARTERY, PERCUTANEOUS APPROACH: ICD-10-PCS | Performed by: THORACIC SURGERY (CARDIOTHORACIC VASCULAR SURGERY)

## 2023-01-31 PROCEDURE — C1894 INTRO/SHEATH, NON-LASER: HCPCS | Performed by: INTERNAL MEDICINE

## 2023-01-31 PROCEDURE — 250N000011 HC RX IP 250 OP 636: Performed by: INTERNAL MEDICINE

## 2023-01-31 PROCEDURE — 250N000009 HC RX 250: Performed by: NURSE ANESTHETIST, CERTIFIED REGISTERED

## 2023-01-31 PROCEDURE — 210N000001 HC R&B IMCU HEART CARE

## 2023-01-31 PROCEDURE — 278N000051 HC OR IMPLANT GENERAL: Performed by: INTERNAL MEDICINE

## 2023-01-31 PROCEDURE — 83735 ASSAY OF MAGNESIUM: CPT | Performed by: INTERNAL MEDICINE

## 2023-01-31 PROCEDURE — 710N000010 HC RECOVERY PHASE 1, LEVEL 2, PER MIN

## 2023-01-31 PROCEDURE — C1887 CATHETER, GUIDING: HCPCS | Performed by: INTERNAL MEDICINE

## 2023-01-31 PROCEDURE — 272N000001 HC OR GENERAL SUPPLY STERILE: Performed by: INTERNAL MEDICINE

## 2023-01-31 PROCEDURE — 93306 TTE W/DOPPLER COMPLETE: CPT | Mod: 26 | Performed by: INTERNAL MEDICINE

## 2023-01-31 PROCEDURE — 93308 TTE F-UP OR LMTD: CPT

## 2023-01-31 PROCEDURE — 33363 REPLACE AORTIC VALVE OPEN: CPT | Mod: 62 | Performed by: THORACIC SURGERY (CARDIOTHORACIC VASCULAR SURGERY)

## 2023-01-31 PROCEDURE — 250N000013 HC RX MED GY IP 250 OP 250 PS 637: Performed by: INTERNAL MEDICINE

## 2023-01-31 PROCEDURE — C1725 CATH, TRANSLUMIN NON-LASER: HCPCS | Performed by: INTERNAL MEDICINE

## 2023-01-31 PROCEDURE — 250N000011 HC RX IP 250 OP 636: Performed by: NURSE ANESTHETIST, CERTIFIED REGISTERED

## 2023-01-31 PROCEDURE — 86923 COMPATIBILITY TEST ELECTRIC: CPT | Performed by: INTERNAL MEDICINE

## 2023-01-31 PROCEDURE — 370N000017 HC ANESTHESIA TECHNICAL FEE, PER MIN: Performed by: INTERNAL MEDICINE

## 2023-01-31 PROCEDURE — 99232 SBSQ HOSP IP/OBS MODERATE 35: CPT | Performed by: INTERNAL MEDICINE

## 2023-01-31 PROCEDURE — P9016 RBC LEUKOCYTES REDUCED: HCPCS | Performed by: INTERNAL MEDICINE

## 2023-01-31 PROCEDURE — 80053 COMPREHEN METABOLIC PANEL: CPT | Performed by: INTERNAL MEDICINE

## 2023-01-31 PROCEDURE — C1733 CATH, EP, OTHR THAN COOL-TIP: HCPCS | Performed by: INTERNAL MEDICINE

## 2023-01-31 PROCEDURE — 85027 COMPLETE CBC AUTOMATED: CPT | Performed by: INTERNAL MEDICINE

## 2023-01-31 PROCEDURE — 93010 ELECTROCARDIOGRAM REPORT: CPT | Mod: HIP | Performed by: INTERNAL MEDICINE

## 2023-01-31 DEVICE — VALVE HEART SAPIEN 3 29MM 9600TFX29A: Type: IMPLANTABLE DEVICE | Site: CHEST | Status: FUNCTIONAL

## 2023-01-31 DEVICE — DECELLULARIZED BOVINE PERICARDIUM
Type: IMPLANTABLE DEVICE | Site: CHEST | Status: FUNCTIONAL
Brand: PHOTOFIX DECELLULARIZED BOVINE PERICARDIUM

## 2023-01-31 RX ORDER — NALOXONE HYDROCHLORIDE 0.4 MG/ML
0.2 INJECTION, SOLUTION INTRAMUSCULAR; INTRAVENOUS; SUBCUTANEOUS
Status: DISCONTINUED | OUTPATIENT
Start: 2023-01-31 | End: 2023-01-31

## 2023-01-31 RX ORDER — ROSUVASTATIN CALCIUM 10 MG/1
10 TABLET, COATED ORAL DAILY
Status: DISCONTINUED | OUTPATIENT
Start: 2023-02-01 | End: 2023-02-01 | Stop reason: HOSPADM

## 2023-01-31 RX ORDER — CEFAZOLIN SODIUM 1 G/3ML
1 INJECTION, POWDER, FOR SOLUTION INTRAMUSCULAR; INTRAVENOUS EVERY 8 HOURS
Status: DISCONTINUED | OUTPATIENT
Start: 2023-01-31 | End: 2023-02-01 | Stop reason: HOSPADM

## 2023-01-31 RX ORDER — SODIUM CHLORIDE 9 MG/ML
INJECTION, SOLUTION INTRAVENOUS CONTINUOUS PRN
Status: DISCONTINUED | OUTPATIENT
Start: 2023-01-31 | End: 2023-01-31

## 2023-01-31 RX ORDER — OXYCODONE HYDROCHLORIDE 5 MG/1
10 TABLET ORAL EVERY 4 HOURS PRN
Status: DISCONTINUED | OUTPATIENT
Start: 2023-01-31 | End: 2023-02-01 | Stop reason: HOSPADM

## 2023-01-31 RX ORDER — NICOTINE POLACRILEX 4 MG
15-30 LOZENGE BUCCAL
Status: DISCONTINUED | OUTPATIENT
Start: 2023-01-31 | End: 2023-02-01 | Stop reason: HOSPADM

## 2023-01-31 RX ORDER — GABAPENTIN 300 MG/1
900 CAPSULE ORAL 2 TIMES DAILY
Status: DISCONTINUED | OUTPATIENT
Start: 2023-01-31 | End: 2023-02-01 | Stop reason: HOSPADM

## 2023-01-31 RX ORDER — ASPIRIN 325 MG
325 TABLET ORAL ONCE
Status: COMPLETED | OUTPATIENT
Start: 2023-01-31 | End: 2023-01-31

## 2023-01-31 RX ORDER — ACETAMINOPHEN 325 MG/1
650 TABLET ORAL EVERY 4 HOURS PRN
Status: DISCONTINUED | OUTPATIENT
Start: 2023-01-31 | End: 2023-02-01 | Stop reason: HOSPADM

## 2023-01-31 RX ORDER — PROPOFOL 10 MG/ML
INJECTION, EMULSION INTRAVENOUS PRN
Status: DISCONTINUED | OUTPATIENT
Start: 2023-01-31 | End: 2023-01-31

## 2023-01-31 RX ORDER — ONDANSETRON 2 MG/ML
4 INJECTION INTRAMUSCULAR; INTRAVENOUS EVERY 30 MIN PRN
Status: DISCONTINUED | OUTPATIENT
Start: 2023-01-31 | End: 2023-01-31 | Stop reason: HOSPADM

## 2023-01-31 RX ORDER — HYDROMORPHONE HYDROCHLORIDE 1 MG/ML
0.4 INJECTION, SOLUTION INTRAMUSCULAR; INTRAVENOUS; SUBCUTANEOUS EVERY 5 MIN PRN
Status: DISCONTINUED | OUTPATIENT
Start: 2023-01-31 | End: 2023-01-31 | Stop reason: HOSPADM

## 2023-01-31 RX ORDER — LIDOCAINE 40 MG/G
CREAM TOPICAL
Status: DISCONTINUED | OUTPATIENT
Start: 2023-01-31 | End: 2023-01-31 | Stop reason: HOSPADM

## 2023-01-31 RX ORDER — HYDRALAZINE HYDROCHLORIDE 20 MG/ML
10 INJECTION INTRAMUSCULAR; INTRAVENOUS
Status: DISCONTINUED | OUTPATIENT
Start: 2023-01-31 | End: 2023-02-01 | Stop reason: HOSPADM

## 2023-01-31 RX ORDER — LISINOPRIL 2.5 MG/1
2.5 TABLET ORAL DAILY
Status: DISCONTINUED | OUTPATIENT
Start: 2023-02-01 | End: 2023-02-01 | Stop reason: HOSPADM

## 2023-01-31 RX ORDER — OXYCODONE HYDROCHLORIDE 5 MG/1
5 TABLET ORAL EVERY 4 HOURS PRN
Status: DISCONTINUED | OUTPATIENT
Start: 2023-01-31 | End: 2023-02-01 | Stop reason: HOSPADM

## 2023-01-31 RX ORDER — ONDANSETRON 2 MG/ML
4 INJECTION INTRAMUSCULAR; INTRAVENOUS EVERY 6 HOURS PRN
Status: DISCONTINUED | OUTPATIENT
Start: 2023-01-31 | End: 2023-02-01 | Stop reason: HOSPADM

## 2023-01-31 RX ORDER — ASPIRIN 81 MG/1
81 TABLET ORAL DAILY
Status: DISCONTINUED | OUTPATIENT
Start: 2023-02-01 | End: 2023-02-01 | Stop reason: HOSPADM

## 2023-01-31 RX ORDER — ONDANSETRON 2 MG/ML
INJECTION INTRAMUSCULAR; INTRAVENOUS PRN
Status: DISCONTINUED | OUTPATIENT
Start: 2023-01-31 | End: 2023-01-31

## 2023-01-31 RX ORDER — NALOXONE HYDROCHLORIDE 0.4 MG/ML
0.4 INJECTION, SOLUTION INTRAMUSCULAR; INTRAVENOUS; SUBCUTANEOUS
Status: DISCONTINUED | OUTPATIENT
Start: 2023-01-31 | End: 2023-01-31

## 2023-01-31 RX ORDER — FENTANYL CITRATE 50 UG/ML
50 INJECTION, SOLUTION INTRAMUSCULAR; INTRAVENOUS EVERY 5 MIN PRN
Status: DISCONTINUED | OUTPATIENT
Start: 2023-01-31 | End: 2023-01-31 | Stop reason: HOSPADM

## 2023-01-31 RX ORDER — ONDANSETRON 4 MG/1
4 TABLET, ORALLY DISINTEGRATING ORAL EVERY 6 HOURS PRN
Status: DISCONTINUED | OUTPATIENT
Start: 2023-01-31 | End: 2023-02-01 | Stop reason: HOSPADM

## 2023-01-31 RX ORDER — DEXTROSE MONOHYDRATE 25 G/50ML
25-50 INJECTION, SOLUTION INTRAVENOUS
Status: DISCONTINUED | OUTPATIENT
Start: 2023-01-31 | End: 2023-02-01 | Stop reason: HOSPADM

## 2023-01-31 RX ORDER — METOPROLOL SUCCINATE 25 MG/1
25 TABLET, EXTENDED RELEASE ORAL EVERY EVENING
Status: DISCONTINUED | OUTPATIENT
Start: 2023-01-31 | End: 2023-02-01 | Stop reason: HOSPADM

## 2023-01-31 RX ORDER — LIDOCAINE HYDROCHLORIDE 10 MG/ML
INJECTION, SOLUTION INFILTRATION; PERINEURAL PRN
Status: DISCONTINUED | OUTPATIENT
Start: 2023-01-31 | End: 2023-01-31

## 2023-01-31 RX ORDER — FENTANYL CITRATE 50 UG/ML
INJECTION, SOLUTION INTRAMUSCULAR; INTRAVENOUS PRN
Status: DISCONTINUED | OUTPATIENT
Start: 2023-01-31 | End: 2023-01-31

## 2023-01-31 RX ORDER — VASOPRESSIN IN 0.9 % NACL 2 UNIT/2ML
SYRINGE (ML) INTRAVENOUS PRN
Status: DISCONTINUED | OUTPATIENT
Start: 2023-01-31 | End: 2023-01-31

## 2023-01-31 RX ORDER — LISINOPRIL 5 MG/1
5 TABLET ORAL DAILY
Status: DISCONTINUED | OUTPATIENT
Start: 2023-02-01 | End: 2023-02-01 | Stop reason: HOSPADM

## 2023-01-31 RX ORDER — TAMSULOSIN HYDROCHLORIDE 0.4 MG/1
0.4 CAPSULE ORAL EVERY EVENING
Status: DISCONTINUED | OUTPATIENT
Start: 2023-01-31 | End: 2023-02-01 | Stop reason: HOSPADM

## 2023-01-31 RX ORDER — NITROGLYCERIN 0.4 MG/1
0.4 TABLET SUBLINGUAL EVERY 5 MIN PRN
Status: DISCONTINUED | OUTPATIENT
Start: 2023-01-31 | End: 2023-02-01 | Stop reason: HOSPADM

## 2023-01-31 RX ORDER — SODIUM CHLORIDE, SODIUM LACTATE, POTASSIUM CHLORIDE, CALCIUM CHLORIDE 600; 310; 30; 20 MG/100ML; MG/100ML; MG/100ML; MG/100ML
INJECTION, SOLUTION INTRAVENOUS CONTINUOUS
Status: DISCONTINUED | OUTPATIENT
Start: 2023-01-31 | End: 2023-01-31 | Stop reason: HOSPADM

## 2023-01-31 RX ORDER — DEXAMETHASONE SODIUM PHOSPHATE 10 MG/ML
INJECTION, SOLUTION INTRAMUSCULAR; INTRAVENOUS PRN
Status: DISCONTINUED | OUTPATIENT
Start: 2023-01-31 | End: 2023-01-31

## 2023-01-31 RX ORDER — CLOPIDOGREL BISULFATE 75 MG/1
75 TABLET ORAL DAILY
Status: DISCONTINUED | OUTPATIENT
Start: 2023-02-01 | End: 2023-02-01 | Stop reason: HOSPADM

## 2023-01-31 RX ORDER — SOTALOL HYDROCHLORIDE 80 MG/1
80 TABLET ORAL 2 TIMES DAILY
Status: DISCONTINUED | OUTPATIENT
Start: 2023-01-31 | End: 2023-02-01 | Stop reason: HOSPADM

## 2023-01-31 RX ORDER — SODIUM CHLORIDE 9 MG/ML
INJECTION, SOLUTION INTRAVENOUS CONTINUOUS
Status: DISCONTINUED | OUTPATIENT
Start: 2023-01-31 | End: 2023-01-31 | Stop reason: HOSPADM

## 2023-01-31 RX ORDER — CEFAZOLIN SODIUM/WATER 2 G/20 ML
2 SYRINGE (ML) INTRAVENOUS
Status: DISCONTINUED | OUTPATIENT
Start: 2023-01-31 | End: 2023-01-31 | Stop reason: HOSPADM

## 2023-01-31 RX ORDER — ONDANSETRON 4 MG/1
4 TABLET, ORALLY DISINTEGRATING ORAL EVERY 30 MIN PRN
Status: DISCONTINUED | OUTPATIENT
Start: 2023-01-31 | End: 2023-01-31 | Stop reason: HOSPADM

## 2023-01-31 RX ORDER — IODIXANOL 320 MG/ML
INJECTION, SOLUTION INTRAVASCULAR
Status: DISCONTINUED | OUTPATIENT
Start: 2023-01-31 | End: 2023-01-31 | Stop reason: HOSPADM

## 2023-01-31 RX ORDER — KETAMINE HYDROCHLORIDE 10 MG/ML
INJECTION INTRAMUSCULAR; INTRAVENOUS PRN
Status: DISCONTINUED | OUTPATIENT
Start: 2023-01-31 | End: 2023-01-31

## 2023-01-31 RX ORDER — SODIUM CHLORIDE 9 MG/ML
INJECTION, SOLUTION INTRAVENOUS CONTINUOUS
Status: ACTIVE | OUTPATIENT
Start: 2023-01-31 | End: 2023-01-31

## 2023-01-31 RX ORDER — FUROSEMIDE 20 MG
20 TABLET ORAL DAILY
Status: DISCONTINUED | OUTPATIENT
Start: 2023-02-01 | End: 2023-02-01 | Stop reason: HOSPADM

## 2023-01-31 RX ORDER — FENTANYL CITRATE 50 UG/ML
25 INJECTION, SOLUTION INTRAMUSCULAR; INTRAVENOUS EVERY 5 MIN PRN
Status: DISCONTINUED | OUTPATIENT
Start: 2023-01-31 | End: 2023-01-31 | Stop reason: HOSPADM

## 2023-01-31 RX ORDER — HEPARIN SODIUM 1000 [USP'U]/ML
INJECTION, SOLUTION INTRAVENOUS; SUBCUTANEOUS PRN
Status: DISCONTINUED | OUTPATIENT
Start: 2023-01-31 | End: 2023-01-31

## 2023-01-31 RX ORDER — HYDROMORPHONE HYDROCHLORIDE 1 MG/ML
0.2 INJECTION, SOLUTION INTRAMUSCULAR; INTRAVENOUS; SUBCUTANEOUS EVERY 5 MIN PRN
Status: DISCONTINUED | OUTPATIENT
Start: 2023-01-31 | End: 2023-01-31 | Stop reason: HOSPADM

## 2023-01-31 RX ADMIN — SODIUM CHLORIDE: 9 INJECTION, SOLUTION INTRAVENOUS at 12:34

## 2023-01-31 RX ADMIN — PROPOFOL 100 MG: 10 INJECTION, EMULSION INTRAVENOUS at 13:04

## 2023-01-31 RX ADMIN — FENTANYL CITRATE 50 MCG: 50 INJECTION, SOLUTION INTRAMUSCULAR; INTRAVENOUS at 12:49

## 2023-01-31 RX ADMIN — ACETAMINOPHEN 650 MG: 325 TABLET ORAL at 17:19

## 2023-01-31 RX ADMIN — HYDRALAZINE HYDROCHLORIDE 10 MG: 20 INJECTION INTRAMUSCULAR; INTRAVENOUS at 16:49

## 2023-01-31 RX ADMIN — ONDANSETRON 4 MG: 2 INJECTION INTRAMUSCULAR; INTRAVENOUS at 14:15

## 2023-01-31 RX ADMIN — LIDOCAINE HYDROCHLORIDE 3 ML: 10 INJECTION, SOLUTION INFILTRATION; PERINEURAL at 13:04

## 2023-01-31 RX ADMIN — FENTANYL CITRATE 50 MCG: 50 INJECTION, SOLUTION INTRAMUSCULAR; INTRAVENOUS at 12:40

## 2023-01-31 RX ADMIN — METOPROLOL SUCCINATE 25 MG: 25 TABLET, EXTENDED RELEASE ORAL at 20:37

## 2023-01-31 RX ADMIN — CEFAZOLIN 1 G: 1 INJECTION, POWDER, FOR SOLUTION INTRAMUSCULAR; INTRAVENOUS at 20:35

## 2023-01-31 RX ADMIN — PHENYLEPHRINE HYDROCHLORIDE 100 MCG: 10 INJECTION INTRAVENOUS at 13:38

## 2023-01-31 RX ADMIN — PHENYLEPHRINE HYDROCHLORIDE 100 MCG: 10 INJECTION INTRAVENOUS at 14:46

## 2023-01-31 RX ADMIN — PHENYLEPHRINE HYDROCHLORIDE 200 MCG: 10 INJECTION INTRAVENOUS at 13:04

## 2023-01-31 RX ADMIN — SODIUM CHLORIDE: 9 INJECTION, SOLUTION INTRAVENOUS at 09:50

## 2023-01-31 RX ADMIN — SOTALOL HYDROCHLORIDE 80 MG: 80 TABLET ORAL at 21:26

## 2023-01-31 RX ADMIN — Medication 0.5 UNITS: at 13:59

## 2023-01-31 RX ADMIN — SODIUM CHLORIDE: 9 INJECTION, SOLUTION INTRAVENOUS at 12:35

## 2023-01-31 RX ADMIN — Medication 2 G: at 12:49

## 2023-01-31 RX ADMIN — TAMSULOSIN HYDROCHLORIDE 0.4 MG: 0.4 CAPSULE ORAL at 20:37

## 2023-01-31 RX ADMIN — Medication 0.5 UNITS: at 14:00

## 2023-01-31 RX ADMIN — GABAPENTIN 900 MG: 300 CAPSULE ORAL at 20:36

## 2023-01-31 RX ADMIN — PHENYLEPHRINE HYDROCHLORIDE 0.3 MCG/KG/MIN: 10 INJECTION INTRAVENOUS at 13:12

## 2023-01-31 RX ADMIN — PHENYLEPHRINE HYDROCHLORIDE 200 MCG: 10 INJECTION INTRAVENOUS at 13:58

## 2023-01-31 RX ADMIN — SODIUM CHLORIDE: 9 INJECTION, SOLUTION INTRAVENOUS at 17:30

## 2023-01-31 RX ADMIN — HEPARIN SODIUM 6000 UNITS: 1000 INJECTION INTRAVENOUS; SUBCUTANEOUS at 13:48

## 2023-01-31 RX ADMIN — APIXABAN 5 MG: 5 TABLET, FILM COATED ORAL at 20:37

## 2023-01-31 RX ADMIN — DEXAMETHASONE SODIUM PHOSPHATE 10 MG: 10 INJECTION, SOLUTION INTRAMUSCULAR; INTRAVENOUS at 13:04

## 2023-01-31 RX ADMIN — SUGAMMADEX 130 MG: 100 INJECTION, SOLUTION INTRAVENOUS at 15:01

## 2023-01-31 RX ADMIN — ACETAMINOPHEN 650 MG: 325 TABLET ORAL at 22:43

## 2023-01-31 RX ADMIN — ASPIRIN 325 MG: 325 TABLET ORAL at 09:53

## 2023-01-31 RX ADMIN — ROCURONIUM BROMIDE 50 MG: 50 INJECTION, SOLUTION INTRAVENOUS at 13:04

## 2023-01-31 RX ADMIN — KETAMINE HYDROCHLORIDE 30 MG: 10 INJECTION, SOLUTION INTRAMUSCULAR; INTRAVENOUS at 13:04

## 2023-01-31 ASSESSMENT — ACTIVITIES OF DAILY LIVING (ADL)
ADLS_ACUITY_SCORE: 35

## 2023-01-31 ASSESSMENT — ENCOUNTER SYMPTOMS: DYSRHYTHMIAS: 1

## 2023-01-31 NOTE — Clinical Note
Prepped: groin, chest, abdomen and neck. Prepped with: ChloraPrep. The patient was draped. .Pre-procedure site marking:Insertion site not predetermined

## 2023-01-31 NOTE — PHARMACY-ADMISSION MEDICATION HISTORY
Pharmacy Note - Admission Medication History    Pertinent Provider Information: n/a   ______________________________________________________________________    Prior To Admission (PTA) med list completed and updated in EMR.       PTA Med List   Medication Sig Last Dose     acetaminophen (TYLENOL) 325 MG tablet Take 650 mg by mouth every 6 hours as needed for pain Past Week at PRN     apixaban ANTICOAGULANT (ELIQUIS) 5 MG tablet Take 5 mg by mouth 2 times daily 1/25/2023     aspirin (ASA) 81 MG EC tablet Take 81 mg by mouth daily 1/30/2023 at AM     clopidogrel (PLAVIX) 75 MG tablet Take 1 tablet (75 mg) by mouth daily 1/31/2023 at AM     fluticasone (FLONASE) 50 MCG/ACT nasal spray Spray 1 spray into both nostrils as needed for rhinitis or allergies More than a month at PRN     furosemide (LASIX) 20 MG tablet Take 1 tablet (20 mg) by mouth daily 1/30/2023 at AM     gabapentin (NEURONTIN) 300 MG capsule Take 900 mg by mouth 2 times daily  1/31/2023 at AM     lisinopril (ZESTRIL) 2.5 MG tablet Take 1 tablet (2.5 mg) by mouth daily Take 2.5 mg + 5 mg =7.5 mg daily in AM 1/31/2023 at AM     lisinopril (ZESTRIL) 5 MG tablet Take 1 tablet (5 mg) by mouth daily (Patient taking differently: Take 5 mg by mouth daily Take in addition to 2.5mg tablet for total 7.5mg daily) 1/31/2023 at AM     metoprolol succinate ER (TOPROL XL) 25 MG 24 hr tablet Take 1 tablet (25 mg) by mouth daily (Patient taking differently: Take 25 mg by mouth every evening) 1/30/2023 at PM     rosuvastatin (CRESTOR) 10 MG tablet Take 1 tablet (10 mg) by mouth daily 1/30/2023 at AM     sotalol (BETAPACE) 80 MG tablet Take 1 tablet (80 mg) by mouth 2 times daily 1/30/2023 at PM     tamsulosin (FLOMAX) 0.4 MG capsule Take 0.4 mg by mouth every evening 1/30/2023 at PM       Information source(s): Patient and CareEverywhere/SureScripts    Method of interview communication: in-person    Patient was asked about OTC/herbal products specifically.  PTA med list  reflects this.    Based on the pharmacist's assessment, the PTA med list information appears reliable    Medication Affordability:  Not including over the counter (OTC) medications, was there a time in the past 12 months when you did not take your medications as prescribed because of cost?: No    Allergies were reviewed, assessed, and updated with the patient.      Patient does not anticipate needing any multi-use medications during admission.     Thank you for the opportunity to participate in the care of this patient.      Gwen Ceron, PharmD     1/31/2023     10:31 AM

## 2023-01-31 NOTE — ANESTHESIA PROCEDURE NOTES
Airway       Patient location during procedure: OR       Procedure Start/Stop Times: 1/31/2023 1:07 PM  Staff -        CRNA: Lucero Meléndez APRN CRNA       Performed By: CRNA  Consent for Airway        Urgency: elective  Indications and Patient Condition       Indications for airway management: agustin-procedural       Induction type:intravenous       Mask difficulty assessment: 1 - vent by mask    Final Airway Details       Final airway type: endotracheal airway       Successful airway: ETT - single  Endotracheal Airway Details        ETT size (mm): 8.0       Cuffed: yes       Successful intubation technique: direct laryngoscopy       DL Blade Type: Frey 2       Grade View of Cords: 3       Adjucts: stylet       Position: Right       Measured from: gums/teeth       Secured at (cm): 23       Bite block used: None    Post intubation assessment        Placement verified by: capnometry, equal breath sounds and chest rise        Number of attempts at approach: 1       Number of other approaches attempted: 0       Secured with: silk tape       Ease of procedure: easy       Dentition: Intact and Unchanged       Dental guard used and removed. Dental Guard Type: Proguard Red.    Medication(s) Administered   Medication Administration Time: 1/31/2023 1:07 PM    Additional Comments       Stiff neck. Partial view of cartilages. Intubated using landmarks

## 2023-01-31 NOTE — Clinical Note
Pigtail exchanged for JR4 catheter to allow for better assessment of flow through left subclavian artery.

## 2023-01-31 NOTE — ANESTHESIA CARE TRANSFER NOTE
Patient: Daniel Alamo    Procedure: Procedure(s):  Transcatheter Aortic Valve Replacement - Subclavian Approach  OR TRANSCATHETER AORTIC VALVE REPLACEMENT, SUBCLAVIAN PERCUTANEOUS APPROACH (STANDBY)       Diagnosis: valvular disease  Diagnosis Additional Information: No value filed.    Anesthesia Type:   General     Note:    Oropharynx: oropharynx clear of all foreign objects  Level of Consciousness: awake  Oxygen Supplementation: room air    Independent Airway: airway patency satisfactory and stable  Dentition: dentition unchanged  Vital Signs Stable: post-procedure vital signs reviewed and stable  Report to RN Given: handoff report given  Patient transferred to: Cardiac Special Care  Comments: Patient spontaneously breathing.  Tidal volumes > 400ml.  Following commands, suctioned and extubated with balloon down.  O2 via mask at 10L.  To CSC, VSS, SBAR report to RN per institutional handoff policy and procedure.  Transfer of care.        Vitals:  Vitals Value Taken Time   /80 01/31/23 1527   Temp 36.4  C (97.6  F) 01/31/23 1527   Pulse 57 01/31/23 1526   Resp 12 01/31/23 1527   SpO2 93 % 01/31/23 1527   Vitals shown include unvalidated device data.    Electronically Signed By: CAILIN Romo CRNA  January 31, 2023  3:28 PM

## 2023-01-31 NOTE — Clinical Note
14 Lao delivery sheath inserted into left subclavian artery. Multiple catheters and wires used to cross aortic valve and place safari wire in delivery position.

## 2023-01-31 NOTE — Clinical Note
Delivery system and safari wire removed intact. OR team to provide surgical closure of left subclavian arterial site.

## 2023-01-31 NOTE — Clinical Note
Prepped per 's instructions. Valve secured onto the delivery system balloon and correct orientation confirmed (blue to blue)

## 2023-01-31 NOTE — Clinical Note
Temporary pacemaker Rate= 40bpmPaced mA= 202Pacer wire was captured appropriately, Pacer is set and Demand on Standby

## 2023-01-31 NOTE — Clinical Note
Unless otherwise noted, the J wire was used to insert, remove, exchange, and reposition all catheters. Pigtail catheter inserted over J wire.

## 2023-01-31 NOTE — DISCHARGE INSTRUCTIONS
Patient Instructions - Going Home after TAVR:    Going Home: It s normal to feel a little anxious after leaving the hospital and when fewer people are nearby. People do much better when they feel as though they have support.  If you live alone we suggest you arrange to have someone stay with you for a day or two to help you recover.     Medications:  Take all of your medications as directed in your discharge summary. Do not stop taking these medications without speaking with your cardiologist. If you have any questions about any of your medications, speak to your pharmacist or provider.     Follow up Appointments  You will follow up with Justin Rivera NP on Feb 6 at 1:30 pm.  You will have a repeat echocardiogram on Feb 27 at 1:45 pm.  You will have a follow up with Josephine Lopez PA-C on March 6 at 2 pm.      If you need to reschedule any of these appointments, please call:  [phone]    See your regular cardiologist in 6 months.  Your regular heart doctor will continue to be your heart specialist.   See your family doctor in 2 weeks.  Make an appointment once you get home.  You will receive a temporary  heart valve  wallet card. We recommend that you keep it in your wallet or purse at all times. You will be receiving a permanent wallet card from the  within 6 months.   Before an MRI (magnetic resonance imaging) procedure, always notify the doctor (or medical technician) that you have an implanted heart valve.     Site Care: Shower every day.  Let the soap and water run over your incision or access site, but do not rub the area. No tub baths, pools or hot-tubs for the 1st week you are at home. Do not put ointments, powders, or lotions on your access site and/or incision.  It is normal to feel a small lump the size of a marble in the groin access site.  That is the closure device used to close the vein/artery.  If you are experiencing discomfort, bleeding, drainage, redness or  increasing swelling, please call us.    Dental Work: Avoid major dental work for the next 6 months if possible. You will need antibiotics before any dental cleanings, work or surgery. This will decrease the risk of infection.     Driving:  You should not drive for the first 2 weeks after your procedure. The first time you drive, you must have someone with you. You may ride in a car immediately after your procedure.      Activity: People recover at different rates depending on their general health and the type of heart valve procedure. Most people take about 4-10 weeks to feel fully recovered. Daily activity and exercise are an important part of your recovery.  Do not lift, push or pull anything > 10 lb. for the first 2 weeks.        CALL THE VALVE CLINIC IF YOU HAVE ANY QUESTIONS OR CONCERNS:  [phone]  For the first 2 weeks after TAVR     Do Not:   Lift, push, or pull more than ten pounds (including pets, laundry, groceries, etc)  Garden, including lawn mowing and raking  Excessively bear down or strain when having a bowel movement   Ride a bike   Do:  Weigh yourself every day in the morning after using the bathroom.  If you notice weight gain of more than 3 pounds in 1 day or more than 5 pounds in 1 week, call the cardiology clinic.   Get up and get dressed every day. Do not stay in bed.  Set a daily routine.   Walk as much as you can. You may walk up and down stairs. When you are tired, rest.   Cough and deep breathe. Use your incentive spirometer 5-10 times each hour when you are awake.   We strongly recommend you participate in a cardiac rehabilitation program. This type of program will help you learn about your heart health, prevent more heart problems, participate in safe and heart-healthy activities, and learn how to return to your activities of daily living and hobbies.   Let the cardiology clinic know if you need to leave town before your first follow-up visit.     When to call the Cardiology Clinic to  speak with a nurse?   Swelling of the legs, ankles, or feet  Increasing shortness of breath  Change in the color or temperature of your lower legs and feet  Abdominal pain or unrelieved nausea and/or vomiting  Redness and warmth around your access site and/or incision that does not go away  Yellow or green drainage from your access site and/or incision   Weight gain of 3 pounds in one day or 5 pounds in one week  Develop any numbness, tingling, or limited movement of your arms or legs.   Chest pain that does not go away  New confusion or you cannot think clearly  Fever and chills         [facility] Clinic:  [phone]  If you are calling after hours, please listen to the entire voicemail, a live  will answer at the end of the message

## 2023-01-31 NOTE — ANESTHESIA PREPROCEDURE EVALUATION
Anesthesia Pre-Procedure Evaluation    Patient: Daniel Alamo   MRN: [id number] : 1940        Procedure : Procedure(s):  Transcatheter Aortic Valve Replacement - Subclavian Approach  OR TRANSCATHETER AORTIC VALVE REPLACEMENT, SUBCLAVIAN PERCUTANEOUS APPROACH (STANDBY)          Past Medical History:   Diagnosis Date     Coronary artery disease      Heart disease      VT (ventricular tachycardia)       Past Surgical History:   Procedure Laterality Date     ABDOMEN SURGERY       BYPASS GRAFT ARTERY CORONARY       CV ANGIOGRAM CORONARY GRAFT N/A 2022    Procedure: Coronary Angiogram Graft;  Surgeon: Tyrone Ordoñez MD;  Location: [facility]     CV CORONARY LITHOTRIPSY PCI N/A 2022    Procedure: Percutaneous Coronary Intervention - Lithotripsy;  Surgeon: Tyrone Ordoñez MD;  Location: [facility]     CV LEFT HEART CATH N/A 2022    Procedure: Left Heart Catheterization;  Surgeon: Tyrone Ordoñez MD;  Location: [facility]     CV PCI ANGIOPLASTY N/A 2022    Procedure: Percutaneous Transluminal Angioplasty;  Surgeon: Tyrone Ordoñez MD;  Location: [facility]     CV RIGHT HEART CATH MEASUREMENTS RECORDED N/A 2022    Procedure: Right Heart Catheterization;  Surgeon: Tyrone Ordoñez MD;  Location: [facility]     EYE SURGERY       INSERT / REPLACE / REMOVE PACEMAKER       OTHER SURGICAL HISTORY      icd     ZZC CABG, VEIN, SINGLE      Description: CABG (CABG);  Recorded: 10/03/2012;  Comments: LIMA to LAD, SVG to OM2, SVG to RCA      Allergies   Allergen Reactions     Atorvastatin Diarrhea     Carvedilol Other (See Comments) and GI Disturbance     Upset stomach; GI upset       Chloride GI Disturbance and Nausea      Social History     Tobacco Use     Smoking status: Every Day     Packs/day: 0.50     Years: 30.00     Pack years: 15.00     Types: Cigarettes     Smokeless tobacco: Never   Substance Use Topics     Alcohol use: Yes      Alcohol/week: 1.0 standard drink     Comment: Glass of wine daily.      Wt Readings from Last 1 Encounters:   01/31/23 64.9 kg (143 lb)        Anesthesia Evaluation   Pt has had prior anesthetic.     No history of anesthetic complications       ROS/MED HX  ENT/Pulmonary:  - neg pulmonary ROS     Neurologic:  - neg neurologic ROS     Cardiovascular:     (+) -Peripheral Vascular Disease (AAA)-- Other. CAD -CABG--CHF Last EF: 30-35 ICD dysrhythmias, a-fib, valvular problems/murmurs type: AI, AS and MR     METS/Exercise Tolerance: >4 METS    Hematologic:  - neg hematologic  ROS     Musculoskeletal:  - neg musculoskeletal ROS     GI/Hepatic:  - neg GI/hepatic ROS     Renal/Genitourinary:  - neg Renal ROS     Endo:  - neg endo ROS     Psychiatric/Substance Use:  - neg psychiatric ROS     Infectious Disease:  - neg infectious disease ROS     Malignancy:  - neg malignancy ROS     Other:  - neg other ROS          Physical Exam    Airway  airway exam normal      Mallampati: II   TM distance: > 3 FB   Neck ROM: full   Mouth opening: > 3 cm    Respiratory Devices and Support         Dental       (+) Minor Abnormalities - some fillings, tiny chips      Cardiovascular   cardiovascular exam normal          Pulmonary   pulmonary exam normal                OUTSIDE LABS:  CBC:   Lab Results   Component Value Date    WBC 8.2 01/30/2023    WBC 8.3 12/16/2022    HGB 14.4 01/30/2023    HGB 14.5 12/16/2022    HCT 45.6 01/30/2023    HCT 45.4 12/16/2022     01/30/2023     12/16/2022     BMP:   Lab Results   Component Value Date     01/30/2023     01/10/2023    POTASSIUM 4.3 01/30/2023    POTASSIUM 4.4 01/10/2023    CHLORIDE 105 01/30/2023    CHLORIDE 104 01/10/2023    CO2 28 01/30/2023    CO2 28 01/10/2023    BUN 14.1 01/30/2023    BUN 13.2 01/10/2023    CR 1.02 01/30/2023    CR 1.08 01/10/2023    GLC 87 01/30/2023    GLC 84 01/10/2023     COAGS:   Lab Results   Component Value Date    INR 1.11 01/30/2023      POC: No results found for: BGM, HCG, HCGS  HEPATIC:   Lab Results   Component Value Date    ALBUMIN 3.8 01/30/2023    PROTTOTAL 6.7 01/30/2023    ALT 18 01/30/2023    AST 19 01/30/2023    ALKPHOS 84 01/30/2023    BILITOTAL 0.7 01/30/2023     OTHER:   Lab Results   Component Value Date    PH 7.42 09/19/2022    EMILIO 9.1 01/30/2023    MAG 1.7 01/30/2023    TSH 0.46 09/01/2022    CRP 0.1 02/25/2020    SED 11 02/25/2020       Anesthesia Plan    ASA Status:  4   NPO Status:  NPO Appropriate    Anesthesia Type: General.     - Airway: ETT   Induction: Intravenous, Propofol.   Maintenance: Balanced.   Techniques and Equipment:     - Lines/Monitors: 2nd IV, Arterial Line     - Drips/Meds: Ketamine, Vasopressin, Phenylephrine     Consents    Anesthesia Plan(s) and associated risks, benefits, and realistic alternatives discussed. Questions answered and patient/representative(s) expressed understanding.    - Discussed:     - Discussed with:  Patient      - Extended Intubation/Ventilatory Support Discussed: No.      - Patient is DNR/DNI Status: No    Use of blood products discussed: No .     Postoperative Care    Pain management: IV analgesics, Multi-modal analgesia.   PONV prophylaxis: Ondansetron (or other 5HT-3), Dexamethasone or Solumedrol, Droperidol or Haldol     Comments:    Other Comments: 30 mg ketamine IV on induction.            Robert Mason MD

## 2023-01-31 NOTE — ANESTHESIA PROCEDURE NOTES
Arterial Line Procedure Note    Pre-Procedure   Staff -        Anesthesiologist:  Robert Mason MD       Performed By: anesthesiologist       Location: pre-op       Pre-Anesthestic Checklist: patient identified, IV checked, risks and benefits discussed, informed consent, monitors and equipment checked, pre-op evaluation and at physician/surgeon's request  Timeout:       Correct Patient: Yes        Correct Procedure: Yes        Correct Site: Yes        Correct Position: Yes   Line Placement:   This line was placed Pre Induction starting at 1/31/2023 12:54 PM and ending at 1/31/2023 1:00 PM  Procedure   Procedure: arterial line, new line and elective       Laterality: right       Insertion Site: radial.  Sterile Prep        Standard elements of sterile barrier followed       Skin prep: Chloraprep  Insertion/Injection        Technique: Seldinger Technique and ultrasound guided        1. Ultrasound was used to evaluate the access site.       2. Artery evaluated via ultrasound for patency/adequacy.       3. Using real-time ultrasound the needle/catheter was observed entering the artery/vein.       4. Permanent image was captured and entered into the patient's record.       5. The visualized structures were anatomically normal.       6. There were no apparent abnormal pathologic findings.       Catheter Type/Size: 20 G, 12 cm  Narrative         Secured by: suture       Tegaderm and Biopatch dressing used.       Complications: None apparent,        Arterial waveform: Yes

## 2023-01-31 NOTE — INTERVAL H&P NOTE
I have reviewed the surgical (or preoperative) H&P that is linked to this encounter, and examined the patient. There are no significant changes    Clinical Conditions Present on Arrival:  Clinically Significant Risk Factors Present on Admission                  # Drug Induced Coagulation Defect: home medication list includes an anticoagulant medication  # Drug Induced Platelet Defect: home medication list includes an antiplatelet medication

## 2023-01-31 NOTE — Clinical Note
[employer] rep present to interrogate device and to turn off ICD function for duration of TAVR procedure.

## 2023-01-31 NOTE — ANESTHESIA POSTPROCEDURE EVALUATION
Patient: Daniel Alamo    Procedure: Procedure(s):  Transcatheter Aortic Valve Replacement - Subclavian Approach  OR TRANSCATHETER AORTIC VALVE REPLACEMENT, SUBCLAVIAN PERCUTANEOUS APPROACH (STANDBY)       Anesthesia Type:  General    Note:  Disposition: Admission   Postop Pain Control: Uneventful            Sign Out: Well controlled pain   PONV: No   Neuro/Psych: Uneventful            Sign Out: Acceptable/Baseline neuro status   Airway/Respiratory: Uneventful            Sign Out: Acceptable/Baseline resp. status   CV/Hemodynamics: Uneventful            Sign Out: Acceptable CV status; No obvious hypovolemia; No obvious fluid overload   Other NRE: NONE   DID A NON-ROUTINE EVENT OCCUR?            Last vitals:  Vitals Value Taken Time   /75 01/31/23 1600   Temp 36.4  C (97.6  F) 01/31/23 1527   Pulse 54 01/31/23 1610   Resp 27 01/31/23 1610   SpO2 89 % 01/31/23 1610   Vitals shown include unvalidated device data.    Electronically Signed By: Robert Mason MD  January 31, 2023  4:12 PM

## 2023-01-31 NOTE — BRIEF OP NOTE
[facility]    Brief Operative Note    Pre-operative diagnosis: Aortic stenosis  Post-operative diagnosis: Same    Procedures:  (1) Left subclavian transcatheter aortic valve replacement using a 29 mm Harper Magdalena 3 valve at [facility] plus one.  (2) Photofix angioplasty repair of the left subclavian artery.  (3) Balloon angioplasty of the left subclavian artery x 2.  Surgeon: Surgeon(s) and Role:  Panel 1:     * Tyrone Ordoñez MD - Primary     * Han Kohler MD - Assisting  Panel 2:     * Shanae Rich MD - Primary  Anesthesia: General   Estimated Blood Loss: 50 mL  Drains: None  Specimens: None  Findings: Minimal gradient with no perivalvular leak after valve deployment.  No aortic insufficiency or perivalvular leak, no pericardial effusion.  DSA revealed pinching of the left subclavian artery that improved after Photofix angioplasty repair and balloon angioplasties of the left subclavian artery.  Complications: None   Implants:   Implant Name Type Inv. Item Serial No.  Lot No. LRB No. Used Action   VALVE HEART MAGDALENA 3 29MM [id number] - [id number] Valve VALVE HEART MAGDALENA 3 29MM [id number]  [employer] [id number] Left 1 Implanted   IMP PATCH PERICARDIUM PHOTOFIX BOVINE 0.8X8CM [id number] - [id number] Bone/Tissue/Biologic IMP PATCH PERICARDIUM PHOTOFIX BOVINE 0.8X8CM [id number]  [facility] [id number] Left 1 Implanted

## 2023-02-01 ENCOUNTER — APPOINTMENT (OUTPATIENT)
Dept: RADIOLOGY | Facility: HOSPITAL | Age: 83
DRG: 267 | End: 2023-02-01
Attending: INTERNAL MEDICINE
Payer: MEDICARE

## 2023-02-01 ENCOUNTER — APPOINTMENT (OUTPATIENT)
Dept: OCCUPATIONAL THERAPY | Facility: HOSPITAL | Age: 83
DRG: 267 | End: 2023-02-01
Attending: INTERNAL MEDICINE
Payer: MEDICARE

## 2023-02-01 ENCOUNTER — APPOINTMENT (OUTPATIENT)
Dept: CARDIOLOGY | Facility: HOSPITAL | Age: 83
DRG: 267 | End: 2023-02-01
Attending: INTERNAL MEDICINE
Payer: MEDICARE

## 2023-02-01 VITALS
HEIGHT: 70 IN | RESPIRATION RATE: 20 BRPM | TEMPERATURE: 98 F | OXYGEN SATURATION: 93 % | SYSTOLIC BLOOD PRESSURE: 113 MMHG | BODY MASS INDEX: 20.77 KG/M2 | HEART RATE: 63 BPM | WEIGHT: 145.06 LBS | DIASTOLIC BLOOD PRESSURE: 56 MMHG

## 2023-02-01 DIAGNOSIS — Z95.2 S/P TAVR (TRANSCATHETER AORTIC VALVE REPLACEMENT): Primary | ICD-10-CM

## 2023-02-01 PROBLEM — Z95.810 ICD (IMPLANTABLE CARDIOVERTER-DEFIBRILLATOR), DUAL, IN SITU: Status: ACTIVE | Noted: 2017-07-18

## 2023-02-01 PROBLEM — I25.5 ISCHEMIC CARDIOMYOPATHY: Status: ACTIVE | Noted: 2019-12-31

## 2023-02-01 PROBLEM — Z95.1 S/P CABG (CORONARY ARTERY BYPASS GRAFT): Status: ACTIVE | Noted: 2022-10-10

## 2023-02-01 PROBLEM — I73.9 PVD (PERIPHERAL VASCULAR DISEASE) (H): Status: ACTIVE | Noted: 2020-02-24

## 2023-02-01 PROBLEM — I49.5 SINUS NODE DYSFUNCTION (H): Status: ACTIVE | Noted: 2022-11-22

## 2023-02-01 LAB
ANION GAP SERPL CALCULATED.3IONS-SCNC: 9 MMOL/L (ref 7–15)
ATRIAL RATE - MUSE: 57 BPM
ATRIAL RATE - MUSE: 60 BPM
BUN SERPL-MCNC: 18.2 MG/DL (ref 8–23)
CALCIUM SERPL-MCNC: 8.5 MG/DL (ref 8.8–10.2)
CHLORIDE SERPL-SCNC: 107 MMOL/L (ref 98–107)
CREAT SERPL-MCNC: 0.97 MG/DL (ref 0.67–1.17)
DEPRECATED HCO3 PLAS-SCNC: 23 MMOL/L (ref 22–29)
DIASTOLIC BLOOD PRESSURE - MUSE: NORMAL MMHG
DIASTOLIC BLOOD PRESSURE - MUSE: NORMAL MMHG
ERYTHROCYTE [DISTWIDTH] IN BLOOD BY AUTOMATED COUNT: 13.7 % (ref 10–15)
GFR SERPL CREATININE-BSD FRML MDRD: 77 ML/MIN/1.73M2
GLUCOSE SERPL-MCNC: 112 MG/DL (ref 70–99)
HCT VFR BLD AUTO: 38.2 % (ref 40–53)
HGB BLD-MCNC: 12.2 G/DL (ref 13.3–17.7)
INTERPRETATION ECG - MUSE: NORMAL
INTERPRETATION ECG - MUSE: NORMAL
LVEF ECHO: NORMAL
MAGNESIUM SERPL-MCNC: 1.6 MG/DL (ref 1.7–2.3)
MCH RBC QN AUTO: 29.5 PG (ref 26.5–33)
MCHC RBC AUTO-ENTMCNC: 31.9 G/DL (ref 31.5–36.5)
MCV RBC AUTO: 92 FL (ref 78–100)
P AXIS - MUSE: -17 DEGREES
P AXIS - MUSE: -33 DEGREES
PLATELET # BLD AUTO: 170 10E3/UL (ref 150–450)
POTASSIUM SERPL-SCNC: 3.9 MMOL/L (ref 3.4–5.3)
PR INTERVAL - MUSE: 202 MS
PR INTERVAL - MUSE: 206 MS
QRS DURATION - MUSE: 148 MS
QRS DURATION - MUSE: 164 MS
QT - MUSE: 492 MS
QT - MUSE: 554 MS
QTC - MUSE: 492 MS
QTC - MUSE: 576 MS
R AXIS - MUSE: 90 DEGREES
R AXIS - MUSE: 94 DEGREES
RBC # BLD AUTO: 4.14 10E6/UL (ref 4.4–5.9)
SODIUM SERPL-SCNC: 139 MMOL/L (ref 136–145)
SYSTOLIC BLOOD PRESSURE - MUSE: NORMAL MMHG
SYSTOLIC BLOOD PRESSURE - MUSE: NORMAL MMHG
T AXIS - MUSE: -23 DEGREES
T AXIS - MUSE: 229 DEGREES
VENTRICULAR RATE- MUSE: 60 BPM
VENTRICULAR RATE- MUSE: 65 BPM
WBC # BLD AUTO: 16.2 10E3/UL (ref 4–11)

## 2023-02-01 PROCEDURE — 93010 ELECTROCARDIOGRAM REPORT: CPT | Mod: HIP | Performed by: INTERNAL MEDICINE

## 2023-02-01 PROCEDURE — 250N000013 HC RX MED GY IP 250 OP 250 PS 637: Performed by: INTERNAL MEDICINE

## 2023-02-01 PROCEDURE — 97165 OT EVAL LOW COMPLEX 30 MIN: CPT | Mod: GO

## 2023-02-01 PROCEDURE — 97110 THERAPEUTIC EXERCISES: CPT | Mod: GO

## 2023-02-01 PROCEDURE — 93306 TTE W/DOPPLER COMPLETE: CPT

## 2023-02-01 PROCEDURE — 80048 BASIC METABOLIC PNL TOTAL CA: CPT | Performed by: INTERNAL MEDICINE

## 2023-02-01 PROCEDURE — 93306 TTE W/DOPPLER COMPLETE: CPT | Mod: 26 | Performed by: INTERNAL MEDICINE

## 2023-02-01 PROCEDURE — 250N000011 HC RX IP 250 OP 636: Performed by: INTERNAL MEDICINE

## 2023-02-01 PROCEDURE — 97535 SELF CARE MNGMENT TRAINING: CPT | Mod: GO

## 2023-02-01 PROCEDURE — 93005 ELECTROCARDIOGRAM TRACING: CPT

## 2023-02-01 PROCEDURE — 83735 ASSAY OF MAGNESIUM: CPT | Performed by: INTERNAL MEDICINE

## 2023-02-01 PROCEDURE — 93005 ELECTROCARDIOGRAM TRACING: CPT | Performed by: INTERNAL MEDICINE

## 2023-02-01 PROCEDURE — 99232 SBSQ HOSP IP/OBS MODERATE 35: CPT | Performed by: INTERNAL MEDICINE

## 2023-02-01 PROCEDURE — 71045 X-RAY EXAM CHEST 1 VIEW: CPT

## 2023-02-01 PROCEDURE — 36415 COLL VENOUS BLD VENIPUNCTURE: CPT | Performed by: INTERNAL MEDICINE

## 2023-02-01 PROCEDURE — 85027 COMPLETE CBC AUTOMATED: CPT | Performed by: INTERNAL MEDICINE

## 2023-02-01 RX ORDER — LISINOPRIL 5 MG/1
5 TABLET ORAL DAILY
Start: 2023-02-01 | End: 2023-02-17

## 2023-02-01 RX ADMIN — FUROSEMIDE 20 MG: 20 TABLET ORAL at 08:59

## 2023-02-01 RX ADMIN — CLOPIDOGREL BISULFATE 75 MG: 75 TABLET ORAL at 08:58

## 2023-02-01 RX ADMIN — APIXABAN 5 MG: 5 TABLET, FILM COATED ORAL at 08:58

## 2023-02-01 RX ADMIN — SOTALOL HYDROCHLORIDE 80 MG: 80 TABLET ORAL at 08:59

## 2023-02-01 RX ADMIN — GABAPENTIN 900 MG: 300 CAPSULE ORAL at 08:58

## 2023-02-01 RX ADMIN — ASPIRIN 81 MG: 81 TABLET, COATED ORAL at 08:59

## 2023-02-01 RX ADMIN — ROSUVASTATIN CALCIUM 10 MG: 10 TABLET, FILM COATED ORAL at 08:59

## 2023-02-01 RX ADMIN — LISINOPRIL 5 MG: 5 TABLET ORAL at 08:58

## 2023-02-01 RX ADMIN — LISINOPRIL 2.5 MG: 2.5 TABLET ORAL at 08:58

## 2023-02-01 RX ADMIN — CEFAZOLIN 1 G: 1 INJECTION, POWDER, FOR SOLUTION INTRAMUSCULAR; INTRAVENOUS at 05:53

## 2023-02-01 ASSESSMENT — ACTIVITIES OF DAILY LIVING (ADL)
ADLS_ACUITY_SCORE: 37
ADLS_ACUITY_SCORE: 35
IADL_COMMENTS: PT AND SPOUSE SHARE IADL TASKS
ADLS_ACUITY_SCORE: 37
ADLS_ACUITY_SCORE: 35
ADLS_ACUITY_SCORE: 37
ADLS_ACUITY_SCORE: 35

## 2023-02-01 NOTE — PLAN OF CARE
Assumed care 1900 to 0730. A&O x 4. Bedrest. Tele is AV paced. C/O bilateral hand pain (at baseline). PRN Tylenol given for pain (see MAR). Primofit in place to suction. Right groin, radial and left subclavian sites are clean, dry, and intact. Call light within reach, able to make needs known. Bed alarm on for safety.    Problem: Risk for Delirium  Goal: Improved Behavioral Control  Intervention: Minimize Safety Risk  Recent Flowsheet Documentation  Taken 2/1/2023 0430 by MIGUEL RACHEL  Enhanced Safety Measures: bed alarm set

## 2023-02-01 NOTE — PROGRESS NOTES
[facility]    Medicine Progress Note - Hospitalist Service    Date of Admission:  1/31/2023    Assessment & Plan   82-year-old with aortic stenosis, ischemic cardiomyopathy with a EF of 30 to 35%, CAD, dyslipidemia, hypertension who underwent left subclavian entrance catheter aortic replacement and balloon angioplasty of the left subclavian artery x2 on 1/31.  [facility] was consulted after TAVR.    Essential hypertension controlled  -- Continue lisinopril, metoprolol and Lasix    Bilateral arthritic hand pain  -- This is chronic  -- Continue Tylenol  -- We will hold NSAIDs given multiple antiplatelet and DOAC  -- Continue gabapentin at home dose.    Paroxysmal atrial fibrillation  -- Patient on sotalol as well as metoprolol  -- He is anticoagulated with Eliquis.    Chronic heart failure  -- Does not appear to be in exacerbation  -- Continue Lasix, ACE inhibitor and beta-blocker.    S/p TAVR  --Defer diet, pain medication, discharge, to cardiology     Diet: Regular Diet Adult    DVT Prophylaxis: DOAC  Mtz Catheter: Not present  Lines: None     Cardiac Monitoring: ACTIVE order. Indication: Transcatheter structural interventions (24 hours)  Code Status: Full Code      Clinically Significant Risk Factors Present on Admission          # Hypocalcemia: Lowest Ca = 8.2 mg/dL in last 2 days, will monitor and replace as appropriate   # Hypomagnesemia: Lowest Mg = 1.5 mg/dL in last 2 days, will replace as needed   # Hypoalbuminemia: Lowest albumin = 3.2 g/dL at 1/31/2023  3:47 PM, will monitor as appropriate  # Drug Induced Coagulation Defect: home medication list includes an anticoagulant medication  # Drug Induced Platelet Defect: home medication list includes an antiplatelet medication   # Hypertension: home medication list includes antihypertensive(s)  # Chronic systolic heart failure: echo within the past year with EF <40%              Disposition Plan      Expected Discharge Date: 02/01/2023                   Andres Lau MD  Hospitalist Service  [facility]  Securely message with [employer] (more info)  Text page via [employer] Paging/Directory   ______________________________________________________________________    Interval History   Patient new to me.  Chart reviewed.  [facility] consulted for postoperative management after TAVR.  Patient is complaining of chronic and arthritic pain.  We will continue Tylenol.  Denies any headache, double vision, difficulty swallowing or speaking.    Physical Exam   Vital Signs: Temp: 98.6  F (37  C) Temp src: Oral BP: 128/60 Pulse: 63   Resp: 19 SpO2: 95 % O2 Device: None (Room air)    Weight: 146 lbs 2.64 oz    General Appearance: Normal speech and no tremors  Respiratory: Lungs are clear to auscultation  Cardiovascular: Did not examine, chest wall has  GI: Abdomen is soft  Skin: No generalized skin rash  Other: Able to move all 4 extremities    Medical Decision Making       35 MINUTES SPENT BY ME on the date of service doing chart review, history, exam, documentation & further activities per the note.      Data     I have personally reviewed the following data over the past 24 hrs:    10.6  \   12.6 (L)   / 168     140 107 13.5 /  96   4.1 26 0.97 \       ALT: 13 AST: 16 AP: 78 TBILI: 0.6   ALB: 3.2 (L) TOT PROTEIN: 5.7 (L) LIPASE: N/A

## 2023-02-01 NOTE — PROGRESS NOTES
Care Management Discharge Note    Discharge Date: 02/01/2023       Discharge Disposition:  Home    Discharge Services:  Outpatient cardiac rehab    Discharge DME:  None    Discharge Transportation: family or friend will provide    Private pay costs discussed: Not applicable    Education Provided on the Discharge Plan:  Yes  Persons Notified of Discharge Plans: MD, RN, SW, patient  Patient/Family in Agreement with the Plan: Yes     Handoff Referral Completed: Yes    Additional Information:  Chart reviewed. Patient from home, independent at baseline. Patient will discharge home with outpatient cardiac rehab.     Saima Copeland

## 2023-02-01 NOTE — PROGRESS NOTES
Received patient from previous RN @ 1700. Patient was kept comfortable during post-procedure stay.VSS. Complained of left hand pain that patient said he usually have when he lays on his back. He said he usually take tylenol and or gabapentin for this. Tylenol given with some relief. Right femoral access site, right arterial line access site and left subclavian access site remained dry & free from signs of bleeding. Dr. Ordoñez was able to speak with patient's wife post procedure. Updated regarding patient's transfer to the floor. Verbalized no concerns. Belongings brought up to patient's room. Transferred to P3 in stable condition. Report given to Denisse AGEE.

## 2023-02-01 NOTE — PLAN OF CARE
"  Problem: Plan of Care - These are the overarching goals to be used throughout the patient stay.    Goal: Plan of Care Review  Description: The Plan of Care Review/Shift note should be completed every shift.  The Outcome Evaluation is a brief statement about your assessment that the patient is improving, declining, or no change.  This information will be displayed automatically on your shift note.  Outcome: Adequate for Care Transition  Goal: Patient-Specific Goal (Individualized)  Description: You can add care plan individualizations to a care plan. Examples of Individualization might be:  \"Parent requests to be called daily at 9am for status\", \"I have a hard time hearing out of my right ear\", or \"Do not touch me to wake me up as it startles me\".  Outcome: Adequate for Care Transition  Goal: Absence of Hospital-Acquired Illness or Injury  Outcome: Adequate for Care Transition  Goal: Optimal Comfort and Wellbeing  Outcome: Adequate for Care Transition  Goal: Readiness for Transition of Care  Outcome: Adequate for Care Transition     Problem: General Plan of Care (Inpatient Behavioral)  Goal: Individualization/Patient Specific Goal (IP Behavioral)  Description: The patient and/or their representative will achieve their patient-specific goals related to the plan of care.    The patient-specific goals include:  Outcome: Adequate for Care Transition  Goal: Patient Schedule  Description: Patient's Schedule:  Outcome: Adequate for Care Transition  Goal: Release of Information  Outcome: Adequate for Care Transition  Goal: Team Discussion  Outcome: Adequate for Care Transition  Goal: Discharge Planning  Outcome: Adequate for Care Transition  Goal: Physician Review  Outcome: Adequate for Care Transition     Problem: Risk for Delirium  Goal: Optimal Coping  Outcome: Adequate for Care Transition  Goal: Improved Behavioral Control  Outcome: Adequate for Care Transition  Goal: Improved Attention and Thought Clarity  Outcome: " Adequate for Care Transition  Goal: Improved Sleep  Outcome: Adequate for Care Transition   Goal Outcome Evaluation:

## 2023-02-01 NOTE — PROGRESS NOTES
Occupational Therapy      02/01/23 0730   Appointment Info   Signing Clinician's Name / Credentials (OT) Carlita Mao OTR/L   Living Environment   People in Home spouse   Current Living Arrangements house   Home Accessibility stairs to enter home;stairs within home   Number of Stairs, Main Entrance 2;3   Stair Railings, Main Entrance railings safe and in good condition   Number of Stairs, Within Home, Primary greater than 10 stairs   Stair Railings, Within Home, Primary railings safe and in good condition   Transportation Anticipated family or friend will provide   Living Environment Comments W/I shower   Self-Care   Usual Activity Tolerance good   Current Activity Tolerance moderate   Regular Exercise No   Fall history within last six months no   Activity/Exercise/Self-Care Comment ind. w/ ADL routine   Instrumental Activities of Daily Living (IADL)   IADL Comments Pt and spouse share IADL tasks   General Information   Onset of Illness/Injury or Date of Surgery 01/31/23   Referring Physician Han Kohler MD   Additional Occupational Profile Info/Pertinent History of Current Problem 82-year-old with aortic stenosis, ischemic cardiomyopathy with a EF of 30 to 35%, CAD, dyslipidemia, hypertension who underwent left subclavian entrance catheter aortic replacement and balloon angioplasty of the left subclavian artery x2 on 1/31.  [facility] [place].   Existing Precautions/Restrictions cardiac  (s/p TAVR R. groin 1/31- no lifting >10lbs, pushing/pulling, R. radial wrist site no excessive push/pull/flex/ext. L. subclavian angio)   Cognitive Status Examination   Orientation Status orientation to person, place and time   Range of Motion Comprehensive   General Range of Motion no range of motion deficits identified   Bed Mobility   Bed Mobility No deficits identified   Clinical Impression   Criteria for Skilled Therapeutic Interventions Met (OT) Yes, treatment indicated   OT Diagnosis decreased  ADL ind.   OT Problem List-Impairments impacting ADL problems related to;activity tolerance impaired;balance;strength;post-surgical precautions   Assessment of Occupational Performance 1-3 Performance Deficits   Planned Therapy Interventions (OT) ADL retraining;strengthening;transfer training   Clinical Decision Making Complexity (OT) low complexity   Risk & Benefits of therapy have been explained evaluation/treatment results reviewed;patient   OT Total Evaluation Time   OT Eval, Low Complexity Minutes (59176) 8   OT Goals   Therapy Frequency (OT) One time eval and treatment   OT Predicted Duration/Target Date for Goal Attainment 02/01/23   OT Goals Cardiac Phase 1   OT: Understanding of cardiac education to maximize quality of life, condition management, and health outcomes Patient;Demonstrate;Verbalize   OT: Perform aerobic activity with stable cardiovascular response continuous;10 minutes;15 minutes   OT: Functional/aerobic ambulation tolerance with stable cardiovascular response in order to return to home and community environment Supervision/SBA;200 feet   OT: Navigation of stairs simulating home set up with stable cardiovascular response in order to return to home and community environment Supervision/SBA;10 stairs   Self-Care/Home Management   Self-Care/Home Mgmt/ADL, Compensatory, Meal Prep Minutes (91495) 12   Treatment Detail/Skilled Intervention Pt provided cardiac educational materials/precautions following TAVR procedure/L. subclavian angio, pt verbalizing understanding. Mod.I for bed mobility, Additional STS Mod.I no device no LOB, Pt INd. w/ LB dressing, STS toileting.   Therapeutic Procedures/Exercise   Therapeutic Procedure: strength, endurance, ROM, flexibillity minutes (15604) 15   Treatment Detail/Skilled Intervention see ambulation/stair tab   Treatment Time Includes (CR Only) Monitoring of vital signs (see vital signs flowsheet for details)   Ambulation   Workload 200ft   Symptoms Denies  symptoms   Cardiovascular Response Normal   Exercise Details SBA w/o device no LOB   Vital Signs Details Pre act. HR 60 /56, post HR 78 /56   Stairs   Workload 12   Symptoms Denies symptoms   Cardiovascular Response Normal   Exercise Details Supervision for stairs no LOB noted   Vital Signs Details Pre act. HR 78 /56, Post HR 75 bP 102/59   Cardiac Education   Education Provided Daily weights;Emergency plan;Heart anatomy;Home exercise program;Outpatient Cardiac Rehab;Precautions;Resuming home activities;Signs and symptoms;Risk factors   Education Packet Given to Patient Yes   All Patient Education Handouts Reviewed with Patient and/or Family Yes   Cardiac Rehab Phase II Plan   Phase II Order Received Yes   Phase II Appointment Status Not scheduled   Not Scheduled Reason   (order put in but not scheduled)   OT Discharge Planning   OT Plan d/c OT   OT Discharge Recommendation (DC Rec) home with outpatient cardiac rehab;home with assist   OT Rationale for DC Rec Pt moving well w/o device Superivsion w/in hallway for long distance, MOd.I w/in room no LOB Noted vitals stable- pt lives w/ spouse in home.   Total Session Time   Timed Code Treatment Minutes 27   Total Session Time (sum of timed and untimed services) 35

## 2023-02-01 NOTE — DISCHARGE SUMMARY
[facility]  CARDIOLOGY DISCHARGE SUMMARY     Primary Care Physician: Kenna Calvillo  Admission Date: 1/31/2023   Discharge Provider: Josephine Lopez PA-C Discharge Date: 2/1/2023   Diet: resume previous home diet   Code Status: Full Code   Activity: No lifting > 10 lb, no driving for 10 days        Condition at Discharge: Stable      BRIEF HPI:   Daniel is an 82-year-old gentleman with history of coronary artery disease, CABG in the past, heart failure with reduced ejection fraction, ischemic cardiomyopathy, ICD in situ, paroxysmal atrial fibrillation, hyperlipidemia and PVD with AAA status post endograft repair in 2020.  He was recently found to have severe low-flow low gradient aortic stenosis and was seen by valve clinic.  He was recommended for TAVR given his age and other comorbidities.    He presented yesterday for the elective, planned procedure    PRINCIPAL & ACTIVE DISCHARGE DIAGNOSES     Principal Problem:    Aortic stenosis, severe  Active Problems:    Paroxysmal atrial fibrillation (H)    Coronary arteriosclerosis due to lipid rich plaque    ICD (implantable cardioverter-defibrillator), dual, in situ    Ischemic cardiomyopathy    PVD (peripheral vascular disease) (H)    Heart failure with reduced ejection fraction, NYHA class II (H)    S/P CABG (coronary artery bypass graft)    Sinus node dysfunction (H)    S/P TAVR (transcatheter aortic valve replacement)      SIGNIFICANT FINDINGS (Imaging, labs):   ECHO 2/1/23  Interpretation Summary     1.Left ventricular function is decreased. The ejection fraction is 30-35%  (moderately reduced).  2.Normal right ventricle size and systolic function.  3.There is mild to moderate (1-2+) mitral regurgitation. ERO 0.11 cmÂ  with a  volume of 27 cc at heart rate of 89 bpm.  4.Aortic valve replace with a 29 mm CHANTAL 3, peak velocity is 2.2 m/s with a  mean gradient of 8 mmHg, there is trivial paravalvular leak.  Compared to the prior  study dated 1/31/2023, the LV EF is visually better, MR  is less, and AoVR is new.    CXR 2/1/23  EXAM: XR CHEST PORT 1 VIEW  LOCATION: [facility]  DATE/TIME: 2/1/2023 6:03 AM     INDICATION: S P Transcatheter Aortic Valve Replacement  COMPARISON: 07/17/2017                                                                      IMPRESSION: Sternal wires, mediastinal clips, and transcatheter aortic valve replacement. Left chest cardiac implanted electronic device with dual leads projecting over the right atrium and right ventricle.     Lungs are clear. No pleural effusion or pneumothorax.     Mildly enlarged cardiac silhouette.    LABS or IMAGING REQUIRING FOLLOW-UP AFTER DISCHARGE:     Echocardiogram on Feb 27 at 1:45 pm    PROCEDURES ( this hospitalization only)      Procedure(s):  Transcatheter Aortic Valve Replacement - Subclavian Approach  OR TRANSCATHETER AORTIC VALVE REPLACEMENT, SUBCLAVIAN PERCUTANEOUS APPROACH (STANDBY)    RECOMMENDATIONS TO OUTPATIENT PROVIDER FOR F/U VISIT     With Justin Rivera NP on Feb 6 at 1:30 pm    With Josephine Lopez PA-C for 1 month visit on March 6 at 2 pm    DISPOSITION     Home    HOSPITAL COURSE BY DIAGNOSIS:      #Severe aortic stenosis, now s/p TAVR using a #29 CHANTAL valve, via the left subclavian  #Ischemic cardiomyopathy  #Chronic systolic heart failure, NYHA class III  Preoperative NT proBNP was slightly elevated, but clinically patient did not look fluid overloaded.      ICD in situ, originally placed in 2004, with gen replacement in 2009 and again in 2018    Today, sheath sites soft without hematoma.  Stitch was removed without difficulty.  Neuro's intact. Pt reports feeling pretty well today and denies any SOB or CP.  He walked with cardiac rehab and did stairs.  He did not feel lightheaded or dizzy.  He felt steady on his feet.  EKG is stable with AV pacing.  He has been voiding without difficulty.      - stop ASA since he has now been on  triple therapy for 6 weeks.   - continue Plavix for antiplatelet  - continue furosemide 20 mg daily, started Monday prior to procedure    - OP Cardiac rehab  - Daily weights  - Lifelong antibiotic prophylaxis prior to all dental procedures.  - follow ups have been arranged as listed above    #CAD, s/p remote hx of CABG  #Hyperlipidemia, with LDL goal < 70  Pre-TAVR coronary angiogram showed severe calcific disease in the mid LAD and diagonal 1 with occluded distal LAD.  Patient underwent shockwave and balloon angioplasty of LAD lesion on 12/19/22, but no stent was placed due to elevated risk with only branch involvement.  Patient was placed on aspirin/Plavix, with instruction to stop aspirin after 6 weeks.    LDL in September was 129    - continue Plavix 75 mg daily for at least 12 months  - continue PTA rosuvastatin 10 mg daily  - repeat LDL in 4-6 weeks    #HTN  BP has been slightly elevated since procedure.  He received 1 dose of IV hydralazine at 1649 yesterday afternoon    - Continue PTA  lisinopril 7.5 mg daily and metoprolol succinate 25 mg daily    #Paroxysmal atrial fibrillation  With controlled ventricular rates.     - continue PTA metoprolol succinate 25 mg daily and sotalol 80 mg twice daily  -Continue Eliquis 5 mg twice daily for stroke prophylaxis    #PVD  #AAA s/p endographic repair in 2020  Stable    - continue statin      Discharge Medications with Med changes:        Review of your medicines      CONTINUE these medicines which may have CHANGED, or have new prescriptions. If we are uncertain of the size of tablets/capsules you have at home, strength may be listed as something that might have changed.      Dose / Directions   metoprolol succinate ER 25 MG 24 hr tablet  Commonly known as: TOPROL XL  This may have changed: when to take this  Used for: Paroxysmal atrial fibrillation (H), Heart failure with reduced ejection fraction, NYHA class II (H)      Dose: 25 mg  Take 1 tablet (25 mg) by mouth  daily  Quantity: 90 tablet  Refills: 11        CONTINUE these medicines which have NOT CHANGED      Dose / Directions   acetaminophen 325 MG tablet  Commonly known as: TYLENOL      Dose: 650 mg  Take 650 mg by mouth every 6 hours as needed for pain  Refills: 0     apixaban ANTICOAGULANT 5 MG tablet  Commonly known as: ELIQUIS      Dose: 5 mg  Take 5 mg by mouth 2 times daily  Refills: 0     clopidogrel 75 MG tablet  Commonly known as: PLAVIX      Dose: 75 mg  Take 1 tablet (75 mg) by mouth daily  Quantity: 90 tablet  Refills: 3     fluticasone 50 MCG/ACT nasal spray  Commonly known as: FLONASE      Dose: 1 spray  Spray 1 spray into both nostrils as needed for rhinitis or allergies  Refills: 0     furosemide 20 MG tablet  Commonly known as: LASIX  Used for: Chronic systolic congestive heart failure (H)      Dose: 20 mg  Take 1 tablet (20 mg) by mouth daily  Quantity: 30 tablet  Refills: 0     gabapentin 300 MG capsule  Commonly known as: NEURONTIN      Dose: 900 mg  Take 900 mg by mouth 2 times daily  Refills: 0     * lisinopril 2.5 MG tablet  Commonly known as: ZESTRIL  Used for: Ischemic cardiomyopathy, Primary hypertension      Dose: 2.5 mg  Take 1 tablet (2.5 mg) by mouth daily Take 2.5 mg + 5 mg =7.5 mg daily in AM  Quantity: 30 tablet  Refills: 0     * lisinopril 5 MG tablet  Commonly known as: ZESTRIL  Used for: Ischemic cardiomyopathy      Dose: 5 mg  Take 1 tablet (5 mg) by mouth daily Take in addition to 2.5mg tablet for total 7.5mg daily  Refills: 0     rosuvastatin 10 MG tablet  Commonly known as: CRESTOR  Used for: Pure hypercholesterolemia      Dose: 10 mg  Take 1 tablet (10 mg) by mouth daily  Quantity: 30 tablet  Refills: 0     sotalol 80 MG tablet  Commonly known as: BETAPACE  Used for: Paroxysmal atrial fibrillation (H)      Dose: 80 mg  Take 1 tablet (80 mg) by mouth 2 times daily  Quantity: 180 tablet  Refills: 3     tamsulosin 0.4 MG capsule  Commonly known as: FLOMAX      Dose: 0.4 mg  Take 0.4  mg by mouth every evening  Refills: 0         * This list has 2 medication(s) that are the same as other medications prescribed for you. Read the directions carefully, and ask your doctor or other care provider to review them with you.            STOP taking    aspirin 81 MG EC tablet  Commonly known as: ASA                     Rationale for medication changes:      ASA stopped since he has finished 6 weeks of triple therapy        Consults   Hospitalist  Cardiac rehab       Pertinent Labs     Lab Results   Component Value Date    WBC 16.2 (H) 02/01/2023    HGB 12.2 (L) 02/01/2023    HCT 38.2 (L) 02/01/2023    MCV 92 02/01/2023     02/01/2023     Lab Results   Component Value Date    CR 0.97 02/01/2023     Lab Results   Component Value Date     02/01/2023    POTASSIUM 3.9 02/01/2023    CHLORIDE 107 02/01/2023    CO2 23 02/01/2023     (H) 02/01/2023     Lab Results   Component Value Date    GFRESTIMATED 77 02/01/2023    GFRESTBLACK >60 01/06/2021     Lab Results   Component Value Date    BUN 18.2 02/01/2023    CR 0.97 02/01/2023           Discharge Orders     Discharge Procedure Orders   CARDIAC REHAB REFERRAL   Standing Status: Future   Referral Priority: Routine Referral Type: Rehab Therapy Cardiac Therapy   Number of Visits Requested: 1     Primary Care - Care Coordination Referral   Standing Status: Future   Referral Priority: Routine: Next available opening Referral Type: Care Coordination   Number of Visits Requested: 1     Reason for your hospital stay   Order Comments: New valve     Follow-up and recommended labs and tests   Order Comments: Follow up  - refer to AVS for appointments already made     Activity   Order Comments: See activity restrictions on AVS printout     Order Specific Question Answer Comments   Is discharge order? Yes      Diet   Order Comments: Resume previous home diet     Order Specific Question Answer Comments   Is discharge order? Yes      Examination     Vital Signs  in last 24 hours:   Temp:  [97.6  F (36.4  C)-98.9  F (37.2  C)] 98  F (36.7  C)  Pulse:  [54-81] 63  Resp:  [9-25] 20  BP: (108-167)/(54-99) 113/56  SpO2:  [93 %-99 %] 93 %        General Appearance:   Alert, cooperative and in no acute distress   ENT/Mouth: membranes moist, no oral lesions or bleeding gums.      EYES:  no scleral icterus, normal conjunctivae   Neck: Supple without lymphadenopathy.  Thyroid not visualized   Chest/Lungs:   lungs are clear to auscultation, no rales or wheezing   Cardiovascular:   Regular. Normal first and second heart sounds with no murmurs, rubs, or gallops; the carotid, radial and posterior tibial pulses are intact, no edema bilaterally    Abdomen:  Soft and nontender. Bowel sounds are present in all quadrants   Extremities: no cyanosis or clubbing.  Puncture site is soft and nontender.  Left subclavian site without hematoma, but with a moderate amount of bruising   Skin: no xanthelasma, warm.    Neurologic: normal gait, normal  bilateral, no tremors   Psychiatric: Normal mood and affect         Please see EMR for more detailed significant labs, imaging, consultant notes etc.

## 2023-02-01 NOTE — CONSULTS
Brief CV Surgery Note        Patient POD#1 from left subclavian TAVR. He was seen and examined by our team this morning.      Pressure dressing removed; incision C/DI. There is superficial soft tissue bruising but no appreciable swelling, erythema, or hematoma.    Left hand warm and with good pulses. Neurovascularly intact.    Our team will sign off at this point; please feel free to reach out if any concerns or questions.           _______  Hyun Walker PA-C  Cardiothoracic Surgery  [phone]

## 2023-02-01 NOTE — OP NOTE
Procedure Date: 01/31/2023    OPERATING AREA:  Room 1 in the cath lab.    PROCEDURES PERFORMED:  1.  Left subclavian transcatheter aortic valve replacement using a 29 mm Harper Magdalena 3 valve at nominal +1.  2.  PhotoFix angioplasty of the left subclavian artery.  3.  Balloon angioplasties x2 of the left subclavian artery.    ATTENDING SURGEON:  Shanae Rich MD    FIRST ASSISTANT:  ST Betsy Fran    INTERVENTIONAL CARDIOLOGISTS:  Tyrone Ordoñez MD and Han Kohler MD    ANESTHESIA:  General endotracheal.    SKIN PREP:  Betadine and DuraPrep.    INCISIONS:  Left infraclavicular.    DRAINS:  None.    SPECIMENS:  None.    CULTURES:  None.    CLOSURE:  Routine.    PREOPERATIVE DIAGNOSES:  1.  Symptomatic severe aortic stenosis.  2.  Status post coronary artery bypass grafting.  3.  Status post aortoiliac stenting.    POSTOPERATIVE DIAGNOSES:  1.  Symptomatic severe aortic stenosis.,  2.  Status post coronary artery bypass grafting.  3.  Status post aortoiliac stenting.    BRIEF HISTORY:  Mr. Alamo is an 82-year-old gentleman who has undergone a previous coronary artery bypass grafting procedure.  More recently, he developed symptomatic aortic stenosis.  Given his age and medical comorbidities, as well as the fact that he would be a redo procedure, he was felt to be a better candidate to undergo a transcatheter procedure and since he had undergone aortobiiliac stenting, it was felt a left subclavian approach would be most appropriate.    FINDINGS AT OPERATION:  Once the valve was deployed, we were pleased with the valve's position and the patient remained hemodynamically stable.  Once the sheath was removed and the left subclavian artery repaired, DSA revealed pinching of the sheath insertion site.  The artery underwent a PhotoFix angioplasty and still there was some narrowing of the artery.  Therefore, balloon angioplasties x2 were performed of the left subclavian artery in this area with resulting  reasonable flow as well as a palpable pulse in the left radial artery.    DESCRIPTION OF PROCEDURE:  The patient arrived in the operating room and was positioned supine.  A right radial arterial line was placed.  Satisfactory general endotracheal anesthesia was induced The patient's neck, chest, abdomen and both groins were prepped and draped in a standard sterile fashion.  A left infraclavicular incision was made and the pectoralis muscle divided.  The left subclavian vein was encountered.  It was mobilized and Vesseloops were placed to retract it downward.  The left subclavian artery was immediately underneath the subclavian vein.  It was mobilized for a distance of 4 cm circumferentially and Vesseloops were placed proximally and distally on the artery.  A pursestring suture of 5-0 Prolene was placed in the left subclavian artery.  At this time, Dr. Ordoñez, placed a right femoral arterial line as well as a right femoral venous line. Through the right femoral venous line, a temporary pacing wire was placed in the right ventricle.  It was tested and captured well.  Heparin was administered. The 29 mm Harper Magdalena 3 valve was prepared at nominal +1 on the back table.  The left subclavian artery was cannulated.  Through it, the necessary intracardiac wire was placed.  An additional intracardiac wire had been placed via the right femoral artery.  The Harper eSheath was then passed through the left subclavian artery.  When the ACT was appropriate, the 29 mm Harper Magdalena 3 valve at nominal +1 was brought onto the operative field and passed through the Harper eSheath.  It was prepared and then positioned.  Rapid pacing was done and the valve was deployed. We were pleased with the results.  The intracardiac wires and eSheath were removed.  The pursestring was secured once the eSheath was removed. Small vascular clamps were placed proximally and distally on the left subclavian artery.  The pursestring was removed  and the left subclavian artery was prepared with 4 interrupted sutures of 6-0 Prolene.  DSA was performed at this time, which showed some pinching of the left subclavian artery.  Therefore, the vascular clamps were once more applied to the left subclavian artery proximally and distally to this sheath insertion site and the preexisting sutures were removed.  The artery was then repaired using a PhotoFix [place] and running 6-0 Prolene.  The vascular clamps were once were removed.  DSA still showed some pinching of the artery.  Therefore, 2 balloon angioplasties of the left subclavian artery were done using a 5 True balloon.  Repeat DSA showed better filling of the left subclavian artery.  There was a dopplerable pulse proximal and distal to the sheath insertion site, and once the left radial artery could be palpated, it could be palpated without difficulty.  The wound was rendered hemostatic. It was closed in layers using running 2-0 and 3-0 Vicryl, as well as 4-0 Monocryl on the skin.  The sponge, needle and instrument counts were reported as correct.  The estimated blood loss was 50 mL Protamine was not administered to reverse the heparin.  Mr. Bustillo was [place] and [place] [facility] [place]    Shanae Rich MD        D: 2023   T: 2023   MT: [email or web address]    Name:     TRISTAN BUSTILLO  MRN:      [id number]        Account:        [id number]   :      1940           Procedure Date: 2023     Document: [id number]

## 2023-02-01 NOTE — PLAN OF CARE
Cardiac Rehab Discharge Summary    Reason for therapy discharge:    All goals and outcomes met, no further needs identified.    Progress towards therapy goal(s). See goals on Care Plan in Epic electronic health record for goal details.  Goals met    Therapy recommendation(s):    Continued therapy is recommended.  Rationale/Recommendations:  home w/ assist from wife if needed w/ heavier IADL tasks- outpatient cardiac Rehab .     MOISES Badillo, OTR/L, 2/1/2023, 9:12 AM      Goal Outcome Evaluation:

## 2023-02-06 ENCOUNTER — OFFICE VISIT (OUTPATIENT)
Dept: CARDIOLOGY | Facility: CLINIC | Age: 83
End: 2023-02-06
Payer: MEDICARE

## 2023-02-06 VITALS
RESPIRATION RATE: 14 BRPM | DIASTOLIC BLOOD PRESSURE: 48 MMHG | WEIGHT: 140 LBS | BODY MASS INDEX: 20.09 KG/M2 | SYSTOLIC BLOOD PRESSURE: 106 MMHG | HEART RATE: 59 BPM

## 2023-02-06 DIAGNOSIS — I48.0 PAROXYSMAL ATRIAL FIBRILLATION (H): ICD-10-CM

## 2023-02-06 DIAGNOSIS — R22.0 LOCALIZED SWELLING, MASS AND LUMP, HEAD: ICD-10-CM

## 2023-02-06 DIAGNOSIS — E83.42 HYPOMAGNESEMIA: ICD-10-CM

## 2023-02-06 DIAGNOSIS — Z95.2 S/P TAVR (TRANSCATHETER AORTIC VALVE REPLACEMENT): Primary | ICD-10-CM

## 2023-02-06 DIAGNOSIS — R19.09 RIGHT GROIN MASS: ICD-10-CM

## 2023-02-06 DIAGNOSIS — I25.5 ISCHEMIC CARDIOMYOPATHY: ICD-10-CM

## 2023-02-06 DIAGNOSIS — I35.0 SEVERE AORTIC STENOSIS: ICD-10-CM

## 2023-02-06 DIAGNOSIS — E78.00 PURE HYPERCHOLESTEROLEMIA: ICD-10-CM

## 2023-02-06 DIAGNOSIS — I95.2 HYPOTENSION DUE TO DRUGS: ICD-10-CM

## 2023-02-06 LAB
ANION GAP SERPL CALCULATED.3IONS-SCNC: 12 MMOL/L (ref 7–15)
BUN SERPL-MCNC: 20 MG/DL (ref 8–23)
CALCIUM SERPL-MCNC: 9.4 MG/DL (ref 8.8–10.2)
CHLORIDE SERPL-SCNC: 104 MMOL/L (ref 98–107)
CREAT SERPL-MCNC: 1.17 MG/DL (ref 0.67–1.17)
DEPRECATED HCO3 PLAS-SCNC: 26 MMOL/L (ref 22–29)
GFR SERPL CREATININE-BSD FRML MDRD: 61 ML/MIN/1.73M2
GLUCOSE SERPL-MCNC: 107 MG/DL (ref 70–99)
MAGNESIUM SERPL-MCNC: 1.8 MG/DL (ref 1.7–2.3)
POTASSIUM SERPL-SCNC: 3.7 MMOL/L (ref 3.4–5.3)
SODIUM SERPL-SCNC: 142 MMOL/L (ref 136–145)

## 2023-02-06 PROCEDURE — 80048 BASIC METABOLIC PNL TOTAL CA: CPT | Performed by: NURSE PRACTITIONER

## 2023-02-06 PROCEDURE — 83735 ASSAY OF MAGNESIUM: CPT | Performed by: NURSE PRACTITIONER

## 2023-02-06 PROCEDURE — 99215 OFFICE O/P EST HI 40 MIN: CPT | Performed by: NURSE PRACTITIONER

## 2023-02-06 PROCEDURE — 36415 COLL VENOUS BLD VENIPUNCTURE: CPT | Performed by: NURSE PRACTITIONER

## 2023-02-06 NOTE — PROGRESS NOTES
"HEART CARE ENCOUNTER NOTE       [facility]  [phone]    Assessment/Recommendations   Post transcatheter aortic valve replacement follow-up    1.  Aortic Stenosis: Patient underwent successful TAVR on 1/31/2023 with #29 CHANTAL valve via left subclavian.    Aspirin was discontinued given patient on triple therapy.  Patient was continued on clopidogrel.    Predischarge echocardiogram on 2/1/2020 showed, \"Aortic valve replace with a 29 mm CHANTAL 3, peak velocity is 2.2 m/s with a mean gradient of 8 mmHg, there is trivial paravalvular leak. Compared to the prior study dated 1/31/2023, the LV EF is visually better, MR is less, and AoVR is new \".    Patient will need prophylactic antibiotic for any predental work.    Once he completes 12 months of clopidogrel 75 mg on 12/19/2023, patient will stop clopidogrel and resume aspirin paren 81 mg indefinitely.    Left subclavian puncture side intact with mild bruising.  Right groin puncture site intact except noted swelling/bulging above puncture side while in upright position.  The swelling disappears well in supine position.  Soft and nontender with no signs of hematoma.  Patient thinks that he had this for several weeks even prior to TAVR.  No bruit noted. Discussed with JUSTYN Sanchez and assessed together.      2.   Ischemic cardiomyopathy with systolic dysfunction with LVEF of 30 to 35% per echo in September 2022, NYHA class II-III with status post ICD: Most recent NT proBNP prior to TAVR was 2798 on 1/30/2020.    Patient was initiated on furosemide 20 mg daily.  BMP stable on 2/1/2023.     Current heart failure regimen includes   - Beta-blocker therapy with metoprolol succinate 25 mg daily in in the evening   -ACE inhibitor therapy with lisinopril 7.5 mg daily in a.m.   - Not on aldosterone blocker therapy    -Diuretic therapy with furosemide 20 mg daily.    Clinic weight is down 5 pounds since TAVR.  He reports some lightheadedness.  He " reports inadequate fluid intake.  I suspect his lightheadedness is likely due to low blood pressure from lisinopril and possible dehydration.    3. Coronary artery disease with history of prior CABG with LIMA to LAD, SVG to OM, and SVG to RCA: Coronary angiogram in September 2022 showed severe mid vessel disease with 70% calcified disease and diagonal 1 with distal LAD fills via patent LIMA to the LAD, 70 to 80% stenosis in OM1 with distal small OM fills via L to L bridging collaterals, 100% occluded proximal RCA.  Vein graft to the OM was found occluded and vein graft to RCA is not visualized and presumed occluded.     Patient returned to Cath Lab on 12/19/2022 and underwent PCI with Cutting Balloon angioplasty and shockwave lithotripsy to LAD/diagonal 1 (see angio result for detail).    On clopidogrel 75 mg daily.  Denies bleeding complications.     4.  Dyslipidemia with LDL goal <70/Obesity with a BMI of: Diarrhea better switching from atorvastatin to rosuvastatin.  Tolerating well.   Most recent LDL is 129.  Most recent AST/ALT are stable.    Recommended repeat fasting lipid profile during next follow-up visit with valve clinic in March-order placed.-Pending     5.  Hypotension: Blood pressure today is 99/51 and recheck was 106/48.  His home blood pressure cuff read 97/68.  Patient declined ED visit for further evaluation for his lightheadedness.    BMP pending  - We will schedule ultrasound of right groin to check for possible hernia?  -Stop furosemide  - Decrease lisinopril from 7.5 mg daily to 5 mg daily-  -instructed to hold p.m. dose of metoprolol if blood pressure systolic is less than 100    Follow-up with PCP as scheduled on 2/9/2020.  Follow-up with me in 2 weeks     History of Present Illness/Subjective    Daniel Alamo is a 82 year old male who comes in today for post TAVR (transcatheter aortic valve replacement) follow-up.     Mr. Daniel Alamo has a past medical history of coronary disease with  remote history of CABG with LIMA to LAD, vein graft to OM, and vein graft RCA done in out-of-hospital, ischemic cardiomyopathy with systolic dysfunction with status post ICD, aortic stenosis, atrial fibrillation, abdominal aortic aneurysm without rupture, pure hypercholesterolemia, atrial fibrillation on sotalol, peripheral artery disease with status post iliac stents and now status post PCI with balloon angioplasty and shockwave lithotripsy to LAD/diagonal 1 on 12/19/2022.    During his preop visit for pre-TAVR, his NT proBNP was found elevated in 2000's.  He continues to have shortness of breath with minimal exertion although no evidence of volume overload on exam.  Given his symptoms and elevated NT proBNP, diuretic therapy was initiated.    Today, Daniel is here accompanied by his wife.  He continues to have shortness of breath with exertion.  He also feels some lightheadedness.  He reports inadequate fluid intake.  He states his appetite is poor.  He denies any fever, chills, nausea, vomiting, or any bleeding complications.  He states his diarrhea has improved since switching his statin therapy.  He denies chest pain, shortness of breath at rest, PND, orthopnea, abdominal bloating or lower extremity edema.    He was noted to have swelling/bulging on his right groin above the puncture site.  Patient thinks that he had this for several weeks.  He denies any pain.  Swelling usually disappears when he lays down.  He denies history of inguinal hernia.    Echocardiogram on 2/1/2023  Interpretation Summary     1.Left ventricular function is decreased. The ejection fraction is 30-35%  (moderately reduced).  2.Normal right ventricle size and systolic function.  3.There is mild to moderate (1-2+) mitral regurgitation. ERO 0.11 cmÂ  with a  volume of 27 cc at heart rate of 89 bpm.  4.Aortic valve replace with a 29 mm CHANTAL 3, peak velocity is 2.2 m/s with a  mean gradient of 8 mmHg, there is trivial paravalvular  leak.  Compared to the prior study dated 1/31/2023, the LV EF is visually better, MR  is less, and AoVR is new.    Echocardiogram done in September 2022-reviewed  Interpretation Summary     The left ventricle is moderately dilated.  There is mild concentric left ventricular hypertrophy.  The visual ejection fraction is 30-35%.  Diastolic Doppler findings (E/E' ratio and/or other parameters) suggest left  ventricular filling pressures are increased.  It appears global hypokinesis with akinesis in inferior and inferoalateral  walls.  Normal right ventricle size and systolic function.  There is a pacemaker lead in the right ventricle.  The left atrium is moderately dilated.  The right atrium is mildly dilated.  Pacer wire in right atrium  There is mild (1+) mitral regurgitation.  There is mild (1+) aortic regurgitation.  Mild valvular aortic stenosis with mean gradient of aortic valve 16 mmHg, but  cannot exclude low flow low gradient aortic valve stenosis since the  calculated aortic valve area in 0.86 cm2.     No previous study for comparison.    Coronary Angiogram from 12/19/22: reviewed:  Conclusion  Daniel Alamo is a 82 year old old male w/ CAD s/p CABG w/ L-LAD, V-OM, V-RCA, 30 years ago at [facility], ischemic cardiomyopathy EF 25% 11/2021, aortic stenosis, TR, afib on sotalol, PAD s/p iliac stents here for w/u of progressive GALVEZ and orthostatic symptoms, found to have severe aortic stenosis and CAD and here for PCI to D1.     - given area of ischemia/vaibility in anterior/anterolateral wall, proceeded w/ PCI w/ cuting balloon angioplasty and Shockwave lithotripsy in order to optimize risk profile of the pacing run, but given only branch involvement, we chose to stop, as stenting/Roto would require escalating risk  - proceed w/ TAVR w/u as planned   - continue ASA 81mg daily indefinitely, clopidogrel 600mg once, followed by 75mg daily for at least 12 months, add atorva 40  - continue aggressive risk  factor modification     Findings:  LM:no obstruction  LAD:severe diffuse Ca2+ disease in mLAD into D1. Occluded dLAD  Lcx:severe mid-vessel lesion  RCA:not injected     Access:  R Radial artery     PCI:  LMT was engaged w/ a 6F EBU 3.5 Guide catheter and the lesion in LAD was wired w/ a Forte wire, then ballooned w/ a 2/0x12mm NC Emerge, a 2.0x10mm ScoreFlex balloon, and a 2.5x12mm Shockwave balloon at 3 jeronimo inflations. We were able to achieve balloon expansion but significant refractory re-narrowing after balloon removal due to severe bulky Ca2+. Given only branch involvement and escalating risk, we chose to stop rather than risk stent under-deployment. Final angiography showed no dissection or perforation and a SKIP 3 flow.     Closure:   Vasc Band       Physical Examination Review of Systems   Vitals: /48   Pulse 59   Resp 14   Wt 63.5 kg (140 lb)   BMI 20.09 kg/m    BMI= Body mass index is 20.09 kg/m .  Wt Readings from Last 3 Encounters:   02/06/23 63.5 kg (140 lb)   02/01/23 65.8 kg (145 lb 1 oz)   01/30/23 64.9 kg (143 lb)       General Appearance:   Alert, cooperative and in no acute distress   ENT/Mouth: membranes moist, no oral lesions or bleeding gums.      EYES:  no scleral icterus, normal conjunctivae   Neck: No carotid bruits.  Thyroid not visualized   Chest/Lungs:   lungs are clear to auscultation, no rales or wheezing   Cardiovascular:    Heart rate regular. Normal first and second heart sounds with no systolic murmur.  No rubs or gallops; the carotid, radial and posterior tibial pulses are intact, no edema bilaterally; noted swelling/bulging about right groin puncture side while standing up and disappears when he lays supine.  Soft and nontender with no hematoma.  No bruit noted.   Abdomen:  Soft and nontender. Bowel sounds are present in all quadrants   Extremities: no cyanosis or clubbing   Skin: no xanthelasma, warm.    Neurologic: normal gait, normal  bilateral, no tremors    Psychiatric: Normal mood and affect       Please refer above for cardiac ROS details.      Medical History  Surgical History Family History Social History   Past Medical History:   Diagnosis Date     Coronary artery disease      Heart disease      VT (ventricular tachycardia) 2004     Past Surgical History:   Procedure Laterality Date     ABDOMEN SURGERY       BYPASS GRAFT ARTERY CORONARY       CV ANGIOGRAM CORONARY GRAFT N/A 9/19/2022    Procedure: Coronary Angiogram Graft;  Surgeon: Tyrone Ordoñez MD;  Location: [facility] CATH [facility] CV     CV CORONARY LITHOTRIPSY PCI N/A 12/19/2022    Procedure: Percutaneous Coronary Intervention - Lithotripsy;  Surgeon: Tyrone Ordoñez MD;  Location: [facility] CATH [facility] CV     CV LEFT HEART CATH N/A 9/19/2022    Procedure: Left Heart Catheterization;  Surgeon: Tyrone Ordoñez MD;  Location: [facility] CATH LAB      CV PCI ANGIOPLASTY N/A 12/19/2022    Procedure: Percutaneous Transluminal Angioplasty;  Surgeon: Tyrone Ordoñez MD;  Location: [facility] CATH [facility]      CV RIGHT HEART CATH MEASUREMENTS RECORDED N/A 9/19/2022    Procedure: Right Heart Catheterization;  Surgeon: Tyrone Ordoñez MD;  Location: [facility]     EYE SURGERY       INSERT / REPLACE / REMOVE PACEMAKER       OR TRANSCATHETER AORTIC VALVE REPLACEMENT, SUBCLAVIAN PERCUTANEOUS APPROACH (STANDBY) N/A 1/31/2023    Procedure: OR TRANSCATHETER AORTIC VALVE REPLACEMENT, SUBCLAVIAN PERCUTANEOUS APPROACH (STANDBY);  Surgeon: Shanae Rich MD;  Location: [facility]     OTHER SURGICAL HISTORY      icd     TRANSCATHETER AORTIC VALVE REPLACEMENT - SUBCLAVIAN APPROACH N/A 1/31/2023    Procedure: Transcatheter Aortic Valve Replacement - Subclavian Approach;  Surgeon: Tyrone Ordoñez MD;  Location: [facility] CV     ZZ CABG, VEIN, SINGLE      Description: CABG (CABG);  Recorded: 10/03/2012;  Comments: LIMA to LAD, SVG to OM2, SVG to RCA     Family History   Problem Relation Age  of Onset     Cancer Mother      Heart Disease Father     Social History     Socioeconomic History     Marital status:      Spouse name: Not on file     Number of children: Not on file     Years of education: Not on file     Highest education level: Not on file   Occupational History     Not on file   Tobacco Use     Smoking status: Every Day     Packs/day: 0.50     Years: 30.00     Pack years: 15.00     Types: Cigarettes     Smokeless tobacco: Never   Vaping Use     Vaping Use: Never used   Substance and Sexual Activity     Alcohol use: Yes     Alcohol/week: 1.0 standard drink     Comment: Glass of wine daily.     Drug use: No     Sexual activity: Not Currently   Other Topics Concern     Parent/sibling w/ CABG, MI or angioplasty before 65F 55M? Not Asked   Social History Narrative     Not on file     Social Determinants of Health     Financial Resource Strain: Not on file   Food Insecurity: Not on file   Transportation Needs: Not on file   Physical Activity: Not on file   Stress: Not on file   Social Connections: Not on file   Intimate Partner Violence: Not on file   Housing Stability: Not on file          Medications  Allergies   Current Outpatient Medications   Medication Sig Dispense Refill     acetaminophen (TYLENOL) 325 MG tablet Take 650 mg by mouth every 6 hours as needed for pain       apixaban ANTICOAGULANT (ELIQUIS) 5 MG tablet Take 5 mg by mouth 2 times daily       clopidogrel (PLAVIX) 75 MG tablet Take 1 tablet (75 mg) by mouth daily 90 tablet 3     fluticasone (FLONASE) 50 MCG/ACT nasal spray Spray 1 spray into both nostrils as needed for rhinitis or allergies       gabapentin (NEURONTIN) 300 MG capsule Take 900 mg by mouth 2 times daily        lisinopril (ZESTRIL) 5 MG tablet Take 1 tablet (5 mg) by mouth daily Take in addition to 2.5mg tablet for total 7.5mg daily       metoprolol succinate ER (TOPROL XL) 25 MG 24 hr tablet Take 1 tablet (25 mg) by mouth daily (Patient taking differently: Take  25 mg by mouth every evening) 90 tablet 11     rosuvastatin (CRESTOR) 10 MG tablet Take 1 tablet (10 mg) by mouth daily 30 tablet 0     sotalol (BETAPACE) 80 MG tablet Take 1 tablet (80 mg) by mouth 2 times daily 180 tablet 3     tamsulosin (FLOMAX) 0.4 MG capsule Take 0.4 mg by mouth every evening      Allergies   Allergen Reactions     Atorvastatin Diarrhea     Carvedilol Other (See Comments) and GI Disturbance     Upset stomach; GI upset       Chloride GI Disturbance and Nausea         Lab Results    Chemistry/lipid CBC Cardiac Enzymes/BNP/TSH/INR   Recent Labs   Lab Test 09/19/22  0803   CHOL 185   HDL 41      TRIG 76     Recent Labs   Lab Test 09/19/22  0803 12/06/19  1134 10/05/18  0930    100 88     Recent Labs   Lab Test 01/10/23  1000      POTASSIUM 4.4   CHLORIDE 104   CO2 28   GLC 84   BUN 13.2   CR 1.08   GFRESTIMATED 68   EMILIO 9.1     Recent Labs   Lab Test 01/10/23  1000 12/16/22  1029 11/25/22  1549   CR 1.08 1.13 1.01     No results for input(s): A1C in the last 45036 hours. Recent Labs   Lab Test 12/16/22  1029   WBC 8.3   HGB 14.5   HCT 45.4   MCV 94        Recent Labs   Lab Test 12/16/22  1029 11/25/22  1549 09/19/22  0803   HGB 14.5 14.6 14.2    No results for input(s): TROPONINI in the last 33798 hours.  No results for input(s): BNP, NTBNPI, NTBNP in the last 54326 hours.  Recent Labs   Lab Test 09/01/22  1327   TSH 0.46     No results for input(s): INR in the last 94803 hours.     45 minutes spent on the date of encounter doing education, consent signing, chart prep/review, review of test results, interpretation with above tests, patient visit, documentation, discussion with other provider and discussion with family.      This note has been dictated using voice recognition software. Any grammatical or context distortions are unintentional and inherent to the software.

## 2023-02-06 NOTE — PATIENT INSTRUCTIONS
Daniel Alamo,    It was a pleasure to see you today at the [facility].     My recommendations after this visit include:    -Stop Furosemide     - Decrease Lisinopril from 7.5 mg to 5 mg daily    - Hold your PM dose of Metoprolol  if your BP systolic (top) number is <100    - Make sure to drink at least 64 ounces per day until further direction    - Follow up with Justin in 2 weeks     - Follow up with JUSTYN Sanchez as scheduled on 3/6/23 in Valve clinic. Please come in fasting (8-12 hours) for your cholesterol level check. You may have water, coffee or tea without sugar, creamer or added flavor    - Please call CYNDIE Davalos on [phone]  if you have any questions or concerns      Justin Rivera CNP

## 2023-02-06 NOTE — LETTER
"2/6/2023    Kenna Calvillo, NP  [address] [facility] [place] [address]    RE: Daniel Alamo       Dear Colleague,     I had the pleasure of seeing Daniel Alamo in the [facility].  HEART CARE ENCOUNTER NOTE       [facility]  [phone]    Assessment/Recommendations   Post transcatheter aortic valve replacement follow-up    1.  Aortic Stenosis: Patient underwent successful TAVR on 1/31/2023 with #29 CHANTAL valve via left subclavian.    Aspirin was discontinued given patient on triple therapy.  Patient was continued on clopidogrel.    Predischarge echocardiogram on 2/1/2020 showed, \"Aortic valve replace with a 29 mm CHANTAL 3, peak velocity is 2.2 m/s with a mean gradient of 8 mmHg, there is trivial paravalvular leak. Compared to the prior study dated 1/31/2023, the LV EF is visually better, MR is less, and AoVR is new \".    Patient will need prophylactic antibiotic for any predental work.    Once he completes 12 months of clopidogrel 75 mg on 12/19/2023, patient will stop clopidogrel and resume aspirin paren 81 mg indefinitely.    Left subclavian puncture side intact with mild bruising.  Right groin puncture site intact except noted swelling/bulging above puncture side while in upright position.  The swelling disappears well in supine position.  Soft and nontender with no signs of hematoma.  Patient thinks that he had this for several weeks even prior to TAVR.  No bruit noted. Discussed with JUSTYN Sanchez and assessed together.      2.   Ischemic cardiomyopathy with systolic dysfunction with LVEF of 30 to 35% per echo in September 2022, NYHA class II-III with status post ICD: Most recent NT proBNP prior to TAVR was 2798 on 1/30/2020.    Patient was initiated on furosemide 20 mg daily.  BMP stable on 2/1/2023.     Current heart failure regimen includes   - Beta-blocker therapy with metoprolol succinate 25 mg daily in in the evening   -ACE inhibitor therapy with lisinopril " 7.5 mg daily in a.m.   - Not on aldosterone blocker therapy    -Diuretic therapy with furosemide 20 mg daily.    Clinic weight is down 5 pounds since TAVR.  He reports some lightheadedness.  He reports inadequate fluid intake.  I suspect his lightheadedness is likely due to low blood pressure from lisinopril and possible dehydration.    3. Coronary artery disease with history of prior CABG with LIMA to LAD, SVG to OM, and SVG to RCA: Coronary angiogram in September 2022 showed severe mid vessel disease with 70% calcified disease and diagonal 1 with distal LAD fills via patent LIMA to the LAD, 70 to 80% stenosis in OM1 with distal small OM fills via L to L bridging collaterals, 100% occluded proximal RCA.  Vein graft to the OM was found occluded and vein graft to RCA is not visualized and presumed occluded.     Patient returned to Cath Lab on 12/19/2022 and underwent PCI with Cutting Balloon angioplasty and shockwave lithotripsy to LAD/diagonal 1 (see angio result for detail).    On clopidogrel 75 mg daily.  Denies bleeding complications.     4.  Dyslipidemia with LDL goal <70/Obesity with a BMI of: Diarrhea better switching from atorvastatin to rosuvastatin.  Tolerating well.   Most recent LDL is 129.  Most recent AST/ALT are stable.    Recommended repeat fasting lipid profile during next follow-up visit with valve clinic in March-order placed.-Pending     5.  Hypotension: Blood pressure today is 99/51 and recheck was 106/48.  His home blood pressure cuff read 97/68.  Patient declined ED visit for further evaluation for his lightheadedness.    BMP pending  - We will schedule ultrasound of right groin to check for possible hernia?  -Stop furosemide  - Decrease lisinopril from 7.5 mg daily to 5 mg daily-  -instructed to hold p.m. dose of metoprolol if blood pressure systolic is less than 100    Follow-up with PCP as scheduled on 2/9/2020.  Follow-up with me in 2 weeks     History of Present Illness/Subjective    Daniel  DENNIS Alamo is a 82 year old male who comes in today for post TAVR (transcatheter aortic valve replacement) follow-up.     Mr. Daniel Alamo has a past medical history of coronary disease with remote history of CABG with LIMA to LAD, vein graft to OM, and vein graft RCA done in out-of-hospital, ischemic cardiomyopathy with systolic dysfunction with status post ICD, aortic stenosis, atrial fibrillation, abdominal aortic aneurysm without rupture, pure hypercholesterolemia, atrial fibrillation on sotalol, peripheral artery disease with status post iliac stents and now status post PCI with balloon angioplasty and shockwave lithotripsy to LAD/diagonal 1 on 12/19/2022.    During his preop visit for pre-TAVR, his NT proBNP was found elevated in 2000's.  He continues to have shortness of breath with minimal exertion although no evidence of volume overload on exam.  Given his symptoms and elevated NT proBNP, diuretic therapy was initiated.    Today, Daniel is here accompanied by his wife.  He continues to have shortness of breath with exertion.  He also feels some lightheadedness.  He reports inadequate fluid intake.  He states his appetite is poor.  He denies any fever, chills, nausea, vomiting, or any bleeding complications.  He states his diarrhea has improved since switching his statin therapy.  He denies chest pain, shortness of breath at rest, PND, orthopnea, abdominal bloating or lower extremity edema.    He was noted to have swelling/bulging on his right groin above the puncture site.  Patient thinks that he had this for several weeks.  He denies any pain.  Swelling usually disappears when he lays down.  He denies history of inguinal hernia.    Echocardiogram on 2/1/2023  Interpretation Summary     1.Left ventricular function is decreased. The ejection fraction is 30-35%  (moderately reduced).  2.Normal right ventricle size and systolic function.  3.There is mild to moderate (1-2+) mitral regurgitation. ERO 0.11 cmÂ   with a  volume of 27 cc at heart rate of 89 bpm.  4.Aortic valve replace with a 29 mm CHANTAL 3, peak velocity is 2.2 m/s with a  mean gradient of 8 mmHg, there is trivial paravalvular leak.  Compared to the prior study dated 1/31/2023, the LV EF is visually better, MR  is less, and AoVR is new.    Echocardiogram done in September 2022-reviewed  Interpretation Summary     The left ventricle is moderately dilated.  There is mild concentric left ventricular hypertrophy.  The visual ejection fraction is 30-35%.  Diastolic Doppler findings (E/E' ratio and/or other parameters) suggest left  ventricular filling pressures are increased.  It appears global hypokinesis with akinesis in inferior and inferoalateral  walls.  Normal right ventricle size and systolic function.  There is a pacemaker lead in the right ventricle.  The left atrium is moderately dilated.  The right atrium is mildly dilated.  Pacer wire in right atrium  There is mild (1+) mitral regurgitation.  There is mild (1+) aortic regurgitation.  Mild valvular aortic stenosis with mean gradient of aortic valve 16 mmHg, but  cannot exclude low flow low gradient aortic valve stenosis since the  calculated aortic valve area in 0.86 cm2.     No previous study for comparison.    Coronary Angiogram from 12/19/22: reviewed:  Conclusion  Daniel Alamo is a 82 year old old male w/ CAD s/p CABG w/ L-LAD, V-OM, V-RCA, 30 years ago at [facility], ischemic cardiomyopathy EF 25% 11/2021, aortic stenosis, TR, afib on sotalol, PAD s/p iliac stents here for w/u of progressive GALVEZ and orthostatic symptoms, found to have severe aortic stenosis and CAD and here for PCI to D1.     - given area of ischemia/vaibility in anterior/anterolateral wall, proceeded w/ PCI w/ cuting balloon angioplasty and Shockwave lithotripsy in order to optimize risk profile of the pacing run, but given only branch involvement, we chose to stop, as stenting/Roto would require escalating risk  - proceed  w/ TAVR w/u as planned   - continue ASA 81mg daily indefinitely, clopidogrel 600mg once, followed by 75mg daily for at least 12 months, add atorva 40  - continue aggressive risk factor modification     Findings:  LM:no obstruction  LAD:severe diffuse Ca2+ disease in mLAD into D1. Occluded dLAD  Lcx:severe mid-vessel lesion  RCA:not injected     Access:  R Radial artery     PCI:  LMT was engaged w/ a 6F EBU 3.5 Guide catheter and the lesion in LAD was wired w/ a Forte wire, then ballooned w/ a 2/0x12mm NC Emerge, a 2.0x10mm ScoreFlex balloon, and a 2.5x12mm Shockwave balloon at 3 jeronimo inflations. We were able to achieve balloon expansion but significant refractory re-narrowing after balloon removal due to severe bulky Ca2+. Given only branch involvement and escalating risk, we chose to stop rather than risk stent under-deployment. Final angiography showed no dissection or perforation and a SKIP 3 flow.     Closure:   Vasc Band       Physical Examination Review of Systems   Vitals: /48   Pulse 59   Resp 14   Wt 63.5 kg (140 lb)   BMI 20.09 kg/m    BMI= Body mass index is 20.09 kg/m .  Wt Readings from Last 3 Encounters:   02/06/23 63.5 kg (140 lb)   02/01/23 65.8 kg (145 lb 1 oz)   01/30/23 64.9 kg (143 lb)       General Appearance:   Alert, cooperative and in no acute distress   ENT/Mouth: membranes moist, no oral lesions or bleeding gums.      EYES:  no scleral icterus, normal conjunctivae   Neck: No carotid bruits.  Thyroid not visualized   Chest/Lungs:   lungs are clear to auscultation, no rales or wheezing   Cardiovascular:    Heart rate regular. Normal first and second heart sounds with no systolic murmur.  No rubs or gallops; the carotid, radial and posterior tibial pulses are intact, no edema bilaterally; noted swelling/bulging about right groin puncture side while standing up and disappears when he lays supine.  Soft and nontender with no hematoma.  No bruit noted.   Abdomen:  Soft and nontender.  Bowel sounds are present in all quadrants   Extremities: no cyanosis or clubbing   Skin: no xanthelasma, warm.    Neurologic: normal gait, normal  bilateral, no tremors   Psychiatric: Normal mood and affect       Please refer above for cardiac ROS details.      Medical History  Surgical History Family History Social History   Past Medical History:   Diagnosis Date     Coronary artery disease      Heart disease      VT (ventricular tachycardia) 2004     Past Surgical History:   Procedure Laterality Date     ABDOMEN SURGERY       BYPASS GRAFT ARTERY CORONARY       CV ANGIOGRAM CORONARY GRAFT N/A 9/19/2022    Procedure: Coronary Angiogram Graft;  Surgeon: Tyrone Ordoñez MD;  Location: [facility]     CV CORONARY LITHOTRIPSY PCI N/A 12/19/2022    Procedure: Percutaneous Coronary Intervention - Lithotripsy;  Surgeon: Tyrone Ordoñez MD;  Location: [facility]     CV LEFT HEART CATH N/A 9/19/2022    Procedure: Left Heart Catheterization;  Surgeon: Tyrone Ordoñez MD;  Location: [facility]     CV PCI ANGIOPLASTY N/A 12/19/2022    Procedure: Percutaneous Transluminal Angioplasty;  Surgeon: Tyrone Ordoñez MD;  Location: [facility]     CV RIGHT HEART CATH MEASUREMENTS RECORDED N/A 9/19/2022    Procedure: Right Heart Catheterization;  Surgeon: Tyrone Ordoñez MD;  Location: [facility]     EYE SURGERY       INSERT / REPLACE / REMOVE PACEMAKER       OR TRANSCATHETER AORTIC VALVE REPLACEMENT, SUBCLAVIAN PERCUTANEOUS APPROACH (STANDBY) N/A 1/31/2023    Procedure: OR TRANSCATHETER AORTIC VALVE REPLACEMENT, SUBCLAVIAN PERCUTANEOUS APPROACH (STANDBY);  Surgeon: Shanae Rich MD;  Location: [facility] CV     OTHER SURGICAL HISTORY      icd     TRANSCATHETER AORTIC VALVE REPLACEMENT - SUBCLAVIAN APPROACH N/A 1/31/2023    Procedure: Transcatheter Aortic Valve Replacement - Subclavian Approach;  Surgeon: Tyrone Ordoñez MD;  Location: [facility]  CV     ZZC CABG, VEIN, SINGLE      Description: CABG (CABG);  Recorded: 10/03/2012;  Comments: LIMA to LAD, SVG to OM2, SVG to RCA     Family History   Problem Relation Age of Onset     Cancer Mother      Heart Disease Father     Social History     Socioeconomic History     Marital status:      Spouse name: Not on file     Number of children: Not on file     Years of education: Not on file     Highest education level: Not on file   Occupational History     Not on file   Tobacco Use     Smoking status: Every Day     Packs/day: 0.50     Years: 30.00     Pack years: 15.00     Types: Cigarettes     Smokeless tobacco: Never   Vaping Use     Vaping Use: Never used   Substance and Sexual Activity     Alcohol use: Yes     Alcohol/week: 1.0 standard drink     Comment: Glass of wine daily.     Drug use: No     Sexual activity: Not Currently   Other Topics Concern     Parent/sibling w/ CABG, MI or angioplasty before 65F 55M? Not Asked   Social History Narrative     Not on file     Social Determinants of Health     Financial Resource Strain: Not on file   Food Insecurity: Not on file   Transportation Needs: Not on file   Physical Activity: Not on file   Stress: Not on file   Social Connections: Not on file   Intimate Partner Violence: Not on file   Housing Stability: Not on file          Medications  Allergies   Current Outpatient Medications   Medication Sig Dispense Refill     acetaminophen (TYLENOL) 325 MG tablet Take 650 mg by mouth every 6 hours as needed for pain       apixaban ANTICOAGULANT (ELIQUIS) 5 MG tablet Take 5 mg by mouth 2 times daily       clopidogrel (PLAVIX) 75 MG tablet Take 1 tablet (75 mg) by mouth daily 90 tablet 3     fluticasone (FLONASE) 50 MCG/ACT nasal spray Spray 1 spray into both nostrils as needed for rhinitis or allergies       gabapentin (NEURONTIN) 300 MG capsule Take 900 mg by mouth 2 times daily        lisinopril (ZESTRIL) 5 MG tablet Take 1 tablet (5 mg) by mouth daily Take in  addition to 2.5mg tablet for total 7.5mg daily       metoprolol succinate ER (TOPROL XL) 25 MG 24 hr tablet Take 1 tablet (25 mg) by mouth daily (Patient taking differently: Take 25 mg by mouth every evening) 90 tablet 11     rosuvastatin (CRESTOR) 10 MG tablet Take 1 tablet (10 mg) by mouth daily 30 tablet 0     sotalol (BETAPACE) 80 MG tablet Take 1 tablet (80 mg) by mouth 2 times daily 180 tablet 3     tamsulosin (FLOMAX) 0.4 MG capsule Take 0.4 mg by mouth every evening      Allergies   Allergen Reactions     Atorvastatin Diarrhea     Carvedilol Other (See Comments) and GI Disturbance     Upset stomach; GI upset       Chloride GI Disturbance and Nausea         Lab Results    Chemistry/lipid CBC Cardiac Enzymes/BNP/TSH/INR   Recent Labs   Lab Test 09/19/22  0803   CHOL 185   HDL 41      TRIG 76     Recent Labs   Lab Test 09/19/22  0803 12/06/19  1134 10/05/18  0930    100 88     Recent Labs   Lab Test 01/10/23  1000      POTASSIUM 4.4   CHLORIDE 104   CO2 28   GLC 84   BUN 13.2   CR 1.08   GFRESTIMATED 68   EMILIO 9.1     Recent Labs   Lab Test 01/10/23  1000 12/16/22  1029 11/25/22  1549   CR 1.08 1.13 1.01     No results for input(s): A1C in the last 45956 hours. Recent Labs   Lab Test 12/16/22  1029   WBC 8.3   HGB 14.5   HCT 45.4   MCV 94        Recent Labs   Lab Test 12/16/22  1029 11/25/22  1549 09/19/22  0803   HGB 14.5 14.6 14.2    No results for input(s): TROPONINI in the last 66810 hours.  No results for input(s): BNP, NTBNPI, NTBNP in the last 61316 hours.  Recent Labs   Lab Test 09/01/22  1327   TSH 0.46     No results for input(s): INR in the last 91997 hours.     45 minutes spent on the date of encounter doing education, consent signing, chart prep/review, review of test results, interpretation with above tests, patient visit, documentation, discussion with other provider and discussion with family.      This note has been dictated using voice recognition software. Any  grammatical or context distortions are unintentional and inherent to the software.                 Thank you for allowing me to participate in the care of your patient.      Sincerely,     CAILIN Singh [facility]  cc:   No referring provider defined for this encounter.

## 2023-02-08 ENCOUNTER — HOSPITAL ENCOUNTER (OUTPATIENT)
Dept: ULTRASOUND IMAGING | Facility: HOSPITAL | Age: 83
Discharge: HOME | End: 2023-02-08
Attending: NURSE PRACTITIONER | Admitting: NURSE PRACTITIONER
Payer: MEDICARE

## 2023-02-08 DIAGNOSIS — R22.0 LOCALIZED SWELLING, MASS AND LUMP, HEAD: ICD-10-CM

## 2023-02-08 DIAGNOSIS — R19.09 RIGHT GROIN MASS: ICD-10-CM

## 2023-02-08 PROCEDURE — 76705 ECHO EXAM OF ABDOMEN: CPT

## 2023-02-09 ENCOUNTER — LAB REQUISITION (OUTPATIENT)
Dept: LAB | Facility: CLINIC | Age: 83
End: 2023-02-09
Payer: MEDICARE

## 2023-02-09 ENCOUNTER — TRANSFERRED RECORDS (OUTPATIENT)
Dept: HEALTH INFORMATION MANAGEMENT | Facility: CLINIC | Age: 83
End: 2023-02-09
Payer: MEDICARE

## 2023-02-09 ENCOUNTER — TELEPHONE (OUTPATIENT)
Dept: CARDIOLOGY | Facility: CLINIC | Age: 83
End: 2023-02-09
Payer: MEDICARE

## 2023-02-09 DIAGNOSIS — E53.8 DEFICIENCY OF OTHER SPECIFIED B GROUP VITAMINS: ICD-10-CM

## 2023-02-09 DIAGNOSIS — E78.5 HYPERLIPIDEMIA, UNSPECIFIED: ICD-10-CM

## 2023-02-09 NOTE — TELEPHONE ENCOUNTER
Called [facility] to obtain fax number to send results. Results of labs and US have been printed and faxed to [phone] for PCP review and follow-up. [facility]    ----- Message from CAILIN Singh CNP sent at 2/8/2023  9:18 PM CST -----  Tejas Davalos,  I saw pt for post TAVR follow up.  I reduced his lisinopril and held lasix d/t symptomatic low BP.  His BMP and Mg+ are stable.  Please let me know how he is feeling with med change?  Ultrasound of right groin shows no pseudoaneurysm but noted  hernia.  Could you please fax his lab and U S result to his PCP Kenna Calvillo for further eval. Pt has follow up appt on 2/9/23.   Let me know if further questions.  Thank you!  CY

## 2023-02-13 ENCOUNTER — TELEPHONE (OUTPATIENT)
Dept: CARDIOLOGY | Facility: CLINIC | Age: 83
End: 2023-02-13
Payer: MEDICARE

## 2023-02-13 NOTE — TELEPHONE ENCOUNTER
Josephine Lopez PA-C Yangzom, Chime, APRN CNP; Clara Samson, RN  Caller: Unspecified (Today, 12:24 PM)  Agree - would decrease lisinopril to 2.5 mg daily.       Don't think we need to get the ECHO earlier at this point.     Thanks,   Josephine     
Justin Rivera APRN CNP Coleman, Kari  Cc: P [facility]; Josephine Lopez PA-C; Tyrone Ordoñez MD Hi Kari,   I saw pt for post TAVR follow up.   I reduced his lisinopril and held lasix d/t symptomatic low BP.   His BMP and Mg+ are stable.   Please let me know how he is feeling with med change?   Ultrasound of right groin shows no pseudoaneurysm but noted  hernia.   Could you please fax his lab and U S result to his PCP Kenna Calvillo for further eval. Pt has follow up appt on 2/9/23.   Let me know if further questions.   Thank you!   CY     
Justin Rivera APRN CNP Voltz, Alexandra L, RN  Cc: Josephine Lopez PA-C  Caller: Unspecified (Today, 12:24 PM)  Thanks Thien for the update!     Did he stop Furosemide and decreased Lisinopril from 7.5 mg to 5 mg daily?   If yes, may consider decreasing lisinopril to 2.5 mg daily in AM and take metoprolol succinate in the evening.   If no issue with fluid retention, increase fluid intake to 50-60 ounces per day.   Could I see him sooner than 2/28?     Josephine,   Should we consider repeat ECHO or wait and see how he responds to med change?   CY               
Valve Clinic RN Phone Call:  Call made on 2/13/2023 at 12:26 PM by Clara Samson RN    Reason for call: checking on patients symptoms and BP after decrease in medications post TAVR     Symptom or request: Patient reports that his SBP's have been 90's-low 100's. VS were as follows:  2/10- 89/58, HR 73  2/11- 87/56, HR 56  2/12- 95/62, HR 66  2/13- 101/66, HR 55    Patient reports that he is still feeling dizzy/light headed when he gets up and moves around but states it is improving/better as of today.     Additional comments/Actions: Update sent to Josephine Lopez PA-C and Justin CALL CNP.     Instructions given to patient: Instructed pt that I will update providers and call back with recommendations. Pt verbalized understanding and agreed to plan.     Clara Samson RN on 2/13/2023 at 12:32 PM           
Valve Clinic RN Phone Call:  Call made on 2/13/2023 at 4:11 PM by Clara Samson RN    Reason for call: follow-up with provider recommendations (listed below)    Instructions given to patient: Patient instructed to continue to hold lasix, drop lisinopril dose down to 2.5 mg and make sure to take his metoprolol in the evening. Also instructed pt to increase his H2O intake if he does not have any signs of fluid overload. Suggested that we rescheduled patients follow-up appt with Justin from 2/28 to next week sometime to check in and see how he is doing after this medication change.     Additional comments/Actions: Had patients appt rescheduled from 2/28 to 2/22. Will plan to check in with patient at the end of this week to see how he is feeling. Msg sent to Justin to update her on changes. Patient instructed to call the valve clinic if he has any questions or concerns before Friday. Pt verbalized understanding and agreed to plan.     Clara Samson RN on 2/13/2023 at 4:17 PM                
no

## 2023-02-17 ENCOUNTER — HOSPITAL ENCOUNTER (EMERGENCY)
Facility: HOSPITAL | Age: 83
Discharge: HOME | End: 2023-02-17
Attending: EMERGENCY MEDICINE | Admitting: EMERGENCY MEDICINE
Payer: MEDICARE

## 2023-02-17 ENCOUNTER — TELEPHONE (OUTPATIENT)
Dept: CARDIOLOGY | Facility: CLINIC | Age: 83
End: 2023-02-17

## 2023-02-17 ENCOUNTER — APPOINTMENT (OUTPATIENT)
Dept: CT IMAGING | Facility: HOSPITAL | Age: 83
End: 2023-02-17
Attending: EMERGENCY MEDICINE
Payer: MEDICARE

## 2023-02-17 VITALS
TEMPERATURE: 98.2 F | WEIGHT: 140 LBS | OXYGEN SATURATION: 99 % | HEIGHT: 70 IN | BODY MASS INDEX: 20.04 KG/M2 | HEART RATE: 66 BPM | DIASTOLIC BLOOD PRESSURE: 71 MMHG | RESPIRATION RATE: 18 BRPM | SYSTOLIC BLOOD PRESSURE: 121 MMHG

## 2023-02-17 DIAGNOSIS — I25.5 ISCHEMIC CARDIOMYOPATHY: ICD-10-CM

## 2023-02-17 DIAGNOSIS — K62.5 RECTAL BLEEDING: ICD-10-CM

## 2023-02-17 PROBLEM — N40.1 BENIGN PROSTATIC HYPERPLASIA WITH URINARY OBSTRUCTION: Status: ACTIVE | Noted: 2020-02-04

## 2023-02-17 PROBLEM — I42.0 DILATED CARDIOMYOPATHY (H): Status: ACTIVE | Noted: 2020-02-04

## 2023-02-17 PROBLEM — E53.9 VITAMIN B DEFICIENCY: Status: ACTIVE | Noted: 2020-02-04

## 2023-02-17 PROBLEM — E78.5 HYPERLIPIDEMIA: Status: ACTIVE | Noted: 2020-02-04

## 2023-02-17 PROBLEM — I72.3 ANEURYSM OF ILIAC ARTERY (H): Status: ACTIVE | Noted: 2020-02-04

## 2023-02-17 PROBLEM — G60.9 HEREDITARY AND IDIOPATHIC NEUROPATHY, UNSPECIFIED: Status: ACTIVE | Noted: 2020-02-04

## 2023-02-17 PROBLEM — I25.89 OTHER SPECIFIED FORMS OF CHRONIC ISCHEMIC HEART DISEASE: Status: ACTIVE | Noted: 2020-02-04

## 2023-02-17 PROBLEM — M79.603 PAIN OF UPPER EXTREMITY: Status: ACTIVE | Noted: 2023-02-17

## 2023-02-17 PROBLEM — N13.8 BENIGN PROSTATIC HYPERPLASIA WITH URINARY OBSTRUCTION: Status: ACTIVE | Noted: 2020-02-04

## 2023-02-17 LAB
ABO/RH(D): NORMAL
ALBUMIN SERPL BCG-MCNC: 3.6 G/DL (ref 3.5–5.2)
ALP SERPL-CCNC: 111 U/L (ref 40–129)
ALT SERPL W P-5'-P-CCNC: 10 U/L (ref 10–50)
ANION GAP SERPL CALCULATED.3IONS-SCNC: 10 MMOL/L (ref 7–15)
ANTIBODY SCREEN: NEGATIVE
APTT PPP: 36 SECONDS (ref 22–38)
AST SERPL W P-5'-P-CCNC: 17 U/L (ref 10–50)
BASOPHILS # BLD AUTO: 0.1 10E3/UL (ref 0–0.2)
BASOPHILS NFR BLD AUTO: 1 %
BILIRUB DIRECT SERPL-MCNC: <0.2 MG/DL (ref 0–0.3)
BILIRUB SERPL-MCNC: 0.6 MG/DL
BUN SERPL-MCNC: 20.2 MG/DL (ref 8–23)
CALCIUM SERPL-MCNC: 9.4 MG/DL (ref 8.8–10.2)
CHLORIDE SERPL-SCNC: 102 MMOL/L (ref 98–107)
CREAT SERPL-MCNC: 1.19 MG/DL (ref 0.67–1.17)
DEPRECATED HCO3 PLAS-SCNC: 28 MMOL/L (ref 22–29)
EOSINOPHIL # BLD AUTO: 0.2 10E3/UL (ref 0–0.7)
EOSINOPHIL NFR BLD AUTO: 2 %
ERYTHROCYTE [DISTWIDTH] IN BLOOD BY AUTOMATED COUNT: 13.1 % (ref 10–15)
GFR SERPL CREATININE-BSD FRML MDRD: 60 ML/MIN/1.73M2
GLUCOSE SERPL-MCNC: 101 MG/DL (ref 70–99)
HCT VFR BLD AUTO: 40.3 % (ref 40–53)
HGB BLD-MCNC: 12.8 G/DL (ref 13.3–17.7)
IMM GRANULOCYTES # BLD: 0.1 10E3/UL
IMM GRANULOCYTES NFR BLD: 1 %
INR PPP: 1.18 (ref 0.85–1.15)
LYMPHOCYTES # BLD AUTO: 2.1 10E3/UL (ref 0.8–5.3)
LYMPHOCYTES NFR BLD AUTO: 21 %
MAGNESIUM SERPL-MCNC: 1.9 MG/DL (ref 1.7–2.3)
MCH RBC QN AUTO: 29.4 PG (ref 26.5–33)
MCHC RBC AUTO-ENTMCNC: 31.8 G/DL (ref 31.5–36.5)
MCV RBC AUTO: 92 FL (ref 78–100)
MONOCYTES # BLD AUTO: 0.8 10E3/UL (ref 0–1.3)
MONOCYTES NFR BLD AUTO: 9 %
NEUTROPHILS # BLD AUTO: 6.5 10E3/UL (ref 1.6–8.3)
NEUTROPHILS NFR BLD AUTO: 66 %
NRBC # BLD AUTO: 0 10E3/UL
NRBC BLD AUTO-RTO: 0 /100
PLATELET # BLD AUTO: 230 10E3/UL (ref 150–450)
POTASSIUM SERPL-SCNC: 4.3 MMOL/L (ref 3.4–5.3)
PROT SERPL-MCNC: 6.8 G/DL (ref 6.4–8.3)
RBC # BLD AUTO: 4.36 10E6/UL (ref 4.4–5.9)
SODIUM SERPL-SCNC: 140 MMOL/L (ref 136–145)
SPECIMEN EXPIRATION DATE: NORMAL
WBC # BLD AUTO: 9.7 10E3/UL (ref 4–11)

## 2023-02-17 PROCEDURE — 99285 EMERGENCY DEPT VISIT HI MDM: CPT | Mod: 25

## 2023-02-17 PROCEDURE — 93005 ELECTROCARDIOGRAM TRACING: CPT | Performed by: EMERGENCY MEDICINE

## 2023-02-17 PROCEDURE — 83735 ASSAY OF MAGNESIUM: CPT | Performed by: EMERGENCY MEDICINE

## 2023-02-17 PROCEDURE — 86850 RBC ANTIBODY SCREEN: CPT | Performed by: EMERGENCY MEDICINE

## 2023-02-17 PROCEDURE — 250N000011 HC RX IP 250 OP 636: Performed by: EMERGENCY MEDICINE

## 2023-02-17 PROCEDURE — 82248 BILIRUBIN DIRECT: CPT | Performed by: EMERGENCY MEDICINE

## 2023-02-17 PROCEDURE — 85610 PROTHROMBIN TIME: CPT | Performed by: EMERGENCY MEDICINE

## 2023-02-17 PROCEDURE — 36415 COLL VENOUS BLD VENIPUNCTURE: CPT | Performed by: EMERGENCY MEDICINE

## 2023-02-17 PROCEDURE — G1010 CDSM STANSON: HCPCS

## 2023-02-17 PROCEDURE — 85730 THROMBOPLASTIN TIME PARTIAL: CPT | Performed by: EMERGENCY MEDICINE

## 2023-02-17 PROCEDURE — 86901 BLOOD TYPING SEROLOGIC RH(D): CPT | Performed by: EMERGENCY MEDICINE

## 2023-02-17 PROCEDURE — 85014 HEMATOCRIT: CPT | Performed by: EMERGENCY MEDICINE

## 2023-02-17 RX ORDER — LISINOPRIL 5 MG/1
2.5 TABLET ORAL DAILY
Qty: 30 TABLET | Refills: 5
Start: 2023-02-17 | End: 2023-02-17

## 2023-02-17 RX ORDER — LISINOPRIL 5 MG/1
TABLET ORAL
Qty: 30 TABLET | Refills: 3
Start: 2023-02-17 | End: 2023-02-28 | Stop reason: DRUGHIGH

## 2023-02-17 RX ORDER — IOPAMIDOL 755 MG/ML
100 INJECTION, SOLUTION INTRAVASCULAR ONCE
Status: COMPLETED | OUTPATIENT
Start: 2023-02-17 | End: 2023-02-17

## 2023-02-17 RX ADMIN — IOPAMIDOL 75 ML: 755 INJECTION, SOLUTION INTRAVENOUS at 20:30

## 2023-02-17 NOTE — TELEPHONE ENCOUNTER
===View-only below this line===  ----- Message -----  From: Justin Rivera APRN CNP  Sent: 2/17/2023   1:18 PM CST  To: Clara Samson RN, Josephine Lopez PA-C  Subject: RE: pt update                                    Thank you Thien for following up!    Despite cutting back on meds with worsening symptoms, I would recommend ED visit for further evaluation.  CY    ----- Message -----  From: Clara Samson RN  Sent: 2/17/2023   1:02 PM CST  To: CAILIN Singh CNP  Subject: pt update                                        Hi Justin-   I spoke to Daniel and he feels the same after decreasing his lisinopril on Monday. His BP's are a bit better-- 105/44, 116/82, 100/62. He says he still feels fatigued and he is still very dizzy/lightheaded when standing/moving around.     He also said that he had an episode of blood in his stool today that looked pretty red. He is on plavix and Eliquis so wondering if you'd like him to go in to urgent care?? I know he has been symptomatic prior to the blood in the stool episode but just concerned about him. I moved his appt up with you to 2/22 but would like to give him feedback for what to do over the weekend.     Thien Lockwood RN BSN  Structural Heart Coordinator   [facility]  [phone]

## 2023-02-17 NOTE — ED TRIAGE NOTES
Patient had a heart valve replacement 2 weeks ago.  He says that he checks his BP at home and it has been running 100's systolic, also is still feeling fatigues.  Had a bloody stool this am and the heart clinic advised him to come in for evaluation. Lives with his wife who dropped him off.

## 2023-02-17 NOTE — TELEPHONE ENCOUNTER
Valve Clinic RN Phone Call:  Call made on 2023 at 12:50 PM by Clara Samson RN    Reason for call: following up on patient to check in after decreasing his lisinopril dose earlier in the week     Symptom or request: Pt reported his BP's to be the followin/17- 105/44  - 116/82  2/15- 100/62  - 101/66    Patient reports that he is feeling the same as he did earlier in the week. Still having fatigue and is dizzy/lightheaded when he is up moving around. Patient also reported he had a bloody stool x 1 today- felt that the blood was pretty bright red.     Additional comments/Actions: urgent msg sent to Justin Rivera CNP     Instructions given to patient: Would like patient to be evaluated for bloody stool especially considering his current symptoms and patient on plavix and eliquis. Instructed pt that writer would call him after speaking to provider. Pt verbalized understanding and agreed to plan.     Clara Samson RN on 2023 at 1:05 PM

## 2023-02-17 NOTE — TELEPHONE ENCOUNTER
Called patient back to recommend that he go to the ED per provider Justin Rivera CNP. Discussed that cardiology team is concerned despite cutting back on medications and seeing improvement in BP that he is still symptomatic with fatigue, dizziness, etc. And now with the addition of bloody stools on plavix and eliquis that he should be evaluated by a provider in person, have lab work drawn and may potentially require further imaging.     Pt verbalized understanding and agreed to this plan. Will go to [facility] ED today. Writer noted cardiology team will monitor how he is doing.     Clara Samson RN on 2/17/2023 at 1:28 PM

## 2023-02-18 NOTE — ED PROVIDER NOTES
EMERGENCY DEPARTMENT NOTE     Name: Daniel Alamo    Age/Sex: 83 year old male   MRN: [id number]   Evaluation Date & Time:  No admission date for patient encounter.    PCP:    Kenna Calvillo   ED Provider: Henrik De Los Santos D.O.       CHIEF COMPLAINT    Fatigue and Melena       DIAGNOSIS & DISPOSITION     1. Rectal bleeding      DISPOSITION: Home    At the conclusion of the encounter I discussed the results of all of the tests and the disposition. The questions were answered. The patient or family acknowledged understanding and was agreeable with the care plan.    TOTAL CRITICAL CARE TIME (EXCLUDING PROCEDURES): Not applicable    PROCEDURES:   None    EMERGENCY DEPARTMENT COURSE/MEDICAL DECISION MAKING   8:55 PM I met with the patient to gather history and to perform my initial exam.  We discussed treatment options and the plan for care while in the Emergency Department.    Daniel Alamo is a 83 year old male with relevant past history of valvular heart disease, CAD, VT, peripheral vascular disease, AAA, and hyperlipidemia who presents to the emergency department for evaluation of fatigue and melena.         Triage note reviewed:Patient had a heart valve replacement 2 weeks ago.  He says that he checks his BP at home and it has been running 100's systolic, also is still feeling fatigues.  Had a bloody stool this am and the heart clinic advised him to come in for evaluation. Lives with his wife who dropped him off.         Differential  diagnosis  considered included but not limited to GI bleeding, abdominal aortic aneurysm graft leakage    Diagnostic studies:  Imaging:  CTA Abdomen Pelvis with Contrast   Final Result   IMPRESSION:   1.  No evidence for active gastrointestinal bleeding or other acute abnormality in the abdomen or pelvis.   2.  Multiple chronic findings.   3.  Large chronic inferolateral left ventricular infarction.   4.  Cholelithiasis.   5.  Severe sigmoid diverticulosis.   6.  Infrarenal  abdominal aortic and common iliac aneurysms status post endovascular repair, unchanged compared to recent exam 12/5/2022. Persistent small type II endoleak unchanged.   7.  Small right inguinal hernia containing short segment of nonobstructed distal small bowel.         Lab:  Labs Ordered and Resulted from Time of ED Arrival to Time of ED Departure   INR - Abnormal       Result Value    INR 1.18 (*)    BASIC METABOLIC PANEL - Abnormal    Sodium 140      Potassium 4.3      Chloride 102      Carbon Dioxide (CO2) 28      Anion Gap 10      Urea Nitrogen 20.2      Creatinine 1.19 (*)     Calcium 9.4      Glucose 101 (*)     GFR Estimate 60 (*)    CBC WITH PLATELETS AND DIFFERENTIAL - Abnormal    WBC Count 9.7      RBC Count 4.36 (*)     Hemoglobin 12.8 (*)     Hematocrit 40.3      MCV 92      MCH 29.4      MCHC 31.8      RDW 13.1      Platelet Count 230      % Neutrophils 66      % Lymphocytes 21      % Monocytes 9      % Eosinophils 2      % Basophils 1      % Immature Granulocytes 1      NRBCs per 100 WBC 0      Absolute Neutrophils 6.5      Absolute Lymphocytes 2.1      Absolute Monocytes 0.8      Absolute Eosinophils 0.2      Absolute Basophils 0.1      Absolute Immature Granulocytes 0.1      Absolute NRBCs 0.0     PARTIAL THROMBOPLASTIN TIME - Normal    aPTT 36     MAGNESIUM - Normal    Magnesium 1.9     HEPATIC FUNCTION PANEL - Normal    Protein Total 6.8      Albumin 3.6      Bilirubin Total 0.6      Alkaline Phosphatase 111      AST 17      ALT 10      Bilirubin Direct <0.20     OCCULT BLOOD STOOL   TYPE AND SCREEN, ADULT    ABO/RH(D) O POS      Antibody Screen Negative      SPECIMEN EXPIRATION DATE [id number]     ABO/RH TYPE AND SCREEN      Interventions:None  Medical decision making: Patient is remained hemodynamically stable with no recurrent episodes of bleeding.  Hemoglobin 12.8.  CTA of the abdomen pelvis without identified source of active bleeding.  Patient has stable abdominal aortic graft with small  "endoleak noted on prior studies and unchanged.  Noted to have diverticulosis without diverticulitis.  Discussed options with the patient and he is comfortable with discharge to home with continued monitoring and close follow-up with primary care physician and discussion for GI referral.  Return criteria discussed and if the patient were to have recurrent rectal bleeding that persists, development of abdominal pain or fever will return to the emergency department.  Discharge Vital Signs:/71   Pulse 66   Temp 98.2  F (36.8  C) (Temporal)   Resp 18   Ht 1.778 m (5' 10\")   Wt 63.5 kg (140 lb)   SpO2 99%   BMI 20.09 kg/m    Medical Decision Making    History:    Supplemental history from: Documented in chart, if applicable    External Record(s) reviewed: Documented in chart, if applicable.    Work Up:    Chart documentation includes differential considered and any EKGs or imaging independently interpreted by provider, where specified.    In additional to work up documented, I considered the following work up: Documented in chart, if applicable.    External consultation:    Discussion of management with another provider: Documented in chart, if applicable    Complicating factors:    Care impacted by chronic illness: Heart Disease    Care affected by social determinants of health: N/A    Disposition considerations: Discharge. No recommendations on prescription strength medication(s). I considered admission, but discharged the patient after share decision making conversation.      ED INTERVENTIONS     Medications   iopamidol (ISOVUE-370) solution 100 mL (75 mLs Intravenous Given 2/17/23 2030)       DISCHARGE MEDICATIONS        Review of your medicines      UNREVIEWED medicines. Ask your doctor about these medicines      Dose / Directions   acetaminophen 325 MG tablet  Commonly known as: TYLENOL      Dose: 650 mg  Take 650 mg by mouth every 6 hours as needed for pain  Refills: 0     apixaban ANTICOAGULANT 5 MG " tablet  Commonly known as: ELIQUIS      Dose: 5 mg  Take 5 mg by mouth 2 times daily  Refills: 0     clopidogrel 75 MG tablet  Commonly known as: PLAVIX      Dose: 75 mg  Take 1 tablet (75 mg) by mouth daily  Quantity: 90 tablet  Refills: 3     fluticasone 50 MCG/ACT nasal spray  Commonly known as: FLONASE      Dose: 1 spray  Spray 1 spray into both nostrils as needed for rhinitis or allergies  Refills: 0     gabapentin 300 MG capsule  Commonly known as: NEURONTIN      Dose: 900 mg  Take 900 mg by mouth 2 times daily  Refills: 0     lisinopril 5 MG tablet  Commonly known as: ZESTRIL  Used for: Ischemic cardiomyopathy  Ask about: Which instructions should I use?      Take half of a tablet (2.5 mg) daily  Quantity: 30 tablet  Refills: 3     metoprolol succinate ER 25 MG 24 hr tablet  Commonly known as: TOPROL XL  Used for: Paroxysmal atrial fibrillation (H), Heart failure with reduced ejection fraction, NYHA class II (H)      Dose: 25 mg  Take 1 tablet (25 mg) by mouth daily  Quantity: 90 tablet  Refills: 11     rosuvastatin 10 MG tablet  Commonly known as: CRESTOR  Used for: Pure hypercholesterolemia      Dose: 10 mg  Take 1 tablet (10 mg) by mouth daily  Quantity: 30 tablet  Refills: 0     sotalol 80 MG tablet  Commonly known as: BETAPACE  Used for: Paroxysmal atrial fibrillation (H)      Dose: 80 mg  Take 1 tablet (80 mg) by mouth 2 times daily  Quantity: 180 tablet  Refills: 3     tamsulosin 0.4 MG capsule  Commonly known as: FLOMAX      Dose: 0.4 mg  Take 0.4 mg by mouth every evening  Refills: 0              INFORMATION SOURCE AND LIMITATIONS    History/Exam limitations: None  Patient information was obtained from: Patient  Use of : N/A    HISTORY OF PRESENT ILLNESS   Daniel Alamo is a 83 year old year old male with a relevant past history of heart disease, CAD, VT, peripheral vascular disease, AAA, and hyperlipidemia, who presents to this ED ambulance for evaluation of fatigue and  melena.    Patient reports an history of chronic intermittent diarrhea.  Patient noted small amount of blood mixed with stool today x1 described as 1 to 2 teaspoons full.  No melena.  Patient denied associated abdominal pain, feeling faint or having actual fainting.  On other review of systems no fevers, chest pain or dyspnea    Patient denies chest pain, fever, and constipation. No other medical concerns are expressed at this time.     REVIEW OF SYSTEMS:   Constitutional: Negative for  fever.   HENT: Negative for URI symptoms or sore throat.    Cardiac: Negative for  chest pain,palpitations, near syncope or syncope  Respiratory: Negative for cough and shortness of breath.    Gastrointestinal: Negative for abdominal pain, nausea, vomiting, constipation, diarrhea, rectal bleeding or melena.  Genitourinary: Negative for dysuria, flank pain and hematuria.   Musculoskeletal: Negative for back pain.   Skin: Negative for  rash  Neurological: Negative for dizziness, headache, syncope, speech difficulty, unilateral weakness or imbalance with walking.   Hematological: Negative for adenopathy. Does not bruise/bleed easily.   Psychiatric/Behavioral: Negative for confusion.     PATIENT HISTORY     Past Medical History:   Diagnosis Date     Coronary artery disease      Heart disease      VT (ventricular tachycardia) 2004     Patient Active Problem List   Diagnosis     Paroxysmal atrial fibrillation (H)     Pure hypercholesterolemia     Coronary arteriosclerosis due to lipid rich plaque     ICD (implantable cardioverter-defibrillator), dual, in situ     Ischemic cardiomyopathy     PVD (peripheral vascular disease) (H)     Abdominal aortic aneurysm (AAA) without rupture     PVC's (premature ventricular contractions)     Heart failure with reduced ejection fraction, NYHA class II (H)     S/P CABG (coronary artery bypass graft)     Severe aortic stenosis     Sinus node dysfunction (H)     Status post coronary angiogram     S/P TAVR  (transcatheter aortic valve replacement)     Aortic stenosis, severe     Aneurysm of iliac artery (H)     Benign prostatic hyperplasia with urinary obstruction     Dilated cardiomyopathy (H)     Hereditary and idiopathic neuropathy, unspecified     Hyperlipidemia     Other specified forms of chronic ischemic heart disease     Pain of upper extremity     Vitamin B deficiency     Past Surgical History:   Procedure Laterality Date     ABDOMEN SURGERY       BYPASS GRAFT ARTERY CORONARY       CV ANGIOGRAM CORONARY GRAFT N/A 9/19/2022    Procedure: Coronary Angiogram Graft;  Surgeon: Tyrone Ordoñez MD;  Location: [facility]     CV CORONARY LITHOTRIPSY PCI N/A 12/19/2022    Procedure: Percutaneous Coronary Intervention - Lithotripsy;  Surgeon: Tyrone Ordoñez MD;  Location: [facility]     CV LEFT HEART CATH N/A 9/19/2022    Procedure: Left Heart Catheterization;  Surgeon: Tyrone Ordoñez MD;  Location: [facility]     CV PCI ANGIOPLASTY N/A 12/19/2022    Procedure: Percutaneous Transluminal Angioplasty;  Surgeon: Tyrone Ordoñez MD;  Location: [facility] CATH [facility]     CV RIGHT HEART CATH MEASUREMENTS RECORDED N/A 9/19/2022    Procedure: Right Heart Catheterization;  Surgeon: Tyrone Ordoñez MD;  Location: [facility]     EYE SURGERY       INSERT / REPLACE / REMOVE PACEMAKER       OR TRANSCATHETER AORTIC VALVE REPLACEMENT, SUBCLAVIAN PERCUTANEOUS APPROACH (STANDBY) N/A 1/31/2023    Procedure: OR TRANSCATHETER AORTIC VALVE REPLACEMENT, SUBCLAVIAN PERCUTANEOUS APPROACH (STANDBY);  Surgeon: Shanae Rich MD;  Location: [facility] LAB CV     OTHER SURGICAL HISTORY      icd     TRANSCATHETER AORTIC VALVE REPLACEMENT - SUBCLAVIAN APPROACH N/A 1/31/2023    Procedure: Transcatheter Aortic Valve Replacement - Subclavian Approach;  Surgeon: Tyrone Ordoñez MD;  Location: [facility] CATH LAB [facility]     ZZC CABG, VEIN, SINGLE      Description: CABG (CABG);  Recorded:  "10/03/2012;  Comments: LIMA to LAD, SVG to OM2, SVG to RCA     Social Histrory  Smoking:None  Alcohol Use:None    Allergies   Allergen Reactions     Atorvastatin Diarrhea     Carvedilol Other (See Comments) and GI Disturbance     Upset stomach; GI upset       Chloride GI Disturbance and Nausea       OUTPATIENT MEDICATIONS     Discharge Medication List as of 2/17/2023  9:48 PM      START taking these medications    Details   lisinopril (ZESTRIL) 5 MG tablet Take half of a tablet (2.5 mg) daily, Disp-30 tablet, R-3, No Print Out            Vitals:    02/17/23 1435 02/17/23 2145   BP: (!) 142/79 121/71   Pulse: 66    Resp: 18    Temp: 98.2  F (36.8  C)    TempSrc: Temporal    SpO2: 99%    Weight: 63.5 kg (140 lb)    Height: 1.778 m (5' 10\")        Physical Exam   Constitutional: Oriented to person, place, and time. Appears well-developed and well-nourished.   HEENT:    Head: Atraumatic.   Neck: Normal range of motion. Neck supple.   Cardiovascular: Normal rate, regular rhythm and normal heart sounds.    Pulmonary/Chest: Normal effort  and breath sounds normal.   Abdominal: Soft. Bowel sounds are normal.   Musculoskeletal: Normal range of motion.   Neurological: Alert and oriented to person, place, and time. Normal strength. No sensory deficit. No cranial nerve deficit.  Skin: Skin is warm and dry.   Psychiatric: Normal mood and affect. Behavior is normal. Thought content normal.     DIAGNOSTICS    LABORATORY FINDINGS (REVIEWED AND INTERPRETED):  Labs Ordered and Resulted from Time of ED Arrival to Time of ED Departure   INR - Abnormal       Result Value    INR 1.18 (*)    BASIC METABOLIC PANEL - Abnormal    Sodium 140      Potassium 4.3      Chloride 102      Carbon Dioxide (CO2) 28      Anion Gap 10      Urea Nitrogen 20.2      Creatinine 1.19 (*)     Calcium 9.4      Glucose 101 (*)     GFR Estimate 60 (*)    CBC WITH PLATELETS AND DIFFERENTIAL - Abnormal    WBC Count 9.7      RBC Count 4.36 (*)     Hemoglobin 12.8 " (*)     Hematocrit 40.3      MCV 92      MCH 29.4      MCHC 31.8      RDW 13.1      Platelet Count 230      % Neutrophils 66      % Lymphocytes 21      % Monocytes 9      % Eosinophils 2      % Basophils 1      % Immature Granulocytes 1      NRBCs per 100 WBC 0      Absolute Neutrophils 6.5      Absolute Lymphocytes 2.1      Absolute Monocytes 0.8      Absolute Eosinophils 0.2      Absolute Basophils 0.1      Absolute Immature Granulocytes 0.1      Absolute NRBCs 0.0     PARTIAL THROMBOPLASTIN TIME - Normal    aPTT 36     MAGNESIUM - Normal    Magnesium 1.9     HEPATIC FUNCTION PANEL - Normal    Protein Total 6.8      Albumin 3.6      Bilirubin Total 0.6      Alkaline Phosphatase 111      AST 17      ALT 10      Bilirubin Direct <0.20     OCCULT BLOOD STOOL   TYPE AND SCREEN, ADULT    ABO/RH(D) O POS      Antibody Screen Negative      SPECIMEN EXPIRATION DATE [id number]     ABO/RH TYPE AND SCREEN         IMAGING (REVIEWED AND INTERPRETED):  CTA Abdomen Pelvis with Contrast   Final Result   IMPRESSION:   1.  No evidence for active gastrointestinal bleeding or other acute abnormality in the abdomen or pelvis.   2.  Multiple chronic findings.   3.  Large chronic inferolateral left ventricular infarction.   4.  Cholelithiasis.   5.  Severe sigmoid diverticulosis.   6.  Infrarenal abdominal aortic and common iliac aneurysms status post endovascular repair, unchanged compared to recent exam 12/5/2022. Persistent small type II endoleak unchanged.   7.  Small right inguinal hernia containing short segment of nonobstructed distal small bowel.          I, Deionalyssamayito Betancourt, am serving as a scribe to document services personally performed by Henrik De Los Santos D.O., based on my observation and the provider s statements to me.    I, Henrik De Los Santos D.O., attest that Helen Betancourt is acting in a scribe capacity, has observed my performance of the services and has documented them in accordance with my direction.    Henrik SANCHEZ  Magali WALSH  EMERGENCY MEDICINE   02/17/23  [facility] EMERGENCY DEPARTMENT  [facility] [facility] [id number] [phone]  Dept: [phone]     Henrik De Los Santos DO  02/18/23 0234

## 2023-02-18 NOTE — ED NOTES
Wife called and mentioned a hernia in his groin they noticed at a follow up appt.  Would like to have that checked as well.

## 2023-02-18 NOTE — DISCHARGE INSTRUCTIONS
If you have recurrent rectal bleeding that persists or is in a large amount over the weekend return to the emergency department.  Otherwise see your primary care provider in follow-up early next week and also keep scheduled cardiology follow-up on Wednesday.

## 2023-02-19 NOTE — TELEPHONE ENCOUNTER
Patient seen in ED. Provider Josephine Lopez PA-C sent message regarding pt. BP and Hgb stable in ED. Pt was able to be sent home. Patient scheduled to follow-up in heart clinic with Justin Rivera CNP on 2/22. Per Josephine she spoke to Dr. Ordoñez who indicated ok for patient to stop plavix (s/p balloon angioplasty- no stent placed)     Plavix removed from pts medication list.     Clara Samson RN on 2/18/2023 at 7:38 PM

## 2023-02-21 NOTE — TELEPHONE ENCOUNTER
Valve Clinic RN Phone Call:  Call made on 2/21/2023 at 9:40 AM by Clara Samson RN    Reason for call: check in on patient after ED visit     Symptom or request: Patient reported he is feeling somewhat better. Not as dizzy/lightheaded. Discussed that everything looked pretty stable in the ED as far as lab work and vital signs. Case was reviewed by Josephine Lopez and Dr. Ordoñez and they felt it is fine for patient to stop taking his Plavix. Patient originally scheduled to see Justin CALL tomorrow 2/22 but due to weather appt was cancelled. Pt scheduled for 2/28.     Instructions given to patient: Stop plavix. Keep appt with Justin on 2/28. Please call valve clinic if you have any questions, concerns or return of symptoms. Pt verbalized understanding and agreed to plan.     Clara Samson RN on 2/21/2023 at 9:50 AM

## 2023-02-22 ENCOUNTER — TELEPHONE (OUTPATIENT)
Dept: CARDIOLOGY | Facility: CLINIC | Age: 83
End: 2023-02-22

## 2023-02-22 NOTE — TELEPHONE ENCOUNTER
Called and left detailed VM for patient that if he continues to feel well and is stable he may cancel appt on 2/28 with Justin since he is seeing Josephine on 3/6. Patient can decide what he would like to do, Justin is still happy to see pt if he would like to come in and be seen by her. Noted that patient can cancel via mychart or he can call the heart care scheduling line which was provided if he wishes.     Clara Samson RN on 2/22/2023 at 12:39 PM

## 2023-02-22 NOTE — TELEPHONE ENCOUNTER
===View-only below this line===  ----- Message -----  From: Justin Rivera APRN CNP  Sent: 2/22/2023  11:22 AM CST  To: Clara Samson RN, Josephine Lopez, PA-C  Subject: RE: Plavix                                       Thank you both for the update!  Looks like he is rescheduled to see me on 2/28.  If he is stable, he may cancel his appointment with me since he is scheduled for 1 month post TAVR follow up with Josephine on 3/6/23 unless he prefers to keep the appointment.    CY

## 2023-02-24 ENCOUNTER — ANCILLARY PROCEDURE (OUTPATIENT)
Dept: CARDIOLOGY | Facility: CLINIC | Age: 83
End: 2023-02-24
Attending: INTERNAL MEDICINE
Payer: MEDICARE

## 2023-02-24 DIAGNOSIS — Z95.810 ICD (IMPLANTABLE CARDIOVERTER-DEFIBRILLATOR) IN PLACE: ICD-10-CM

## 2023-02-24 DIAGNOSIS — I25.5 ISCHEMIC CARDIOMYOPATHY: ICD-10-CM

## 2023-02-24 PROBLEM — I95.9 HYPOTENSION, UNSPECIFIED HYPOTENSION TYPE: Status: ACTIVE | Noted: 2023-02-24

## 2023-02-24 LAB
MDC_IDC_EPISODE_DTM: NORMAL
MDC_IDC_EPISODE_DURATION: 2 S
MDC_IDC_EPISODE_DURATION: 3 S
MDC_IDC_EPISODE_DURATION: 4 S
MDC_IDC_EPISODE_DURATION: 5 S
MDC_IDC_EPISODE_DURATION: 7 S
MDC_IDC_EPISODE_DURATION: 8 S
MDC_IDC_EPISODE_DURATION: 9 S
MDC_IDC_EPISODE_DURATION: 9 S
MDC_IDC_EPISODE_ID: NORMAL
MDC_IDC_EPISODE_TYPE: NORMAL
MDC_IDC_LEAD_IMPLANT_DT: NORMAL
MDC_IDC_LEAD_IMPLANT_DT: NORMAL
MDC_IDC_LEAD_LOCATION: NORMAL
MDC_IDC_LEAD_LOCATION: NORMAL
MDC_IDC_LEAD_LOCATION_DETAIL_1: NORMAL
MDC_IDC_LEAD_LOCATION_DETAIL_1: NORMAL
MDC_IDC_LEAD_MFG: NORMAL
MDC_IDC_LEAD_MFG: NORMAL
MDC_IDC_LEAD_MODEL: NORMAL
MDC_IDC_LEAD_MODEL: NORMAL
MDC_IDC_LEAD_POLARITY_TYPE: NORMAL
MDC_IDC_LEAD_POLARITY_TYPE: NORMAL
MDC_IDC_LEAD_SERIAL: NORMAL
MDC_IDC_LEAD_SERIAL: NORMAL
MDC_IDC_LEAD_SPECIAL_FUNCTION: NORMAL
MDC_IDC_MSMT_BATTERY_DTM: NORMAL
MDC_IDC_MSMT_BATTERY_REMAINING_LONGEVITY: 102 MO
MDC_IDC_MSMT_BATTERY_REMAINING_PERCENTAGE: 100 %
MDC_IDC_MSMT_BATTERY_STATUS: NORMAL
MDC_IDC_MSMT_CAP_CHARGE_DTM: NORMAL
MDC_IDC_MSMT_CAP_CHARGE_TIME: 10.4 S
MDC_IDC_MSMT_CAP_CHARGE_TYPE: NORMAL
MDC_IDC_MSMT_LEADCHNL_RA_IMPEDANCE_VALUE: 743 OHM
MDC_IDC_MSMT_LEADCHNL_RA_PACING_THRESHOLD_AMPLITUDE: 1.9 V
MDC_IDC_MSMT_LEADCHNL_RA_PACING_THRESHOLD_PULSEWIDTH: 0.7 MS
MDC_IDC_MSMT_LEADCHNL_RV_IMPEDANCE_VALUE: 515 OHM
MDC_IDC_MSMT_LEADCHNL_RV_PACING_THRESHOLD_AMPLITUDE: 0.8 V
MDC_IDC_MSMT_LEADCHNL_RV_PACING_THRESHOLD_PULSEWIDTH: 0.4 MS
MDC_IDC_PG_IMPLANT_DTM: NORMAL
MDC_IDC_PG_MFG: NORMAL
MDC_IDC_PG_MODEL: NORMAL
MDC_IDC_PG_SERIAL: NORMAL
MDC_IDC_PG_TYPE: NORMAL
MDC_IDC_SESS_CLINIC_NAME: NORMAL
MDC_IDC_SESS_DTM: NORMAL
MDC_IDC_SESS_TYPE: NORMAL
MDC_IDC_SET_BRADY_AT_MODE_SWITCH_MODE: NORMAL
MDC_IDC_SET_BRADY_AT_MODE_SWITCH_RATE: 170 {BEATS}/MIN
MDC_IDC_SET_BRADY_LOWRATE: 55 {BEATS}/MIN
MDC_IDC_SET_BRADY_MAX_SENSOR_RATE: 130 {BEATS}/MIN
MDC_IDC_SET_BRADY_MAX_TRACKING_RATE: 120 {BEATS}/MIN
MDC_IDC_SET_BRADY_MODE: NORMAL
MDC_IDC_SET_BRADY_PAV_DELAY_HIGH: 200 MS
MDC_IDC_SET_BRADY_PAV_DELAY_LOW: 300 MS
MDC_IDC_SET_BRADY_SAV_DELAY_HIGH: 165 MS
MDC_IDC_SET_BRADY_SAV_DELAY_LOW: 250 MS
MDC_IDC_SET_LEADCHNL_RA_PACING_AMPLITUDE: 4 V
MDC_IDC_SET_LEADCHNL_RA_PACING_POLARITY: NORMAL
MDC_IDC_SET_LEADCHNL_RA_PACING_PULSEWIDTH: 0.7 MS
MDC_IDC_SET_LEADCHNL_RA_SENSING_ADAPTATION_MODE: NORMAL
MDC_IDC_SET_LEADCHNL_RA_SENSING_POLARITY: NORMAL
MDC_IDC_SET_LEADCHNL_RA_SENSING_SENSITIVITY: 0.3 MV
MDC_IDC_SET_LEADCHNL_RV_PACING_AMPLITUDE: 1.5 V
MDC_IDC_SET_LEADCHNL_RV_PACING_POLARITY: NORMAL
MDC_IDC_SET_LEADCHNL_RV_PACING_PULSEWIDTH: 0.4 MS
MDC_IDC_SET_LEADCHNL_RV_SENSING_ADAPTATION_MODE: NORMAL
MDC_IDC_SET_LEADCHNL_RV_SENSING_POLARITY: NORMAL
MDC_IDC_SET_LEADCHNL_RV_SENSING_SENSITIVITY: 0.6 MV
MDC_IDC_SET_ZONE_DETECTION_INTERVAL: 261 MS
MDC_IDC_SET_ZONE_DETECTION_INTERVAL: 333 MS
MDC_IDC_SET_ZONE_DETECTION_INTERVAL: 400 MS
MDC_IDC_SET_ZONE_TYPE: NORMAL
MDC_IDC_SET_ZONE_VENDOR_TYPE: NORMAL
MDC_IDC_STAT_AT_BURDEN_PERCENT: 1 %
MDC_IDC_STAT_AT_DTM_END: NORMAL
MDC_IDC_STAT_AT_DTM_START: NORMAL
MDC_IDC_STAT_BRADY_DTM_END: NORMAL
MDC_IDC_STAT_BRADY_DTM_START: NORMAL
MDC_IDC_STAT_BRADY_RA_PERCENT_PACED: 22 %
MDC_IDC_STAT_BRADY_RV_PERCENT_PACED: 12 %
MDC_IDC_STAT_EPISODE_RECENT_COUNT: 0
MDC_IDC_STAT_EPISODE_RECENT_COUNT: 80
MDC_IDC_STAT_EPISODE_RECENT_COUNT_DTM_END: NORMAL
MDC_IDC_STAT_EPISODE_RECENT_COUNT_DTM_START: NORMAL
MDC_IDC_STAT_EPISODE_TYPE: NORMAL
MDC_IDC_STAT_EPISODE_VENDOR_TYPE: NORMAL
MDC_IDC_STAT_TACHYTHERAPY_ATP_DELIVERED_RECENT: 0
MDC_IDC_STAT_TACHYTHERAPY_ATP_DELIVERED_TOTAL: 1
MDC_IDC_STAT_TACHYTHERAPY_RECENT_DTM_END: NORMAL
MDC_IDC_STAT_TACHYTHERAPY_RECENT_DTM_START: NORMAL
MDC_IDC_STAT_TACHYTHERAPY_SHOCKS_ABORTED_RECENT: 0
MDC_IDC_STAT_TACHYTHERAPY_SHOCKS_ABORTED_TOTAL: 0
MDC_IDC_STAT_TACHYTHERAPY_SHOCKS_DELIVERED_RECENT: 0
MDC_IDC_STAT_TACHYTHERAPY_SHOCKS_DELIVERED_TOTAL: 0
MDC_IDC_STAT_TACHYTHERAPY_TOTAL_DTM_END: NORMAL
MDC_IDC_STAT_TACHYTHERAPY_TOTAL_DTM_START: NORMAL

## 2023-02-24 PROCEDURE — 93296 REM INTERROG EVL PM/IDS: CPT | Performed by: INTERNAL MEDICINE

## 2023-02-24 PROCEDURE — 93295 DEV INTERROG REMOTE 1/2/MLT: CPT | Performed by: INTERNAL MEDICINE

## 2023-02-24 NOTE — PROGRESS NOTES
"HEART CARE ENCOUNTER NOTE       [facility]  [phone]    Assessment/Recommendations     1.  Ischemic cardiomyopathy with systolic dysfunction with LVEF of 30 to 35% per echo in 2/27/2023 with status post ICD: Stable and unchanged from previous echo done on 2/1/2023.  He is well compensated with no evidence of fluid retention on exam.  His lisinopril dose was reduced from 7.5 mg down to 5 mg and to 2.5 mg due to hypotension post TAVR.  His furosemide was discontinued.  He is on metoprolol succinate 25 mg daily.  Blood pressure today in clinic is 125/79.  Home blood pressure reading mostly staying in systolic low 100s per patient.    He still has some mild dizziness although denies worsening.    Most recent device check done on 2/24/2020 shows stable with no arrhythmia detected since January 2023.    2.  Aortic Stenosis status post successful TAVR on 1/31/2023 with #29 CHANTAL valve via left subclavian.  Left clavicle incision is dry and intact.  Echocardiogram on 2/27/2020 showed, \"there is a 29 mm CHANTAL 3 bioprosthetic valve present in aortic valve  position. Mean gradient is 6 mmHg \".    He thinks his shortness of breath is unchanged.  Initiated cardiac rehab.      3. Coronary artery disease with history of prior CABG with LIMA to LAD, SVG to OM, and SVG to RCA: Coronary angiogram in September 2022 showed severe mid vessel disease with 70% calcified disease and diagonal 1 with distal LAD fills via patent LIMA to the LAD, 70 to 80% stenosis in OM1 with distal small OM fills via L to L bridging collaterals, 100% occluded proximal RCA.  Vein graft to the OM was found occluded and vein graft to RCA is not visualized and presumed occluded.    Recent ED visit with low blood pressure, fatigue and rectal bleed.  Hemoglobin was found stable.  CT abdomen did not find any active bleeding.  Patient was recommended follow-up with PCP for possible GI referral.     Plavix was discontinued by denies " recurrent bleeding.Dr. Ordoñez.     4.  Dyslipidemia with LDL goal <70/Obesity with a BMI of: Diarrhea better switching from atorvastatin to rosuvastatin.  Tolerating well.  Most recent LDL is 129.  Most recent AST/ALT are stable.    Recommended repeat fasting lipid profile during next follow-up visit with valve clinic in March-order placed.-Pending    5.  Paroxysmal atrial fibrillation: Recent EKG in ED showed stable QT/QTc.    Plan:   -Continue current heart failure regimen.  -Continue to stay off of furosemide.  -Educated on low-sodium diet, daily weight monitoring and maintain fluid intake of 50 to 60 ounces per day.  -Patient was encouraged to call if persistent weight gain with worsening heart failure symptoms.  -Okay to use arm for exercise.      Follow-up with JUSTYN Sanchez as scheduled.  Patient was offered to move the appointment out further but would like to keep the appointment as it is.  Follow-up with Dr. Devin encarnacion in April.  Follow-up with me in 3 months or sooner if needed.     History of Present Illness/Subjective    Daniel Alamo is a 82 year old male with past medical history of coronary disease with remote history of CABG with LIMA to LAD, vein graft to OM, and vein graft RCA done in out-of-hospital, ischemic cardiomyopathy with systolic dysfunction with status post ICD,  atrial fibrillation, abdominal aortic aneurysm without rupture, pure hypercholesterolemia, atrial fibrillation on sotalol, peripheral artery disease with status post iliac stents and now status post PCI with balloon angioplasty and shockwave lithotripsy to LAD/diagonal 1 on 12/19/2022, aortic stenosis with status post TAVR who is seen in [facility] for follow-up from recent post TAVR visit.    During last clinic visit for his 1 week postoperative follow-up.  Patient continues to have shortness of breath with exertion.  He was experiencing lightheadedness.  He reported inadequate fluid intake and poor appetite.  He  does have issue with chronic diarrhea which improved slightly switching from atorvastatin to rosuvastatin.  He was also noted to have swelling in his right groin suspected for hernia.  His ultrasound did show abdominal hernia.  He followed up with PCP.  His lisinopril and furosemide dose were reduced due to symptomatic hypotension.    Patient continues to feel poorly with not much improvement in his lightheadedness, fatigue and low blood pressure.  He was also experiencing some rectal bleeding.  He was sent to ED for further evaluation.    Today, Daniel is here accompanied by his wife.  He still has some shortness of breath with exertion although he feels some improvement overall with his fatigue.  He continues to have poor appetite.  Per wife, he does not drink adequate fluid.  He is adding extra salt to his food.  He continues to have diarrhea.    He denies further rectal bleeding since Plavix was discontinued.  She denies chest pain, shortness of breath at rest, PND, orthopnea, abdominal bloating, heart palpitation or lower extremity edema.    Echocardiogram on 2/1/2023  Interpretation Summary     1.Left ventricular function is decreased. The ejection fraction is 30-35%  (moderately reduced).  2.Normal right ventricle size and systolic function.  3.There is mild to moderate (1-2+) mitral regurgitation. ERO 0.11 cmÂ  with a  volume of 27 cc at heart rate of 89 bpm.  4.Aortic valve replace with a 29 mm CHANTAL 3, peak velocity is 2.2 m/s with a  mean gradient of 8 mmHg, there is trivial paravalvular leak.  Compared to the prior study dated 1/31/2023, the LV EF is visually better, MR  is less, and AoVR is new.    Echocardiogram done in September 2022-reviewed  Interpretation Summary     The left ventricle is moderately dilated.  There is mild concentric left ventricular hypertrophy.  The visual ejection fraction is 30-35%.  Diastolic Doppler findings (E/E' ratio and/or other parameters) suggest left  ventricular  filling pressures are increased.  It appears global hypokinesis with akinesis in inferior and inferoalateral  walls.  Normal right ventricle size and systolic function.  There is a pacemaker lead in the right ventricle.  The left atrium is moderately dilated.  The right atrium is mildly dilated.  Pacer wire in right atrium  There is mild (1+) mitral regurgitation.  There is mild (1+) aortic regurgitation.  Mild valvular aortic stenosis with mean gradient of aortic valve 16 mmHg, but  cannot exclude low flow low gradient aortic valve stenosis since the  calculated aortic valve area in 0.86 cm2.     No previous study for comparison.    Coronary Angiogram from 12/19/22: reviewed:  Conclusion  Daniel Alamo is a 82 year old old male w/ CAD s/p CABG w/ L-LAD, V-OM, V-RCA, 30 years ago at [facility], ischemic cardiomyopathy EF 25% 11/2021, aortic stenosis, TR, afib on sotalol, PAD s/p iliac stents here for w/u of progressive GALVEZ and orthostatic symptoms, found to have severe aortic stenosis and CAD and here for PCI to D1.     - given area of ischemia/vaibility in anterior/anterolateral wall, proceeded w/ PCI w/ cuting balloon angioplasty and Shockwave lithotripsy in order to optimize risk profile of the pacing run, but given only branch involvement, we chose to stop, as stenting/Roto would require escalating risk  - proceed w/ TAVR w/u as planned   - continue ASA 81mg daily indefinitely, clopidogrel 600mg once, followed by 75mg daily for at least 12 months, add atorva 40  - continue aggressive risk factor modification     Findings:  LM:no obstruction  LAD:severe diffuse Ca2+ disease in mLAD into D1. Occluded dLAD  Lcx:severe mid-vessel lesion  RCA:not injected     Access:  R Radial artery     PCI:  LMT was engaged w/ a 6F EBU 3.5 Guide catheter and the lesion in LAD was wired w/ a Forte wire, then ballooned w/ a 2/0x12mm NC Emerge, a 2.0x10mm ScoreFlex balloon, and a 2.5x12mm Shockwave balloon at 3 jeronimo inflations.  "We were able to achieve balloon expansion but significant refractory re-narrowing after balloon removal due to severe bulky Ca2+. Given only branch involvement and escalating risk, we chose to stop rather than risk stent under-deployment. Final angiography showed no dissection or perforation and a SKIP 3 flow.     Closure:   Vasc Band       Physical Examination Review of Systems   Vitals: /79 (BP Location: Left arm, Patient Position: Sitting, Cuff Size: Adult Regular)   Pulse 73   Resp 14   Ht 1.778 m (5' 10\")   Wt 64.4 kg (142 lb)   BMI 20.37 kg/m    BMI= Body mass index is 20.37 kg/m .  Wt Readings from Last 3 Encounters:   02/28/23 64.4 kg (142 lb)   02/17/23 63.5 kg (140 lb)   02/06/23 63.5 kg (140 lb)       General Appearance:   Alert, cooperative and in no acute distress   ENT/Mouth: membranes moist, no oral lesions or bleeding gums.      EYES:  no scleral icterus, normal conjunctivae   Neck: No carotid bruits.  Thyroid not visualized   Chest/Lungs:   lungs are clear to auscultation, no rales or wheezing, left clavicle incision dry and intact.   Cardiovascular:     Normal first and second heart sounds with no systolic murmur.  No rubs or gallops; the carotid, radial and posterior tibial pulses are intact, no edema bilaterally;    Abdomen:  Soft and nontender. Bowel sounds are present in all quadrants   Extremities: no cyanosis or clubbing   Skin: no xanthelasma, warm.    Neurologic: normal gait, normal  bilateral, no tremors   Psychiatric: Normal mood and affect       Please refer above for cardiac ROS details.      Medical History  Surgical History Family History Social History   Past Medical History:   Diagnosis Date     Coronary artery disease      Heart disease      VT (ventricular tachycardia) 2004     Past Surgical History:   Procedure Laterality Date     ABDOMEN SURGERY       BYPASS GRAFT ARTERY CORONARY       CV ANGIOGRAM CORONARY GRAFT N/A 9/19/2022    Procedure: Coronary Angiogram " Graft;  Surgeon: Tyrone Ordoñez MD;  Location: [facility] CV     CV CORONARY LITHOTRIPSY PCI N/A 12/19/2022    Procedure: Percutaneous Coronary Intervention - Lithotripsy;  Surgeon: Tyrone Ordoñez MD;  Location: [facility] CATH LAB CV     CV LEFT HEART CATH N/A 9/19/2022    Procedure: Left Heart Catheterization;  Surgeon: Tyrone Ordoñez MD;  Location: [facility] CV     CV PCI ANGIOPLASTY N/A 12/19/2022    Procedure: Percutaneous Transluminal Angioplasty;  Surgeon: Tyrone Ordoñez MD;  Location: [facility]     CV RIGHT HEART CATH MEASUREMENTS RECORDED N/A 9/19/2022    Procedure: Right Heart Catheterization;  Surgeon: Tyrone Ordoñez MD;  Location: [facility]     EYE SURGERY       INSERT / REPLACE / REMOVE PACEMAKER       OR TRANSCATHETER AORTIC VALVE REPLACEMENT, SUBCLAVIAN PERCUTANEOUS APPROACH (STANDBY) N/A 1/31/2023    Procedure: OR TRANSCATHETER AORTIC VALVE REPLACEMENT, SUBCLAVIAN PERCUTANEOUS APPROACH (STANDBY);  Surgeon: Shanae Rich MD;  Location: [facility]     OTHER SURGICAL HISTORY      icd     TRANSCATHETER AORTIC VALVE REPLACEMENT - SUBCLAVIAN APPROACH N/A 1/31/2023    Procedure: Transcatheter Aortic Valve Replacement - Subclavian Approach;  Surgeon: Tyrone Ordoñez MD;  Location: [facility]     ZZC CABG, VEIN, SINGLE      Description: CABG (CABG);  Recorded: 10/03/2012;  Comments: LIMA to LAD, SVG to OM2, SVG to RCA     Family History   Problem Relation Age of Onset     Cancer Mother      Heart Disease Father     Social History     Socioeconomic History     Marital status:      Spouse name: Not on file     Number of children: Not on file     Years of education: Not on file     Highest education level: Not on file   Occupational History     Not on file   Tobacco Use     Smoking status: Every Day     Packs/day: 0.50     Years: 30.00     Pack years: 15.00     Types: Cigarettes     Smokeless tobacco: Never   Vaping Use      Vaping Use: Never used   Substance and Sexual Activity     Alcohol use: Yes     Alcohol/week: 1.0 standard drink     Comment: Glass of wine daily.     Drug use: No     Sexual activity: Not Currently   Other Topics Concern     Parent/sibling w/ CABG, MI or angioplasty before 65F 55M? Not Asked   Social History Narrative     Not on file     Social Determinants of Health     Financial Resource Strain: Not on file   Food Insecurity: Not on file   Transportation Needs: Not on file   Physical Activity: Not on file   Stress: Not on file   Social Connections: Not on file   Intimate Partner Violence: Not on file   Housing Stability: Not on file          Medications  Allergies   Current Outpatient Medications   Medication Sig Dispense Refill     acetaminophen (TYLENOL) 325 MG tablet Take 650 mg by mouth every 6 hours as needed for pain       apixaban ANTICOAGULANT (ELIQUIS) 5 MG tablet Take 5 mg by mouth 2 times daily       fluticasone (FLONASE) 50 MCG/ACT nasal spray Spray 1 spray into both nostrils as needed for rhinitis or allergies       gabapentin (NEURONTIN) 300 MG capsule Take 900 mg by mouth 2 times daily        lisinopril (ZESTRIL) 2.5 MG tablet Take 1 tablet (2.5 mg) by mouth daily 90 tablet 3     metoprolol succinate ER (TOPROL XL) 25 MG 24 hr tablet Take 1 tablet (25 mg) by mouth daily (Patient taking differently: Take 25 mg by mouth every evening) 90 tablet 11     rosuvastatin (CRESTOR) 10 MG tablet Take 1 tablet (10 mg) by mouth daily 90 tablet 3     sotalol (BETAPACE) 80 MG tablet Take 1 tablet (80 mg) by mouth 2 times daily 180 tablet 3     tamsulosin (FLOMAX) 0.4 MG capsule Take 0.4 mg by mouth every evening      Allergies   Allergen Reactions     Atorvastatin Diarrhea     Carvedilol Other (See Comments) and GI Disturbance     Upset stomach; GI upset       Chloride GI Disturbance and Nausea         Lab Results    Chemistry/lipid CBC Cardiac Enzymes/BNP/TSH/INR   Recent Labs   Lab Test 09/19/22  0803    CHOL 185   HDL 41      TRIG 76     Recent Labs   Lab Test 09/19/22  0803 12/06/19  1134 10/05/18  0930    100 88     Recent Labs   Lab Test 01/10/23  1000      POTASSIUM 4.4   CHLORIDE 104   CO2 28   GLC 84   BUN 13.2   CR 1.08   GFRESTIMATED 68   EMILIO 9.1     Recent Labs   Lab Test 01/10/23  1000 12/16/22  1029 11/25/22  1549   CR 1.08 1.13 1.01     No results for input(s): A1C in the last 67463 hours. Recent Labs   Lab Test 12/16/22  1029   WBC 8.3   HGB 14.5   HCT 45.4   MCV 94        Recent Labs   Lab Test 12/16/22  1029 11/25/22  1549 09/19/22  0803   HGB 14.5 14.6 14.2    No results for input(s): TROPONINI in the last 16213 hours.  No results for input(s): BNP, NTBNPI, NTBNP in the last 81927 hours.  Recent Labs   Lab Test 09/01/22  1327   TSH 0.46     No results for input(s): INR in the last 81733 hours.     40  minutes spent on the date of encounter doing review of test results, interpretation with above tests, patient visit, documentation and discussion with family.      This note has been dictated using voice recognition software. Any grammatical or context distortions are unintentional and inherent to the software.

## 2023-02-27 ENCOUNTER — HOSPITAL ENCOUNTER (OUTPATIENT)
Dept: CARDIOLOGY | Facility: HOSPITAL | Age: 83
Discharge: HOME | End: 2023-02-27
Attending: INTERNAL MEDICINE | Admitting: INTERNAL MEDICINE
Payer: MEDICARE

## 2023-02-27 DIAGNOSIS — I35.0 NONRHEUMATIC AORTIC VALVE STENOSIS: ICD-10-CM

## 2023-02-27 LAB — LVEF ECHO: NORMAL

## 2023-02-27 PROCEDURE — 93306 TTE W/DOPPLER COMPLETE: CPT | Mod: 26 | Performed by: INTERNAL MEDICINE

## 2023-02-27 PROCEDURE — 93306 TTE W/DOPPLER COMPLETE: CPT

## 2023-02-28 ENCOUNTER — HOSPITAL ENCOUNTER (OUTPATIENT)
Dept: CARDIAC REHAB | Facility: HOSPITAL | Age: 83
Discharge: HOME | End: 2023-02-28
Attending: INTERNAL MEDICINE
Payer: MEDICARE

## 2023-02-28 ENCOUNTER — OFFICE VISIT (OUTPATIENT)
Dept: CARDIOLOGY | Facility: CLINIC | Age: 83
End: 2023-02-28
Payer: MEDICARE

## 2023-02-28 VITALS
HEART RATE: 73 BPM | SYSTOLIC BLOOD PRESSURE: 125 MMHG | BODY MASS INDEX: 20.33 KG/M2 | WEIGHT: 142 LBS | HEIGHT: 70 IN | RESPIRATION RATE: 14 BRPM | DIASTOLIC BLOOD PRESSURE: 79 MMHG

## 2023-02-28 DIAGNOSIS — I48.0 PAROXYSMAL ATRIAL FIBRILLATION (H): ICD-10-CM

## 2023-02-28 DIAGNOSIS — I25.5 ISCHEMIC CARDIOMYOPATHY: Primary | ICD-10-CM

## 2023-02-28 DIAGNOSIS — Z95.2 S/P TAVR (TRANSCATHETER AORTIC VALVE REPLACEMENT): ICD-10-CM

## 2023-02-28 DIAGNOSIS — I35.0 AORTIC STENOSIS, SEVERE: ICD-10-CM

## 2023-02-28 DIAGNOSIS — I42.0 DILATED CARDIOMYOPATHY (H): ICD-10-CM

## 2023-02-28 DIAGNOSIS — I50.22 CHRONIC HFREF (HEART FAILURE WITH REDUCED EJECTION FRACTION) (H): ICD-10-CM

## 2023-02-28 DIAGNOSIS — I95.9 HYPOTENSION, UNSPECIFIED HYPOTENSION TYPE: ICD-10-CM

## 2023-02-28 DIAGNOSIS — E78.00 PURE HYPERCHOLESTEROLEMIA: ICD-10-CM

## 2023-02-28 PROCEDURE — 93797 PHYS/QHP OP CAR RHAB WO ECG: CPT | Mod: 59

## 2023-02-28 PROCEDURE — 93798 PHYS/QHP OP CAR RHAB W/ECG: CPT

## 2023-02-28 PROCEDURE — 99215 OFFICE O/P EST HI 40 MIN: CPT | Performed by: NURSE PRACTITIONER

## 2023-02-28 RX ORDER — LISINOPRIL 2.5 MG/1
2.5 TABLET ORAL DAILY
Qty: 90 TABLET | Refills: 3 | Status: SHIPPED | OUTPATIENT
Start: 2023-02-28 | End: 2024-03-22

## 2023-02-28 RX ORDER — ROSUVASTATIN CALCIUM 10 MG/1
10 TABLET, COATED ORAL DAILY
Qty: 90 TABLET | Refills: 3 | Status: SHIPPED | OUTPATIENT
Start: 2023-02-28 | End: 2023-03-10

## 2023-02-28 NOTE — PATIENT INSTRUCTIONS
Daniel Alamo,    It was a pleasure to see you today at the [facility].     My recommendations after this visit include:    - No medications changes made today    - Keep your salt intake <2000 mg per day, daily weight check, and maintain your fluid intake at 50-60 ounces per day    - Please call if you develop 2-3 lbs 2 days in a row or 5 lbs in a week with more shortness of breath, abdominal bloating or ankle swelling     - You may use your arm for exercise but let cardiac rehab team know if it cause any pain or discomfort     - Follow up with Justin in 3 months     - Follow up with Dr. Vargas in 6-8 weeks     - Please call Heart Failure Nurse Line at [phone], if you have any questions or concerns    Justin Rivera, CNP

## 2023-02-28 NOTE — LETTER
"2/28/2023    Kenna Calvillo, NP  [address] [facility] [place] [address]    RE: Daniel Alamo       Dear Colleague,     I had the pleasure of seeing Daniel Alamo in the [facility].  HEART CARE ENCOUNTER NOTE       [facility]  [phone]    Assessment/Recommendations     1.  Ischemic cardiomyopathy with systolic dysfunction with LVEF of 30 to 35% per echo in 2/27/2023 with status post ICD: Stable and unchanged from previous echo done on 2/1/2023.  He is well compensated with no evidence of fluid retention on exam.  His lisinopril dose was reduced from 7.5 mg down to 5 mg and to 2.5 mg due to hypotension post TAVR.  His furosemide was discontinued.  He is on metoprolol succinate 25 mg daily.  Blood pressure today in clinic is 125/79.  Home blood pressure reading mostly staying in systolic low 100s per patient.    He still has some mild dizziness although denies worsening.    Most recent device check done on 2/24/2020 shows stable with no arrhythmia detected since January 2023.    2.  Aortic Stenosis status post successful TAVR on 1/31/2023 with #29 CHANTAL valve via left subclavian.  Left clavicle incision is dry and intact.  Echocardiogram on 2/27/2020 showed, \"there is a 29 mm CHANTAL 3 bioprosthetic valve present in aortic valve  position. Mean gradient is 6 mmHg \".    He thinks his shortness of breath is unchanged.  Initiated cardiac rehab.      3. Coronary artery disease with history of prior CABG with LIMA to LAD, SVG to OM, and SVG to RCA: Coronary angiogram in September 2022 showed severe mid vessel disease with 70% calcified disease and diagonal 1 with distal LAD fills via patent LIMA to the LAD, 70 to 80% stenosis in OM1 with distal small OM fills via L to L bridging collaterals, 100% occluded proximal RCA.  Vein graft to the OM was found occluded and vein graft to RCA is not visualized and presumed occluded.    Recent ED visit with low blood pressure, fatigue and " rectal bleed.  Hemoglobin was found stable.  CT abdomen did not find any active bleeding.  Patient was recommended follow-up with PCP for possible GI referral.     Plavix was discontinued by denies recurrent bleeding.Dr. Ordoñez.     4.  Dyslipidemia with LDL goal <70/Obesity with a BMI of: Diarrhea better switching from atorvastatin to rosuvastatin.  Tolerating well.  Most recent LDL is 129.  Most recent AST/ALT are stable.    Recommended repeat fasting lipid profile during next follow-up visit with valve clinic in March-order placed.-Pending    5.  Paroxysmal atrial fibrillation: Recent EKG in ED showed stable QT/QTc.    Plan:   -Continue current heart failure regimen.  -Continue to stay off of furosemide.  -Educated on low-sodium diet, daily weight monitoring and maintain fluid intake of 50 to 60 ounces per day.  -Patient was encouraged to call if persistent weight gain with worsening heart failure symptoms.  -Okay to use arm for exercise.      Follow-up with JUSTYN Sanchez as scheduled.  Patient was offered to move the appointment out further but would like to keep the appointment as it is.  Follow-up with Dr. Devin encarnacion in April.  Follow-up with me in 3 months or sooner if needed.     History of Present Illness/Subjective    Daniel Alamo is a 82 year old male with past medical history of coronary disease with remote history of CABG with LIMA to LAD, vein graft to OM, and vein graft RCA done in out-of-hospital, ischemic cardiomyopathy with systolic dysfunction with status post ICD,  atrial fibrillation, abdominal aortic aneurysm without rupture, pure hypercholesterolemia, atrial fibrillation on sotalol, peripheral artery disease with status post iliac stents and now status post PCI with balloon angioplasty and shockwave lithotripsy to LAD/diagonal 1 on 12/19/2022, aortic stenosis with status post TAVR who is seen in [facility] for follow-up from recent post TAVR visit.    During last clinic visit  for his 1 week postoperative follow-up.  Patient continues to have shortness of breath with exertion.  He was experiencing lightheadedness.  He reported inadequate fluid intake and poor appetite.  He does have issue with chronic diarrhea which improved slightly switching from atorvastatin to rosuvastatin.  He was also noted to have swelling in his right groin suspected for hernia.  His ultrasound did show abdominal hernia.  He followed up with PCP.  His lisinopril and furosemide dose were reduced due to symptomatic hypotension.    Patient continues to feel poorly with not much improvement in his lightheadedness, fatigue and low blood pressure.  He was also experiencing some rectal bleeding.  He was sent to ED for further evaluation.    Today, Daniel is here accompanied by his wife.  He still has some shortness of breath with exertion although he feels some improvement overall with his fatigue.  He continues to have poor appetite.  Per wife, he does not drink adequate fluid.  He is adding extra salt to his food.  He continues to have diarrhea.    He denies further rectal bleeding since Plavix was discontinued.  She denies chest pain, shortness of breath at rest, PND, orthopnea, abdominal bloating, heart palpitation or lower extremity edema.    Echocardiogram on 2/1/2023  Interpretation Summary     1.Left ventricular function is decreased. The ejection fraction is 30-35%  (moderately reduced).  2.Normal right ventricle size and systolic function.  3.There is mild to moderate (1-2+) mitral regurgitation. ERO 0.11 cmÂ  with a  volume of 27 cc at heart rate of 89 bpm.  4.Aortic valve replace with a 29 mm CHANTAL 3, peak velocity is 2.2 m/s with a  mean gradient of 8 mmHg, there is trivial paravalvular leak.  Compared to the prior study dated 1/31/2023, the LV EF is visually better, MR  is less, and AoVR is new.    Echocardiogram done in September 2022-reviewed  Interpretation Summary     The left ventricle is moderately  dilated.  There is mild concentric left ventricular hypertrophy.  The visual ejection fraction is 30-35%.  Diastolic Doppler findings (E/E' ratio and/or other parameters) suggest left  ventricular filling pressures are increased.  It appears global hypokinesis with akinesis in inferior and inferoalateral  walls.  Normal right ventricle size and systolic function.  There is a pacemaker lead in the right ventricle.  The left atrium is moderately dilated.  The right atrium is mildly dilated.  Pacer wire in right atrium  There is mild (1+) mitral regurgitation.  There is mild (1+) aortic regurgitation.  Mild valvular aortic stenosis with mean gradient of aortic valve 16 mmHg, but  cannot exclude low flow low gradient aortic valve stenosis since the  calculated aortic valve area in 0.86 cm2.     No previous study for comparison.    Coronary Angiogram from 12/19/22: reviewed:  Conclusion  Daniel Alamo is a 82 year old old male w/ CAD s/p CABG w/ L-LAD, V-OM, V-RCA, 30 years ago at [facility], ischemic cardiomyopathy EF 25% 11/2021, aortic stenosis, TR, afib on sotalol, PAD s/p iliac stents here for w/u of progressive GALVEZ and orthostatic symptoms, found to have severe aortic stenosis and CAD and here for PCI to D1.     - given area of ischemia/vaibility in anterior/anterolateral wall, proceeded w/ PCI w/ cuting balloon angioplasty and Shockwave lithotripsy in order to optimize risk profile of the pacing run, but given only branch involvement, we chose to stop, as stenting/Roto would require escalating risk  - proceed w/ TAVR w/u as planned   - continue ASA 81mg daily indefinitely, clopidogrel 600mg once, followed by 75mg daily for at least 12 months, add atorva 40  - continue aggressive risk factor modification     Findings:  LM:no obstruction  LAD:severe diffuse Ca2+ disease in mLAD into D1. Occluded dLAD  Lcx:severe mid-vessel lesion  RCA:not injected     Access:  R Radial artery     PCI:  LMT was engaged w/ a 6F  "EBU 3.5 Guide catheter and the lesion in LAD was wired w/ a Forte wire, then ballooned w/ a 2/0x12mm NC Emerge, a 2.0x10mm ScoreFlex balloon, and a 2.5x12mm Shockwave balloon at 3 jeronimo inflations. We were able to achieve balloon expansion but significant refractory re-narrowing after balloon removal due to severe bulky Ca2+. Given only branch involvement and escalating risk, we chose to stop rather than risk stent under-deployment. Final angiography showed no dissection or perforation and a SKIP 3 flow.     Closure:   Vasc Band       Physical Examination Review of Systems   Vitals: /79 (BP Location: Left arm, Patient Position: Sitting, Cuff Size: Adult Regular)   Pulse 73   Resp 14   Ht 1.778 m (5' 10\")   Wt 64.4 kg (142 lb)   BMI 20.37 kg/m    BMI= Body mass index is 20.37 kg/m .  Wt Readings from Last 3 Encounters:   02/28/23 64.4 kg (142 lb)   02/17/23 63.5 kg (140 lb)   02/06/23 63.5 kg (140 lb)       General Appearance:   Alert, cooperative and in no acute distress   ENT/Mouth: membranes moist, no oral lesions or bleeding gums.      EYES:  no scleral icterus, normal conjunctivae   Neck: No carotid bruits.  Thyroid not visualized   Chest/Lungs:   lungs are clear to auscultation, no rales or wheezing, left clavicle incision dry and intact.   Cardiovascular:     Normal first and second heart sounds with no systolic murmur.  No rubs or gallops; the carotid, radial and posterior tibial pulses are intact, no edema bilaterally;    Abdomen:  Soft and nontender. Bowel sounds are present in all quadrants   Extremities: no cyanosis or clubbing   Skin: no xanthelasma, warm.    Neurologic: normal gait, normal  bilateral, no tremors   Psychiatric: Normal mood and affect       Please refer above for cardiac ROS details.      Medical History  Surgical History Family History Social History   Past Medical History:   Diagnosis Date     Coronary artery disease      Heart disease      VT (ventricular tachycardia) 2004 "     Past Surgical History:   Procedure Laterality Date     ABDOMEN SURGERY       BYPASS GRAFT ARTERY CORONARY       CV ANGIOGRAM CORONARY GRAFT N/A 9/19/2022    Procedure: Coronary Angiogram Graft;  Surgeon: Tyrone Ordoñez MD;  Location: [facility]     CV CORONARY LITHOTRIPSY PCI N/A 12/19/2022    Procedure: Percutaneous Coronary Intervention - Lithotripsy;  Surgeon: Tyrone Ordoñez MD;  Location: [facility]     CV LEFT HEART CATH N/A 9/19/2022    Procedure: Left Heart Catheterization;  Surgeon: Tyrone Ordoñez MD;  Location: [facility]     CV PCI ANGIOPLASTY N/A 12/19/2022    Procedure: Percutaneous Transluminal Angioplasty;  Surgeon: Tyrone Ordoñez MD;  Location: [facility]     CV RIGHT HEART CATH MEASUREMENTS RECORDED N/A 9/19/2022    Procedure: Right Heart Catheterization;  Surgeon: Tyrone Ordoñez MD;  Location: [facility]     EYE SURGERY       INSERT / REPLACE / REMOVE PACEMAKER       OR TRANSCATHETER AORTIC VALVE REPLACEMENT, SUBCLAVIAN PERCUTANEOUS APPROACH (STANDBY) N/A 1/31/2023    Procedure: OR TRANSCATHETER AORTIC VALVE REPLACEMENT, SUBCLAVIAN PERCUTANEOUS APPROACH (STANDBY);  Surgeon: Shanae Rich MD;  Location: [facility]     OTHER SURGICAL HISTORY      icd     TRANSCATHETER AORTIC VALVE REPLACEMENT - SUBCLAVIAN APPROACH N/A 1/31/2023    Procedure: Transcatheter Aortic Valve Replacement - Subclavian Approach;  Surgeon: Tyrone Ordoñez MD;  Location: [facility]     ZZC CABG, VEIN, SINGLE      Description: CABG (CABG);  Recorded: 10/03/2012;  Comments: LIMA to LAD, SVG to OM2, SVG to RCA     Family History   Problem Relation Age of Onset     Cancer Mother      Heart Disease Father     Social History     Socioeconomic History     Marital status:      Spouse name: Not on file     Number of children: Not on file     Years of education: Not on file     Highest education level: Not on file   Occupational  History     Not on file   Tobacco Use     Smoking status: Every Day     Packs/day: 0.50     Years: 30.00     Pack years: 15.00     Types: Cigarettes     Smokeless tobacco: Never   Vaping Use     Vaping Use: Never used   Substance and Sexual Activity     Alcohol use: Yes     Alcohol/week: 1.0 standard drink     Comment: Glass of wine daily.     Drug use: No     Sexual activity: Not Currently   Other Topics Concern     Parent/sibling w/ CABG, MI or angioplasty before 65F 55M? Not Asked   Social History Narrative     Not on file     Social Determinants of Health     Financial Resource Strain: Not on file   Food Insecurity: Not on file   Transportation Needs: Not on file   Physical Activity: Not on file   Stress: Not on file   Social Connections: Not on file   Intimate Partner Violence: Not on file   Housing Stability: Not on file          Medications  Allergies   Current Outpatient Medications   Medication Sig Dispense Refill     acetaminophen (TYLENOL) 325 MG tablet Take 650 mg by mouth every 6 hours as needed for pain       apixaban ANTICOAGULANT (ELIQUIS) 5 MG tablet Take 5 mg by mouth 2 times daily       fluticasone (FLONASE) 50 MCG/ACT nasal spray Spray 1 spray into both nostrils as needed for rhinitis or allergies       gabapentin (NEURONTIN) 300 MG capsule Take 900 mg by mouth 2 times daily        lisinopril (ZESTRIL) 2.5 MG tablet Take 1 tablet (2.5 mg) by mouth daily 90 tablet 3     metoprolol succinate ER (TOPROL XL) 25 MG 24 hr tablet Take 1 tablet (25 mg) by mouth daily (Patient taking differently: Take 25 mg by mouth every evening) 90 tablet 11     rosuvastatin (CRESTOR) 10 MG tablet Take 1 tablet (10 mg) by mouth daily 90 tablet 3     sotalol (BETAPACE) 80 MG tablet Take 1 tablet (80 mg) by mouth 2 times daily 180 tablet 3     tamsulosin (FLOMAX) 0.4 MG capsule Take 0.4 mg by mouth every evening      Allergies   Allergen Reactions     Atorvastatin Diarrhea     Carvedilol Other (See Comments) and GI  Disturbance     Upset stomach; GI upset       Chloride GI Disturbance and Nausea         Lab Results    Chemistry/lipid CBC Cardiac Enzymes/BNP/TSH/INR   Recent Labs   Lab Test 09/19/22  0803   CHOL 185   HDL 41      TRIG 76     Recent Labs   Lab Test 09/19/22  0803 12/06/19  1134 10/05/18  0930    100 88     Recent Labs   Lab Test 01/10/23  1000      POTASSIUM 4.4   CHLORIDE 104   CO2 28   GLC 84   BUN 13.2   CR 1.08   GFRESTIMATED 68   EMILIO 9.1     Recent Labs   Lab Test 01/10/23  1000 12/16/22  1029 11/25/22  1549   CR 1.08 1.13 1.01     No results for input(s): A1C in the last 44833 hours. Recent Labs   Lab Test 12/16/22  1029   WBC 8.3   HGB 14.5   HCT 45.4   MCV 94        Recent Labs   Lab Test 12/16/22  1029 11/25/22  1549 09/19/22  0803   HGB 14.5 14.6 14.2    No results for input(s): TROPONINI in the last 09381 hours.  No results for input(s): BNP, NTBNPI, NTBNP in the last 50695 hours.  Recent Labs   Lab Test 09/01/22  1327   TSH 0.46     No results for input(s): INR in the last 91152 hours.     40  minutes spent on the date of encounter doing review of test results, interpretation with above tests, patient visit, documentation and discussion with family.      This note has been dictated using voice recognition software. Any grammatical or context distortions are unintentional and inherent to the software.                     Thank you for allowing me to participate in the care of your patient.      Sincerely,     CAILIN Singh [facility]  cc:   No referring provider defined for this encounter.

## 2023-03-03 DIAGNOSIS — Z95.2 S/P TAVR (TRANSCATHETER AORTIC VALVE REPLACEMENT): Primary | ICD-10-CM

## 2023-03-06 ENCOUNTER — OFFICE VISIT (OUTPATIENT)
Dept: CARDIOLOGY | Facility: CLINIC | Age: 83
End: 2023-03-06
Payer: MEDICARE

## 2023-03-06 ENCOUNTER — LAB (OUTPATIENT)
Dept: CARDIOLOGY | Facility: CLINIC | Age: 83
End: 2023-03-06
Payer: MEDICARE

## 2023-03-06 VITALS
HEART RATE: 57 BPM | HEIGHT: 70 IN | RESPIRATION RATE: 16 BRPM | WEIGHT: 142 LBS | SYSTOLIC BLOOD PRESSURE: 122 MMHG | BODY MASS INDEX: 20.33 KG/M2 | DIASTOLIC BLOOD PRESSURE: 77 MMHG

## 2023-03-06 DIAGNOSIS — Z95.1 S/P CABG (CORONARY ARTERY BYPASS GRAFT): ICD-10-CM

## 2023-03-06 DIAGNOSIS — I42.0 DILATED CARDIOMYOPATHY (H): ICD-10-CM

## 2023-03-06 DIAGNOSIS — Z95.2 S/P TAVR (TRANSCATHETER AORTIC VALVE REPLACEMENT): ICD-10-CM

## 2023-03-06 DIAGNOSIS — I48.0 PAROXYSMAL ATRIAL FIBRILLATION (H): ICD-10-CM

## 2023-03-06 DIAGNOSIS — Z95.810 ICD (IMPLANTABLE CARDIOVERTER-DEFIBRILLATOR), DUAL, IN SITU: ICD-10-CM

## 2023-03-06 DIAGNOSIS — I71.40 ABDOMINAL AORTIC ANEURYSM (AAA) WITHOUT RUPTURE, UNSPECIFIED PART (H): ICD-10-CM

## 2023-03-06 DIAGNOSIS — I35.0 SEVERE AORTIC STENOSIS: Primary | ICD-10-CM

## 2023-03-06 DIAGNOSIS — I50.20 HEART FAILURE WITH REDUCED EJECTION FRACTION, NYHA CLASS II (H): ICD-10-CM

## 2023-03-06 LAB
ANION GAP SERPL CALCULATED.3IONS-SCNC: 8 MMOL/L (ref 7–15)
ATRIAL RATE - MUSE: 57 BPM
BUN SERPL-MCNC: 19.4 MG/DL (ref 8–23)
CALCIUM SERPL-MCNC: 9.2 MG/DL (ref 8.8–10.2)
CHLORIDE SERPL-SCNC: 106 MMOL/L (ref 98–107)
CREAT SERPL-MCNC: 1.1 MG/DL (ref 0.67–1.17)
DEPRECATED HCO3 PLAS-SCNC: 28 MMOL/L (ref 22–29)
DIASTOLIC BLOOD PRESSURE - MUSE: NORMAL MMHG
ERYTHROCYTE [DISTWIDTH] IN BLOOD BY AUTOMATED COUNT: 13.4 % (ref 10–15)
GFR SERPL CREATININE-BSD FRML MDRD: 66 ML/MIN/1.73M2
GLUCOSE SERPL-MCNC: 90 MG/DL (ref 70–99)
HCT VFR BLD AUTO: 42.1 % (ref 40–53)
HGB BLD-MCNC: 13.2 G/DL (ref 13.3–17.7)
INTERPRETATION ECG - MUSE: NORMAL
MCH RBC QN AUTO: 29.3 PG (ref 26.5–33)
MCHC RBC AUTO-ENTMCNC: 31.4 G/DL (ref 31.5–36.5)
MCV RBC AUTO: 93 FL (ref 78–100)
P AXIS - MUSE: NORMAL DEGREES
PLATELET # BLD AUTO: 173 10E3/UL (ref 150–450)
POTASSIUM SERPL-SCNC: 4.7 MMOL/L (ref 3.4–5.3)
PR INTERVAL - MUSE: 222 MS
QRS DURATION - MUSE: 150 MS
QT - MUSE: 488 MS
QTC - MUSE: 474 MS
R AXIS - MUSE: 93 DEGREES
RBC # BLD AUTO: 4.51 10E6/UL (ref 4.4–5.9)
SODIUM SERPL-SCNC: 142 MMOL/L (ref 136–145)
SYSTOLIC BLOOD PRESSURE - MUSE: NORMAL MMHG
T AXIS - MUSE: -55 DEGREES
VENTRICULAR RATE- MUSE: 57 BPM
WBC # BLD AUTO: 8.8 10E3/UL (ref 4–11)

## 2023-03-06 PROCEDURE — 80048 BASIC METABOLIC PNL TOTAL CA: CPT

## 2023-03-06 PROCEDURE — 36415 COLL VENOUS BLD VENIPUNCTURE: CPT

## 2023-03-06 PROCEDURE — 93000 ELECTROCARDIOGRAM COMPLETE: CPT | Performed by: INTERNAL MEDICINE

## 2023-03-06 PROCEDURE — 85027 COMPLETE CBC AUTOMATED: CPT

## 2023-03-06 PROCEDURE — 99215 OFFICE O/P EST HI 40 MIN: CPT | Performed by: INTERNAL MEDICINE

## 2023-03-06 RX ORDER — METOPROLOL SUCCINATE 25 MG/1
12.5 TABLET, EXTENDED RELEASE ORAL DAILY
Qty: 90 TABLET | Refills: 11
Start: 2023-03-06 | End: 2023-03-16

## 2023-03-06 NOTE — PATIENT INSTRUCTIONS
Daniel Alamo,    It was a pleasure to see you today in the clinic regarding your TAVR.     My recommendations after this visit include:     - decrease metoprolol to 12.5 mg (1/2 tablet) every evening to see if this makes a difference in the dizziness.     You should followup with Dr. Vargas on May 10 and with Justin on May 23      If you have questions or concerns, please call using the numbers below:    Valve Clinic Phone   [phone]    After Hours/Scheduling  [phone]    Otherwise you can dial the nurse directly at:    Heydi Clark RN  [phone]    Thien Samson RN  [phone]

## 2023-03-06 NOTE — PROGRESS NOTES
HEART CARE ENCOUNTER NOTE       [facility]  [phone]    Assessment/Recommendations   Assessment:  1. Severe aortic stenosis - s/p TAVR on 1/31/2023 using a #29 Magdalena valve  2.   Ischemic cardiomyopathy, chronic combined systolic and diastolic heart failure, NYHA class III - currently compensated with no evidence of fluid overlaod  3.   CAD -with prior CABG and PCI to LAD/diagonal with shockwave and balloon angioplasty on 12/19/2022  4.   Paroxysmal atrial fibrillation  5.   Dyslipidemia with goal LDL of less than 70  6.   ICD in situ  7.   Hypertension - BP today is at goal  8.   PVD  9.   AAA s/p endographic repair in 2020  10. Rectal bleeding with mild drop in Hgb - resolved  11.  Direct inguinal hernia    Plan:  1. Continue cardiac rehab  2. Eliquis 5 mg twice daily for stroke prophylaxis  3. Continue lisinopril 2.5 mg daily, but decrease metoprolol succinate to 12.5 mg daily to see if dizziness improves.  4. Since patient's Plavix was stopped, he should probably be on aspirin 81 mg daily and I will confirm this with Dr. Ordoñez and then update patient.  5. Repeat lipid panel this Friday  6. Follow-up with Dr. Vargas on May 10 and with Justin again on May 23.  He should see the structural team again in January 2024    Thank you for the opportunity to participate in the care of Daniel Alamo. It's been a pleasure working with him.  Please do not hesitate to call with any questions or concerns.       History of Present Illness/Subjective    I had the opportunity to see Daniel Alamo at the [facility] for his 1 month follow-up visit after transcatheter aortic valve replacement.    His wife accompanies him to the visit today.    Daniel is an 82-year-old gentleman with history of coronary artery disease, CABG in the past, heart failure with reduced ejection fraction, ischemic cardiomyopathy, ICD in situ, paroxysmal atrial fibrillation, hyperlipidemia and PVD with AAA  status post endograft repair in 2020.  He was recently found to have severe low-flow low gradient aortic stenosis and was seen by valve clinic.  He was recommended for TAVR given his age and other comorbidities.    He underwent procedure on January 31 via left subclavian approach.  Initially, he had significant dizziness and hypotension and had to be taken off his diuretic.   His lisinopril dose was also decreased.  He continued to have dizziness and hypotension with some minor rectal bleeding, so was instructed to be seen in the ED.  Hemoglobin was stable, but Plavix stopped by our service just in case.  He has been on lisinopril 2.5 mg daily since that time and has been taking his metoprolol in the evening.    Today, Daniel says he still is not feeling any better.  He feels that he has had slight improvement in his overall status, but still short of breath with stairs and not active within the home per his wife.  His dizziness is maybe a little bit better, but it still happens on a daily basis and last for few minutes at a time.  He did have his initial session with cardiac rehab, but has not actually exercised at rehab yet.    Daniel DENNIS Cally denies exertional chest discomfort, palpitations, shortness of breath at rest, PND, orthopnea, pre-syncope or syncope, weight loss, changes in appetite, nausea or vomiting.   ____________________________________________________________  Echo 2/27/23  Interpretation Summary     The left ventricle is moderately dilated.  There is normal left ventricular wall thickness.  The visual ejection fraction is 30-35%.  Grade II or moderate diastolic dysfunction.  There is moderate global hypokinesia of the left ventricle.  Normal right ventricle size and systolic function.  The left atrium is moderately dilated.  There is mild to moderate (1-2+) mitral regurgitation.  There is a 29 mm CHANTAL 3 bioprosthetic valve present in aortic valve  position. Mean gradient is 6 mmHg.  There is mild  "praravalvular leaking at 10 am and 2 pm.     When compared to previous study on 2-1-2023, there is no signioficantly  interval change.     Physical Examination Review of Systems   Vitals: Resp 16   Ht 1.778 m (5' 10\")   Wt 64.4 kg (142 lb)   BMI 20.37 kg/m    BMI= Body mass index is 20.37 kg/m .  Wt Readings from Last 3 Encounters:   03/06/23 64.4 kg (142 lb)   02/28/23 64.4 kg (142 lb)   02/17/23 63.5 kg (140 lb)       General Appearance:   Alert, cooperative and in no acute distress   ENT/Mouth: membranes moist, no oral lesions or bleeding gums.      EYES:  no scleral icterus, normal conjunctivae   Neck: Supple without lymphadenopathy.  Thyroid not visualized   Chest/Lungs:   lungs are clear to auscultation, no rales or wheezing   Cardiovascular:   Regular. Normal first and second heart sounds with no murmurs, rubs, or gallops; the carotid, radial and posterior tibial pulses are intact, no edema bilaterally    Abdomen:  Soft and nontender. Bowel sounds are present in all quadrants   Extremities: no cyanosis or clubbing.     Skin: no xanthelasma, warm.    Neurologic: normal gait, normal  bilateral, no tremors   Psychiatric: Normal mood and affect       Please refer above for cardiac ROS details.      Medical History  Surgical History Family History Social History   Past Medical History:   Diagnosis Date     Coronary artery disease      Heart disease      VT (ventricular tachycardia) 2004     Past Surgical History:   Procedure Laterality Date     ABDOMEN SURGERY       BYPASS GRAFT ARTERY CORONARY       CV ANGIOGRAM CORONARY GRAFT N/A 9/19/2022    Procedure: Coronary Angiogram Graft;  Surgeon: Tyrone Ordoñez MD;  Location: [facility] CATH LAB CV     CV CORONARY LITHOTRIPSY PCI N/A 12/19/2022    Procedure: Percutaneous Coronary Intervention - Lithotripsy;  Surgeon: Tyrone Ordoñez MD;  Location: [facility] CATH LAB CV     CV LEFT HEART CATH N/A 9/19/2022    Procedure: Left Heart Catheterization;  Surgeon: " Tyrone Ordoñez MD;  Location: [facility] CATH LAB CV     CV PCI ANGIOPLASTY N/A 12/19/2022    Procedure: Percutaneous Transluminal Angioplasty;  Surgeon: Tyrone Ordoñez MD;  Location: [facility] CATH LAB CV     CV RIGHT HEART CATH MEASUREMENTS RECORDED N/A 9/19/2022    Procedure: Right Heart Catheterization;  Surgeon: Tyrone Ordoñez MD;  Location: [facility] CATH LAB CV     EYE SURGERY       INSERT / REPLACE / REMOVE PACEMAKER       OR TRANSCATHETER AORTIC VALVE REPLACEMENT, SUBCLAVIAN PERCUTANEOUS APPROACH (STANDBY) N/A 1/31/2023    Procedure: OR TRANSCATHETER AORTIC VALVE REPLACEMENT, SUBCLAVIAN PERCUTANEOUS APPROACH (STANDBY);  Surgeon: Shanae Rich MD;  Location: [facility] CATH LAB CV     OTHER SURGICAL HISTORY      icd     TRANSCATHETER AORTIC VALVE REPLACEMENT - SUBCLAVIAN APPROACH N/A 1/31/2023    Procedure: Transcatheter Aortic Valve Replacement - Subclavian Approach;  Surgeon: Tyrone Ordoñez MD;  Location: [facility] CATH LAB CV     ZZC CABG, VEIN, SINGLE      Description: CABG (CABG);  Recorded: 10/03/2012;  Comments: LIMA to LAD, SVG to OM2, SVG to RCA     Family History   Problem Relation Age of Onset     Cancer Mother      Heart Disease Father     Social History     Socioeconomic History     Marital status:      Spouse name: Not on file     Number of children: Not on file     Years of education: Not on file     Highest education level: Not on file   Occupational History     Not on file   Tobacco Use     Smoking status: Every Day     Packs/day: 0.50     Years: 30.00     Pack years: 15.00     Types: Cigarettes     Smokeless tobacco: Never   Vaping Use     Vaping Use: Never used   Substance and Sexual Activity     Alcohol use: Yes     Alcohol/week: 1.0 standard drink     Comment: Glass of wine daily.     Drug use: No     Sexual activity: Not Currently   Other Topics Concern     Parent/sibling w/ CABG, MI or angioplasty before 65F 55M? Not Asked   Social History Narrative     Not on  file     Social Determinants of Health     Financial Resource Strain: Not on file   Food Insecurity: Not on file   Transportation Needs: Not on file   Physical Activity: Not on file   Stress: Not on file   Social Connections: Not on file   Intimate Partner Violence: Not on file   Housing Stability: Not on file          Medications  Allergies   Current Outpatient Medications   Medication Sig Dispense Refill     acetaminophen (TYLENOL) 325 MG tablet Take 650 mg by mouth every 6 hours as needed for pain       apixaban ANTICOAGULANT (ELIQUIS) 5 MG tablet Take 5 mg by mouth 2 times daily       fluticasone (FLONASE) 50 MCG/ACT nasal spray Spray 1 spray into both nostrils as needed for rhinitis or allergies       gabapentin (NEURONTIN) 300 MG capsule Take 900 mg by mouth 2 times daily        lisinopril (ZESTRIL) 2.5 MG tablet Take 1 tablet (2.5 mg) by mouth daily 90 tablet 3     metoprolol succinate ER (TOPROL XL) 25 MG 24 hr tablet Take 1 tablet (25 mg) by mouth daily (Patient taking differently: Take 25 mg by mouth every evening) 90 tablet 11     rosuvastatin (CRESTOR) 10 MG tablet Take 1 tablet (10 mg) by mouth daily 90 tablet 3     sotalol (BETAPACE) 80 MG tablet Take 1 tablet (80 mg) by mouth 2 times daily 180 tablet 3     tamsulosin (FLOMAX) 0.4 MG capsule Take 0.4 mg by mouth every evening      Allergies   Allergen Reactions     Atorvastatin Diarrhea     Carvedilol Other (See Comments) and GI Disturbance     Upset stomach; GI upset       Chloride GI Disturbance and Nausea         Lab Results    Chemistry/lipid CBC Cardiac Enzymes/BNP/TSH/INR   Recent Labs   Lab Test 09/19/22  0803   CHOL 185   HDL 41      TRIG 76     Recent Labs   Lab Test 09/19/22  0803 12/06/19  1134 10/05/18  0930    100 88     Recent Labs   Lab Test 02/17/23 1917      POTASSIUM 4.3   CHLORIDE 102   CO2 28   *   BUN 20.2   CR 1.19*   GFRESTIMATED 60*   EMILIO 9.4     Recent Labs   Lab Test 02/17/23 1917 02/06/23  1444  02/01/23  0616   CR 1.19* 1.17 0.97     No results for input(s): A1C in the last 41088 hours. Recent Labs   Lab Test 02/17/23 1917   WBC 9.7   HGB 12.8*   HCT 40.3   MCV 92        Recent Labs   Lab Test 02/17/23 1917 02/01/23  0616 01/31/23  1547   HGB 12.8* 12.2* 12.6*    No results for input(s): TROPONINI in the last 68233 hours.  Recent Labs   Lab Test 01/30/23  0922   NTBNP 2,798*     Recent Labs   Lab Test 09/01/22  1327   TSH 0.46     Recent Labs   Lab Test 02/17/23 1917 01/30/23  0922   INR 1.18* 1.11        45 minutes spent on the date of encounter doing chart review, review of test results, interpretation with above tests, patient visit, documentation and discussion with family.      This note has been dictated using voice recognition software. Any grammatical or context distortions are unintentional and inherent to the software.

## 2023-03-06 NOTE — LETTER
3/6/2023    Kenna Calvillo, NP  [address] [facility] [address]    RE: Daniel Alamo       Dear Colleague,     I had the pleasure of seeing Daniel Alamo in the [facility].  HEART CARE ENCOUNTER NOTE       [facility]  [phone]    Assessment/Recommendations   Assessment:  1. Severe aortic stenosis - s/p TAVR on 1/31/2023 using a #29 Magdalena valve  2.   Ischemic cardiomyopathy, chronic combined systolic and diastolic heart failure, NYHA class III - currently compensated with no evidence of fluid overlaod  3.   CAD -with prior CABG and PCI to LAD/diagonal with shockwave and balloon angioplasty on 12/19/2022  4.   Paroxysmal atrial fibrillation  5.   Dyslipidemia with goal LDL of less than 70  6.   ICD in situ  7.   Hypertension - BP today is at goal  8.   PVD  9.   AAA s/p endographic repair in 2020  10. Rectal bleeding with mild drop in Hgb - resolved  11.  Direct inguinal hernia    Plan:  1. Continue cardiac rehab  2. Eliquis 5 mg twice daily for stroke prophylaxis  3. Continue lisinopril 2.5 mg daily, but decrease metoprolol succinate to 12.5 mg daily to see if dizziness improves.  4. Since patient's Plavix was stopped, he should probably be on aspirin 81 mg daily and I will confirm this with Dr. Ordoñez and then update patient.  5. Repeat lipid panel this Friday  6. Follow-up with Dr. Vargas on May 10 and with Justin again on May 23.  He should see the structural team again in January 2024    Thank you for the opportunity to participate in the care of Daniel Alamo. It's been a pleasure working with him.  Please do not hesitate to call with any questions or concerns.       History of Present Illness/Subjective    I had the opportunity to see Daniel Alamo at the [facility] for his 1 month follow-up visit after transcatheter aortic valve replacement.    His wife accompanies him to the visit today.    Daniel is an 82-year-old gentleman with  history of coronary artery disease, CABG in the past, heart failure with reduced ejection fraction, ischemic cardiomyopathy, ICD in situ, paroxysmal atrial fibrillation, hyperlipidemia and PVD with AAA status post endograft repair in 2020.  He was recently found to have severe low-flow low gradient aortic stenosis and was seen by valve clinic.  He was recommended for TAVR given his age and other comorbidities.    He underwent procedure on January 31 via left subclavian approach.  Initially, he had significant dizziness and hypotension and had to be taken off his diuretic.   His lisinopril dose was also decreased.  He continued to have dizziness and hypotension with some minor rectal bleeding, so was instructed to be seen in the ED.  Hemoglobin was stable, but Plavix stopped by our service just in case.  He has been on lisinopril 2.5 mg daily since that time and has been taking his metoprolol in the evening.    Today, Daniel says he still is not feeling any better.  He feels that he has had slight improvement in his overall status, but still short of breath with stairs and not active within the home per his wife.  His dizziness is maybe a little bit better, but it still happens on a daily basis and last for few minutes at a time.  He did have his initial session with cardiac rehab, but has not actually exercised at rehab yet.    Daniel Alamo denies exertional chest discomfort, palpitations, shortness of breath at rest, PND, orthopnea, pre-syncope or syncope, weight loss, changes in appetite, nausea or vomiting.   ____________________________________________________________  Echo 2/27/23  Interpretation Summary     The left ventricle is moderately dilated.  There is normal left ventricular wall thickness.  The visual ejection fraction is 30-35%.  Grade II or moderate diastolic dysfunction.  There is moderate global hypokinesia of the left ventricle.  Normal right ventricle size and systolic function.  The left atrium  "is moderately dilated.  There is mild to moderate (1-2+) mitral regurgitation.  There is a 29 mm CHANTAL 3 bioprosthetic valve present in aortic valve  position. Mean gradient is 6 mmHg.  There is mild praravalvular leaking at 10 am and 2 pm.     When compared to previous study on 2-1-2023, there is no signioficantly  interval change.     Physical Examination Review of Systems   Vitals: Resp 16   Ht 1.778 m (5' 10\")   Wt 64.4 kg (142 lb)   BMI 20.37 kg/m    BMI= Body mass index is 20.37 kg/m .  Wt Readings from Last 3 Encounters:   03/06/23 64.4 kg (142 lb)   02/28/23 64.4 kg (142 lb)   02/17/23 63.5 kg (140 lb)       General Appearance:   Alert, cooperative and in no acute distress   ENT/Mouth: membranes moist, no oral lesions or bleeding gums.      EYES:  no scleral icterus, normal conjunctivae   Neck: Supple without lymphadenopathy.  Thyroid not visualized   Chest/Lungs:   lungs are clear to auscultation, no rales or wheezing   Cardiovascular:   Regular. Normal first and second heart sounds with no murmurs, rubs, or gallops; the carotid, radial and posterior tibial pulses are intact, no edema bilaterally    Abdomen:  Soft and nontender. Bowel sounds are present in all quadrants   Extremities: no cyanosis or clubbing.     Skin: no xanthelasma, warm.    Neurologic: normal gait, normal  bilateral, no tremors   Psychiatric: Normal mood and affect       Please refer above for cardiac ROS details.      Medical History  Surgical History Family History Social History   Past Medical History:   Diagnosis Date     Coronary artery disease      Heart disease      VT (ventricular tachycardia) 2004     Past Surgical History:   Procedure Laterality Date     ABDOMEN SURGERY       BYPASS GRAFT ARTERY CORONARY       CV ANGIOGRAM CORONARY GRAFT N/A 9/19/2022    Procedure: Coronary Angiogram Graft;  Surgeon: Tyrone Ordoñez MD;  Location: [facility] CATH LAB CV     CV CORONARY LITHOTRIPSY PCI N/A 12/19/2022    Procedure: " Percutaneous Coronary Intervention - Lithotripsy;  Surgeon: Tyrone Ordoñez MD;  Location: [facility] CATH LAB CV     CV LEFT HEART CATH N/A 9/19/2022    Procedure: Left Heart Catheterization;  Surgeon: Tyrone Ordoñez MD;  Location: [facility] CATH LAB CV     CV PCI ANGIOPLASTY N/A 12/19/2022    Procedure: Percutaneous Transluminal Angioplasty;  Surgeon: Tyrone Ordoñez MD;  Location: [facility] CATH LAB CV     CV RIGHT HEART CATH MEASUREMENTS RECORDED N/A 9/19/2022    Procedure: Right Heart Catheterization;  Surgeon: Tyrone Ordoñez MD;  Location: [facility] CATH LAB CV     EYE SURGERY       INSERT / REPLACE / REMOVE PACEMAKER       OR TRANSCATHETER AORTIC VALVE REPLACEMENT, SUBCLAVIAN PERCUTANEOUS APPROACH (STANDBY) N/A 1/31/2023    Procedure: OR TRANSCATHETER AORTIC VALVE REPLACEMENT, SUBCLAVIAN PERCUTANEOUS APPROACH (STANDBY);  Surgeon: Shanae Rich MD;  Location: [facility] CV     OTHER SURGICAL HISTORY      icd     TRANSCATHETER AORTIC VALVE REPLACEMENT - SUBCLAVIAN APPROACH N/A 1/31/2023    Procedure: Transcatheter Aortic Valve Replacement - Subclavian Approach;  Surgeon: Tyrone Ordoñez MD;  Location: [facility] CATH LAB CV     ZZC CABG, VEIN, SINGLE      Description: CABG (CABG);  Recorded: 10/03/2012;  Comments: LIMA to LAD, SVG to OM2, SVG to RCA     Family History   Problem Relation Age of Onset     Cancer Mother      Heart Disease Father     Social History     Socioeconomic History     Marital status:      Spouse name: Not on file     Number of children: Not on file     Years of education: Not on file     Highest education level: Not on file   Occupational History     Not on file   Tobacco Use     Smoking status: Every Day     Packs/day: 0.50     Years: 30.00     Pack years: 15.00     Types: Cigarettes     Smokeless tobacco: Never   Vaping Use     Vaping Use: Never used   Substance and Sexual Activity     Alcohol use: Yes     Alcohol/week: 1.0 standard drink     Comment: Glass  of wine daily.     Drug use: No     Sexual activity: Not Currently   Other Topics Concern     Parent/sibling w/ CABG, MI or angioplasty before 65F 55M? Not Asked   Social History Narrative     Not on file     Social Determinants of Health     Financial Resource Strain: Not on file   Food Insecurity: Not on file   Transportation Needs: Not on file   Physical Activity: Not on file   Stress: Not on file   Social Connections: Not on file   Intimate Partner Violence: Not on file   Housing Stability: Not on file          Medications  Allergies   Current Outpatient Medications   Medication Sig Dispense Refill     acetaminophen (TYLENOL) 325 MG tablet Take 650 mg by mouth every 6 hours as needed for pain       apixaban ANTICOAGULANT (ELIQUIS) 5 MG tablet Take 5 mg by mouth 2 times daily       fluticasone (FLONASE) 50 MCG/ACT nasal spray Spray 1 spray into both nostrils as needed for rhinitis or allergies       gabapentin (NEURONTIN) 300 MG capsule Take 900 mg by mouth 2 times daily        lisinopril (ZESTRIL) 2.5 MG tablet Take 1 tablet (2.5 mg) by mouth daily 90 tablet 3     metoprolol succinate ER (TOPROL XL) 25 MG 24 hr tablet Take 1 tablet (25 mg) by mouth daily (Patient taking differently: Take 25 mg by mouth every evening) 90 tablet 11     rosuvastatin (CRESTOR) 10 MG tablet Take 1 tablet (10 mg) by mouth daily 90 tablet 3     sotalol (BETAPACE) 80 MG tablet Take 1 tablet (80 mg) by mouth 2 times daily 180 tablet 3     tamsulosin (FLOMAX) 0.4 MG capsule Take 0.4 mg by mouth every evening      Allergies   Allergen Reactions     Atorvastatin Diarrhea     Carvedilol Other (See Comments) and GI Disturbance     Upset stomach; GI upset       Chloride GI Disturbance and Nausea         Lab Results    Chemistry/lipid CBC Cardiac Enzymes/BNP/TSH/INR   Recent Labs   Lab Test 09/19/22  0803   CHOL 185   HDL 41      TRIG 76     Recent Labs   Lab Test 09/19/22  0803 12/06/19  1134 10/05/18  0930    100 88     Recent  Labs   Lab Test 02/17/23 1917      POTASSIUM 4.3   CHLORIDE 102   CO2 28   *   BUN 20.2   CR 1.19*   GFRESTIMATED 60*   EMILIO 9.4     Recent Labs   Lab Test 02/17/23 1917 02/06/23  1444 02/01/23  0616   CR 1.19* 1.17 0.97     No results for input(s): A1C in the last 54311 hours. Recent Labs   Lab Test 02/17/23 1917   WBC 9.7   HGB 12.8*   HCT 40.3   MCV 92        Recent Labs   Lab Test 02/17/23 1917 02/01/23  0616 01/31/23  1547   HGB 12.8* 12.2* 12.6*    No results for input(s): TROPONINI in the last 09667 hours.  Recent Labs   Lab Test 01/30/23  0922   NTBNP 2,798*     Recent Labs   Lab Test 09/01/22  1327   TSH 0.46     Recent Labs   Lab Test 02/17/23 1917 01/30/23  0922   INR 1.18* 1.11        45 minutes spent on the date of encounter doing chart review, review of test results, interpretation with above tests, patient visit, documentation and discussion with family.      This note has been dictated using voice recognition software. Any grammatical or context distortions are unintentional and inherent to the software.                Thank you for allowing me to participate in the care of your patient.      Sincerely,     Josephine Lopez PA-C     [facility]  cc:   No referring provider defined for this encounter.

## 2023-03-07 ENCOUNTER — HOSPITAL ENCOUNTER (OUTPATIENT)
Dept: CARDIAC REHAB | Facility: HOSPITAL | Age: 83
Discharge: HOME | End: 2023-03-07
Attending: INTERNAL MEDICINE
Payer: MEDICARE

## 2023-03-07 ENCOUNTER — TELEPHONE (OUTPATIENT)
Dept: CARDIOLOGY | Facility: CLINIC | Age: 83
End: 2023-03-07
Payer: MEDICARE

## 2023-03-07 PROCEDURE — 93798 PHYS/QHP OP CAR RHAB W/ECG: CPT

## 2023-03-07 NOTE — TELEPHONE ENCOUNTER
Home phone line rang w/o answering machine. Left detailed VM on cell # listed with request to return phone call to ensure patient received information.     Clara Samson RN on 3/7/2023 at 1:55 PM

## 2023-03-07 NOTE — TELEPHONE ENCOUNTER
Called and spoke with pt. He will begin taking 81 mg ASA starting today, continue to hold the plavix. Pt had no questions about this information. Reports he went to [facility] which went well. Instructed pt to contact valve clinic if he has any questions or concerns moving forward.     Clara Samson RN on 3/7/2023 at 4:14 PM

## 2023-03-07 NOTE — TELEPHONE ENCOUNTER
===View-only below this line===  ----- Message -----  From: Josephine Lopez PA-C  Sent: 3/7/2023  10:11 AM CST  To: Clara Samson RN    Please have patient start taking ASA 81 mg daily.  You can tell him I talked with Dr. Ordoñez and he okayed starting it since he's been off Plavix and had no recurrent bleeding.      Tiago doesn't have any other recommendations at this time    ThanksJosephine

## 2023-03-09 ENCOUNTER — HOSPITAL ENCOUNTER (OUTPATIENT)
Dept: CARDIAC REHAB | Facility: HOSPITAL | Age: 83
Discharge: HOME | End: 2023-03-09
Attending: INTERNAL MEDICINE
Payer: MEDICARE

## 2023-03-09 PROCEDURE — 93798 PHYS/QHP OP CAR RHAB W/ECG: CPT

## 2023-03-10 ENCOUNTER — LAB (OUTPATIENT)
Dept: CARDIOLOGY | Facility: CLINIC | Age: 83
End: 2023-03-10
Payer: MEDICARE

## 2023-03-10 DIAGNOSIS — E78.00 PURE HYPERCHOLESTEROLEMIA: ICD-10-CM

## 2023-03-10 DIAGNOSIS — I25.83 CORONARY ARTERIOSCLEROSIS DUE TO LIPID RICH PLAQUE: ICD-10-CM

## 2023-03-10 LAB
CHOLEST SERPL-MCNC: 139 MG/DL
HDLC SERPL-MCNC: 38 MG/DL
LDLC SERPL CALC-MCNC: 83 MG/DL
NONHDLC SERPL-MCNC: 101 MG/DL
TRIGL SERPL-MCNC: 89 MG/DL

## 2023-03-10 PROCEDURE — 80061 LIPID PANEL: CPT

## 2023-03-10 PROCEDURE — 36415 COLL VENOUS BLD VENIPUNCTURE: CPT

## 2023-03-10 RX ORDER — ROSUVASTATIN CALCIUM 10 MG/1
15 TABLET, COATED ORAL DAILY
Qty: 90 TABLET | Refills: 3 | Status: SHIPPED | OUTPATIENT
Start: 2023-03-10 | End: 2024-01-23

## 2023-03-14 ENCOUNTER — HOSPITAL ENCOUNTER (OUTPATIENT)
Dept: CARDIAC REHAB | Facility: HOSPITAL | Age: 83
Discharge: HOME | End: 2023-03-14
Attending: INTERNAL MEDICINE
Payer: MEDICARE

## 2023-03-14 PROCEDURE — 93798 PHYS/QHP OP CAR RHAB W/ECG: CPT

## 2023-03-16 ENCOUNTER — TELEPHONE (OUTPATIENT)
Dept: CARDIOLOGY | Facility: CLINIC | Age: 83
End: 2023-03-16
Payer: MEDICARE

## 2023-03-16 ENCOUNTER — HOSPITAL ENCOUNTER (OUTPATIENT)
Dept: CARDIAC REHAB | Facility: HOSPITAL | Age: 83
Discharge: HOME | End: 2023-03-16
Attending: INTERNAL MEDICINE
Payer: MEDICARE

## 2023-03-16 DIAGNOSIS — I48.0 PAROXYSMAL ATRIAL FIBRILLATION (H): ICD-10-CM

## 2023-03-16 DIAGNOSIS — I50.20 HEART FAILURE WITH REDUCED EJECTION FRACTION, NYHA CLASS II (H): ICD-10-CM

## 2023-03-16 PROCEDURE — 93798 PHYS/QHP OP CAR RHAB W/ECG: CPT

## 2023-03-16 RX ORDER — METOPROLOL SUCCINATE 25 MG/1
12.5 TABLET, EXTENDED RELEASE ORAL DAILY
Qty: 90 TABLET | Refills: 1 | Status: SHIPPED | OUTPATIENT
Start: 2023-03-16 | End: 2023-06-27

## 2023-03-16 NOTE — TELEPHONE ENCOUNTER
[facility] Call Center    Phone Message    May a detailed message be left on voicemail: yes     Reason for Call: Medication Refill Request    Has the patient contacted the pharmacy for the refill? Yes   Name of medication being requested: metoprolol succinate ER (TOPROL XL) 25 MG 24 hr tablet   Provider who prescribed the medication: Josephine Lopez  Pharmacy: [id number] - [place] [[place]], [address]  Date medication is needed: 3/20/2023      Action Taken: Other: Cardiology    Travel Screening: Not Applicable     Thank you!  [facility]

## 2023-03-20 ENCOUNTER — TELEPHONE (OUTPATIENT)
Dept: CARDIOLOGY | Facility: CLINIC | Age: 83
End: 2023-03-20
Payer: MEDICARE

## 2023-03-20 NOTE — TELEPHONE ENCOUNTER
===View-only below this line===  ----- Message -----  From: Clara Samson RN  Sent: 3/20/2023   8:00 AM CDT  To: [facility]  Subject: check on pt                                        ===View-only below this line===  ----- Message -----  From: Josephine Lopez PA-C  Sent: 3/6/2023   3:11 PM CST  To: [facility]    Please call him and about 2 weeks to see if dizziness has improved after decrease of metoprolol to 12.5 mg daily.    Josephine

## 2023-03-20 NOTE — TELEPHONE ENCOUNTER
[facility] RN Phone Call:  Call made on 3/20/2023 at 1245PM by Clara Samson RN    Reason for call: follow up per provider request (below)    Symptom or request: Called to check on patient after decreasing metoprolol dose to 12.5 mg daily. Patient reports he is doing about the same- no change in dizziness.     Additional comments/Actions: Msg sent to provider to update her.     Instructions given to patient: Will update provider. Continue medications as directed right now. Continue to work with cardiac rehab. Will connect with patient after provider reviews update. Pt instructed to call heart clinic if symptoms worsen or he develops additional concerning symptoms.     Clara Samson RN on 3/20/2023 at 1:05 PM

## 2023-03-21 ENCOUNTER — HOSPITAL ENCOUNTER (OUTPATIENT)
Dept: CARDIAC REHAB | Facility: HOSPITAL | Age: 83
Discharge: HOME | End: 2023-03-21
Attending: INTERNAL MEDICINE
Payer: MEDICARE

## 2023-03-21 PROCEDURE — 93798 PHYS/QHP OP CAR RHAB W/ECG: CPT

## 2023-03-21 RX ORDER — ASPIRIN 81 MG/1
81 TABLET ORAL DAILY
COMMUNITY

## 2023-03-21 NOTE — TELEPHONE ENCOUNTER
===View-only below this line===  ----- Message -----  From: Josephine Lopez PA-C  Sent: 3/21/2023  12:45 PM CDT  To: Clara Samson RN    I think his dizziness may be chronic and I'm not sure anything we do is going to fix it, unfortunately.  If BP stable, wouldn't change anything at this point.      If he's not taking a baby ASA, please have him start it daily since he is no longer on the Plavix.     Thanks,  C    ----- Message -----  From: Clara Samson RN  Sent: 3/20/2023   1:09 PM CDT  To: Josephine Lopez PA-C    ----- Message from Clara Samson RN sent at 3/20/2023  1:09 PM CDT -----  Tejas Burton, checked on roberta and he says the decreased metoprolol has not helped- he's about the same. I told him to continue his current medication dosing and continue to work with cardiac rehab and would check in with him after I speak to you.     Thanks!,  AV

## 2023-03-21 NOTE — TELEPHONE ENCOUNTER
Called and spoke to patient regarding provider updates below. Patient indicated his PCP had tried changing a couple of medications around recently when he saw her that she thought could be potentially contributing to dizziness. Writer instructed pt to continue on low dose metoprolol if BP remains stable, call clinic if any changes in BP. Suggested follow-up appt with PCP to discuss dizziness again and see if provider has any further suggestions. Patient verbalized understanding and agreed to plan.     Clara Samson RN on 3/21/2023 at 1:03 PM

## 2023-03-23 ENCOUNTER — HOSPITAL ENCOUNTER (OUTPATIENT)
Dept: CARDIAC REHAB | Facility: HOSPITAL | Age: 83
Discharge: HOME | End: 2023-03-23
Attending: INTERNAL MEDICINE
Payer: MEDICARE

## 2023-03-23 PROCEDURE — 93798 PHYS/QHP OP CAR RHAB W/ECG: CPT

## 2023-03-28 ENCOUNTER — HOSPITAL ENCOUNTER (OUTPATIENT)
Dept: CARDIAC REHAB | Facility: HOSPITAL | Age: 83
Discharge: HOME | End: 2023-03-28
Attending: INTERNAL MEDICINE
Payer: MEDICARE

## 2023-03-28 PROCEDURE — 93798 PHYS/QHP OP CAR RHAB W/ECG: CPT

## 2023-03-30 ENCOUNTER — HOSPITAL ENCOUNTER (OUTPATIENT)
Dept: CARDIAC REHAB | Facility: HOSPITAL | Age: 83
Discharge: HOME | End: 2023-03-30
Attending: INTERNAL MEDICINE
Payer: MEDICARE

## 2023-03-30 PROCEDURE — 93798 PHYS/QHP OP CAR RHAB W/ECG: CPT

## 2023-04-04 ENCOUNTER — HOSPITAL ENCOUNTER (OUTPATIENT)
Dept: CARDIAC REHAB | Facility: HOSPITAL | Age: 83
Discharge: HOME | End: 2023-04-04
Attending: INTERNAL MEDICINE
Payer: MEDICARE

## 2023-04-04 PROCEDURE — 93798 PHYS/QHP OP CAR RHAB W/ECG: CPT

## 2023-04-06 ENCOUNTER — HOSPITAL ENCOUNTER (OUTPATIENT)
Dept: CARDIAC REHAB | Facility: HOSPITAL | Age: 83
Discharge: HOME | End: 2023-04-06
Attending: INTERNAL MEDICINE
Payer: MEDICARE

## 2023-04-06 PROCEDURE — 93798 PHYS/QHP OP CAR RHAB W/ECG: CPT

## 2023-04-11 ENCOUNTER — HOSPITAL ENCOUNTER (OUTPATIENT)
Dept: CARDIAC REHAB | Facility: HOSPITAL | Age: 83
Discharge: HOME | End: 2023-04-11
Attending: INTERNAL MEDICINE
Payer: MEDICARE

## 2023-04-11 PROCEDURE — 93798 PHYS/QHP OP CAR RHAB W/ECG: CPT

## 2023-04-13 ENCOUNTER — HOSPITAL ENCOUNTER (OUTPATIENT)
Dept: CARDIAC REHAB | Facility: HOSPITAL | Age: 83
Discharge: HOME | End: 2023-04-13
Attending: INTERNAL MEDICINE
Payer: MEDICARE

## 2023-04-13 PROCEDURE — 93798 PHYS/QHP OP CAR RHAB W/ECG: CPT | Performed by: OCCUPATIONAL THERAPIST

## 2023-04-18 ENCOUNTER — HOSPITAL ENCOUNTER (OUTPATIENT)
Dept: CARDIAC REHAB | Facility: HOSPITAL | Age: 83
Discharge: HOME | End: 2023-04-18
Attending: INTERNAL MEDICINE
Payer: MEDICARE

## 2023-04-18 PROCEDURE — 93798 PHYS/QHP OP CAR RHAB W/ECG: CPT

## 2023-04-20 ENCOUNTER — HOSPITAL ENCOUNTER (OUTPATIENT)
Dept: CARDIAC REHAB | Facility: HOSPITAL | Age: 83
Discharge: HOME | End: 2023-04-20
Attending: INTERNAL MEDICINE
Payer: MEDICARE

## 2023-04-20 ENCOUNTER — ANCILLARY PROCEDURE (OUTPATIENT)
Dept: CARDIOLOGY | Facility: CLINIC | Age: 83
End: 2023-04-20
Attending: INTERNAL MEDICINE
Payer: MEDICARE

## 2023-04-20 DIAGNOSIS — I25.5 ISCHEMIC CARDIOMYOPATHY: ICD-10-CM

## 2023-04-20 DIAGNOSIS — Z95.810 ICD (IMPLANTABLE CARDIOVERTER-DEFIBRILLATOR) IN PLACE: ICD-10-CM

## 2023-04-20 PROCEDURE — 93798 PHYS/QHP OP CAR RHAB W/ECG: CPT

## 2023-04-21 ENCOUNTER — TELEPHONE (OUTPATIENT)
Dept: CARDIOLOGY | Facility: CLINIC | Age: 83
End: 2023-04-21
Payer: MEDICARE

## 2023-04-21 NOTE — TELEPHONE ENCOUNTER
----- Message from Jackie GRACIA Pivec sent at 4/20/2023  6:49 AM CDT -----  Regarding: device RN review  Encounter Type: Alert remote ICD transmission for AT/AF for 6/24hrs. Courtesy check.  Device: [employer] Dynagen.  Pacing %/Programmed: AP 25%,  10% at DDDR 55/120.  Lead(s): RA output up to 4.0V per note from rep, post TAVR procedure, otherwise stable.  Battery longevity: 9yrs.  Presenting: fine AF vs noise with ventricular pacing  bpm. Occasional AP noted.   Atrial arrhythmias: since 2/24/23; per trend total of 10/7hrs of AT/AF noted on 4/19/23, AF burden <1%. Fine AF vs noise, occasional AP beats noted. Low intrinsic amplitude noted.  Anticoagulant: Eliquis.   Ventricular arrhythmias: since 2/24/23; No VT/VF detected.  Device/Lead alerts: None.  Comments: Normal ICD function.  Plan: Routed to device RN for review. TAQUERIA Thompson, Device Specialist      Transmission reviewed, probable AF (very low amplitude) with intermittent undersensing noted. Hx of PAF,   Known RA lead noise (reproducible with crossing Left arm toward middle of body), and FFRW oversensing per chart review. Recent TAVR and RA output was increased to 4V per [employer] [place] [employer] rep for increased safety margin.  Currently RA lead sensitivity is at 0.3mV.  RA impedance stable.    Call placed to patient to review findings, he denies any awareness of A.fib, confirms Sotalol and Eliquis compliance. States he has been doing Cardiac rehab and it has been going well. Advised to call with any troublesome symptoms. Verbalized understanding.     Will review with Dr. Saxena for any further concerns/recommendations.   Andria Auguste, RN

## 2023-04-25 ENCOUNTER — HOSPITAL ENCOUNTER (OUTPATIENT)
Dept: CARDIAC REHAB | Facility: HOSPITAL | Age: 83
Discharge: HOME | End: 2023-04-25
Attending: INTERNAL MEDICINE
Payer: MEDICARE

## 2023-04-25 PROCEDURE — 93798 PHYS/QHP OP CAR RHAB W/ECG: CPT

## 2023-04-26 ENCOUNTER — TELEPHONE (OUTPATIENT)
Dept: CARDIOLOGY | Facility: CLINIC | Age: 83
End: 2023-04-26
Payer: MEDICARE

## 2023-04-26 NOTE — TELEPHONE ENCOUNTER
PA Initiation    Key- [id number]    Medication: Rosuvastatin  Insurance Company:    Pharmacy Filling the Rx:  [facility]  Filling Pharmacy Phone:  [phone]  Filling Pharmacy Fax:  [phone]  Start Date:

## 2023-04-27 ENCOUNTER — HOSPITAL ENCOUNTER (OUTPATIENT)
Dept: CARDIAC REHAB | Facility: HOSPITAL | Age: 83
Discharge: HOME | End: 2023-04-27
Attending: INTERNAL MEDICINE
Payer: MEDICARE

## 2023-04-27 PROCEDURE — 93798 PHYS/QHP OP CAR RHAB W/ECG: CPT

## 2023-05-01 NOTE — TELEPHONE ENCOUNTER
Central Prior Authorization Team  Phone: [phone]    PA Initiation    Medication: Rosuvastatin  Insurance Company: [facility] - Phone [phone] Fax [phone]  Pharmacy Filling the Rx: [id number] - [place] [[place]][address]  Filling Pharmacy Phone: [phone]  Filling Pharmacy Fax:    Start Date: 5/1/2023

## 2023-05-02 ENCOUNTER — HOSPITAL ENCOUNTER (OUTPATIENT)
Dept: CARDIAC REHAB | Facility: HOSPITAL | Age: 83
Discharge: HOME | End: 2023-05-02
Attending: INTERNAL MEDICINE
Payer: MEDICARE

## 2023-05-02 PROCEDURE — 93798 PHYS/QHP OP CAR RHAB W/ECG: CPT

## 2023-05-02 NOTE — TELEPHONE ENCOUNTER
Christel Saxena MD  You 20 hours ago (1:43 PM)     AW  Tejas Ayers - really hard to know.  ECGs 2/17/2023 and 3/6/2023 showed SR/Ap, and device check 2/24/2023 appears to show presenting SR with very low RA sensing.  I'm not sure about presenting on 4/20/2023, though look to be consistent with AF with undersensing.  The lead is not doing great.     We can bring him in for an in-clinic check including isometrics etc, and visit to discuss options (accept dysfunctional RA lead, add new RA lead).       Thanks,   Juno      You  Christel Saxena MD 11 days ago     MG  Could you just give this remote/last remote a quick look. Very tricky RA lead EMGs, I believe he is in AF, had TAVR a couple months ago and I found notes stating that B[employer] rep increased the RA lead output to 4V for safety.. HX of RA lead noise and FFRW. RA lead is just messy. Not sure if we should bring him back in at some point to look at RA lead? Or just leave as is since really isnt new... plan isnt really clear. Thanks!!     Above noted, will send message to device schedulers to set up patient with Dr. Saxena/ Device RN for further testing.     Andria Auguste RN

## 2023-05-03 LAB
MDC_IDC_EPISODE_DTM: NORMAL
MDC_IDC_EPISODE_DURATION: 107 S
MDC_IDC_EPISODE_DURATION: 108 S
MDC_IDC_EPISODE_DURATION: 12 S
MDC_IDC_EPISODE_DURATION: 157 S
MDC_IDC_EPISODE_DURATION: 246 S
MDC_IDC_EPISODE_DURATION: 30 S
MDC_IDC_EPISODE_DURATION: 37 S
MDC_IDC_EPISODE_DURATION: 90 S
MDC_IDC_EPISODE_DURATION: 93 S
MDC_IDC_EPISODE_DURATION: 994 S
MDC_IDC_EPISODE_ID: NORMAL
MDC_IDC_EPISODE_TYPE: NORMAL
MDC_IDC_LEAD_IMPLANT_DT: NORMAL
MDC_IDC_LEAD_IMPLANT_DT: NORMAL
MDC_IDC_LEAD_LOCATION: NORMAL
MDC_IDC_LEAD_LOCATION: NORMAL
MDC_IDC_LEAD_LOCATION_DETAIL_1: NORMAL
MDC_IDC_LEAD_LOCATION_DETAIL_1: NORMAL
MDC_IDC_LEAD_MFG: NORMAL
MDC_IDC_LEAD_MFG: NORMAL
MDC_IDC_LEAD_MODEL: NORMAL
MDC_IDC_LEAD_MODEL: NORMAL
MDC_IDC_LEAD_POLARITY_TYPE: NORMAL
MDC_IDC_LEAD_POLARITY_TYPE: NORMAL
MDC_IDC_LEAD_SERIAL: NORMAL
MDC_IDC_LEAD_SERIAL: NORMAL
MDC_IDC_LEAD_SPECIAL_FUNCTION: NORMAL
MDC_IDC_MSMT_BATTERY_DTM: NORMAL
MDC_IDC_MSMT_BATTERY_REMAINING_LONGEVITY: 108 MO
MDC_IDC_MSMT_BATTERY_REMAINING_PERCENTAGE: 100 %
MDC_IDC_MSMT_BATTERY_STATUS: NORMAL
MDC_IDC_MSMT_CAP_CHARGE_DTM: NORMAL
MDC_IDC_MSMT_CAP_CHARGE_TIME: 10.4 S
MDC_IDC_MSMT_CAP_CHARGE_TYPE: NORMAL
MDC_IDC_MSMT_LEADCHNL_RA_IMPEDANCE_VALUE: 804 OHM
MDC_IDC_MSMT_LEADCHNL_RA_PACING_THRESHOLD_AMPLITUDE: 1.9 V
MDC_IDC_MSMT_LEADCHNL_RA_PACING_THRESHOLD_PULSEWIDTH: 0.7 MS
MDC_IDC_MSMT_LEADCHNL_RV_IMPEDANCE_VALUE: 573 OHM
MDC_IDC_MSMT_LEADCHNL_RV_PACING_THRESHOLD_AMPLITUDE: 0.8 V
MDC_IDC_MSMT_LEADCHNL_RV_PACING_THRESHOLD_PULSEWIDTH: 0.4 MS
MDC_IDC_PG_IMPLANT_DTM: NORMAL
MDC_IDC_PG_MFG: NORMAL
MDC_IDC_PG_MODEL: NORMAL
MDC_IDC_PG_SERIAL: NORMAL
MDC_IDC_PG_TYPE: NORMAL
MDC_IDC_SESS_CLINIC_NAME: NORMAL
MDC_IDC_SESS_DTM: NORMAL
MDC_IDC_SESS_TYPE: NORMAL
MDC_IDC_SET_BRADY_AT_MODE_SWITCH_MODE: NORMAL
MDC_IDC_SET_BRADY_AT_MODE_SWITCH_RATE: 170 {BEATS}/MIN
MDC_IDC_SET_BRADY_LOWRATE: 55 {BEATS}/MIN
MDC_IDC_SET_BRADY_MAX_SENSOR_RATE: 130 {BEATS}/MIN
MDC_IDC_SET_BRADY_MAX_TRACKING_RATE: 120 {BEATS}/MIN
MDC_IDC_SET_BRADY_MODE: NORMAL
MDC_IDC_SET_BRADY_PAV_DELAY_HIGH: 200 MS
MDC_IDC_SET_BRADY_PAV_DELAY_LOW: 300 MS
MDC_IDC_SET_BRADY_SAV_DELAY_HIGH: 165 MS
MDC_IDC_SET_BRADY_SAV_DELAY_LOW: 250 MS
MDC_IDC_SET_LEADCHNL_RA_PACING_AMPLITUDE: 4 V
MDC_IDC_SET_LEADCHNL_RA_PACING_POLARITY: NORMAL
MDC_IDC_SET_LEADCHNL_RA_PACING_PULSEWIDTH: 0.7 MS
MDC_IDC_SET_LEADCHNL_RA_SENSING_ADAPTATION_MODE: NORMAL
MDC_IDC_SET_LEADCHNL_RA_SENSING_POLARITY: NORMAL
MDC_IDC_SET_LEADCHNL_RA_SENSING_SENSITIVITY: 0.3 MV
MDC_IDC_SET_LEADCHNL_RV_PACING_AMPLITUDE: 1.5 V
MDC_IDC_SET_LEADCHNL_RV_PACING_POLARITY: NORMAL
MDC_IDC_SET_LEADCHNL_RV_PACING_PULSEWIDTH: 0.4 MS
MDC_IDC_SET_LEADCHNL_RV_SENSING_ADAPTATION_MODE: NORMAL
MDC_IDC_SET_LEADCHNL_RV_SENSING_POLARITY: NORMAL
MDC_IDC_SET_LEADCHNL_RV_SENSING_SENSITIVITY: 0.6 MV
MDC_IDC_SET_ZONE_DETECTION_INTERVAL: 261 MS
MDC_IDC_SET_ZONE_DETECTION_INTERVAL: 333 MS
MDC_IDC_SET_ZONE_DETECTION_INTERVAL: 400 MS
MDC_IDC_SET_ZONE_TYPE: NORMAL
MDC_IDC_SET_ZONE_VENDOR_TYPE: NORMAL
MDC_IDC_STAT_AT_BURDEN_PERCENT: 1 %
MDC_IDC_STAT_AT_DTM_END: NORMAL
MDC_IDC_STAT_AT_DTM_START: NORMAL
MDC_IDC_STAT_BRADY_DTM_END: NORMAL
MDC_IDC_STAT_BRADY_DTM_START: NORMAL
MDC_IDC_STAT_BRADY_RA_PERCENT_PACED: 25 %
MDC_IDC_STAT_BRADY_RV_PERCENT_PACED: 10 %
MDC_IDC_STAT_EPISODE_RECENT_COUNT: 0
MDC_IDC_STAT_EPISODE_RECENT_COUNT: 406
MDC_IDC_STAT_EPISODE_RECENT_COUNT_DTM_END: NORMAL
MDC_IDC_STAT_EPISODE_RECENT_COUNT_DTM_START: NORMAL
MDC_IDC_STAT_EPISODE_TYPE: NORMAL
MDC_IDC_STAT_EPISODE_VENDOR_TYPE: NORMAL
MDC_IDC_STAT_TACHYTHERAPY_ATP_DELIVERED_RECENT: 0
MDC_IDC_STAT_TACHYTHERAPY_ATP_DELIVERED_TOTAL: 1
MDC_IDC_STAT_TACHYTHERAPY_RECENT_DTM_END: NORMAL
MDC_IDC_STAT_TACHYTHERAPY_RECENT_DTM_START: NORMAL
MDC_IDC_STAT_TACHYTHERAPY_SHOCKS_ABORTED_RECENT: 0
MDC_IDC_STAT_TACHYTHERAPY_SHOCKS_ABORTED_TOTAL: 0
MDC_IDC_STAT_TACHYTHERAPY_SHOCKS_DELIVERED_RECENT: 0
MDC_IDC_STAT_TACHYTHERAPY_SHOCKS_DELIVERED_TOTAL: 0
MDC_IDC_STAT_TACHYTHERAPY_TOTAL_DTM_END: NORMAL
MDC_IDC_STAT_TACHYTHERAPY_TOTAL_DTM_START: NORMAL

## 2023-05-03 NOTE — TELEPHONE ENCOUNTER
Prior Authorization Approval    Authorization Effective Date: 1/1/2023  Authorization Expiration Date: 12/31/2023  Medication: Rosuvastatin 10MG tablets- APPROVED   Approved Dose/Quantity: 90 tablets per 45 days   Reference #:     Insurance Company: [employer] - Phone [phone] Fax [phone]  Expected CoPay:       CoPay Card Available:      Foundation Assistance Needed:    Which Pharmacy is filling the prescription (Not needed for infusion/clinic administered): [id number] [[address]  Pharmacy Notified: Yes  Patient Notified:  **Instructed pharmacy to notify patient when script is ready to /ship.**

## 2023-05-04 ENCOUNTER — HOSPITAL ENCOUNTER (OUTPATIENT)
Dept: CARDIAC REHAB | Facility: HOSPITAL | Age: 83
Discharge: HOME | End: 2023-05-04
Attending: INTERNAL MEDICINE
Payer: MEDICARE

## 2023-05-04 PROCEDURE — 93798 PHYS/QHP OP CAR RHAB W/ECG: CPT

## 2023-05-08 ENCOUNTER — HEALTH MAINTENANCE LETTER (OUTPATIENT)
Age: 83
End: 2023-05-08

## 2023-05-09 ENCOUNTER — DOCUMENTATION ONLY (OUTPATIENT)
Dept: CARDIOLOGY | Facility: CLINIC | Age: 83
End: 2023-05-09
Payer: MEDICARE

## 2023-05-09 ENCOUNTER — HOSPITAL ENCOUNTER (OUTPATIENT)
Dept: CARDIAC REHAB | Facility: HOSPITAL | Age: 83
Discharge: HOME | End: 2023-05-09
Attending: INTERNAL MEDICINE
Payer: MEDICARE

## 2023-05-09 PROCEDURE — 93798 PHYS/QHP OP CAR RHAB W/ECG: CPT

## 2023-05-09 NOTE — PROGRESS NOTES
No charge. No Epic interface required.  Type: alert remote ICD transmission for AT/AF at least 6 of 24 hours.  Presenting rhythm: AP-, AP-VS. and normal sinus rhythm, rate 60's, occasional PACs.  Battery/lead status: stable  Arrhythmias: since 4/20/23, 235 mode switches, possibly a single episode as AF is very fine and often undersensed, total of 23 hrs in past 24 hrs, V-rates controlled. No VT/VF detected.  Anticoagulant: Eliquis  Comments: normal ICD function. Routed to device RN.  SHAE Shanks, Device Specialist    Hx PAF, asymptomatic in past. On Eliquis and V-rates controlled. Now back in NSR, has upcoming appt with Dr. Vargas 5/10/23  Also is set up to see Dr. Saxena on 5/23/23 with Device RN to reassess RA lead- frequent undersensing/noise, high RA outputs and very small P waves.      Andria Auguste, RN

## 2023-05-10 ENCOUNTER — OFFICE VISIT (OUTPATIENT)
Dept: CARDIOLOGY | Facility: CLINIC | Age: 83
End: 2023-05-10
Attending: NURSE PRACTITIONER
Payer: MEDICARE

## 2023-05-10 VITALS
HEART RATE: 63 BPM | WEIGHT: 143 LBS | OXYGEN SATURATION: 100 % | BODY MASS INDEX: 20.52 KG/M2 | DIASTOLIC BLOOD PRESSURE: 78 MMHG | RESPIRATION RATE: 20 BRPM | SYSTOLIC BLOOD PRESSURE: 126 MMHG

## 2023-05-10 DIAGNOSIS — I25.5 ISCHEMIC CARDIOMYOPATHY: ICD-10-CM

## 2023-05-10 DIAGNOSIS — Z95.810 ICD (IMPLANTABLE CARDIOVERTER-DEFIBRILLATOR), DUAL, IN SITU: ICD-10-CM

## 2023-05-10 DIAGNOSIS — I48.0 PAROXYSMAL ATRIAL FIBRILLATION (H): ICD-10-CM

## 2023-05-10 DIAGNOSIS — I50.20 HEART FAILURE WITH REDUCED EJECTION FRACTION, NYHA CLASS II (H): ICD-10-CM

## 2023-05-10 DIAGNOSIS — Z95.2 S/P TAVR (TRANSCATHETER AORTIC VALVE REPLACEMENT): ICD-10-CM

## 2023-05-10 DIAGNOSIS — E78.00 PURE HYPERCHOLESTEROLEMIA: ICD-10-CM

## 2023-05-10 DIAGNOSIS — I25.83 CORONARY ARTERIOSCLEROSIS DUE TO LIPID RICH PLAQUE: Primary | ICD-10-CM

## 2023-05-10 DIAGNOSIS — Z95.1 S/P CABG (CORONARY ARTERY BYPASS GRAFT): ICD-10-CM

## 2023-05-10 DIAGNOSIS — I95.9 HYPOTENSION, UNSPECIFIED HYPOTENSION TYPE: ICD-10-CM

## 2023-05-10 PROBLEM — Z98.890 STATUS POST CORONARY ANGIOGRAM: Status: RESOLVED | Noted: 2022-12-19 | Resolved: 2023-05-10

## 2023-05-10 PROBLEM — E78.5 HYPERLIPIDEMIA: Status: RESOLVED | Noted: 2020-02-04 | Resolved: 2023-05-10

## 2023-05-10 PROCEDURE — 99214 OFFICE O/P EST MOD 30 MIN: CPT | Performed by: INTERNAL MEDICINE

## 2023-05-10 NOTE — PATIENT INSTRUCTIONS
Mr Daniel COLLINS Cally,  I enjoyed visiting with you again today.  I am glad to hear you are doing well.  Per our conversation let us try the SOTALOL at 1/2 a tablet twice a day.  Let me know if better or worse [phone].  I will plan on seeing you July.  Corey Vargas

## 2023-05-10 NOTE — PROGRESS NOTES
[facility]  Heart Care Clinic Follow-up Note    Assessment & Plan        (I25.10,  I25.83) Coronary arteriosclerosis due to lipid rich plaque  (primary encounter diagnosis)  Comment: Angiography December 2022 showed normal left main, LAD with a distal total occlusion and first diagonal 95% stenosis, normal circumflex, first obtuse marginal is 70 to 80% stenosis, and total occlusion of the right coronary artery.  PET viability showed scar involving the inferior and inferolateral wall with some mild agustin-infarct anterolateral ischemia prompting intervention on the diagonal lesion.     (Z95.1) S/P CABG (coronary artery bypass graft)  Comment: Approximately 30 years ago he had a LIMA to the LAD which is patent vein graft to the OM which is occluded and vein graft to the right coronary which is occluded.    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  Leads are stable.    (I25.5) Ischemic cardiomyopathy  Comment: Ejection fraction 30 to 35% and on appropriate metoprolol as well as lisinopril.    (I50.20) Heart failure with reduced ejection fraction, NYHA class II (H)  Comment: No significant signs or symptoms currently.    (I48.0) Paroxysmal atrial fibrillation (H)  Comment:  Currently a paced, on sotalol with QT corrected 477 ms, on Eliquis 5 mg twice a day, might need to consider lowering this to 2.5 mg given his weight.  He is on 80 mg of sotalol with corrected  ms and uncorrected for 180 ms.  Concerned he may be having some orthostasis from this and will take the liberty of cutting the  sotalol down to 40 mg twice daily.    (I95.9) Hypotension, unspecified hypotension type  Comment: Noted in cardiac rehab and wonder if this is due to sotalol and will cut in half as above.    (E78.00) Pure hypercholesterolemia  Comment: Total cholesterol 139 with an LDL of 83 which is acceptable.  On 15 mg of Crestor a day.    Plan   1.  Try cutting sotalol down to 40 mg twice daily.  2.  Have him give us a call in several weeks to see if he is better or worse or no change.  3.  Follow-up with me which will be in September, 6 months out from his TAVR or sooner if needed.    Subjective  CC: 83-year-old white gentleman here for a post TAVR follow-up.  He is still living independently with his wife, he is still felt tired, does not have the energy to walk his schnauzer/Labrador retriever mix dog.  He does admit to some lightheadedness, denies any significant chest pains, palpitations, shortness of breath, PND, orthopnea, peripheral edema.    Medications  Current Outpatient Medications   Medication Sig Dispense Refill     acetaminophen (TYLENOL) 325 MG tablet Take 650 mg by mouth every 6 hours as needed for pain       apixaban ANTICOAGULANT (ELIQUIS) 5 MG tablet Take 5 mg by mouth 2 times daily       aspirin 81 MG EC tablet Take 81 mg by mouth daily       fluticasone (FLONASE) 50 MCG/ACT nasal spray Spray 1 spray into both nostrils as needed for rhinitis or allergies       gabapentin (NEURONTIN) 300 MG capsule Take 900 mg by mouth 2 times daily        lisinopril (ZESTRIL) 2.5 MG tablet Take 1 tablet (2.5 mg) by mouth daily 90 tablet 3     metoprolol succinate ER (TOPROL XL) 25 MG 24 hr tablet Take 0.5 tablets (12.5 mg) by mouth daily 90 tablet 1     rosuvastatin (CRESTOR) 10 MG tablet Take 1.5 tablets (15 mg) by mouth daily 90 tablet 3     sotalol (BETAPACE) 80 MG tablet Take 1 tablet (80 mg) by mouth 2 times daily 180 tablet 3     tamsulosin (FLOMAX) 0.4 MG capsule Take 0.4 mg by mouth every evening          Objective  /78 (BP Location: Right arm, Patient Position: Sitting, Cuff Size: Adult Regular)   Pulse 63   Resp 20   Wt 64.9 kg (143 lb)   SpO2 100%   BMI 20.52 kg/m      General Appearance:    Alert, cooperative, no distress, appears stated age   Head:    Normocephalic, without obvious abnormality, atraumatic   Throat:   Lips, mucosa, and tongue normal; teeth and gums normal   Neck:   Supple, symmetrical, trachea midline, no adenopathy;        thyroid:  No enlargement/tenderness/nodules; no carotid    bruit or JVD   Back:     Symmetric, no curvature, ROM normal, no CVA tenderness   Lungs:     Clear to auscultation bilaterally, respirations unlabored   Chest wall:    No tenderness, left-sided ICD and midline sternotomy scar noted   Heart:    Regular rate and rhythm, S1 and S2 normal, no murmur, rub   or gallop   Abdomen:     Soft, non-tender, bowel sounds active all four quadrants,     no masses, no organomegaly   Extremities:   Normal, atraumatic, no cyanosis or edema   Pulses:   2+ and symmetric all extremities   Skin:   Skin color, texture, turgor normal, no rashes or lesions     Results    Lab Results personally reviewed   Lab Results   Component Value Date    CHOL 139 03/10/2023    CHOL 185 09/19/2022     Lab Results   Component Value Date    HDL 38 (L) 03/10/2023    HDL 41 09/19/2022     No components found for: LDLCALC  Lab Results   Component Value Date    TRIG 89 03/10/2023    TRIG 76 09/19/2022     Lab Results   Component Value Date    WBC 8.8 03/06/2023    HGB 13.2 (L) 03/06/2023    HCT 42.1 03/06/2023     03/06/2023     Lab Results   Component Value Date    BUN 19.4 03/06/2023     03/06/2023    CO2 28 03/06/2023

## 2023-05-10 NOTE — LETTER
5/10/2023    Kenna Calvillo, NP  [address] [facility] [address]    RE: Daniel Alamo       Dear Colleague,     I had the pleasure of seeing Daniel Alamo in the [facility].      [facility]  Heart Care Clinic Follow-up Note    Assessment & Plan        (I25.10,  I25.83) Coronary arteriosclerosis due to lipid rich plaque  (primary encounter diagnosis)  Comment: Angiography December 2022 showed normal left main, LAD with a distal total occlusion and first diagonal 95% stenosis, normal circumflex, first obtuse marginal is 70 to 80% stenosis, and total occlusion of the right coronary artery.  PET viability showed scar involving the inferior and inferolateral wall with some mild agustin-infarct anterolateral ischemia prompting intervention on the diagonal lesion.     (Z95.1) S/P CABG (coronary artery bypass graft)  Comment: Approximately 30 years ago he had a LIMA to the LAD which is patent vein graft to the OM which is occluded and vein graft to the right coronary which is occluded.    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  Leads are stable.    (I25.5) Ischemic cardiomyopathy  Comment: Ejection fraction 30 to 35% and on appropriate metoprolol as well as lisinopril.    (I50.20) Heart failure with reduced ejection fraction, NYHA class II (H)  Comment: No significant signs or symptoms currently.    (I48.0) Paroxysmal atrial fibrillation (H)  Comment:  Currently a paced, on sotalol with QT corrected 477 ms, on Eliquis 5 mg twice a day, might need to consider lowering this to 2.5  mg given his weight.  He is on 80 mg of sotalol with corrected  ms and uncorrected for 180 ms.  Concerned he may be having some orthostasis from this and will take the liberty of cutting the sotalol down to 40 mg twice daily.    (I95.9) Hypotension, unspecified hypotension type  Comment: Noted in cardiac rehab and wonder if this is due to sotalol and will cut in half as above.    (E78.00) Pure hypercholesterolemia  Comment: Total cholesterol 139 with an LDL of 83 which is acceptable.  On 15 mg of Crestor a day.    Plan   1.  Try cutting sotalol down to 40 mg twice daily.  2.  Have him give us a call in several weeks to see if he is better or worse or no change.  3.  Follow-up with me which will be in September, 6 months out from his TAVR or sooner if needed.    Subjective  CC: 83-year-old white gentleman here for a post TAVR follow-up.  He is still living independently with his wife, he is still felt tired, does not have the energy to walk his schnauzer/Labrador retriever mix dog.  He does admit to some lightheadedness, denies any significant chest pains, palpitations, shortness of breath, PND, orthopnea, peripheral edema.    Medications  Current Outpatient Medications   Medication Sig Dispense Refill    acetaminophen (TYLENOL) 325 MG tablet Take 650 mg by mouth every 6 hours as needed for pain      apixaban ANTICOAGULANT (ELIQUIS) 5 MG tablet Take 5 mg by mouth 2 times daily      aspirin 81 MG EC tablet Take 81 mg by mouth daily      fluticasone (FLONASE) 50 MCG/ACT nasal spray Spray 1 spray into both nostrils as needed for rhinitis or allergies      gabapentin (NEURONTIN) 300 MG capsule Take 900 mg by mouth 2 times daily       lisinopril (ZESTRIL) 2.5 MG tablet Take 1 tablet (2.5 mg) by mouth daily 90 tablet 3    metoprolol succinate ER (TOPROL XL) 25 MG 24 hr tablet Take 0.5 tablets (12.5 mg) by mouth daily 90 tablet 1    rosuvastatin (CRESTOR) 10 MG tablet Take 1.5 tablets (15 mg) by mouth daily 90 tablet  3    sotalol (BETAPACE) 80 MG tablet Take 1 tablet (80 mg) by mouth 2 times daily 180 tablet 3    tamsulosin (FLOMAX) 0.4 MG capsule Take 0.4 mg by mouth every evening         Objective  /78 (BP Location: Right arm, Patient Position: Sitting, Cuff Size: Adult Regular)   Pulse 63   Resp 20   Wt 64.9 kg (143 lb)   SpO2 100%   BMI 20.52 kg/m      General Appearance:    Alert, cooperative, no distress, appears stated age   Head:    Normocephalic, without obvious abnormality, atraumatic   Throat:   Lips, mucosa, and tongue normal; teeth and gums normal   Neck:   Supple, symmetrical, trachea midline, no adenopathy;        thyroid:  No enlargement/tenderness/nodules; no carotid    bruit or JVD   Back:     Symmetric, no curvature, ROM normal, no CVA tenderness   Lungs:     Clear to auscultation bilaterally, respirations unlabored   Chest wall:    No tenderness, left-sided ICD and midline sternotomy scar noted   Heart:    Regular rate and rhythm, S1 and S2 normal, no murmur, rub   or gallop   Abdomen:     Soft, non-tender, bowel sounds active all four quadrants,     no masses, no organomegaly   Extremities:   Normal, atraumatic, no cyanosis or edema   Pulses:   2+ and symmetric all extremities   Skin:   Skin color, texture, turgor normal, no rashes or lesions     Results    Lab Results personally reviewed   Lab Results   Component Value Date    CHOL 139 03/10/2023    CHOL 185 09/19/2022     Lab Results   Component Value Date    HDL 38 (L) 03/10/2023    HDL 41 09/19/2022     No components found for: LDLCALC  Lab Results   Component Value Date    TRIG 89 03/10/2023    TRIG 76 09/19/2022     Lab Results   Component Value Date    WBC 8.8 03/06/2023    HGB 13.2 (L) 03/06/2023    HCT 42.1 03/06/2023     03/06/2023     Lab Results   Component Value Date    BUN 19.4 03/06/2023     03/06/2023    CO2 28 03/06/2023               Thank you for allowing me to participate in the care of your  patient.      Sincerely,     GIO VARGAS MD     [facility]  cc:   Gio Vargas MD  [address], [address]  [place] [address]

## 2023-05-11 ENCOUNTER — HOSPITAL ENCOUNTER (OUTPATIENT)
Dept: CARDIAC REHAB | Facility: HOSPITAL | Age: 83
Discharge: HOME | End: 2023-05-11
Attending: INTERNAL MEDICINE
Payer: MEDICARE

## 2023-05-11 PROCEDURE — 93798 PHYS/QHP OP CAR RHAB W/ECG: CPT

## 2023-05-16 ENCOUNTER — TELEPHONE (OUTPATIENT)
Dept: CARDIOLOGY | Facility: CLINIC | Age: 83
End: 2023-05-16
Payer: MEDICARE

## 2023-05-16 ENCOUNTER — HOSPITAL ENCOUNTER (OUTPATIENT)
Dept: CARDIAC REHAB | Facility: HOSPITAL | Age: 83
Discharge: HOME | End: 2023-05-16
Attending: INTERNAL MEDICINE
Payer: MEDICARE

## 2023-05-16 DIAGNOSIS — I48.0 PAROXYSMAL ATRIAL FIBRILLATION (H): ICD-10-CM

## 2023-05-16 PROCEDURE — 93798 PHYS/QHP OP CAR RHAB W/ECG: CPT

## 2023-05-16 NOTE — TELEPHONE ENCOUNTER
Received a call directly from Daniel. He saw Dr. Vargas on 5/10 and was told to cut his sotalol in half to 40 mg bid from 80 mg bid. He does not feel any better. In fact, he thinks he may feel more tired on the lower dose but this is hard for him to gauge. He was updated that Dr. Vargas is out of the office this week but writer will get back to him as soon as able with an update. He verbalized understanding. -[facility]        Dr. Vargas,  Update on Daniel- feels no better on 40 mg BID of sotalol; maybe a little more fatigued actually. Recommendations for your return?  Thanks,  Mal

## 2023-05-17 RX ORDER — SOTALOL HYDROCHLORIDE 80 MG/1
40 TABLET ORAL 2 TIMES DAILY
Qty: 180 TABLET | Refills: 3
Start: 2023-05-17 | End: 2023-06-27

## 2023-05-17 NOTE — TELEPHONE ENCOUNTER
----- Message -----  From: Corey Vargas MD  Sent: 5/17/2023   1:47 PM CDT  To: Laly Swanson RN    If we are not sure if the 40 is better then the 80 and do not know the current rhythm, would stay on 40 until device check.  LF          ==Called Daniel and updated on message from [facility]. He verbalized understanding and will stay on 40 mg bid until he sees Dr. Saxena next week. -[facility]

## 2023-05-17 NOTE — TELEPHONE ENCOUNTER
Corey Vargas MD Caswell, Mallory J, RN  Caller: Unspecified (Yesterday, 10:42 AM)  It was his concern that he was having side effects from the sotalol. If no better check ecg or pacer and confirm not in a fib.   LF              ==called Daniel to update on message above. He has in-clinic device check plus visit with Dr. Saxena next week. He declines ekg and extra remote and reports he will just have things checked on 5/23 as scheduled. He wonders about going back to full 80 mg of sotalol. Will route this question.-[facility]            Dr. Vargas,  See above. Should Daniel go back to 80 mg bid? He refused to come in for ekg or extra remote and opts to just have this checked at scheduled EP visit 5/23.  Thanks,  Mal

## 2023-05-18 ENCOUNTER — HOSPITAL ENCOUNTER (OUTPATIENT)
Dept: CARDIAC REHAB | Facility: HOSPITAL | Age: 83
Discharge: HOME | End: 2023-05-18
Attending: INTERNAL MEDICINE
Payer: MEDICARE

## 2023-05-18 PROCEDURE — 93798 PHYS/QHP OP CAR RHAB W/ECG: CPT

## 2023-05-23 ENCOUNTER — OFFICE VISIT (OUTPATIENT)
Dept: CARDIOLOGY | Facility: CLINIC | Age: 83
End: 2023-05-23
Attending: INTERNAL MEDICINE
Payer: MEDICARE

## 2023-05-23 VITALS
WEIGHT: 142 LBS | HEART RATE: 72 BPM | RESPIRATION RATE: 16 BRPM | DIASTOLIC BLOOD PRESSURE: 66 MMHG | BODY MASS INDEX: 20.37 KG/M2 | SYSTOLIC BLOOD PRESSURE: 114 MMHG | OXYGEN SATURATION: 96 %

## 2023-05-23 DIAGNOSIS — I48.0 PAROXYSMAL ATRIAL FIBRILLATION (H): ICD-10-CM

## 2023-05-23 DIAGNOSIS — Z95.810 ICD (IMPLANTABLE CARDIOVERTER-DEFIBRILLATOR), DUAL, IN SITU: ICD-10-CM

## 2023-05-23 DIAGNOSIS — R53.83 OTHER FATIGUE: ICD-10-CM

## 2023-05-23 DIAGNOSIS — I48.0 PAROXYSMAL ATRIAL FIBRILLATION (H): Primary | ICD-10-CM

## 2023-05-23 DIAGNOSIS — Z95.810 ICD (IMPLANTABLE CARDIOVERTER-DEFIBRILLATOR) IN PLACE: ICD-10-CM

## 2023-05-23 PROCEDURE — 93283 PRGRMG EVAL IMPLANTABLE DFB: CPT | Performed by: INTERNAL MEDICINE

## 2023-05-23 PROCEDURE — 99214 OFFICE O/P EST MOD 30 MIN: CPT | Performed by: INTERNAL MEDICINE

## 2023-05-23 NOTE — PATIENT INSTRUCTIONS
[facility]  Cardiac Electrophysiology  [address]  [place] [address]   Office: [phone]  Fax: [phone]       Thank you for seeing us in clinic today - it is a pleasure to be a part of your care team.  Below is a summary of our plan from today's visit.      You have chronic heart failure, had a defibrillator in place, and have paroxysmal atrial fibrillation (<1% of the time on check today).  Your defibrillator system shows normal lead and device (your right atrial lead has been known to have some far-field ventricular oversensing).      You have had some fatigue and your sotalol was reduced to 40mg twice daily without much improvement.  While it is perhaps less likely that sotalol would be contributing to your fatigue, we will plan for the following:  - stop sotalol 40mg twice daily for now  - return clinic visit with EP NP in 1 month - if no significant improvement in symptoms, would favor restarting sotalol 80mg twice daily  - continue Eliquis    Please do not hesitate to be in touch with our office at [phone] with any questions that may arise.      Thank you for trusting us with your care,    Christel Saxena MD  Clinical Cardiac Electrophysiology  [facility]  [address]  [place] [address]   Office: [phone]  Fax: [phone]

## 2023-05-23 NOTE — PROGRESS NOTES
[facility]  Cardiac Electrophysiology  [address]  [place] [address]   Office: [phone]  Fax: [phone]     Cardiac Electrophysiology Follow-up Visit    Patient: Daniel Alamo   : 1940     Primary Care Provider: Kenna Calvillo NP    CHIEF COMPLAINT/REASON FOR VISIT  Dual chamber ICD in-situ ([employer], 2004, generator replacement 10/15/2018)  Chronic systolic heart failure related to ischemic cardiomyopathy (LVEF 30-35% by 2022)  Paroxysmal atrial fibrillation  Sinus node dysfunction    Assessment/Recommendations   Daniel Alamo is a 83 year old male with chronic systolic heart failure related to ischemic cardiomyopathy (LVEF 30-35% by 2022), paroxysmal atrial fibrillation, sinus node dysfunction, dual chamber ICD in-situ ([employer], 2004, generator replacement 10/15/2018), CAD with prior CABG, aortic stenosis presenting for evaluation of ICD in-situ, paroxysmal atrial fibrillation.    Dual chamber ICD in-situ - [employer], 2004, generator replacement 10/15/2018.  Device check today shows normal lead and device function - known far-field ventricular oversensing on atrial channel  - ongoing routine ICD follow-up - next planned in-clinic check in 1 year in conjunction with cardiology visit, EP follow-up as needed    Paroxysmal atrial fibrillation - <1% burden, though possibly overestimated due to far field V oversensing  [id number]  - while it is perhaps less likely that his sotalol would be contributing to his fatigue, he would like to trial cessation to see if there is any improvement.  We will plan for the following:  - stop sotalol 40mg twice daily for now  - return clinic visit with EP NP in 1 month - if no significant improvement in symptoms, would favor restarting sotalol 80mg twice daily  - continue metoprolol XL 12.5mg daily  - continue apixaban 5mg twice daily (age>80 though wt>60kg and  Cr<1.5)    Chronic systolic heart failure related to ischemic cardiomyopathy - LVEF 30-35% by 9/19/2022  - continue heart failure medical therapies and evaluation regarding revascularization and aortic stenosis      Follow up: as above         History of Present Illness   Daniel Alamo is a 83 year old male with chronic systolic heart failure related to ischemic cardiomyopathy (LVEF 30-35% by 9/19/2022), paroxysmal atrial fibrillation, sinus node dysfunction, dual chamber ICD in-situ ([employer], 4/22/2004, generator replacement 10/15/2018), CAD with prior CABG, aortic stenosis presenting for evaluation of ICD in-situ, paroxysmal atrial fibrillation.    Mr. Alamo reports no issues at his ICD implantation site.  He is enrolled in remote monitoring.  He notes exertional fatigue with ascending 1 flight of stairs.  He has not had any bleeding issues on apixaban.  He denies chest pain, syncope.  He has not had recent admissions for heart failure.  Given fatigue, his sotalol was reduced from 80mg twice daily to 40mg twice daily 5/10/2023 - he notes ongoing fatigue.  Device check today shows <1% AF.    He denies chest pain, dyspnea, syncope.       Physical Examination  Review of Systems   VITALS: /66 (BP Location: Right arm, Patient Position: Sitting, Cuff Size: Adult Regular)   Pulse 72   Resp 16   Wt 64.4 kg (142 lb)   SpO2 96%   BMI 20.37 kg/m    Wt Readings from Last 3 Encounters:   05/10/23 64.9 kg (143 lb)   03/06/23 64.4 kg (142 lb)   02/28/23 64.4 kg (142 lb)     CONSTITUTIONAL: well nourished, comfortable, no distress  EYES:  Conjunctivae pink, sclerae clear.    E/N/T:  Oral mucosa pink  RESPIRATORY:  Respiratory effort is normal  CARDIOVASCULAR:  normal S1 and S2  GASTROINTESTINAL:  Abdomen without masses or tenderness  EXTREMITIES:  No clubbing or cyanosis.    MUSCULOSKELETAL:  Overall grossly normal muscle strength  SKIN:  Overall, skin warm and dry, no lesions.  NEURO/PSYCH:  Oriented x 3  with normal affect.   Constitutional:  No weight loss or loss of appetite    Eyes:  No difficulty with vision, no double vision, no dry eyes  ENT:  No sore throat, difficulty swallowing; changes in hearing or tinnitus  Cardiovascular: As detailed above  Respiratory:  No cough  Musculoskeletal  No joint pain, muscle aches  Neurologic:  No syncope, lightheadedness, fainting spells   Hematologic: No easy bruising, excessive bleeding tendency   Gastrointestinal:  No jaundice, abdominal pain or abdominal bloating  Genitourinary: No changes in urinary habits, no trouble urinating    Psychiatric: No anxiety or depression      Medical History  Surgical History   Past Medical History:   Diagnosis Date     Coronary artery disease      Heart disease      VT (ventricular tachycardia) (H) 2004    Past Surgical History:   Procedure Laterality Date     ABDOMEN SURGERY       BYPASS GRAFT ARTERY CORONARY       CV ANGIOGRAM CORONARY GRAFT N/A 9/19/2022    Procedure: Coronary Angiogram Graft;  Surgeon: Tyrone Ordoñez MD;  Location: [facility]     CV CORONARY LITHOTRIPSY PCI N/A 12/19/2022    Procedure: Percutaneous Coronary Intervention - Lithotripsy;  Surgeon: Tyrone Ordoñez MD;  Location: [facility]     CV LEFT HEART CATH N/A 9/19/2022    Procedure: Left Heart Catheterization;  Surgeon: Tyrone Ordoñez MD;  Location: [facility]     CV PCI ANGIOPLASTY N/A 12/19/2022    Procedure: Percutaneous Transluminal Angioplasty;  Surgeon: Tyrone Ordoñez MD;  Location: [facility]     CV RIGHT HEART CATH MEASUREMENTS RECORDED N/A 9/19/2022    Procedure: Right Heart Catheterization;  Surgeon: Tyrone Ordoñez MD;  Location: [facility]     EYE SURGERY       INSERT / REPLACE / REMOVE PACEMAKER       OR TRANSCATHETER AORTIC VALVE REPLACEMENT, SUBCLAVIAN PERCUTANEOUS APPROACH (STANDBY) N/A 1/31/2023    Procedure: OR TRANSCATHETER AORTIC VALVE REPLACEMENT, SUBCLAVIAN PERCUTANEOUS  APPROACH (STANDBY);  Surgeon: Shanae Rich MD;  Location: [facility] CATH LAB CV     OTHER SURGICAL HISTORY      icd     TRANSCATHETER AORTIC VALVE REPLACEMENT - SUBCLAVIAN APPROACH N/A 1/31/2023    Procedure: Transcatheter Aortic Valve Replacement - Subclavian Approach;  Surgeon: Tyrone Ordoñez MD;  Location: [facility] CATH LAB CV     ZZC CABG, VEIN, SINGLE      Description: CABG (CABG);  Recorded: 10/03/2012;  Comments: LIMA to LAD, SVG to OM2, SVG to RCA         Family History Social History   Family History   Problem Relation Age of Onset     Cancer Mother      Heart Disease Father         Social History     Tobacco Use     Smoking status: Every Day     Packs/day: 0.50     Years: 30.00     Pack years: 15.00     Types: Cigarettes     Smokeless tobacco: Never   Vaping Use     Vaping status: Never Used   Substance Use Topics     Alcohol use: Yes     Alcohol/week: 1.0 standard drink of alcohol     Comment: Glass of wine daily.     Drug use: No         Medications  Allergies     Current Outpatient Medications:      acetaminophen (TYLENOL) 325 MG tablet, Take 650 mg by mouth every 6 hours as needed for pain, Disp: , Rfl:      apixaban ANTICOAGULANT (ELIQUIS) 5 MG tablet, Take 5 mg by mouth 2 times daily, Disp: , Rfl:      aspirin 81 MG EC tablet, Take 81 mg by mouth daily, Disp: , Rfl:      fluticasone (FLONASE) 50 MCG/ACT nasal spray, Spray 1 spray into both nostrils as needed for rhinitis or allergies, Disp: , Rfl:      gabapentin (NEURONTIN) 300 MG capsule, Take 900 mg by mouth 2 times daily , Disp: , Rfl:      lisinopril (ZESTRIL) 2.5 MG tablet, Take 1 tablet (2.5 mg) by mouth daily, Disp: 90 tablet, Rfl: 3     metoprolol succinate ER (TOPROL XL) 25 MG 24 hr tablet, Take 0.5 tablets (12.5 mg) by mouth daily, Disp: 90 tablet, Rfl: 1     rosuvastatin (CRESTOR) 10 MG tablet, Take 1.5 tablets (15 mg) by mouth daily, Disp: 90 tablet, Rfl: 3     sotalol (BETAPACE) 80 MG tablet, Take 0.5 tablets (40 mg) by mouth  2 times daily, Disp: 180 tablet, Rfl: 3     tamsulosin (FLOMAX) 0.4 MG capsule, Take 0.4 mg by mouth every evening, Disp: , Rfl:      Allergies   Allergen Reactions     Atorvastatin Diarrhea     Carvedilol Other (See Comments) and GI Disturbance     Upset stomach; GI upset       Chloride GI Disturbance and Nausea          Lab Results    Chemistry CBC Cardiac Enzymes/BNP/TSH/INR   Recent Labs   Lab Test 09/23/22  1125 09/19/22  0803   NA  --  140   POTASSIUM  --  3.8   CHLORIDE  --  106   CO2  --  28   * 94   BUN  --  14   CR  --  0.95   GFRESTIMATED  --  79   EMILIO  --  9.1     Recent Labs   Lab Test 09/19/22  0803 09/01/22  1327 10/14/21  0928   CR 0.95 0.96 0.83          Recent Labs   Lab Test 09/19/22  0803   WBC 6.5   HGB 14.2   HCT 43.9   MCV 95        Recent Labs   Lab Test 09/19/22  0803 09/01/22  1327 10/14/21  0928   HGB 14.2 13.3 13.8    No results for input(s): TROPONINI in the last 82974 hours.  No results for input(s): BNP, NTBNPI, NTBNP in the last 20623 hours.  Recent Labs   Lab Test 09/01/22  1327   TSH 0.46     No results for input(s): INR in the last 43801 hours.      Data Review    ECGs (tracings independently reviewed)  9/19/2022 - ApV2 59bpm, RBBB QRS 140ms, inferior Qw, QT/QTc 482/477ms    5/23/2023 ICD check  Pacing %/Programmed: AP 24%,  12%, DDDR 55-120bpm  Lead(s): Stable  Battery longevity: Estimating 8.5 years remaining   Presenting rhythm: AP/AS-VS 55bpm  Underlying rhythm: SB 40-80bpm w/1st deg AVB (237ms)  Heart rates: Stable, HR's per histogram range 40-130bpm and primarily 50-100bpm  Atrial arrhythmias: 666 mode switches logged, burden 1%, v-rate >/=120bpm <5%, available EGM's suggest FFRW with AF in refractory, patient denies symptoms  Anticoagulant: Eliquis  Ventricular arrhythmias: 3 VHR episodes logged, EGMs suggest 9-13 beats NSVT 164-171bpm, patient unable to recall symptoms    Lead/device alerts: NA  Comments: Normal device function. RA Amplitude changed from 4V  to 2.6V. Isometrics done and noise only seen when patient pushing up on chair, device did not sense noise. Multiple RA lead tests done and measurements stable.    11/22/2022 ICD check (independently reviewed)  Normal lead and device function - FF V on RA lead noted  4% AT/AFl burden - longest 36s - some of these episodes may be related to FF V oversensing  7 NSVT episodes - longest 19 beats  26% RA pacing, 7% RV pacing  Estimated remaining battery longevity 9yrs    9/19/2022 TTE  The left ventricle is moderately dilated.  There is mild concentric left ventricular hypertrophy.  The visual ejection fraction is 30-35%.  Diastolic Doppler findings (E/E' ratio and/or other parameters) suggest left  ventricular filling pressures are increased.  It appears global hypokinesis with akinesis in inferior and inferoalateral  walls.  Normal right ventricle size and systolic function.  There is a pacemaker lead in the right ventricle.  The left atrium is moderately dilated.  The right atrium is mildly dilated.  Pacer wire in right atrium  There is mild (1+) mitral regurgitation.  There is mild (1+) aortic regurgitation.  Mild valvular aortic stenosis with mean gradient of aortic valve 16 mmHg, but  cannot exclude low flow low gradient aortic valve stenosis since the  calculated aortic valve area in 0.86 cm2.  No previous study for comparison.    9/19/2022 coronary angiography and RHC  LM:no obstruction  LAD:severe disease mid-vessel, supplying a moderate diagonal branch w/ Ca2+ 70% disease but TIMI3 antegrade flow and some flow via LIMA. Distal LAD fills via patent L-LAD  Lcx:OM1 w/ 70-80% ostial Ca2+ disease but SKIP 3 flow. Distal small OM fills via L-L bridging collaterals   RCA:100% occluded proximally     L-LAD: patent, fills the LAD territory including diseased diagonal branch, with some collaterals to Lcx and RCA territory  V-OM: occluded  V-RCA: not visualized, presumed occluded     LVEDP:21  AV gradient by pullback:  12-15  RHC:  RA:4   RV:28/4  PA:33/8 (18)  PCWP:8  SvO2:68     CO/CI:  Peggy:4.42/2.45       Cc: Corey Vargas MD, Kenna Calvillo, LINDSAY Saxena MD  5/23/2023  11:18 AM

## 2023-05-23 NOTE — LETTER
2023    Kenna Calvillo NP  [address] [facility] [address]    RE: Daniel Alamo       Dear Colleague,     I had the pleasure of seeing Daniel Alamo in the [facility].     [facility]  Cardiac Electrophysiology  [address]  [place] [address]   Office: [phone]  Fax: [phone]     Cardiac Electrophysiology Follow-up Visit    Patient: Daniel Alamo   : 1940     Primary Care Provider: Kenna Calvillo NP    CHIEF COMPLAINT/REASON FOR VISIT  Dual chamber ICD in-situ ([employer], 2004, generator replacement 10/15/2018)  Chronic systolic heart failure related to ischemic cardiomyopathy (LVEF 30-35% by 2022)  Paroxysmal atrial fibrillation  Sinus node dysfunction    Assessment/Recommendations   Daniel Alamo is a 83 year old male with chronic systolic heart failure related to ischemic cardiomyopathy (LVEF 30-35% by 2022), paroxysmal atrial fibrillation, sinus node dysfunction, dual chamber ICD in-situ ([employer], 2004, generator replacement 10/15/2018), CAD with prior CABG, aortic stenosis presenting for evaluation of ICD in-situ, paroxysmal atrial fibrillation.    Dual chamber ICD in-situ - [employer], 2004, generator replacement 10/15/2018.  Device check today shows normal lead and device function - known far-field ventricular oversensing on atrial channel  - ongoing routine ICD follow-up - next planned in-clinic check in 1 year in conjunction with cardiology visit, EP follow-up as needed    Paroxysmal atrial fibrillation - <1% burden, though possibly overestimated due to far field V oversensing  [id number]  - while it is perhaps less likely that his sotalol would be contributing to his fatigue, he would like to trial cessation to see if there is any improvement.  We will plan for the following:  - stop sotalol 40mg twice daily for now  - return clinic visit with EP NP in 1  month - if no significant improvement in symptoms, would favor restarting sotalol 80mg twice daily  - continue metoprolol XL 12.5mg daily  - continue apixaban 5mg twice daily (age>80 though wt>60kg and Cr<1.5)    Chronic systolic heart failure related to ischemic cardiomyopathy - LVEF 30-35% by 9/19/2022  - continue heart failure medical therapies and evaluation regarding revascularization and aortic stenosis      Follow up: as above         History of Present Illness   Daniel Alamo is a 83 year old male with chronic systolic heart failure related to ischemic cardiomyopathy (LVEF 30-35% by 9/19/2022), paroxysmal atrial fibrillation, sinus node dysfunction, dual chamber ICD in-situ ([employer], 4/22/2004, generator replacement 10/15/2018), CAD with prior CABG, aortic stenosis presenting for evaluation of ICD in-situ, paroxysmal atrial fibrillation.    Mr. Alamo reports no issues at his ICD implantation site.  He is enrolled in remote monitoring.  He notes exertional fatigue with ascending 1 flight of stairs.  He has not had any bleeding issues on apixaban.  He denies chest pain, syncope.  He has not had recent admissions for heart failure.  Given fatigue, his sotalol was reduced from 80mg twice daily to 40mg twice daily 5/10/2023 - he notes ongoing fatigue.  Device check today shows <1% AF.    He denies chest pain, dyspnea, syncope.       Physical Examination  Review of Systems   VITALS: /66 (BP Location: Right arm, Patient Position: Sitting, Cuff Size: Adult Regular)   Pulse 72   Resp 16   Wt 64.4 kg (142 lb)   SpO2 96%   BMI 20.37 kg/m    Wt Readings from Last 3 Encounters:   05/10/23 64.9 kg (143 lb)   03/06/23 64.4 kg (142 lb)   02/28/23 64.4 kg (142 lb)     CONSTITUTIONAL: well nourished, comfortable, no distress  EYES:  Conjunctivae pink, sclerae clear.    E/N/T:  Oral mucosa pink  RESPIRATORY:  Respiratory effort is normal  CARDIOVASCULAR:  normal S1 and S2  GASTROINTESTINAL:  Abdomen  without masses or tenderness  EXTREMITIES:  No clubbing or cyanosis.    MUSCULOSKELETAL:  Overall grossly normal muscle strength  SKIN:  Overall, skin warm and dry, no lesions.  NEURO/PSYCH:  Oriented x 3 with normal affect.   Constitutional:  No weight loss or loss of appetite    Eyes:  No difficulty with vision, no double vision, no dry eyes  ENT:  No sore throat, difficulty swallowing; changes in hearing or tinnitus  Cardiovascular: As detailed above  Respiratory:  No cough  Musculoskeletal  No joint pain, muscle aches  Neurologic:  No syncope, lightheadedness, fainting spells   Hematologic: No easy bruising, excessive bleeding tendency   Gastrointestinal:  No jaundice, abdominal pain or abdominal bloating  Genitourinary: No changes in urinary habits, no trouble urinating    Psychiatric: No anxiety or depression      Medical History  Surgical History   Past Medical History:   Diagnosis Date    Coronary artery disease     Heart disease     VT (ventricular tachycardia) (H) 2004    Past Surgical History:   Procedure Laterality Date    ABDOMEN SURGERY      BYPASS GRAFT ARTERY CORONARY      CV ANGIOGRAM CORONARY GRAFT N/A 9/19/2022    Procedure: Coronary Angiogram Graft;  Surgeon: Tyrone Ordoñez MD;  Location: [facility]    CV CORONARY LITHOTRIPSY PCI N/A 12/19/2022    Procedure: Percutaneous Coronary Intervention - Lithotripsy;  Surgeon: Tyrone Ordoñez MD;  Location: [facility]    CV LEFT HEART CATH N/A 9/19/2022    Procedure: Left Heart Catheterization;  Surgeon: Tyrone Ordoñez MD;  Location: [facility]    CV PCI ANGIOPLASTY N/A 12/19/2022    Procedure: Percutaneous Transluminal Angioplasty;  Surgeon: Tyrone Ordoñez MD;  Location: [facility]    CV RIGHT HEART CATH MEASUREMENTS RECORDED N/A 9/19/2022    Procedure: Right Heart Catheterization;  Surgeon: Tyrone Ordoñez MD;  Location: [facility]    EYE SURGERY      INSERT / REPLACE / REMOVE PACEMAKER       OR TRANSCATHETER AORTIC VALVE REPLACEMENT, SUBCLAVIAN PERCUTANEOUS APPROACH (STANDBY) N/A 1/31/2023    Procedure: OR TRANSCATHETER AORTIC VALVE REPLACEMENT, SUBCLAVIAN PERCUTANEOUS APPROACH (STANDBY);  Surgeon: Shanae Rich MD;  Location: [facility] CV    OTHER SURGICAL HISTORY      icd    TRANSCATHETER AORTIC VALVE REPLACEMENT - SUBCLAVIAN APPROACH N/A 1/31/2023    Procedure: Transcatheter Aortic Valve Replacement - Subclavian Approach;  Surgeon: Tyrone Ordoñez MD;  Location: [facility] CV    ZZC CABG, VEIN, SINGLE      Description: CABG (CABG);  Recorded: 10/03/2012;  Comments: LIMA to LAD, SVG to OM2, SVG to RCA         Family History Social History   Family History   Problem Relation Age of Onset    Cancer Mother     Heart Disease Father         Social History     Tobacco Use    Smoking status: Every Day     Packs/day: 0.50     Years: 30.00     Pack years: 15.00     Types: Cigarettes    Smokeless tobacco: Never   Vaping Use    Vaping status: Never Used   Substance Use Topics    Alcohol use: Yes     Alcohol/week: 1.0 standard drink of alcohol     Comment: Glass of wine daily.    Drug use: No         Medications  Allergies     Current Outpatient Medications:     acetaminophen (TYLENOL) 325 MG tablet, Take 650 mg by mouth every 6 hours as needed for pain, Disp: , Rfl:     apixaban ANTICOAGULANT (ELIQUIS) 5 MG tablet, Take 5 mg by mouth 2 times daily, Disp: , Rfl:     aspirin 81 MG EC tablet, Take 81 mg by mouth daily, Disp: , Rfl:     fluticasone (FLONASE) 50 MCG/ACT nasal spray, Spray 1 spray into both nostrils as needed for rhinitis or allergies, Disp: , Rfl:     gabapentin (NEURONTIN) 300 MG capsule, Take 900 mg by mouth 2 times daily , Disp: , Rfl:     lisinopril (ZESTRIL) 2.5 MG tablet, Take 1 tablet (2.5 mg) by mouth daily, Disp: 90 tablet, Rfl: 3    metoprolol succinate ER (TOPROL XL) 25 MG 24 hr tablet, Take 0.5 tablets (12.5 mg) by mouth daily, Disp: 90 tablet, Rfl: 1     rosuvastatin (CRESTOR) 10 MG tablet, Take 1.5 tablets (15 mg) by mouth daily, Disp: 90 tablet, Rfl: 3    sotalol (BETAPACE) 80 MG tablet, Take 0.5 tablets (40 mg) by mouth 2 times daily, Disp: 180 tablet, Rfl: 3    tamsulosin (FLOMAX) 0.4 MG capsule, Take 0.4 mg by mouth every evening, Disp: , Rfl:      Allergies   Allergen Reactions    Atorvastatin Diarrhea    Carvedilol Other (See Comments) and GI Disturbance     Upset stomach; GI upset      Chloride GI Disturbance and Nausea          Lab Results    Chemistry CBC Cardiac Enzymes/BNP/TSH/INR   Recent Labs   Lab Test 09/23/22  1125 09/19/22  0803   NA  --  140   POTASSIUM  --  3.8   CHLORIDE  --  106   CO2  --  28   * 94   BUN  --  14   CR  --  0.95   GFRESTIMATED  --  79   EMILIO  --  9.1     Recent Labs   Lab Test 09/19/22  0803 09/01/22  1327 10/14/21  0928   CR 0.95 0.96 0.83          Recent Labs   Lab Test 09/19/22  0803   WBC 6.5   HGB 14.2   HCT 43.9   MCV 95        Recent Labs   Lab Test 09/19/22  0803 09/01/22  1327 10/14/21  0928   HGB 14.2 13.3 13.8    No results for input(s): TROPONINI in the last 39183 hours.  No results for input(s): BNP, NTBNPI, NTBNP in the last 86531 hours.  Recent Labs   Lab Test 09/01/22  1327   TSH 0.46     No results for input(s): INR in the last 84961 hours.      Data Review    ECGs (tracings independently reviewed)  9/19/2022 - ApV2 59bpm, RBBB QRS 140ms, inferior Qw, QT/QTc 482/477ms    5/23/2023 ICD check  Pacing %/Programmed: AP 24%,  12%, DDDR 55-120bpm  Lead(s): Stable  Battery longevity: Estimating 8.5 years remaining   Presenting rhythm: AP/AS-VS 55bpm  Underlying rhythm: SB 40-80bpm w/1st deg AVB (237ms)  Heart rates: Stable, HR's per histogram range 40-130bpm and primarily 50-100bpm  Atrial arrhythmias: 666 mode switches logged, burden 1%, v-rate >/=120bpm <5%, available EGM's suggest FFRW with AF in refractory, patient denies symptoms  Anticoagulant: Eliquis  Ventricular arrhythmias: 3 VHR episodes  logged, EGMs suggest 9-13 beats NSVT 164-171bpm, patient unable to recall symptoms    Lead/device alerts: NA  Comments: Normal device function. RA Amplitude changed from 4V to 2.6V. Isometrics done and noise only seen when patient pushing up on chair, device did not sense noise. Multiple RA lead tests done and measurements stable.    11/22/2022 ICD check (independently reviewed)  Normal lead and device function - FF V on RA lead noted  4% AT/AFl burden - longest 36s - some of these episodes may be related to FF V oversensing  7 NSVT episodes - longest 19 beats  26% RA pacing, 7% RV pacing  Estimated remaining battery longevity 9yrs    9/19/2022 TTE  The left ventricle is moderately dilated.  There is mild concentric left ventricular hypertrophy.  The visual ejection fraction is 30-35%.  Diastolic Doppler findings (E/E' ratio and/or other parameters) suggest left  ventricular filling pressures are increased.  It appears global hypokinesis with akinesis in inferior and inferoalateral  walls.  Normal right ventricle size and systolic function.  There is a pacemaker lead in the right ventricle.  The left atrium is moderately dilated.  The right atrium is mildly dilated.  Pacer wire in right atrium  There is mild (1+) mitral regurgitation.  There is mild (1+) aortic regurgitation.  Mild valvular aortic stenosis with mean gradient of aortic valve 16 mmHg, but  cannot exclude low flow low gradient aortic valve stenosis since the  calculated aortic valve area in 0.86 cm2.  No previous study for comparison.    9/19/2022 coronary angiography and RHC  LM:no obstruction  LAD:severe disease mid-vessel, supplying a moderate diagonal branch w/ Ca2+ 70% disease but TIMI3 antegrade flow and some flow via LIMA. Distal LAD fills via patent L-LAD  Lcx:OM1 w/ 70-80% ostial Ca2+ disease but SKIP 3 flow. Distal small OM fills via L-L bridging collaterals   RCA:100% occluded proximally     L-LAD: patent, fills the LAD territory including  diseased diagonal branch, with some collaterals to Lcx and RCA territory  V-OM: occluded  V-RCA: not visualized, presumed occluded     LVEDP:21  AV gradient by pullback: 12-15  RHC:  RA:4   RV:28/4  PA:33/8 (18)  PCWP:8  SvO2:68     CO/CI:  Peggy:4.42/2.45       Cc: Corey Vargas MD, Kenna Calvillo, NP    Christel Saxena MD  5/23/2023  11:18 AM        Thank you for allowing me to participate in the care of your patient.      Sincerely,     Christel Saxena MD     [facility]  cc:   Nabor Sims MD  [address] [place] [id number]

## 2023-05-25 ENCOUNTER — HOSPITAL ENCOUNTER (OUTPATIENT)
Dept: CARDIAC REHAB | Facility: HOSPITAL | Age: 83
Discharge: HOME | End: 2023-05-25
Attending: INTERNAL MEDICINE
Payer: MEDICARE

## 2023-05-25 LAB
MDC_IDC_LEAD_IMPLANT_DT: NORMAL
MDC_IDC_LEAD_IMPLANT_DT: NORMAL
MDC_IDC_LEAD_LOCATION: NORMAL
MDC_IDC_LEAD_LOCATION: NORMAL
MDC_IDC_LEAD_LOCATION_DETAIL_1: NORMAL
MDC_IDC_LEAD_LOCATION_DETAIL_1: NORMAL
MDC_IDC_LEAD_MFG: NORMAL
MDC_IDC_LEAD_MFG: NORMAL
MDC_IDC_LEAD_MODEL: NORMAL
MDC_IDC_LEAD_MODEL: NORMAL
MDC_IDC_LEAD_POLARITY_TYPE: NORMAL
MDC_IDC_LEAD_POLARITY_TYPE: NORMAL
MDC_IDC_LEAD_SERIAL: NORMAL
MDC_IDC_LEAD_SERIAL: NORMAL
MDC_IDC_LEAD_SPECIAL_FUNCTION: NORMAL
MDC_IDC_MSMT_BATTERY_DTM: NORMAL
MDC_IDC_MSMT_BATTERY_REMAINING_LONGEVITY: 114 MO
MDC_IDC_MSMT_BATTERY_REMAINING_PERCENTAGE: 100 %
MDC_IDC_MSMT_BATTERY_STATUS: NORMAL
MDC_IDC_MSMT_CAP_CHARGE_DTM: NORMAL
MDC_IDC_MSMT_CAP_CHARGE_TIME: 10.5 S
MDC_IDC_MSMT_CAP_CHARGE_TYPE: NORMAL
MDC_IDC_MSMT_LEADCHNL_RA_IMPEDANCE_VALUE: 757 OHM
MDC_IDC_MSMT_LEADCHNL_RA_LEAD_CHANNEL_STATUS: NORMAL
MDC_IDC_MSMT_LEADCHNL_RA_PACING_THRESHOLD_AMPLITUDE: 1.7 V
MDC_IDC_MSMT_LEADCHNL_RA_PACING_THRESHOLD_PULSEWIDTH: 0.7 MS
MDC_IDC_MSMT_LEADCHNL_RA_SENSING_INTR_AMPL: 0.7 MV
MDC_IDC_MSMT_LEADCHNL_RV_IMPEDANCE_VALUE: 546 OHM
MDC_IDC_MSMT_LEADCHNL_RV_PACING_THRESHOLD_AMPLITUDE: 0.9 V
MDC_IDC_MSMT_LEADCHNL_RV_PACING_THRESHOLD_PULSEWIDTH: 0.4 MS
MDC_IDC_MSMT_LEADCHNL_RV_SENSING_INTR_AMPL: 19.9 MV
MDC_IDC_PG_IMPLANT_DTM: NORMAL
MDC_IDC_PG_MFG: NORMAL
MDC_IDC_PG_MODEL: NORMAL
MDC_IDC_PG_SERIAL: NORMAL
MDC_IDC_PG_TYPE: NORMAL
MDC_IDC_SESS_CLINIC_NAME: NORMAL
MDC_IDC_SESS_DTM: NORMAL
MDC_IDC_SESS_TYPE: NORMAL
MDC_IDC_SET_BRADY_AT_MODE_SWITCH_MODE: NORMAL
MDC_IDC_SET_BRADY_AT_MODE_SWITCH_RATE: 170 {BEATS}/MIN
MDC_IDC_SET_BRADY_LOWRATE: 55 {BEATS}/MIN
MDC_IDC_SET_BRADY_MAX_SENSOR_RATE: 130 {BEATS}/MIN
MDC_IDC_SET_BRADY_MAX_TRACKING_RATE: 120 {BEATS}/MIN
MDC_IDC_SET_BRADY_MODE: NORMAL
MDC_IDC_SET_BRADY_PAV_DELAY_HIGH: 200 MS
MDC_IDC_SET_BRADY_PAV_DELAY_LOW: 300 MS
MDC_IDC_SET_BRADY_SAV_DELAY_HIGH: 165 MS
MDC_IDC_SET_BRADY_SAV_DELAY_LOW: 250 MS
MDC_IDC_SET_LEADCHNL_RA_PACING_AMPLITUDE: 2.6 V
MDC_IDC_SET_LEADCHNL_RA_PACING_POLARITY: NORMAL
MDC_IDC_SET_LEADCHNL_RA_PACING_PULSEWIDTH: 0.7 MS
MDC_IDC_SET_LEADCHNL_RA_SENSING_ADAPTATION_MODE: NORMAL
MDC_IDC_SET_LEADCHNL_RA_SENSING_POLARITY: NORMAL
MDC_IDC_SET_LEADCHNL_RA_SENSING_SENSITIVITY: 0.3 MV
MDC_IDC_SET_LEADCHNL_RV_PACING_AMPLITUDE: 1.5 V
MDC_IDC_SET_LEADCHNL_RV_PACING_POLARITY: NORMAL
MDC_IDC_SET_LEADCHNL_RV_PACING_PULSEWIDTH: 0.4 MS
MDC_IDC_SET_LEADCHNL_RV_SENSING_ADAPTATION_MODE: NORMAL
MDC_IDC_SET_LEADCHNL_RV_SENSING_POLARITY: NORMAL
MDC_IDC_SET_LEADCHNL_RV_SENSING_SENSITIVITY: 0.6 MV
MDC_IDC_SET_ZONE_DETECTION_INTERVAL: 261 MS
MDC_IDC_SET_ZONE_DETECTION_INTERVAL: 333 MS
MDC_IDC_SET_ZONE_DETECTION_INTERVAL: 400 MS
MDC_IDC_SET_ZONE_TYPE: NORMAL
MDC_IDC_SET_ZONE_VENDOR_TYPE: NORMAL
MDC_IDC_STAT_AT_BURDEN_PERCENT: 1 %
MDC_IDC_STAT_AT_DTM_END: NORMAL
MDC_IDC_STAT_AT_DTM_START: NORMAL
MDC_IDC_STAT_AT_MODE_SW_COUNT: 666
MDC_IDC_STAT_BRADY_DTM_END: NORMAL
MDC_IDC_STAT_BRADY_DTM_START: NORMAL
MDC_IDC_STAT_BRADY_RA_PERCENT_PACED: 24 %
MDC_IDC_STAT_BRADY_RV_PERCENT_PACED: 12 %
MDC_IDC_STAT_EPISODE_RECENT_COUNT: 0
MDC_IDC_STAT_EPISODE_RECENT_COUNT: 3
MDC_IDC_STAT_EPISODE_RECENT_COUNT: 666
MDC_IDC_STAT_EPISODE_RECENT_COUNT_DTM_END: NORMAL
MDC_IDC_STAT_EPISODE_RECENT_COUNT_DTM_START: NORMAL
MDC_IDC_STAT_EPISODE_TYPE: NORMAL
MDC_IDC_STAT_EPISODE_VENDOR_TYPE: NORMAL
MDC_IDC_STAT_TACHYTHERAPY_ATP_DELIVERED_RECENT: 0
MDC_IDC_STAT_TACHYTHERAPY_ATP_DELIVERED_TOTAL: 1
MDC_IDC_STAT_TACHYTHERAPY_RECENT_DTM_END: NORMAL
MDC_IDC_STAT_TACHYTHERAPY_RECENT_DTM_START: NORMAL
MDC_IDC_STAT_TACHYTHERAPY_SHOCKS_ABORTED_RECENT: 0
MDC_IDC_STAT_TACHYTHERAPY_SHOCKS_ABORTED_TOTAL: 0
MDC_IDC_STAT_TACHYTHERAPY_SHOCKS_DELIVERED_RECENT: 0
MDC_IDC_STAT_TACHYTHERAPY_SHOCKS_DELIVERED_TOTAL: 0
MDC_IDC_STAT_TACHYTHERAPY_TOTAL_DTM_END: NORMAL
MDC_IDC_STAT_TACHYTHERAPY_TOTAL_DTM_START: NORMAL

## 2023-05-25 PROCEDURE — 93798 PHYS/QHP OP CAR RHAB W/ECG: CPT

## 2023-05-30 ENCOUNTER — HOSPITAL ENCOUNTER (OUTPATIENT)
Dept: CARDIAC REHAB | Facility: HOSPITAL | Age: 83
Discharge: HOME | End: 2023-05-30
Attending: INTERNAL MEDICINE
Payer: MEDICARE

## 2023-05-30 PROCEDURE — 93798 PHYS/QHP OP CAR RHAB W/ECG: CPT

## 2023-06-01 ENCOUNTER — HOSPITAL ENCOUNTER (OUTPATIENT)
Dept: CARDIAC REHAB | Facility: HOSPITAL | Age: 83
Discharge: HOME | End: 2023-06-01
Attending: INTERNAL MEDICINE
Payer: MEDICARE

## 2023-06-01 PROCEDURE — 93798 PHYS/QHP OP CAR RHAB W/ECG: CPT

## 2023-06-02 NOTE — PROGRESS NOTES
"HEART CARE ENCOUNTER NOTE       [facility]  [phone]    Assessment/Recommendations     1.  Ischemic cardiomyopathy with systolic dysfunction with LVEF of 30 to 35% per echo in 2/27/2023 with status post ICD: Stable and unchanged from previous echo done on 2/1/2023.    Patient is well compensated with no evidence of fluid retention on exam.    His metoprolol dose has been reduced to 12.5 mg daily in the past due to dizziness and low blood pressure.  His lisinopril dose has been reduced to 2.5 mg daily due to dizziness and low blood pressure.    2.  Aortic Stenosis status post successful TAVR on 1/31/2023 with #29 CHANTAL valve via left subclavian.  Left clavicle incision is dry and intact.Echocardiogram on 2/27/2020 showed, \"there is a 29 mm CHANTAL 3 bioprosthetic valve present in aortic valve position. Mean gradient is 6 mmHg \".    In cardiac rehab.  His main concern is fatigue.    3. Coronary artery disease with history of prior CABG with LIMA to LAD, SVG to OM, and SVG to RCA: Coronary angiogram in September 2022 showed severe mid vessel disease with 70% calcified disease and diagonal 1 with distal LAD fills via patent LIMA to the LAD, 70 to 80% stenosis in OM1 with distal small OM fills via L to L bridging collaterals, 100% occluded proximal RCA.  Vein graft to the OM was found occluded and vein graft to RCA is not visualized and presumed occluded.    Denies chest pain.     4.  Dyslipidemia with LDL goal <70/Obesity with a BMI of: Diarrhea improved switching from atorvastatin to rosuvastatin.  Most recent LDL is 83.  Currently on rosuvastatin 15 mg daily.    5.  Paroxysmal atrial fibrillation: He has been taken off of sotalol by Dr. Saxena.  He feels his fatigue is unchanged.  He has follow-up appointment in A-fib clinic in few weeks.    Blood pressure today is 100/60.  Blood pressure in cardiac rehab are stable.  Last Wednesday patient had an episode of fatigue and nausea and blood " pressure was found in 80s systolic.  Per wife, inadequate fluid intake.-Likely secondary to dehydration.    Plan:   -He declined BMP checked today.  -Instruction given to hold lisinopril if blood pressures less than 100/50.  -Highly encouraged adequate hydration  -We can consider either decreasing his rosuvastatin and see if this helps with fatigue.  -Recommended patient follow-up with PCP regarding ongoing issue with fatigue and poor appetite.    Follow-up with Dr. Vargas as scheduled in August     History of Present Illness/Subjective    Daniel Alamo is a 82 year old male with past medical history of coronary disease with remote history of CABG with LIMA to LAD, vein graft to OM, and vein graft RCA done in out-of-hospital, ischemic cardiomyopathy with systolic dysfunction with status post ICD,  atrial fibrillation, abdominal aortic aneurysm without rupture, pure hypercholesterolemia, atrial fibrillation on sotalol, peripheral artery disease with status post iliac stents and now status post PCI with balloon angioplasty and shockwave lithotripsy to LAD/diagonal 1 on 12/19/2022, aortic stenosis with status post TAVR who is seen in [facility] for follow-up from recent post TAVR visit.    During last clinic visit for his 1 week postoperative follow-up.  Patient continues to have shortness of breath with exertion.  He was experiencing lightheadedness.  He reported inadequate fluid intake and poor appetite.  He does have issue with chronic diarrhea which improved slightly switching from atorvastatin to rosuvastatin.  He was also noted to have swelling in his right groin suspected for hernia.  His ultrasound did show abdominal hernia.  He followed up with PCP.  His lisinopril and furosemide dose were reduced due to symptomatic hypotension.    Patient continues to feel poorly with not much improvement in his lightheadedness, fatigue and low blood pressure.  He was also experiencing some rectal bleeding.  He was  sent to ED for further evaluation.    Today, Daniel is here accompanied by his wife.  He still has some shortness of breath with exertion although he feels some improvement overall with his fatigue.  He continues to have poor appetite.  Per wife, he does not drink adequate fluid.  He is adding extra salt to his food.  He continues to have diarrhea.    He denies further rectal bleeding since Plavix was discontinued.  She denies chest pain, shortness of breath at rest, PND, orthopnea, abdominal bloating, heart palpitation or lower extremity edema.    Echocardiogram on 2/1/2023  Interpretation Summary     1.Left ventricular function is decreased. The ejection fraction is 30-35%  (moderately reduced).  2.Normal right ventricle size and systolic function.  3.There is mild to moderate (1-2+) mitral regurgitation. ERO 0.11 cmÂ  with a  volume of 27 cc at heart rate of 89 bpm.  4.Aortic valve replace with a 29 mm CHANTAL 3, peak velocity is 2.2 m/s with a  mean gradient of 8 mmHg, there is trivial paravalvular leak.  Compared to the prior study dated 1/31/2023, the LV EF is visually better, MR  is less, and AoVR is new.    Echocardiogram done in September 2022-reviewed  Interpretation Summary     The left ventricle is moderately dilated.  There is mild concentric left ventricular hypertrophy.  The visual ejection fraction is 30-35%.  Diastolic Doppler findings (E/E' ratio and/or other parameters) suggest left  ventricular filling pressures are increased.  It appears global hypokinesis with akinesis in inferior and inferoalateral  walls.  Normal right ventricle size and systolic function.  There is a pacemaker lead in the right ventricle.  The left atrium is moderately dilated.  The right atrium is mildly dilated.  Pacer wire in right atrium  There is mild (1+) mitral regurgitation.  There is mild (1+) aortic regurgitation.  Mild valvular aortic stenosis with mean gradient of aortic valve 16 mmHg, but  cannot exclude low flow  low gradient aortic valve stenosis since the  calculated aortic valve area in 0.86 cm2.     No previous study for comparison.    Coronary Angiogram from 12/19/22: reviewed:  Conclusion  Daniel Alamo is a 82 year old old male w/ CAD s/p CABG w/ L-LAD, V-OM, V-RCA, 30 years ago at [facility], ischemic cardiomyopathy EF 25% 11/2021, aortic stenosis, TR, afib on sotalol, PAD s/p iliac stents here for w/u of progressive GALVEZ and orthostatic symptoms, found to have severe aortic stenosis and CAD and here for PCI to D1.     - given area of ischemia/vaibility in anterior/anterolateral wall, proceeded w/ PCI w/ cuting balloon angioplasty and Shockwave lithotripsy in order to optimize risk profile of the pacing run, but given only branch involvement, we chose to stop, as stenting/Roto would require escalating risk  - proceed w/ TAVR w/u as planned   - continue ASA 81mg daily indefinitely, clopidogrel 600mg once, followed by 75mg daily for at least 12 months, add atorva 40  - continue aggressive risk factor modification     Findings:  LM:no obstruction  LAD:severe diffuse Ca2+ disease in mLAD into D1. Occluded dLAD  Lcx:severe mid-vessel lesion  RCA:not injected     Access:  R Radial artery     PCI:  LMT was engaged w/ a 6F EBU 3.5 Guide catheter and the lesion in LAD was wired w/ a Forte wire, then ballooned w/ a 2/0x12mm NC Emerge, a 2.0x10mm ScoreFlex balloon, and a 2.5x12mm Shockwave balloon at 3 jeronimo inflations. We were able to achieve balloon expansion but significant refractory re-narrowing after balloon removal due to severe bulky Ca2+. Given only branch involvement and escalating risk, we chose to stop rather than risk stent under-deployment. Final angiography showed no dissection or perforation and a SKIP 3 flow.     Closure:   Vasc Band       Physical Examination Review of Systems   Vitals: /60 (BP Location: Left arm, Patient Position: Sitting, Cuff Size: Adult Regular)   Pulse (!) 48   Resp 16   Ht  "1.778 m (5' 10\")   Wt 64 kg (141 lb)   BMI 20.23 kg/m    BMI= Body mass index is 20.23 kg/m .  Wt Readings from Last 3 Encounters:   06/05/23 64 kg (141 lb)   05/23/23 64.4 kg (142 lb)   05/10/23 64.9 kg (143 lb)       General Appearance:   Alert, cooperative and in no acute distress   ENT/Mouth: membranes moist, no oral lesions or bleeding gums.      EYES:  no scleral icterus, normal conjunctivae   Neck: No carotid bruits.  Thyroid not visualized   Chest/Lungs:   lungs are clear to auscultation, no rales or wheezing, left clavicle incision dry and intact.   Cardiovascular:     Normal first and second heart sounds with no systolic murmur.  No rubs or gallops; the carotid, radial and posterior tibial pulses are intact, no edema bilaterally;    Abdomen:  Soft and nontender. Bowel sounds are present in all quadrants   Extremities: no cyanosis or clubbing   Skin: no xanthelasma, warm.    Neurologic: normal gait, normal  bilateral, no tremors   Psychiatric: Normal mood and affect       Please refer above for cardiac ROS details.      Medical History  Surgical History Family History Social History   Past Medical History:   Diagnosis Date     Coronary artery disease      Heart disease      VT (ventricular tachycardia) (H) 2004     Past Surgical History:   Procedure Laterality Date     ABDOMEN SURGERY       BYPASS GRAFT ARTERY CORONARY       CV ANGIOGRAM CORONARY GRAFT N/A 9/19/2022    Procedure: Coronary Angiogram Graft;  Surgeon: Tyrone Ordoñez MD;  Location: [facility] CV     CV CORONARY LITHOTRIPSY PCI N/A 12/19/2022    Procedure: Percutaneous Coronary Intervention - Lithotripsy;  Surgeon: Tyrone Ordoñez MD;  Location: [facility] LAB CV     CV LEFT HEART CATH N/A 9/19/2022    Procedure: Left Heart Catheterization;  Surgeon: Tyrone Ordoñez MD;  Location: [facility] CV     CV PCI ANGIOPLASTY N/A 12/19/2022    Procedure: Percutaneous Transluminal Angioplasty;  Surgeon: Tyrone Ordoñez, " MD;  Location: [facility] CV     CV RIGHT HEART CATH MEASUREMENTS RECORDED N/A 9/19/2022    Procedure: Right Heart Catheterization;  Surgeon: Tyrone Ordoñez MD;  Location: [facility] CV     EYE SURGERY       INSERT / REPLACE / REMOVE PACEMAKER       OR TRANSCATHETER AORTIC VALVE REPLACEMENT, SUBCLAVIAN PERCUTANEOUS APPROACH (STANDBY) N/A 1/31/2023    Procedure: OR TRANSCATHETER AORTIC VALVE REPLACEMENT, SUBCLAVIAN PERCUTANEOUS APPROACH (STANDBY);  Surgeon: Shanae Rich MD;  Location: [facility] CV     OTHER SURGICAL HISTORY      icd     TRANSCATHETER AORTIC VALVE REPLACEMENT - SUBCLAVIAN APPROACH N/A 1/31/2023    Procedure: Transcatheter Aortic Valve Replacement - Subclavian Approach;  Surgeon: Tyrone Ordoñez MD;  Location: [facility] CV     ZZC CABG, VEIN, SINGLE      Description: CABG (CABG);  Recorded: 10/03/2012;  Comments: LIMA to LAD, SVG to OM2, SVG to RCA     Family History   Problem Relation Age of Onset     Cancer Mother      Heart Disease Father     Social History     Socioeconomic History     Marital status:      Spouse name: Not on file     Number of children: Not on file     Years of education: Not on file     Highest education level: Not on file   Occupational History     Not on file   Tobacco Use     Smoking status: Every Day     Packs/day: 0.50     Years: 30.00     Pack years: 15.00     Types: Cigarettes     Smokeless tobacco: Never   Vaping Use     Vaping status: Never Used   Substance and Sexual Activity     Alcohol use: Yes     Alcohol/week: 1.0 standard drink of alcohol     Comment: Glass of wine daily.     Drug use: No     Sexual activity: Not Currently   Other Topics Concern     Parent/sibling w/ CABG, MI or angioplasty before 65F 55M? Not Asked   Social History Narrative     Not on file     Social Determinants of Health     Financial Resource Strain: Not on file   Food Insecurity: Not on file   Transportation Needs: Not on file   Physical  Activity: Not on file   Stress: Not on file   Social Connections: Not on file   Intimate Partner Violence: Not on file   Housing Stability: Not on file          Medications  Allergies   Current Outpatient Medications   Medication Sig Dispense Refill     acetaminophen (TYLENOL) 325 MG tablet Take 650 mg by mouth every 6 hours as needed for pain       apixaban ANTICOAGULANT (ELIQUIS) 5 MG tablet Take 5 mg by mouth 2 times daily       aspirin 81 MG EC tablet Take 81 mg by mouth daily       fluticasone (FLONASE) 50 MCG/ACT nasal spray Spray 1 spray into both nostrils as needed for rhinitis or allergies       gabapentin (NEURONTIN) 300 MG capsule Take 900 mg by mouth 2 times daily        lisinopril (ZESTRIL) 2.5 MG tablet Take 1 tablet (2.5 mg) by mouth daily 90 tablet 3     metoprolol succinate ER (TOPROL XL) 25 MG 24 hr tablet Take 0.5 tablets (12.5 mg) by mouth daily 90 tablet 1     rosuvastatin (CRESTOR) 10 MG tablet Take 1.5 tablets (15 mg) by mouth daily 90 tablet 3     tamsulosin (FLOMAX) 0.4 MG capsule Take 0.4 mg by mouth every evening       sotalol (BETAPACE) 80 MG tablet Take 0.5 tablets (40 mg) by mouth 2 times daily (Patient not taking: Reported on 6/5/2023) 180 tablet 3    Allergies   Allergen Reactions     Atorvastatin Diarrhea     Carvedilol Other (See Comments) and GI Disturbance     Upset stomach; GI upset       Chloride GI Disturbance and Nausea         Lab Results    Chemistry/lipid CBC Cardiac Enzymes/BNP/TSH/INR   Recent Labs   Lab Test 09/19/22  0803   CHOL 185   HDL 41      TRIG 76     Recent Labs   Lab Test 09/19/22  0803 12/06/19  1134 10/05/18  0930    100 88     Recent Labs   Lab Test 01/10/23  1000      POTASSIUM 4.4   CHLORIDE 104   CO2 28   GLC 84   BUN 13.2   CR 1.08   GFRESTIMATED 68   EMILIO 9.1     Recent Labs   Lab Test 01/10/23  1000 12/16/22  1029 11/25/22  1549   CR 1.08 1.13 1.01     No results for input(s): A1C in the last 13369 hours. Recent Labs   Lab Test  12/16/22  1029   WBC 8.3   HGB 14.5   HCT 45.4   MCV 94        Recent Labs   Lab Test 12/16/22  1029 11/25/22  1549 09/19/22  0803   HGB 14.5 14.6 14.2    No results for input(s): TROPONINI in the last 04450 hours.  No results for input(s): BNP, NTBNPI, NTBNP in the last 54050 hours.  Recent Labs   Lab Test 09/01/22  1327   TSH 0.46     No results for input(s): INR in the last 90332 hours.     31  minutes spent on the date of encounter doing review of test results, interpretation with above tests, patient visit, documentation and discussion with family.      This note has been dictated using voice recognition software. Any grammatical or context distortions are unintentional and inherent to the software.

## 2023-06-05 ENCOUNTER — OFFICE VISIT (OUTPATIENT)
Dept: CARDIOLOGY | Facility: CLINIC | Age: 83
End: 2023-06-05
Attending: NURSE PRACTITIONER
Payer: MEDICARE

## 2023-06-05 VITALS
WEIGHT: 141 LBS | HEIGHT: 70 IN | DIASTOLIC BLOOD PRESSURE: 60 MMHG | SYSTOLIC BLOOD PRESSURE: 100 MMHG | HEART RATE: 48 BPM | RESPIRATION RATE: 16 BRPM | BODY MASS INDEX: 20.19 KG/M2

## 2023-06-05 DIAGNOSIS — I48.0 PAROXYSMAL ATRIAL FIBRILLATION (H): ICD-10-CM

## 2023-06-05 DIAGNOSIS — I25.5 ISCHEMIC CARDIOMYOPATHY: ICD-10-CM

## 2023-06-05 DIAGNOSIS — I50.20 HEART FAILURE WITH REDUCED EJECTION FRACTION, NYHA CLASS II (H): ICD-10-CM

## 2023-06-05 DIAGNOSIS — I42.0 DILATED CARDIOMYOPATHY (H): Primary | ICD-10-CM

## 2023-06-05 DIAGNOSIS — I25.83 CORONARY ARTERIOSCLEROSIS DUE TO LIPID RICH PLAQUE: ICD-10-CM

## 2023-06-05 DIAGNOSIS — I35.0 AORTIC STENOSIS, SEVERE: ICD-10-CM

## 2023-06-05 PROCEDURE — 99214 OFFICE O/P EST MOD 30 MIN: CPT | Performed by: NURSE PRACTITIONER

## 2023-06-05 NOTE — PATIENT INSTRUCTIONS
Daniel Alamo,    It was a pleasure to see you today at the [facility].     My recommendations after this visit include:    - No medications changes made today    - You may hold your Lisinopril if BP is <100/50 with lightheaded, dizziness or excessive fatigue     - Please make sure to drink at least 50-60 ounces per day     - Follow up with Dr. Vargas in August    - Follow up with  Airam Malin CNP as scheduled in June    - Follow up with Justin in 4-5 months     - Please call Heart Failure Nurse Line at [phone], if you have any questions or concerns

## 2023-06-05 NOTE — LETTER
"6/5/2023    Kenna Calvillo, NP  [address] [facility] [address]    RE: Daniel Alamo       Dear Colleague,     I had the pleasure of seeing Daniel Alamo in the [facility] [place].  HEART CARE ENCOUNTER NOTE       [facility] [place]  [phone]    Assessment/Recommendations     1.  Ischemic cardiomyopathy with systolic dysfunction with LVEF of 30 to 35% per echo in 2/27/2023 with status post ICD: Stable and unchanged from previous echo done on 2/1/2023.    Patient is well compensated with no evidence of fluid retention on exam.    His metoprolol dose has been reduced to 12.5 mg daily in the past due to dizziness and low blood pressure.  His lisinopril dose has been reduced to 2.5 mg daily due to dizziness and low blood pressure.    2.  Aortic Stenosis status post successful TAVR on 1/31/2023 with #29 CHANTAL valve via left subclavian.  Left clavicle incision is dry and intact.Echocardiogram on 2/27/2020 showed, \"there is a 29 mm CHANTAL 3 bioprosthetic valve present in aortic valve position. Mean gradient is 6 mmHg \".    In cardiac rehab.  His main concern is fatigue.    3. Coronary artery disease with history of prior CABG with LIMA to LAD, SVG to OM, and SVG to RCA: Coronary angiogram in September 2022 showed severe mid vessel disease with 70% calcified disease and diagonal 1 with distal LAD fills via patent LIMA to the LAD, 70 to 80% stenosis in OM1 with distal small OM fills via L to L bridging collaterals, 100% occluded proximal RCA.  Vein graft to the OM was found occluded and vein graft to RCA is not visualized and presumed occluded.    Denies chest pain.     4.  Dyslipidemia with LDL goal <70/Obesity with a BMI of: Diarrhea improved switching from atorvastatin to rosuvastatin.  Most recent LDL is 83.  Currently on rosuvastatin 15 mg daily.    5.  Paroxysmal atrial fibrillation: He has been taken off of sotalol by Dr. Saxena.  He feels his fatigue is unchanged.  He has follow-up " appointment in A-fib clinic in few weeks.    Blood pressure today is 100/60.  Blood pressure in cardiac rehab are stable.  Last Wednesday patient had an episode of fatigue and nausea and blood pressure was found in 80s systolic.  Per wife, inadequate fluid intake.-Likely secondary to dehydration.    Plan:   -He declined BMP checked today.  -Instruction given to hold lisinopril if blood pressures less than 100/50.  -Highly encouraged adequate hydration  -We can consider either decreasing his rosuvastatin and see if this helps with fatigue.  -Recommended patient follow-up with PCP regarding ongoing issue with fatigue and poor appetite.    Follow-up with Dr. Vargas as scheduled in August     History of Present Illness/Subjective    Daniel Alamo is a 82 year old male with past medical history of coronary disease with remote history of CABG with LIMA to LAD, vein graft to OM, and vein graft RCA done in out-of-hospital, ischemic cardiomyopathy with systolic dysfunction with status post ICD,  atrial fibrillation, abdominal aortic aneurysm without rupture, pure hypercholesterolemia, atrial fibrillation on sotalol, peripheral artery disease with status post iliac stents and now status post PCI with balloon angioplasty and shockwave lithotripsy to LAD/diagonal 1 on 12/19/2022, aortic stenosis with status post TAVR who is seen in [facility] for follow-up from recent post TAVR visit.    During last clinic visit for his 1 week postoperative follow-up.  Patient continues to have shortness of breath with exertion.  He was experiencing lightheadedness.  He reported inadequate fluid intake and poor appetite.  He does have issue with chronic diarrhea which improved slightly switching from atorvastatin to rosuvastatin.  He was also noted to have swelling in his right groin suspected for hernia.  His ultrasound did show abdominal hernia.  He followed up with PCP.  His lisinopril and furosemide dose were reduced due to  symptomatic hypotension.    Patient continues to feel poorly with not much improvement in his lightheadedness, fatigue and low blood pressure.  He was also experiencing some rectal bleeding.  He was sent to ED for further evaluation.    Today, Daniel is here accompanied by his wife.  He still has some shortness of breath with exertion although he feels some improvement overall with his fatigue.  He continues to have poor appetite.  Per wife, he does not drink adequate fluid.  He is adding extra salt to his food.  He continues to have diarrhea.    He denies further rectal bleeding since Plavix was discontinued.  She denies chest pain, shortness of breath at rest, PND, orthopnea, abdominal bloating, heart palpitation or lower extremity edema.    Echocardiogram on 2/1/2023  Interpretation Summary     1.Left ventricular function is decreased. The ejection fraction is 30-35%  (moderately reduced).  2.Normal right ventricle size and systolic function.  3.There is mild to moderate (1-2+) mitral regurgitation. ERO 0.11 cmÂ  with a  volume of 27 cc at heart rate of 89 bpm.  4.Aortic valve replace with a 29 mm CHANTAL 3, peak velocity is 2.2 m/s with a  mean gradient of 8 mmHg, there is trivial paravalvular leak.  Compared to the prior study dated 1/31/2023, the LV EF is visually better, MR  is less, and AoVR is new.    Echocardiogram done in September 2022-reviewed  Interpretation Summary     The left ventricle is moderately dilated.  There is mild concentric left ventricular hypertrophy.  The visual ejection fraction is 30-35%.  Diastolic Doppler findings (E/E' ratio and/or other parameters) suggest left  ventricular filling pressures are increased.  It appears global hypokinesis with akinesis in inferior and inferoalateral  walls.  Normal right ventricle size and systolic function.  There is a pacemaker lead in the right ventricle.  The left atrium is moderately dilated.  The right atrium is mildly dilated.  Pacer wire in  right atrium  There is mild (1+) mitral regurgitation.  There is mild (1+) aortic regurgitation.  Mild valvular aortic stenosis with mean gradient of aortic valve 16 mmHg, but  cannot exclude low flow low gradient aortic valve stenosis since the  calculated aortic valve area in 0.86 cm2.     No previous study for comparison.    Coronary Angiogram from 12/19/22: reviewed:  Conclusion  Daniel Alamo is a 82 year old old male w/ CAD s/p CABG w/ L-LAD, V-OM, V-RCA, 30 years ago at [facility], ischemic cardiomyopathy EF 25% 11/2021, aortic stenosis, TR, afib on sotalol, PAD s/p iliac stents here for w/u of progressive GALVEZ and orthostatic symptoms, found to have severe aortic stenosis and CAD and here for PCI to D1.     - given area of ischemia/vaibility in anterior/anterolateral wall, proceeded w/ PCI w/ cuting balloon angioplasty and Shockwave lithotripsy in order to optimize risk profile of the pacing run, but given only branch involvement, we chose to stop, as stenting/Roto would require escalating risk  - proceed w/ TAVR w/u as planned   - continue ASA 81mg daily indefinitely, clopidogrel 600mg once, followed by 75mg daily for at least 12 months, add atorva 40  - continue aggressive risk factor modification     Findings:  LM:no obstruction  LAD:severe diffuse Ca2+ disease in mLAD into D1. Occluded dLAD  Lcx:severe mid-vessel lesion  RCA:not injected     Access:  R Radial artery     PCI:  LMT was engaged w/ a 6F EBU 3.5 Guide catheter and the lesion in LAD was wired w/ a Forte wire, then ballooned w/ a 2/0x12mm NC Emerge, a 2.0x10mm ScoreFlex balloon, and a 2.5x12mm Shockwave balloon at 3 jeronimo inflations. We were able to achieve balloon expansion but significant refractory re-narrowing after balloon removal due to severe bulky Ca2+. Given only branch involvement and escalating risk, we chose to stop rather than risk stent under-deployment. Final angiography showed no dissection or perforation and a SKIP 3 flow.    "  Closure:   Vasc Band       Physical Examination Review of Systems   Vitals: /60 (BP Location: Left arm, Patient Position: Sitting, Cuff Size: Adult Regular)   Pulse (!) 48   Resp 16   Ht 1.778 m (5' 10\")   Wt 64 kg (141 lb)   BMI 20.23 kg/m    BMI= Body mass index is 20.23 kg/m .  Wt Readings from Last 3 Encounters:   06/05/23 64 kg (141 lb)   05/23/23 64.4 kg (142 lb)   05/10/23 64.9 kg (143 lb)       General Appearance:   Alert, cooperative and in no acute distress   ENT/Mouth: membranes moist, no oral lesions or bleeding gums.      EYES:  no scleral icterus, normal conjunctivae   Neck: No carotid bruits.  Thyroid not visualized   Chest/Lungs:   lungs are clear to auscultation, no rales or wheezing, left clavicle incision dry and intact.   Cardiovascular:     Normal first and second heart sounds with no systolic murmur.  No rubs or gallops; the carotid, radial and posterior tibial pulses are intact, no edema bilaterally;    Abdomen:  Soft and nontender. Bowel sounds are present in all quadrants   Extremities: no cyanosis or clubbing   Skin: no xanthelasma, warm.    Neurologic: normal gait, normal  bilateral, no tremors   Psychiatric: Normal mood and affect       Please refer above for cardiac ROS details.      Medical History  Surgical History Family History Social History   Past Medical History:   Diagnosis Date    Coronary artery disease     Heart disease     VT (ventricular tachycardia) (H) 2004     Past Surgical History:   Procedure Laterality Date    ABDOMEN SURGERY      BYPASS GRAFT ARTERY CORONARY      CV ANGIOGRAM CORONARY GRAFT N/A 9/19/2022    Procedure: Coronary Angiogram Graft;  Surgeon: Tyrone Ordoñez MD;  Location: [facility] CATH LAB CV    CV CORONARY LITHOTRIPSY PCI N/A 12/19/2022    Procedure: Percutaneous Coronary Intervention - Lithotripsy;  Surgeon: Tyrone Ordoñez MD;  Location: [facility] CATH LAB CV    CV LEFT HEART CATH N/A 9/19/2022    Procedure: Left Heart " Catheterization;  Surgeon: Tyrone Ordoñez MD;  Location: [facility] CATH LAB CV    CV PCI ANGIOPLASTY N/A 12/19/2022    Procedure: Percutaneous Transluminal Angioplasty;  Surgeon: Tyrone Ordoñez MD;  Location: [facility] LAB CV    CV RIGHT HEART CATH MEASUREMENTS RECORDED N/A 9/19/2022    Procedure: Right Heart Catheterization;  Surgeon: Tyrone Ordoñez MD;  Location: [facility] LAB CV    EYE SURGERY      INSERT / REPLACE / REMOVE PACEMAKER      OR TRANSCATHETER AORTIC VALVE REPLACEMENT, SUBCLAVIAN PERCUTANEOUS APPROACH (STANDBY) N/A 1/31/2023    Procedure: OR TRANSCATHETER AORTIC VALVE REPLACEMENT, SUBCLAVIAN PERCUTANEOUS APPROACH (STANDBY);  Surgeon: Shanae Rich MD;  Location: [facility] LAB CV    OTHER SURGICAL HISTORY      icd    TRANSCATHETER AORTIC VALVE REPLACEMENT - SUBCLAVIAN APPROACH N/A 1/31/2023    Procedure: Transcatheter Aortic Valve Replacement - Subclavian Approach;  Surgeon: Tyrone Ordoñez MD;  Location: [facility] CV    ZZC CABG, VEIN, SINGLE      Description: CABG (CABG);  Recorded: 10/03/2012;  Comments: LIMA to LAD, SVG to OM2, SVG to RCA     Family History   Problem Relation Age of Onset    Cancer Mother     Heart Disease Father     Social History     Socioeconomic History    Marital status:      Spouse name: Not on file    Number of children: Not on file    Years of education: Not on file    Highest education level: Not on file   Occupational History    Not on file   Tobacco Use    Smoking status: Every Day     Packs/day: 0.50     Years: 30.00     Pack years: 15.00     Types: Cigarettes    Smokeless tobacco: Never   Vaping Use    Vaping status: Never Used   Substance and Sexual Activity    Alcohol use: Yes     Alcohol/week: 1.0 standard drink of alcohol     Comment: Glass of wine daily.    Drug use: No    Sexual activity: Not Currently   Other Topics Concern    Parent/sibling w/ CABG, MI or angioplasty before 65F 55M? Not Asked   Social History  Narrative    Not on file     Social Determinants of Health     Financial Resource Strain: Not on file   Food Insecurity: Not on file   Transportation Needs: Not on file   Physical Activity: Not on file   Stress: Not on file   Social Connections: Not on file   Intimate Partner Violence: Not on file   Housing Stability: Not on file          Medications  Allergies   Current Outpatient Medications   Medication Sig Dispense Refill    acetaminophen (TYLENOL) 325 MG tablet Take 650 mg by mouth every 6 hours as needed for pain      apixaban ANTICOAGULANT (ELIQUIS) 5 MG tablet Take 5 mg by mouth 2 times daily      aspirin 81 MG EC tablet Take 81 mg by mouth daily      fluticasone (FLONASE) 50 MCG/ACT nasal spray Spray 1 spray into both nostrils as needed for rhinitis or allergies      gabapentin (NEURONTIN) 300 MG capsule Take 900 mg by mouth 2 times daily       lisinopril (ZESTRIL) 2.5 MG tablet Take 1 tablet (2.5 mg) by mouth daily 90 tablet 3    metoprolol succinate ER (TOPROL XL) 25 MG 24 hr tablet Take 0.5 tablets (12.5 mg) by mouth daily 90 tablet 1    rosuvastatin (CRESTOR) 10 MG tablet Take 1.5 tablets (15 mg) by mouth daily 90 tablet 3    tamsulosin (FLOMAX) 0.4 MG capsule Take 0.4 mg by mouth every evening      sotalol (BETAPACE) 80 MG tablet Take 0.5 tablets (40 mg) by mouth 2 times daily (Patient not taking: Reported on 6/5/2023) 180 tablet 3    Allergies   Allergen Reactions    Atorvastatin Diarrhea    Carvedilol Other (See Comments) and GI Disturbance     Upset stomach; GI upset      Chloride GI Disturbance and Nausea         Lab Results    Chemistry/lipid CBC Cardiac Enzymes/BNP/TSH/INR   Recent Labs   Lab Test 09/19/22  0803   CHOL 185   HDL 41      TRIG 76     Recent Labs   Lab Test 09/19/22  0803 12/06/19  1134 10/05/18  0930    100 88     Recent Labs   Lab Test 01/10/23  1000      POTASSIUM 4.4   CHLORIDE 104   CO2 28   GLC 84   BUN 13.2   CR 1.08   GFRESTIMATED 68   EMILIO 9.1     Recent  Labs   Lab Test 01/10/23  1000 12/16/22  1029 11/25/22  1549   CR 1.08 1.13 1.01     No results for input(s): A1C in the last 20611 hours. Recent Labs   Lab Test 12/16/22  1029   WBC 8.3   HGB 14.5   HCT 45.4   MCV 94        Recent Labs   Lab Test 12/16/22  1029 11/25/22  1549 09/19/22  0803   HGB 14.5 14.6 14.2    No results for input(s): TROPONINI in the last 20164 hours.  No results for input(s): BNP, NTBNPI, NTBNP in the last 80253 hours.  Recent Labs   Lab Test 09/01/22  1327   TSH 0.46     No results for input(s): INR in the last 97171 hours.     31  minutes spent on the date of encounter doing review of test results, interpretation with above tests, patient visit, documentation and discussion with family.      This note has been dictated using voice recognition software. Any grammatical or context distortions are unintentional and inherent to the software.                     Thank you for allowing me to participate in the care of your patient.      Sincerely,     CAILIN Singh CNP     [facility]  cc:   CAILIN Singh CNP  [address]  [place] [phone]

## 2023-06-06 ENCOUNTER — HOSPITAL ENCOUNTER (OUTPATIENT)
Dept: CARDIAC REHAB | Facility: HOSPITAL | Age: 83
Discharge: HOME | End: 2023-06-06
Attending: INTERNAL MEDICINE
Payer: MEDICARE

## 2023-06-06 PROCEDURE — 93798 PHYS/QHP OP CAR RHAB W/ECG: CPT

## 2023-06-08 ENCOUNTER — HOSPITAL ENCOUNTER (OUTPATIENT)
Dept: CARDIAC REHAB | Facility: HOSPITAL | Age: 83
Discharge: HOME | End: 2023-06-08
Attending: INTERNAL MEDICINE
Payer: MEDICARE

## 2023-06-08 PROCEDURE — 93798 PHYS/QHP OP CAR RHAB W/ECG: CPT

## 2023-06-13 ENCOUNTER — HOSPITAL ENCOUNTER (OUTPATIENT)
Dept: CARDIAC REHAB | Facility: HOSPITAL | Age: 83
Discharge: HOME | End: 2023-06-13
Attending: INTERNAL MEDICINE
Payer: MEDICARE

## 2023-06-13 PROCEDURE — 93798 PHYS/QHP OP CAR RHAB W/ECG: CPT

## 2023-06-15 ENCOUNTER — HOSPITAL ENCOUNTER (OUTPATIENT)
Dept: CARDIAC REHAB | Facility: HOSPITAL | Age: 83
Discharge: HOME | End: 2023-06-15
Attending: INTERNAL MEDICINE
Payer: MEDICARE

## 2023-06-15 PROCEDURE — 93798 PHYS/QHP OP CAR RHAB W/ECG: CPT

## 2023-06-20 ENCOUNTER — HOSPITAL ENCOUNTER (OUTPATIENT)
Dept: CARDIAC REHAB | Facility: HOSPITAL | Age: 83
Discharge: HOME | End: 2023-06-20
Attending: INTERNAL MEDICINE
Payer: MEDICARE

## 2023-06-20 PROCEDURE — 93798 PHYS/QHP OP CAR RHAB W/ECG: CPT

## 2023-06-22 ENCOUNTER — HOSPITAL ENCOUNTER (OUTPATIENT)
Dept: CARDIAC REHAB | Facility: HOSPITAL | Age: 83
Discharge: HOME | End: 2023-06-22
Attending: INTERNAL MEDICINE
Payer: MEDICARE

## 2023-06-22 PROCEDURE — 93798 PHYS/QHP OP CAR RHAB W/ECG: CPT

## 2023-06-27 ENCOUNTER — HOSPITAL ENCOUNTER (OUTPATIENT)
Dept: CARDIAC REHAB | Facility: HOSPITAL | Age: 83
Discharge: HOME | End: 2023-06-27
Attending: INTERNAL MEDICINE
Payer: MEDICARE

## 2023-06-27 ENCOUNTER — OFFICE VISIT (OUTPATIENT)
Dept: CARDIOLOGY | Facility: CLINIC | Age: 83
End: 2023-06-27
Attending: INTERNAL MEDICINE
Payer: MEDICARE

## 2023-06-27 VITALS
SYSTOLIC BLOOD PRESSURE: 104 MMHG | WEIGHT: 140 LBS | HEIGHT: 70 IN | RESPIRATION RATE: 16 BRPM | HEART RATE: 55 BPM | DIASTOLIC BLOOD PRESSURE: 66 MMHG | BODY MASS INDEX: 20.04 KG/M2

## 2023-06-27 DIAGNOSIS — Z95.810 ICD (IMPLANTABLE CARDIOVERTER-DEFIBRILLATOR), DUAL, IN SITU: ICD-10-CM

## 2023-06-27 DIAGNOSIS — R53.83 OTHER FATIGUE: ICD-10-CM

## 2023-06-27 DIAGNOSIS — I42.0 DILATED CARDIOMYOPATHY (H): Primary | ICD-10-CM

## 2023-06-27 DIAGNOSIS — I25.83 CORONARY ARTERIOSCLEROSIS DUE TO LIPID RICH PLAQUE: ICD-10-CM

## 2023-06-27 DIAGNOSIS — I48.0 PAROXYSMAL ATRIAL FIBRILLATION (H): Primary | ICD-10-CM

## 2023-06-27 DIAGNOSIS — I50.20 HEART FAILURE WITH REDUCED EJECTION FRACTION, NYHA CLASS II (H): ICD-10-CM

## 2023-06-27 DIAGNOSIS — Z95.810 ICD (IMPLANTABLE CARDIOVERTER-DEFIBRILLATOR) IN PLACE: ICD-10-CM

## 2023-06-27 DIAGNOSIS — I48.0 PAROXYSMAL ATRIAL FIBRILLATION (H): ICD-10-CM

## 2023-06-27 LAB
MDC_IDC_LEAD_IMPLANT_DT: NORMAL
MDC_IDC_LEAD_IMPLANT_DT: NORMAL
MDC_IDC_LEAD_LOCATION: NORMAL
MDC_IDC_LEAD_LOCATION: NORMAL
MDC_IDC_LEAD_LOCATION_DETAIL_1: NORMAL
MDC_IDC_LEAD_LOCATION_DETAIL_1: NORMAL
MDC_IDC_LEAD_MFG: NORMAL
MDC_IDC_LEAD_MFG: NORMAL
MDC_IDC_LEAD_MODEL: NORMAL
MDC_IDC_LEAD_MODEL: NORMAL
MDC_IDC_LEAD_POLARITY_TYPE: NORMAL
MDC_IDC_LEAD_POLARITY_TYPE: NORMAL
MDC_IDC_LEAD_SERIAL: NORMAL
MDC_IDC_LEAD_SERIAL: NORMAL
MDC_IDC_LEAD_SPECIAL_FUNCTION: NORMAL
MDC_IDC_MSMT_BATTERY_DTM: NORMAL
MDC_IDC_MSMT_BATTERY_REMAINING_LONGEVITY: 108 MO
MDC_IDC_MSMT_BATTERY_REMAINING_PERCENTAGE: 100 %
MDC_IDC_MSMT_BATTERY_STATUS: NORMAL
MDC_IDC_MSMT_CAP_CHARGE_DTM: NORMAL
MDC_IDC_MSMT_CAP_CHARGE_TIME: 10.5 S
MDC_IDC_MSMT_CAP_CHARGE_TYPE: NORMAL
MDC_IDC_MSMT_LEADCHNL_RA_IMPEDANCE_VALUE: 782 OHM
MDC_IDC_MSMT_LEADCHNL_RA_LEAD_CHANNEL_STATUS: NORMAL
MDC_IDC_MSMT_LEADCHNL_RA_SENSING_INTR_AMPL: 1.3 MV
MDC_IDC_MSMT_LEADCHNL_RV_IMPEDANCE_VALUE: 546 OHM
MDC_IDC_MSMT_LEADCHNL_RV_SENSING_INTR_AMPL: 19.9 MV
MDC_IDC_PG_IMPLANT_DTM: NORMAL
MDC_IDC_PG_MFG: NORMAL
MDC_IDC_PG_MODEL: NORMAL
MDC_IDC_PG_SERIAL: NORMAL
MDC_IDC_PG_TYPE: NORMAL
MDC_IDC_SESS_CLINIC_NAME: NORMAL
MDC_IDC_SESS_DTM: NORMAL
MDC_IDC_SESS_TYPE: NORMAL
MDC_IDC_SET_BRADY_AT_MODE_SWITCH_MODE: NORMAL
MDC_IDC_SET_BRADY_AT_MODE_SWITCH_RATE: 170 {BEATS}/MIN
MDC_IDC_SET_BRADY_LOWRATE: 55 {BEATS}/MIN
MDC_IDC_SET_BRADY_MAX_SENSOR_RATE: 130 {BEATS}/MIN
MDC_IDC_SET_BRADY_MAX_TRACKING_RATE: 120 {BEATS}/MIN
MDC_IDC_SET_BRADY_MODE: NORMAL
MDC_IDC_SET_BRADY_PAV_DELAY_HIGH: 200 MS
MDC_IDC_SET_BRADY_PAV_DELAY_LOW: 300 MS
MDC_IDC_SET_BRADY_SAV_DELAY_HIGH: 165 MS
MDC_IDC_SET_BRADY_SAV_DELAY_LOW: 250 MS
MDC_IDC_SET_LEADCHNL_RA_PACING_AMPLITUDE: 2.6 V
MDC_IDC_SET_LEADCHNL_RA_PACING_POLARITY: NORMAL
MDC_IDC_SET_LEADCHNL_RA_PACING_PULSEWIDTH: 0.7 MS
MDC_IDC_SET_LEADCHNL_RA_SENSING_ADAPTATION_MODE: NORMAL
MDC_IDC_SET_LEADCHNL_RA_SENSING_POLARITY: NORMAL
MDC_IDC_SET_LEADCHNL_RA_SENSING_SENSITIVITY: 0.4 MV
MDC_IDC_SET_LEADCHNL_RV_PACING_AMPLITUDE: 1.5 V
MDC_IDC_SET_LEADCHNL_RV_PACING_POLARITY: NORMAL
MDC_IDC_SET_LEADCHNL_RV_PACING_PULSEWIDTH: 0.4 MS
MDC_IDC_SET_LEADCHNL_RV_SENSING_ADAPTATION_MODE: NORMAL
MDC_IDC_SET_LEADCHNL_RV_SENSING_POLARITY: NORMAL
MDC_IDC_SET_LEADCHNL_RV_SENSING_SENSITIVITY: 0.6 MV
MDC_IDC_SET_ZONE_DETECTION_INTERVAL: 261 MS
MDC_IDC_SET_ZONE_DETECTION_INTERVAL: 333 MS
MDC_IDC_SET_ZONE_DETECTION_INTERVAL: 400 MS
MDC_IDC_SET_ZONE_TYPE: NORMAL
MDC_IDC_SET_ZONE_VENDOR_TYPE: NORMAL
MDC_IDC_STAT_AT_BURDEN_PERCENT: 3 %
MDC_IDC_STAT_AT_DTM_END: NORMAL
MDC_IDC_STAT_AT_DTM_START: NORMAL
MDC_IDC_STAT_AT_MODE_SW_COUNT: 977
MDC_IDC_STAT_BRADY_DTM_END: NORMAL
MDC_IDC_STAT_BRADY_DTM_START: NORMAL
MDC_IDC_STAT_BRADY_RA_PERCENT_PACED: 21 %
MDC_IDC_STAT_BRADY_RV_PERCENT_PACED: 20 %
MDC_IDC_STAT_EPISODE_RECENT_COUNT: 0
MDC_IDC_STAT_EPISODE_RECENT_COUNT: 2
MDC_IDC_STAT_EPISODE_RECENT_COUNT: 2
MDC_IDC_STAT_EPISODE_RECENT_COUNT: 977
MDC_IDC_STAT_EPISODE_RECENT_COUNT_DTM_END: NORMAL
MDC_IDC_STAT_EPISODE_RECENT_COUNT_DTM_START: NORMAL
MDC_IDC_STAT_EPISODE_TYPE: NORMAL
MDC_IDC_STAT_EPISODE_VENDOR_TYPE: NORMAL
MDC_IDC_STAT_TACHYTHERAPY_ATP_DELIVERED_RECENT: 0
MDC_IDC_STAT_TACHYTHERAPY_ATP_DELIVERED_TOTAL: 1
MDC_IDC_STAT_TACHYTHERAPY_RECENT_DTM_END: NORMAL
MDC_IDC_STAT_TACHYTHERAPY_RECENT_DTM_START: NORMAL
MDC_IDC_STAT_TACHYTHERAPY_SHOCKS_ABORTED_RECENT: 0
MDC_IDC_STAT_TACHYTHERAPY_SHOCKS_ABORTED_TOTAL: 0
MDC_IDC_STAT_TACHYTHERAPY_SHOCKS_DELIVERED_RECENT: 0
MDC_IDC_STAT_TACHYTHERAPY_SHOCKS_DELIVERED_TOTAL: 0
MDC_IDC_STAT_TACHYTHERAPY_TOTAL_DTM_END: NORMAL
MDC_IDC_STAT_TACHYTHERAPY_TOTAL_DTM_START: NORMAL

## 2023-06-27 PROCEDURE — 93289 INTERROG DEVICE EVAL HEART: CPT | Performed by: INTERNAL MEDICINE

## 2023-06-27 PROCEDURE — 99215 OFFICE O/P EST HI 40 MIN: CPT | Performed by: NURSE PRACTITIONER

## 2023-06-27 PROCEDURE — 93798 PHYS/QHP OP CAR RHAB W/ECG: CPT

## 2023-06-27 PROCEDURE — 93797 PHYS/QHP OP CAR RHAB WO ECG: CPT

## 2023-06-27 RX ORDER — SOTALOL HYDROCHLORIDE 80 MG/1
40 TABLET ORAL 2 TIMES DAILY
Qty: 90 TABLET | Refills: 3 | Status: SHIPPED | OUTPATIENT
Start: 2023-06-27 | End: 2024-06-06

## 2023-06-27 NOTE — LETTER
6/27/2023    Kenna Calvillo, LINDSAY  [address] [facility] [facility] [place] [address]    RE: Daniel Alamo       Dear Colleague,     I had the pleasure of seeing Daniel Alamo in the [facility].    HEART CARE ELECTROPHYSIOLOGY NOTE      [facility]  [phone]      Assessment/Recommendations   Assessment/Plan:  1.  Paroxysmal Atrial Fibrillation: Increase in AF burden with discontinuation of sotalol.  No improvement in fatigue.  He also has a tendency for hypotension, so restart sotalol at 40 mg twice daily.  Consider increasing it back to 80 mg twice daily if he continues to have significant AF.    He was reassured that atrial fibrillation is not life-threatening, but carries an increased risk for stroke.  He has a [id number] score of 4 for age >75, HFrEF, CAD.  Continue Eliquis 5 mg twice daily for stroke prophylaxis.    2.  Dual-chamber ICD in situ:  The ICD was interrogated and is functioning appropriately.  The battery shows estimated longevity of 9 years.  Atrial and ventricular lead sensing, impedance, and pacing thresholds are stable and within normal limits. Known atrial oversensing, sensitivity adjusted to minimize FFRW.  No sustained ventricular ectopy.    3.  Fatigue: He continues to have ongoing muscle weakness and fatigue.  Low fluid intake and an adequate caloric intake are likely contributors.  He was instructed to increase his daily fluid intake to at least 50 ounces daily.  So recommended increasing his caloric intake, particularly with lean proteins.  Could consider decreasing rosuvastatin if symptoms do not improve.    4.  Heart failure with reduced ejection fraction, LVEF 30 to 35% %.  No evidence of fluid retention or heart failure symptoms.  Discontinue metoprolol to allow for restarting sotalol.  Continue lisinopril.    5.  Coronary artery disease: Denies anginal symptoms.  Continue statin and aspirin.    Follow up with Dr. Vargas on 8/14/2023  Device and  "arrhythmia follow-up with me in 6 months     History of Present Illness/Subjective    HPI: Daniel Alamo is a 83 year old male who comes in today for EP follow-up.  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  Paroxysmal atrial fibrillation has been well managed on sotalol.  However, due to ongoing fatigue, sotalol was decreased from 80 mg twice daily to 40 mg twice daily, then discontinued on 5/23/2023.  Is on Eliquis for stroke prophylaxis.    Daniel states that he does not feel any better since sotalol was discontinued.  He describes the fatigue as he is \"body wearing out\" as opposed to getting sleepy.  He denies chest discomfort, palpitations, abdominal fullness/bloating or peripheral edema, shortness of breath, paroxysmal nocturnal dyspnea, orthopnea, lightheadedness, dizziness, pre-syncope, or syncope.    Cardiographics (EKGs and device interrogation personally reviewed):  EKG done 3/6/2023 shows atrial pacing with intrinsic ventricular conduction at 57 bpm, right bundle branch block,  ms, QT/QTc 488/474 ms.    ICD Interrogation (BSCI):  Pacing mode: DDDR 55 to 120 bpm  Presenting rhythm: AS/AP-VS at 55 bpm  Underlying rhythm: SB 50-70 bpm, 1st degree AVB  A-paced: 21%.  V-paced 20%.  Battery: estimated longevity 9 years.  Atrial and Ventricular leads: Stable with good sensing, impedance, and pacing thresholds.  Known FFRW, RA sensitivity changed from 0.3 mV to 0.4 mV.  Arrhythmias: Since 05/23/2023, 977 mode switches logged, 3 available EGM's suggest FFRW's with AF in refractory, v-rate >/=120bpm <5%.  2 NSVT, 9 &15 beats, 162-201 bpm.  AF Slick: 3% (previous <1%)  Histograms: Good rate distribution    ECHO " "done 2/27/2023:  The left ventricle is moderately dilated.  There is normal left ventricular wall thickness.  The visual ejection fraction is 30-35%.  Grade II or moderate diastolic dysfunction.  There is moderate global hypokinesia of the left ventricle.  Normal right ventricle size and systolic function.  The left atrium is moderately dilated.  There is mild to moderate (1-2+) mitral regurgitation.  There is a 29 mm CHANTAL 3 bioprosthetic valve present in aortic valve  position. Mean gradient is 6 mmHg.  There is mild praravalvular leaking at 10 am and 2 pm.     When compared to previous study on 2-1-2023, there is no signioficantly  interval change.    Cardiac catheterization done 12/19/2022:  LM:no obstruction  LAD:severe diffuse Ca2+ disease in mLAD into D1. Occluded dLAD  Lcx:severe mid-vessel lesion  RCA:not injected  - given area of ischemia/vaibility in anterior/anterolateral wall, proceeded w/ PCI w/ cuting balloon angioplasty and Shockwave lithotripsy in order to optimize risk profile of the pacing run, but given only branch involvement, we chose to stop, as stenting/Roto would require escalating risk  - proceed w/ TAVR w/u as planned   - continue ASA 81mg daily indefinitely, clopidogrel 600mg once, followed by 75mg daily for at least 12 months, add atorva 40  - continue aggressive risk factor modification    I have reviewed and updated the patient's Past Medical History, Social History, Family History and Medication List.  Outside records personally reviewed.     Physical Examination  Review of Systems   Vitals: /66 (BP Location: Left arm, Patient Position: Sitting, Cuff Size: Adult Small)   Pulse 55   Resp 16   Ht 1.778 m (5' 10\")   Wt 63.5 kg (140 lb)   BMI 20.09 kg/m    BMI= Body mass index is 20.09 kg/m .  Wt Readings from Last 3 Encounters:   06/27/23 63.5 kg (140 lb)   06/05/23 64 kg (141 lb)   05/23/23 64.4 kg (142 lb)       General Appearance:   Alert, well-appearing and in no acute " distress.   HEENT: Atraumatic, normocephalic.  No scleral icterus, normal conjunctivae, EOMs intact, PERRL.     Chest/Lungs:   Chest symmetric, spine straight.  Respirations unlabored.  Lungs are clear to auscultation.  Left subclavian ICD site with no sign of infection or tissue thinning.   Cardiovascular:   Regular rate and rhythm.  Normal first and second heart sounds with no murmurs, rubs, or gallops; radial and posterior tibial pulses are intact, No edema.   Abdomen:  Soft, nondistended, bowel sounds present.   Extremities: No cyanosis or clubbing.   Musculoskeletal: Moves all extremities.    Skin: Warm, dry, intact.    Neurologic: Mood and affect are appropriate.  Alert and oriented to person, place, time, and situation.     ROS: 10 point ROS neg other than the symptoms noted above in the HPI.         Medical History  Surgical History Family History Social History   Past Medical History:   Diagnosis Date    Abdominal aortic aneurysm (AAA) without rupture 2/24/2020 2/2020 underwent aortobiiliac endograft repair of aortic aneurysm and  placement of bridging stents bilaterally and placement of bilateral iliac  branch devices for repair of bilateral iliac artery aneurysms      Aneurysm of iliac artery (H) 2/4/2020    Aortic stenosis, severe 1/31/2023    Coronary artery disease     Heart disease     Heart failure with reduced ejection fraction, NYHA class II (H)     Ischemic cardiomyopathy 12/31/2019    Paroxysmal atrial fibrillation (H)     Device interrogation, started on AC by Dr. Harrell 1/22/2020 [id number] scire of (>75, CAD, CHF)     Pure hypercholesterolemia     Created by Conversion     PVD (peripheral vascular disease) (H) 2/24/2020 2/2020 underwent aortobiiliac endograft repair of aortic aneurysm and  placement of bridging stents bilaterally and placement of bilateral iliac  branch devices for repair of bilateral iliac artery aneurysms      Severe aortic stenosis 10/10/2022    Sinus node  dysfunction (H) 11/22/2022    VT (ventricular tachycardia) (H) 2004     Past Surgical History:   Procedure Laterality Date    ABDOMEN SURGERY      BYPASS GRAFT ARTERY CORONARY      CV ANGIOGRAM CORONARY GRAFT N/A 9/19/2022    Procedure: Coronary Angiogram Graft;  Surgeon: Tyrone Ordoñez MD;  Location: [facility] CATH LAB CV    CV CORONARY LITHOTRIPSY PCI N/A 12/19/2022    Procedure: Percutaneous Coronary Intervention - Lithotripsy;  Surgeon: Tyrone Ordoñez MD;  Location: [facility] CATH LAB CV    CV LEFT HEART CATH N/A 9/19/2022    Procedure: Left Heart Catheterization;  Surgeon: Tyrone Ordoñez MD;  Location: [facility] CATH LAB CV    CV PCI ANGIOPLASTY N/A 12/19/2022    Procedure: Percutaneous Transluminal Angioplasty;  Surgeon: Tyrone Ordoñez MD;  Location: [facility] LAB     CV RIGHT HEART CATH MEASUREMENTS RECORDED N/A 9/19/2022    Procedure: Right Heart Catheterization;  Surgeon: Tyrone Ordoñez MD;  Location: [facility]    EYE SURGERY      INSERT / REPLACE / REMOVE PACEMAKER      OR TRANSCATHETER AORTIC VALVE REPLACEMENT, SUBCLAVIAN PERCUTANEOUS APPROACH (STANDBY) N/A 1/31/2023    Procedure: OR TRANSCATHETER AORTIC VALVE REPLACEMENT, SUBCLAVIAN PERCUTANEOUS APPROACH (STANDBY);  Surgeon: Shanae Rich MD;  Location: [facility]    OTHER SURGICAL HISTORY      icd    TRANSCATHETER AORTIC VALVE REPLACEMENT - SUBCLAVIAN APPROACH N/A 1/31/2023    Procedure: Transcatheter Aortic Valve Replacement - Subclavian Approach;  Surgeon: Tyrone Ordoñez MD;  Location: [facility] CV    ZZC CABG, VEIN, SINGLE      Description: CABG (CABG);  Recorded: 10/03/2012;  Comments: LIMA to LAD, SVG to OM2, SVG to RCA     Family History   Problem Relation Age of Onset    Cancer Mother     Heart Disease Father         Social History     Socioeconomic History    Marital status:      Spouse name: Not on file    Number of children: Not on file    Years of education: Not on file     Highest education level: Not on file   Occupational History    Not on file   Tobacco Use    Smoking status: Every Day     Packs/day: 0.50     Years: 30.00     Pack years: 15.00     Types: Cigarettes    Smokeless tobacco: Never   Vaping Use    Vaping Use: Never used   Substance and Sexual Activity    Alcohol use: Yes     Alcohol/week: 1.0 standard drink of alcohol     Comment: Glass of wine daily.    Drug use: No    Sexual activity: Not Currently   Other Topics Concern    Parent/sibling w/ CABG, MI or angioplasty before 65F 55M? Not Asked   Social History Narrative    Not on file     Social Determinants of Health     Financial Resource Strain: Not on file   Food Insecurity: Not on file   Transportation Needs: Not on file   Physical Activity: Not on file   Stress: Not on file   Social Connections: Not on file   Intimate Partner Violence: Not on file   Housing Stability: Not on file           Medications  Allergies   Current Outpatient Medications   Medication Sig Dispense Refill    acetaminophen (TYLENOL) 325 MG tablet Take 650 mg by mouth every 6 hours as needed for pain      apixaban ANTICOAGULANT (ELIQUIS) 5 MG tablet Take 5 mg by mouth 2 times daily      aspirin 81 MG EC tablet Take 81 mg by mouth daily      fluticasone (FLONASE) 50 MCG/ACT nasal spray Spray 1 spray into both nostrils as needed for rhinitis or allergies      gabapentin (NEURONTIN) 300 MG capsule Take 900 mg by mouth 2 times daily       lisinopril (ZESTRIL) 2.5 MG tablet Take 1 tablet (2.5 mg) by mouth daily 90 tablet 3    rosuvastatin (CRESTOR) 10 MG tablet Take 1.5 tablets (15 mg) by mouth daily 90 tablet 3    sotalol (BETAPACE) 80 MG tablet Take 0.5 tablets (40 mg) by mouth 2 times daily 90 tablet 3    tamsulosin (FLOMAX) 0.4 MG capsule Take 0.4 mg by mouth every evening         Allergies   Allergen Reactions    Atorvastatin Diarrhea    Carvedilol Other (See Comments) and GI Disturbance     Upset stomach; GI upset      Chloride GI Disturbance  and Nausea          Lab Results    Chemistry/lipid CBC Cardiac Enzymes/BNP/TSH/INR   Recent Labs   Lab Test 03/10/23  0910   CHOL 139   HDL 38*   LDL 83   TRIG 89     Recent Labs   Lab Test 03/10/23  0910 09/19/22  0803 12/06/19  1134   LDL 83 129 100     Recent Labs   Lab Test 03/06/23  1420      POTASSIUM 4.7   CHLORIDE 106   CO2 28   GLC 90   BUN 19.4   CR 1.10   GFRESTIMATED 66   EMILIO 9.2     Recent Labs   Lab Test 03/06/23  1420 02/17/23  1917 02/06/23  1444   CR 1.10 1.19* 1.17     No results for input(s): A1C in the last 33141 hours.   Recent Labs   Lab Test 03/06/23  1420   WBC 8.8   HGB 13.2*   HCT 42.1   MCV 93        Recent Labs   Lab Test 03/06/23  1420 02/17/23  1917 02/01/23  0616   HGB 13.2* 12.8* 12.2*    No results for input(s): TROPONINI in the last 74105 hours.  Recent Labs   Lab Test 01/30/23  0922   NTBNP 2,798*     Recent Labs   Lab Test 09/01/22  1327   TSH 0.46     Recent Labs   Lab Test 02/17/23 1917 01/30/23  0922   INR 1.18* 1.11      49 minutes were spent on the date of encounter performing chart review, history and exam, documentation, and further activities as noted above.              Thank you for allowing me to participate in the care of your patient.      Sincerely,     [facility]  cc:   Nabor Sims MD  [address] [place] [id number]

## 2023-06-27 NOTE — PATIENT INSTRUCTIONS
Daniel Alamo,    It was a pleasure to see you today at the [facility].     My recommendations after this visit include:    Restart sotalol 40 mg (1/2 tab) twice daily  Stop taking metoprolol    Increase daily fluid intake, goal 50 ounces daily  Increase daily calorie intake, especially lean proteins    Follow up with Dr. Vargas on 8/14/2023  Follow up with me in 6 months    Airam Malin, [facility], Electrophysiology  [phone]  EP nurses [phone]

## 2023-06-27 NOTE — PROGRESS NOTES
HEART CARE ELECTROPHYSIOLOGY NOTE      [facility]  [phone]      Assessment/Recommendations   Assessment/Plan:  1.  Paroxysmal Atrial Fibrillation: Increase in AF burden with discontinuation of sotalol.  No improvement in fatigue.  He also has a tendency for hypotension, so restart sotalol at 40 mg twice daily.  Consider increasing it back to 80 mg twice daily if he continues to have significant AF.    He was reassured that atrial fibrillation is not life-threatening, but carries an increased risk for stroke.  He has a [id number] score of 4 for age >75, HFrEF, CAD.  Continue Eliquis 5 mg twice daily for stroke prophylaxis.    2.  Dual-chamber ICD in situ:  The ICD was interrogated and is functioning appropriately.  The battery shows estimated longevity of 9 years.  Atrial and ventricular lead sensing, impedance, and pacing thresholds are stable and within normal limits. Known atrial oversensing, sensitivity adjusted to minimize FFRW.  No sustained ventricular ectopy.    3.  Fatigue: He continues to have ongoing muscle weakness and fatigue.  Low fluid intake and an adequate caloric intake are likely contributors.  He was instructed to increase his daily fluid intake to at least 50 ounces daily.  So recommended increasing his caloric intake, particularly with lean proteins.  Could consider decreasing rosuvastatin if symptoms do not improve.    4.  Heart failure with reduced ejection fraction, LVEF 30 to 35% %.  No evidence of fluid retention or heart failure symptoms.  Discontinue metoprolol to allow for restarting sotalol.  Continue lisinopril.    5.  Coronary artery disease: Denies anginal symptoms.  Continue statin and aspirin.    Follow up with Dr. Vargas on 8/14/2023  Device and arrhythmia follow-up with me in 6 months     History of Present Illness/Subjective    HPI: Daniel Alamo is a 83 year old male who comes in today for EP follow-up.  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  Paroxysmal atrial fibrillation has been well managed on sotalol.  However, due to ongoing fatigue, sotalol was decreased from 80 mg twice daily to 40 mg twice daily, then discontinued on 5/23/2023.  Is on Eliquis for stroke prophylaxis.    Daniel states that he does not feel any better since sotalol was discontinued.  He describes the fatigue as he is \"body wearing out\" as opposed to getting sleepy.  He denies chest discomfort, palpitations, abdominal fullness/bloating or peripheral edema, shortness of breath, paroxysmal nocturnal dyspnea, orthopnea, lightheadedness, dizziness, pre-syncope, or syncope.    Cardiographics (EKGs and device interrogation personally reviewed):  EKG done 3/6/2023 shows atrial pacing with intrinsic ventricular conduction at 57 bpm, right bundle branch block,  ms, QT/QTc 488/474 ms.    ICD Interrogation (BSCI):  Pacing mode: DDDR 55 to 120 bpm  Presenting rhythm: AS/AP-VS at 55 bpm  Underlying rhythm: SB 50-70 bpm, 1st degree AVB  A-paced: 21%.  V-paced 20%.  Battery: estimated longevity 9 years.  Atrial and Ventricular leads: Stable with good sensing, impedance, and pacing thresholds.  Known FFRW, RA sensitivity changed from 0.3 mV to 0.4 mV.  Arrhythmias: Since 05/23/2023, 977 mode switches logged, 3 available EGM's suggest FFRW's with AF in refractory, v-rate >/=120bpm <5%.  2 NSVT, 9 &15 beats, 162-201 bpm.  AF [place]: 3% (previous <1%)  Histograms: Good rate distribution    ECHO done 2/27/2023:  The left ventricle is moderately dilated.  There is normal left ventricular wall thickness.  The visual ejection fraction is 30-35%.  Grade II or moderate diastolic dysfunction.  There is " "moderate global hypokinesia of the left ventricle.  Normal right ventricle size and systolic function.  The left atrium is moderately dilated.  There is mild to moderate (1-2+) mitral regurgitation.  There is a 29 mm CHANTAL 3 bioprosthetic valve present in aortic valve  position. Mean gradient is 6 mmHg.  There is mild praravalvular leaking at 10 am and 2 pm.     When compared to previous study on 2-1-2023, there is no signioficantly  interval change.    Cardiac catheterization done 12/19/2022:  LM:no obstruction  LAD:severe diffuse Ca2+ disease in mLAD into D1. Occluded dLAD  Lcx:severe mid-vessel lesion  RCA:not injected  - given area of ischemia/vaibility in anterior/anterolateral wall, proceeded w/ PCI w/ cuting balloon angioplasty and Shockwave lithotripsy in order to optimize risk profile of the pacing run, but given only branch involvement, we chose to stop, as stenting/Roto would require escalating risk  - proceed w/ TAVR w/u as planned   - continue ASA 81mg daily indefinitely, clopidogrel 600mg once, followed by 75mg daily for at least 12 months, add atorva 40  - continue aggressive risk factor modification    I have reviewed and updated the patient's Past Medical History, Social History, Family History and Medication List.  Outside records personally reviewed.     Physical Examination  Review of Systems   Vitals: /66 (BP Location: Left arm, Patient Position: Sitting, Cuff Size: Adult Small)   Pulse 55   Resp 16   Ht 1.778 m (5' 10\")   Wt 63.5 kg (140 lb)   BMI 20.09 kg/m    BMI= Body mass index is 20.09 kg/m .  Wt Readings from Last 3 Encounters:   06/27/23 63.5 kg (140 lb)   06/05/23 64 kg (141 lb)   05/23/23 64.4 kg (142 lb)       General Appearance:   Alert, well-appearing and in no acute distress.   HEENT: Atraumatic, normocephalic.  No scleral icterus, normal conjunctivae, EOMs intact, PERRL.     Chest/Lungs:   Chest symmetric, spine straight.  Respirations unlabored.  Lungs are clear to " auscultation.  Left subclavian ICD site with no sign of infection or tissue thinning.   Cardiovascular:   Regular rate and rhythm.  Normal first and second heart sounds with no murmurs, rubs, or gallops; radial and posterior tibial pulses are intact, No edema.   Abdomen:  Soft, nondistended, bowel sounds present.   Extremities: No cyanosis or clubbing.   Musculoskeletal: Moves all extremities.    Skin: Warm, dry, intact.    Neurologic: Mood and affect are appropriate.  Alert and oriented to person, place, time, and situation.     ROS: 10 point ROS neg other than the symptoms noted above in the HPI.         Medical History  Surgical History Family History Social History   Past Medical History:   Diagnosis Date     Abdominal aortic aneurysm (AAA) without rupture 2/24/2020 2/2020 underwent aortobiiliac endograft repair of aortic aneurysm and  placement of bridging stents bilaterally and placement of bilateral iliac  branch devices for repair of bilateral iliac artery aneurysms       Aneurysm of iliac artery (H) 2/4/2020     Aortic stenosis, severe 1/31/2023     Coronary artery disease      Heart disease      Heart failure with reduced ejection fraction, NYHA class II (H)      Ischemic cardiomyopathy 12/31/2019     Paroxysmal atrial fibrillation (H)     Device interrogation, started on AC by Dr. Harrell 1/22/2020 [id number] scire of (>75, CAD, CHF)      Pure hypercholesterolemia     Created by Conversion      PVD (peripheral vascular disease) (H) 2/24/2020 2/2020 underwent aortobiiliac endograft repair of aortic aneurysm and  placement of bridging stents bilaterally and placement of bilateral iliac  branch devices for repair of bilateral iliac artery aneurysms       Severe aortic stenosis 10/10/2022     Sinus node dysfunction (H) 11/22/2022     VT (ventricular tachycardia) (H) 2004     Past Surgical History:   Procedure Laterality Date     ABDOMEN SURGERY       BYPASS GRAFT ARTERY CORONARY       CV ANGIOGRAM  CORONARY GRAFT N/A 9/19/2022    Procedure: Coronary Angiogram Graft;  Surgeon: Tyrone Ordoñez MD;  Location: [facility]     CV CORONARY LITHOTRIPSY PCI N/A 12/19/2022    Procedure: Percutaneous Coronary Intervention - Lithotripsy;  Surgeon: Tyrone Ordoñez MD;  Location: [facility]     CV LEFT HEART CATH N/A 9/19/2022    Procedure: Left Heart Catheterization;  Surgeon: Tyrone Ordoñez MD;  Location: [facility]     CV PCI ANGIOPLASTY N/A 12/19/2022    Procedure: Percutaneous Transluminal Angioplasty;  Surgeon: Tyrone Ordoñez MD;  Location: [facility]     CV RIGHT HEART CATH MEASUREMENTS RECORDED N/A 9/19/2022    Procedure: Right Heart Catheterization;  Surgeon: Tyrone Ordoñez MD;  Location: [facility]     EYE SURGERY       INSERT / REPLACE / REMOVE PACEMAKER       OR TRANSCATHETER AORTIC VALVE REPLACEMENT, SUBCLAVIAN PERCUTANEOUS APPROACH (STANDBY) N/A 1/31/2023    Procedure: OR TRANSCATHETER AORTIC VALVE REPLACEMENT, SUBCLAVIAN PERCUTANEOUS APPROACH (STANDBY);  Surgeon: Shanae Rich MD;  Location: [facility]     OTHER SURGICAL HISTORY      icd     TRANSCATHETER AORTIC VALVE REPLACEMENT - SUBCLAVIAN APPROACH N/A 1/31/2023    Procedure: Transcatheter Aortic Valve Replacement - Subclavian Approach;  Surgeon: Tyrone Ordoñez MD;  Location: [facility]     ZZC CABG, VEIN, SINGLE      Description: CABG (CABG);  Recorded: 10/03/2012;  Comments: LIMA to LAD, SVG to OM2, SVG to RCA     Family History   Problem Relation Age of Onset     Cancer Mother      Heart Disease Father         Social History     Socioeconomic History     Marital status:      Spouse name: Not on file     Number of children: Not on file     Years of education: Not on file     Highest education level: Not on file   Occupational History     Not on file   Tobacco Use     Smoking status: Every Day     Packs/day: 0.50     Years: 30.00     Pack years: 15.00      Types: Cigarettes     Smokeless tobacco: Never   Vaping Use     Vaping Use: Never used   Substance and Sexual Activity     Alcohol use: Yes     Alcohol/week: 1.0 standard drink of alcohol     Comment: Glass of wine daily.     Drug use: No     Sexual activity: Not Currently   Other Topics Concern     Parent/sibling w/ CABG, MI or angioplasty before 65F 55M? Not Asked   Social History Narrative     Not on file     Social Determinants of Health     Financial Resource Strain: Not on file   Food Insecurity: Not on file   Transportation Needs: Not on file   Physical Activity: Not on file   Stress: Not on file   Social Connections: Not on file   Intimate Partner Violence: Not on file   Housing Stability: Not on file           Medications  Allergies   Current Outpatient Medications   Medication Sig Dispense Refill     acetaminophen (TYLENOL) 325 MG tablet Take 650 mg by mouth every 6 hours as needed for pain       apixaban ANTICOAGULANT (ELIQUIS) 5 MG tablet Take 5 mg by mouth 2 times daily       aspirin 81 MG EC tablet Take 81 mg by mouth daily       fluticasone (FLONASE) 50 MCG/ACT nasal spray Spray 1 spray into both nostrils as needed for rhinitis or allergies       gabapentin (NEURONTIN) 300 MG capsule Take 900 mg by mouth 2 times daily        lisinopril (ZESTRIL) 2.5 MG tablet Take 1 tablet (2.5 mg) by mouth daily 90 tablet 3     rosuvastatin (CRESTOR) 10 MG tablet Take 1.5 tablets (15 mg) by mouth daily 90 tablet 3     sotalol (BETAPACE) 80 MG tablet Take 0.5 tablets (40 mg) by mouth 2 times daily 90 tablet 3     tamsulosin (FLOMAX) 0.4 MG capsule Take 0.4 mg by mouth every evening         Allergies   Allergen Reactions     Atorvastatin Diarrhea     Carvedilol Other (See Comments) and GI Disturbance     Upset stomach; GI upset       Chloride GI Disturbance and Nausea          Lab Results    Chemistry/lipid CBC Cardiac Enzymes/BNP/TSH/INR   Recent Labs   Lab Test 03/10/23  0910   CHOL 139   HDL 38*   LDL 83   TRIG  89     Recent Labs   Lab Test 03/10/23  0910 09/19/22  0803 12/06/19  1134   LDL 83 129 100     Recent Labs   Lab Test 03/06/23  1420      POTASSIUM 4.7   CHLORIDE 106   CO2 28   GLC 90   BUN 19.4   CR 1.10   GFRESTIMATED 66   EMILIO 9.2     Recent Labs   Lab Test 03/06/23  1420 02/17/23  1917 02/06/23  1444   CR 1.10 1.19* 1.17     No results for input(s): A1C in the last 50870 hours.   Recent Labs   Lab Test 03/06/23  1420   WBC 8.8   HGB 13.2*   HCT 42.1   MCV 93        Recent Labs   Lab Test 03/06/23  1420 02/17/23 1917 02/01/23  0616   HGB 13.2* 12.8* 12.2*    No results for input(s): TROPONINI in the last 77170 hours.  Recent Labs   Lab Test 01/30/23  0922   NTBNP 2,798*     Recent Labs   Lab Test 09/01/22  1327   TSH 0.46     Recent Labs   Lab Test 02/17/23 1917 01/30/23  0922   INR 1.18* 1.11      49 minutes were spent on the date of encounter performing chart review, history and exam, documentation, and further activities as noted above.

## 2023-08-14 ENCOUNTER — OFFICE VISIT (OUTPATIENT)
Dept: CARDIOLOGY | Facility: CLINIC | Age: 83
End: 2023-08-14
Payer: MEDICARE

## 2023-08-14 VITALS
OXYGEN SATURATION: 96 % | HEART RATE: 65 BPM | SYSTOLIC BLOOD PRESSURE: 133 MMHG | WEIGHT: 139 LBS | BODY MASS INDEX: 19.94 KG/M2 | DIASTOLIC BLOOD PRESSURE: 64 MMHG | RESPIRATION RATE: 18 BRPM

## 2023-08-14 DIAGNOSIS — I49.5 SINUS NODE DYSFUNCTION (H): ICD-10-CM

## 2023-08-14 DIAGNOSIS — Z95.1 S/P CABG (CORONARY ARTERY BYPASS GRAFT): ICD-10-CM

## 2023-08-14 DIAGNOSIS — I48.0 PAROXYSMAL ATRIAL FIBRILLATION (H): ICD-10-CM

## 2023-08-14 DIAGNOSIS — I25.5 ISCHEMIC CARDIOMYOPATHY: ICD-10-CM

## 2023-08-14 DIAGNOSIS — I25.83 CORONARY ARTERIOSCLEROSIS DUE TO LIPID RICH PLAQUE: Primary | ICD-10-CM

## 2023-08-14 DIAGNOSIS — E78.00 PURE HYPERCHOLESTEROLEMIA: ICD-10-CM

## 2023-08-14 DIAGNOSIS — N13.8 BENIGN PROSTATIC HYPERPLASIA WITH URINARY OBSTRUCTION: ICD-10-CM

## 2023-08-14 DIAGNOSIS — N40.1 BENIGN PROSTATIC HYPERPLASIA WITH URINARY OBSTRUCTION: ICD-10-CM

## 2023-08-14 DIAGNOSIS — Z95.2 S/P TAVR (TRANSCATHETER AORTIC VALVE REPLACEMENT): ICD-10-CM

## 2023-08-14 PROBLEM — I35.0 AORTIC STENOSIS, SEVERE: Status: RESOLVED | Noted: 2023-01-31 | Resolved: 2023-08-14

## 2023-08-14 LAB
ALBUMIN SERPL BCG-MCNC: 3.7 G/DL (ref 3.5–5.2)
ALP SERPL-CCNC: 86 U/L (ref 40–129)
ALT SERPL W P-5'-P-CCNC: 22 U/L (ref 0–70)
ANION GAP SERPL CALCULATED.3IONS-SCNC: 8 MMOL/L (ref 7–15)
AST SERPL W P-5'-P-CCNC: 22 U/L (ref 0–45)
BILIRUB SERPL-MCNC: 0.6 MG/DL
BUN SERPL-MCNC: 18.1 MG/DL (ref 8–23)
CALCIUM SERPL-MCNC: 9 MG/DL (ref 8.8–10.2)
CHLORIDE SERPL-SCNC: 105 MMOL/L (ref 98–107)
CREAT SERPL-MCNC: 1.17 MG/DL (ref 0.67–1.17)
DEPRECATED HCO3 PLAS-SCNC: 26 MMOL/L (ref 22–29)
ERYTHROCYTE [DISTWIDTH] IN BLOOD BY AUTOMATED COUNT: 14.1 % (ref 10–15)
GFR SERPL CREATININE-BSD FRML MDRD: 61 ML/MIN/1.73M2
GLUCOSE SERPL-MCNC: 92 MG/DL (ref 70–99)
HCT VFR BLD AUTO: 40.9 % (ref 40–53)
HGB BLD-MCNC: 12.8 G/DL (ref 13.3–17.7)
MCH RBC QN AUTO: 28.6 PG (ref 26.5–33)
MCHC RBC AUTO-ENTMCNC: 31.3 G/DL (ref 31.5–36.5)
MCV RBC AUTO: 91 FL (ref 78–100)
PLATELET # BLD AUTO: 195 10E3/UL (ref 150–450)
POTASSIUM SERPL-SCNC: 4.2 MMOL/L (ref 3.4–5.3)
PROT SERPL-MCNC: 6.3 G/DL (ref 6.4–8.3)
RBC # BLD AUTO: 4.48 10E6/UL (ref 4.4–5.9)
SODIUM SERPL-SCNC: 139 MMOL/L (ref 136–145)
WBC # BLD AUTO: 7 10E3/UL (ref 4–11)

## 2023-08-14 PROCEDURE — 85027 COMPLETE CBC AUTOMATED: CPT | Performed by: INTERNAL MEDICINE

## 2023-08-14 PROCEDURE — 36415 COLL VENOUS BLD VENIPUNCTURE: CPT | Performed by: INTERNAL MEDICINE

## 2023-08-14 PROCEDURE — 80053 COMPREHEN METABOLIC PANEL: CPT | Performed by: INTERNAL MEDICINE

## 2023-08-14 PROCEDURE — 99214 OFFICE O/P EST MOD 30 MIN: CPT | Performed by: INTERNAL MEDICINE

## 2023-08-14 NOTE — LETTER
August 14, 2023      Daniel Alamo  [address] [address]        Dear ,    We are writing to inform you of your test results.  This blood work is all normal, the mildly low protein would not necessarily cause issues.  Blood count, kidney function, and liver are all okay.  Given that, we should proceed with a stress test as well as echocardiogram.    Resulted Orders   Comprehensive metabolic panel   Result Value Ref Range    Sodium 139 136 - 145 mmol/L    Potassium 4.2 3.4 - 5.3 mmol/L    Chloride 105 98 - 107 mmol/L    Carbon Dioxide (CO2) 26 22 - 29 mmol/L    Anion Gap 8 7 - 15 mmol/L    Urea Nitrogen 18.1 8.0 - 23.0 mg/dL    Creatinine 1.17 0.67 - 1.17 mg/dL    Calcium 9.0 8.8 - 10.2 mg/dL    Glucose 92 70 - 99 mg/dL    Alkaline Phosphatase 86 40 - 129 U/L    AST 22 0 - 45 U/L      Comment:      Reference intervals for this test were updated on 6/12/2023 to more accurately reflect our healthy population. There may be differences in the flagging of prior results with similar values performed with this method. Interpretation of those prior results can be made in the context of the updated reference intervals.    ALT 22 0 - 70 U/L      Comment:      Reference intervals for this test were updated on 6/12/2023 to more accurately reflect our healthy population. There may be differences in the flagging of prior results with similar values performed with this method. Interpretation of those prior results can be made in the context of the updated reference intervals.      Protein Total 6.3 (L) 6.4 - 8.3 g/dL    Albumin 3.7 3.5 - 5.2 g/dL    Bilirubin Total 0.6 <=1.2 mg/dL    GFR Estimate 61 >60 mL/min/1.73m2   CBC with platelets   Result Value Ref Range    WBC Count 7.0 4.0 - 11.0 10e3/uL    RBC Count 4.48 4.40 - 5.90 10e6/uL    Hemoglobin 12.8 (L) 13.3 - 17.7 g/dL    Hematocrit 40.9 40.0 - 53.0 %    MCV 91 78 - 100 fL    MCH 28.6 26.5 - 33.0 pg    MCHC 31.3 (L) 31.5 - 36.5 g/dL    RDW 14.1 10.0 -  15.0 %    Platelet Count 195 150 - 450 10e3/uL       If you have any questions or concerns, please call the clinic at the number listed above.       Sincerely,      Corey Vargas MD

## 2023-08-14 NOTE — PROGRESS NOTES
[facility]  Heart Care Clinic Follow-up Note    Assessment & Plan        (I25.10,  I25.83) Coronary arteriosclerosis due to lipid rich plaque  (primary encounter diagnosis)  Comment: Angiography December 2022 showed normal left main, LAD with a distal total occlusion and first diagonal 95% stenosis, normal circumflex, first obtuse marginal is 70 to 80% stenosis, and total occlusion of the right coronary artery.  PET viability showed scar involving the inferior and inferolateral wall with some mild agustin-infarct anterolateral ischemia prompting intervention on the diagonal lesion, with balloon, unable to place stent.  Given profound fatigue if he still has this despite all her medicine changes will perform pharmacological stress nuclear and if major ischemia seen consider revascularization.      (Z95.1) S/P CABG (coronary artery bypass graft)  Comment: Approximately 30 years ago he had a LIMA to the LAD which is patent vein graft to the OM which is occluded and vein graft to the right coronary which is occluded.     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  If fatigue persists and no cause is found we will recheck echocardiogram.     (Z95.810) ICD (implantable cardioverter-defibrillator), dual, in situ  Comment: [employer] device with [employer] right atrial and right ventricular leads with 21% atrial pacing and 20% ventricular pacing.  Leads are stable.  Defer to EP about potential rate related changes.     (I25.5) Ischemic cardiomyopathy  Comment: Ejection fraction 30 to 35% and was on appropriate metoprolol as well as lisinopril.  Due to fatigue metoprolol was discontinued, no better, we will now try and holding lisinopril, if no better go back on both.  We will check echo to see what ejection  fraction is currently.    (I48.0) Paroxysmal atrial fibrillation (H)  Comment: Sotalol was lowered from 80-40 and discontinued, atrial fibrillation burden did go up, he had no difference in his symptoms, went back up on sotalol and symptoms did not worsen.  A-fib and sotalol thus not because of [place].  Only issue is his weight is 63 kg, given his age will defer to EP but wonder about lower dose of Eliquis 2.5 mg twice daily.    (E78.00) Pure hypercholesterolemia  Comment: Total cholesterol 139 with LDL of 83 which is acceptable on 15 mg of Crestor.    (N40.1,  N13.8) Benign prostatic hyperplasia with urinary obstruction  Comment: On tamsulosin with no significant symptoms.    Plan  1.  Check comprehensive metabolic profile as well as CBC today, if abnormal address these.  2.  If no abnormalities we will have him hold lisinopril and if no better we will then pursue stress testing pharmacologically given his ICD as well as echo.  3.  If any major abnormalities are seen address those otherwise would then restart metoprolol and lisinopril.  4.  Follow-up with me in about 6 months given all this or sooner if needed.    Subjective  CC: 83-year-old white gentleman here for follow-up visit after having seen EP, nurse practitioners, and cardiac rehab.  Still tells me he cannot walk his dog, he let throughout the back.  States if he tries to go for a walk he feels extremely lightheaded and fatigued.  No chest pains, palpitations, significant shortness of breath, PND, orthopnea or peripheral edema.    Medications  Current Outpatient Medications   Medication Sig Dispense Refill    acetaminophen (TYLENOL) 325 MG tablet Take 650 mg by mouth every 6 hours as needed for pain      apixaban ANTICOAGULANT (ELIQUIS) 5 MG tablet Take 5 mg by mouth 2 times daily      aspirin 81 MG EC tablet Take 81 mg by mouth daily      fluticasone (FLONASE) 50 MCG/ACT nasal spray Spray 1 spray into both nostrils as needed for rhinitis or allergies       gabapentin (NEURONTIN) 300 MG capsule Take 900 mg by mouth 2 times daily       lisinopril (ZESTRIL) 2.5 MG tablet Take 1 tablet (2.5 mg) by mouth daily 90 tablet 3    rosuvastatin (CRESTOR) 10 MG tablet Take 1.5 tablets (15 mg) by mouth daily 90 tablet 3    sotalol (BETAPACE) 80 MG tablet Take 0.5 tablets (40 mg) by mouth 2 times daily 90 tablet 3    tamsulosin (FLOMAX) 0.4 MG capsule Take 0.4 mg by mouth every evening         Objective  /64 (BP Location: Right arm, Patient Position: Sitting, Cuff Size: Adult Regular)   Pulse 65   Resp 18   Wt 63 kg (139 lb)   SpO2 96%   BMI 19.94 kg/m      General Appearance:    Alert, cooperative, no distress, appears stated age   Head:    Normocephalic, without obvious abnormality, atraumatic   Throat:   Lips, mucosa, and tongue normal; teeth and gums normal   Neck:   Supple, symmetrical, trachea midline, no adenopathy;        thyroid:  No enlargement/tenderness/nodules; no carotid    bruit or JVD   Back:     Symmetric, no curvature, ROM normal, no CVA tenderness   Lungs:     Clear to auscultation bilaterally, respirations unlabored   Chest wall:    No tenderness, left-sided ICD   Heart:    Regular rate and rhythm, S1 and S2 normal, no murmur, rub   or gallop   Abdomen:     Soft, non-tender, bowel sounds active all four quadrants,     no masses, no organomegaly   Extremities:   Normal, atraumatic, no cyanosis or edema   Pulses:   2+ and symmetric all extremities   Skin:   Skin color, texture, turgor normal, no rashes or lesions     Results    Lab Results personally reviewed   Lab Results   Component Value Date    CHOL 139 03/10/2023    CHOL 185 09/19/2022     Lab Results   Component Value Date    HDL 38 (L) 03/10/2023    HDL 41 09/19/2022     No components found for: LDLCALC  Lab Results   Component Value Date    TRIG 89 03/10/2023    TRIG 76 09/19/2022     Lab Results   Component Value Date    WBC 8.8 03/06/2023    HGB 13.2 (L) 03/06/2023    HCT 42.1 03/06/2023      03/06/2023     Lab Results   Component Value Date    BUN 19.4 03/06/2023     03/06/2023    CO2 28 03/06/2023

## 2023-08-14 NOTE — PATIENT INSTRUCTIONS
Mr Daniel Alamo,  I enjoyed visiting with you again today.  I am still concerned that you have no energy, fatigue, sounds like a lightheaded type sensation on walking.  In review, did not get any better off metoprolol, did not get any better or worse on lower dose or higher dose of sotalol.  Per our conversation I will do the blood work today to see if there is any metabolic reason for the fatigue.  If this looks good please stop the lisinopril and call me at [phone] to let me know how that goes for you.  If no better or blood work shows no major abnormalities we will get the stress test and ultrasound which have tentatively put off for about a month.  I will plan on seeing you several months to review all of this.  Corey Vargas

## 2023-08-14 NOTE — LETTER
8/14/2023    Kenna Calvillo, NP  [address] [place] [address]    RE: Daniel Alamo       Dear Colleague,     I had the pleasure of seeing Daniel Alamo in the [facility].      [facility]  Heart Care Clinic Follow-up Note    Assessment & Plan        (I25.10,  I25.83) Coronary arteriosclerosis due to lipid rich plaque  (primary encounter diagnosis)  Comment: Angiography December 2022 showed normal left main, LAD with a distal total occlusion and first diagonal 95% stenosis, normal circumflex, first obtuse marginal is 70 to 80% stenosis, and total occlusion of the right coronary artery.  PET viability showed scar involving the inferior and inferolateral wall with some mild agustin-infarct anterolateral ischemia prompting intervention on the diagonal lesion, with balloon, unable to place stent.  Given profound fatigue if he still has this despite all her medicine changes will perform pharmacological stress nuclear and if major ischemia seen consider revascularization.      (Z95.1) S/P CABG (coronary artery bypass graft)  Comment: Approximately 30 years ago he had a LIMA to the LAD which is patent vein graft to the OM which is occluded and vein graft to the right coronary which is occluded.     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  If fatigue persists and no cause is found we will recheck echocardiogram.     (Z95.810) ICD (implantable cardioverter-defibrillator), dual, in situ  Comment: [employer] device with [employer] right atrial and right ventricular leads with 21% atrial pacing and 20% ventricular pacing.  Leads are stable.  Defer to EP about potential rate related changes.     (I25.5) Ischemic cardiomyopathy  Comment: Ejection fraction 30 to 35% and was on appropriate  metoprolol as well as lisinopril.  Due to fatigue metoprolol was discontinued, no better, we will now try and holding lisinopril, if no better go back on both.  We will check echo to see what ejection fraction is currently.    (I48.0) Paroxysmal atrial fibrillation (H)  Comment: Sotalol was lowered from 80-40 and discontinued, atrial fibrillation burden did go up, he had no difference in his symptoms, went back up on sotalol and symptoms did not worsen.  A-fib and sotalol thus not because of Cale.  Only issue is his weight is 63 kg, given his age will defer to EP but wonder about lower dose of Eliquis 2.5 mg twice daily.    (E78.00) Pure hypercholesterolemia  Comment: Total cholesterol 139 with LDL of 83 which is acceptable on 15 mg of Crestor.    (N40.1,  N13.8) Benign prostatic hyperplasia with urinary obstruction  Comment: On tamsulosin with no significant symptoms.    Plan  1.  Check comprehensive metabolic profile as well as CBC today, if abnormal address these.  2.  If no abnormalities we will have him hold lisinopril and if no better we will then pursue stress testing pharmacologically given his ICD as well as echo.  3.  If any major abnormalities are seen address those otherwise would then restart metoprolol and lisinopril.  4.  Follow-up with me in about 6 months given all this or sooner if needed.    Subjective  CC: 83-year-old white gentleman here for follow-up visit after having seen EP, nurse practitioners, and cardiac rehab.  Still tells me he cannot walk his dog, he let throughout the back.  States if he tries to go for a walk he feels extremely lightheaded and fatigued.  No chest pains, palpitations, significant shortness of breath, PND, orthopnea or peripheral edema.    Medications  Current Outpatient Medications   Medication Sig Dispense Refill    acetaminophen (TYLENOL) 325 MG tablet Take 650 mg by mouth every 6 hours as needed for pain      apixaban ANTICOAGULANT (ELIQUIS) 5 MG tablet Take 5 mg  by mouth 2 times daily      aspirin 81 MG EC tablet Take 81 mg by mouth daily      fluticasone (FLONASE) 50 MCG/ACT nasal spray Spray 1 spray into both nostrils as needed for rhinitis or allergies      gabapentin (NEURONTIN) 300 MG capsule Take 900 mg by mouth 2 times daily       lisinopril (ZESTRIL) 2.5 MG tablet Take 1 tablet (2.5 mg) by mouth daily 90 tablet 3    rosuvastatin (CRESTOR) 10 MG tablet Take 1.5 tablets (15 mg) by mouth daily 90 tablet 3    sotalol (BETAPACE) 80 MG tablet Take 0.5 tablets (40 mg) by mouth 2 times daily 90 tablet 3    tamsulosin (FLOMAX) 0.4 MG capsule Take 0.4 mg by mouth every evening         Objective  /64 (BP Location: Right arm, Patient Position: Sitting, Cuff Size: Adult Regular)   Pulse 65   Resp 18   Wt 63 kg (139 lb)   SpO2 96%   BMI 19.94 kg/m      General Appearance:    Alert, cooperative, no distress, appears stated age   Head:    Normocephalic, without obvious abnormality, atraumatic   Throat:   Lips, mucosa, and tongue normal; teeth and gums normal   Neck:   Supple, symmetrical, trachea midline, no adenopathy;        thyroid:  No enlargement/tenderness/nodules; no carotid    bruit or JVD   Back:     Symmetric, no curvature, ROM normal, no CVA tenderness   Lungs:     Clear to auscultation bilaterally, respirations unlabored   Chest wall:    No tenderness, left-sided ICD   Heart:    Regular rate and rhythm, S1 and S2 normal, no murmur, rub   or gallop   Abdomen:     Soft, non-tender, bowel sounds active all four quadrants,     no masses, no organomegaly   Extremities:   Normal, atraumatic, no cyanosis or edema   Pulses:   2+ and symmetric all extremities   Skin:   Skin color, texture, turgor normal, no rashes or lesions     Results    Lab Results personally reviewed   Lab Results   Component Value Date    CHOL 139 03/10/2023    CHOL 185 09/19/2022     Lab Results   Component Value Date    HDL 38 (L) 03/10/2023    HDL 41 09/19/2022     No components found for:  LDLCALC  Lab Results   Component Value Date    TRIG 89 03/10/2023    TRIG 76 09/19/2022     Lab Results   Component Value Date    WBC 8.8 03/06/2023    HGB 13.2 (L) 03/06/2023    HCT 42.1 03/06/2023     03/06/2023     Lab Results   Component Value Date    BUN 19.4 03/06/2023     03/06/2023    CO2 28 03/06/2023             Thank you for allowing me to participate in the care of your patient.      Sincerely,     GIO VARGAS MD     [facility]  cc:   Gio Vargas MD  [address], [address]  [place] [address]

## 2023-09-14 ENCOUNTER — HOSPITAL ENCOUNTER (OUTPATIENT)
Dept: NUCLEAR MEDICINE | Facility: HOSPITAL | Age: 83
Discharge: HOME | End: 2023-09-14
Attending: INTERNAL MEDICINE
Payer: MEDICARE

## 2023-09-14 ENCOUNTER — HOSPITAL ENCOUNTER (OUTPATIENT)
Dept: CARDIOLOGY | Facility: HOSPITAL | Age: 83
Discharge: HOME | End: 2023-09-14
Attending: INTERNAL MEDICINE
Payer: MEDICARE

## 2023-09-14 DIAGNOSIS — Z95.1 S/P CABG (CORONARY ARTERY BYPASS GRAFT): ICD-10-CM

## 2023-09-14 DIAGNOSIS — I25.83 CORONARY ARTERIOSCLEROSIS DUE TO LIPID RICH PLAQUE: ICD-10-CM

## 2023-09-14 DIAGNOSIS — Z95.2 S/P TAVR (TRANSCATHETER AORTIC VALVE REPLACEMENT): ICD-10-CM

## 2023-09-14 DIAGNOSIS — I49.5 SINUS NODE DYSFUNCTION (H): ICD-10-CM

## 2023-09-14 LAB
CV STRESS CURRENT BP HE: NORMAL
CV STRESS CURRENT HR HE: 62
CV STRESS CURRENT HR HE: 66
CV STRESS CURRENT HR HE: 66
CV STRESS CURRENT HR HE: 72
CV STRESS CURRENT HR HE: 73
CV STRESS CURRENT HR HE: 74
CV STRESS CURRENT HR HE: 75
CV STRESS CURRENT HR HE: 77
CV STRESS CURRENT HR HE: 77
CV STRESS CURRENT HR HE: 78
CV STRESS CURRENT HR HE: 78
CV STRESS CURRENT HR HE: 79
CV STRESS CURRENT HR HE: 79
CV STRESS DEVIATION TIME HE: NORMAL
CV STRESS ECHO PERCENT HR HE: NORMAL
CV STRESS EXERCISE STAGE HE: NORMAL
CV STRESS FINAL RESTING BP HE: NORMAL
CV STRESS FINAL RESTING HR HE: 73
CV STRESS MAX HR HE: 80
CV STRESS MAX TREADMILL GRADE HE: 0
CV STRESS MAX TREADMILL SPEED HE: 0
CV STRESS PEAK DIA BP HE: NORMAL
CV STRESS PEAK SYS BP HE: NORMAL
CV STRESS PHASE HE: NORMAL
CV STRESS PROTOCOL HE: NORMAL
CV STRESS RESTING PT POSITION HE: NORMAL
CV STRESS ST DEVIATION AMOUNT HE: NORMAL
CV STRESS ST DEVIATION ELEVATION HE: NORMAL
CV STRESS ST EVELATION AMOUNT HE: NORMAL
CV STRESS TEST TYPE HE: NORMAL
CV STRESS TOTAL STAGE TIME MIN 1 HE: NORMAL
LVEF ECHO: NORMAL
NUC STRESS EJECTION FRACTION: 33 %
RATE PRESSURE PRODUCT: 8400
STRESS ECHO BASELINE DIASTOLIC HE: 67
STRESS ECHO BASELINE HR: 65
STRESS ECHO BASELINE SYSTOLIC BP: 129
STRESS ECHO CALCULATED PERCENT HR: 59 %
STRESS ECHO LAST STRESS DIASTOLIC BP: 58
STRESS ECHO LAST STRESS HR: 78
STRESS ECHO LAST STRESS SYSTOLIC BP: 105
STRESS ECHO TARGET HR: 135

## 2023-09-14 PROCEDURE — 93017 CV STRESS TEST TRACING ONLY: CPT

## 2023-09-14 PROCEDURE — 93016 CV STRESS TEST SUPVJ ONLY: CPT | Performed by: INTERNAL MEDICINE

## 2023-09-14 PROCEDURE — G1010 CDSM STANSON: HCPCS

## 2023-09-14 PROCEDURE — 343N000001 HC RX 343: Performed by: INTERNAL MEDICINE

## 2023-09-14 PROCEDURE — A9500 TC99M SESTAMIBI: HCPCS | Performed by: INTERNAL MEDICINE

## 2023-09-14 PROCEDURE — G1010 CDSM STANSON: HCPCS | Performed by: INTERNAL MEDICINE

## 2023-09-14 PROCEDURE — 255N000002 HC RX 255 OP 636: Performed by: INTERNAL MEDICINE

## 2023-09-14 PROCEDURE — 78452 HT MUSCLE IMAGE SPECT MULT: CPT | Mod: 26 | Performed by: INTERNAL MEDICINE

## 2023-09-14 PROCEDURE — 93306 TTE W/DOPPLER COMPLETE: CPT | Mod: 26 | Performed by: INTERNAL MEDICINE

## 2023-09-14 PROCEDURE — 93018 CV STRESS TEST I&R ONLY: CPT | Performed by: INTERNAL MEDICINE

## 2023-09-14 PROCEDURE — 999N000208 ECHOCARDIOGRAM COMPLETE

## 2023-09-14 PROCEDURE — 250N000011 HC RX IP 250 OP 636: Performed by: INTERNAL MEDICINE

## 2023-09-14 RX ORDER — ALBUTEROL SULFATE 0.83 MG/ML
2.5 SOLUTION RESPIRATORY (INHALATION)
Status: DISCONTINUED | OUTPATIENT
Start: 2023-09-14 | End: 2023-09-14 | Stop reason: HOSPADM

## 2023-09-14 RX ORDER — AMINOPHYLLINE 25 MG/ML
50 INJECTION, SOLUTION INTRAVENOUS
Status: DISCONTINUED | OUTPATIENT
Start: 2023-09-14 | End: 2023-09-14 | Stop reason: HOSPADM

## 2023-09-14 RX ORDER — CAFFEINE CITRATE 20 MG/ML
60 SOLUTION INTRAVENOUS
Status: DISCONTINUED | OUTPATIENT
Start: 2023-09-14 | End: 2023-09-14 | Stop reason: HOSPADM

## 2023-09-14 RX ORDER — REGADENOSON 0.08 MG/ML
0.4 INJECTION, SOLUTION INTRAVENOUS ONCE
Status: COMPLETED | OUTPATIENT
Start: 2023-09-14 | End: 2023-09-14

## 2023-09-14 RX ORDER — CAFFEINE 200 MG
200 TABLET ORAL
Status: DISCONTINUED | OUTPATIENT
Start: 2023-09-14 | End: 2023-09-14 | Stop reason: HOSPADM

## 2023-09-14 RX ADMIN — REGADENOSON 0.4 MG: 0.08 INJECTION, SOLUTION INTRAVENOUS at 12:38

## 2023-09-14 RX ADMIN — Medication 29 MILLICURIE: at 13:39

## 2023-09-14 RX ADMIN — Medication 8.1 MILLICURIE: at 11:32

## 2023-09-14 RX ADMIN — PERFLUTREN 2 ML: 6.52 INJECTION, SUSPENSION INTRAVENOUS at 13:30

## 2023-09-27 ENCOUNTER — ANCILLARY PROCEDURE (OUTPATIENT)
Dept: CARDIOLOGY | Facility: CLINIC | Age: 83
End: 2023-09-27
Attending: INTERNAL MEDICINE
Payer: MEDICARE

## 2023-09-27 DIAGNOSIS — Z95.810 ICD (IMPLANTABLE CARDIOVERTER-DEFIBRILLATOR) IN PLACE: ICD-10-CM

## 2023-09-27 DIAGNOSIS — I48.0 PAROXYSMAL ATRIAL FIBRILLATION (H): ICD-10-CM

## 2023-09-28 LAB
MDC_IDC_EPISODE_DTM: NORMAL
MDC_IDC_EPISODE_DURATION: 10 S
MDC_IDC_EPISODE_DURATION: 10 S
MDC_IDC_EPISODE_DURATION: 11 S
MDC_IDC_EPISODE_DURATION: 12 S
MDC_IDC_EPISODE_DURATION: 16 S
MDC_IDC_EPISODE_DURATION: 4 S
MDC_IDC_EPISODE_DURATION: 5 S
MDC_IDC_EPISODE_DURATION: 5 S
MDC_IDC_EPISODE_DURATION: 6 S
MDC_IDC_EPISODE_DURATION: 6 S
MDC_IDC_EPISODE_DURATION: 8 S
MDC_IDC_EPISODE_ID: NORMAL
MDC_IDC_EPISODE_TYPE: NORMAL
MDC_IDC_EPISODE_VENDOR_TYPE: NORMAL
MDC_IDC_LEAD_IMPLANT_DT: NORMAL
MDC_IDC_LEAD_IMPLANT_DT: NORMAL
MDC_IDC_LEAD_LOCATION: NORMAL
MDC_IDC_LEAD_LOCATION: NORMAL
MDC_IDC_LEAD_LOCATION_DETAIL_1: NORMAL
MDC_IDC_LEAD_LOCATION_DETAIL_1: NORMAL
MDC_IDC_LEAD_MFG: NORMAL
MDC_IDC_LEAD_MFG: NORMAL
MDC_IDC_LEAD_MODEL: NORMAL
MDC_IDC_LEAD_MODEL: NORMAL
MDC_IDC_LEAD_POLARITY_TYPE: NORMAL
MDC_IDC_LEAD_POLARITY_TYPE: NORMAL
MDC_IDC_LEAD_SERIAL: NORMAL
MDC_IDC_LEAD_SERIAL: NORMAL
MDC_IDC_LEAD_SPECIAL_FUNCTION: NORMAL
MDC_IDC_MSMT_BATTERY_DTM: NORMAL
MDC_IDC_MSMT_BATTERY_REMAINING_LONGEVITY: 114 MO
MDC_IDC_MSMT_BATTERY_REMAINING_PERCENTAGE: 100 %
MDC_IDC_MSMT_BATTERY_STATUS: NORMAL
MDC_IDC_MSMT_CAP_CHARGE_DTM: NORMAL
MDC_IDC_MSMT_CAP_CHARGE_TIME: 10.5 S
MDC_IDC_MSMT_CAP_CHARGE_TYPE: NORMAL
MDC_IDC_MSMT_LEADCHNL_RA_IMPEDANCE_VALUE: 815 OHM
MDC_IDC_MSMT_LEADCHNL_RA_PACING_THRESHOLD_AMPLITUDE: 1.5 V
MDC_IDC_MSMT_LEADCHNL_RA_PACING_THRESHOLD_PULSEWIDTH: 0.7 MS
MDC_IDC_MSMT_LEADCHNL_RV_IMPEDANCE_VALUE: 570 OHM
MDC_IDC_MSMT_LEADCHNL_RV_PACING_THRESHOLD_AMPLITUDE: 0.9 V
MDC_IDC_MSMT_LEADCHNL_RV_PACING_THRESHOLD_PULSEWIDTH: 0.4 MS
MDC_IDC_PG_IMPLANT_DTM: NORMAL
MDC_IDC_PG_MFG: NORMAL
MDC_IDC_PG_MODEL: NORMAL
MDC_IDC_PG_SERIAL: NORMAL
MDC_IDC_PG_TYPE: NORMAL
MDC_IDC_SESS_CLINIC_NAME: NORMAL
MDC_IDC_SESS_DTM: NORMAL
MDC_IDC_SESS_TYPE: NORMAL
MDC_IDC_SET_BRADY_AT_MODE_SWITCH_MODE: NORMAL
MDC_IDC_SET_BRADY_AT_MODE_SWITCH_RATE: 170 {BEATS}/MIN
MDC_IDC_SET_BRADY_LOWRATE: 55 {BEATS}/MIN
MDC_IDC_SET_BRADY_MAX_SENSOR_RATE: 130 {BEATS}/MIN
MDC_IDC_SET_BRADY_MAX_TRACKING_RATE: 120 {BEATS}/MIN
MDC_IDC_SET_BRADY_MODE: NORMAL
MDC_IDC_SET_BRADY_PAV_DELAY_HIGH: 200 MS
MDC_IDC_SET_BRADY_PAV_DELAY_LOW: 300 MS
MDC_IDC_SET_BRADY_SAV_DELAY_HIGH: 165 MS
MDC_IDC_SET_BRADY_SAV_DELAY_LOW: 250 MS
MDC_IDC_SET_LEADCHNL_RA_PACING_AMPLITUDE: 2.6 V
MDC_IDC_SET_LEADCHNL_RA_PACING_POLARITY: NORMAL
MDC_IDC_SET_LEADCHNL_RA_PACING_PULSEWIDTH: 0.7 MS
MDC_IDC_SET_LEADCHNL_RA_SENSING_ADAPTATION_MODE: NORMAL
MDC_IDC_SET_LEADCHNL_RA_SENSING_POLARITY: NORMAL
MDC_IDC_SET_LEADCHNL_RA_SENSING_SENSITIVITY: 0.4 MV
MDC_IDC_SET_LEADCHNL_RV_PACING_AMPLITUDE: 1.5 V
MDC_IDC_SET_LEADCHNL_RV_PACING_POLARITY: NORMAL
MDC_IDC_SET_LEADCHNL_RV_PACING_PULSEWIDTH: 0.4 MS
MDC_IDC_SET_LEADCHNL_RV_SENSING_ADAPTATION_MODE: NORMAL
MDC_IDC_SET_LEADCHNL_RV_SENSING_POLARITY: NORMAL
MDC_IDC_SET_LEADCHNL_RV_SENSING_SENSITIVITY: 0.6 MV
MDC_IDC_SET_ZONE_DETECTION_INTERVAL: 261 MS
MDC_IDC_SET_ZONE_DETECTION_INTERVAL: 333 MS
MDC_IDC_SET_ZONE_DETECTION_INTERVAL: 400 MS
MDC_IDC_SET_ZONE_TYPE: NORMAL
MDC_IDC_SET_ZONE_VENDOR_TYPE: NORMAL
MDC_IDC_STAT_AT_BURDEN_PERCENT: 1 %
MDC_IDC_STAT_AT_DTM_END: NORMAL
MDC_IDC_STAT_AT_DTM_START: NORMAL
MDC_IDC_STAT_BRADY_DTM_END: NORMAL
MDC_IDC_STAT_BRADY_DTM_START: NORMAL
MDC_IDC_STAT_BRADY_RA_PERCENT_PACED: 27 %
MDC_IDC_STAT_BRADY_RV_PERCENT_PACED: 23 %
MDC_IDC_STAT_EPISODE_RECENT_COUNT: 0
MDC_IDC_STAT_EPISODE_RECENT_COUNT: 1
MDC_IDC_STAT_EPISODE_RECENT_COUNT: 789
MDC_IDC_STAT_EPISODE_RECENT_COUNT_DTM_END: NORMAL
MDC_IDC_STAT_EPISODE_RECENT_COUNT_DTM_START: NORMAL
MDC_IDC_STAT_EPISODE_TYPE: NORMAL
MDC_IDC_STAT_EPISODE_VENDOR_TYPE: NORMAL
MDC_IDC_STAT_TACHYTHERAPY_ATP_DELIVERED_RECENT: 0
MDC_IDC_STAT_TACHYTHERAPY_ATP_DELIVERED_TOTAL: 1
MDC_IDC_STAT_TACHYTHERAPY_RECENT_DTM_END: NORMAL
MDC_IDC_STAT_TACHYTHERAPY_RECENT_DTM_START: NORMAL
MDC_IDC_STAT_TACHYTHERAPY_SHOCKS_ABORTED_RECENT: 0
MDC_IDC_STAT_TACHYTHERAPY_SHOCKS_ABORTED_TOTAL: 0
MDC_IDC_STAT_TACHYTHERAPY_SHOCKS_DELIVERED_RECENT: 0
MDC_IDC_STAT_TACHYTHERAPY_SHOCKS_DELIVERED_TOTAL: 0
MDC_IDC_STAT_TACHYTHERAPY_TOTAL_DTM_END: NORMAL
MDC_IDC_STAT_TACHYTHERAPY_TOTAL_DTM_START: NORMAL

## 2023-09-28 PROCEDURE — 93295 DEV INTERROG REMOTE 1/2/MLT: CPT | Performed by: INTERNAL MEDICINE

## 2023-09-28 PROCEDURE — 93296 REM INTERROG EVL PM/IDS: CPT | Performed by: INTERNAL MEDICINE

## 2023-10-25 DIAGNOSIS — Z95.2 S/P TAVR (TRANSCATHETER AORTIC VALVE REPLACEMENT): Primary | ICD-10-CM

## 2024-01-04 ENCOUNTER — ANCILLARY PROCEDURE (OUTPATIENT)
Dept: CARDIOLOGY | Facility: CLINIC | Age: 84
End: 2024-01-04
Attending: INTERNAL MEDICINE
Payer: MEDICARE

## 2024-01-04 DIAGNOSIS — Z95.810 ICD (IMPLANTABLE CARDIOVERTER-DEFIBRILLATOR) IN PLACE: ICD-10-CM

## 2024-01-04 DIAGNOSIS — I48.0 PAROXYSMAL ATRIAL FIBRILLATION (H): ICD-10-CM

## 2024-01-17 ENCOUNTER — ANCILLARY PROCEDURE (OUTPATIENT)
Dept: CARDIOLOGY | Facility: CLINIC | Age: 84
End: 2024-01-17
Attending: INTERNAL MEDICINE
Payer: MEDICARE

## 2024-01-17 ENCOUNTER — OFFICE VISIT (OUTPATIENT)
Dept: CARDIOLOGY | Facility: CLINIC | Age: 84
End: 2024-01-17
Attending: NURSE PRACTITIONER
Payer: MEDICARE

## 2024-01-17 VITALS
BODY MASS INDEX: 20.37 KG/M2 | HEART RATE: 60 BPM | DIASTOLIC BLOOD PRESSURE: 72 MMHG | WEIGHT: 142 LBS | RESPIRATION RATE: 16 BRPM | SYSTOLIC BLOOD PRESSURE: 139 MMHG

## 2024-01-17 DIAGNOSIS — I50.20 HEART FAILURE WITH REDUCED EJECTION FRACTION, NYHA CLASS II (H): ICD-10-CM

## 2024-01-17 DIAGNOSIS — Z95.810 ICD (IMPLANTABLE CARDIOVERTER-DEFIBRILLATOR) IN PLACE: ICD-10-CM

## 2024-01-17 DIAGNOSIS — I42.0 DILATED CARDIOMYOPATHY (H): ICD-10-CM

## 2024-01-17 DIAGNOSIS — I48.0 PAROXYSMAL ATRIAL FIBRILLATION (H): Primary | ICD-10-CM

## 2024-01-17 DIAGNOSIS — I48.0 PAROXYSMAL ATRIAL FIBRILLATION (H): ICD-10-CM

## 2024-01-17 DIAGNOSIS — Z95.810 ICD (IMPLANTABLE CARDIOVERTER-DEFIBRILLATOR), DUAL, IN SITU: ICD-10-CM

## 2024-01-17 PROCEDURE — G2211 COMPLEX E/M VISIT ADD ON: HCPCS | Performed by: NURSE PRACTITIONER

## 2024-01-17 PROCEDURE — 99215 OFFICE O/P EST HI 40 MIN: CPT | Performed by: NURSE PRACTITIONER

## 2024-01-17 PROCEDURE — 99417 PROLNG OP E/M EACH 15 MIN: CPT | Performed by: NURSE PRACTITIONER

## 2024-01-17 NOTE — PROGRESS NOTES
[facility]  Cardiac Electrophysiology  [address]  [place] [address]   Office: [phone]  Fax: [phone]     HEART CARE ELECTROPHYSIOLOGY NOTE    Primary Care: Kenna Calvillo MD    REASON FOR VISIT: paroxysmal atrial fibrillation      Assessment/Recommendations     Paroxysmal atrial fibrillation: Asymptomatic.  No significant changes since resuming sotalol. We discussed the physiology and natural progression of atrial fibrillation and management options including CVA prophylaxis, ventricular rate control, antiarrhythmic drug therapy and consideration of catheter ablation. We also discussed long term management of atrial fibrillation includes sleep apnea diagnosis and management, avoiding heavy alcohol consumption, and management of concurrent hypertension and coronary artery disease as appropriate.  He has very limited options for medical management of atrial fibrillation due to his history of CAD and fatigue on beta-blocker.  We discussed ongoing monitoring via device and discuss ablation in further detail with increasing AF    [id number] score of 4 for age >75, HFrEF, CAD     QTc within normal limits, no history CKD    Ischemic cardiomyopathy, HFrEF: LVEF stable 30 to 45% by TTE 9/2023. Compensated and euvolemic.  GDMT limited due to hypotension    ICD in situ: known FFRW oversensing. Appropriate device function    Plan:   -Continue Eliquis 5 mg twice a day for stroke prophylaxis  -Continue sotalol 40 mg twice a day  -EP follow-up 6 months       History of Present Illness/Subjective    Daniel DENNIS Alamo is a 83 year old male who is seen today for evaluation of atrial fibrillation. 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  He was previously on sotalol, discontinued due to significant fatigue, resumed at low dose about 6 months ago after increased AF burden by device.  Due to ongoing fatigue he underwent echocardiogram showing stable LVEF 30-35%, stress testing without significant changes.     He feels no differently since resuming sotalol.  He is chronically fatigued which is stable.  He can get dizzy at times throughout the day.  He denies presyncope, syncope or falls recently.  He has no awareness of atrial fibrillation. He denies chest discomfort, palpitations, shortness of breath, pedal edema, or syncope. He is fairly sedentary but walks occasionally for exercise, uses scooter for long distances.  He lives in a single-family home with his wife.  He is an avid [employer] fan.      Data Review     Arrhythmia hx:   Dx/date: Paroxysmal AF 2018  Sx: asx   [id number], OAC: 4 for age >75, HFrEF, CAD; apixaban  Rate control: metoprolol  Prior AAD: sotalol  DCCV: N/A  Ablation: N/A    EKG:   3/6/2023: Atrial paced/ventricular sensed 57 bpm, RBBB, QT/QTc 488/474 ms  Personally reviewed.     TTE/ARAVIND: 9/14/2023  The left ventricle is severely dilated.  There is mild concentric left ventricular hypertrophy.  The visual ejection fraction is 30-35%.  There is mild global hypokinesia of the left ventricle.  There is inferior wall akinesis.  There is severe inferolateral wall hypokinesis.  The right ventricle is normal in size and function.  There is mild to moderate (1-2+) mitral regurgitation.  There is a CHANTAL 3 bioprosthetic valve present in aortic valve position. The  gradient is normal for this prosthetic aortic valve. There is trace  paravalvular regurgitation present.  There is mild to moderate (1-2+) tricuspid regurgitation.  Compared to the prior study dated 2/27/2023, there have been no changes.    MPI:  9/14/2023    Lexiscan stress ECG negative for ischemia.    The nuclear stress test is abnormal.  There is a large area of transmural infarction involving the inferior, inferolateral and lateral walls along with a small area of nontransmural infarction involving the mid septal wall.  No evidence of ischemia.    The left ventricular ejection fraction at stress is 33% with akinesis of the inferior and inferolateral wall..    A prior study was conducted on 8/25/2014.  Images not available for review.    CA/PCI: 12/192022  LM:no obstruction  LAD:severe diffuse Ca2+ disease in mLAD into D1. Occluded dLAD  Lcx:severe mid-vessel lesion  RCA:not injected  - given area of ischemia/vaibility in anterior/anterolateral wall, proceeded w/ PCI w/ cuting balloon angioplasty and Shockwave lithotripsy in order to optimize risk profile of the pacing run, but given only branch involvement, we chose to stop, as stenting/Roto would require escalating risk  - proceed w/ TAVR w/u as planned   - continue ASA 81mg daily indefinitely, clopidogrel 600mg once, followed by 75mg daily for at least 12 months, add atorva 40  - continue aggressive risk factor modification     DEVICE: [employer] Dynagen dual ICD 4/22/2004, generator replacement 10/15/2018 (SND)   Annual interrogation  Pacing mode: DDDR 55/120  Presenting rhythm: AP/ASVS  Underlying rhythm: SB 50s 1st degree AVB  A-paced: 26%.  V-paced 22%.  Battery: estimated longevity 9 years  Atrial and Ventricular leads: Stable with good sensing, impedance, and pacing thresholds  Atrial arrhythmias: Most recent sustained AF episodes occurring in November, lasting approximately 6-15 hours  Ventricular arrhythmias: NSVT most recently 1/14/2024 up to 167, no recent therapies  AF burden: 1%  Histograms: stable  Programming changes: RA sensitivity decreased 0.4 mV --> 0.2 mV     I have reviewed and updated the patient's past medical history, allergy list and medication list.          Physical Examination  Review of Systems   /72 (BP Location: Right arm, Patient Position: Sitting, Cuff Size: Adult Regular)   Pulse 60   Resp 16   Wt 64.4 kg (142 lb)   BMI 20.37 kg/m      Body mass index is 20.37 kg/m .    Wt Readings from Last 3 Encounters:   01/17/24 64.4 kg (142 lb)   08/14/23 63 kg (139 lb)   06/27/23 63.5 kg (140 lb)       General   Appearance:   Alert and oriented, in no acute distress.    HEENT:  Normocephalic and atraumatic.  Conjunctiva and sclera are clear. Moist oral mucosa.  Hard of hearing   Neck: No JVP, carotid bruit or obvious thyromegaly.   Lungs:   Respirations unlabored.  Diminished throughout.  No wheezing or rhonchi   Cardiovascular:   Rhythm is regular. S1 and S2 are normal. No significant murmur is present. Lower extremities demonstrate no significant edema. Posterior tibial pulses are intact bilaterally.   Extremities: No cyanosis or clubbing   Skin: Skin is warm, dry, and otherwise intact.   Neurologic: Gait not assessed. Mood and affect appropriate.                                                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 Harrell 1/22/2020 [id number] scire of (>75, CAD, CHF)     Pure hypercholesterolemia     Created by Conversion     PVD (peripheral vascular disease) (H24) 2/24/2020 2/2020 underwent aortobiiliac endograft  repair of aortic aneurysm and  placement of bridging stents bilaterally and placement of bilateral iliac  branch devices for repair of bilateral iliac artery aneurysms      Severe aortic stenosis 10/10/2022    Sinus node dysfunction (H) 11/22/2022    VT (ventricular tachycardia) (H) 2004    Past Surgical History:   Procedure Laterality Date    ABDOMEN SURGERY      BYPASS GRAFT ARTERY CORONARY      CV ANGIOGRAM CORONARY GRAFT N/A 9/19/2022    Procedure: Coronary Angiogram Graft;  Surgeon: Tyrone Ordoñez MD;  Location: [facility] CV    CV CORONARY LITHOTRIPSY PCI N/A 12/19/2022    Procedure: Percutaneous Coronary Intervention - Lithotripsy;  Surgeon: Tyrone Ordoñez MD;  Location: [facility]    CV LEFT HEART CATH N/A 9/19/2022    Procedure: Left Heart Catheterization;  Surgeon: Tyrone Ordoñez MD;  Location: [facility]    CV PCI ANGIOPLASTY N/A 12/19/2022    Procedure: Percutaneous Transluminal Angioplasty;  Surgeon: Tyrone Ordoñez MD;  Location: [facility] CATH LAB [facility]    CV RIGHT HEART CATH MEASUREMENTS RECORDED N/A 9/19/2022    Procedure: Right Heart Catheterization;  Surgeon: Tyrone Ordoñez MD;  Location: [facility]    EYE SURGERY      INSERT / REPLACE / REMOVE PACEMAKER      OR TRANSCATHETER AORTIC VALVE REPLACEMENT, SUBCLAVIAN PERCUTANEOUS APPROACH (STANDBY) N/A 1/31/2023    Procedure: OR TRANSCATHETER AORTIC VALVE REPLACEMENT, SUBCLAVIAN PERCUTANEOUS APPROACH (STANDBY);  Surgeon: Shanae Rich MD;  Location: [facility] CATH LAB CV    OTHER SURGICAL HISTORY      icd    TRANSCATHETER AORTIC VALVE REPLACEMENT - SUBCLAVIAN APPROACH N/A 1/31/2023    Procedure: Transcatheter Aortic Valve Replacement - Subclavian Approach;  Surgeon: Tyrone Ordoñez MD;  Location: [facility] LAB CV    ZZC CABG, VEIN, SINGLE      Description: CABG (CABG);  Recorded: 10/03/2012;  Comments: LIMA to LAD, SVG to OM2, SVG to RCA    Family History   Problem Relation Age of Onset     "Cancer Mother     Heart Disease Father     Social History     Tobacco Use    Smoking status: Every Day     Packs/day: 0.50     Years: 30.00     Additional pack years: 0.00     Total pack years: 15.00     Types: Cigarettes    Smokeless tobacco: Never   Vaping Use    Vaping Use: Never used   Substance Use Topics    Alcohol use: Yes     Alcohol/week: 1.0 standard drink of alcohol     Comment: Glass of wine daily.    Drug use: No          Medications  Allergies   Current Outpatient Medications   Medication Sig Dispense Refill    acetaminophen (TYLENOL) 325 MG tablet Take 650 mg by mouth every 6 hours as needed for pain      apixaban ANTICOAGULANT (ELIQUIS) 5 MG tablet Take 5 mg by mouth 2 times daily      aspirin 81 MG EC tablet Take 81 mg by mouth daily      fluticasone (FLONASE) 50 MCG/ACT nasal spray Spray 1 spray into both nostrils as needed for rhinitis or allergies      gabapentin (NEURONTIN) 300 MG capsule Take 900 mg by mouth 2 times daily       lisinopril (ZESTRIL) 2.5 MG tablet Take 1 tablet (2.5 mg) by mouth daily 90 tablet 3    rosuvastatin (CRESTOR) 10 MG tablet Take 1.5 tablets (15 mg) by mouth daily 90 tablet 3    sotalol (BETAPACE) 80 MG tablet Take 0.5 tablets (40 mg) by mouth 2 times daily 90 tablet 3    tamsulosin (FLOMAX) 0.4 MG capsule Take 0.4 mg by mouth every evening      Allergies   Allergen Reactions    Atorvastatin Diarrhea    Carvedilol Other (See Comments) and GI Disturbance     Upset stomach; GI upset      Chloride GI Disturbance and Nausea         Lab Results    Chemistry/lipid CBC Cardiac Enzymes/BNP/TSH/INR   Lab Results   Component Value Date    BUN 18.1 08/14/2023     08/14/2023    CO2 26 08/14/2023     No results found for: \"CREATININE\"    Lab Results   Component Value Date    CHOL 139 03/10/2023    HDL 38 (L) 03/10/2023    LDL 83 03/10/2023      Lab Results   Component Value Date    WBC 7.0 08/14/2023    HGB 12.8 (L) 08/14/2023    HCT 40.9 08/14/2023    MCV 91 08/14/2023    PLT " 195 2023    Lab Results   Component Value Date    TSH 0.46 2022    INR 1.18 (H) 2023        60 minutes spent reviewing prior records (including documentation, laboratory studies, cardiac testing/imaging), history and physical exam, planning, and subsequent documentation.     The longitudinal plan of care for atrial fibrillation was addressed during this visit. Due to the added complexity in care, I will continue to support Daniel in the subsequent management of this condition(s) and with the ongoing continuity of care of this condition(s).     This note has been dictated using voice recognition software. Any grammatical, typographical, or context distortions are unintentional and inherent to the software.    Jackie Beach, CNP  Clinical Cardiac Electrophysiology  [facility]  Clinic and [phone]  Fax: [phone]  Electrophysiology Nurses: [phone]

## 2024-01-17 NOTE — LETTER
1/17/2024    Kenna Calvillo, NP  [address] [place] [address]    RE: Daniel Alamo       Dear Colleague,     I had the pleasure of seeing Daniel Alamo in the [facility].       [facility]  Cardiac Electrophysiology  [address]  [place] [address]   Office: [phone]  Fax: [phone]     HEART CARE ELECTROPHYSIOLOGY NOTE    Primary Care: Kenna Calvillo MD    REASON FOR VISIT: paroxysmal atrial fibrillation      Assessment/Recommendations     Paroxysmal atrial fibrillation: Asymptomatic.  No significant changes since resuming sotalol. We discussed the physiology and natural progression of atrial fibrillation and management options including CVA prophylaxis, ventricular rate control, antiarrhythmic drug therapy and consideration of catheter ablation. We also discussed long term management of atrial fibrillation includes sleep apnea diagnosis and management, avoiding heavy alcohol consumption, and management of concurrent hypertension and coronary artery disease as appropriate.  He has very limited options for medical management of atrial fibrillation due to his history of CAD and fatigue on beta-blocker.  We discussed ongoing monitoring via device and discuss ablation in further detail with increasing AF    [id number] score of 4 for age >75, HFrEF, CAD     QTc within normal limits, no history CKD    Ischemic cardiomyopathy, HFrEF: LVEF stable 30 to 45% by TTE 9/2023. Compensated and euvolemic.  GDMT limited due to hypotension    ICD in situ: known FFRW oversensing. Appropriate device function    Plan:   -Continue Eliquis 5 mg twice a day for stroke prophylaxis  -Continue sotalol 40 mg twice a day  -EP follow-up 6 months       History of Present Illness/Subjective    Daniel Alamo is a 83 year old male who is seen today for evaluation of atrial fibrillation. He has a past medical history significant for sinus node dysfunction status  post dual-chamber ICD 2004 and generator replacement 2018, CAD status post CABG 1990s, ischemic cardiomyopathy, primary prevention dual-chamber ICD in situ, status post TAVR 2023, ascending aortic aneurysm, orthostatic hypotension, PVCs, hyperlipidemia, iliac artery aneurysm sp EVAR and endoprosthesis 2020 with persistent leak.  He was previously on sotalol, discontinued due to significant fatigue, resumed at low dose about 6 months ago after increased AF burden by device.  Due to ongoing fatigue he underwent echocardiogram showing stable LVEF 30-35%, stress testing without significant changes.     He feels no differently since resuming sotalol.  He is chronically fatigued which is stable.  He can get dizzy at times throughout the day.  He denies presyncope, syncope or falls recently.  He has no awareness of atrial fibrillation. He denies chest discomfort, palpitations, shortness of breath, pedal edema, or syncope. He is fairly sedentary but walks occasionally for exercise, uses scooter for long distances.  He lives in a single-family home with his wife.  He is an avid [employer] fan.      Data Review     Arrhythmia hx:   Dx/date: Paroxysmal AF 2018  Sx: asx   [id number], OAC: 4 for age >75, HFrEF, CAD; apixaban  Rate control: metoprolol  Prior AAD: sotalol  DCCV: N/A  Ablation: N/A    EKG:   3/6/2023: Atrial paced/ventricular sensed 57 bpm, RBBB, QT/QTc 488/474 ms  Personally reviewed.     TTE/ARAVIND: 9/14/2023  The left ventricle is severely dilated.  There is mild concentric left ventricular hypertrophy.  The visual ejection fraction is 30-35%.  There is mild global hypokinesia of the left ventricle.  There is inferior wall akinesis.  There is severe inferolateral wall hypokinesis.  The right ventricle is normal in size and function.  There is mild to moderate (1-2+) mitral regurgitation.  There is a CHANTAL 3 bioprosthetic valve present in aortic valve position. The  gradient is normal for this prosthetic aortic  valve. There is trace  paravalvular regurgitation present.  There is mild to moderate (1-2+) tricuspid regurgitation.  Compared to the prior study dated 2/27/2023, there have been no changes.    MPI: 9/14/2023    Lexiscan stress ECG negative for ischemia.    The nuclear stress test is abnormal.  There is a large area of transmural infarction involving the inferior, inferolateral and lateral walls along with a small area of nontransmural infarction involving the mid septal wall.  No evidence of ischemia.    The left ventricular ejection fraction at stress is 33% with akinesis of the inferior and inferolateral wall..    A prior study was conducted on 8/25/2014.  Images not available for review.    CA/PCI: 12/192022  LM:no obstruction  LAD:severe diffuse Ca2+ disease in mLAD into D1. Occluded dLAD  Lcx:severe mid-vessel lesion  RCA:not injected  - given area of ischemia/vaibility in anterior/anterolateral wall, proceeded w/ PCI w/ cuting balloon angioplasty and Shockwave lithotripsy in order to optimize risk profile of the pacing run, but given only branch involvement, we chose to stop, as stenting/Roto would require escalating risk  - proceed w/ TAVR w/u as planned   - continue ASA 81mg daily indefinitely, clopidogrel 600mg once, followed by 75mg daily for at least 12 months, add atorva 40  - continue aggressive risk factor modification     DEVICE: BSCI Dynagen dual ICD 4/22/2004, generator replacement 10/15/2018 (SND)   Annual interrogation  Pacing mode: DDDR 55/120  Presenting rhythm: AP/ASVS  Underlying rhythm: SB 50s 1st degree AVB  A-paced: 26%.  V-paced 22%.  Battery: estimated longevity 9 years  Atrial and Ventricular leads: Stable with good sensing, impedance, and pacing thresholds  Atrial arrhythmias: Most recent sustained AF episodes occurring in November, lasting approximately 6-15 hours  Ventricular arrhythmias: NSVT most recently 1/14/2024 up to 167, no recent therapies  AF burden: 1%  Histograms:  stable  Programming changes: RA sensitivity decreased 0.4 mV --> 0.2 mV     I have reviewed and updated the patient's past medical history, allergy list and medication list.          Physical Examination Review of Systems   /72 (BP Location: Right arm, Patient Position: Sitting, Cuff Size: Adult Regular)   Pulse 60   Resp 16   Wt 64.4 kg (142 lb)   BMI 20.37 kg/m      Body mass index is 20.37 kg/m .    Wt Readings from Last 3 Encounters:   01/17/24 64.4 kg (142 lb)   08/14/23 63 kg (139 lb)   06/27/23 63.5 kg (140 lb)       General   Appearance:   Alert and oriented, in no acute distress.    HEENT:  Normocephalic and atraumatic.  Conjunctiva and sclera are clear. Moist oral mucosa.  Hard of hearing   Neck: No JVP, carotid bruit or obvious thyromegaly.   Lungs:   Respirations unlabored.  Diminished throughout.  No wheezing or rhonchi   Cardiovascular:   Rhythm is regular. S1 and S2 are normal. No significant murmur is present. Lower extremities demonstrate no significant edema. Posterior tibial pulses are intact bilaterally.   Extremities: No cyanosis or clubbing   Skin: Skin is warm, dry, and otherwise intact.   Neurologic: Gait not assessed. Mood and affect appropriate.                                                Medical History  Surgical History Family History Social History   Past Medical History:   Diagnosis Date    Abdominal aortic aneurysm (AAA) without rupture 2/24/2020 2/2020 underwent aortobiiliac endograft repair of aortic aneurysm and  placement of bridging stents bilaterally and placement of bilateral iliac  branch devices for repair of bilateral iliac artery aneurysms      Aneurysm of iliac artery (H24) 2/4/2020    Aortic stenosis, severe 1/31/2023    Coronary artery disease     Heart disease     Heart failure with reduced ejection fraction, NYHA class II (H)     Ischemic cardiomyopathy 12/31/2019    Paroxysmal atrial fibrillation (H)     Device interrogation, started on AC by   Praneethd 1/22/2020 [id number] scire of (>75, CAD, CHF)     Pure hypercholesterolemia     Created by Conversion     PVD (peripheral vascular disease) (H24) 2/24/2020 2/2020 underwent aortobiiliac endograft repair of aortic aneurysm and  placement of bridging stents bilaterally and placement of bilateral iliac  branch devices for repair of bilateral iliac artery aneurysms      Severe aortic stenosis 10/10/2022    Sinus node dysfunction (H) 11/22/2022    VT (ventricular tachycardia) (H) 2004    Past Surgical History:   Procedure Laterality Date    ABDOMEN SURGERY      BYPASS GRAFT ARTERY CORONARY      CV ANGIOGRAM CORONARY GRAFT N/A 9/19/2022    Procedure: Coronary Angiogram Graft;  Surgeon: Tyrone Ordoñez MD;  Location: [facility]    CV CORONARY LITHOTRIPSY PCI N/A 12/19/2022    Procedure: Percutaneous Coronary Intervention - Lithotripsy;  Surgeon: Tyrone Ordoñez MD;  Location: [facility]    CV LEFT HEART CATH N/A 9/19/2022    Procedure: Left Heart Catheterization;  Surgeon: Tyrone Ordoñez MD;  Location: [facility]    CV PCI ANGIOPLASTY N/A 12/19/2022    Procedure: Percutaneous Transluminal Angioplasty;  Surgeon: Tyrone Ordoñez MD;  Location: [facility]    CV RIGHT HEART CATH MEASUREMENTS RECORDED N/A 9/19/2022    Procedure: Right Heart Catheterization;  Surgeon: Tyrone Ordoñze MD;  Location: [facility]    EYE SURGERY      INSERT / REPLACE / REMOVE PACEMAKER      OR TRANSCATHETER AORTIC VALVE REPLACEMENT, SUBCLAVIAN PERCUTANEOUS APPROACH (STANDBY) N/A 1/31/2023    Procedure: OR TRANSCATHETER AORTIC VALVE REPLACEMENT, SUBCLAVIAN PERCUTANEOUS APPROACH (STANDBY);  Surgeon: Shanae Rich MD;  Location: [facility]    OTHER SURGICAL HISTORY      icd    TRANSCATHETER AORTIC VALVE REPLACEMENT - SUBCLAVIAN APPROACH N/A 1/31/2023    Procedure: Transcatheter Aortic Valve Replacement - Subclavian Approach;  Surgeon: Tyrone Ordoñez MD;   "Location: [facility]    [facility] CABG, VEIN, SINGLE      Description: CABG (CABG);  Recorded: 10/03/2012;  Comments: LIMA to LAD, SVG to OM2, SVG to RCA    Family History   Problem Relation Age of Onset    Cancer Mother     Heart Disease Father     Social History     Tobacco Use    Smoking status: Every Day     Packs/day: 0.50     Years: 30.00     Additional pack years: 0.00     Total pack years: 15.00     Types: Cigarettes    Smokeless tobacco: Never   Vaping Use    Vaping Use: Never used   Substance Use Topics    Alcohol use: Yes     Alcohol/week: 1.0 standard drink of alcohol     Comment: Glass of wine daily.    Drug use: No          Medications  Allergies   Current Outpatient Medications   Medication Sig Dispense Refill    acetaminophen (TYLENOL) 325 MG tablet Take 650 mg by mouth every 6 hours as needed for pain      apixaban ANTICOAGULANT (ELIQUIS) 5 MG tablet Take 5 mg by mouth 2 times daily      aspirin 81 MG EC tablet Take 81 mg by mouth daily      fluticasone (FLONASE) 50 MCG/ACT nasal spray Spray 1 spray into both nostrils as needed for rhinitis or allergies      gabapentin (NEURONTIN) 300 MG capsule Take 900 mg by mouth 2 times daily       lisinopril (ZESTRIL) 2.5 MG tablet Take 1 tablet (2.5 mg) by mouth daily 90 tablet 3    rosuvastatin (CRESTOR) 10 MG tablet Take 1.5 tablets (15 mg) by mouth daily 90 tablet 3    sotalol (BETAPACE) 80 MG tablet Take 0.5 tablets (40 mg) by mouth 2 times daily 90 tablet 3    tamsulosin (FLOMAX) 0.4 MG capsule Take 0.4 mg by mouth every evening      Allergies   Allergen Reactions    Atorvastatin Diarrhea    Carvedilol Other (See Comments) and GI Disturbance     Upset stomach; GI upset      Chloride GI Disturbance and Nausea         Lab Results    Chemistry/lipid CBC Cardiac Enzymes/BNP/TSH/INR   Lab Results   Component Value Date    BUN 18.1 08/14/2023     08/14/2023    CO2 26 08/14/2023     No results found for: \"CREATININE\"    Lab Results   Component Value " Date    CHOL 139 03/10/2023    HDL 38 (L) 03/10/2023    LDL 83 03/10/2023      Lab Results   Component Value Date    WBC 7.0 2023    HGB 12.8 (L) 2023    HCT 40.9 2023    MCV 91 2023     2023    Lab Results   Component Value Date    TSH 0.46 2022    INR 1.18 (H) 2023        60 minutes spent reviewing prior records (including documentation, laboratory studies, cardiac testing/imaging), history and physical exam, planning, and subsequent documentation.     The longitudinal plan of care for atrial fibrillation was addressed during this visit. Due to the added complexity in care, I will continue to support Daniel in the subsequent management of this condition(s) and with the ongoing continuity of care of this condition(s).     This note has been dictated using voice recognition software. Any grammatical, typographical, or context distortions are unintentional and inherent to the software.    Jackie Beach CNP  Clinical Cardiac Electrophysiology  [facility]  Clinic and [phone]  Fax: [phone]  Electrophysiology Nurses: [phone]         Thank you for allowing me to participate in the care of your patient.      Sincerely,     CAILIN KNOX CNP     [facility]  cc:   CAILIN Evans CNP  [address], [address]  [place] [address]

## 2024-01-23 DIAGNOSIS — E78.00 PURE HYPERCHOLESTEROLEMIA: ICD-10-CM

## 2024-01-23 RX ORDER — ROSUVASTATIN CALCIUM 10 MG/1
15 TABLET, COATED ORAL DAILY
Qty: 90 TABLET | Refills: 0 | Status: SHIPPED | OUTPATIENT
Start: 2024-01-23 | End: 2024-04-22

## 2024-01-30 ENCOUNTER — TELEPHONE (OUTPATIENT)
Dept: CARDIOLOGY | Facility: CLINIC | Age: 84
End: 2024-01-30
Payer: MEDICARE

## 2024-01-30 ENCOUNTER — APPOINTMENT (OUTPATIENT)
Dept: RADIOLOGY | Facility: HOSPITAL | Age: 84
End: 2024-01-30
Attending: EMERGENCY MEDICINE
Payer: MEDICARE

## 2024-01-30 ENCOUNTER — HOSPITAL ENCOUNTER (EMERGENCY)
Facility: HOSPITAL | Age: 84
Discharge: HOME | End: 2024-01-30
Attending: EMERGENCY MEDICINE | Admitting: EMERGENCY MEDICINE
Payer: MEDICARE

## 2024-01-30 VITALS
HEART RATE: 65 BPM | OXYGEN SATURATION: 96 % | TEMPERATURE: 97.6 F | WEIGHT: 140 LBS | DIASTOLIC BLOOD PRESSURE: 77 MMHG | HEIGHT: 70 IN | RESPIRATION RATE: 20 BRPM | BODY MASS INDEX: 20.04 KG/M2 | SYSTOLIC BLOOD PRESSURE: 171 MMHG

## 2024-01-30 DIAGNOSIS — R19.7 DIARRHEA, UNSPECIFIED TYPE: ICD-10-CM

## 2024-01-30 DIAGNOSIS — R05.9 COUGH, UNSPECIFIED TYPE: ICD-10-CM

## 2024-01-30 LAB
ANION GAP SERPL CALCULATED.3IONS-SCNC: 10 MMOL/L (ref 7–15)
BASOPHILS # BLD AUTO: 0.1 10E3/UL (ref 0–0.2)
BASOPHILS NFR BLD AUTO: 1 %
BUN SERPL-MCNC: 17.7 MG/DL (ref 8–23)
CALCIUM SERPL-MCNC: 9.5 MG/DL (ref 8.8–10.2)
CHLORIDE SERPL-SCNC: 104 MMOL/L (ref 98–107)
CREAT SERPL-MCNC: 1.2 MG/DL (ref 0.67–1.17)
DEPRECATED HCO3 PLAS-SCNC: 26 MMOL/L (ref 22–29)
EGFRCR SERPLBLD CKD-EPI 2021: 59 ML/MIN/1.73M2
EOSINOPHIL # BLD AUTO: 0.1 10E3/UL (ref 0–0.7)
EOSINOPHIL NFR BLD AUTO: 1 %
ERYTHROCYTE [DISTWIDTH] IN BLOOD BY AUTOMATED COUNT: 13.9 % (ref 10–15)
FLUAV RNA SPEC QL NAA+PROBE: NEGATIVE
FLUBV RNA RESP QL NAA+PROBE: NEGATIVE
GLUCOSE SERPL-MCNC: 105 MG/DL (ref 70–99)
HCT VFR BLD AUTO: 48.8 % (ref 40–53)
HGB BLD-MCNC: 16 G/DL (ref 13.3–17.7)
HOLD SPECIMEN: NORMAL
HOLD SPECIMEN: NORMAL
IMM GRANULOCYTES # BLD: 0 10E3/UL
IMM GRANULOCYTES NFR BLD: 0 %
LYMPHOCYTES # BLD AUTO: 1.6 10E3/UL (ref 0.8–5.3)
LYMPHOCYTES NFR BLD AUTO: 14 %
MAGNESIUM SERPL-MCNC: 1.8 MG/DL (ref 1.7–2.3)
MCH RBC QN AUTO: 29.1 PG (ref 26.5–33)
MCHC RBC AUTO-ENTMCNC: 32.8 G/DL (ref 31.5–36.5)
MCV RBC AUTO: 89 FL (ref 78–100)
MONOCYTES # BLD AUTO: 1.1 10E3/UL (ref 0–1.3)
MONOCYTES NFR BLD AUTO: 10 %
NEUTROPHILS # BLD AUTO: 8.4 10E3/UL (ref 1.6–8.3)
NEUTROPHILS NFR BLD AUTO: 74 %
NRBC # BLD AUTO: 0 10E3/UL
NRBC BLD AUTO-RTO: 0 /100
PLATELET # BLD AUTO: 192 10E3/UL (ref 150–450)
POTASSIUM SERPL-SCNC: 4 MMOL/L (ref 3.4–5.3)
RBC # BLD AUTO: 5.5 10E6/UL (ref 4.4–5.9)
RSV RNA SPEC NAA+PROBE: NEGATIVE
SARS-COV-2 RNA RESP QL NAA+PROBE: NEGATIVE
SODIUM SERPL-SCNC: 140 MMOL/L (ref 135–145)
TROPONIN T SERPL HS-MCNC: 31 NG/L
TROPONIN T SERPL HS-MCNC: 35 NG/L
WBC # BLD AUTO: 11.3 10E3/UL (ref 4–11)

## 2024-01-30 PROCEDURE — 36415 COLL VENOUS BLD VENIPUNCTURE: CPT | Performed by: EMERGENCY MEDICINE

## 2024-01-30 PROCEDURE — 80048 BASIC METABOLIC PNL TOTAL CA: CPT | Performed by: EMERGENCY MEDICINE

## 2024-01-30 PROCEDURE — 99285 EMERGENCY DEPT VISIT HI MDM: CPT | Mod: 25

## 2024-01-30 PROCEDURE — 96360 HYDRATION IV INFUSION INIT: CPT

## 2024-01-30 PROCEDURE — 84484 ASSAY OF TROPONIN QUANT: CPT | Performed by: EMERGENCY MEDICINE

## 2024-01-30 PROCEDURE — 71046 X-RAY EXAM CHEST 2 VIEWS: CPT

## 2024-01-30 PROCEDURE — 85025 COMPLETE CBC W/AUTO DIFF WBC: CPT | Performed by: EMERGENCY MEDICINE

## 2024-01-30 PROCEDURE — 87637 SARSCOV2&INF A&B&RSV AMP PRB: CPT | Performed by: EMERGENCY MEDICINE

## 2024-01-30 PROCEDURE — 258N000003 HC RX IP 258 OP 636: Performed by: EMERGENCY MEDICINE

## 2024-01-30 PROCEDURE — 93005 ELECTROCARDIOGRAM TRACING: CPT | Performed by: EMERGENCY MEDICINE

## 2024-01-30 PROCEDURE — 96361 HYDRATE IV INFUSION ADD-ON: CPT

## 2024-01-30 PROCEDURE — 83735 ASSAY OF MAGNESIUM: CPT | Performed by: EMERGENCY MEDICINE

## 2024-01-30 RX ADMIN — SODIUM CHLORIDE 500 ML: 9 INJECTION, SOLUTION INTRAVENOUS at 11:11

## 2024-01-30 ASSESSMENT — ACTIVITIES OF DAILY LIVING (ADL)
ADLS_ACUITY_SCORE: 35
ADLS_ACUITY_SCORE: 35

## 2024-01-30 NOTE — ED TRIAGE NOTES
Sick since 1/26. Called PCP on Monday, triage nurse called back, believed that it is a viral issue recommended see how he does, but wife states getting worse. Per wife, Patient has not been eating or drinking, abdominal pain, dizzy, sob, cough, diarrhea.

## 2024-01-30 NOTE — TELEPHONE ENCOUNTER
[facility] Call Center    Phone Message    May a detailed message be left on voicemail: yes     Reason for Call: Other: Spouse states that Daniel is having intermittent shortness of breath, not really eating or drinking since Friday and some diarrhea. They are concerned about him and that he may be dehydrated.  Please call [facility] to discuss.      Action Taken: Other: cardiology    Travel Screening: Not Applicable  Thank you!  [facility]

## 2024-01-30 NOTE — ED NOTES
Patient  sitting up  in bed. Wife at bedside.  Patient very Circle, wife is good histroian. Dr Woodward in room talking to  both.  Abdomin flat, bowel  sounds decreased, non  tender abdomin.  Lungs diminished posteriorly

## 2024-01-30 NOTE — ED PROVIDER NOTES
EMERGENCY DEPARTMENT ENCOUNTER     NAME: Daniel Alamo   AGE: 83 year old male   YOB: 1940   MRN: [id number]   EVALUATION DATE & TIME: 1/30/2024 10:56 AM   PCP: Kenna Calvillo     Chief Complaint   Patient presents with    Abdominal Pain    Diarrhea    Shortness of Breath   :    FINAL IMPRESSION       1. Cough, unspecified type    2. Diarrhea, unspecified type           ED COURSE & MEDICAL DECISION MAKING      Pertinent Labs & Imaging studies reviewed. (See chart for details)   83 year old male  presents to the Emergency Department for evaluation of several days of cough, diarrhea, and generalized weakness.  This was initially a presumed viral illness but then he called the cardiology clinic this morning and they asked him to come in for evaluation. Initial Vitals Reviewed. Initial exam notable for thin male who is in no acute distress with generally normal vitals.  His abdomen is nontender and I do not suspect something like obstruction, colitis, diverticulitis or any surgical pathology and I actually do not think we need to do imaging as he has no complaints of pain.  With both respiratory and GI symptoms I am in agreement that this sounds viral in nature.  We did do a cardiac workup and troponin on arrival and 2 hours later rule out on high-sensitivity protocol and there are no acute changes on his EKG.  His creatinine appears at baseline only 0.03 off from the last check, electrolytes are acceptable with no signs of significant dehydration, and viral panel is negative for COVID, influenza, RSV.  Chest x-ray does not show a pneumonia.  After 500 cc of IV fluids here he feels improved and comfortable with discharge with home supportive care.  I discussed instructions and workup at length with him and his wife prior to discharge.         11:00 AM I met with the patient, gathered information, history, and performed initial exam.  1:40 PM I rechecked on the patient and updated him. I discussed  discharge plan with him. Patient is agreeable with the plan.    At the conclusion of the encounter I discussed the results of all of the tests and the disposition. The questions were answered. The patient or family acknowledged understanding and was agreeable with the care plan.     0 minutes critical care time, see procedure note below for details if relevant    Medical Decision Making    History:  Supplemental history from: Documented in chart, family member  External Record(s) reviewed: Documented in chart, telephone encounter from cardiology from today    Work Up:  Chart documentation includes differential considered and any EKGs or imaging independently interpreted by provider, where specified.  In additional to work up documented, I considered the following work up: Documented in chart, if applicable.    External consultation:  Discussion of management with another provider: Documented in chart, if applicable    Complicating factors:  Care impacted by chronic illness: Heart Disease  Care affected by social determinants of health: N/A    Disposition considerations: Discharge. No recommendations on prescription strength medication(s). I considered admission, but ultimately discharged patient with reassuring workup.        MEDICATIONS GIVEN IN THE EMERGENCY:   Medications   sodium chloride 0.9% BOLUS 500 mL (0 mLs Intravenous Stopped 1/30/24 1311)      NEW PRESCRIPTIONS STARTED AT TODAY'S ER VISIT   New Prescriptions    No medications on file     ================================================================   HISTORY OF PRESENT ILLNESS       Patient information was obtained from: wife.   Use of Intrepreter: N/A     Daniel Alamo is a 83 year old male with history of hypotension, CHF, CA, a-fib, and hypercholesteremia who presents to the ED with his wife for evaluation of generalized sickness.     Patient's wife whom helps with providing the history of the patient since the patient has hearing difficulties.  She states that he has been sick since Friday 1/26/24 (4 days ago). He has abdominal pain, nausea, loss of appetite, watery diarrhea, dizziness, shortness of breath, and a full cough.     She called nurse triage on 1/29 and they told her that it could be a virus and not to worry. However, she called the patient's cardiology clinic today and spoke wit the cardiologists' nurse who advised her to come to the ED.      Patient had an echo appointment tomorrow, but they canceled it and pushed it until March 14th.     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 Harrell 1/22/2020 [id number] scire of (>75, CAD, CHF)     Pure hypercholesterolemia     Created by Conversion     PVD (peripheral vascular disease) (H24) 2/24/2020 2/2020 underwent aortobiiliac endograft repair of aortic aneurysm and  placement of bridging stents bilaterally and placement of bilateral iliac  branch devices for repair of bilateral iliac artery aneurysms      Severe aortic stenosis 10/10/2022    Sinus node dysfunction (H) 11/22/2022    VT (ventricular tachycardia) (H) 2004      PAST SURGICAL HISTORY:   Past Surgical History:   Procedure Laterality Date    ABDOMEN SURGERY      BYPASS GRAFT ARTERY CORONARY      CV ANGIOGRAM CORONARY GRAFT N/A 9/19/2022     Procedure: Coronary Angiogram Graft;  Surgeon: Tyrone Ordoñez MD;  Location: [facility]    CV CORONARY LITHOTRIPSY PCI N/A 12/19/2022    Procedure: Percutaneous Coronary Intervention - Lithotripsy;  Surgeon: Tyrone Ordoñez MD;  Location: [facility]    CV LEFT HEART CATH N/A 9/19/2022    Procedure: Left Heart Catheterization;  Surgeon: Tyrone Ordoñez MD;  Location: [facility]    CV PCI ANGIOPLASTY N/A 12/19/2022    Procedure: Percutaneous Transluminal Angioplasty;  Surgeon: Tyrone Ordoñez MD;  Location: [facility]    CV RIGHT HEART CATH MEASUREMENTS RECORDED N/A 9/19/2022    Procedure: Right Heart Catheterization;  Surgeon: Tyrone Ordoñez MD;  Location: [facility]    EYE SURGERY      INSERT / REPLACE / REMOVE PACEMAKER      OR TRANSCATHETER AORTIC VALVE REPLACEMENT, SUBCLAVIAN PERCUTANEOUS APPROACH (STANDBY) N/A 1/31/2023    Procedure: OR TRANSCATHETER AORTIC VALVE REPLACEMENT, SUBCLAVIAN PERCUTANEOUS APPROACH (STANDBY);  Surgeon: Shanae Rich MD;  Location: [facility]    OTHER SURGICAL HISTORY      icd    TRANSCATHETER AORTIC VALVE REPLACEMENT - SUBCLAVIAN APPROACH N/A 1/31/2023    Procedure: Transcatheter Aortic Valve Replacement - Subclavian Approach;  Surgeon: yTrone Ordoñez MD;  Location: [facility]    Z CABG, VEIN, SINGLE      Description: CABG (CABG);  Recorded: 10/03/2012;  Comments: LIMA to LAD, SVG to OM2, SVG to RCA      CURRENT MEDICATIONS:   acetaminophen (TYLENOL) 325 MG tablet  apixaban ANTICOAGULANT (ELIQUIS) 5 MG tablet  aspirin 81 MG EC tablet  fluticasone (FLONASE) 50 MCG/ACT nasal spray  gabapentin (NEURONTIN) 300 MG capsule  lisinopril (ZESTRIL) 2.5 MG tablet  rosuvastatin (CRESTOR) 10 MG tablet  sotalol (BETAPACE) 80 MG tablet  tamsulosin (FLOMAX) 0.4 MG capsule      ALLERGIES:   Allergies   Allergen Reactions    Atorvastatin Diarrhea    Carvedilol Other (See Comments) and GI Disturbance      "Upset stomach; GI upset      Chloride GI Disturbance and Nausea      FAMILY HISTORY:   Family History   Problem Relation Age of Onset    Cancer Mother     Heart Disease Father       SOCIAL HISTORY:   Social History     Socioeconomic History    Marital status:    Tobacco Use    Smoking status: Every Day     Packs/day: 0.50     Years: 30.00     Additional pack years: 0.00     Total pack years: 15.00     Types: Cigarettes    Smokeless tobacco: Never   Vaping Use    Vaping Use: Never used   Substance and Sexual Activity    Alcohol use: Yes     Alcohol/week: 1.0 standard drink of alcohol     Comment: Glass of wine daily.    Drug use: No    Sexual activity: Not Currently        VITALS  Patient Vitals for the past 24 hrs:   BP Temp Temp src Pulse Resp SpO2 Height Weight   01/30/24 1326 (!) 171/77 -- -- 65 20 96 % -- --   01/30/24 1304 (!) 148/85 -- -- 76 24 94 % -- --   01/30/24 1058 (!) 167/87 -- -- -- -- 98 % -- --   01/30/24 1040 120/89 97.6  F (36.4  C) Oral 82 24 100 % 1.778 m (5' 10\") 63.5 kg (140 lb)        ================================================================    PHYSICAL EXAM     VITAL SIGNS: BP (!) 171/77   Pulse 65   Temp 97.6  F (36.4  C) (Oral)   Resp 20   Ht 1.778 m (5' 10\")   Wt 63.5 kg (140 lb)   SpO2 96%   BMI 20.09 kg/m     Constitutional:  Awake, no acute distress   HENT:  Atraumatic, oropharynx without exudate or erythema, membranes moist  Lymph:  No adenopathy  Eyes: EOM intact, PERRL, no injection  Neck: Supple  Respiratory:  Clear to auscultation bilaterally, no wheezes or crackles   Cardiovascular:  Regular rate and rhythm, single S1 and S2   GI:  Soft, nontender, nondistended, no rebound or guarding   Musculoskeletal:  Moves all extremities, no lower extremity edema, no deformities    Skin:  Warm, dry  Neurologic:  Alert and oriented x3, no focal deficits noted       ================================================================  LAB       All pertinent labs reviewed and " interpreted.   Labs Ordered and Resulted from Time of ED Arrival to Time of ED Departure   BASIC METABOLIC PANEL - Abnormal       Result Value    Sodium 140      Potassium 4.0      Chloride 104      Carbon Dioxide (CO2) 26      Anion Gap 10      Urea Nitrogen 17.7      Creatinine 1.20 (*)     GFR Estimate 59 (*)     Calcium 9.5      Glucose 105 (*)    TROPONIN T, HIGH SENSITIVITY - Abnormal    Troponin T, High Sensitivity 35 (*)    CBC WITH PLATELETS AND DIFFERENTIAL - Abnormal    WBC Count 11.3 (*)     RBC Count 5.50      Hemoglobin 16.0      Hematocrit 48.8      MCV 89      MCH 29.1      MCHC 32.8      RDW 13.9      Platelet Count 192      % Neutrophils 74      % Lymphocytes 14      % Monocytes 10      % Eosinophils 1      % Basophils 1      % Immature Granulocytes 0      NRBCs per 100 WBC 0      Absolute Neutrophils 8.4 (*)     Absolute Lymphocytes 1.6      Absolute Monocytes 1.1      Absolute Eosinophils 0.1      Absolute Basophils 0.1      Absolute Immature Granulocytes 0.0      Absolute NRBCs 0.0     TROPONIN T, HIGH SENSITIVITY - Abnormal    Troponin T, High Sensitivity 31 (*)    INFLUENZA A/B, RSV, & SARS-COV2 PCR - Normal    Influenza A PCR Negative      Influenza B PCR Negative      RSV PCR Negative      SARS CoV2 PCR Negative     MAGNESIUM - Normal    Magnesium 1.8          ===============================================================  RADIOLOGY       Reviewed all pertinent imaging. Please see official radiology report.   Chest XR,  PA & LAT   Final Result   IMPRESSION: Stable left subclavian dual-chamber pacer/defibrillator and TAVR prosthesis. Previous sternotomy. Heart size and vascularity are normal. No focal consolidation, pneumothorax nor pleural effusion.            ================================================================  EKG       EKG reviewed interpreted by me shows sinus rhythm with a rate of 73, normal axis, QTc 519 with a right bundle branch block pattern and no acute ST or T wave  changes since March 2023 when lateral and inferior T wave inversions were also present    I have independently reviewed and interpreted the EKG(s) documented above.     ================================================================  PROCEDURES         I, Kiya Ya, am serving as a scribe to document services personally performed by Dr. Woodward based on my observation and the provider's statements to me. I, Jessica Woodward MD attest that Kiya Ya  is acting in a scribe capacity, has observed my performance of the services and has documented them in accordance with my direction.       Jessica Woodward M.D.   Emergency Medicine   [facility] EMERGENCY DEPARTMENT  [address] [place] [id number]  [phone]  Dept: [phone]      Jessica Woodward MD  01/30/24 2707

## 2024-01-30 NOTE — DISCHARGE INSTRUCTIONS
Fortunately, the labs in the ER look very reassuring and that there is no significant signs of dehydration, the COVID and influenza tests are negative, the electrolytes are acceptable, and the chest x-ray does not show pneumonia.  Like we discussed, this is most consistent with a viral process.  Try to drink fluids is much as possible to replace any GI losses and it should improve over the next few days.

## 2024-01-30 NOTE — TELEPHONE ENCOUNTER
Noted pt is presently in the ED as recommended. LBF agreed this was the best place for him given symptoms. -[facility] [place]      ----- Message -----  From: Corey Vargas MD  Sent: 1/30/2024   2:42 PM CST  To: Laly Swanson RN    Agree.  LF

## 2024-01-30 NOTE — TELEPHONE ENCOUNTER
"Called Lucero to address her concerns. She states that Daniel has not been doing well. He has had diarrhea, weakness, shortness of breath, a \"gurgling\" cough, reduced oral intake and dizziness. She called in to cardiology, because she did not receive great direction from PMD office.     Lucero states that she noticed Daniel had these symptoms on Friday. He is not much of a complaining and typically asks her not to call in. She saw that he called in to PMD to report his symptoms yesterday, which lead her to believe that he really is not feeling well. He started getting up to sleep in his recliner at night as well. She says he denies his heart is racing, he has lost weight, not gained and has no edema. With his cough, it sounds like it could be productive and \"gurgly\" with mucus. She denies fever. He still smokes, and this has not stopped him from going outside to smoke, but she worries about the dizziness and him falling.     We went over all these symptoms and possible causes. Writer explained that he does not sound like he is having any fluid retention due to HF and he denies a rapid pulse rate. He could have numerous things going on - viral or bacteria/pneumonia etc. All of which warrant in-person evaluation and if there is concern for dehydration, the ER is best. She inquired if LBF could see him and writer updated Lucero that he is rounding this week on the wards and this would be an unnecessary stop as his symptoms do not sound cardiac, despite the shortness of breath. He should be evaluated for all possible etiologies. She is agreeable to taking him to [facility] ER. Will update LBF that writer recommended this. -[facility] [place]           Dr. Vargas,  FYI only- I recommended ED evaluation for above symptoms. Lucero called in to us because PMD office was unfortunately not very helpful and she thought you would know what to do :). I do not think his symptoms are cardiac and emphasized that he needs to be checked out for all possible causes " of his symptoms, may need IV fluids, imaging, labs etc. She is agreeable to taking him to [facility] ED.  Thanks,  Mal

## 2024-02-01 LAB
MDC_IDC_EPISODE_DTM: NORMAL
MDC_IDC_EPISODE_DURATION: 1 S
MDC_IDC_EPISODE_DURATION: 10 S
MDC_IDC_EPISODE_DURATION: 10 S
MDC_IDC_EPISODE_DURATION: 11 S
MDC_IDC_EPISODE_DURATION: 19 S
MDC_IDC_EPISODE_DURATION: 21 S
MDC_IDC_EPISODE_DURATION: 23 S
MDC_IDC_EPISODE_DURATION: 3 S
MDC_IDC_EPISODE_DURATION: 3 S
MDC_IDC_EPISODE_DURATION: 4 S
MDC_IDC_EPISODE_DURATION: 4 S
MDC_IDC_EPISODE_DURATION: 5 S
MDC_IDC_EPISODE_DURATION: 6 S
MDC_IDC_EPISODE_DURATION: 6 S
MDC_IDC_EPISODE_DURATION: 7 S
MDC_IDC_EPISODE_DURATION: 8 S
MDC_IDC_EPISODE_DURATION: 8 S
MDC_IDC_EPISODE_DURATION: 9 S
MDC_IDC_EPISODE_DURATION: 9 S
MDC_IDC_EPISODE_ID: NORMAL
MDC_IDC_EPISODE_TYPE: NORMAL
MDC_IDC_EPISODE_TYPE_INDUCED: NO
MDC_IDC_EPISODE_VENDOR_TYPE: NORMAL
MDC_IDC_LEAD_CONNECTION_STATUS: NORMAL
MDC_IDC_LEAD_IMPLANT_DT: NORMAL
MDC_IDC_LEAD_LOCATION: NORMAL
MDC_IDC_LEAD_LOCATION_DETAIL_1: NORMAL
MDC_IDC_LEAD_MFG: NORMAL
MDC_IDC_LEAD_MODEL: NORMAL
MDC_IDC_LEAD_POLARITY_TYPE: NORMAL
MDC_IDC_LEAD_SERIAL: NORMAL
MDC_IDC_LEAD_SPECIAL_FUNCTION: NORMAL
MDC_IDC_LEAD_SPECIAL_FUNCTION: NORMAL
MDC_IDC_MSMT_BATTERY_DTM: NORMAL
MDC_IDC_MSMT_BATTERY_DTM: NORMAL
MDC_IDC_MSMT_BATTERY_REMAINING_LONGEVITY: 108 MO
MDC_IDC_MSMT_BATTERY_REMAINING_LONGEVITY: 108 MO
MDC_IDC_MSMT_BATTERY_REMAINING_PERCENTAGE: 100 %
MDC_IDC_MSMT_BATTERY_REMAINING_PERCENTAGE: 100 %
MDC_IDC_MSMT_BATTERY_STATUS: NORMAL
MDC_IDC_MSMT_BATTERY_STATUS: NORMAL
MDC_IDC_MSMT_CAP_CHARGE_DTM: NORMAL
MDC_IDC_MSMT_CAP_CHARGE_DTM: NORMAL
MDC_IDC_MSMT_CAP_CHARGE_TIME: 10.5 S
MDC_IDC_MSMT_CAP_CHARGE_TIME: 10.5 S
MDC_IDC_MSMT_CAP_CHARGE_TYPE: NORMAL
MDC_IDC_MSMT_CAP_CHARGE_TYPE: NORMAL
MDC_IDC_MSMT_LEADCHNL_RA_IMPEDANCE_VALUE: 792 OHM
MDC_IDC_MSMT_LEADCHNL_RA_IMPEDANCE_VALUE: 806 OHM
MDC_IDC_MSMT_LEADCHNL_RA_LEAD_CHANNEL_STATUS: NORMAL
MDC_IDC_MSMT_LEADCHNL_RA_PACING_THRESHOLD_AMPLITUDE: 1.5 V
MDC_IDC_MSMT_LEADCHNL_RA_PACING_THRESHOLD_AMPLITUDE: 1.5 V
MDC_IDC_MSMT_LEADCHNL_RA_PACING_THRESHOLD_PULSEWIDTH: 0.7 MS
MDC_IDC_MSMT_LEADCHNL_RA_PACING_THRESHOLD_PULSEWIDTH: 0.7 MS
MDC_IDC_MSMT_LEADCHNL_RA_SENSING_INTR_AMPL: 0.8 MV
MDC_IDC_MSMT_LEADCHNL_RV_IMPEDANCE_VALUE: 562 OHM
MDC_IDC_MSMT_LEADCHNL_RV_IMPEDANCE_VALUE: 567 OHM
MDC_IDC_MSMT_LEADCHNL_RV_PACING_THRESHOLD_AMPLITUDE: 0.8 V
MDC_IDC_MSMT_LEADCHNL_RV_PACING_THRESHOLD_AMPLITUDE: 0.9 V
MDC_IDC_MSMT_LEADCHNL_RV_PACING_THRESHOLD_PULSEWIDTH: 0.4 MS
MDC_IDC_MSMT_LEADCHNL_RV_PACING_THRESHOLD_PULSEWIDTH: 0.4 MS
MDC_IDC_MSMT_LEADCHNL_RV_SENSING_INTR_AMPL: 21.8 MV
MDC_IDC_PG_IMPLANT_DTM: NORMAL
MDC_IDC_PG_IMPLANT_DTM: NORMAL
MDC_IDC_PG_MFG: NORMAL
MDC_IDC_PG_MFG: NORMAL
MDC_IDC_PG_MODEL: NORMAL
MDC_IDC_PG_MODEL: NORMAL
MDC_IDC_PG_SERIAL: NORMAL
MDC_IDC_PG_SERIAL: NORMAL
MDC_IDC_PG_TYPE: NORMAL
MDC_IDC_PG_TYPE: NORMAL
MDC_IDC_SESS_CLINIC_NAME: NORMAL
MDC_IDC_SESS_CLINIC_NAME: NORMAL
MDC_IDC_SESS_DTM: NORMAL
MDC_IDC_SESS_DTM: NORMAL
MDC_IDC_SESS_TYPE: NORMAL
MDC_IDC_SESS_TYPE: NORMAL
MDC_IDC_SET_BRADY_AT_MODE_SWITCH_MODE: NORMAL
MDC_IDC_SET_BRADY_AT_MODE_SWITCH_MODE: NORMAL
MDC_IDC_SET_BRADY_AT_MODE_SWITCH_RATE: 170 {BEATS}/MIN
MDC_IDC_SET_BRADY_AT_MODE_SWITCH_RATE: 170 {BEATS}/MIN
MDC_IDC_SET_BRADY_LOWRATE: 55 {BEATS}/MIN
MDC_IDC_SET_BRADY_LOWRATE: 55 {BEATS}/MIN
MDC_IDC_SET_BRADY_MAX_SENSOR_RATE: 130 {BEATS}/MIN
MDC_IDC_SET_BRADY_MAX_SENSOR_RATE: 130 {BEATS}/MIN
MDC_IDC_SET_BRADY_MAX_TRACKING_RATE: 120 {BEATS}/MIN
MDC_IDC_SET_BRADY_MAX_TRACKING_RATE: 120 {BEATS}/MIN
MDC_IDC_SET_BRADY_MODE: NORMAL
MDC_IDC_SET_BRADY_MODE: NORMAL
MDC_IDC_SET_BRADY_PAV_DELAY_HIGH: 200 MS
MDC_IDC_SET_BRADY_PAV_DELAY_HIGH: 200 MS
MDC_IDC_SET_BRADY_PAV_DELAY_LOW: 300 MS
MDC_IDC_SET_BRADY_PAV_DELAY_LOW: 300 MS
MDC_IDC_SET_BRADY_SAV_DELAY_HIGH: 165 MS
MDC_IDC_SET_BRADY_SAV_DELAY_HIGH: 165 MS
MDC_IDC_SET_BRADY_SAV_DELAY_LOW: 250 MS
MDC_IDC_SET_BRADY_SAV_DELAY_LOW: 250 MS
MDC_IDC_SET_LEADCHNL_RA_PACING_AMPLITUDE: 2.6 V
MDC_IDC_SET_LEADCHNL_RA_PACING_AMPLITUDE: 2.6 V
MDC_IDC_SET_LEADCHNL_RA_PACING_POLARITY: NORMAL
MDC_IDC_SET_LEADCHNL_RA_PACING_POLARITY: NORMAL
MDC_IDC_SET_LEADCHNL_RA_PACING_PULSEWIDTH: 0.7 MS
MDC_IDC_SET_LEADCHNL_RA_PACING_PULSEWIDTH: 0.7 MS
MDC_IDC_SET_LEADCHNL_RA_SENSING_ADAPTATION_MODE: NORMAL
MDC_IDC_SET_LEADCHNL_RA_SENSING_ADAPTATION_MODE: NORMAL
MDC_IDC_SET_LEADCHNL_RA_SENSING_POLARITY: NORMAL
MDC_IDC_SET_LEADCHNL_RA_SENSING_POLARITY: NORMAL
MDC_IDC_SET_LEADCHNL_RA_SENSING_SENSITIVITY: 0.2 MV
MDC_IDC_SET_LEADCHNL_RA_SENSING_SENSITIVITY: 0.4 MV
MDC_IDC_SET_LEADCHNL_RV_PACING_AMPLITUDE: 1.5 V
MDC_IDC_SET_LEADCHNL_RV_PACING_AMPLITUDE: 1.5 V
MDC_IDC_SET_LEADCHNL_RV_PACING_POLARITY: NORMAL
MDC_IDC_SET_LEADCHNL_RV_PACING_POLARITY: NORMAL
MDC_IDC_SET_LEADCHNL_RV_PACING_PULSEWIDTH: 0.4 MS
MDC_IDC_SET_LEADCHNL_RV_PACING_PULSEWIDTH: 0.4 MS
MDC_IDC_SET_LEADCHNL_RV_SENSING_ADAPTATION_MODE: NORMAL
MDC_IDC_SET_LEADCHNL_RV_SENSING_ADAPTATION_MODE: NORMAL
MDC_IDC_SET_LEADCHNL_RV_SENSING_POLARITY: NORMAL
MDC_IDC_SET_LEADCHNL_RV_SENSING_POLARITY: NORMAL
MDC_IDC_SET_LEADCHNL_RV_SENSING_SENSITIVITY: 0.6 MV
MDC_IDC_SET_LEADCHNL_RV_SENSING_SENSITIVITY: 0.6 MV
MDC_IDC_SET_ZONE_DETECTION_INTERVAL: 261 MS
MDC_IDC_SET_ZONE_DETECTION_INTERVAL: 261 MS
MDC_IDC_SET_ZONE_DETECTION_INTERVAL: 333 MS
MDC_IDC_SET_ZONE_DETECTION_INTERVAL: 333 MS
MDC_IDC_SET_ZONE_DETECTION_INTERVAL: 400 MS
MDC_IDC_SET_ZONE_DETECTION_INTERVAL: 400 MS
MDC_IDC_SET_ZONE_STATUS: NORMAL
MDC_IDC_SET_ZONE_TYPE: NORMAL
MDC_IDC_SET_ZONE_VENDOR_TYPE: NORMAL
MDC_IDC_STAT_AT_BURDEN_PERCENT: 1 %
MDC_IDC_STAT_AT_BURDEN_PERCENT: 1 %
MDC_IDC_STAT_AT_DTM_END: NORMAL
MDC_IDC_STAT_AT_DTM_END: NORMAL
MDC_IDC_STAT_AT_DTM_START: NORMAL
MDC_IDC_STAT_AT_DTM_START: NORMAL
MDC_IDC_STAT_AT_MODE_SW_COUNT: 7450
MDC_IDC_STAT_BRADY_DTM_END: NORMAL
MDC_IDC_STAT_BRADY_DTM_END: NORMAL
MDC_IDC_STAT_BRADY_DTM_START: NORMAL
MDC_IDC_STAT_BRADY_DTM_START: NORMAL
MDC_IDC_STAT_BRADY_RA_PERCENT_PACED: 26 %
MDC_IDC_STAT_BRADY_RA_PERCENT_PACED: 26 %
MDC_IDC_STAT_BRADY_RV_PERCENT_PACED: 22 %
MDC_IDC_STAT_BRADY_RV_PERCENT_PACED: 22 %
MDC_IDC_STAT_EPISODE_RECENT_COUNT: 0
MDC_IDC_STAT_EPISODE_RECENT_COUNT: 1
MDC_IDC_STAT_EPISODE_RECENT_COUNT: 3
MDC_IDC_STAT_EPISODE_RECENT_COUNT: 4436
MDC_IDC_STAT_EPISODE_RECENT_COUNT_DTM_END: NORMAL
MDC_IDC_STAT_EPISODE_RECENT_COUNT_DTM_START: NORMAL
MDC_IDC_STAT_EPISODE_TYPE: NORMAL
MDC_IDC_STAT_EPISODE_VENDOR_TYPE: NORMAL
MDC_IDC_STAT_TACHYTHERAPY_ATP_DELIVERED_RECENT: 0
MDC_IDC_STAT_TACHYTHERAPY_ATP_DELIVERED_RECENT: 0
MDC_IDC_STAT_TACHYTHERAPY_ATP_DELIVERED_TOTAL: 1
MDC_IDC_STAT_TACHYTHERAPY_ATP_DELIVERED_TOTAL: 1
MDC_IDC_STAT_TACHYTHERAPY_RECENT_DTM_END: NORMAL
MDC_IDC_STAT_TACHYTHERAPY_RECENT_DTM_END: NORMAL
MDC_IDC_STAT_TACHYTHERAPY_RECENT_DTM_START: NORMAL
MDC_IDC_STAT_TACHYTHERAPY_RECENT_DTM_START: NORMAL
MDC_IDC_STAT_TACHYTHERAPY_SHOCKS_ABORTED_RECENT: 0
MDC_IDC_STAT_TACHYTHERAPY_SHOCKS_ABORTED_RECENT: 0
MDC_IDC_STAT_TACHYTHERAPY_SHOCKS_ABORTED_TOTAL: 0
MDC_IDC_STAT_TACHYTHERAPY_SHOCKS_ABORTED_TOTAL: 0
MDC_IDC_STAT_TACHYTHERAPY_SHOCKS_DELIVERED_RECENT: 0
MDC_IDC_STAT_TACHYTHERAPY_SHOCKS_DELIVERED_RECENT: 0
MDC_IDC_STAT_TACHYTHERAPY_SHOCKS_DELIVERED_TOTAL: 0
MDC_IDC_STAT_TACHYTHERAPY_SHOCKS_DELIVERED_TOTAL: 0
MDC_IDC_STAT_TACHYTHERAPY_TOTAL_DTM_END: NORMAL
MDC_IDC_STAT_TACHYTHERAPY_TOTAL_DTM_END: NORMAL
MDC_IDC_STAT_TACHYTHERAPY_TOTAL_DTM_START: NORMAL
MDC_IDC_STAT_TACHYTHERAPY_TOTAL_DTM_START: NORMAL

## 2024-02-01 PROCEDURE — 93283 PRGRMG EVAL IMPLANTABLE DFB: CPT | Performed by: INTERNAL MEDICINE

## 2024-02-01 PROCEDURE — 93295 DEV INTERROG REMOTE 1/2/MLT: CPT | Performed by: INTERNAL MEDICINE

## 2024-02-01 PROCEDURE — 93296 REM INTERROG EVL PM/IDS: CPT | Performed by: INTERNAL MEDICINE

## 2024-02-02 LAB
ATRIAL RATE - MUSE: 73 BPM
DIASTOLIC BLOOD PRESSURE - MUSE: 87 MMHG
INTERPRETATION ECG - MUSE: NORMAL
P AXIS - MUSE: 91 DEGREES
PR INTERVAL - MUSE: 212 MS
QRS DURATION - MUSE: 146 MS
QT - MUSE: 472 MS
QTC - MUSE: 519 MS
R AXIS - MUSE: 93 DEGREES
SYSTOLIC BLOOD PRESSURE - MUSE: 167 MMHG
T AXIS - MUSE: -30 DEGREES
VENTRICULAR RATE- MUSE: 73 BPM

## 2024-02-14 ENCOUNTER — OFFICE VISIT (OUTPATIENT)
Dept: CARDIOLOGY | Facility: CLINIC | Age: 84
End: 2024-02-14
Payer: MEDICARE

## 2024-02-14 ENCOUNTER — APPOINTMENT (OUTPATIENT)
Dept: CARDIOLOGY | Facility: CLINIC | Age: 84
End: 2024-02-14
Payer: MEDICARE

## 2024-02-14 VITALS
DIASTOLIC BLOOD PRESSURE: 80 MMHG | BODY MASS INDEX: 20.37 KG/M2 | RESPIRATION RATE: 16 BRPM | HEART RATE: 70 BPM | SYSTOLIC BLOOD PRESSURE: 120 MMHG | WEIGHT: 142 LBS

## 2024-02-14 DIAGNOSIS — Z95.2 S/P TAVR (TRANSCATHETER AORTIC VALVE REPLACEMENT): ICD-10-CM

## 2024-02-14 DIAGNOSIS — E78.00 PURE HYPERCHOLESTEROLEMIA: Primary | ICD-10-CM

## 2024-02-14 DIAGNOSIS — I25.5 ISCHEMIC CARDIOMYOPATHY: ICD-10-CM

## 2024-02-14 DIAGNOSIS — Z95.2 S/P TAVR (TRANSCATHETER AORTIC VALVE REPLACEMENT): Primary | ICD-10-CM

## 2024-02-14 DIAGNOSIS — I25.83 CORONARY ARTERIOSCLEROSIS DUE TO LIPID RICH PLAQUE: ICD-10-CM

## 2024-02-14 DIAGNOSIS — I50.20 HEART FAILURE WITH REDUCED EJECTION FRACTION, NYHA CLASS II (H): ICD-10-CM

## 2024-02-14 DIAGNOSIS — I48.0 PAROXYSMAL ATRIAL FIBRILLATION (H): ICD-10-CM

## 2024-02-14 LAB
ANION GAP SERPL CALCULATED.3IONS-SCNC: 7 MMOL/L (ref 7–15)
BUN SERPL-MCNC: 15.2 MG/DL (ref 8–23)
CALCIUM SERPL-MCNC: 8.5 MG/DL (ref 8.8–10.2)
CHLORIDE SERPL-SCNC: 105 MMOL/L (ref 98–107)
CREAT SERPL-MCNC: 1.13 MG/DL (ref 0.67–1.17)
DEPRECATED HCO3 PLAS-SCNC: 28 MMOL/L (ref 22–29)
EGFRCR SERPLBLD CKD-EPI 2021: 63 ML/MIN/1.73M2
ERYTHROCYTE [DISTWIDTH] IN BLOOD BY AUTOMATED COUNT: 13.3 % (ref 10–15)
GLUCOSE SERPL-MCNC: 86 MG/DL (ref 70–99)
HCT VFR BLD AUTO: 42.4 % (ref 40–53)
HGB BLD-MCNC: 13.5 G/DL (ref 13.3–17.7)
MCH RBC QN AUTO: 28.7 PG (ref 26.5–33)
MCHC RBC AUTO-ENTMCNC: 31.8 G/DL (ref 31.5–36.5)
MCV RBC AUTO: 90 FL (ref 78–100)
PLATELET # BLD AUTO: 287 10E3/UL (ref 150–450)
POTASSIUM SERPL-SCNC: 4.4 MMOL/L (ref 3.4–5.3)
RBC # BLD AUTO: 4.7 10E6/UL (ref 4.4–5.9)
SODIUM SERPL-SCNC: 140 MMOL/L (ref 135–145)
WBC # BLD AUTO: 8.4 10E3/UL (ref 4–11)

## 2024-02-14 PROCEDURE — 80048 BASIC METABOLIC PNL TOTAL CA: CPT | Performed by: PHYSICIAN ASSISTANT

## 2024-02-14 PROCEDURE — 85027 COMPLETE CBC AUTOMATED: CPT | Performed by: PHYSICIAN ASSISTANT

## 2024-02-14 PROCEDURE — 93000 ELECTROCARDIOGRAM COMPLETE: CPT | Performed by: INTERNAL MEDICINE

## 2024-02-14 PROCEDURE — 36415 COLL VENOUS BLD VENIPUNCTURE: CPT | Performed by: PHYSICIAN ASSISTANT

## 2024-02-14 PROCEDURE — 99215 OFFICE O/P EST HI 40 MIN: CPT | Performed by: PHYSICIAN ASSISTANT

## 2024-02-14 NOTE — PROGRESS NOTES
HEART CARE ENCOUNTER NOTE       [facility]  [phone]    Assessment/Recommendations   Assessment:  Severe aortic stenosis - s/p TAVR on 1/31/2023 using a #29 Magdalena valve. His echocardiogram September 2023 showed a mean gradient of 7 mmHg. He has another echocardiogram scheduled for 2/26/2024  Ischemic cardiomyopathy, heart failure with reduced ejection fraction, EF 30-35%, NYHA Class II - currently compensated. Reports chronic fatigue. Denies SOB or orthopnea. No LE edema on exam  ICD in situ - normal function no most recent device check  Paroxysmal atrial fibrillation - [id number] = 4 (age > 75, HFrEF, CAD).   Coronary artery disease s/p CABG ~30 years ago; PCI to LAD/diagonal with shockwave and balloon angioplasty December 2022.   Dyslipidemia - LDL 83, total cholesterol 139. Taking Crestor 15 mg daily  Hypertension - blood pressure is controlled   AAA s/p endographic repair in 2020  Fatigue - ongoing/chronic fatigue. He underwent further cardiac workup with Dr. Vargas last fall. His CBC and BMP from today's visit are unremarkable.     Plan:  Continue lisinopril 2.5 mg daily  Continue sotalol 40 mg twice daily  Continue Eliquis 5 mg twice daily  His 1 year Echocardiogram after TAVR is scheduled for 2/26/2024  Follow-up with EP in July or sooner if needed   Follow-up with PCP     Thank you for the opportunity to participate in the care of Daniel Alamo. It's been a pleasure working with him.  Please do not hesitate to call with any questions or concerns.       History of Present Illness/Subjective    I had the opportunity to see Daniel Alamo at the [facility] for 1 year visit after TAVR.    Daniel is an 84-year-old male with a past medical history significant for severe aortic stenosis (s/p TAVR January 2023), coronary artery disease (s/p CABG ~30 yrs ago; PCI to LAD/diagonal with shockwave and balloon angioplasty December 2022), ischemic cardiomyopathy, heart failure  with reduced ejection fraction (EF 30 to 35%), ICD in situ, paroxysmal atrial fibrillation (on Eliquis), dyslipidemia, hypertension, AAA (s/p endograft repair 2020), BPH    He has had ongoing/chronic fatigue since prior to the TAVR. He underwent stress test and echocardiogram in September 2023 due to his ongoing fatigue. He has been taken off metoprolol and sotalol without improvement in his symptoms. He has since resumed sotalol. He was recently seen in the emergency department for cough and diarrhea - work-up was overall negative and it was thought his symptoms were related to viral-illness. He did miss his appointment for his 1 year echocardiogram after TAVR because of this ED visit. His appetite has been ok and he reports his weight has been stable. He reports occasional dizziness and lightheadedness but this has been chronic for him.    Daniel DENNIS Cally denies exertional chest discomfort, palpitations, shortness of breath at rest, PND, orthopnea, pre-syncope or syncope.  Daniel Alamo also denies any recent weight loss, changes in appetite, nausea or vomiting.   ____________________________________________________________  Echo: 9/14/2023  Interpretation Summary     The left ventricle is severely dilated.  There is mild concentric left ventricular hypertrophy.  The visual ejection fraction is 30-35%.  There is mild global hypokinesia of the left ventricle.  There is inferior wall akinesis.  There is severe inferolateral wall hypokinesis.  The right ventricle is normal in size and function.  There is mild to moderate (1-2+) mitral regurgitation.  There is a CHANTAL 3 bioprosthetic valve present in aortic valve position. The  gradient is normal for this prosthetic aortic valve. There is trace  paravalvular regurgitation present.  There is mild to moderate (1-2+) tricuspid regurgitation.  Compared to the prior study dated 2/27/2023, there have been no changes.    NM Stress Test: 9/14/2023   Lexiscan stress ECG  negative for ischemia.    The nuclear stress test is abnormal.  There is a large area of transmural infarction involving the inferior, inferolateral and lateral walls along with a small area of nontransmural infarction involving the mid septal wall.  No evidence of ischemia.    The left ventricular ejection fraction at stress is 33% with akinesis of the inferior and inferolateral wall..    A prior study was conducted on 8/25/2014.  Images not available for review.    EKG (personally reviewed and interpreted):  Atrial-paced rhythm    Device check: 1/17/2024  Encounter Type: Patient seen in clinic for early annual device evaluation and iterative programming, RA lead testing, followed by Jackie Beach NP  Device: Sweetser Dynagen (D) ICD  Pacing %/Programmed: AP 26%,  22%, DDDR 55-120bpm  Lead(s): Stable  Battery longevity: Estimating 9 years remaining  Presenting rhythm: AP/AS-VS 60bpm w/ occassional PVC's  Underlying rhythm: SB 50's w/1st degree AVB (237ms)  Heart rates: Stable, HR's per histogram range 50-140bpm and primarily 60-100bpm  Atrial High rates: 7450 mode switches logged, EGM's suggest AF w/ atrial undersensing <1 min, burden 1%, v-rate >/=120bpm <5%, patient denies symptoms  Anticoagulant: Eliquis  Ventricular High rates: 4 VHR episodes logged, 2 EGM's suggest 16-21 beats NSVT 155bpm, 1 EGM suggests AF w/RVR, patient denies symptoms. EF 30-35% per echo 9/14/23.   Comments: Normal device function. RA sensitivity changed from 0.4mV to 0.2mV to mitigate undersensing AF. Unable to test atrial lead in unipolar, as unipolar is not an option on this lead/device. PVAB already programmed 85ms to mitigate FFRWs.  Plan: Remote device check scheduled for 4/10/24. Regina Shell RN       Physical Examination Review of Systems   Vitals: There were no vitals taken for this visit.  BMI= There is no height or weight on file to calculate BMI.  Wt Readings from Last 3 Encounters:   01/30/24 63.5 kg (140 lb)   01/17/24 64.4  kg (142 lb)   08/14/23 63 kg (139 lb)       General Appearance:   Alert, cooperative and in no acute distress   ENT/Mouth: membranes moist, no oral lesions or bleeding gums.      EYES:  no scleral icterus, normal conjunctivae   Neck: Supple without lymphadenopathy.  Thyroid not visualized   Chest/Lungs:   lungs are clear to auscultation, no rales or wheezing   Cardiovascular:   Regular. Normal first and second heart sounds with no murmurs, rubs, or gallops; the carotid, radial and posterior tibial pulses are intact, no edema bilaterally    Abdomen:  Soft and nontender. Bowel sounds are present in all quadrants   Extremities: no cyanosis or clubbing.     Skin: no xanthelasma, warm.    Neurologic: normal gait, normal  bilateral, no tremors   Psychiatric: Normal mood and affect       Please refer above for cardiac ROS details.      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 Harrell 1/22/2020 [id number] scire of (>75, CAD, CHF)     Pure hypercholesterolemia     Created by Conversion     PVD (peripheral vascular disease) (H24) 2/24/2020 2/2020 underwent aortobiiliac endograft repair of aortic aneurysm and  placement of bridging stents bilaterally and placement of bilateral iliac  branch devices for repair of bilateral iliac artery aneurysms      Severe aortic stenosis  10/10/2022    Sinus node dysfunction (H) 11/22/2022    VT (ventricular tachycardia) (H) 2004     Past Surgical History:   Procedure Laterality Date    ABDOMEN SURGERY      BYPASS GRAFT ARTERY CORONARY      CV ANGIOGRAM CORONARY GRAFT N/A 9/19/2022    Procedure: Coronary Angiogram Graft;  Surgeon: Tyrone Ordoñez MD;  Location: [facility] LAB CV    CV CORONARY LITHOTRIPSY PCI N/A 12/19/2022    Procedure: Percutaneous Coronary Intervention - Lithotripsy;  Surgeon: Tyrone Ordoñez MD;  Location: [facility] CV    CV LEFT HEART CATH N/A 9/19/2022    Procedure: Left Heart Catheterization;  Surgeon: Tyrone Ordoñez MD;  Location: [facility] CV    CV PCI ANGIOPLASTY N/A 12/19/2022    Procedure: Percutaneous Transluminal Angioplasty;  Surgeon: Tyrone Ordoñez MD;  Location: [facility] LAB     CV RIGHT HEART CATH MEASUREMENTS RECORDED N/A 9/19/2022    Procedure: Right Heart Catheterization;  Surgeon: Tyrone Ordoñez MD;  Location: [facility]    EYE SURGERY      INSERT / REPLACE / REMOVE PACEMAKER      OR TRANSCATHETER AORTIC VALVE REPLACEMENT, SUBCLAVIAN PERCUTANEOUS APPROACH (STANDBY) N/A 1/31/2023    Procedure: OR TRANSCATHETER AORTIC VALVE REPLACEMENT, SUBCLAVIAN PERCUTANEOUS APPROACH (STANDBY);  Surgeon: Shanae Rich MD;  Location: [facility]    OTHER SURGICAL HISTORY      icd    TRANSCATHETER AORTIC VALVE REPLACEMENT - SUBCLAVIAN APPROACH N/A 1/31/2023    Procedure: Transcatheter Aortic Valve Replacement - Subclavian Approach;  Surgeon: Tyrone Ordoñez MD;  Location: [facility] CV    ZZC CABG, VEIN, SINGLE      Description: CABG (CABG);  Recorded: 10/03/2012;  Comments: LIMA to LAD, SVG to OM2, SVG to RCA     Family History   Problem Relation Age of Onset    Cancer Mother     Heart Disease Father     Social History     Socioeconomic History    Marital status:      Spouse name: Not on file    Number of children: Not on file    Years of  education: Not on file    Highest education level: Not on file   Occupational History    Not on file   Tobacco Use    Smoking status: Every Day     Packs/day: 0.50     Years: 30.00     Additional pack years: 0.00     Total pack years: 15.00     Types: Cigarettes    Smokeless tobacco: Never   Vaping Use    Vaping Use: Never used   Substance and Sexual Activity    Alcohol use: Yes     Alcohol/week: 1.0 standard drink of alcohol     Comment: Glass of wine daily.    Drug use: No    Sexual activity: Not Currently   Other Topics Concern    Parent/sibling w/ CABG, MI or angioplasty before 65F 55M? Not Asked   Social History Narrative    Not on file     Social Determinants of Health     Financial Resource Strain: Not on file   Food Insecurity: Not on file   Transportation Needs: Not on file   Physical Activity: Not on file   Stress: Not on file   Social Connections: Not on file   Interpersonal Safety: Not on file   Housing Stability: Not on file          Medications  Allergies   Current Outpatient Medications   Medication Sig Dispense Refill    acetaminophen (TYLENOL) 325 MG tablet Take 650 mg by mouth every 6 hours as needed for pain      apixaban ANTICOAGULANT (ELIQUIS) 5 MG tablet Take 1 tablet (5 mg) by mouth 2 times daily 180 tablet 3    aspirin 81 MG EC tablet Take 81 mg by mouth daily      fluticasone (FLONASE) 50 MCG/ACT nasal spray Spray 1 spray into both nostrils as needed for rhinitis or allergies      gabapentin (NEURONTIN) 300 MG capsule Take 900 mg by mouth 2 times daily       lisinopril (ZESTRIL) 2.5 MG tablet Take 1 tablet (2.5 mg) by mouth daily 90 tablet 3    rosuvastatin (CRESTOR) 10 MG tablet Take 1.5 tablets (15 mg) by mouth daily 90 tablet 0    sotalol (BETAPACE) 80 MG tablet Take 0.5 tablets (40 mg) by mouth 2 times daily 90 tablet 3    tamsulosin (FLOMAX) 0.4 MG capsule Take 0.4 mg by mouth every evening      Allergies   Allergen Reactions    Atorvastatin Diarrhea    Carvedilol Other (See Comments)  "and GI Disturbance     Upset stomach; GI upset      Chloride GI Disturbance and Nausea         Lab Results    Chemistry/lipid CBC Cardiac Enzymes/BNP/TSH/INR   Recent Labs   Lab Test 03/10/23  0910   CHOL 139   HDL 38*   LDL 83   TRIG 89     Recent Labs   Lab Test 03/10/23  0910 09/19/22  0803 12/06/19  1134   LDL 83 129 100     Recent Labs   Lab Test 01/30/24  1054      POTASSIUM 4.0   CHLORIDE 104   CO2 26   *   BUN 17.7   CR 1.20*   GFRESTIMATED 59*   EMILIO 9.5     Recent Labs   Lab Test 01/30/24  1054 08/14/23  0818 03/06/23  1420   CR 1.20* 1.17 1.10     No results for input(s): \"A1C\" in the last 47706 hours. Recent Labs   Lab Test 01/30/24  1054   WBC 11.3*   HGB 16.0   HCT 48.8   MCV 89        Recent Labs   Lab Test 01/30/24  1054 08/14/23  0818 03/06/23  1420   HGB 16.0 12.8* 13.2*    No results for input(s): \"TROPONINI\" in the last 10613 hours.  Recent Labs   Lab Test 01/30/23  0922   NTBNP 2,798*     Recent Labs   Lab Test 09/01/22  1327   TSH 0.46     Recent Labs   Lab Test 02/17/23  1917 01/30/23  0922   INR 1.18* 1.11        50 minutes spent on the date of encounter doing chart review, review of outside records, review of test results, interpretation with above tests, patient visit, and documentation.      This note has been dictated using voice recognition software. Any grammatical or context distortions are unintentional and inherent to the software.    Melva Singh PA-C  Structural Heart Program  [facility]           "

## 2024-02-14 NOTE — LETTER
2/14/2024    Kenna Calvillo, NP  [address] [place] [address]    RE: Daniel Alamo       Dear Colleague,     I had the pleasure of seeing Daniel Alamo in the [facility].  HEART CARE ENCOUNTER NOTE       [facility]  [phone]    Assessment/Recommendations   Assessment:  Severe aortic stenosis - s/p TAVR on 1/31/2023 using a #29 Magdalena valve. His echocardiogram September 2023 showed a mean gradient of 7 mmHg. He has another echocardiogram scheduled for 2/26/2024  Ischemic cardiomyopathy, heart failure with reduced ejection fraction, EF 30-35%, NYHA Class II - currently compensated. Reports chronic fatigue. Denies SOB or orthopnea. No LE edema on exam  ICD in situ - normal function no most recent device check  Paroxysmal atrial fibrillation - [id number] = 4 (age > 75, HFrEF, CAD).   Coronary artery disease s/p CABG ~30 years ago; PCI to LAD/diagonal with shockwave and balloon angioplasty December 2022.   Dyslipidemia - LDL 83, total cholesterol 139. Taking Crestor 15 mg daily  Hypertension - blood pressure is controlled   AAA s/p endographic repair in 2020  Fatigue - ongoing/chronic fatigue. He underwent further cardiac workup with Dr. Vargas last fall. His CBC and BMP from today's visit are unremarkable.     Plan:  Continue lisinopril 2.5 mg daily  Continue sotalol 40 mg twice daily  Continue Eliquis 5 mg twice daily  His 1 year Echocardiogram after TAVR is scheduled for 2/26/2024  Follow-up with EP in July or sooner if needed   Follow-up with PCP     Thank you for the opportunity to participate in the care of Daniel Alamo. It's been a pleasure working with him.  Please do not hesitate to call with any questions or concerns.       History of Present Illness/Subjective    I had the opportunity to see Daniel Alamo at the [facility] for 1 year visit after TAVR.    Daniel is an 84-year-old male with a past medical history significant for severe  aortic stenosis (s/p TAVR January 2023), coronary artery disease (s/p CABG ~30 yrs ago; PCI to LAD/diagonal with shockwave and balloon angioplasty December 2022), ischemic cardiomyopathy, heart failure with reduced ejection fraction (EF 30 to 35%), ICD in situ, paroxysmal atrial fibrillation (on Eliquis), dyslipidemia, hypertension, AAA (s/p endograft repair 2020), BPH    He has had ongoing/chronic fatigue since prior to the TAVR. He underwent stress test and echocardiogram in September 2023 due to his ongoing fatigue. He has been taken off metoprolol and sotalol without improvement in his symptoms. He has since resumed sotalol. He was recently seen in the emergency department for cough and diarrhea - work-up was overall negative and it was thought his symptoms were related to viral-illness. He did miss his appointment for his 1 year echocardiogram after TAVR because of this ED visit. His appetite has been ok and he reports his weight has been stable. He reports occasional dizziness and lightheadedness but this has been chronic for him.    Daniel Alamo denies exertional chest discomfort, palpitations, shortness of breath at rest, PND, orthopnea, pre-syncope or syncope.  Daniel Alamo also denies any recent weight loss, changes in appetite, nausea or vomiting.   ____________________________________________________________  Echo: 9/14/2023  Interpretation Summary     The left ventricle is severely dilated.  There is mild concentric left ventricular hypertrophy.  The visual ejection fraction is 30-35%.  There is mild global hypokinesia of the left ventricle.  There is inferior wall akinesis.  There is severe inferolateral wall hypokinesis.  The right ventricle is normal in size and function.  There is mild to moderate (1-2+) mitral regurgitation.  There is a CHANTAL 3 bioprosthetic valve present in aortic valve position. The  gradient is normal for this prosthetic aortic valve. There is trace  paravalvular  regurgitation present.  There is mild to moderate (1-2+) tricuspid regurgitation.  Compared to the prior study dated 2/27/2023, there have been no changes.    NM Stress Test: 9/14/2023   Lexiscan stress ECG negative for ischemia.    The nuclear stress test is abnormal.  There is a large area of transmural infarction involving the inferior, inferolateral and lateral walls along with a small area of nontransmural infarction involving the mid septal wall.  No evidence of ischemia.    The left ventricular ejection fraction at stress is 33% with akinesis of the inferior and inferolateral wall..    A prior study was conducted on 8/25/2014.  Images not available for review.    EKG (personally reviewed and interpreted):  Atrial-paced rhythm    Device check: 1/17/2024  Encounter Type: Patient seen in clinic for early annual device evaluation and iterative programming, RA lead testing, followed by Jackie Beach NP  Device: [employer] [employer] (D) ICD  Pacing %/Programmed: AP 26%,  22%, DDDR 55-120bpm  Lead(s): Stable  Battery longevity: Estimating 9 years remaining  Presenting rhythm: AP/AS-VS 60bpm w/ occassional PVC's  Underlying rhythm: SB 50's w/1st degree AVB (237ms)  Heart rates: Stable, HR's per histogram range 50-140bpm and primarily 60-100bpm  Atrial High rates: 7450 mode switches logged, EGM's suggest AF w/ atrial undersensing <1 min, burden 1%, v-rate >/=120bpm <5%, patient denies symptoms  Anticoagulant: Eliquis  Ventricular High rates: 4 VHR episodes logged, 2 EGM's suggest 16-21 beats NSVT 155bpm, 1 EGM suggests AF w/RVR, patient denies symptoms. EF 30-35% per echo 9/14/23.   Comments: Normal device function. RA sensitivity changed from 0.4mV to 0.2mV to mitigate undersensing AF. Unable to test atrial lead in unipolar, as unipolar is not an option on this lead/device. PVAB already programmed 85ms to mitigate FFRWs.  Plan: Remote device check scheduled for 4/10/24. Regina Shell RN       Physical Examination Review  of Systems   Vitals: There were no vitals taken for this visit.  BMI= There is no height or weight on file to calculate BMI.  Wt Readings from Last 3 Encounters:   01/30/24 63.5 kg (140 lb)   01/17/24 64.4 kg (142 lb)   08/14/23 63 kg (139 lb)       General Appearance:   Alert, cooperative and in no acute distress   ENT/Mouth: membranes moist, no oral lesions or bleeding gums.      EYES:  no scleral icterus, normal conjunctivae   Neck: Supple without lymphadenopathy.  Thyroid not visualized   Chest/Lungs:   lungs are clear to auscultation, no rales or wheezing   Cardiovascular:   Regular. Normal first and second heart sounds with no murmurs, rubs, or gallops; the carotid, radial and posterior tibial pulses are intact, no edema bilaterally    Abdomen:  Soft and nontender. Bowel sounds are present in all quadrants   Extremities: no cyanosis or clubbing.     Skin: no xanthelasma, warm.    Neurologic: normal gait, normal  bilateral, no tremors   Psychiatric: Normal mood and affect       Please refer above for cardiac ROS details.      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 Harrell 1/22/2020 [id number] scire of (>75, CAD, CHF)     Pure hypercholesterolemia     Created by Conversion     PVD (peripheral vascular disease) (H24) 2/24/2020 2/2020 underwent aortobiiliac  endograft repair of aortic aneurysm and  placement of bridging stents bilaterally and placement of bilateral iliac  branch devices for repair of bilateral iliac artery aneurysms      Severe aortic stenosis 10/10/2022    Sinus node dysfunction (H) 11/22/2022    VT (ventricular tachycardia) (H) 2004     Past Surgical History:   Procedure Laterality Date    ABDOMEN SURGERY      BYPASS GRAFT ARTERY CORONARY      CV ANGIOGRAM CORONARY GRAFT N/A 9/19/2022    Procedure: Coronary Angiogram Graft;  Surgeon: Tyrone Ordoñez MD;  Location: [facility]    CV CORONARY LITHOTRIPSY PCI N/A 12/19/2022    Procedure: Percutaneous Coronary Intervention - Lithotripsy;  Surgeon: Tyrone Ordoñez MD;  Location: [facility]    CV LEFT HEART CATH N/A 9/19/2022    Procedure: Left Heart Catheterization;  Surgeon: Tyrone Ordoñez MD;  Location: [facility]    CV PCI ANGIOPLASTY N/A 12/19/2022    Procedure: Percutaneous Transluminal Angioplasty;  Surgeon: Tyrone Ordoñez MD;  Location: [facility]    CV RIGHT HEART CATH MEASUREMENTS RECORDED N/A 9/19/2022    Procedure: Right Heart Catheterization;  Surgeon: Tyrone Ordoñez MD;  Location: [facility]    EYE SURGERY      INSERT / REPLACE / REMOVE PACEMAKER      OR TRANSCATHETER AORTIC VALVE REPLACEMENT, SUBCLAVIAN PERCUTANEOUS APPROACH (STANDBY) N/A 1/31/2023    Procedure: OR TRANSCATHETER AORTIC VALVE REPLACEMENT, SUBCLAVIAN PERCUTANEOUS APPROACH (STANDBY);  Surgeon: Shanae Rich MD;  Location: [facility]    OTHER SURGICAL HISTORY      icd    TRANSCATHETER AORTIC VALVE REPLACEMENT - SUBCLAVIAN APPROACH N/A 1/31/2023    Procedure: Transcatheter Aortic Valve Replacement - Subclavian Approach;  Surgeon: Tyrone Ordoñez MD;  Location: [facility]ATH [facility]    ZZ CABG, VEIN, SINGLE      Description: CABG (CABG);  Recorded: 10/03/2012;  Comments: LIMA to LAD, SVG to OM2, SVG to RCA     Family History   Problem Relation Age  of Onset    Cancer Mother     Heart Disease Father     Social History     Socioeconomic History    Marital status:      Spouse name: Not on file    Number of children: Not on file    Years of education: Not on file    Highest education level: Not on file   Occupational History    Not on file   Tobacco Use    Smoking status: Every Day     Packs/day: 0.50     Years: 30.00     Additional pack years: 0.00     Total pack years: 15.00     Types: Cigarettes    Smokeless tobacco: Never   Vaping Use    Vaping Use: Never used   Substance and Sexual Activity    Alcohol use: Yes     Alcohol/week: 1.0 standard drink of alcohol     Comment: Glass of wine daily.    Drug use: No    Sexual activity: Not Currently   Other Topics Concern    Parent/sibling w/ CABG, MI or angioplasty before 65F 55M? Not Asked   Social History Narrative    Not on file     Social Determinants of Health     Financial Resource Strain: Not on file   Food Insecurity: Not on file   Transportation Needs: Not on file   Physical Activity: Not on file   Stress: Not on file   Social Connections: Not on file   Interpersonal Safety: Not on file   Housing Stability: Not on file          Medications  Allergies   Current Outpatient Medications   Medication Sig Dispense Refill    acetaminophen (TYLENOL) 325 MG tablet Take 650 mg by mouth every 6 hours as needed for pain      apixaban ANTICOAGULANT (ELIQUIS) 5 MG tablet Take 1 tablet (5 mg) by mouth 2 times daily 180 tablet 3    aspirin 81 MG EC tablet Take 81 mg by mouth daily      fluticasone (FLONASE) 50 MCG/ACT nasal spray Spray 1 spray into both nostrils as needed for rhinitis or allergies      gabapentin (NEURONTIN) 300 MG capsule Take 900 mg by mouth 2 times daily       lisinopril (ZESTRIL) 2.5 MG tablet Take 1 tablet (2.5 mg) by mouth daily 90 tablet 3    rosuvastatin (CRESTOR) 10 MG tablet Take 1.5 tablets (15 mg) by mouth daily 90 tablet 0    sotalol (BETAPACE) 80 MG tablet Take 0.5 tablets (40 mg) by  "mouth 2 times daily 90 tablet 3    tamsulosin (FLOMAX) 0.4 MG capsule Take 0.4 mg by mouth every evening      Allergies   Allergen Reactions    Atorvastatin Diarrhea    Carvedilol Other (See Comments) and GI Disturbance     Upset stomach; GI upset      Chloride GI Disturbance and Nausea         Lab Results    Chemistry/lipid CBC Cardiac Enzymes/BNP/TSH/INR   Recent Labs   Lab Test 03/10/23  0910   CHOL 139   HDL 38*   LDL 83   TRIG 89     Recent Labs   Lab Test 03/10/23  0910 09/19/22  0803 12/06/19  1134   LDL 83 129 100     Recent Labs   Lab Test 01/30/24  1054      POTASSIUM 4.0   CHLORIDE 104   CO2 26   *   BUN 17.7   CR 1.20*   GFRESTIMATED 59*   EMILIO 9.5     Recent Labs   Lab Test 01/30/24  1054 08/14/23  0818 03/06/23  1420   CR 1.20* 1.17 1.10     No results for input(s): \"A1C\" in the last 31279 hours. Recent Labs   Lab Test 01/30/24  1054   WBC 11.3*   HGB 16.0   HCT 48.8   MCV 89        Recent Labs   Lab Test 01/30/24  1054 08/14/23  0818 03/06/23  1420   HGB 16.0 12.8* 13.2*    No results for input(s): \"TROPONINI\" in the last 36135 hours.  Recent Labs   Lab Test 01/30/23  0922   NTBNP 2,798*     Recent Labs   Lab Test 09/01/22  1327   TSH 0.46     Recent Labs   Lab Test 02/17/23  1917 01/30/23  0922   INR 1.18* 1.11        50 minutes spent on the date of encounter doing chart review, review of outside records, review of test results, interpretation with above tests, patient visit, and documentation.      This note has been dictated using voice recognition software. Any grammatical or context distortions are unintentional and inherent to the software.    Melva Singh PA-C  Structural Heart Program  [facility]               Thank you for allowing me to participate in the care of your patient.      Sincerely,     Melva Singh PA-C     [facility]  cc:   No referring provider defined for this " encounter.

## 2024-02-14 NOTE — PATIENT INSTRUCTIONS
Daniel Alamo,    It was a pleasure to see you today in the clinic regarding your 1 year visit after TAVR.     My recommendations after this visit include:     - echocardiogram scheduled for Monday March 4, 2024 at 10 am   - no medication changes today    You should followup with EP    **Remember you will need prophylactic antibiotics for all dental visits in the future.  You can get the Rx from your dentist, your PCP or from us.  If possible, still refrain from going to the dentist until 6 months or more after your valve replacement      If you have questions or concerns, please call using the numbers below:    Valve Clinic Phone   [phone]    After Hours/Scheduling  [phone]    Otherwise you can dial the nurse directly at:    Heydi Clark RN  [phone]    Thien Samson RN  [phone]        Melva Singh PA-C  Structural Heart Program  [facility]             
triple lumen

## 2024-02-16 LAB
ATRIAL RATE - MUSE: 70 BPM
DIASTOLIC BLOOD PRESSURE - MUSE: NORMAL MMHG
INTERPRETATION ECG - MUSE: NORMAL
P AXIS - MUSE: 72 DEGREES
PR INTERVAL - MUSE: 220 MS
QRS DURATION - MUSE: 156 MS
QT - MUSE: 482 MS
QTC - MUSE: 520 MS
R AXIS - MUSE: 81 DEGREES
SYSTOLIC BLOOD PRESSURE - MUSE: NORMAL MMHG
T AXIS - MUSE: -59 DEGREES
VENTRICULAR RATE- MUSE: 70 BPM

## 2024-02-26 ENCOUNTER — HOSPITAL ENCOUNTER (OUTPATIENT)
Dept: CARDIOLOGY | Facility: HOSPITAL | Age: 84
Discharge: HOME | End: 2024-02-26
Attending: INTERNAL MEDICINE | Admitting: INTERNAL MEDICINE
Payer: MEDICARE

## 2024-02-26 DIAGNOSIS — Z95.2 S/P TAVR (TRANSCATHETER AORTIC VALVE REPLACEMENT): ICD-10-CM

## 2024-02-26 LAB — LVEF ECHO: NORMAL

## 2024-02-26 PROCEDURE — 93306 TTE W/DOPPLER COMPLETE: CPT | Mod: 26 | Performed by: INTERNAL MEDICINE

## 2024-02-26 PROCEDURE — 93306 TTE W/DOPPLER COMPLETE: CPT

## 2024-02-27 ENCOUNTER — OFFICE VISIT (OUTPATIENT)
Dept: CARDIOLOGY | Facility: CLINIC | Age: 84
End: 2024-02-27
Payer: MEDICARE

## 2024-02-27 ENCOUNTER — TELEPHONE (OUTPATIENT)
Dept: CARDIOLOGY | Facility: CLINIC | Age: 84
End: 2024-02-27

## 2024-02-27 VITALS
BODY MASS INDEX: 20.66 KG/M2 | WEIGHT: 144 LBS | HEART RATE: 60 BPM | DIASTOLIC BLOOD PRESSURE: 78 MMHG | RESPIRATION RATE: 16 BRPM | SYSTOLIC BLOOD PRESSURE: 124 MMHG

## 2024-02-27 DIAGNOSIS — I50.23 ACUTE ON CHRONIC SYSTOLIC HEART FAILURE (H): Primary | ICD-10-CM

## 2024-02-27 DIAGNOSIS — I25.5 ISCHEMIC CARDIOMYOPATHY: ICD-10-CM

## 2024-02-27 DIAGNOSIS — I50.22 CHRONIC HFREF (HEART FAILURE WITH REDUCED EJECTION FRACTION) (H): ICD-10-CM

## 2024-02-27 DIAGNOSIS — Z95.2 S/P TAVR (TRANSCATHETER AORTIC VALVE REPLACEMENT): Primary | ICD-10-CM

## 2024-02-27 PROCEDURE — 99205 OFFICE O/P NEW HI 60 MIN: CPT | Performed by: INTERNAL MEDICINE

## 2024-02-27 PROCEDURE — G2211 COMPLEX E/M VISIT ADD ON: HCPCS | Performed by: INTERNAL MEDICINE

## 2024-02-27 NOTE — TELEPHONE ENCOUNTER
Spoke with the pt regarding results and recommendations. Will send message to scheduling to setup HF visit and then an echo for 3 months from then. Patient verbalizes understanding and agrees with plan. No further questions or concerns at this time.

## 2024-02-27 NOTE — LETTER
2/27/2024    eKnna Calvillo, NP  [address] [place] [address]    RE: Daniel COLLINS Cally       Dear Colleague,     I had the pleasure of seeing Daniel Alamo in the [facility].  HEART CARE NOTE        Thank you, Dr. Calvillo for asking the [facility] team to see Daniel Alamo to evaluate HFrEF.    Assessment/Recommendations     1. Severe HFrEF   Assessment / Plan  Near euvolemia on physical exam; denies HF symptoms of orthopnea, PND, fluid retention or edema; does not currently require a loop diuretics - continue to hold for now  Patient is high risk for adverse cardiac events 2/2 advanced age, frailty  S/p ICD - consider upgrade to CRT-D; will start SGLT2-I; declines     Current Pharmacotherapy AHA Guideline-Directed Medical Therapy   Lisinopril 2.5 mg twice daily Lisinopril 20 mg twice daily   Metoprolol succinate - held 2/2 borderline bradycardia 2/2 sotalol Carvedilol 25 mg twice daily   Spironolactone not started Spironolactone 25 mg once daily   Hydralazine NA  Hydralazine 100 mg three times daily   Isosorbide dinitrate NA Isosorbide dinitrate 40 mg three times daily   SGLT2 inhibitor:Empagliflozin 10 mg daily Dapagliflozin or Empagliflozin 10 mg daily     2. CAD  Assessment / Plan  Denies chest pain anginal equivalents  Will discuss previous coronary angiogram with Dr. Ordoñez in light of what appears to be worsening EF - will continue   Continue ASA, rosuvastatin, lisinopril    3. Valvular disease  Assessment / Plan  S/p TAVR - recent echo demonstrated well seated valve with adequate function  Severe MR and mod TR also noted - will also discuss with Dr. Ordoñez      65 minutes spent reviewing prior records (including documentation, laboratory studies, cardiac testing/imaging), history and physical exam, planning, and subsequent documentation.    The longitudinal plan of care for HFrEF was addressed during this visit. Due to the added complexity in care, I will  continue to support Mr. Daniel Alamo  in the subsequent management of this condition(s) and with the ongoing continuity of care of this condition(s).    History of Present Illness/Subjective    Mr. Daniel Alamo is a 84 year old male with a PMHx significant for HFrEF, CAD, valvular heart disease who presents to [facility] to establish care.    Today, Mr. Alamo denies acute cardiac events or complaints; he denies HF symptoms, chest pain, anginal equivalents; Management plan as detailed above    We discussed HF diet and lifestyle modifications including the 2 g Na and 2L fluid restrictions. HE does not currently adhere, but will do so moving forward. Patient was provided with the HF educational binder as well as a book to log his daily weights as well HF education by our [facility] RN,       ECG: Personally reviewed. Atrial paced rhythm.    ECHO (personnaly Reviewed on 2/27/24):   The left ventricle is severely dilated.  There is mild to moderate eccentric left ventricular hypertrophy.  The visual ejection fraction is 20-25%.  There is inferior wall akinesis.  Diastolic Doppler findings (E/E' ratio and/or other parameters) suggest left  ventricular filling pressures are increased.  The left atrium is moderately dilated.  The mitral valve leaflets are mildly thickened.  There is moderately severe (3+) mitral regurgitation.  There is no mitral valve stenosis.  There is moderate (2+) tricuspid regurgitation.  The right ventricular systolic pressure is approximated at 22mmHg plus the  right atrial pressure.  There is a CHANTAL 3 bioprosthetic valve present in aortic valve position  The prosthetic valve gradients are normal .  Compared to echo from 9/14/23 the LVEF is reduced and the MR is more  prominent.    Lab results: personally reviewed February 27, 2024; notable for resolving DEAN    Medical history and pertinent documents reviewed in Care Everywhere please where applicable see details above          Physical  Examination Review of Systems   /78 (BP Location: Left arm, Patient Position: Sitting, Cuff Size: Adult Regular)   Pulse 60   Resp 16   Wt 65.3 kg (144 lb)   BMI 20.66 kg/m    Body mass index is 20.66 kg/m .  Wt Readings from Last 3 Encounters:   02/27/24 65.3 kg (144 lb)   02/14/24 64.4 kg (142 lb)   01/30/24 63.5 kg (140 lb)     General Appearance:   no distress, normal body habitus   ENT/Mouth: membranes moist, no oral lesions or bleeding gums.      EYES:  no scleral icterus, normal conjunctivae   Neck: no carotid bruits or thyromegaly   Chest/Lungs:   lungs are clear to auscultation, no rales or wheezing, equal chest wall expansion    Cardiovascular:   Regular. Normal first and second heart sounds with no murmurs, rubs, or gallops; the carotid, radial and posterior tibial pulses are intact, no JVD or LE edema bilaterally    Abdomen:  no organomegaly, masses, bruits, or tenderness; bowel sounds are present   Extremities: no cyanosis or clubbing   Skin: no xanthelasma, warm.    Neurologic: NAD     Psychiatric: alert and oriented x3, calm     A complete 10 systems ROS was reviewed  And is negative except what is listed in the HPI.          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 Harrell 1/22/2020 [id number] scire of (>75, CAD, CHF)     Pure hypercholesterolemia      Created by Conversion     PVD (peripheral vascular disease) (H24) 2/24/2020 2/2020 underwent aortobiiliac endograft repair of aortic aneurysm and  placement of bridging stents bilaterally and placement of bilateral iliac  branch devices for repair of bilateral iliac artery aneurysms      Severe aortic stenosis 10/10/2022    Sinus node dysfunction (H) 11/22/2022    VT (ventricular tachycardia) (H) 2004    Past Surgical History:   Procedure Laterality Date    ABDOMEN SURGERY      BYPASS GRAFT ARTERY CORONARY      CV ANGIOGRAM CORONARY GRAFT N/A 9/19/2022    Procedure: Coronary Angiogram Graft;  Surgeon: Tyrone Ordoñez MD;  Location: [facility] CV    CV CORONARY LITHOTRIPSY PCI N/A 12/19/2022    Procedure: Percutaneous Coronary Intervention - Lithotripsy;  Surgeon: Tyrone Ordoñez MD;  Location: [facility]    CV LEFT HEART CATH N/A 9/19/2022    Procedure: Left Heart Catheterization;  Surgeon: Tyrone Ordoñez MD;  Location: [facility]    CV PCI ANGIOPLASTY N/A 12/19/2022    Procedure: Percutaneous Transluminal Angioplasty;  Surgeon: Tyrone Ordoñez MD;  Location: [facility] CATH LAB     CV RIGHT HEART CATH MEASUREMENTS RECORDED N/A 9/19/2022    Procedure: Right Heart Catheterization;  Surgeon: Tyrone Ordoñez MD;  Location: [facility]    EYE SURGERY      INSERT / REPLACE / REMOVE PACEMAKER      OR TRANSCATHETER AORTIC VALVE REPLACEMENT, SUBCLAVIAN PERCUTANEOUS APPROACH (STANDBY) N/A 1/31/2023    Procedure: OR TRANSCATHETER AORTIC VALVE REPLACEMENT, SUBCLAVIAN PERCUTANEOUS APPROACH (STANDBY);  Surgeon: Shanae Rich MD;  Location: [facility] CATH [facility] CV    OTHER SURGICAL HISTORY      icd    TRANSCATHETER AORTIC VALVE REPLACEMENT - SUBCLAVIAN APPROACH N/A 1/31/2023    Procedure: Transcatheter Aortic Valve Replacement - Subclavian Approach;  Surgeon: Tyrone Ordoñez MD;  Location: [facility] CATH LAB CV    [facility] CABG, VEIN, SINGLE      Description: CABG (CABG);   "Recorded: 10/03/2012;  Comments: LIMA to LAD, SVG to OM2, SVG to RCA    no family history of premature coronary artery disease Social History     Socioeconomic History    Marital status:      Spouse name: Not on file    Number of children: Not on file    Years of education: Not on file    Highest education level: Not on file   Occupational History    Not on file   Tobacco Use    Smoking status: Every Day     Packs/day: 0.50     Years: 30.00     Additional pack years: 0.00     Total pack years: 15.00     Types: Cigarettes    Smokeless tobacco: Never   Vaping Use    Vaping Use: Never used   Substance and Sexual Activity    Alcohol use: Yes     Alcohol/week: 1.0 standard drink of alcohol     Comment: Glass of wine daily.    Drug use: No    Sexual activity: Not Currently   Other Topics Concern    Parent/sibling w/ CABG, MI or angioplasty before 65F 55M? Not Asked   Social History Narrative    Not on file     Social Determinants of Health     Financial Resource Strain: Not on file   Food Insecurity: Not on file   Transportation Needs: Not on file   Physical Activity: Not on file   Stress: Not on file   Social Connections: Not on file   Interpersonal Safety: Not on file   Housing Stability: Not on file           Lab Results    Chemistry/lipid CBC Cardiac Enzymes/BNP/TSH/INR   Lab Results   Component Value Date    CHOL 139 03/10/2023    HDL 38 (L) 03/10/2023    TRIG 89 03/10/2023    BUN 15.2 02/14/2024     02/14/2024    CO2 28 02/14/2024    Lab Results   Component Value Date    WBC 8.4 02/14/2024    HGB 13.5 02/14/2024    HCT 42.4 02/14/2024    MCV 90 02/14/2024     02/14/2024    Lab Results   Component Value Date    TSH 0.46 09/01/2022    INR 1.18 (H) 02/17/2023     No results found for: \"CKTOTAL\", \"CKMB\", \"TROPONINI\"       Weight:    Wt Readings from Last 3 Encounters:   02/27/24 65.3 kg (144 lb)   02/14/24 64.4 kg (142 lb)   01/30/24 63.5 kg (140 lb)       Allergies  Allergies   Allergen Reactions    " Atorvastatin Diarrhea    Carvedilol Other (See Comments) and GI Disturbance     Upset stomach; GI upset      Chloride GI Disturbance and Nausea         Surgical History  Past Surgical History:   Procedure Laterality Date    ABDOMEN SURGERY      BYPASS GRAFT ARTERY CORONARY      CV ANGIOGRAM CORONARY GRAFT N/A 9/19/2022    Procedure: Coronary Angiogram Graft;  Surgeon: Tyrone Ordoñez MD;  Location: [facility] CV    CV CORONARY LITHOTRIPSY PCI N/A 12/19/2022    Procedure: Percutaneous Coronary Intervention - Lithotripsy;  Surgeon: Tyrone Ordoñez MD;  Location: [facility] CV    CV LEFT HEART CATH N/A 9/19/2022    Procedure: Left Heart Catheterization;  Surgeon: Tyrone Ordoñez MD;  [facility]    CV PCI ANGIOPLASTY N/A 12/19/2022    Procedure: Percutaneous Transluminal Angioplasty;  Surgeon: Tyrone Ordoñez MD;  Location: [facility]    CV RIGHT HEART CATH MEASUREMENTS RECORDED N/A 9/19/2022    Procedure: Right Heart Catheterization;  Surgeon: Tyrone Ordoñez MD;  Location: [facility]    EYE SURGERY      INSERT / REPLACE / REMOVE PACEMAKER      OR TRANSCATHETER AORTIC VALVE REPLACEMENT, SUBCLAVIAN PERCUTANEOUS APPROACH (STANDBY) N/A 1/31/2023    Procedure: OR TRANSCATHETER AORTIC VALVE REPLACEMENT, SUBCLAVIAN PERCUTANEOUS APPROACH (STANDBY);  Surgeon: Shanae Rich MD;  Location: [facility]    OTHER SURGICAL HISTORY      icd    TRANSCATHETER AORTIC VALVE REPLACEMENT - SUBCLAVIAN APPROACH N/A 1/31/2023    Procedure: Transcatheter Aortic Valve Replacement - Subclavian Approach;  Surgeon: Tyrone Ordoñez MD;  Location: [facility]    ZZ CABG, VEIN, SINGLE      Description: CABG (CABG);  Recorded: 10/03/2012;  Comments: LIMA to LAD, SVG to OM2, SVG to RCA       Social History  Tobacco:   History   Smoking Status    Every Day    Packs/day: 0.50    Years: 30.00    Types: Cigarettes   Smokeless Tobacco    Never    Alcohol:    Social History    Substance and Sexual Activity      Alcohol use: Yes        Alcohol/week: 1.0 standard drink of alcohol        Comment: Glass of wine daily.   Illicit Drugs:   History   Drug Use No       Family History  Family History   Problem Relation Age of Onset    Cancer Mother     Heart Disease Father           Neha Estrada MD on 2/27/2024      cc: Kenna Calvillo,       [facility] Nurse Education with patient today outlining heart failure and home management. Resources and written materials provided for contacting the [facility] with concerns, questions or symptoms.     Outlined low sodium and fluid restrictions. Discussed where sodium hides in popular foods and drinks. Emphasized daily weights and daily medications for ongoing management of his heart failure.     Daniel states that his wife does the cooking, shopping and meal planning. That they do not eat out frequently and don't use a lot of salty foods in their diet.    Reinforced that the patient should utilize the [facility] Nurse Line to communicate needs/concerns.  Alexandra ARANDA RN BSN, CHFN, PCCN-K        Thank you for allowing me to participate in the care of your patient.      Sincerely,     Neha Estrada MD     [facility]  cc:   No referring provider defined for this encounter.

## 2024-02-27 NOTE — PROGRESS NOTES
[facility] Nurse Education with patient today outlining heart failure and home management. Resources and written materials provided for contacting the [facility] with concerns, questions or symptoms.     Outlined low sodium and fluid restrictions. Discussed where sodium hides in popular foods and drinks. Emphasized daily weights and daily medications for ongoing management of his heart failure.     Daniel states that his wife does the cooking, shopping and meal planning. That they do not eat out frequently and don't use a lot of salty foods in their diet.    Reinforced that the patient should utilize the [facility] Nurse Line to communicate needs/concerns.  Alexandra Carmen  [facility] RN BSN, CHFN, PCCN-K

## 2024-02-27 NOTE — TELEPHONE ENCOUNTER
----- Message from Tyrone Ordoñez MD sent at 2/27/2024  8:01 AM CST -----  EF is down a bit. No great PCI targets previously, valve doing okay.  Let's get him into HF clinic and optimize meds, re-check TTE in 3 mos. If no improvement, wondering about utility of CRT upgrade.    Thx!    Tyrone

## 2024-02-27 NOTE — PROGRESS NOTES
HEART CARE NOTE        Thank you, Dr. Calvillo for asking the [facility] team to see Daniel Alamo to evaluate HFrEF.    Assessment/Recommendations     1. Severe HFrEF   Assessment / Plan  Near euvolemia on physical exam; denies HF symptoms of orthopnea, PND, fluid retention or edema; does not currently require a loop diuretics - continue to hold for now  Patient is high risk for adverse cardiac events 2/2 advanced age, frailty  S/p ICD - consider upgrade to CRT-D; will start SGLT2-I; declines     Current Pharmacotherapy AHA Guideline-Directed Medical Therapy   Lisinopril 2.5 mg twice daily Lisinopril 20 mg twice daily   Metoprolol succinate - held 2/2 borderline bradycardia 2/2 sotalol Carvedilol 25 mg twice daily   Spironolactone not started Spironolactone 25 mg once daily   Hydralazine NA  Hydralazine 100 mg three times daily   Isosorbide dinitrate NA Isosorbide dinitrate 40 mg three times daily   SGLT2 inhibitor:Empagliflozin 10 mg daily Dapagliflozin or Empagliflozin 10 mg daily     2. CAD  Assessment / Plan  Denies chest pain anginal equivalents  Will discuss previous coronary angiogram with Dr. Ordoñez in light of what appears to be worsening EF - will continue   Continue ASA, rosuvastatin, lisinopril    3. Valvular disease  Assessment / Plan  S/p TAVR - recent echo demonstrated well seated valve with adequate function  Severe MR and mod TR also noted - will also discuss with Dr. Ordoñez      65 minutes spent reviewing prior records (including documentation, laboratory studies, cardiac testing/imaging), history and physical exam, planning, and subsequent documentation.    The longitudinal plan of care for HFrEF was addressed during this visit. Due to the added complexity in care, I will continue to support Mr. Daniel Alaom  in the subsequent management of this condition(s) and with the ongoing continuity of care of this condition(s).    History of Present Illness/Subjective      Daniel Alamo is a 84 year old male with a PMHx significant for HFrEF, CAD, valvular heart disease who presents to [facility] to establish care.    Today, Mr. Alamo denies acute cardiac events or complaints; he denies HF symptoms, chest pain, anginal equivalents; Management plan as detailed above    We discussed HF diet and lifestyle modifications including the 2 g Na and 2L fluid restrictions. HE does not currently adhere, but will do so moving forward. Patient was provided with the HF educational binder as well as a book to log his daily weights as well HF education by our [facility] RN,       ECG: Personally reviewed. Atrial paced rhythm.    ECHO (personnaly Reviewed on 2/27/24):   The left ventricle is severely dilated.  There is mild to moderate eccentric left ventricular hypertrophy.  The visual ejection fraction is 20-25%.  There is inferior wall akinesis.  Diastolic Doppler findings (E/E' ratio and/or other parameters) suggest left  ventricular filling pressures are increased.  The left atrium is moderately dilated.  The mitral valve leaflets are mildly thickened.  There is moderately severe (3+) mitral regurgitation.  There is no mitral valve stenosis.  There is moderate (2+) tricuspid regurgitation.  The right ventricular systolic pressure is approximated at 22mmHg plus the  right atrial pressure.  There is a CHANTAL 3 bioprosthetic valve present in aortic valve position  The prosthetic valve gradients are normal .  Compared to echo from 9/14/23 the LVEF is reduced and the MR is more  prominent.    Lab results: personally reviewed February 27, 2024; notable for resolving DEAN    Medical history and pertinent documents reviewed in Care Everywhere please where applicable see details above          Physical Examination Review of Systems   /78 (BP Location: Left arm, Patient Position: Sitting, Cuff Size: Adult Regular)   Pulse 60   Resp 16   Wt 65.3 kg (144 lb)   BMI 20.66 kg/m    Body mass index is 20.66  kg/m .  Wt Readings from Last 3 Encounters:   02/27/24 65.3 kg (144 lb)   02/14/24 64.4 kg (142 lb)   01/30/24 63.5 kg (140 lb)     General Appearance:   no distress, normal body habitus   ENT/Mouth: membranes moist, no oral lesions or bleeding gums.      EYES:  no scleral icterus, normal conjunctivae   Neck: no carotid bruits or thyromegaly   Chest/Lungs:   lungs are clear to auscultation, no rales or wheezing, equal chest wall expansion    Cardiovascular:   Regular. Normal first and second heart sounds with no murmurs, rubs, or gallops; the carotid, radial and posterior tibial pulses are intact, no JVD or LE edema bilaterally    Abdomen:  no organomegaly, masses, bruits, or tenderness; bowel sounds are present   Extremities: no cyanosis or clubbing   Skin: no xanthelasma, warm.    Neurologic: NAD     Psychiatric: alert and oriented x3, calm     A complete 10 systems ROS was reviewed  And is negative except what is listed in the HPI.          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 Harrell 1/22/2020 [id number] scire of (>75, CAD, CHF)     Pure hypercholesterolemia     Created by Conversion     PVD (peripheral vascular disease) (H24) 2/24/2020 2/2020 underwent aortobiiliac endograft repair of aortic aneurysm and  placement of bridging stents bilaterally and placement of  bilateral iliac  branch devices for repair of bilateral iliac artery aneurysms      Severe aortic stenosis 10/10/2022    Sinus node dysfunction (H) 11/22/2022    VT (ventricular tachycardia) (H) 2004    Past Surgical History:   Procedure Laterality Date    ABDOMEN SURGERY      BYPASS GRAFT ARTERY CORONARY      CV ANGIOGRAM CORONARY GRAFT N/A 9/19/2022    Procedure: Coronary Angiogram Graft;  Surgeon: Tyrone Ordoeñz MD;  Location: [facility]    CV CORONARY LITHOTRIPSY PCI N/A 12/19/2022    Procedure: Percutaneous Coronary Intervention - Lithotripsy;  Surgeon: Tyrone Ordoñez MD;  Location: [facility]    CV LEFT HEART CATH N/A 9/19/2022    Procedure: Left Heart Catheterization;  Surgeon: Tyrone Ordoñez MD;  Location: [facility]    CV PCI ANGIOPLASTY N/A 12/19/2022    Procedure: Percutaneous Transluminal Angioplasty;  Surgeon: Tyrone Ordoñez MD;  Location: [facility]    CV RIGHT HEART CATH MEASUREMENTS RECORDED N/A 9/19/2022    Procedure: Right Heart Catheterization;  Surgeon: Tyrone Ordoñez MD;  Location: [facility]    EYE SURGERY      INSERT / REPLACE / REMOVE PACEMAKER      OR TRANSCATHETER AORTIC VALVE REPLACEMENT, SUBCLAVIAN PERCUTANEOUS APPROACH (STANDBY) N/A 1/31/2023    Procedure: OR TRANSCATHETER AORTIC VALVE REPLACEMENT, SUBCLAVIAN PERCUTANEOUS APPROACH (STANDBY);  Surgeon: Shanae Rich MD;  Location: [facility]ATH [facility]    OTHER SURGICAL HISTORY      icd    TRANSCATHETER AORTIC VALVE REPLACEMENT - SUBCLAVIAN APPROACH N/A 1/31/2023    Procedure: Transcatheter Aortic Valve Replacement - Subclavian Approach;  Surgeon: Tyrone Ordoñez MD;  Location: [facility] CATH [facility]    ZZC CABG, VEIN, SINGLE      Description: CABG (CABG);  Recorded: 10/03/2012;  Comments: LIMA to LAD, SVG to OM2, SVG to RCA    no family history of premature coronary artery disease Social History     Socioeconomic History    Marital status:      Spouse  "name: Not on file    Number of children: Not on file    Years of education: Not on file    Highest education level: Not on file   Occupational History    Not on file   Tobacco Use    Smoking status: Every Day     Packs/day: 0.50     Years: 30.00     Additional pack years: 0.00     Total pack years: 15.00     Types: Cigarettes    Smokeless tobacco: Never   Vaping Use    Vaping Use: Never used   Substance and Sexual Activity    Alcohol use: Yes     Alcohol/week: 1.0 standard drink of alcohol     Comment: Glass of wine daily.    Drug use: No    Sexual activity: Not Currently   Other Topics Concern    Parent/sibling w/ CABG, MI or angioplasty before 65F 55M? Not Asked   Social History Narrative    Not on file     Social Determinants of Health     Financial Resource Strain: Not on file   Food Insecurity: Not on file   Transportation Needs: Not on file   Physical Activity: Not on file   Stress: Not on file   Social Connections: Not on file   Interpersonal Safety: Not on file   Housing Stability: Not on file           Lab Results    Chemistry/lipid CBC Cardiac Enzymes/BNP/TSH/INR   Lab Results   Component Value Date    CHOL 139 03/10/2023    HDL 38 (L) 03/10/2023    TRIG 89 03/10/2023    BUN 15.2 02/14/2024     02/14/2024    CO2 28 02/14/2024    Lab Results   Component Value Date    WBC 8.4 02/14/2024    HGB 13.5 02/14/2024    HCT 42.4 02/14/2024    MCV 90 02/14/2024     02/14/2024    Lab Results   Component Value Date    TSH 0.46 09/01/2022    INR 1.18 (H) 02/17/2023     No results found for: \"CKTOTAL\", \"CKMB\", \"TROPONINI\"       Weight:    Wt Readings from Last 3 Encounters:   02/27/24 65.3 kg (144 lb)   02/14/24 64.4 kg (142 lb)   01/30/24 63.5 kg (140 lb)       Allergies  Allergies   Allergen Reactions    Atorvastatin Diarrhea    Carvedilol Other (See Comments) and GI Disturbance     Upset stomach; GI upset      Chloride GI Disturbance and Nausea         Surgical History  Past Surgical History:   Procedure " Laterality Date    ABDOMEN SURGERY      BYPASS GRAFT ARTERY CORONARY      CV ANGIOGRAM CORONARY GRAFT N/A 9/19/2022    Procedure: Coronary Angiogram Graft;  Surgeon: Tyrone Ordoñez MD;  Location: [facility]    CV CORONARY LITHOTRIPSY PCI N/A 12/19/2022    Procedure: Percutaneous Coronary Intervention - Lithotripsy;  Surgeon: Tyrone Ordoñez MD;  Location: [facility]    CV LEFT HEART CATH N/A 9/19/2022    Procedure: Left Heart Catheterization;  Surgeon: Tyrone Ordoñez MD;  Location: [facility]    CV PCI ANGIOPLASTY N/A 12/19/2022    Procedure: Percutaneous Transluminal Angioplasty;  Surgeon: Tyrone Ordoñez MD;  Location: [facility]    CV RIGHT HEART CATH MEASUREMENTS RECORDED N/A 9/19/2022    Procedure: Right Heart Catheterization;  Surgeon: Tyrone Ordoñez MD;  Location: [facility]    EYE SURGERY      INSERT / REPLACE / REMOVE PACEMAKER      OR TRANSCATHETER AORTIC VALVE REPLACEMENT, SUBCLAVIAN PERCUTANEOUS APPROACH (STANDBY) N/A 1/31/2023    Procedure: OR TRANSCATHETER AORTIC VALVE REPLACEMENT, SUBCLAVIAN PERCUTANEOUS APPROACH (STANDBY);  Surgeon: Shanae Rich MD;  Location: [facility] CATH [facility]    OTHER SURGICAL HISTORY      icd    TRANSCATHETER AORTIC VALVE REPLACEMENT - SUBCLAVIAN APPROACH N/A 1/31/2023    Procedure: Transcatheter Aortic Valve Replacement - Subclavian Approach;  Surgeon: Tyrone Ordoñez MD;  Location: [facility] CATH LAB [facility]    ZZC CABG, VEIN, SINGLE      Description: CABG (CABG);  Recorded: 10/03/2012;  Comments: LIMA to LAD, SVG to OM2, SVG to RCA       Social History  Tobacco:   History   Smoking Status    Every Day    Packs/day: 0.50    Years: 30.00    Types: Cigarettes   Smokeless Tobacco    Never    Alcohol:   Social History    Substance and Sexual Activity      Alcohol use: Yes        Alcohol/week: 1.0 standard drink of alcohol        Comment: Glass of wine daily.   Illicit Drugs:   History   Drug Use No        Family History  Family History   Problem Relation Age of Onset    Cancer Mother     Heart Disease Father           Neha Estrada MD on 2/27/2024      cc: Kenna Calvillo,

## 2024-02-28 ENCOUNTER — TELEPHONE (OUTPATIENT)
Dept: CARDIOLOGY | Facility: CLINIC | Age: 84
End: 2024-02-28
Payer: MEDICARE

## 2024-02-28 NOTE — TELEPHONE ENCOUNTER
[facility] Call Center    Phone Message    May a detailed message be left on voicemail: yes     Reason for Call: Other: Loreto from [facility] stated they did not receive the prescription for Jardiance as their escript is down so please call it in or fax over to [phone]     Action Taken: Other: Cardio    Travel Screening: Not Applicable

## 2024-03-13 NOTE — TELEPHONE ENCOUNTER
I have pt scheduled for today 2/27/24 with [facility]  And pt ECHO is scheduled for 5/28/24 at [facility] AT 10 AM     Clara Samson RN on 3/13/2024 at 12:04 PM

## 2024-03-16 ENCOUNTER — ANCILLARY PROCEDURE (OUTPATIENT)
Dept: CARDIOLOGY | Facility: CLINIC | Age: 84
End: 2024-03-16
Attending: INTERNAL MEDICINE
Payer: MEDICARE

## 2024-03-16 DIAGNOSIS — I48.0 PAROXYSMAL ATRIAL FIBRILLATION (H): ICD-10-CM

## 2024-03-16 DIAGNOSIS — Z95.810 ICD (IMPLANTABLE CARDIOVERTER-DEFIBRILLATOR) IN PLACE: ICD-10-CM

## 2024-03-16 DIAGNOSIS — I42.0 DILATED CARDIOMYOPATHY (H): ICD-10-CM

## 2024-03-19 LAB
MDC_IDC_EPISODE_DTM: NORMAL
MDC_IDC_EPISODE_DURATION: 1 S
MDC_IDC_EPISODE_DURATION: 11 S
MDC_IDC_EPISODE_DURATION: 14 S
MDC_IDC_EPISODE_DURATION: 4 S
MDC_IDC_EPISODE_DURATION: 5 S
MDC_IDC_EPISODE_DURATION: 6 S
MDC_IDC_EPISODE_DURATION: 8 S
MDC_IDC_EPISODE_DURATION: 8 S
MDC_IDC_EPISODE_ID: NORMAL
MDC_IDC_EPISODE_TYPE: NORMAL
MDC_IDC_EPISODE_TYPE_INDUCED: NO
MDC_IDC_EPISODE_VENDOR_TYPE: NORMAL
MDC_IDC_LEAD_CONNECTION_STATUS: NORMAL
MDC_IDC_LEAD_CONNECTION_STATUS: NORMAL
MDC_IDC_LEAD_IMPLANT_DT: NORMAL
MDC_IDC_LEAD_IMPLANT_DT: NORMAL
MDC_IDC_LEAD_LOCATION: NORMAL
MDC_IDC_LEAD_LOCATION: NORMAL
MDC_IDC_LEAD_LOCATION_DETAIL_1: NORMAL
MDC_IDC_LEAD_LOCATION_DETAIL_1: NORMAL
MDC_IDC_LEAD_MFG: NORMAL
MDC_IDC_LEAD_MFG: NORMAL
MDC_IDC_LEAD_MODEL: NORMAL
MDC_IDC_LEAD_MODEL: NORMAL
MDC_IDC_LEAD_POLARITY_TYPE: NORMAL
MDC_IDC_LEAD_POLARITY_TYPE: NORMAL
MDC_IDC_LEAD_SERIAL: NORMAL
MDC_IDC_LEAD_SERIAL: NORMAL
MDC_IDC_LEAD_SPECIAL_FUNCTION: NORMAL
MDC_IDC_MSMT_BATTERY_DTM: NORMAL
MDC_IDC_MSMT_BATTERY_REMAINING_LONGEVITY: 102 MO
MDC_IDC_MSMT_BATTERY_REMAINING_PERCENTAGE: 100 %
MDC_IDC_MSMT_BATTERY_STATUS: NORMAL
MDC_IDC_MSMT_CAP_CHARGE_DTM: NORMAL
MDC_IDC_MSMT_CAP_CHARGE_TIME: 10.5 S
MDC_IDC_MSMT_CAP_CHARGE_TYPE: NORMAL
MDC_IDC_MSMT_LEADCHNL_RA_IMPEDANCE_VALUE: 795 OHM
MDC_IDC_MSMT_LEADCHNL_RA_LEAD_CHANNEL_STATUS: NORMAL
MDC_IDC_MSMT_LEADCHNL_RA_PACING_THRESHOLD_AMPLITUDE: 1.5 V
MDC_IDC_MSMT_LEADCHNL_RA_PACING_THRESHOLD_PULSEWIDTH: 0.7 MS
MDC_IDC_MSMT_LEADCHNL_RV_IMPEDANCE_VALUE: 522 OHM
MDC_IDC_MSMT_LEADCHNL_RV_PACING_THRESHOLD_AMPLITUDE: 0.8 V
MDC_IDC_MSMT_LEADCHNL_RV_PACING_THRESHOLD_PULSEWIDTH: 0.4 MS
MDC_IDC_PG_IMPLANT_DTM: NORMAL
MDC_IDC_PG_MFG: NORMAL
MDC_IDC_PG_MODEL: NORMAL
MDC_IDC_PG_SERIAL: NORMAL
MDC_IDC_PG_TYPE: NORMAL
MDC_IDC_SESS_CLINIC_NAME: NORMAL
MDC_IDC_SESS_DTM: NORMAL
MDC_IDC_SESS_TYPE: NORMAL
MDC_IDC_SET_BRADY_AT_MODE_SWITCH_MODE: NORMAL
MDC_IDC_SET_BRADY_AT_MODE_SWITCH_RATE: 170 {BEATS}/MIN
MDC_IDC_SET_BRADY_LOWRATE: 55 {BEATS}/MIN
MDC_IDC_SET_BRADY_MAX_SENSOR_RATE: 130 {BEATS}/MIN
MDC_IDC_SET_BRADY_MAX_TRACKING_RATE: 120 {BEATS}/MIN
MDC_IDC_SET_BRADY_MODE: NORMAL
MDC_IDC_SET_BRADY_PAV_DELAY_HIGH: 200 MS
MDC_IDC_SET_BRADY_PAV_DELAY_LOW: 300 MS
MDC_IDC_SET_BRADY_SAV_DELAY_HIGH: 165 MS
MDC_IDC_SET_BRADY_SAV_DELAY_LOW: 250 MS
MDC_IDC_SET_LEADCHNL_RA_PACING_AMPLITUDE: 2.6 V
MDC_IDC_SET_LEADCHNL_RA_PACING_POLARITY: NORMAL
MDC_IDC_SET_LEADCHNL_RA_PACING_PULSEWIDTH: 0.7 MS
MDC_IDC_SET_LEADCHNL_RA_SENSING_ADAPTATION_MODE: NORMAL
MDC_IDC_SET_LEADCHNL_RA_SENSING_POLARITY: NORMAL
MDC_IDC_SET_LEADCHNL_RA_SENSING_SENSITIVITY: 0.2 MV
MDC_IDC_SET_LEADCHNL_RV_PACING_AMPLITUDE: 1.5 V
MDC_IDC_SET_LEADCHNL_RV_PACING_POLARITY: NORMAL
MDC_IDC_SET_LEADCHNL_RV_PACING_PULSEWIDTH: 0.4 MS
MDC_IDC_SET_LEADCHNL_RV_SENSING_ADAPTATION_MODE: NORMAL
MDC_IDC_SET_LEADCHNL_RV_SENSING_POLARITY: NORMAL
MDC_IDC_SET_LEADCHNL_RV_SENSING_SENSITIVITY: 0.6 MV
MDC_IDC_SET_ZONE_DETECTION_INTERVAL: 261 MS
MDC_IDC_SET_ZONE_DETECTION_INTERVAL: 333 MS
MDC_IDC_SET_ZONE_DETECTION_INTERVAL: 400 MS
MDC_IDC_SET_ZONE_STATUS: NORMAL
MDC_IDC_SET_ZONE_TYPE: NORMAL
MDC_IDC_SET_ZONE_VENDOR_TYPE: NORMAL
MDC_IDC_STAT_AT_BURDEN_PERCENT: 1 %
MDC_IDC_STAT_AT_DTM_END: NORMAL
MDC_IDC_STAT_AT_DTM_START: NORMAL
MDC_IDC_STAT_BRADY_DTM_END: NORMAL
MDC_IDC_STAT_BRADY_DTM_START: NORMAL
MDC_IDC_STAT_BRADY_RA_PERCENT_PACED: 20 %
MDC_IDC_STAT_BRADY_RV_PERCENT_PACED: 24 %
MDC_IDC_STAT_EPISODE_RECENT_COUNT: 0
MDC_IDC_STAT_EPISODE_RECENT_COUNT: 0
MDC_IDC_STAT_EPISODE_RECENT_COUNT: 1
MDC_IDC_STAT_EPISODE_RECENT_COUNT: 2
MDC_IDC_STAT_EPISODE_RECENT_COUNT: 261
MDC_IDC_STAT_EPISODE_RECENT_COUNT_DTM_END: NORMAL
MDC_IDC_STAT_EPISODE_RECENT_COUNT_DTM_START: NORMAL
MDC_IDC_STAT_EPISODE_TYPE: NORMAL
MDC_IDC_STAT_EPISODE_VENDOR_TYPE: NORMAL
MDC_IDC_STAT_TACHYTHERAPY_ATP_DELIVERED_RECENT: 2
MDC_IDC_STAT_TACHYTHERAPY_ATP_DELIVERED_TOTAL: 3
MDC_IDC_STAT_TACHYTHERAPY_RECENT_DTM_END: NORMAL
MDC_IDC_STAT_TACHYTHERAPY_RECENT_DTM_START: NORMAL
MDC_IDC_STAT_TACHYTHERAPY_SHOCKS_ABORTED_RECENT: 0
MDC_IDC_STAT_TACHYTHERAPY_SHOCKS_ABORTED_TOTAL: 0
MDC_IDC_STAT_TACHYTHERAPY_SHOCKS_DELIVERED_RECENT: 0
MDC_IDC_STAT_TACHYTHERAPY_SHOCKS_DELIVERED_TOTAL: 0
MDC_IDC_STAT_TACHYTHERAPY_TOTAL_DTM_END: NORMAL
MDC_IDC_STAT_TACHYTHERAPY_TOTAL_DTM_START: NORMAL

## 2024-03-22 DIAGNOSIS — I25.5 ISCHEMIC CARDIOMYOPATHY: ICD-10-CM

## 2024-03-22 DIAGNOSIS — I42.0 DILATED CARDIOMYOPATHY (H): ICD-10-CM

## 2024-03-22 RX ORDER — LISINOPRIL 2.5 MG/1
TABLET ORAL
Qty: 90 TABLET | Refills: 0 | Status: SHIPPED | OUTPATIENT
Start: 2024-03-22 | End: 2024-06-21

## 2024-04-10 ENCOUNTER — ANCILLARY PROCEDURE (OUTPATIENT)
Dept: CARDIOLOGY | Facility: CLINIC | Age: 84
End: 2024-04-10
Attending: INTERNAL MEDICINE
Payer: MEDICARE

## 2024-04-10 DIAGNOSIS — I48.0 PAROXYSMAL ATRIAL FIBRILLATION (H): ICD-10-CM

## 2024-04-10 DIAGNOSIS — I42.0 DILATED CARDIOMYOPATHY (H): ICD-10-CM

## 2024-04-10 DIAGNOSIS — Z95.810 ICD (IMPLANTABLE CARDIOVERTER-DEFIBRILLATOR) IN PLACE: ICD-10-CM

## 2024-04-10 LAB
MDC_IDC_EPISODE_DTM: NORMAL
MDC_IDC_EPISODE_DURATION: 1 S
MDC_IDC_EPISODE_DURATION: 11 S
MDC_IDC_EPISODE_DURATION: 13 S
MDC_IDC_EPISODE_DURATION: 3 S
MDC_IDC_EPISODE_DURATION: 4 S
MDC_IDC_EPISODE_DURATION: 5 S
MDC_IDC_EPISODE_DURATION: 5 S
MDC_IDC_EPISODE_DURATION: 6 S
MDC_IDC_EPISODE_DURATION: 7 S
MDC_IDC_EPISODE_DURATION: 9 S
MDC_IDC_EPISODE_ID: NORMAL
MDC_IDC_EPISODE_TYPE: NORMAL
MDC_IDC_LEAD_CONNECTION_STATUS: NORMAL
MDC_IDC_LEAD_CONNECTION_STATUS: NORMAL
MDC_IDC_LEAD_IMPLANT_DT: NORMAL
MDC_IDC_LEAD_IMPLANT_DT: NORMAL
MDC_IDC_LEAD_LOCATION: NORMAL
MDC_IDC_LEAD_LOCATION: NORMAL
MDC_IDC_LEAD_LOCATION_DETAIL_1: NORMAL
MDC_IDC_LEAD_LOCATION_DETAIL_1: NORMAL
MDC_IDC_LEAD_MFG: NORMAL
MDC_IDC_LEAD_MFG: NORMAL
MDC_IDC_LEAD_MODEL: NORMAL
MDC_IDC_LEAD_MODEL: NORMAL
MDC_IDC_LEAD_POLARITY_TYPE: NORMAL
MDC_IDC_LEAD_POLARITY_TYPE: NORMAL
MDC_IDC_LEAD_SERIAL: NORMAL
MDC_IDC_LEAD_SERIAL: NORMAL
MDC_IDC_LEAD_SPECIAL_FUNCTION: NORMAL
MDC_IDC_MSMT_BATTERY_DTM: NORMAL
MDC_IDC_MSMT_BATTERY_REMAINING_LONGEVITY: 102 MO
MDC_IDC_MSMT_BATTERY_REMAINING_PERCENTAGE: 100 %
MDC_IDC_MSMT_BATTERY_STATUS: NORMAL
MDC_IDC_MSMT_CAP_CHARGE_DTM: NORMAL
MDC_IDC_MSMT_CAP_CHARGE_TIME: 10.5 S
MDC_IDC_MSMT_CAP_CHARGE_TYPE: NORMAL
MDC_IDC_MSMT_LEADCHNL_RA_IMPEDANCE_VALUE: 816 OHM
MDC_IDC_MSMT_LEADCHNL_RV_IMPEDANCE_VALUE: 539 OHM
MDC_IDC_PG_IMPLANT_DTM: NORMAL
MDC_IDC_PG_MFG: NORMAL
MDC_IDC_PG_MODEL: NORMAL
MDC_IDC_PG_SERIAL: NORMAL
MDC_IDC_PG_TYPE: NORMAL
MDC_IDC_SESS_CLINIC_NAME: NORMAL
MDC_IDC_SESS_DTM: NORMAL
MDC_IDC_SESS_TYPE: NORMAL
MDC_IDC_SET_BRADY_AT_MODE_SWITCH_MODE: NORMAL
MDC_IDC_SET_BRADY_AT_MODE_SWITCH_RATE: 170 {BEATS}/MIN
MDC_IDC_SET_BRADY_LOWRATE: 55 {BEATS}/MIN
MDC_IDC_SET_BRADY_MAX_SENSOR_RATE: 130 {BEATS}/MIN
MDC_IDC_SET_BRADY_MAX_TRACKING_RATE: 120 {BEATS}/MIN
MDC_IDC_SET_BRADY_MODE: NORMAL
MDC_IDC_SET_BRADY_PAV_DELAY_HIGH: 200 MS
MDC_IDC_SET_BRADY_PAV_DELAY_LOW: 300 MS
MDC_IDC_SET_BRADY_SAV_DELAY_HIGH: 165 MS
MDC_IDC_SET_BRADY_SAV_DELAY_LOW: 250 MS
MDC_IDC_SET_LEADCHNL_RA_PACING_AMPLITUDE: 2.6 V
MDC_IDC_SET_LEADCHNL_RA_PACING_POLARITY: NORMAL
MDC_IDC_SET_LEADCHNL_RA_PACING_PULSEWIDTH: 0.7 MS
MDC_IDC_SET_LEADCHNL_RA_SENSING_ADAPTATION_MODE: NORMAL
MDC_IDC_SET_LEADCHNL_RA_SENSING_POLARITY: NORMAL
MDC_IDC_SET_LEADCHNL_RA_SENSING_SENSITIVITY: 0.2 MV
MDC_IDC_SET_LEADCHNL_RV_PACING_AMPLITUDE: 1.5 V
MDC_IDC_SET_LEADCHNL_RV_PACING_POLARITY: NORMAL
MDC_IDC_SET_LEADCHNL_RV_PACING_PULSEWIDTH: 0.4 MS
MDC_IDC_SET_LEADCHNL_RV_SENSING_ADAPTATION_MODE: NORMAL
MDC_IDC_SET_LEADCHNL_RV_SENSING_POLARITY: NORMAL
MDC_IDC_SET_LEADCHNL_RV_SENSING_SENSITIVITY: 0.6 MV
MDC_IDC_SET_ZONE_DETECTION_INTERVAL: 261 MS
MDC_IDC_SET_ZONE_DETECTION_INTERVAL: 333 MS
MDC_IDC_SET_ZONE_DETECTION_INTERVAL: 400 MS
MDC_IDC_SET_ZONE_STATUS: NORMAL
MDC_IDC_SET_ZONE_TYPE: NORMAL
MDC_IDC_SET_ZONE_VENDOR_TYPE: NORMAL
MDC_IDC_STAT_AT_BURDEN_PERCENT: 1 %
MDC_IDC_STAT_AT_DTM_END: NORMAL
MDC_IDC_STAT_AT_DTM_START: NORMAL
MDC_IDC_STAT_BRADY_DTM_END: NORMAL
MDC_IDC_STAT_BRADY_DTM_START: NORMAL
MDC_IDC_STAT_BRADY_RA_PERCENT_PACED: 21 %
MDC_IDC_STAT_BRADY_RV_PERCENT_PACED: 26 %
MDC_IDC_STAT_EPISODE_RECENT_COUNT: 0
MDC_IDC_STAT_EPISODE_RECENT_COUNT: 1
MDC_IDC_STAT_EPISODE_RECENT_COUNT: 62
MDC_IDC_STAT_EPISODE_RECENT_COUNT_DTM_END: NORMAL
MDC_IDC_STAT_EPISODE_RECENT_COUNT_DTM_START: NORMAL
MDC_IDC_STAT_EPISODE_TYPE: NORMAL
MDC_IDC_STAT_EPISODE_VENDOR_TYPE: NORMAL
MDC_IDC_STAT_TACHYTHERAPY_ATP_DELIVERED_RECENT: 2
MDC_IDC_STAT_TACHYTHERAPY_ATP_DELIVERED_TOTAL: 3
MDC_IDC_STAT_TACHYTHERAPY_RECENT_DTM_END: NORMAL
MDC_IDC_STAT_TACHYTHERAPY_RECENT_DTM_START: NORMAL
MDC_IDC_STAT_TACHYTHERAPY_SHOCKS_ABORTED_RECENT: 0
MDC_IDC_STAT_TACHYTHERAPY_SHOCKS_ABORTED_TOTAL: 0
MDC_IDC_STAT_TACHYTHERAPY_SHOCKS_DELIVERED_RECENT: 0
MDC_IDC_STAT_TACHYTHERAPY_SHOCKS_DELIVERED_TOTAL: 0
MDC_IDC_STAT_TACHYTHERAPY_TOTAL_DTM_END: NORMAL
MDC_IDC_STAT_TACHYTHERAPY_TOTAL_DTM_START: NORMAL

## 2024-04-10 PROCEDURE — 99207 CARDIAC DEVICE CHECK - REMOTE: CPT | Performed by: INTERNAL MEDICINE

## 2024-04-22 DIAGNOSIS — E78.00 PURE HYPERCHOLESTEROLEMIA: ICD-10-CM

## 2024-04-22 RX ORDER — ROSUVASTATIN CALCIUM 10 MG/1
15 TABLET, COATED ORAL DAILY
Qty: 135 TABLET | Refills: 1 | Status: SHIPPED | OUTPATIENT
Start: 2024-04-22 | End: 2024-05-17

## 2024-04-25 ENCOUNTER — OFFICE VISIT (OUTPATIENT)
Dept: CARDIOLOGY | Facility: CLINIC | Age: 84
End: 2024-04-25
Attending: INTERNAL MEDICINE
Payer: MEDICARE

## 2024-04-25 VITALS
HEART RATE: 60 BPM | WEIGHT: 139 LBS | RESPIRATION RATE: 16 BRPM | SYSTOLIC BLOOD PRESSURE: 102 MMHG | BODY MASS INDEX: 19.94 KG/M2 | DIASTOLIC BLOOD PRESSURE: 70 MMHG

## 2024-04-25 DIAGNOSIS — I50.23 ACUTE ON CHRONIC SYSTOLIC HEART FAILURE (H): Primary | ICD-10-CM

## 2024-04-25 PROCEDURE — G2211 COMPLEX E/M VISIT ADD ON: HCPCS | Performed by: INTERNAL MEDICINE

## 2024-04-25 PROCEDURE — 99214 OFFICE O/P EST MOD 30 MIN: CPT | Performed by: INTERNAL MEDICINE

## 2024-04-25 NOTE — LETTER
4/25/2024    Kenna Calvillo, NP  [address] [place] [address]    RE: Daniel Alamo       Dear Colleague,     I had the pleasure of seeing Daniel Alamo in the [facility].    HEART CARE NOTE          Assessment/Recommendations     1. Severe HFrEF   Assessment / Plan  Near euvolemia on physical exam; denies HF symptoms of orthopnea, PND, fluid retention or edema; does not currently require a loop diuretics - no changes at this time  Patient is high risk for adverse cardiac events 2/2 advanced age, frailty  S/p ICD - consider upgrade to CRT-D; will start SGLT2-I; declines      Current Pharmacotherapy AHA Guideline-Directed Medical Therapy   Lisinopril 2.5 mg  daily Lisinopril 20 mg twice daily   Metoprolol succinate - held 2/2 borderline bradycardia 2/2 sotalol Carvedilol 25 mg twice daily   Spironolactone not started Spironolactone 25 mg once daily   Hydralazine NA  Hydralazine 100 mg three times daily   Isosorbide dinitrate NA Isosorbide dinitrate 40 mg three times daily   SGLT2 inhibitor:Empagliflozin 10 mg daily Dapagliflozin or Empagliflozin 10 mg daily      2. CAD  Assessment / Plan  Denies chest pain or anginal equivalents  continue ASA, rosuvastatin, lisinopril     3. Valvular disease  Assessment / Plan  S/p TAVR - recent echo demonstrated well seated valve with adequate function  Severe MR and mod TR also noted - patient decline further interventions      30 minutes spent reviewing prior records (including documentation, laboratory studies, cardiac testing/imaging), history and physical exam, planning, and subsequent documentation.    The longitudinal plan of care for HFrEF was addressed during this visit. Due to the added complexity in care, I will continue to support Mr. Daniel Alamo in the subsequent management of this condition(s) and with the ongoing continuity of care of this condition(s).      History of Present Illness/Subjective    Mr. Daniel Alamo is a 84 year old male  with a PMHx significant for HFrEF, CAD, valvular heart disease who presents [facility] for [facility]-up care     Today, Mr. Alamo denies acute cardiac events or complaints; he denies HF symptoms, chest pain, anginal equivalents; Management plan as detailed above     ECG: Personally reviewed. Atrial paced rhythm.     ECHO (personnaly Reviewed on 2/27/24):   The left ventricle is severely dilated.  There is mild to moderate eccentric left ventricular hypertrophy.  The visual ejection fraction is 20-25%.  There is inferior wall akinesis.  Diastolic Doppler findings (E/E' ratio and/or other parameters) suggest left  ventricular filling pressures are increased.  The left atrium is moderately dilated.  The mitral valve leaflets are mildly thickened.  There is moderately severe (3+) mitral regurgitation.  There is no mitral valve stenosis.  There is moderate (2+) tricuspid regurgitation.  The right ventricular systolic pressure is approximated at 22mmHg plus the  right atrial pressure.  There is a CHANTAL 3 bioprosthetic valve present in aortic valve position  The prosthetic valve gradients are normal .  Compared to echo from 9/14/23 the LVEF is reduced and the MR is more  prominent.    Lab results: personally reviewed April 25, 2024; notable for resolving DEAN    Medical history and pertinent documents reviewed in Care Everywhere please where applicable see details above        Physical Examination Review of Systems   /70 (BP Location: Left arm, Patient Position: Sitting, Cuff Size: Adult Regular)   Pulse 60   Resp 16   Wt 63 kg (139 lb)   BMI 19.94 kg/m    Body mass index is 19.94 kg/m .  Wt Readings from Last 3 Encounters:   04/25/24 63 kg (139 lb)   02/27/24 65.3 kg (144 lb)   02/14/24 64.4 kg (142 lb)     General Appearance:   no distress, normal body habitus   ENT/Mouth: membranes moist, no oral lesions or bleeding gums.      EYES:  no scleral icterus, normal conjunctivae   Neck: no carotid bruits or  thyromegaly   Chest/Lungs:   lungs are clear to auscultation, no rales or wheezing, equal chest wall expansion    Cardiovascular:   Regular. Normal first and second heart sounds with no murmurs, rubs, or gallops; the carotid, radial and posterior tibial pulses are intact, no JVD or LE edema bilaterally    Abdomen:  no organomegaly, masses, bruits, or tenderness; bowel sounds are present   Extremities: no cyanosis or clubbing   Skin: no xanthelasma, warm.    Neurologic: NAD     Psychiatric: alert and oriented x3, calm     A complete 10 systems ROS was reviewed  And is negative except what is listed in the HPI.          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 Harrell 1/22/2020 [id number] scire of (>75, CAD, CHF)     Pure hypercholesterolemia     Created by Conversion     PVD (peripheral vascular disease) (H24) 2/24/2020 2/2020 underwent aortobiiliac endograft repair of aortic aneurysm and  placement of bridging stents bilaterally and placement of bilateral iliac  branch devices for repair of bilateral iliac artery aneurysms      Severe aortic stenosis 10/10/2022    Sinus node dysfunction (H) 11/22/2022    VT (ventricular tachycardia) (H) 2004    Past Surgical History:   Procedure Laterality Date    ABDOMEN SURGERY      BYPASS GRAFT ARTERY CORONARY      CV ANGIOGRAM  CORONARY GRAFT N/A 9/19/2022    Procedure: Coronary Angiogram Graft;  Surgeon: Tyrone Ordoñez MD;  Location: [facility]    CV CORONARY LITHOTRIPSY PCI N/A 12/19/2022    Procedure: Percutaneous Coronary Intervention - Lithotripsy;  Surgeon: Tyrone Ordoñez MD;  Location: [facility]    CV LEFT HEART CATH N/A 9/19/2022    Procedure: Left Heart Catheterization;  Surgeon: Tyrone Ordoñez MD;  Location: [facility]    CV PCI ANGIOPLASTY N/A 12/19/2022    Procedure: Percutaneous Transluminal Angioplasty;  Surgeon: Tyrone Ordoñez MD;  Location: [facility]    CV RIGHT HEART CATH MEASUREMENTS RECORDED N/A 9/19/2022    Procedure: Right Heart Catheterization;  Surgeon: Tyrone Ordoñez MD;  Location: [facility]    EYE SURGERY      INSERT / REPLACE / REMOVE PACEMAKER      OR TRANSCATHETER AORTIC VALVE REPLACEMENT, SUBCLAVIAN PERCUTANEOUS APPROACH (STANDBY) N/A 1/31/2023    Procedure: OR TRANSCATHETER AORTIC VALVE REPLACEMENT, SUBCLAVIAN PERCUTANEOUS APPROACH (STANDBY);  Surgeon: Shanae Rich MD;  Location: [facility]    OTHER SURGICAL HISTORY      icd    TRANSCATHETER AORTIC VALVE REPLACEMENT - SUBCLAVIAN APPROACH N/A 1/31/2023    Procedure: Transcatheter Aortic Valve Replacement - Subclavian Approach;  Surgeon: Tyrone Ordoñez MD;  Location: [facility] CV    ZZC CABG, VEIN, SINGLE      Description: CABG (CABG);  Recorded: 10/03/2012;  Comments: LIMA to LAD, SVG to OM2, SVG to RCA    no family history of premature coronary artery disease Social History     Socioeconomic History    Marital status:      Spouse name: Not on file    Number of children: Not on file    Years of education: Not on file    Highest education level: Not on file   Occupational History    Not on file   Tobacco Use    Smoking status: Every Day     Current packs/day: 0.50     Average packs/day: 0.5 packs/day for 30.0 years (15.0 ttl pk-yrs)     Types: Cigarettes  "   Smokeless tobacco: Never   Vaping Use    Vaping status: Never Used   Substance and Sexual Activity    Alcohol use: Yes     Alcohol/week: 1.0 standard drink of alcohol     Comment: Glass of wine daily.    Drug use: No    Sexual activity: Not Currently   Other Topics Concern    Parent/sibling w/ CABG, MI or angioplasty before 65F 55M? Not Asked   Social History Narrative    Not on file     Social Determinants of Health     Financial Resource Strain: Not on file   Food Insecurity: Not on file   Transportation Needs: Not on file   Physical Activity: Not on file   Stress: Not on file   Social Connections: Not on file   Interpersonal Safety: Not on file   Housing Stability: Not on file           Lab Results    Chemistry/lipid CBC Cardiac Enzymes/BNP/TSH/INR   Lab Results   Component Value Date    CHOL 139 03/10/2023    HDL 38 (L) 03/10/2023    TRIG 89 03/10/2023    BUN 15.2 02/14/2024     02/14/2024    CO2 28 02/14/2024    Lab Results   Component Value Date    WBC 8.4 02/14/2024    HGB 13.5 02/14/2024    HCT 42.4 02/14/2024    MCV 90 02/14/2024     02/14/2024    Lab Results   Component Value Date    TSH 0.46 09/01/2022    INR 1.18 (H) 02/17/2023     No results found for: \"CKTOTAL\", \"CKMB\", \"TROPONINI\"       Weight:    Wt Readings from Last 3 Encounters:   04/25/24 63 kg (139 lb)   02/27/24 65.3 kg (144 lb)   02/14/24 64.4 kg (142 lb)       Allergies  Allergies   Allergen Reactions    Atorvastatin Diarrhea    Carvedilol Other (See Comments) and GI Disturbance     Upset stomach; GI upset      Chloride GI Disturbance and Nausea         Surgical History  Past Surgical History:   Procedure Laterality Date    ABDOMEN SURGERY      BYPASS GRAFT ARTERY CORONARY      CV ANGIOGRAM CORONARY GRAFT N/A 9/19/2022    Procedure: Coronary Angiogram Graft;  Surgeon: Tyrone Ordoñez MD;  Location: [facility] CATH LAB CV    CV CORONARY LITHOTRIPSY PCI N/A 12/19/2022    Procedure: Percutaneous Coronary Intervention - " Lithotripsy;  Surgeon: Tyrone Ordoñez MD;  Location: [facility] CATH LAB CV    CV LEFT HEART CATH N/A 9/19/2022    Procedure: Left Heart Catheterization;  Surgeon: Tyrone Ordoñez MD;  Location: [facility]    CV PCI ANGIOPLASTY N/A 12/19/2022    Procedure: Percutaneous Transluminal Angioplasty;  Surgeon: Tyrone Ordoñez MD;  Location: [facility] CATH LAB CV    CV RIGHT HEART CATH MEASUREMENTS RECORDED N/A 9/19/2022    Procedure: Right Heart Catheterization;  Surgeon: Tyrone Ordoñez MD;  Location: [facility] CATH LAB CV    EYE SURGERY      INSERT / REPLACE / REMOVE PACEMAKER      OR TRANSCATHETER AORTIC VALVE REPLACEMENT, SUBCLAVIAN PERCUTANEOUS APPROACH (STANDBY) N/A 1/31/2023    Procedure: OR TRANSCATHETER AORTIC VALVE REPLACEMENT, SUBCLAVIAN PERCUTANEOUS APPROACH (STANDBY);  Surgeon: Shanae Rich MD;  Location: [facility] CV    OTHER SURGICAL HISTORY      icd    TRANSCATHETER AORTIC VALVE REPLACEMENT - SUBCLAVIAN APPROACH N/A 1/31/2023    Procedure: Transcatheter Aortic Valve Replacement - Subclavian Approach;  Surgeon: Tyrone Ordoñez MD;  Location: [facility] CV    ZZC CABG, VEIN, SINGLE      Description: CABG (CABG);  Recorded: 10/03/2012;  Comments: LIMA to LAD, SVG to OM2, SVG to RCA       Social History  Tobacco:   History   Smoking Status    Every Day    Types: Cigarettes   Smokeless Tobacco    Never    Alcohol:   Social History    Substance and Sexual Activity      Alcohol use: Yes        Alcohol/week: 1.0 standard drink of alcohol        Comment: Glass of wine daily.   Illicit Drugs:   History   Drug Use No       Family History  Family History   Problem Relation Age of Onset    Cancer Mother     Heart Disease Father           Neha Estrada MD on 4/25/2024      cc: Kenna Calvillo      Thank you for allowing me to participate in the care of your patient.      Sincerely,     Neha Estrada MD     [facility]  [facility]  cc:   Neha Estrada MD  [address]  [place] [address]

## 2024-04-25 NOTE — PROGRESS NOTES
HEART CARE NOTE          Assessment/Recommendations     1. Severe HFrEF   Assessment / Plan  Near euvolemia on physical exam; denies HF symptoms of orthopnea, PND, fluid retention or edema; does not currently require a loop diuretics - no changes at this time  Patient is high risk for adverse cardiac events 2/2 advanced age, frailty  S/p ICD - consider upgrade to CRT-D; will start SGLT2-I; declines      Current Pharmacotherapy AHA Guideline-Directed Medical Therapy   Lisinopril 2.5 mg  daily Lisinopril 20 mg twice daily   Metoprolol succinate - held 2/2 borderline bradycardia 2/2 sotalol Carvedilol 25 mg twice daily   Spironolactone not started Spironolactone 25 mg once daily   Hydralazine NA  Hydralazine 100 mg three times daily   Isosorbide dinitrate NA Isosorbide dinitrate 40 mg three times daily   SGLT2 inhibitor:Empagliflozin 10 mg daily Dapagliflozin or Empagliflozin 10 mg daily      2. CAD  Assessment / Plan  Denies chest pain or anginal equivalents  continue ASA, rosuvastatin, lisinopril     3. Valvular disease  Assessment / Plan  S/p TAVR - recent echo demonstrated well seated valve with adequate function  Severe MR and mod TR also noted - patient decline further interventions      30 minutes spent reviewing prior records (including documentation, laboratory studies, cardiac testing/imaging), history and physical exam, planning, and subsequent documentation.    The longitudinal plan of care for HFrEF was addressed during this visit. Due to the added complexity in care, I will continue to support Mr. Daniel Alamo in the subsequent management of this condition(s) and with the ongoing continuity of care of this condition(s).      History of Present Illness/Subjective    Mr. Daniel Alamo is a 84 year old male with a PMHx significant for HFrEF, CAD, valvular heart disease who presents to [facility] for follow-up care     Today, Mr. Alamo denies acute cardiac events or complaints; he denies HF symptoms,  chest pain, anginal equivalents; Management plan as detailed above     ECG: Personally reviewed. Atrial paced rhythm.     ECHO (personnaly Reviewed on 2/27/24):   The left ventricle is severely dilated.  There is mild to moderate eccentric left ventricular hypertrophy.  The visual ejection fraction is 20-25%.  There is inferior wall akinesis.  Diastolic Doppler findings (E/E' ratio and/or other parameters) suggest left  ventricular filling pressures are increased.  The left atrium is moderately dilated.  The mitral valve leaflets are mildly thickened.  There is moderately severe (3+) mitral regurgitation.  There is no mitral valve stenosis.  There is moderate (2+) tricuspid regurgitation.  The right ventricular systolic pressure is approximated at 22mmHg plus the  right atrial pressure.  There is a CHANTAL 3 bioprosthetic valve present in aortic valve position  The prosthetic valve gradients are normal .  Compared to echo from 9/14/23 the LVEF is reduced and the MR is more  prominent.    Lab results: personally reviewed April 25, 2024; notable for resolving DEAN    Medical history and pertinent documents reviewed in Care Everywhere please where applicable see details above        Physical Examination Review of Systems   /70 (BP Location: Left arm, Patient Position: Sitting, Cuff Size: Adult Regular)   Pulse 60   Resp 16   Wt 63 kg (139 lb)   BMI 19.94 kg/m    Body mass index is 19.94 kg/m .  Wt Readings from Last 3 Encounters:   04/25/24 63 kg (139 lb)   02/27/24 65.3 kg (144 lb)   02/14/24 64.4 kg (142 lb)     General Appearance:   no distress, normal body habitus   ENT/Mouth: membranes moist, no oral lesions or bleeding gums.      EYES:  no scleral icterus, normal conjunctivae   Neck: no carotid bruits or thyromegaly   Chest/Lungs:   lungs are clear to auscultation, no rales or wheezing, equal chest wall expansion    Cardiovascular:   Regular. Normal first and second heart sounds with no murmurs, rubs, or  gallops; the carotid, radial and posterior tibial pulses are intact, no JVD or LE edema bilaterally    Abdomen:  no organomegaly, masses, bruits, or tenderness; bowel sounds are present   Extremities: no cyanosis or clubbing   Skin: no xanthelasma, warm.    Neurologic: NAD     Psychiatric: alert and oriented x3, calm     A complete 10 systems ROS was reviewed  And is negative except what is listed in the HPI.          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 Harrell 1/22/2020 [id number] scire of (>75, CAD, CHF)     Pure hypercholesterolemia     Created by Conversion     PVD (peripheral vascular disease) (H24) 2/24/2020 2/2020 underwent aortobiiliac endograft repair of aortic aneurysm and  placement of bridging stents bilaterally and placement of bilateral iliac  branch devices for repair of bilateral iliac artery aneurysms      Severe aortic stenosis 10/10/2022    Sinus node dysfunction (H) 11/22/2022    VT (ventricular tachycardia) (H) 2004    Past Surgical History:   Procedure Laterality Date    ABDOMEN SURGERY      BYPASS GRAFT ARTERY CORONARY      CV ANGIOGRAM CORONARY GRAFT N/A 9/19/2022    Procedure: Coronary Angiogram Graft;  Surgeon: Tyrone Ordoñez MD;  Location: [facility] CATH LAB CV    CV CORONARY LITHOTRIPSY PCI N/A 12/19/2022    Procedure: Percutaneous  Coronary Intervention - Lithotripsy;  Surgeon: Tyrone Ordoñez MD;  Location: [facility] CATH LAB CV    CV LEFT HEART CATH N/A 9/19/2022    Procedure: Left Heart Catheterization;  Surgeon: Tyrone Ordoñez MD;  Location: [facility] CATH LAB CV    CV PCI ANGIOPLASTY N/A 12/19/2022    Procedure: Percutaneous Transluminal Angioplasty;  Surgeon: Tyrone Ordoñez MD;  Location: [facility] CV    CV RIGHT HEART CATH MEASUREMENTS RECORDED N/A 9/19/2022    Procedure: Right Heart Catheterization;  Surgeon: Tyrone Ordoñez MD;  Location: [facility]    EYE SURGERY      INSERT / REPLACE / REMOVE PACEMAKER      OR TRANSCATHETER AORTIC VALVE REPLACEMENT, SUBCLAVIAN PERCUTANEOUS APPROACH (STANDBY) N/A 1/31/2023    Procedure: OR TRANSCATHETER AORTIC VALVE REPLACEMENT, SUBCLAVIAN PERCUTANEOUS APPROACH (STANDBY);  Surgeon: Shanae Rich MD;  Location: [facility] CV    OTHER SURGICAL HISTORY      icd    TRANSCATHETER AORTIC VALVE REPLACEMENT - SUBCLAVIAN APPROACH N/A 1/31/2023    Procedure: Transcatheter Aortic Valve Replacement - Subclavian Approach;  Surgeon: Tyrone Ordoñez MD;  Location: [facility] CV    ZZC CABG, VEIN, SINGLE      Description: CABG (CABG);  Recorded: 10/03/2012;  Comments: LIMA to LAD, SVG to OM2, SVG to RCA    no family history of premature coronary artery disease Social History     Socioeconomic History    Marital status:      Spouse name: Not on file    Number of children: Not on file    Years of education: Not on file    Highest education level: Not on file   Occupational History    Not on file   Tobacco Use    Smoking status: Every Day     Current packs/day: 0.50     Average packs/day: 0.5 packs/day for 30.0 years (15.0 ttl pk-yrs)     Types: Cigarettes    Smokeless tobacco: Never   Vaping Use    Vaping status: Never Used   Substance and Sexual Activity    Alcohol use: Yes     Alcohol/week: 1.0 standard drink of alcohol     Comment: Glass of wine daily.     "Drug use: No    Sexual activity: Not Currently   Other Topics Concern    Parent/sibling w/ CABG, MI or angioplasty before 65F 55M? Not Asked   Social History Narrative    Not on file     Social Determinants of Health     Financial Resource Strain: Not on file   Food Insecurity: Not on file   Transportation Needs: Not on file   Physical Activity: Not on file   Stress: Not on file   Social Connections: Not on file   Interpersonal Safety: Not on file   Housing Stability: Not on file           Lab Results    Chemistry/lipid CBC Cardiac Enzymes/BNP/TSH/INR   Lab Results   Component Value Date    CHOL 139 03/10/2023    HDL 38 (L) 03/10/2023    TRIG 89 03/10/2023    BUN 15.2 02/14/2024     02/14/2024    CO2 28 02/14/2024    Lab Results   Component Value Date    WBC 8.4 02/14/2024    HGB 13.5 02/14/2024    HCT 42.4 02/14/2024    MCV 90 02/14/2024     02/14/2024    Lab Results   Component Value Date    TSH 0.46 09/01/2022    INR 1.18 (H) 02/17/2023     No results found for: \"CKTOTAL\", \"CKMB\", \"TROPONINI\"       Weight:    Wt Readings from Last 3 Encounters:   04/25/24 63 kg (139 lb)   02/27/24 65.3 kg (144 lb)   02/14/24 64.4 kg (142 lb)       Allergies  Allergies   Allergen Reactions    Atorvastatin Diarrhea    Carvedilol Other (See Comments) and GI Disturbance     Upset stomach; GI upset      Chloride GI Disturbance and Nausea         Surgical History  Past Surgical History:   Procedure Laterality Date    ABDOMEN SURGERY      BYPASS GRAFT ARTERY CORONARY      CV ANGIOGRAM CORONARY GRAFT N/A 9/19/2022    Procedure: Coronary Angiogram Graft;  Surgeon: Tyrone Ordoñez MD;  Location: [facility] CATH LAB CV    CV CORONARY LITHOTRIPSY PCI N/A 12/19/2022    Procedure: Percutaneous Coronary Intervention - Lithotripsy;  Surgeon: Tyrone Ordoñez MD;  Location: [facility] CATH LAB CV    CV LEFT HEART CATH N/A 9/19/2022    Procedure: Left Heart Catheterization;  Surgeon: Tyrone Ordoñez MD;  Location: [facility] CATH " LAB CV    CV PCI ANGIOPLASTY N/A 12/19/2022    Procedure: Percutaneous Transluminal Angioplasty;  Surgeon: Tyrone Ordoñez MD;  Location: [facility] CV    CV RIGHT HEART CATH MEASUREMENTS RECORDED N/A 9/19/2022    Procedure: Right Heart Catheterization;  Surgeon: Tyrone Ordoñez MD;  Location: [facility] CV    EYE SURGERY      INSERT / REPLACE / REMOVE PACEMAKER      OR TRANSCATHETER AORTIC VALVE REPLACEMENT, SUBCLAVIAN PERCUTANEOUS APPROACH (STANDBY) N/A 1/31/2023    Procedure: OR TRANSCATHETER AORTIC VALVE REPLACEMENT, SUBCLAVIAN PERCUTANEOUS APPROACH (STANDBY);  Surgeon: Shanae Rich MD;  Location: [facility] LAB CV    OTHER SURGICAL HISTORY      icd    TRANSCATHETER AORTIC VALVE REPLACEMENT - SUBCLAVIAN APPROACH N/A 1/31/2023    Procedure: Transcatheter Aortic Valve Replacement - Subclavian Approach;  Surgeon: Tyrone Ordoñez MD;  Location: [facility] CV    ZZC CABG, VEIN, SINGLE      Description: CABG (CABG);  Recorded: 10/03/2012;  Comments: LIMA to LAD, SVG to OM2, SVG to RCA       Social History  Tobacco:   History   Smoking Status    Every Day    Types: Cigarettes   Smokeless Tobacco    Never    Alcohol:   Social History    Substance and Sexual Activity      Alcohol use: Yes        Alcohol/week: 1.0 standard drink of alcohol        Comment: Glass of wine daily.   Illicit Drugs:   History   Drug Use No       Family History  Family History   Problem Relation Age of Onset    Cancer Mother     Heart Disease Father           Neha Estrada MD on 4/25/2024      cc: Kenna Calvillo

## 2024-05-17 DIAGNOSIS — E78.00 PURE HYPERCHOLESTEROLEMIA: ICD-10-CM

## 2024-05-17 RX ORDER — ROSUVASTATIN CALCIUM 10 MG/1
15 TABLET, COATED ORAL DAILY
Qty: 135 TABLET | Refills: 3 | Status: SHIPPED | OUTPATIENT
Start: 2024-05-17

## 2024-05-28 ENCOUNTER — HOSPITAL ENCOUNTER (OUTPATIENT)
Dept: CARDIOLOGY | Facility: HOSPITAL | Age: 84
Discharge: HOME | End: 2024-05-28
Attending: INTERNAL MEDICINE | Admitting: INTERNAL MEDICINE
Payer: MEDICARE

## 2024-05-28 DIAGNOSIS — I25.5 ISCHEMIC CARDIOMYOPATHY: ICD-10-CM

## 2024-05-28 DIAGNOSIS — Z95.2 S/P TAVR (TRANSCATHETER AORTIC VALVE REPLACEMENT): ICD-10-CM

## 2024-05-28 DIAGNOSIS — I50.22 CHRONIC HFREF (HEART FAILURE WITH REDUCED EJECTION FRACTION) (H): ICD-10-CM

## 2024-05-28 LAB — LVEF ECHO: NORMAL

## 2024-05-28 PROCEDURE — 93306 TTE W/DOPPLER COMPLETE: CPT

## 2024-05-28 PROCEDURE — 93306 TTE W/DOPPLER COMPLETE: CPT | Mod: 26 | Performed by: INTERNAL MEDICINE

## 2024-06-06 DIAGNOSIS — I48.0 PAROXYSMAL ATRIAL FIBRILLATION (H): ICD-10-CM

## 2024-06-06 RX ORDER — SOTALOL HYDROCHLORIDE 80 MG/1
40 TABLET ORAL 2 TIMES DAILY
Qty: 90 TABLET | Refills: 0 | Status: SHIPPED | OUTPATIENT
Start: 2024-06-06 | End: 2024-07-25

## 2024-06-06 NOTE — TELEPHONE ENCOUNTER
Per Jackie's last office visit 01/2024 pt should be seen in 6 months with EP NP.  Please call to schedule! Thank you!

## 2024-06-17 DIAGNOSIS — I42.0 DILATED CARDIOMYOPATHY (H): ICD-10-CM

## 2024-06-17 DIAGNOSIS — I25.5 ISCHEMIC CARDIOMYOPATHY: ICD-10-CM

## 2024-06-21 RX ORDER — LISINOPRIL 2.5 MG/1
TABLET ORAL
Qty: 90 TABLET | Refills: 3 | Status: SHIPPED | OUTPATIENT
Start: 2024-06-21

## 2024-07-01 ENCOUNTER — TELEPHONE (OUTPATIENT)
Dept: CARDIOLOGY | Facility: CLINIC | Age: 84
End: 2024-07-01

## 2024-07-01 ENCOUNTER — ANCILLARY PROCEDURE (OUTPATIENT)
Dept: CARDIOLOGY | Facility: CLINIC | Age: 84
End: 2024-07-01
Attending: INTERNAL MEDICINE
Payer: MEDICARE

## 2024-07-01 DIAGNOSIS — I42.0 DILATED CARDIOMYOPATHY (H): ICD-10-CM

## 2024-07-01 DIAGNOSIS — Z95.810 ICD (IMPLANTABLE CARDIOVERTER-DEFIBRILLATOR) IN PLACE: ICD-10-CM

## 2024-07-01 DIAGNOSIS — I48.0 PAROXYSMAL ATRIAL FIBRILLATION (H): ICD-10-CM

## 2024-07-01 NOTE — TELEPHONE ENCOUNTER
Encounter Type: Alert remote ICD transmission for AT/AF for 6/24hrs. Courtesy check.   Device: [employer] Dyn[employer].  Pacing %/Programmed: AP 24%,  30% at DDDR 55/120 ppm.   Lead(s):  Stable.   Battery longevity: 8yrs, 6mo estimated.   Presenting: Atrial fibrillation with ventricular pacing 60 bpm.   Atrial high rates: since 4/10/24; 276 mode switch episodes, available EGMs appear to be AT/AF with significant atrial undersensing causing AP, current episode in progress since 7:13PM, burden <1%, ventricular rates controlled.   Anticoagulant: Eliquis.   Ventricular High rates: since 4/10/24; Two VT-1 episodes.  Comments: Normal device function.   Plan: Routed to device RN for review. TAQUERIA Thompson, Device Specialist    Transmission reviewed, agree with above. Known PAF with hx of undersensing and FFRWs on A EGMs. Low AF burden and on Eliquis. Also noted 2 NSVTs in monitor only zone. Reviewed with patient, he denies any troublesome symptoms, no palpitations or near-syncope. Advised to call with any changes prior to upcoming appointments this month, patient verbalized understanding.     Andria Auguste, RN

## 2024-07-05 LAB
MDC_IDC_EPISODE_DTM: NORMAL
MDC_IDC_EPISODE_DURATION: 1 S
MDC_IDC_EPISODE_DURATION: 12 S
MDC_IDC_EPISODE_DURATION: 15 S
MDC_IDC_EPISODE_DURATION: 2 S
MDC_IDC_EPISODE_DURATION: 4 S
MDC_IDC_EPISODE_DURATION: 5 S
MDC_IDC_EPISODE_DURATION: 5 S
MDC_IDC_EPISODE_DURATION: 8 S
MDC_IDC_EPISODE_DURATION: NORMAL S
MDC_IDC_EPISODE_ID: NORMAL
MDC_IDC_EPISODE_TYPE: NORMAL
MDC_IDC_EPISODE_TYPE_INDUCED: NO
MDC_IDC_EPISODE_TYPE_INDUCED: NO
MDC_IDC_EPISODE_VENDOR_TYPE: NORMAL
MDC_IDC_EPISODE_VENDOR_TYPE: NORMAL
MDC_IDC_LEAD_CONNECTION_STATUS: NORMAL
MDC_IDC_LEAD_CONNECTION_STATUS: NORMAL
MDC_IDC_LEAD_IMPLANT_DT: NORMAL
MDC_IDC_LEAD_IMPLANT_DT: NORMAL
MDC_IDC_LEAD_LOCATION: NORMAL
MDC_IDC_LEAD_LOCATION: NORMAL
MDC_IDC_LEAD_LOCATION_DETAIL_1: NORMAL
MDC_IDC_LEAD_LOCATION_DETAIL_1: NORMAL
MDC_IDC_LEAD_MFG: NORMAL
MDC_IDC_LEAD_MFG: NORMAL
MDC_IDC_LEAD_MODEL: NORMAL
MDC_IDC_LEAD_MODEL: NORMAL
MDC_IDC_LEAD_POLARITY_TYPE: NORMAL
MDC_IDC_LEAD_POLARITY_TYPE: NORMAL
MDC_IDC_LEAD_SERIAL: NORMAL
MDC_IDC_LEAD_SERIAL: NORMAL
MDC_IDC_LEAD_SPECIAL_FUNCTION: NORMAL
MDC_IDC_MSMT_BATTERY_DTM: NORMAL
MDC_IDC_MSMT_BATTERY_REMAINING_LONGEVITY: 102 MO
MDC_IDC_MSMT_BATTERY_REMAINING_PERCENTAGE: 100 %
MDC_IDC_MSMT_BATTERY_STATUS: NORMAL
MDC_IDC_MSMT_CAP_CHARGE_DTM: NORMAL
MDC_IDC_MSMT_CAP_CHARGE_TIME: 10.6 S
MDC_IDC_MSMT_CAP_CHARGE_TYPE: NORMAL
MDC_IDC_MSMT_LEADCHNL_RA_IMPEDANCE_VALUE: 808 OHM
MDC_IDC_MSMT_LEADCHNL_RV_IMPEDANCE_VALUE: 546 OHM
MDC_IDC_PG_IMPLANT_DTM: NORMAL
MDC_IDC_PG_MFG: NORMAL
MDC_IDC_PG_MODEL: NORMAL
MDC_IDC_PG_SERIAL: NORMAL
MDC_IDC_PG_TYPE: NORMAL
MDC_IDC_SESS_CLINIC_NAME: NORMAL
MDC_IDC_SESS_DTM: NORMAL
MDC_IDC_SESS_TYPE: NORMAL
MDC_IDC_SET_BRADY_AT_MODE_SWITCH_MODE: NORMAL
MDC_IDC_SET_BRADY_AT_MODE_SWITCH_RATE: 170 {BEATS}/MIN
MDC_IDC_SET_BRADY_LOWRATE: 55 {BEATS}/MIN
MDC_IDC_SET_BRADY_MAX_SENSOR_RATE: 130 {BEATS}/MIN
MDC_IDC_SET_BRADY_MAX_TRACKING_RATE: 120 {BEATS}/MIN
MDC_IDC_SET_BRADY_MODE: NORMAL
MDC_IDC_SET_BRADY_PAV_DELAY_HIGH: 200 MS
MDC_IDC_SET_BRADY_PAV_DELAY_LOW: 300 MS
MDC_IDC_SET_BRADY_SAV_DELAY_HIGH: 165 MS
MDC_IDC_SET_BRADY_SAV_DELAY_LOW: 250 MS
MDC_IDC_SET_LEADCHNL_RA_PACING_AMPLITUDE: 2.6 V
MDC_IDC_SET_LEADCHNL_RA_PACING_POLARITY: NORMAL
MDC_IDC_SET_LEADCHNL_RA_PACING_PULSEWIDTH: 0.7 MS
MDC_IDC_SET_LEADCHNL_RA_SENSING_ADAPTATION_MODE: NORMAL
MDC_IDC_SET_LEADCHNL_RA_SENSING_POLARITY: NORMAL
MDC_IDC_SET_LEADCHNL_RA_SENSING_SENSITIVITY: 0.2 MV
MDC_IDC_SET_LEADCHNL_RV_PACING_AMPLITUDE: 1.5 V
MDC_IDC_SET_LEADCHNL_RV_PACING_POLARITY: NORMAL
MDC_IDC_SET_LEADCHNL_RV_PACING_PULSEWIDTH: 0.4 MS
MDC_IDC_SET_LEADCHNL_RV_SENSING_ADAPTATION_MODE: NORMAL
MDC_IDC_SET_LEADCHNL_RV_SENSING_POLARITY: NORMAL
MDC_IDC_SET_LEADCHNL_RV_SENSING_SENSITIVITY: 0.6 MV
MDC_IDC_SET_ZONE_DETECTION_INTERVAL: 261 MS
MDC_IDC_SET_ZONE_DETECTION_INTERVAL: 333 MS
MDC_IDC_SET_ZONE_DETECTION_INTERVAL: 400 MS
MDC_IDC_SET_ZONE_STATUS: NORMAL
MDC_IDC_SET_ZONE_TYPE: NORMAL
MDC_IDC_SET_ZONE_VENDOR_TYPE: NORMAL
MDC_IDC_STAT_AT_BURDEN_PERCENT: 1 %
MDC_IDC_STAT_AT_DTM_END: NORMAL
MDC_IDC_STAT_AT_DTM_START: NORMAL
MDC_IDC_STAT_BRADY_DTM_END: NORMAL
MDC_IDC_STAT_BRADY_DTM_START: NORMAL
MDC_IDC_STAT_BRADY_RA_PERCENT_PACED: 24 %
MDC_IDC_STAT_BRADY_RV_PERCENT_PACED: 30 %
MDC_IDC_STAT_EPISODE_RECENT_COUNT: 0
MDC_IDC_STAT_EPISODE_RECENT_COUNT: 0
MDC_IDC_STAT_EPISODE_RECENT_COUNT: 2
MDC_IDC_STAT_EPISODE_RECENT_COUNT: 2
MDC_IDC_STAT_EPISODE_RECENT_COUNT: 276
MDC_IDC_STAT_EPISODE_RECENT_COUNT_DTM_END: NORMAL
MDC_IDC_STAT_EPISODE_RECENT_COUNT_DTM_START: NORMAL
MDC_IDC_STAT_EPISODE_TYPE: NORMAL
MDC_IDC_STAT_EPISODE_VENDOR_TYPE: NORMAL
MDC_IDC_STAT_TACHYTHERAPY_ATP_DELIVERED_RECENT: 2
MDC_IDC_STAT_TACHYTHERAPY_ATP_DELIVERED_TOTAL: 3
MDC_IDC_STAT_TACHYTHERAPY_RECENT_DTM_END: NORMAL
MDC_IDC_STAT_TACHYTHERAPY_RECENT_DTM_START: NORMAL
MDC_IDC_STAT_TACHYTHERAPY_SHOCKS_ABORTED_RECENT: 0
MDC_IDC_STAT_TACHYTHERAPY_SHOCKS_ABORTED_TOTAL: 0
MDC_IDC_STAT_TACHYTHERAPY_SHOCKS_DELIVERED_RECENT: 0
MDC_IDC_STAT_TACHYTHERAPY_SHOCKS_DELIVERED_TOTAL: 0
MDC_IDC_STAT_TACHYTHERAPY_TOTAL_DTM_END: NORMAL
MDC_IDC_STAT_TACHYTHERAPY_TOTAL_DTM_START: NORMAL

## 2024-07-14 ENCOUNTER — HEALTH MAINTENANCE LETTER (OUTPATIENT)
Age: 84
End: 2024-07-14

## 2024-07-23 ENCOUNTER — ANCILLARY PROCEDURE (OUTPATIENT)
Dept: CARDIOLOGY | Facility: CLINIC | Age: 84
End: 2024-07-23
Attending: INTERNAL MEDICINE
Payer: MEDICARE

## 2024-07-23 DIAGNOSIS — I42.0 DILATED CARDIOMYOPATHY (H): ICD-10-CM

## 2024-07-23 DIAGNOSIS — I48.0 PAROXYSMAL ATRIAL FIBRILLATION (H): ICD-10-CM

## 2024-07-23 DIAGNOSIS — Z95.810 ICD (IMPLANTABLE CARDIOVERTER-DEFIBRILLATOR) IN PLACE: ICD-10-CM

## 2024-07-25 ENCOUNTER — OFFICE VISIT (OUTPATIENT)
Dept: CARDIOLOGY | Facility: CLINIC | Age: 84
End: 2024-07-25
Payer: MEDICARE

## 2024-07-25 ENCOUNTER — TELEPHONE (OUTPATIENT)
Dept: CARDIOLOGY | Facility: CLINIC | Age: 84
End: 2024-07-25

## 2024-07-25 VITALS
WEIGHT: 137 LBS | HEART RATE: 64 BPM | SYSTOLIC BLOOD PRESSURE: 108 MMHG | BODY MASS INDEX: 19.66 KG/M2 | RESPIRATION RATE: 16 BRPM | DIASTOLIC BLOOD PRESSURE: 58 MMHG

## 2024-07-25 VITALS
DIASTOLIC BLOOD PRESSURE: 81 MMHG | SYSTOLIC BLOOD PRESSURE: 118 MMHG | RESPIRATION RATE: 16 BRPM | BODY MASS INDEX: 19.61 KG/M2 | HEIGHT: 70 IN | HEART RATE: 53 BPM | WEIGHT: 137 LBS

## 2024-07-25 DIAGNOSIS — I50.23 ACUTE ON CHRONIC SYSTOLIC HEART FAILURE (H): Primary | ICD-10-CM

## 2024-07-25 DIAGNOSIS — Z95.810 ICD (IMPLANTABLE CARDIOVERTER-DEFIBRILLATOR), DUAL, IN SITU: ICD-10-CM

## 2024-07-25 DIAGNOSIS — I47.20 VENTRICULAR TACHYCARDIA (H): ICD-10-CM

## 2024-07-25 DIAGNOSIS — I25.5 ISCHEMIC CARDIOMYOPATHY: Primary | ICD-10-CM

## 2024-07-25 DIAGNOSIS — I50.20 HFREF (HEART FAILURE WITH REDUCED EJECTION FRACTION) (H): ICD-10-CM

## 2024-07-25 DIAGNOSIS — I48.0 PAROXYSMAL ATRIAL FIBRILLATION (H): Primary | ICD-10-CM

## 2024-07-25 DIAGNOSIS — R42 DIZZINESS: ICD-10-CM

## 2024-07-25 LAB — NT-PROBNP SERPL-MCNC: 2034 PG/ML (ref 0–1800)

## 2024-07-25 PROCEDURE — 99214 OFFICE O/P EST MOD 30 MIN: CPT | Performed by: INTERNAL MEDICINE

## 2024-07-25 PROCEDURE — 36415 COLL VENOUS BLD VENIPUNCTURE: CPT | Performed by: INTERNAL MEDICINE

## 2024-07-25 PROCEDURE — G2211 COMPLEX E/M VISIT ADD ON: HCPCS | Performed by: INTERNAL MEDICINE

## 2024-07-25 PROCEDURE — 83880 ASSAY OF NATRIURETIC PEPTIDE: CPT | Performed by: INTERNAL MEDICINE

## 2024-07-25 PROCEDURE — 99214 OFFICE O/P EST MOD 30 MIN: CPT | Performed by: NURSE PRACTITIONER

## 2024-07-25 RX ORDER — FUROSEMIDE 20 MG
20 TABLET ORAL DAILY
Qty: 60 TABLET | Refills: 3 | Status: SHIPPED | OUTPATIENT
Start: 2024-07-25 | End: 2024-08-05

## 2024-07-25 RX ORDER — SOTALOL HYDROCHLORIDE 80 MG/1
40 TABLET ORAL 2 TIMES DAILY
Qty: 90 TABLET | Refills: 3 | Status: SHIPPED | OUTPATIENT
Start: 2024-07-25

## 2024-07-25 NOTE — PATIENT INSTRUCTIONS
Daniel Alamo,    It was a pleasure to see you today at the [facility].     My recommendations after this visit include:    Continue current medications    Increase daily activity  Eat regular meals  Daily fluid intake 50 ounces    Follow up with Dr. Saxena in 6 months    Airam Malin, [facility], Electrophysiology  [phone]  EP nurses [phone]

## 2024-07-25 NOTE — LETTER
7/25/2024    Kenna Calvillo, NP  [address] [place] [address]    RE: Daniel Alamo       Dear Colleague,     I had the pleasure of seeing Daniel Alamo in the [facility].    HEART CARE NOTE          Assessment/Recommendations     1. Severe HFrEF   Assessment / Plan  Near euvolemia on physical exam; denies HF symptoms of orthopnea, PND, fluid retention or edema; does not currently require a loop diuretics - no changes at this time  Patient is high risk for adverse cardiac events 2/2 advanced age, frailty  S/p ICD - consider upgrade to CRT-D; will start SGLT2-I  Patient is NYHA functional class 3 with EF < 35 % despite optimization of GDMT, and therefore meets criteria for Barostim implant for further HF management and would like to proceed with implant       Current Pharmacotherapy AHA Guideline-Directed Medical Therapy   Lisinopril 2.5 mg  daily Lisinopril 20 mg twice daily   Metoprolol succinate - due to HR in 60 and on sotalol Carvedilol 25 mg twice daily   Spironolactone not started Spironolactone 25 mg once daily   Hydralazine NA  Hydralazine 100 mg three times daily   Isosorbide dinitrate NA Isosorbide dinitrate 40 mg three times daily   SGLT2 inhibitor:Empagliflozin 10 mg daily Dapagliflozin or Empagliflozin 10 mg daily      2. CAD  Assessment / Plan  Denies chest pain or anginal equivalents  continue ASA, rosuvastatin, lisinopril     3. Valvular disease  Assessment / Plan  S/p TAVR - recent echo demonstrated well seated valve with adequate function  Mod MR and mild TR also noted - patient decline further interventions    4. Atrial fibrillation  Assessment / Plan  Currently on apixaban and sotalol    35 minutes spent reviewing prior records (including documentation, laboratory studies, cardiac testing/imaging), history and physical exam, planning, and subsequent documentation.    The longitudinal plan of care for HFrEF was addressed during this visit. Due to the added complexity in  care, I will continue to support Mr. Daniel Alamo in the subsequent management of this condition(s) and with the ongoing continuity of care of this condition(s).      History of Present Illness/Subjective    Mr. Daniel Alamo is a 84 year old male with a PMHx significant for HFrEF, CAD, valvular heart disease who presents to [facility] for follow-up care     Today, Mr. Alamo denies acute cardiac events or complaints; he denies HF symptoms, chest pain, anginal equivalents; Management plan as detailed above     ECG: Personally reviewed. Atrial paced rhythm.     Repeat echo personally reviewed 7/25/24:  The left ventricle is moderately dilated.  Left ventricular function is decreased. The ejection fraction is 30-35%  (moderately reduced).  Normal right ventricle size and systolic function.  The left atrium is moderately dilated.  There is moderate (2+) mitral regurgitation.  There is a bioprosthetic aortic valve.  The gradient is normal for this prosthetic aortic valve.  There is no paravalvular regurgitation present.  IVC diameter <2.1 cm collapsing >50% with sniff suggests a normal RA pressure  of 3 mmHg.    ECHO (personnaly Reviewed on 2/27/24):   The left ventricle is severely dilated.  There is mild to moderate eccentric left ventricular hypertrophy.  The visual ejection fraction is 20-25%.  There is inferior wall akinesis.  Diastolic Doppler findings (E/E' ratio and/or other parameters) suggest left  ventricular filling pressures are increased.  The left atrium is moderately dilated.  The mitral valve leaflets are mildly thickened.  There is moderately severe (3+) mitral regurgitation.  There is no mitral valve stenosis.  There is moderate (2+) tricuspid regurgitation.  The right ventricular systolic pressure is approximated at 22mmHg plus the  right atrial pressure.  There is a CHANTAL 3 bioprosthetic valve present in aortic valve position  The prosthetic valve gradients are normal .  Compared to echo from  9/14/23 the LVEF is reduced and the MR is more  prominent.    Lab results: personally reviewed July 25, 2024; notable for resolving DEAN    Medical history and pertinent documents reviewed in Care Everywhere please where applicable see details above        Physical Examination Review of Systems   /58 (BP Location: Right arm, Patient Position: Sitting, Cuff Size: Adult Regular)   Pulse 64   Resp 16   Wt 62.1 kg (137 lb)   BMI 19.66 kg/m    Body mass index is 19.66 kg/m .  Wt Readings from Last 3 Encounters:   07/25/24 62.1 kg (137 lb)   04/25/24 63 kg (139 lb)   02/27/24 65.3 kg (144 lb)     General Appearance:   no distress, normal body habitus   ENT/Mouth: membranes moist, no oral lesions or bleeding gums.      EYES:  no scleral icterus, normal conjunctivae   Neck: no carotid bruits or thyromegaly   Chest/Lungs:   lungs are clear to auscultation, no rales or wheezing, equal chest wall expansion    Cardiovascular:   Regular. Normal first and second heart sounds with no murmurs, rubs, or gallops; the carotid, radial and posterior tibial pulses are intact, no JVD or LE edema bilaterally    Abdomen:  no organomegaly, masses, bruits, or tenderness; bowel sounds are present   Extremities: no cyanosis or clubbing   Skin: no xanthelasma, warm.    Neurologic: NAD     Psychiatric: alert and oriented x3, calm     A complete 10 systems ROS was reviewed  And is negative except what is listed in the HPI.          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 Harrell 1/22/2020 [id number] scire of (>75, CAD, CHF)     Pure hypercholesterolemia     Created by Conversion     PVD (peripheral vascular disease) (H24) 2/24/2020 2/2020 underwent aortobiiliac endograft repair of aortic aneurysm and  placement of bridging stents bilaterally and placement of bilateral iliac  branch devices for repair of bilateral iliac artery aneurysms      Severe aortic stenosis 10/10/2022    Sinus node dysfunction (H) 11/22/2022    VT (ventricular tachycardia) (H) 2004    Past Surgical History:   Procedure Laterality Date    ABDOMEN SURGERY      BYPASS GRAFT ARTERY CORONARY      CV ANGIOGRAM CORONARY GRAFT N/A 9/19/2022    Procedure: Coronary Angiogram Graft;  Surgeon: Tyrone Ordoñez MD;  Location: [facility] CATH LAB     CV CORONARY LITHOTRIPSY PCI N/A 12/19/2022    Procedure: Percutaneous Coronary Intervention - Lithotripsy;  Surgeon: Tyrone Ordoñez MD;  Location: [facility] CATH LAB     CV LEFT HEART CATH N/A 9/19/2022    Procedure: Left Heart Catheterization;  Surgeon: Tyrone Ordoñez MD;  Location: [facility] CATH LAB     CV PCI ANGIOPLASTY N/A 12/19/2022    Procedure: Percutaneous Transluminal Angioplasty;  Surgeon: Tyrone Ordoñez MD;  Location: [facility] CATH LAB     CV RIGHT HEART CATH MEASUREMENTS RECORDED N/A 9/19/2022    Procedure: Right Heart Catheterization;  Surgeon: Tyrone Ordoñez MD;  Location: [facility] CATH LAB     EYE SURGERY      INSERT / REPLACE / REMOVE PACEMAKER      OR TRANSCATHETER AORTIC VALVE REPLACEMENT, SUBCLAVIAN PERCUTANEOUS APPROACH (STANDBY) N/A 1/31/2023    Procedure: OR TRANSCATHETER AORTIC VALVE REPLACEMENT, SUBCLAVIAN PERCUTANEOUS APPROACH (STANDBY);  Surgeon: Shanae Rich MD;  Location: [facility] CATH LAB CV    OTHER SURGICAL HISTORY      icd     TRANSCATHETER AORTIC VALVE REPLACEMENT - SUBCLAVIAN APPROACH N/A 1/31/2023    Procedure: Transcatheter Aortic Valve Replacement - Subclavian Approach;  Surgeon: Tyrone Ordoñez MD;  Location: [facility] CATH LAB CV    [facility] CABG, VEIN, SINGLE      Description: CABG (CABG);  Recorded: 10/03/2012;  Comments: LIMA to LAD, SVG to OM2, SVG to RCA    no family history of premature coronary artery disease Social History     Socioeconomic History    Marital status:      Spouse name: Not on file    Number of children: Not on file    Years of education: Not on file    Highest education level: Not on file   Occupational History    Not on file   Tobacco Use    Smoking status: Every Day     Current packs/day: 0.50     Average packs/day: 0.5 packs/day for 30.0 years (15.0 ttl pk-yrs)     Types: Cigarettes    Smokeless tobacco: Never   Vaping Use    Vaping status: Never Used   Substance and Sexual Activity    Alcohol use: Yes     Alcohol/week: 1.0 standard drink of alcohol     Comment: Glass of wine daily.    Drug use: No    Sexual activity: Not Currently   Other Topics Concern    Parent/sibling w/ CABG, MI or angioplasty before 65F 55M? Not Asked   Social History Narrative    Not on file     Social Determinants of Health     Financial Resource Strain: Not on file   Food Insecurity: Not on file   Transportation Needs: Not on file   Physical Activity: Not on file   Stress: Not on file   Social Connections: Not on file   Interpersonal Safety: Not on file   Housing Stability: Not on file           Lab Results    Chemistry/lipid CBC Cardiac Enzymes/BNP/TSH/INR   Lab Results   Component Value Date    CHOL 139 03/10/2023    HDL 38 (L) 03/10/2023    TRIG 89 03/10/2023    BUN 15.2 02/14/2024     02/14/2024    CO2 28 02/14/2024    Lab Results   Component Value Date    WBC 8.4 02/14/2024    HGB 13.5 02/14/2024    HCT 42.4 02/14/2024    MCV 90 02/14/2024     02/14/2024    Lab Results   Component Value Date    TSH 0.46  "09/01/2022    INR 1.18 (H) 02/17/2023     No results found for: \"CKTOTAL\", \"CKMB\", \"TROPONINI\"       Weight:    Wt Readings from Last 3 Encounters:   07/25/24 62.1 kg (137 lb)   04/25/24 63 kg (139 lb)   02/27/24 65.3 kg (144 lb)       Allergies  Allergies   Allergen Reactions    Atorvastatin Diarrhea    Carvedilol Other (See Comments) and GI Disturbance     Upset stomach; GI upset      Chloride GI Disturbance and Nausea         Surgical History  Past Surgical History:   Procedure Laterality Date    ABDOMEN SURGERY      BYPASS GRAFT ARTERY CORONARY      CV ANGIOGRAM CORONARY GRAFT N/A 9/19/2022    Procedure: Coronary Angiogram Graft;  Surgeon: Tyrone Ordoñez MD;  Location: [facility] CATH LAB CV    CV CORONARY LITHOTRIPSY PCI N/A 12/19/2022    Procedure: Percutaneous Coronary Intervention - Lithotripsy;  Surgeon: Tyrone Ordoñez MD;  Location: [facility] CATH LAB CV    CV LEFT HEART CATH N/A 9/19/2022    Procedure: Left Heart Catheterization;  Surgeon: Tyrone Ordoñez MD;  Location: [facility] CATH LAB CV    CV PCI ANGIOPLASTY N/A 12/19/2022    Procedure: Percutaneous Transluminal Angioplasty;  Surgeon: Tyrone Ordoñez MD;  Location: [facility] CATH LAB CV    CV RIGHT HEART CATH MEASUREMENTS RECORDED N/A 9/19/2022    Procedure: Right Heart Catheterization;  Surgeon: Tyrone Ordoñez MD;  Location: [facility] CATH LAB CV    EYE SURGERY      INSERT / REPLACE / REMOVE PACEMAKER      OR TRANSCATHETER AORTIC VALVE REPLACEMENT, SUBCLAVIAN PERCUTANEOUS APPROACH (STANDBY) N/A 1/31/2023    Procedure: OR TRANSCATHETER AORTIC VALVE REPLACEMENT, SUBCLAVIAN PERCUTANEOUS APPROACH (STANDBY);  Surgeon: Shanae Rich MD;  Location: [facility] CATH LAB CV    OTHER SURGICAL HISTORY      icd    TRANSCATHETER AORTIC VALVE REPLACEMENT - SUBCLAVIAN APPROACH N/A 1/31/2023    Procedure: Transcatheter Aortic Valve Replacement - Subclavian Approach;  Surgeon: Tyrone Ordoñez MD;  Location: [facility] CATH LAB CV    ZZC CABG, VEIN, " SINGLE      Description: CABG (CABG);  Recorded: 10/03/2012;  Comments: LIMA to LAD, SVG to OM2, SVG to RCA       Social History  Tobacco:   History   Smoking Status    Every Day    Types: Cigarettes   Smokeless Tobacco    Never    Alcohol:   Social History    Substance and Sexual Activity      Alcohol use: Yes        Alcohol/week: 1.0 standard drink of alcohol        Comment: Glass of wine daily.   Illicit Drugs:   History   Drug Use No       Family History  Family History   Problem Relation Age of Onset    Cancer Mother     Heart Disease Father           Neha Estrada MD on 7/25/2024      cc: Kenna Calvillo      Thank you for allowing me to participate in the care of your patient.      Sincerely,     Neha Estrada MD     [facility]  cc:   Neha Estrada MD  [address] [place], [place] [id number]

## 2024-07-25 NOTE — PROGRESS NOTES
HEART CARE NOTE          Assessment/Recommendations     1. Severe HFrEF   Assessment / Plan  Near euvolemia on physical exam; denies HF symptoms of orthopnea, PND, fluid retention or edema; does not currently require a loop diuretics - no changes at this time  Patient is high risk for adverse cardiac events 2/2 advanced age, frailty  S/p ICD - consider upgrade to CRT-D; will start SGLT2-I  Patient is NYHA functional class 3 with EF < 35 % despite optimization of GDMT, and therefore meets criteria for Barostim implant for further HF management and would like to proceed with implant       Current Pharmacotherapy AHA Guideline-Directed Medical Therapy   Lisinopril 2.5 mg  daily Lisinopril 20 mg twice daily   Metoprolol succinate - due to HR in 60 and on sotalol Carvedilol 25 mg twice daily   Spironolactone not started Spironolactone 25 mg once daily   Hydralazine NA  Hydralazine 100 mg three times daily   Isosorbide dinitrate NA Isosorbide dinitrate 40 mg three times daily   SGLT2 inhibitor:Empagliflozin 10 mg daily Dapagliflozin or Empagliflozin 10 mg daily      2. CAD  Assessment / Plan  Denies chest pain or anginal equivalents  continue ASA, rosuvastatin, lisinopril     3. Valvular disease  Assessment / Plan  S/p TAVR - recent echo demonstrated well seated valve with adequate function  Mod MR and mild TR also noted - patient decline further interventions    4. Atrial fibrillation  Assessment / Plan  Currently on apixaban and sotalol    35 minutes spent reviewing prior records (including documentation, laboratory studies, cardiac testing/imaging), history and physical exam, planning, and subsequent documentation.    The longitudinal plan of care for HFrEF was addressed during this visit. Due to the added complexity in care, I will continue to support Mr. Daniel Alamo in the subsequent management of this condition(s) and with the ongoing continuity of care of this condition(s).      History of Present  Illness/Subjective    Mr. Daniel Alamo is a 84 year old male with a PMHx significant for HFrEF, CAD, valvular heart disease who presents [facility] for follow-up care     Today, Mr. Alamo denies acute cardiac events or complaints; he denies HF symptoms, chest pain, anginal equivalents; Management plan as detailed above     ECG: Personally reviewed. Atrial paced rhythm.     Repeat echo personally reviewed 7/25/24:  The left ventricle is moderately dilated.  Left ventricular function is decreased. The ejection fraction is 30-35%  (moderately reduced).  Normal right ventricle size and systolic function.  The left atrium is moderately dilated.  There is moderate (2+) mitral regurgitation.  There is a bioprosthetic aortic valve.  The gradient is normal for this prosthetic aortic valve.  There is no paravalvular regurgitation present.  IVC diameter <2.1 cm collapsing >50% with sniff suggests a normal RA pressure  of 3 mmHg.    ECHO (personnaly Reviewed on 2/27/24):   The left ventricle is severely dilated.  There is mild to moderate eccentric left ventricular hypertrophy.  The visual ejection fraction is 20-25%.  There is inferior wall akinesis.  Diastolic Doppler findings (E/E' ratio and/or other parameters) suggest left  ventricular filling pressures are increased.  The left atrium is moderately dilated.  The mitral valve leaflets are mildly thickened.  There is moderately severe (3+) mitral regurgitation.  There is no mitral valve stenosis.  There is moderate (2+) tricuspid regurgitation.  The right ventricular systolic pressure is approximated at 22mmHg plus the  right atrial pressure.  There is a CHANTAL 3 bioprosthetic valve present in aortic valve position  The prosthetic valve gradients are normal .  Compared to echo from 9/14/23 the LVEF is reduced and the MR is more  prominent.    Lab results: personally reviewed July 25, 2024; notable for resolving DEAN    Medical history and pertinent documents reviewed  in Care Everywhere please where applicable see details above        Physical Examination Review of Systems   /58 (BP Location: Right arm, Patient Position: Sitting, Cuff Size: Adult Regular)   Pulse 64   Resp 16   Wt 62.1 kg (137 lb)   BMI 19.66 kg/m    Body mass index is 19.66 kg/m .  Wt Readings from Last 3 Encounters:   07/25/24 62.1 kg (137 lb)   04/25/24 63 kg (139 lb)   02/27/24 65.3 kg (144 lb)     General Appearance:   no distress, normal body habitus   ENT/Mouth: membranes moist, no oral lesions or bleeding gums.      EYES:  no scleral icterus, normal conjunctivae   Neck: no carotid bruits or thyromegaly   Chest/Lungs:   lungs are clear to auscultation, no rales or wheezing, equal chest wall expansion    Cardiovascular:   Regular. Normal first and second heart sounds with no murmurs, rubs, or gallops; the carotid, radial and posterior tibial pulses are intact, no JVD or LE edema bilaterally    Abdomen:  no organomegaly, masses, bruits, or tenderness; bowel sounds are present   Extremities: no cyanosis or clubbing   Skin: no xanthelasma, warm.    Neurologic: NAD     Psychiatric: alert and oriented x3, calm     A complete 10 systems ROS was reviewed  And is negative except what is listed in the HPI.          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 Harrell  1/22/2020 [id number] scire of (>75, CAD, CHF)     Pure hypercholesterolemia     Created by Conversion     PVD (peripheral vascular disease) (H24) 2/24/2020 2/2020 underwent aortobiiliac endograft repair of aortic aneurysm and  placement of bridging stents bilaterally and placement of bilateral iliac  branch devices for repair of bilateral iliac artery aneurysms      Severe aortic stenosis 10/10/2022    Sinus node dysfunction (H) 11/22/2022    VT (ventricular tachycardia) (H) 2004    Past Surgical History:   Procedure Laterality Date    ABDOMEN SURGERY      BYPASS GRAFT ARTERY CORONARY      CV ANGIOGRAM CORONARY GRAFT N/A 9/19/2022    Procedure: Coronary Angiogram Graft;  Surgeon: Tyrone Ordoñez MD;  Location: [facility]    CV CORONARY LITHOTRIPSY PCI N/A 12/19/2022    Procedure: Percutaneous Coronary Intervention - Lithotripsy;  Surgeon: Tyrone Ordoñez MD;  Location: [facility]    CV LEFT HEART CATH N/A 9/19/2022    Procedure: Left Heart Catheterization;  Surgeon: Tyrone Ordoñez MD;  Location: [facility]    CV PCI ANGIOPLASTY N/A 12/19/2022    Procedure: Percutaneous Transluminal Angioplasty;  Surgeon: Tyrone Ordoñez MD;  Location: [facility]    CV RIGHT HEART CATH MEASUREMENTS RECORDED N/A 9/19/2022    Procedure: Right Heart Catheterization;  Surgeon: Tyrone Ordoñez MD;  Location: [facility]    EYE SURGERY      INSERT / REPLACE / REMOVE PACEMAKER      OR TRANSCATHETER AORTIC VALVE REPLACEMENT, SUBCLAVIAN PERCUTANEOUS APPROACH (STANDBY) N/A 1/31/2023    Procedure: OR TRANSCATHETER AORTIC VALVE REPLACEMENT, SUBCLAVIAN PERCUTANEOUS APPROACH (STANDBY);  Surgeon: Shanae Rich MD;  Location: [facility] CATH LAB [facility]    OTHER SURGICAL HISTORY      icd    TRANSCATHETER AORTIC VALVE REPLACEMENT - SUBCLAVIAN APPROACH N/A 1/31/2023    Procedure: Transcatheter Aortic Valve Replacement - Subclavian Approach;  Surgeon: Tyrone Ordoñez MD;  Location:  "[facility] CATH LAB CV    ZZC CABG, VEIN, SINGLE      Description: CABG (CABG);  Recorded: 10/03/2012;  Comments: LIMA to LAD, SVG to OM2, SVG to RCA    no family history of premature coronary artery disease Social History     Socioeconomic History    Marital status:      Spouse name: Not on file    Number of children: Not on file    Years of education: Not on file    Highest education level: Not on file   Occupational History    Not on file   Tobacco Use    Smoking status: Every Day     Current packs/day: 0.50     Average packs/day: 0.5 packs/day for 30.0 years (15.0 ttl pk-yrs)     Types: Cigarettes    Smokeless tobacco: Never   Vaping Use    Vaping status: Never Used   Substance and Sexual Activity    Alcohol use: Yes     Alcohol/week: 1.0 standard drink of alcohol     Comment: Glass of wine daily.    Drug use: No    Sexual activity: Not Currently   Other Topics Concern    Parent/sibling w/ CABG, MI or angioplasty before 65F 55M? Not Asked   Social History Narrative    Not on file     Social Determinants of Health     Financial Resource Strain: Not on file   Food Insecurity: Not on file   Transportation Needs: Not on file   Physical Activity: Not on file   Stress: Not on file   Social Connections: Not on file   Interpersonal Safety: Not on file   Housing Stability: Not on file           Lab Results    Chemistry/lipid CBC Cardiac Enzymes/BNP/TSH/INR   Lab Results   Component Value Date    CHOL 139 03/10/2023    HDL 38 (L) 03/10/2023    TRIG 89 03/10/2023    BUN 15.2 02/14/2024     02/14/2024    CO2 28 02/14/2024    Lab Results   Component Value Date    WBC 8.4 02/14/2024    HGB 13.5 02/14/2024    HCT 42.4 02/14/2024    MCV 90 02/14/2024     02/14/2024    Lab Results   Component Value Date    TSH 0.46 09/01/2022    INR 1.18 (H) 02/17/2023     No results found for: \"CKTOTAL\", \"CKMB\", \"TROPONINI\"       Weight:    Wt Readings from Last 3 Encounters:   07/25/24 62.1 kg (137 lb)   04/25/24 63 kg (139 " lb)   02/27/24 65.3 kg (144 lb)       Allergies  Allergies   Allergen Reactions    Atorvastatin Diarrhea    Carvedilol Other (See Comments) and GI Disturbance     Upset stomach; GI upset      Chloride GI Disturbance and Nausea         Surgical History  Past Surgical History:   Procedure Laterality Date    ABDOMEN SURGERY      BYPASS GRAFT ARTERY CORONARY      CV ANGIOGRAM CORONARY GRAFT N/A 9/19/2022    Procedure: Coronary Angiogram Graft;  Surgeon: Tyrone Ordoñez MD;  Location: [facility] LAB CV    CV CORONARY LITHOTRIPSY PCI N/A 12/19/2022    Procedure: Percutaneous Coronary Intervention - Lithotripsy;  Surgeon: Tyrone Ordoñez MD;  Location: [facility]    CV LEFT HEART CATH N/A 9/19/2022    Procedure: Left Heart Catheterization;  Surgeon: Tyrone Ordoñez MD;  Location: [facility]    CV PCI ANGIOPLASTY N/A 12/19/2022    Procedure: Percutaneous Transluminal Angioplasty;  Surgeon: Tyrone Ordoñez MD;  Location: [facility] LAB     CV RIGHT HEART CATH MEASUREMENTS RECORDED N/A 9/19/2022    Procedure: Right Heart Catheterization;  Surgeon: Tyrone Ordoñez MD;  Location: [facility] CV    EYE SURGERY      INSERT / REPLACE / REMOVE PACEMAKER      OR TRANSCATHETER AORTIC VALVE REPLACEMENT, SUBCLAVIAN PERCUTANEOUS APPROACH (STANDBY) N/A 1/31/2023    Procedure: OR TRANSCATHETER AORTIC VALVE REPLACEMENT, SUBCLAVIAN PERCUTANEOUS APPROACH (STANDBY);  Surgeon: Shanae Rich MD;  Location: [facility] CV    OTHER SURGICAL HISTORY      icd    TRANSCATHETER AORTIC VALVE REPLACEMENT - SUBCLAVIAN APPROACH N/A 1/31/2023    Procedure: Transcatheter Aortic Valve Replacement - Subclavian Approach;  Surgeon: Tyrone Ordoñez MD;  Location: [facility] CV    ZZC CABG, VEIN, SINGLE      Description: CABG (CABG);  Recorded: 10/03/2012;  Comments: LIMA to LAD, SVG to OM2, SVG to RCA       Social History  Tobacco:   History   Smoking Status    Every Day    Types: Cigarettes    Smokeless Tobacco    Never    Alcohol:   Social History    Substance and Sexual Activity      Alcohol use: Yes        Alcohol/week: 1.0 standard drink of alcohol        Comment: Glass of wine daily.   Illicit Drugs:   History   Drug Use No       Family History  Family History   Problem Relation Age of Onset    Cancer Mother     Heart Disease Father           Neha Estrada MD on 7/25/2024      cc: Kenna Calvillo

## 2024-07-25 NOTE — LETTER
7/25/2024    Kenna Calvillo, NP  [address] [place] [address]    RE: Daniel Alamo       Dear Colleague,     I had the pleasure of seeing Daniel Alamo in the [facility].    HEART CARE ELECTROPHYSIOLOGY NOTE      [facility]  [phone]      Assessment/Recommendations   Assessment/Plan:  1.  Paroxysmal Atrial Fibrillation: Per device histograms, infrequent episodes.  Not an ideal candidate for ablation due to frailty.  No change in fatigue off sotalol.  He was encouraged to increase his activity, maintain adequate hydration, and eats regular meals.  -- Continue sotalol 40 mg twice daily    He was reassured that atrial fibrillation is not life-threatening, but carries an increased risk for stroke.  He has a [id number] score of 4 for age >75, HFrEF, CAD .    -- Continue Eliquis 5 mg twice daily for stroke prophylaxis    2.  Ventricular tachycardia status post ICD implant:  The ICD was interrogated and is functioning appropriately.  The battery shows estimated longevity of 8.5 years.  Atrial and ventricular lead sensing, impedance, and pacing thresholds are stable and within normal limits.  Known FFRW over sensing.  1 self terminating episode of VT, no device therapy needed.  -- Routine ICD follow-up  -- Consideration for upgrade to CRT-D,  ms, however unclear benefit with right bundle branch block pattern      3.  Heart failure with reduced ejection fraction: Saw Dr. Estrada this morning.  Discussed Barostim implant.    Follow up with Dr. Saxena in 6 months       History of Present Illness/Subjective    HPI: Daniel Alamo is a 84 year old male who comes in today for EP device and arrhythmia follow-up.  He has a history of sinus node dysfunction paroxysmal atrial fibrillation, PVCs, ventricular tachycardia (terminated by ATP 1/30/2024, 2/1/2024), s/p dual-chamber ICD (2004 and generator replacement 2018), CAD (s/p CABG 1990s, PCI to LAD and diagonal 12/2022),  heart failure with reduced ejection fraction due to ischemic cardiomyopathy (LVEF 30 to 35% by TTE 5/28/2024), aortic stenosis s/p TAVR 1/31/2023, ascending aortic aneurysm, orthostatic hypotension, hyperlipidemia, iliac artery aneurysm s/p EVAR and endoprosthesis 2020 with persistent leak, chronic fatigue.      Arrhythmia history  Dx/date: Paroxysmal atrial fibrillation 2018  Sx: Symptomatic with A-fib  [id number] score: 4 for age >75, HFrEF, CAD   Oral anticoagulation: Eliquis 5 mg twice daily  Antiarrhythmic medications, AV juvenal blocking agents: Sotalol 40 mg twice daily  Procedures  DCCV: N/A  Ablation: N/A    He states that he continues to be tired, weak, and gets lightheaded easily.  He admits to not being active, not drinking fluids, not eating much.  He declined rehab.  He denies chest discomfort, palpitations, abdominal fullness/bloating or peripheral edema, shortness of breath at rest, paroxysmal nocturnal dyspnea, orthopnea, pre-syncope, or syncope.    Cardiographics (EKG personally reviewed):  EKG done 2/14/2024 shows atrial paced with ventricular conduction at 70 bpm, RBBB, PVCs,  ms, QT/QTc 482/520 ms.    ICD Interrogation, remote 7/23/2024 ([facility]):  Pacing mode: DDDR 55 to 120 ppm  Presenting rhythm:AP-VS, AS- 60-80 bpm.  FFRW oversensing noted.  Underlying rhythm:NA  A-paced: 25%.  V-paced 31%.  Battery: estimated longevity 8.5 years.  Atrial and Ventricular leads: Stable with good sensing, impedance, and pacing thresholds, though FFR W oversensing.  Arrhythmias: Since 7/1/2024, 26 AT/AF, available EGMs show FFRW oversensing.  1 VT self-terminated  AF [place]: < 1%  Histograms: Good rate distribution    ECHO done 5/28/2024:  The left ventricle is moderately dilated.  Left ventricular function is decreased. The ejection fraction is 30-35%  (moderately reduced).  Normal right ventricle size and systolic function.  The left atrium is moderately dilated.  There is moderate (2+) mitral  "regurgitation.  There is a bioprosthetic aortic valve.  The gradient is normal for this prosthetic aortic valve.  There is no paravalvular regurgitation present.  IVC diameter <2.1 cm collapsing >50% with sniff suggests a normal RA pressure  of 3 mmHg.    NM stress test done 9/14/2023:    Lexiscan stress ECG negative for ischemia.    The nuclear stress test is abnormal.  There is a large area of transmural infarction involving the inferior, inferolateral and lateral walls along with a small area of nontransmural infarction involving the mid septal wall.  No evidence of ischemia.    The left ventricular ejection fraction at stress is 33% with akinesis of the inferior and inferolateral wall..    A prior study was conducted on 8/25/2014.  Images not available for review.    I have reviewed and updated the patient's Past Medical History, Social History, Family History and Medication List.  Outside records personally reviewed.     Physical Examination  Review of Systems   Vitals: /81 (BP Location: Right arm, Patient Position: Sitting, Cuff Size: Adult Regular)   Pulse 53   Resp 16   Ht 1.778 m (5' 10\")   Wt 62.1 kg (137 lb)   BMI 19.66 kg/m    BMI= Body mass index is 19.66 kg/m .  Wt Readings from Last 3 Encounters:   07/25/24 62.1 kg (137 lb)   07/25/24 62.1 kg (137 lb)   04/25/24 63 kg (139 lb)       General Appearance:   Alert, frail-appearing, but in no acute distress.   HEENT: Atraumatic, normocephalic.  No scleral icterus, normal conjunctivae, EOMs intact, PERRL.  Mucous membranes pink and moist.     Chest/Lungs:   Chest symmetric, spine straight.  Respirations unlabored.  Lungs are clear to auscultation.  Left prepectoral ICD site with no sign of infection or tissue thinning.   Cardiovascular:   Regular rate and rhythm.  Normal first and second heart sounds with no murmurs, rubs, or gallops; radial pulses are intact, No edema.   Abdomen:  Soft, nondistended.   Extremities: No cyanosis or clubbing. "   Musculoskeletal: Moves all extremities.     Skin: Warm, dry, intact.    Neurologic: Mood and affect are appropriate.  Alert and oriented to person, place, time, and situation.     ROS: 10 point ROS neg other than the symptoms noted above in the HPI.         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 Harrell 1/22/2020 [id number] scire of (>75, CAD, CHF)     Pure hypercholesterolemia     Created by Conversion     PVD (peripheral vascular disease) (H24) 2/24/2020 2/2020 underwent aortobiiliac endograft repair of aortic aneurysm and  placement of bridging stents bilaterally and placement of bilateral iliac  branch devices for repair of bilateral iliac artery aneurysms      Severe aortic stenosis 10/10/2022    Sinus node dysfunction (H) 11/22/2022    VT (ventricular tachycardia) (H) 2004     Past Surgical History:   Procedure Laterality Date    ABDOMEN SURGERY      BYPASS GRAFT ARTERY CORONARY      CV ANGIOGRAM CORONARY GRAFT N/A 9/19/2022    Procedure: Coronary Angiogram Graft;  Surgeon: Tyrone Ordoñez MD;  Location: [facility] CATH LAB CV    CV CORONARY LITHOTRIPSY PCI N/A 12/19/2022    Procedure: Percutaneous Coronary Intervention - Lithotripsy;  Surgeon: Tyrone Ordoñez MD;  Location: [facility] CATH LAB CV    CV LEFT HEART CATH N/A 9/19/2022    Procedure: Left Heart  Catheterization;  Surgeon: Tyrone Ordoñez MD;  Location: [facility] LAB CV    CV PCI ANGIOPLASTY N/A 12/19/2022    Procedure: Percutaneous Transluminal Angioplasty;  Surgeon: Tyrone Ordoñez MD;  Location: [facility] LAB CV    CV RIGHT HEART CATH MEASUREMENTS RECORDED N/A 9/19/2022    Procedure: Right Heart Catheterization;  Surgeon: Tyrone Ordoñez MD;  Location: [facility] CV    EYE SURGERY      INSERT / REPLACE / REMOVE PACEMAKER      OR TRANSCATHETER AORTIC VALVE REPLACEMENT, SUBCLAVIAN PERCUTANEOUS APPROACH (STANDBY) N/A 1/31/2023    Procedure: OR TRANSCATHETER AORTIC VALVE REPLACEMENT, SUBCLAVIAN PERCUTANEOUS APPROACH (STANDBY);  Surgeon: Shanae Rich MD;  Location: [facility] CV    OTHER SURGICAL HISTORY      icd    TRANSCATHETER AORTIC VALVE REPLACEMENT - SUBCLAVIAN APPROACH N/A 1/31/2023    Procedure: Transcatheter Aortic Valve Replacement - Subclavian Approach;  Surgeon: Tyrone Ordoñez MD;  Location: [facility] CV    ZZC CABG, VEIN, SINGLE      Description: CABG (CABG);  Recorded: 10/03/2012;  Comments: LIMA to LAD, SVG to OM2, SVG to RCA     Family History   Problem Relation Age of Onset    Cancer Mother     Heart Disease Father         Social History     Socioeconomic History    Marital status:      Spouse name: Not on file    Number of children: Not on file    Years of education: Not on file    Highest education level: Not on file   Occupational History    Not on file   Tobacco Use    Smoking status: Every Day     Current packs/day: 0.50     Average packs/day: 0.5 packs/day for 30.0 years (15.0 ttl pk-yrs)     Types: Cigarettes    Smokeless tobacco: Never   Vaping Use    Vaping status: Never Used   Substance and Sexual Activity    Alcohol use: Yes     Alcohol/week: 1.0 standard drink of alcohol     Comment: Glass of wine daily.    Drug use: No    Sexual activity: Not Currently   Other Topics Concern    Parent/sibling w/ CABG, MI or angioplasty  before 65F 55M? Not Asked   Social History Narrative    Not on file     Social Determinants of Health     Financial Resource Strain: Not on file   Food Insecurity: Not on file   Transportation Needs: Not on file   Physical Activity: Not on file   Stress: Not on file   Social Connections: Not on file   Interpersonal Safety: Not on file   Housing Stability: Not on file           Medications  Allergies   Current Outpatient Medications   Medication Sig Dispense Refill    acetaminophen (TYLENOL) 325 MG tablet Take 650 mg by mouth every 6 hours as needed for pain      apixaban ANTICOAGULANT (ELIQUIS) 5 MG tablet Take 1 tablet (5 mg) by mouth 2 times daily 180 tablet 3    aspirin 81 MG EC tablet Take 81 mg by mouth daily      empagliflozin (JARDIANCE) 10 MG TABS tablet Take 1 tablet (10 mg) by mouth daily 90 tablet 3    furosemide (LASIX) 20 MG tablet Take 1 tablet (20 mg) by mouth daily 60 tablet 3    gabapentin (NEURONTIN) 300 MG capsule Take 900 mg by mouth 2 times daily       lisinopril (ZESTRIL) 2.5 MG tablet TAKE 1 TABLET (2.5MG) BY MOUTH DAILY 90 tablet 3    rosuvastatin (CRESTOR) 10 MG tablet Take 1.5 tablets (15 mg) by mouth daily 135 tablet 3    sotalol (BETAPACE) 80 MG tablet Take 0.5 tablets (40 mg) by mouth 2 times daily 90 tablet 3    tamsulosin (FLOMAX) 0.4 MG capsule Take 0.4 mg by mouth every evening         Allergies   Allergen Reactions    Atorvastatin Diarrhea    Carvedilol Other (See Comments) and GI Disturbance     Upset stomach; GI upset      Chloride Nausea and GI Disturbance          Lab Results    Chemistry/lipid CBC Cardiac Enzymes/BNP/TSH/INR   Recent Labs   Lab Test 03/10/23  0910   CHOL 139   HDL 38*   LDL 83   TRIG 89     Recent Labs   Lab Test 03/10/23  0910 09/19/22  0803 12/06/19  1134   LDL 83 129 100     Recent Labs   Lab Test 02/14/24  1550      POTASSIUM 4.4   CHLORIDE 105   CO2 28   GLC 86   BUN 15.2   CR 1.13   GFRESTIMATED 63   EMILIO 8.5*     Recent Labs   Lab Test  "02/14/24  1550 01/30/24  1054 08/14/23  0818   CR 1.13 1.20* 1.17     No results for input(s): \"A1C\" in the last 64264 hours.   Recent Labs   Lab Test 02/14/24  1550   WBC 8.4   HGB 13.5   HCT 42.4   MCV 90        Recent Labs   Lab Test 02/14/24  1550 01/30/24  1054 08/14/23  0818   HGB 13.5 16.0 12.8*    No results for input(s): \"TROPONINI\" in the last 41417 hours.  Recent Labs   Lab Test 01/30/23  0922   NTBNP 2,798*     Recent Labs   Lab Test 09/01/22  1327   TSH 0.46     Recent Labs   Lab Test 02/17/23  1917 01/30/23  0922   INR 1.18* 1.11                        Thank you for allowing me to participate in the care of your patient.      Sincerely,     CAILIN Evans CNP     [facility]  cc:   CAILIN Peguero CNP  [facility] [address] [place] [address]      "

## 2024-07-25 NOTE — TELEPHONE ENCOUNTER
Daniel is LM today on identified VM with his NT Pro BNP results indicating that he should begin a low dose Furosemide 20mg once a day as per Dr. Estrada.   Advised to recheck NT Pro BNP in a week to 10 days.   Alexandra ARANDA RN BSN, CHFN, PCCN-K

## 2024-07-25 NOTE — PROGRESS NOTES
HEART CARE ELECTROPHYSIOLOGY NOTE      [facility]  [phone]      Assessment/Recommendations   Assessment/Plan:  1.  Paroxysmal Atrial Fibrillation: Per device histograms, infrequent episodes.  Not an ideal candidate for ablation due to frailty.  No change in fatigue off sotalol.  He was encouraged to increase his activity, maintain adequate hydration, and eats regular meals.  -- Continue sotalol 40 mg twice daily    He was reassured that atrial fibrillation is not life-threatening, but carries an increased risk for stroke.  He has a [id number] score of 4 for age >75, HFrEF, CAD .    -- Continue Eliquis 5 mg twice daily for stroke prophylaxis    2.  Ventricular tachycardia status post ICD implant:  The ICD was interrogated and is functioning appropriately.  The battery shows estimated longevity of 8.5 years.  Atrial and ventricular lead sensing, impedance, and pacing thresholds are stable and within normal limits.  Known FFRW over sensing.  1 self terminating episode of VT, no device therapy needed.  -- Routine ICD follow-up  -- Consideration for upgrade to CRT-D,  ms, however unclear benefit with right bundle branch block pattern      3.  Heart failure with reduced ejection fraction: Saw Dr. Estrada this morning.  Discussed Barostim implant.    Follow up with Dr. Saxena in 6 months       History of Present Illness/Subjective    HPI: Daniel Alamo is a 84 year old male who comes in today for EP device and arrhythmia follow-up.  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      Arrhythmia history  Dx/date: Paroxysmal atrial fibrillation 2018  Sx: Symptomatic with A-fib  [id number] score: 4 for age >75, HFrEF, CAD   Oral anticoagulation: Eliquis 5 mg twice daily  Antiarrhythmic medications, AV juvenal blocking agents: Sotalol 40 mg twice daily  Procedures  DCCV: N/A  Ablation: N/A    He states that he continues to be tired, weak, and gets lightheaded easily.  He admits to not being active, not drinking fluids, not eating much.  He declined rehab.  He denies chest discomfort, palpitations, abdominal fullness/bloating or peripheral edema, shortness of breath at rest, paroxysmal nocturnal dyspnea, orthopnea, pre-syncope, or syncope.    Cardiographics (EKG personally reviewed):  EKG done 2/14/2024 shows atrial paced with ventricular conduction at 70 bpm, RBBB, PVCs,  ms, QT/QTc 482/520 ms.    ICD Interrogation, remote 7/23/2024 (Former[facility], [facility]):  Pacing mode: DDDR 55 to 120 ppm  Presenting rhythm:AP-VS, AS- 60-80 bpm.  FFRW oversensing noted.  Underlying rhythm:NA  A-paced: 25%.  V-paced 31%.  Battery: estimated longevity 8.5 years.  Atrial and Ventricular leads: Stable with good sensing, impedance, and pacing thresholds, though FFR W oversensing.  Arrhythmias: Since 7/1/2024, 26 AT/AF, available EGMs show FFRW oversensing.  1 VT self-terminated  AF Lucan: < 1%  Histograms: Good rate distribution    ECHO done 5/28/2024:  The left ventricle is moderately dilated.  Left ventricular function is decreased. The ejection fraction is 30-35%  (moderately reduced).  Normal right ventricle size and systolic function.  The left atrium is moderately dilated.  There is moderate (2+) mitral regurgitation.  There is a bioprosthetic aortic valve.  The gradient is normal for this prosthetic aortic valve.  There is no paravalvular regurgitation present.  IVC diameter <2.1 cm collapsing >50% with  "sniff suggests a normal RA pressure  of 3 mmHg.    NM stress test done 9/14/2023:    Lexiscan stress ECG negative for ischemia.    The nuclear stress test is abnormal.  There is a large area of transmural infarction involving the inferior, inferolateral and lateral walls along with a small area of nontransmural infarction involving the mid septal wall.  No evidence of ischemia.    The left ventricular ejection fraction at stress is 33% with akinesis of the inferior and inferolateral wall..    A prior study was conducted on 8/25/2014.  Images not available for review.    I have reviewed and updated the patient's Past Medical History, Social History, Family History and Medication List.  Outside records personally reviewed.     Physical Examination  Review of Systems   Vitals: /81 (BP Location: Right arm, Patient Position: Sitting, Cuff Size: Adult Regular)   Pulse 53   Resp 16   Ht 1.778 m (5' 10\")   Wt 62.1 kg (137 lb)   BMI 19.66 kg/m    BMI= Body mass index is 19.66 kg/m .  Wt Readings from Last 3 Encounters:   07/25/24 62.1 kg (137 lb)   07/25/24 62.1 kg (137 lb)   04/25/24 63 kg (139 lb)       General Appearance:   Alert, frail-appearing, but in no acute distress.   HEENT: Atraumatic, normocephalic.  No scleral icterus, normal conjunctivae, EOMs intact, PERRL.  Mucous membranes pink and moist.     Chest/Lungs:   Chest symmetric, spine straight.  Respirations unlabored.  Lungs are clear to auscultation.  Left prepectoral ICD site with no sign of infection or tissue thinning.   Cardiovascular:   Regular rate and rhythm.  Normal first and second heart sounds with no murmurs, rubs, or gallops; radial pulses are intact, No edema.   Abdomen:  Soft, nondistended.   Extremities: No cyanosis or clubbing.   Musculoskeletal: Moves all extremities.     Skin: Warm, dry, intact.    Neurologic: Mood and affect are appropriate.  Alert and oriented to person, place, time, and situation.     ROS: 10 point ROS neg other " than the symptoms noted above in the HPI.         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 Harrell 1/22/2020 [id number] scire of (>75, CAD, CHF)     Pure hypercholesterolemia     Created by Conversion     PVD (peripheral vascular disease) (H24) 2/24/2020 2/2020 underwent aortobiiliac endograft repair of aortic aneurysm and  placement of bridging stents bilaterally and placement of bilateral iliac  branch devices for repair of bilateral iliac artery aneurysms      Severe aortic stenosis 10/10/2022    Sinus node dysfunction (H) 11/22/2022    VT (ventricular tachycardia) (H) 2004     Past Surgical History:   Procedure Laterality Date    ABDOMEN SURGERY      BYPASS GRAFT ARTERY CORONARY      CV ANGIOGRAM CORONARY GRAFT N/A 9/19/2022    Procedure: Coronary Angiogram Graft;  Surgeon: Tyrone Ordoñez MD;  Location: [facility] LAB CV    CV CORONARY LITHOTRIPSY PCI N/A 12/19/2022    Procedure: Percutaneous Coronary Intervention - Lithotripsy;  Surgeon: Tyrone Ordoñez MD;  Location: [facility] CATH LAB CV    CV LEFT HEART CATH N/A 9/19/2022    Procedure: Left Heart Catheterization;  Surgeon: Tyrone Ordoñez MD;  Location: [facility] CATH LAB CV    CV PCI ANGIOPLASTY N/A 12/19/2022    Procedure: Percutaneous Transluminal Angioplasty;  Surgeon: Tyrone Ordoñez MD;   Location: [facility] LAB CV    CV RIGHT HEART CATH MEASUREMENTS RECORDED N/A 9/19/2022    Procedure: Right Heart Catheterization;  Surgeon: Tyrone Ordoñez MD;  Location: [facility] CATH LAB CV    EYE SURGERY      INSERT / REPLACE / REMOVE PACEMAKER      OR TRANSCATHETER AORTIC VALVE REPLACEMENT, SUBCLAVIAN PERCUTANEOUS APPROACH (STANDBY) N/A 1/31/2023    Procedure: OR TRANSCATHETER AORTIC VALVE REPLACEMENT, SUBCLAVIAN PERCUTANEOUS APPROACH (STANDBY);  Surgeon: Shanae Rich MD;  Location: [facility] LAB CV    OTHER SURGICAL HISTORY      icd    TRANSCATHETER AORTIC VALVE REPLACEMENT - SUBCLAVIAN APPROACH N/A 1/31/2023    Procedure: Transcatheter Aortic Valve Replacement - Subclavian Approach;  Surgeon: Tyrone Ordoñez MD;  Location: [facility] CATH LAB CV    ZZC CABG, VEIN, SINGLE      Description: CABG (CABG);  Recorded: 10/03/2012;  Comments: LIMA to LAD, SVG to OM2, SVG to RCA     Family History   Problem Relation Age of Onset    Cancer Mother     Heart Disease Father         Social History     Socioeconomic History    Marital status:      Spouse name: Not on file    Number of children: Not on file    Years of education: Not on file    Highest education level: Not on file   Occupational History    Not on file   Tobacco Use    Smoking status: Every Day     Current packs/day: 0.50     Average packs/day: 0.5 packs/day for 30.0 years (15.0 ttl pk-yrs)     Types: Cigarettes    Smokeless tobacco: Never   Vaping Use    Vaping status: Never Used   Substance and Sexual Activity    Alcohol use: Yes     Alcohol/week: 1.0 standard drink of alcohol     Comment: Glass of wine daily.    Drug use: No    Sexual activity: Not Currently   Other Topics Concern    Parent/sibling w/ CABG, MI or angioplasty before 65F 55M? Not Asked   Social History Narrative    Not on file     Social Determinants of Health     Financial Resource Strain: Not on file   Food Insecurity: Not on file   Transportation Needs: Not on file  "  Physical Activity: Not on file   Stress: Not on file   Social Connections: Not on file   Interpersonal Safety: Not on file   Housing Stability: Not on file           Medications  Allergies   Current Outpatient Medications   Medication Sig Dispense Refill    acetaminophen (TYLENOL) 325 MG tablet Take 650 mg by mouth every 6 hours as needed for pain      apixaban ANTICOAGULANT (ELIQUIS) 5 MG tablet Take 1 tablet (5 mg) by mouth 2 times daily 180 tablet 3    aspirin 81 MG EC tablet Take 81 mg by mouth daily      empagliflozin (JARDIANCE) 10 MG TABS tablet Take 1 tablet (10 mg) by mouth daily 90 tablet 3    furosemide (LASIX) 20 MG tablet Take 1 tablet (20 mg) by mouth daily 60 tablet 3    gabapentin (NEURONTIN) 300 MG capsule Take 900 mg by mouth 2 times daily       lisinopril (ZESTRIL) 2.5 MG tablet TAKE 1 TABLET (2.5MG) BY MOUTH DAILY 90 tablet 3    rosuvastatin (CRESTOR) 10 MG tablet Take 1.5 tablets (15 mg) by mouth daily 135 tablet 3    sotalol (BETAPACE) 80 MG tablet Take 0.5 tablets (40 mg) by mouth 2 times daily 90 tablet 3    tamsulosin (FLOMAX) 0.4 MG capsule Take 0.4 mg by mouth every evening         Allergies   Allergen Reactions    Atorvastatin Diarrhea    Carvedilol Other (See Comments) and GI Disturbance     Upset stomach; GI upset      Chloride Nausea and GI Disturbance          Lab Results    Chemistry/lipid CBC Cardiac Enzymes/BNP/TSH/INR   Recent Labs   Lab Test 03/10/23  0910   CHOL 139   HDL 38*   LDL 83   TRIG 89     Recent Labs   Lab Test 03/10/23  0910 09/19/22  0803 12/06/19  1134   LDL 83 129 100     Recent Labs   Lab Test 02/14/24  1550      POTASSIUM 4.4   CHLORIDE 105   CO2 28   GLC 86   BUN 15.2   CR 1.13   GFRESTIMATED 63   EMILIO 8.5*     Recent Labs   Lab Test 02/14/24  1550 01/30/24  1054 08/14/23  0818   CR 1.13 1.20* 1.17     No results for input(s): \"A1C\" in the last 95515 hours.   Recent Labs   Lab Test 02/14/24  1550   WBC 8.4   HGB 13.5   HCT 42.4   MCV 90    " "    Recent Labs   Lab Test 02/14/24  1550 01/30/24  1054 08/14/23  0818   HGB 13.5 16.0 12.8*    No results for input(s): \"TROPONINI\" in the last 97850 hours.  Recent Labs   Lab Test 01/30/23  0922   NTBNP 2,798*     Recent Labs   Lab Test 09/01/22  1327   TSH 0.46     Recent Labs   Lab Test 02/17/23  1917 01/30/23  0922   INR 1.18* 1.11                                             "

## 2024-07-26 ENCOUNTER — HOSPITAL ENCOUNTER (OUTPATIENT)
Dept: ULTRASOUND IMAGING | Facility: HOSPITAL | Age: 84
Discharge: HOME | End: 2024-07-26
Attending: INTERNAL MEDICINE | Admitting: INTERNAL MEDICINE
Payer: MEDICARE

## 2024-07-26 DIAGNOSIS — I50.23 ACUTE ON CHRONIC SYSTOLIC HEART FAILURE (H): ICD-10-CM

## 2024-07-26 DIAGNOSIS — R42 DIZZINESS: ICD-10-CM

## 2024-07-26 PROCEDURE — 93880 EXTRACRANIAL BILAT STUDY: CPT

## 2024-07-29 ENCOUNTER — TELEPHONE (OUTPATIENT)
Dept: CARDIOLOGY | Facility: CLINIC | Age: 84
End: 2024-07-29
Payer: MEDICARE

## 2024-07-29 DIAGNOSIS — I50.20 HFREF (HEART FAILURE WITH REDUCED EJECTION FRACTION) (H): Primary | ICD-10-CM

## 2024-07-29 NOTE — TELEPHONE ENCOUNTER
Update Dr. Estrada-    Spoke w/ pt and updated him w/ results of Carotid US. Pt also confirmed he started furosemide 20mg daily last week on 7/26 for elevated NTpBNP. Pt will go to the [facility] OP lab in 1 week on 8/5 for NT recheck. Recommend BMP as well as this was new med? Let me know, thank you!    VICENTE Randall RN

## 2024-07-30 NOTE — TELEPHONE ENCOUNTER
Neha Estrada MD Strom, Tayva M RN  Caller: Unspecified (Yesterday, 10:42 AM)  Yes. Let's get BMP also.

## 2024-08-05 ENCOUNTER — LAB (OUTPATIENT)
Dept: LAB | Facility: HOSPITAL | Age: 84
End: 2024-08-05
Payer: MEDICARE

## 2024-08-05 ENCOUNTER — TELEPHONE (OUTPATIENT)
Dept: CARDIOLOGY | Facility: CLINIC | Age: 84
End: 2024-08-05

## 2024-08-05 DIAGNOSIS — I25.5 ISCHEMIC CARDIOMYOPATHY: Primary | ICD-10-CM

## 2024-08-05 DIAGNOSIS — I25.5 ISCHEMIC CARDIOMYOPATHY: ICD-10-CM

## 2024-08-05 DIAGNOSIS — I50.20 HFREF (HEART FAILURE WITH REDUCED EJECTION FRACTION) (H): ICD-10-CM

## 2024-08-05 LAB
ANION GAP SERPL CALCULATED.3IONS-SCNC: 10 MMOL/L (ref 7–15)
BUN SERPL-MCNC: 26.3 MG/DL (ref 8–23)
CALCIUM SERPL-MCNC: 9.4 MG/DL (ref 8.8–10.4)
CHLORIDE SERPL-SCNC: 102 MMOL/L (ref 98–107)
CREAT SERPL-MCNC: 1.42 MG/DL (ref 0.67–1.17)
EGFRCR SERPLBLD CKD-EPI 2021: 48 ML/MIN/1.73M2
GLUCOSE SERPL-MCNC: 84 MG/DL (ref 70–99)
HCO3 SERPL-SCNC: 28 MMOL/L (ref 22–29)
NT-PROBNP SERPL-MCNC: 1252 PG/ML (ref 0–1800)
POTASSIUM SERPL-SCNC: 4.7 MMOL/L (ref 3.4–5.3)
SODIUM SERPL-SCNC: 140 MMOL/L (ref 135–145)

## 2024-08-05 PROCEDURE — 36415 COLL VENOUS BLD VENIPUNCTURE: CPT

## 2024-08-05 PROCEDURE — 83880 ASSAY OF NATRIURETIC PEPTIDE: CPT

## 2024-08-05 PROCEDURE — 80048 BASIC METABOLIC PNL TOTAL CA: CPT

## 2024-08-05 RX ORDER — FUROSEMIDE 20 MG
10 TABLET ORAL DAILY
Qty: 45 TABLET | Refills: 1 | Status: SHIPPED | OUTPATIENT
Start: 2024-08-05 | End: 2024-08-19

## 2024-08-05 NOTE — TELEPHONE ENCOUNTER
Justin Rivera, APRN CNP  Alka Huizar RN; P formerly [facility]  Caller: Unspecified (Today,  1:12 PM)  Change furosemide to 10 mg daily or 20 mg every other day.  Repeat BMP in 2 weeks.  Thanks Alka!  CY

## 2024-08-05 NOTE — TELEPHONE ENCOUNTER
Noted. Called pt and updated him w/ recommendations from Justin. Pt verbalized understanding. Pt prefers to take furosemide 10mg daily. Pt will go to the OP lab at [facility] in 2 weeks for BMP. Encouraged adequate fluid intake of 50-60oz daily & to call if noticing increasing lightheadedness, weakness, significant weight loss. Pt verbalized understanding. Pt reports he got on the scale after we spoke and wt is 133.2lbs. Orders updated.     DONAVAN Randall.MARTIN RN

## 2024-08-05 NOTE — TELEPHONE ENCOUNTER
Justin in Dr. Estrada's absence-    Spoke w/ pt for update on HF status after starting furosemide 20mg daily approx. 10 days ago. See BMP & NTproBNP results from today. Pt reports weight is down about 1-2lbs. In clinic w/ Dr. Estrada he was 137lbs, he reports being 135-136lbs at home. Pt believes he feels about the same as prior to furosemide. He has some baseline lightheadedness intermittently, reports that it wasn't better or worse w/ the medication that he noticed. Pt denied having issues w/ abd bloating, swelling, SOB prior, so no change there. Pt reports same fatigue level. He was a little bothered by increased urination at times.     Please let us know your recommendations, thank you.     VICENTE Randall RN

## 2024-08-12 LAB
MDC_IDC_EPISODE_DTM: NORMAL
MDC_IDC_EPISODE_DURATION: 1 S
MDC_IDC_EPISODE_DURATION: 13 S
MDC_IDC_EPISODE_DURATION: 17 S
MDC_IDC_EPISODE_DURATION: 2 S
MDC_IDC_EPISODE_DURATION: 4 S
MDC_IDC_EPISODE_DURATION: 4 S
MDC_IDC_EPISODE_DURATION: 5 S
MDC_IDC_EPISODE_DURATION: 6 S
MDC_IDC_EPISODE_DURATION: 8 S
MDC_IDC_EPISODE_DURATION: 87 S
MDC_IDC_EPISODE_ID: NORMAL
MDC_IDC_EPISODE_TYPE: NORMAL
MDC_IDC_EPISODE_TYPE_INDUCED: NO
MDC_IDC_EPISODE_VENDOR_TYPE: NORMAL
MDC_IDC_LEAD_CONNECTION_STATUS: NORMAL
MDC_IDC_LEAD_CONNECTION_STATUS: NORMAL
MDC_IDC_LEAD_IMPLANT_DT: NORMAL
MDC_IDC_LEAD_IMPLANT_DT: NORMAL
MDC_IDC_LEAD_LOCATION: NORMAL
MDC_IDC_LEAD_LOCATION: NORMAL
MDC_IDC_LEAD_LOCATION_DETAIL_1: NORMAL
MDC_IDC_LEAD_LOCATION_DETAIL_1: NORMAL
MDC_IDC_LEAD_MFG: NORMAL
MDC_IDC_LEAD_MFG: NORMAL
MDC_IDC_LEAD_MODEL: NORMAL
MDC_IDC_LEAD_MODEL: NORMAL
MDC_IDC_LEAD_POLARITY_TYPE: NORMAL
MDC_IDC_LEAD_POLARITY_TYPE: NORMAL
MDC_IDC_LEAD_SERIAL: NORMAL
MDC_IDC_LEAD_SERIAL: NORMAL
MDC_IDC_LEAD_SPECIAL_FUNCTION: NORMAL
MDC_IDC_MSMT_BATTERY_DTM: NORMAL
MDC_IDC_MSMT_BATTERY_REMAINING_LONGEVITY: 102 MO
MDC_IDC_MSMT_BATTERY_REMAINING_PERCENTAGE: 100 %
MDC_IDC_MSMT_BATTERY_STATUS: NORMAL
MDC_IDC_MSMT_CAP_CHARGE_DTM: NORMAL
MDC_IDC_MSMT_CAP_CHARGE_TIME: 10.6 S
MDC_IDC_MSMT_CAP_CHARGE_TYPE: NORMAL
MDC_IDC_MSMT_LEADCHNL_RA_IMPEDANCE_VALUE: 818 OHM
MDC_IDC_MSMT_LEADCHNL_RA_PACING_THRESHOLD_AMPLITUDE: 1.5 V
MDC_IDC_MSMT_LEADCHNL_RA_PACING_THRESHOLD_PULSEWIDTH: 0.7 MS
MDC_IDC_MSMT_LEADCHNL_RV_IMPEDANCE_VALUE: 557 OHM
MDC_IDC_MSMT_LEADCHNL_RV_PACING_THRESHOLD_AMPLITUDE: 0.8 V
MDC_IDC_MSMT_LEADCHNL_RV_PACING_THRESHOLD_PULSEWIDTH: 0.4 MS
MDC_IDC_PG_IMPLANT_DTM: NORMAL
MDC_IDC_PG_MFG: NORMAL
MDC_IDC_PG_MODEL: NORMAL
MDC_IDC_PG_SERIAL: NORMAL
MDC_IDC_PG_TYPE: NORMAL
MDC_IDC_SESS_CLINIC_NAME: NORMAL
MDC_IDC_SESS_DTM: NORMAL
MDC_IDC_SESS_TYPE: NORMAL
MDC_IDC_SET_BRADY_AT_MODE_SWITCH_MODE: NORMAL
MDC_IDC_SET_BRADY_AT_MODE_SWITCH_RATE: 170 {BEATS}/MIN
MDC_IDC_SET_BRADY_LOWRATE: 55 {BEATS}/MIN
MDC_IDC_SET_BRADY_MAX_SENSOR_RATE: 130 {BEATS}/MIN
MDC_IDC_SET_BRADY_MAX_TRACKING_RATE: 120 {BEATS}/MIN
MDC_IDC_SET_BRADY_MODE: NORMAL
MDC_IDC_SET_BRADY_PAV_DELAY_HIGH: 200 MS
MDC_IDC_SET_BRADY_PAV_DELAY_LOW: 300 MS
MDC_IDC_SET_BRADY_SAV_DELAY_HIGH: 165 MS
MDC_IDC_SET_BRADY_SAV_DELAY_LOW: 250 MS
MDC_IDC_SET_LEADCHNL_RA_PACING_AMPLITUDE: 2.6 V
MDC_IDC_SET_LEADCHNL_RA_PACING_POLARITY: NORMAL
MDC_IDC_SET_LEADCHNL_RA_PACING_PULSEWIDTH: 0.7 MS
MDC_IDC_SET_LEADCHNL_RA_SENSING_ADAPTATION_MODE: NORMAL
MDC_IDC_SET_LEADCHNL_RA_SENSING_POLARITY: NORMAL
MDC_IDC_SET_LEADCHNL_RA_SENSING_SENSITIVITY: 0.2 MV
MDC_IDC_SET_LEADCHNL_RV_PACING_AMPLITUDE: 1.5 V
MDC_IDC_SET_LEADCHNL_RV_PACING_POLARITY: NORMAL
MDC_IDC_SET_LEADCHNL_RV_PACING_PULSEWIDTH: 0.4 MS
MDC_IDC_SET_LEADCHNL_RV_SENSING_ADAPTATION_MODE: NORMAL
MDC_IDC_SET_LEADCHNL_RV_SENSING_POLARITY: NORMAL
MDC_IDC_SET_LEADCHNL_RV_SENSING_SENSITIVITY: 0.6 MV
MDC_IDC_SET_ZONE_DETECTION_INTERVAL: 261 MS
MDC_IDC_SET_ZONE_DETECTION_INTERVAL: 333 MS
MDC_IDC_SET_ZONE_DETECTION_INTERVAL: 400 MS
MDC_IDC_SET_ZONE_STATUS: NORMAL
MDC_IDC_SET_ZONE_TYPE: NORMAL
MDC_IDC_SET_ZONE_VENDOR_TYPE: NORMAL
MDC_IDC_STAT_AT_BURDEN_PERCENT: 1 %
MDC_IDC_STAT_AT_DTM_END: NORMAL
MDC_IDC_STAT_AT_DTM_START: NORMAL
MDC_IDC_STAT_BRADY_DTM_END: NORMAL
MDC_IDC_STAT_BRADY_DTM_START: NORMAL
MDC_IDC_STAT_BRADY_RA_PERCENT_PACED: 25 %
MDC_IDC_STAT_BRADY_RV_PERCENT_PACED: 31 %
MDC_IDC_STAT_EPISODE_RECENT_COUNT: 0
MDC_IDC_STAT_EPISODE_RECENT_COUNT: 0
MDC_IDC_STAT_EPISODE_RECENT_COUNT: 1
MDC_IDC_STAT_EPISODE_RECENT_COUNT: 26
MDC_IDC_STAT_EPISODE_RECENT_COUNT: 3
MDC_IDC_STAT_EPISODE_RECENT_COUNT_DTM_END: NORMAL
MDC_IDC_STAT_EPISODE_RECENT_COUNT_DTM_START: NORMAL
MDC_IDC_STAT_EPISODE_TYPE: NORMAL
MDC_IDC_STAT_EPISODE_VENDOR_TYPE: NORMAL
MDC_IDC_STAT_TACHYTHERAPY_ATP_DELIVERED_RECENT: 2
MDC_IDC_STAT_TACHYTHERAPY_ATP_DELIVERED_TOTAL: 3
MDC_IDC_STAT_TACHYTHERAPY_RECENT_DTM_END: NORMAL
MDC_IDC_STAT_TACHYTHERAPY_RECENT_DTM_START: NORMAL
MDC_IDC_STAT_TACHYTHERAPY_SHOCKS_ABORTED_RECENT: 0
MDC_IDC_STAT_TACHYTHERAPY_SHOCKS_ABORTED_TOTAL: 0
MDC_IDC_STAT_TACHYTHERAPY_SHOCKS_DELIVERED_RECENT: 0
MDC_IDC_STAT_TACHYTHERAPY_SHOCKS_DELIVERED_TOTAL: 0
MDC_IDC_STAT_TACHYTHERAPY_TOTAL_DTM_END: NORMAL
MDC_IDC_STAT_TACHYTHERAPY_TOTAL_DTM_START: NORMAL

## 2024-08-12 PROCEDURE — 93296 REM INTERROG EVL PM/IDS: CPT | Performed by: INTERNAL MEDICINE

## 2024-08-12 PROCEDURE — 93295 DEV INTERROG REMOTE 1/2/MLT: CPT | Performed by: INTERNAL MEDICINE

## 2024-08-19 ENCOUNTER — LAB (OUTPATIENT)
Dept: LAB | Facility: HOSPITAL | Age: 84
End: 2024-08-19
Payer: MEDICARE

## 2024-08-19 ENCOUNTER — TELEPHONE (OUTPATIENT)
Dept: CARDIOLOGY | Facility: CLINIC | Age: 84
End: 2024-08-19

## 2024-08-19 DIAGNOSIS — I25.5 ISCHEMIC CARDIOMYOPATHY: ICD-10-CM

## 2024-08-19 DIAGNOSIS — I50.20 HFREF (HEART FAILURE WITH REDUCED EJECTION FRACTION) (H): Primary | ICD-10-CM

## 2024-08-19 LAB
ANION GAP SERPL CALCULATED.3IONS-SCNC: 8 MMOL/L (ref 7–15)
BUN SERPL-MCNC: 25.4 MG/DL (ref 8–23)
CALCIUM SERPL-MCNC: 8.6 MG/DL (ref 8.8–10.4)
CHLORIDE SERPL-SCNC: 105 MMOL/L (ref 98–107)
CREAT SERPL-MCNC: 1.39 MG/DL (ref 0.67–1.17)
EGFRCR SERPLBLD CKD-EPI 2021: 49 ML/MIN/1.73M2
GLUCOSE SERPL-MCNC: 70 MG/DL (ref 70–99)
HCO3 SERPL-SCNC: 26 MMOL/L (ref 22–29)
POTASSIUM SERPL-SCNC: 3.9 MMOL/L (ref 3.4–5.3)
SODIUM SERPL-SCNC: 139 MMOL/L (ref 135–145)

## 2024-08-19 PROCEDURE — 36415 COLL VENOUS BLD VENIPUNCTURE: CPT

## 2024-08-19 PROCEDURE — 80048 BASIC METABOLIC PNL TOTAL CA: CPT

## 2024-08-19 NOTE — TELEPHONE ENCOUNTER
Spoke w/ pt and updated him w/ lab results and recommendations from Justin. Pt verbalized understanding. Reviewed pt update and labs w/ Dr. Estrada in person as well, as she was the provider to start pt on furosemide. NTpBNP has normalized and pt has not noticed a change in his symptoms as he was not really noticing many to begin with, though weight has decreased to 131-133lbs. Pt does have lightheadedness baseline. Dr. Estrada made recommendation for pt to stop furosemide at this point & continue to monitor weight and for any increased HF symptoms. Updated pt of this and he verbalized understanding. Provided HF nurse line. Pt will still plan to get repeat BMP in 2 weeks to assess kidney function.    VICENTE Randall RN

## 2024-08-19 NOTE — TELEPHONE ENCOUNTER
----- Message from Justin Rivera sent at 8/19/2024 10:28 AM CDT -----  Cr is still elevated.  Did he cut back on Furosemide to 10 mg daily?  If yes, may reduce to 10 mg three times a week if stable wt and HF status.  Repeat BMP in 2 weeks.  Thanks  CY

## 2024-09-03 ENCOUNTER — LAB (OUTPATIENT)
Dept: LAB | Facility: HOSPITAL | Age: 84
End: 2024-09-03
Payer: MEDICARE

## 2024-09-03 DIAGNOSIS — I50.20 HFREF (HEART FAILURE WITH REDUCED EJECTION FRACTION) (H): ICD-10-CM

## 2024-09-03 LAB
ANION GAP SERPL CALCULATED.3IONS-SCNC: 9 MMOL/L (ref 7–15)
BUN SERPL-MCNC: 17.5 MG/DL (ref 8–23)
CALCIUM SERPL-MCNC: 8.9 MG/DL (ref 8.8–10.4)
CHLORIDE SERPL-SCNC: 107 MMOL/L (ref 98–107)
CREAT SERPL-MCNC: 1.12 MG/DL (ref 0.67–1.17)
EGFRCR SERPLBLD CKD-EPI 2021: 64 ML/MIN/1.73M2
GLUCOSE SERPL-MCNC: 73 MG/DL (ref 70–99)
HCO3 SERPL-SCNC: 25 MMOL/L (ref 22–29)
POTASSIUM SERPL-SCNC: 4.2 MMOL/L (ref 3.4–5.3)
SODIUM SERPL-SCNC: 141 MMOL/L (ref 135–145)

## 2024-09-03 PROCEDURE — 36415 COLL VENOUS BLD VENIPUNCTURE: CPT

## 2024-09-03 PROCEDURE — 80048 BASIC METABOLIC PNL TOTAL CA: CPT

## 2024-09-18 ENCOUNTER — ANCILLARY PROCEDURE (OUTPATIENT)
Dept: CARDIOLOGY | Facility: CLINIC | Age: 84
End: 2024-09-18
Attending: INTERNAL MEDICINE
Payer: MEDICARE

## 2024-09-18 DIAGNOSIS — I48.0 PAROXYSMAL ATRIAL FIBRILLATION (H): ICD-10-CM

## 2024-09-18 DIAGNOSIS — I42.0 DILATED CARDIOMYOPATHY (H): ICD-10-CM

## 2024-09-18 DIAGNOSIS — Z95.810 ICD (IMPLANTABLE CARDIOVERTER-DEFIBRILLATOR) IN PLACE: ICD-10-CM

## 2024-09-18 LAB
MDC_IDC_EPISODE_DTM: NORMAL
MDC_IDC_EPISODE_DURATION: 10 S
MDC_IDC_EPISODE_DURATION: 11 S
MDC_IDC_EPISODE_DURATION: 12 S
MDC_IDC_EPISODE_DURATION: 3 S
MDC_IDC_EPISODE_DURATION: 4 S
MDC_IDC_EPISODE_DURATION: 5 S
MDC_IDC_EPISODE_DURATION: 7 S
MDC_IDC_EPISODE_DURATION: 8 S
MDC_IDC_EPISODE_DURATION: NORMAL S
MDC_IDC_EPISODE_DURATION: NORMAL S
MDC_IDC_EPISODE_ID: NORMAL
MDC_IDC_EPISODE_TYPE: NORMAL
MDC_IDC_LEAD_CONNECTION_STATUS: NORMAL
MDC_IDC_LEAD_CONNECTION_STATUS: NORMAL
MDC_IDC_LEAD_IMPLANT_DT: NORMAL
MDC_IDC_LEAD_IMPLANT_DT: NORMAL
MDC_IDC_LEAD_LOCATION: NORMAL
MDC_IDC_LEAD_LOCATION: NORMAL
MDC_IDC_LEAD_LOCATION_DETAIL_1: NORMAL
MDC_IDC_LEAD_LOCATION_DETAIL_1: NORMAL
MDC_IDC_LEAD_MFG: NORMAL
MDC_IDC_LEAD_MFG: NORMAL
MDC_IDC_LEAD_MODEL: NORMAL
MDC_IDC_LEAD_MODEL: NORMAL
MDC_IDC_LEAD_POLARITY_TYPE: NORMAL
MDC_IDC_LEAD_POLARITY_TYPE: NORMAL
MDC_IDC_LEAD_SERIAL: NORMAL
MDC_IDC_LEAD_SERIAL: NORMAL
MDC_IDC_LEAD_SPECIAL_FUNCTION: NORMAL
MDC_IDC_MSMT_BATTERY_DTM: NORMAL
MDC_IDC_MSMT_BATTERY_REMAINING_LONGEVITY: 102 MO
MDC_IDC_MSMT_BATTERY_REMAINING_PERCENTAGE: 100 %
MDC_IDC_MSMT_BATTERY_STATUS: NORMAL
MDC_IDC_MSMT_CAP_CHARGE_DTM: NORMAL
MDC_IDC_MSMT_CAP_CHARGE_TIME: 10.6 S
MDC_IDC_MSMT_CAP_CHARGE_TYPE: NORMAL
MDC_IDC_MSMT_LEADCHNL_RA_IMPEDANCE_VALUE: 844 OHM
MDC_IDC_MSMT_LEADCHNL_RA_LEAD_CHANNEL_STATUS: NORMAL
MDC_IDC_MSMT_LEADCHNL_RV_IMPEDANCE_VALUE: 577 OHM
MDC_IDC_PG_IMPLANT_DTM: NORMAL
MDC_IDC_PG_MFG: NORMAL
MDC_IDC_PG_MODEL: NORMAL
MDC_IDC_PG_SERIAL: NORMAL
MDC_IDC_PG_TYPE: NORMAL
MDC_IDC_SESS_CLINIC_NAME: NORMAL
MDC_IDC_SESS_DTM: NORMAL
MDC_IDC_SESS_TYPE: NORMAL
MDC_IDC_SET_BRADY_AT_MODE_SWITCH_MODE: NORMAL
MDC_IDC_SET_BRADY_AT_MODE_SWITCH_RATE: 170 {BEATS}/MIN
MDC_IDC_SET_BRADY_LOWRATE: 55 {BEATS}/MIN
MDC_IDC_SET_BRADY_MAX_SENSOR_RATE: 130 {BEATS}/MIN
MDC_IDC_SET_BRADY_MAX_TRACKING_RATE: 120 {BEATS}/MIN
MDC_IDC_SET_BRADY_MODE: NORMAL
MDC_IDC_SET_BRADY_PAV_DELAY_HIGH: 200 MS
MDC_IDC_SET_BRADY_PAV_DELAY_LOW: 300 MS
MDC_IDC_SET_BRADY_SAV_DELAY_HIGH: 165 MS
MDC_IDC_SET_BRADY_SAV_DELAY_LOW: 250 MS
MDC_IDC_SET_LEADCHNL_RA_PACING_AMPLITUDE: 2.6 V
MDC_IDC_SET_LEADCHNL_RA_PACING_POLARITY: NORMAL
MDC_IDC_SET_LEADCHNL_RA_PACING_PULSEWIDTH: 0.7 MS
MDC_IDC_SET_LEADCHNL_RA_SENSING_ADAPTATION_MODE: NORMAL
MDC_IDC_SET_LEADCHNL_RA_SENSING_POLARITY: NORMAL
MDC_IDC_SET_LEADCHNL_RA_SENSING_SENSITIVITY: 0.2 MV
MDC_IDC_SET_LEADCHNL_RV_PACING_AMPLITUDE: 1.5 V
MDC_IDC_SET_LEADCHNL_RV_PACING_POLARITY: NORMAL
MDC_IDC_SET_LEADCHNL_RV_PACING_PULSEWIDTH: 0.4 MS
MDC_IDC_SET_LEADCHNL_RV_SENSING_ADAPTATION_MODE: NORMAL
MDC_IDC_SET_LEADCHNL_RV_SENSING_POLARITY: NORMAL
MDC_IDC_SET_LEADCHNL_RV_SENSING_SENSITIVITY: 0.6 MV
MDC_IDC_SET_ZONE_DETECTION_INTERVAL: 261 MS
MDC_IDC_SET_ZONE_DETECTION_INTERVAL: 333 MS
MDC_IDC_SET_ZONE_DETECTION_INTERVAL: 400 MS
MDC_IDC_SET_ZONE_STATUS: NORMAL
MDC_IDC_SET_ZONE_TYPE: NORMAL
MDC_IDC_SET_ZONE_VENDOR_TYPE: NORMAL
MDC_IDC_STAT_AT_BURDEN_PERCENT: 1 %
MDC_IDC_STAT_AT_DTM_END: NORMAL
MDC_IDC_STAT_AT_DTM_START: NORMAL
MDC_IDC_STAT_BRADY_DTM_END: NORMAL
MDC_IDC_STAT_BRADY_DTM_START: NORMAL
MDC_IDC_STAT_BRADY_RA_PERCENT_PACED: 25 %
MDC_IDC_STAT_BRADY_RV_PERCENT_PACED: 32 %
MDC_IDC_STAT_EPISODE_RECENT_COUNT: 0
MDC_IDC_STAT_EPISODE_RECENT_COUNT: 102
MDC_IDC_STAT_EPISODE_RECENT_COUNT: 3
MDC_IDC_STAT_EPISODE_RECENT_COUNT_DTM_END: NORMAL
MDC_IDC_STAT_EPISODE_RECENT_COUNT_DTM_START: NORMAL
MDC_IDC_STAT_EPISODE_TYPE: NORMAL
MDC_IDC_STAT_EPISODE_VENDOR_TYPE: NORMAL
MDC_IDC_STAT_TACHYTHERAPY_ATP_DELIVERED_RECENT: 2
MDC_IDC_STAT_TACHYTHERAPY_ATP_DELIVERED_TOTAL: 3
MDC_IDC_STAT_TACHYTHERAPY_RECENT_DTM_END: NORMAL
MDC_IDC_STAT_TACHYTHERAPY_RECENT_DTM_START: NORMAL
MDC_IDC_STAT_TACHYTHERAPY_SHOCKS_ABORTED_RECENT: 0
MDC_IDC_STAT_TACHYTHERAPY_SHOCKS_ABORTED_TOTAL: 0
MDC_IDC_STAT_TACHYTHERAPY_SHOCKS_DELIVERED_RECENT: 0
MDC_IDC_STAT_TACHYTHERAPY_SHOCKS_DELIVERED_TOTAL: 0
MDC_IDC_STAT_TACHYTHERAPY_TOTAL_DTM_END: NORMAL
MDC_IDC_STAT_TACHYTHERAPY_TOTAL_DTM_START: NORMAL

## 2024-10-08 ENCOUNTER — TELEPHONE (OUTPATIENT)
Dept: CARDIOLOGY | Facility: CLINIC | Age: 84
End: 2024-10-08

## 2024-10-08 ENCOUNTER — ANCILLARY PROCEDURE (OUTPATIENT)
Dept: CARDIOLOGY | Facility: CLINIC | Age: 84
End: 2024-10-08
Attending: INTERNAL MEDICINE
Payer: MEDICARE

## 2024-10-08 DIAGNOSIS — Z95.810 ICD (IMPLANTABLE CARDIOVERTER-DEFIBRILLATOR) IN PLACE: ICD-10-CM

## 2024-10-08 DIAGNOSIS — I42.0 DILATED CARDIOMYOPATHY (H): ICD-10-CM

## 2024-10-08 DIAGNOSIS — I48.0 PAROXYSMAL ATRIAL FIBRILLATION (H): ICD-10-CM

## 2024-10-08 DIAGNOSIS — I48.0 PAROXYSMAL ATRIAL FIBRILLATION (H): Primary | ICD-10-CM

## 2024-10-08 RX ORDER — AMIODARONE HYDROCHLORIDE 200 MG/1
TABLET ORAL
Qty: 180 TABLET | Refills: 1 | Status: SHIPPED | OUTPATIENT
Start: 2024-10-08 | End: 2025-11-07

## 2024-10-08 NOTE — TELEPHONE ENCOUNTER
Pt was called, corresponding information/recommendations reviewed, verbalized understanding, has no further questions at this time, contact information was given for further concerns/questions, scheduling notified to contact pt, order(s) were placed, RX was sent to pt pharmacy, and medication list updated   10/8/2024 4:10 PM  Gwen Shell RN

## 2024-10-08 NOTE — TELEPHONE ENCOUNTER
Discontinue sotalol, in 2 days start amiodarone 200 mg twice daily x 4 weeks, then decrease to 200 mg once daily.  Remote device check in 2 weeks, if still in AF, proceed to DCCV.  Thanks,  Airam

## 2024-10-08 NOTE — TELEPHONE ENCOUNTER
----- Message from Gwen Thompson sent at 10/8/2024  7:40 AM CDT -----  Regarding: Device RN Review  Encounter Type: Alert remote ICD transmission for AT/AF for 24/24hrs. Courtesy check.   Device: [employer] Dynagen.  Pacing %/Programmed: AP 24%,  33% at DDDR 55/120 bpm.   Lead(s): Stable measurements and trends.   Battery longevity: 8 years estimated.   Presenting: AFib , VS 60-85 bpm  Atrial high rates: Since 9/18/2024 21 mode switch episodes, available EGMs appear to be fine AF, undersensing at times causing AP, per trend current episode in progress since 10/7/24 00:29, burden 1%, ventricular rates controlled.   Anticoagulant: Eliquis.   Ventricular High rates: Since 9/18/2024 none detected.   Comments: Normal device function.   Plan: Routed to device RN for review. Cecy, Device Specialist    Findings were reviewed with patient. Current episode of AF most likely ongoing since 10/6/2024 1838, currently 31.5 hrs. Patient reports an increase in fatigue. He denies any dizziness, light headedness or lower extremity swelling. He confirms that the takes Sotalol diligently and has not missed a dose. He is anticoagulated with Eliquis. Routed to Airam Malin for further recommendations.     Chacho Hunt, RN  Device Nurse

## 2024-10-14 LAB
MDC_IDC_EPISODE_DTM: NORMAL
MDC_IDC_EPISODE_DURATION: 2 S
MDC_IDC_EPISODE_DURATION: 28 S
MDC_IDC_EPISODE_DURATION: 3 S
MDC_IDC_EPISODE_DURATION: 3 S
MDC_IDC_EPISODE_DURATION: 5 S
MDC_IDC_EPISODE_DURATION: 6 S
MDC_IDC_EPISODE_DURATION: 6 S
MDC_IDC_EPISODE_DURATION: 9 S
MDC_IDC_EPISODE_DURATION: NORMAL S
MDC_IDC_EPISODE_ID: NORMAL
MDC_IDC_EPISODE_TYPE: NORMAL
MDC_IDC_LEAD_CONNECTION_STATUS: NORMAL
MDC_IDC_LEAD_CONNECTION_STATUS: NORMAL
MDC_IDC_LEAD_IMPLANT_DT: NORMAL
MDC_IDC_LEAD_IMPLANT_DT: NORMAL
MDC_IDC_LEAD_LOCATION: NORMAL
MDC_IDC_LEAD_LOCATION: NORMAL
MDC_IDC_LEAD_LOCATION_DETAIL_1: NORMAL
MDC_IDC_LEAD_LOCATION_DETAIL_1: NORMAL
MDC_IDC_LEAD_MFG: NORMAL
MDC_IDC_LEAD_MFG: NORMAL
MDC_IDC_LEAD_MODEL: NORMAL
MDC_IDC_LEAD_MODEL: NORMAL
MDC_IDC_LEAD_POLARITY_TYPE: NORMAL
MDC_IDC_LEAD_POLARITY_TYPE: NORMAL
MDC_IDC_LEAD_SERIAL: NORMAL
MDC_IDC_LEAD_SERIAL: NORMAL
MDC_IDC_LEAD_SPECIAL_FUNCTION: NORMAL
MDC_IDC_MSMT_BATTERY_DTM: NORMAL
MDC_IDC_MSMT_BATTERY_REMAINING_LONGEVITY: 96 MO
MDC_IDC_MSMT_BATTERY_REMAINING_PERCENTAGE: 100 %
MDC_IDC_MSMT_BATTERY_STATUS: NORMAL
MDC_IDC_MSMT_CAP_CHARGE_DTM: NORMAL
MDC_IDC_MSMT_CAP_CHARGE_TIME: 10.6 S
MDC_IDC_MSMT_CAP_CHARGE_TYPE: NORMAL
MDC_IDC_MSMT_LEADCHNL_RA_IMPEDANCE_VALUE: 840 OHM
MDC_IDC_MSMT_LEADCHNL_RV_IMPEDANCE_VALUE: 557 OHM
MDC_IDC_PG_IMPLANT_DTM: NORMAL
MDC_IDC_PG_MFG: NORMAL
MDC_IDC_PG_MODEL: NORMAL
MDC_IDC_PG_SERIAL: NORMAL
MDC_IDC_PG_TYPE: NORMAL
MDC_IDC_SESS_CLINIC_NAME: NORMAL
MDC_IDC_SESS_DTM: NORMAL
MDC_IDC_SESS_TYPE: NORMAL
MDC_IDC_SET_BRADY_AT_MODE_SWITCH_MODE: NORMAL
MDC_IDC_SET_BRADY_AT_MODE_SWITCH_RATE: 170 {BEATS}/MIN
MDC_IDC_SET_BRADY_LOWRATE: 55 {BEATS}/MIN
MDC_IDC_SET_BRADY_MAX_SENSOR_RATE: 130 {BEATS}/MIN
MDC_IDC_SET_BRADY_MAX_TRACKING_RATE: 120 {BEATS}/MIN
MDC_IDC_SET_BRADY_MODE: NORMAL
MDC_IDC_SET_BRADY_PAV_DELAY_HIGH: 200 MS
MDC_IDC_SET_BRADY_PAV_DELAY_LOW: 300 MS
MDC_IDC_SET_BRADY_SAV_DELAY_HIGH: 165 MS
MDC_IDC_SET_BRADY_SAV_DELAY_LOW: 250 MS
MDC_IDC_SET_LEADCHNL_RA_PACING_AMPLITUDE: 2.6 V
MDC_IDC_SET_LEADCHNL_RA_PACING_POLARITY: NORMAL
MDC_IDC_SET_LEADCHNL_RA_PACING_PULSEWIDTH: 0.7 MS
MDC_IDC_SET_LEADCHNL_RA_SENSING_ADAPTATION_MODE: NORMAL
MDC_IDC_SET_LEADCHNL_RA_SENSING_POLARITY: NORMAL
MDC_IDC_SET_LEADCHNL_RA_SENSING_SENSITIVITY: 0.2 MV
MDC_IDC_SET_LEADCHNL_RV_PACING_AMPLITUDE: 1.5 V
MDC_IDC_SET_LEADCHNL_RV_PACING_POLARITY: NORMAL
MDC_IDC_SET_LEADCHNL_RV_PACING_PULSEWIDTH: 0.4 MS
MDC_IDC_SET_LEADCHNL_RV_SENSING_ADAPTATION_MODE: NORMAL
MDC_IDC_SET_LEADCHNL_RV_SENSING_POLARITY: NORMAL
MDC_IDC_SET_LEADCHNL_RV_SENSING_SENSITIVITY: 0.6 MV
MDC_IDC_SET_ZONE_DETECTION_INTERVAL: 261 MS
MDC_IDC_SET_ZONE_DETECTION_INTERVAL: 333 MS
MDC_IDC_SET_ZONE_DETECTION_INTERVAL: 400 MS
MDC_IDC_SET_ZONE_STATUS: NORMAL
MDC_IDC_SET_ZONE_TYPE: NORMAL
MDC_IDC_SET_ZONE_VENDOR_TYPE: NORMAL
MDC_IDC_STAT_AT_BURDEN_PERCENT: 1 %
MDC_IDC_STAT_AT_DTM_END: NORMAL
MDC_IDC_STAT_AT_DTM_START: NORMAL
MDC_IDC_STAT_BRADY_DTM_END: NORMAL
MDC_IDC_STAT_BRADY_DTM_START: NORMAL
MDC_IDC_STAT_BRADY_RA_PERCENT_PACED: 24 %
MDC_IDC_STAT_BRADY_RV_PERCENT_PACED: 33 %
MDC_IDC_STAT_EPISODE_RECENT_COUNT: 0
MDC_IDC_STAT_EPISODE_RECENT_COUNT: 21
MDC_IDC_STAT_EPISODE_RECENT_COUNT: 3
MDC_IDC_STAT_EPISODE_RECENT_COUNT_DTM_END: NORMAL
MDC_IDC_STAT_EPISODE_RECENT_COUNT_DTM_START: NORMAL
MDC_IDC_STAT_EPISODE_TYPE: NORMAL
MDC_IDC_STAT_EPISODE_VENDOR_TYPE: NORMAL
MDC_IDC_STAT_TACHYTHERAPY_ATP_DELIVERED_RECENT: 2
MDC_IDC_STAT_TACHYTHERAPY_ATP_DELIVERED_TOTAL: 3
MDC_IDC_STAT_TACHYTHERAPY_RECENT_DTM_END: NORMAL
MDC_IDC_STAT_TACHYTHERAPY_RECENT_DTM_START: NORMAL
MDC_IDC_STAT_TACHYTHERAPY_SHOCKS_ABORTED_RECENT: 0
MDC_IDC_STAT_TACHYTHERAPY_SHOCKS_ABORTED_TOTAL: 0
MDC_IDC_STAT_TACHYTHERAPY_SHOCKS_DELIVERED_RECENT: 0
MDC_IDC_STAT_TACHYTHERAPY_SHOCKS_DELIVERED_TOTAL: 0
MDC_IDC_STAT_TACHYTHERAPY_TOTAL_DTM_END: NORMAL
MDC_IDC_STAT_TACHYTHERAPY_TOTAL_DTM_START: NORMAL

## 2024-10-23 ENCOUNTER — ANCILLARY PROCEDURE (OUTPATIENT)
Dept: CARDIOLOGY | Facility: CLINIC | Age: 84
End: 2024-10-23
Attending: INTERNAL MEDICINE
Payer: MEDICARE

## 2024-10-23 DIAGNOSIS — I48.0 PAROXYSMAL ATRIAL FIBRILLATION (H): ICD-10-CM

## 2024-10-23 DIAGNOSIS — I42.0 DILATED CARDIOMYOPATHY (H): ICD-10-CM

## 2024-10-23 DIAGNOSIS — Z95.810 ICD (IMPLANTABLE CARDIOVERTER-DEFIBRILLATOR) IN PLACE: ICD-10-CM

## 2024-10-24 LAB
MDC_IDC_EPISODE_DTM: NORMAL
MDC_IDC_EPISODE_DURATION: 13 S
MDC_IDC_EPISODE_DURATION: 21 S
MDC_IDC_EPISODE_DURATION: 22 S
MDC_IDC_EPISODE_DURATION: 28 S
MDC_IDC_EPISODE_DURATION: 28 S
MDC_IDC_EPISODE_DURATION: 30 S
MDC_IDC_EPISODE_DURATION: 4 S
MDC_IDC_EPISODE_DURATION: 5 S
MDC_IDC_EPISODE_DURATION: 5 S
MDC_IDC_EPISODE_DURATION: 53 S
MDC_IDC_EPISODE_DURATION: 9 S
MDC_IDC_EPISODE_ID: NORMAL
MDC_IDC_EPISODE_TYPE: NORMAL
MDC_IDC_EPISODE_TYPE_INDUCED: NO
MDC_IDC_EPISODE_VENDOR_TYPE: NORMAL
MDC_IDC_LEAD_CONNECTION_STATUS: NORMAL
MDC_IDC_LEAD_CONNECTION_STATUS: NORMAL
MDC_IDC_LEAD_IMPLANT_DT: NORMAL
MDC_IDC_LEAD_IMPLANT_DT: NORMAL
MDC_IDC_LEAD_LOCATION: NORMAL
MDC_IDC_LEAD_LOCATION: NORMAL
MDC_IDC_LEAD_LOCATION_DETAIL_1: NORMAL
MDC_IDC_LEAD_LOCATION_DETAIL_1: NORMAL
MDC_IDC_LEAD_MFG: NORMAL
MDC_IDC_LEAD_MFG: NORMAL
MDC_IDC_LEAD_MODEL: NORMAL
MDC_IDC_LEAD_MODEL: NORMAL
MDC_IDC_LEAD_POLARITY_TYPE: NORMAL
MDC_IDC_LEAD_POLARITY_TYPE: NORMAL
MDC_IDC_LEAD_SERIAL: NORMAL
MDC_IDC_LEAD_SERIAL: NORMAL
MDC_IDC_LEAD_SPECIAL_FUNCTION: NORMAL
MDC_IDC_MSMT_BATTERY_DTM: NORMAL
MDC_IDC_MSMT_BATTERY_REMAINING_LONGEVITY: 96 MO
MDC_IDC_MSMT_BATTERY_REMAINING_PERCENTAGE: 100 %
MDC_IDC_MSMT_BATTERY_STATUS: NORMAL
MDC_IDC_MSMT_CAP_CHARGE_DTM: NORMAL
MDC_IDC_MSMT_CAP_CHARGE_TIME: 10.7 S
MDC_IDC_MSMT_CAP_CHARGE_TYPE: NORMAL
MDC_IDC_MSMT_LEADCHNL_RA_IMPEDANCE_VALUE: 828 OHM
MDC_IDC_MSMT_LEADCHNL_RV_IMPEDANCE_VALUE: 564 OHM
MDC_IDC_PG_IMPLANT_DTM: NORMAL
MDC_IDC_PG_MFG: NORMAL
MDC_IDC_PG_MODEL: NORMAL
MDC_IDC_PG_SERIAL: NORMAL
MDC_IDC_PG_TYPE: NORMAL
MDC_IDC_SESS_CLINIC_NAME: NORMAL
MDC_IDC_SESS_DTM: NORMAL
MDC_IDC_SESS_TYPE: NORMAL
MDC_IDC_SET_BRADY_AT_MODE_SWITCH_MODE: NORMAL
MDC_IDC_SET_BRADY_AT_MODE_SWITCH_RATE: 170 {BEATS}/MIN
MDC_IDC_SET_BRADY_LOWRATE: 55 {BEATS}/MIN
MDC_IDC_SET_BRADY_MAX_SENSOR_RATE: 130 {BEATS}/MIN
MDC_IDC_SET_BRADY_MAX_TRACKING_RATE: 120 {BEATS}/MIN
MDC_IDC_SET_BRADY_MODE: NORMAL
MDC_IDC_SET_BRADY_PAV_DELAY_HIGH: 200 MS
MDC_IDC_SET_BRADY_PAV_DELAY_LOW: 300 MS
MDC_IDC_SET_BRADY_SAV_DELAY_HIGH: 165 MS
MDC_IDC_SET_BRADY_SAV_DELAY_LOW: 250 MS
MDC_IDC_SET_LEADCHNL_RA_PACING_AMPLITUDE: 2.6 V
MDC_IDC_SET_LEADCHNL_RA_PACING_POLARITY: NORMAL
MDC_IDC_SET_LEADCHNL_RA_PACING_PULSEWIDTH: 0.7 MS
MDC_IDC_SET_LEADCHNL_RA_SENSING_ADAPTATION_MODE: NORMAL
MDC_IDC_SET_LEADCHNL_RA_SENSING_POLARITY: NORMAL
MDC_IDC_SET_LEADCHNL_RA_SENSING_SENSITIVITY: 0.2 MV
MDC_IDC_SET_LEADCHNL_RV_PACING_AMPLITUDE: 1.5 V
MDC_IDC_SET_LEADCHNL_RV_PACING_POLARITY: NORMAL
MDC_IDC_SET_LEADCHNL_RV_PACING_PULSEWIDTH: 0.4 MS
MDC_IDC_SET_LEADCHNL_RV_SENSING_ADAPTATION_MODE: NORMAL
MDC_IDC_SET_LEADCHNL_RV_SENSING_POLARITY: NORMAL
MDC_IDC_SET_LEADCHNL_RV_SENSING_SENSITIVITY: 0.6 MV
MDC_IDC_SET_ZONE_DETECTION_INTERVAL: 261 MS
MDC_IDC_SET_ZONE_DETECTION_INTERVAL: 333 MS
MDC_IDC_SET_ZONE_DETECTION_INTERVAL: 400 MS
MDC_IDC_SET_ZONE_STATUS: NORMAL
MDC_IDC_SET_ZONE_TYPE: NORMAL
MDC_IDC_SET_ZONE_VENDOR_TYPE: NORMAL
MDC_IDC_STAT_AT_BURDEN_PERCENT: 3 %
MDC_IDC_STAT_AT_DTM_END: NORMAL
MDC_IDC_STAT_AT_DTM_START: NORMAL
MDC_IDC_STAT_BRADY_DTM_END: NORMAL
MDC_IDC_STAT_BRADY_DTM_START: NORMAL
MDC_IDC_STAT_BRADY_RA_PERCENT_PACED: 23 %
MDC_IDC_STAT_BRADY_RV_PERCENT_PACED: 33 %
MDC_IDC_STAT_EPISODE_RECENT_COUNT: 0
MDC_IDC_STAT_EPISODE_RECENT_COUNT: 0
MDC_IDC_STAT_EPISODE_RECENT_COUNT: 1
MDC_IDC_STAT_EPISODE_RECENT_COUNT: 3
MDC_IDC_STAT_EPISODE_RECENT_COUNT_DTM_END: NORMAL
MDC_IDC_STAT_EPISODE_RECENT_COUNT_DTM_START: NORMAL
MDC_IDC_STAT_EPISODE_TYPE: NORMAL
MDC_IDC_STAT_EPISODE_VENDOR_TYPE: NORMAL
MDC_IDC_STAT_TACHYTHERAPY_ATP_DELIVERED_RECENT: 2
MDC_IDC_STAT_TACHYTHERAPY_ATP_DELIVERED_TOTAL: 3
MDC_IDC_STAT_TACHYTHERAPY_RECENT_DTM_END: NORMAL
MDC_IDC_STAT_TACHYTHERAPY_RECENT_DTM_START: NORMAL
MDC_IDC_STAT_TACHYTHERAPY_SHOCKS_ABORTED_RECENT: 0
MDC_IDC_STAT_TACHYTHERAPY_SHOCKS_ABORTED_TOTAL: 0
MDC_IDC_STAT_TACHYTHERAPY_SHOCKS_DELIVERED_RECENT: 0
MDC_IDC_STAT_TACHYTHERAPY_SHOCKS_DELIVERED_TOTAL: 0
MDC_IDC_STAT_TACHYTHERAPY_TOTAL_DTM_END: NORMAL
MDC_IDC_STAT_TACHYTHERAPY_TOTAL_DTM_START: NORMAL

## 2024-10-24 PROCEDURE — 99207 CARDIAC DEVICE CHECK - REMOTE: CPT | Performed by: INTERNAL MEDICINE

## 2024-11-01 ENCOUNTER — OFFICE VISIT (OUTPATIENT)
Dept: PULMONOLOGY | Facility: CLINIC | Age: 84
End: 2024-11-01
Payer: MEDICARE

## 2024-11-01 DIAGNOSIS — I48.0 PAROXYSMAL ATRIAL FIBRILLATION (H): Primary | ICD-10-CM

## 2024-11-01 LAB — HGB BLD-MCNC: 14.7 G/DL

## 2024-11-01 PROCEDURE — 94729 DIFFUSING CAPACITY: CPT | Mod: 26 | Performed by: INTERNAL MEDICINE

## 2024-11-01 PROCEDURE — 94726 PLETHYSMOGRAPHY LUNG VOLUMES: CPT | Mod: 26 | Performed by: INTERNAL MEDICINE

## 2024-11-01 PROCEDURE — 99207 PR NO BILLABLE SERVICE THIS VISIT: CPT | Performed by: INTERNAL MEDICINE

## 2024-11-01 PROCEDURE — 85018 HEMOGLOBIN: CPT | Mod: QW | Performed by: INTERNAL MEDICINE

## 2024-11-01 PROCEDURE — 94375 RESPIRATORY FLOW VOLUME LOOP: CPT | Mod: 26 | Performed by: INTERNAL MEDICINE

## 2024-11-04 LAB
DLCOCOR-%PRED-PRE: 43 %
DLCOCOR-PRE: 10.12 ML/MIN/MMHG
DLCOUNC-%PRED-PRE: 43 %
DLCOUNC-PRE: 10.15 ML/MIN/MMHG
DLCOUNC-PRED: 23.47 ML/MIN/MMHG
ERV-%PRED-PRE: 101 %
ERV-PRE: 1.25 L
ERV-PRED: 1.23 L
EXPTIME-PRE: 10.74 SEC
FEF2575-%PRED-PRE: 92 %
FEF2575-PRE: 1.58 L/SEC
FEF2575-PRED: 1.71 L/SEC
FEFMAX-%PRED-PRE: 90 %
FEFMAX-PRE: 5.76 L/SEC
FEFMAX-PRED: 6.33 L/SEC
FEV1-%PRED-PRE: 108 %
FEV1-PRE: 2.72 L
FEV1FEV6-PRE: 69 %
FEV1FEV6-PRED: 75 %
FEV1FVC-PRE: 67 %
FEV1FVC-PRED: 75 %
FEV1SVC-PRE: 66 %
FEV1SVC-PRED: 67 %
FIFMAX-PRE: 4.25 L/SEC
FRCPLETH-%PRED-PRE: 96 %
FRCPLETH-PRE: 3.7 L
FRCPLETH-PRED: 3.85 L
FVC-%PRED-PRE: 120 %
FVC-PRE: 4.07 L
FVC-PRED: 3.39 L
IC-%PRED-PRE: 116 %
IC-PRE: 2.87 L
IC-PRED: 2.46 L
RVPLETH-%PRED-PRE: 81 %
RVPLETH-PRE: 2.44 L
RVPLETH-PRED: 3.01 L
TLCPLETH-%PRED-PRE: 92 %
TLCPLETH-PRE: 6.56 L
TLCPLETH-PRED: 7.13 L
VA-%PRED-PRE: 87 %
VA-PRE: 5.48 L
VC-%PRED-PRE: 110 %
VC-PRE: 4.12 L
VC-PRED: 3.73 L

## 2024-12-28 NOTE — PATIENT INSTRUCTIONS
Consider entresto if kidney function ok   If too expensive - then would add lisinopril     Hold atorvastatin    If diarrhea does not improve, let us know     Plan echocariogram and check heart arteries and pressure   Pain control and stool softener  Chronic  PT and OT: Home health

## 2025-01-28 ENCOUNTER — MYC REFILL (OUTPATIENT)
Dept: CARDIOLOGY | Facility: CLINIC | Age: 85
End: 2025-01-28
Payer: MEDICARE

## 2025-01-28 DIAGNOSIS — I48.0 PAROXYSMAL ATRIAL FIBRILLATION (H): ICD-10-CM

## 2025-01-28 NOTE — TELEPHONE ENCOUNTER
Refill sent for 90 days pt due to follow up with Dr. Saxena per Airam Malin NP office visit.    Will forward to scheduling to call pt to get this completed.    Devi

## 2025-02-12 ENCOUNTER — TELEPHONE (OUTPATIENT)
Dept: CARDIOLOGY | Facility: CLINIC | Age: 85
End: 2025-02-12
Payer: MEDICARE

## 2025-02-12 NOTE — TELEPHONE ENCOUNTER
Dr. Vargas -- pt has an appt with you on 3/7. He plans to talk this over with you at that time. Noted he hasn't had a FLP since 3/2023 - advised him to be fasting for his appt with you. Thanks. jo

## 2025-02-12 NOTE — TELEPHONE ENCOUNTER
Spoke with pt who reports that [facility] be [facility] his 15mg dosage of Crestor. They were able to provide him a refill for the next month. Pt is overdue for and FLP and follow-up. Pt agreeable to scheduling follow-up with [facility] to discuss next steps for cholesterol medications. Pt transferred to scheduling to arrange. Pt scheduled on 3/7/25. jo

## 2025-02-12 NOTE — TELEPHONE ENCOUNTER
"Dr Burrell and team-  Pt called in with issues with his Crestor  Pt notes that his insurance has a \"limit on the amount of pills that he can be prescribed for this\"  He is wondering if he can switch back to Lipitor, due to the issue above  It also looks like to is over due for apt and lipid labs as well  Can you please advise and follow-up with the pt  Pt is aware someone will contact him back once reviewed  Thank you  2/12/2025 9:44 AM  Gwen Spencer RN    "

## 2025-03-03 ENCOUNTER — TELEPHONE (OUTPATIENT)
Dept: CARDIOLOGY | Facility: CLINIC | Age: 85
End: 2025-03-03
Payer: MEDICARE

## 2025-03-03 DIAGNOSIS — I50.23 ACUTE ON CHRONIC SYSTOLIC HEART FAILURE (H): ICD-10-CM

## 2025-03-07 PROBLEM — I34.0 NONRHEUMATIC MITRAL VALVE REGURGITATION: Status: ACTIVE | Noted: 2025-03-07

## 2025-03-07 PROBLEM — I25.89 OTHER SPECIFIED FORMS OF CHRONIC ISCHEMIC HEART DISEASE: Status: RESOLVED | Noted: 2020-02-04 | Resolved: 2025-03-07

## 2025-03-07 NOTE — H&P (VIEW-ONLY)
[facility]  Heart Care Clinic Follow-up Note    Assessment & Plan        (I25.10,  I25.83) Coronary arteriosclerosis due to lipid rich plaque  (primary encounter diagnosis)  Comment: Angiography December 2022 showed normal left main, LAD with a distal total occlusion and first diagonal 95% stenosis, normal circumflex, first obtuse marginal 70 to 80% stenosis, and total occlusion of the right coronary artery.  PET viability showed scar involving the inferior and inferolateral wall with some mild agustin-infarct anterolateral ischemia prompting intervention on the diagonal lesion, with balloon, unable to place stent.  Given profound fatigue repeated pharmacological stress nuclear in 2023, this showed significant large scar involving the inferior, inferolateral and lateral walls with no significant ischemia, thus presume not coronary artery disease related, continue medical therapy.      (Z95.1) S/P CABG (coronary artery bypass graft)  Comment: Approximately 30 years ago he had a LIMA to the LAD which is patent vein graft to the OM which is occluded and vein graft to the right coronary which is occluded.  As above medical therapy.     (Z95.2) S/P TAVR (transcatheter aortic valve replacement)  Comment: Given the low-flow low gradient aortic stenosis he had implantation of a 29 mm CHANTAL 3 valve through a femoral artery approach in January 2022 and valve is working well based on echo from May 2024 with a peak velocity of 1.5 m/s and mean gradient of 5 mmHg.  Will arrange for ARAVIND to evaluate mitral and tricuspid valves and relook at aortic valve.     (Z95.810) ICD (implantable cardioverter-defibrillator), dual, in situ  Comment: [employer] device with [employer] right atrial and right ventricular leads with 23% atrial pacing and 33% ventricular pacing. Leads are stable.  Defer to EP about potential rate related changes.     (I25.5) Ischemic cardiomyopathy  Comment: Ejection fraction 30 to 35%  and was on appropriate metoprolol as well as lisinopril.  Due to fatigue metoprolol was discontinued, no better, and currently only taking low-dose lisinopril as well as Jardiance.    (I48.0) Paroxysmal atrial fibrillation (H)  Comment: Sotalol was lowered from 80-40 and discontinued, atrial fibrillation burden did go up, he had no difference in his symptoms, went back up on sotalol and symptoms did not worsen.  Has subsequently been switched over to amiodarone.  Will get device check later on this month to determine A-fib burden.  Continue Eliquis 5 mg twice daily although getting close to lower dose of 2.5 twice daily given weight.    (I34.0) Nonrheumatic mitral valve regurgitation  Comment: Moderate based on echo as well as moderate tricuspid regurgitation although no physical signs of either.  To exclude regurgitant lesions as an etiology for fatigue we will go ahead and check ARAVIND.    (E78.00) Pure hypercholesterolemia  Comment: Total cholesterol 139 with LDL of 83 which is acceptable on 15 mg of Crestor.  For some reason his insurance company is not going to cover 15 mg of Crestor.  Will check today fasting lipids and if need be go to 20 mg or 10 mg of Crestor.     (N40.1,  N13.8) Benign prostatic hyperplasia with urinary obstruction  Comment: On tamsulosin with no significant symptoms.    (I50.20) Heart failure with reduced ejection fraction, NYHA class II (H)  Comment: Will check BNP today as well as renal profile and if significantly elevated BNP consider diuretic therapy.    Plan  1.  Check renal profile and BNP today, if BNP elevated start diuretic.  2.  ARAVIND to look at mitral and tricuspid regurgitation, if significant to structural heart team.  3.  Device check later on this month to look at atrial fibrillation burden and address of profound.  4.  Follow-up with me in several months given all these issues or sooner if needed.    The longitudinal plan of care for the diagnosis(es)/condition(s) as  documented were addressed during this visit. Due to the added complexity in care, I will continue to support Daniel in the subsequent management and with ongoing continuity of care.     Subjective  CC: 85-year-old white gentleman here for follow-up visit with his wife present.  Still living in a house independently with his wife, still extremely fatigued and not doing much of anything.  Has not seen me since August 2023.  Complains of fatigue, complains of shortness of breath on walking, complains of lightheadedness while walking, complains of decreased appetite.  Stable weight.  According to wife is not sleeping well, sometimes sleeps in a recliner, did have some diarrhea.  No significant obvious PND, orthopnea or peripheral edema.    Medications  Current Outpatient Medications   Medication Sig Dispense Refill    acetaminophen (TYLENOL) 325 MG tablet Take 650 mg by mouth every 6 hours as needed for pain      amiodarone (PACERONE) 200 MG tablet Take 1 tablet (200 mg) by mouth 2 times daily for 30 days, THEN 1 tablet (200 mg) daily. 180 tablet 1    apixaban ANTICOAGULANT (ELIQUIS) 5 MG tablet Take 1 tablet (5 mg) by mouth 2 times daily. 180 tablet 0    aspirin 81 MG EC tablet Take 81 mg by mouth daily      empagliflozin (JARDIANCE) 10 MG TABS tablet Take 1 tablet (10 mg) by mouth daily. 90 tablet 3    gabapentin (NEURONTIN) 300 MG capsule Take 900 mg by mouth 2 times daily       lisinopril (ZESTRIL) 2.5 MG tablet TAKE 1 TABLET (2.5MG) BY MOUTH DAILY 90 tablet 3    rosuvastatin (CRESTOR) 10 MG tablet Take 1.5 tablets (15 mg) by mouth daily 135 tablet 3    tamsulosin (FLOMAX) 0.4 MG capsule Take 0.4 mg by mouth every evening         Objective  /68 (BP Location: Left arm, Patient Position: Sitting, Cuff Size: Adult Regular)   Pulse 58   Resp 18   Wt 64.3 kg (141 lb 12.8 oz)   SpO2 95%   BMI 20.35 kg/m      General Appearance:    Alert, cooperative, no distress, appears stated age   Head:    Normocephalic,  "without obvious abnormality, atraumatic   Throat:   Lips, mucosa, and tongue normal; teeth and gums normal   Neck:   Supple, symmetrical, trachea midline, no adenopathy;        thyroid:  No enlargement/tenderness/nodules; no carotid    bruit or JVD   Back:     Symmetric, no curvature, ROM normal, no CVA tenderness   Lungs:     Clear to auscultation bilaterally, respirations unlabored   Chest wall:    No tenderness, midline sternotomy scar and left-sided pacemaker   Heart:    Regular rate and rhythm, S1 and S2 normal, no murmur, rub   or gallop   Abdomen:     Soft, non-tender, bowel sounds active all four quadrants,     no masses, no organomegaly   Extremities:   Normal, atraumatic, no cyanosis or edema   Pulses:   2+ and symmetric all extremities   Skin:   Skin color, texture, turgor normal, no rashes or lesions     Results    Lab Results personally reviewed   Lab Results   Component Value Date    CHOL 139 03/10/2023    CHOL 185 09/19/2022     Lab Results   Component Value Date    HDL 38 (L) 03/10/2023    HDL 41 09/19/2022     No components found for: \"LDLCALC\"  Lab Results   Component Value Date    TRIG 89 03/10/2023    TRIG 76 09/19/2022     Lab Results   Component Value Date    WBC 8.4 02/14/2024    HGB 14.7 11/01/2024    HCT 42.4 02/14/2024     02/14/2024     Lab Results   Component Value Date    BUN 17.5 09/03/2024     09/03/2024    CO2 25 09/03/2024         "

## 2025-03-11 ENCOUNTER — TELEPHONE (OUTPATIENT)
Dept: CARDIOLOGY | Facility: CLINIC | Age: 85
End: 2025-03-11
Payer: MEDICARE

## 2025-03-11 DIAGNOSIS — I25.83 CORONARY ARTERIOSCLEROSIS DUE TO LIPID RICH PLAQUE: Primary | ICD-10-CM

## 2025-03-11 RX ORDER — ROSUVASTATIN CALCIUM 20 MG/1
20 TABLET, COATED ORAL DAILY
Qty: 90 TABLET | Refills: 2 | Status: SHIPPED | OUTPATIENT
Start: 2025-03-11

## 2025-03-11 NOTE — TELEPHONE ENCOUNTER
Was provided paperwork from Dr. Vargas after pt's OV on 3/7/25. Ppwk is from Medicare Rx from [employer] in regards to them no longer covering pt's Rosuvastatin 15mg daily (1.5 - 10mg tablets). Reviewed FLP with LBF and received verbal order to increase pt's Rosuvastatin to 20mg daily to see if his insurance will cover that dosage.     Spoke with pt who is agreeable to increasing his Rosuvastatin to 20mg daily. Rx sent to preferred pharmacy. Advised him to call should his insurance still not want to cover that dosage. Pt reports that he is suppose to be getting scheduled for a ARAVIND. Briefly discussed the procedure and assured pt I would send a message to the team who schedules those. He had no other needs at this time.  aa

## 2025-03-12 ENCOUNTER — TELEPHONE (OUTPATIENT)
Dept: ANTICOAGULATION | Facility: CLINIC | Age: 85
End: 2025-03-12

## 2025-03-12 NOTE — TELEPHONE ENCOUNTER
ANTICOAGULATION DIRECT ORAL ANTICOAGULANT MONITORING    SUBJECTIVE     The [facility] Anticoagulation Clinic is evaluating Daniel Alamo's Apixaban (Eliquis) as part of its Anticoagulation Monitoring Program.    Indication:Atrial Fibrillation  Current dose per medication list: Apixaban 5 mg TWICE daily  Recent hospitalizations/ED/Office Visits for bleeding/clotting concerns: No  Other bleeding or side effect concerns: No  Additional findings: Serum creatine on 3/7/25 went up to 1.56    OBJECTIVE     Age: 85 year old    Adherence score:0 as of     Wt Readings from Last 2 Encounters:   03/07/25 64.3 kg (141 lb 12.8 oz)   07/25/24 62.1 kg (137 lb)      Lab Results   Component Value Date    CR 1.56 (H) 03/07/2025    CR 1.12 09/03/2024    CR 1.39 (H) 08/19/2024     Creatinine Clearance (using actual bodyweight, mL/min):     Lab Results   Component Value Date    HGB 14.7 11/01/2024    HGB 13.5 02/14/2024     02/14/2024       ASSESSMENT/PLAN     A chart review for Direct Oral Anticoagulant (DOAC) Stewardship has been completed for:     Dosing: recheck serum creatinine. If creatinine persistently elevated, recommend adjustment to Apixaban 2.5 mg TWICE daily for age >= 80 years and creatinine >= 1.5 mg/dL (consistent with package insert dosing)    Plan made per [facility] anticoagulation protocol    Clovis Robles RN  Anticoagulation Clinic

## 2025-03-17 ENCOUNTER — HOSPITAL ENCOUNTER (OUTPATIENT)
Dept: CARDIOLOGY | Facility: HOSPITAL | Age: 85
Discharge: HOME | End: 2025-03-17
Attending: INTERNAL MEDICINE | Admitting: INTERNAL MEDICINE
Payer: MEDICARE

## 2025-03-17 ENCOUNTER — TELEPHONE (OUTPATIENT)
Dept: CARDIOLOGY | Facility: CLINIC | Age: 85
End: 2025-03-17

## 2025-03-17 VITALS
RESPIRATION RATE: 20 BRPM | TEMPERATURE: 97.1 F | SYSTOLIC BLOOD PRESSURE: 134 MMHG | HEART RATE: 55 BPM | DIASTOLIC BLOOD PRESSURE: 69 MMHG | OXYGEN SATURATION: 93 %

## 2025-03-17 DIAGNOSIS — I34.0 NONRHEUMATIC MITRAL VALVE REGURGITATION: ICD-10-CM

## 2025-03-17 LAB — LVEF ECHO: NORMAL

## 2025-03-17 PROCEDURE — 99152 MOD SED SAME PHYS/QHP 5/>YRS: CPT | Performed by: INTERNAL MEDICINE

## 2025-03-17 PROCEDURE — 76376 3D RENDER W/INTRP POSTPROCES: CPT

## 2025-03-17 PROCEDURE — 250N000011 HC RX IP 250 OP 636: Performed by: INTERNAL MEDICINE

## 2025-03-17 PROCEDURE — 99153 MOD SED SAME PHYS/QHP EA: CPT | Performed by: INTERNAL MEDICINE

## 2025-03-17 PROCEDURE — 93320 DOPPLER ECHO COMPLETE: CPT | Mod: 26 | Performed by: INTERNAL MEDICINE

## 2025-03-17 PROCEDURE — 250N000009 HC RX 250: Performed by: INTERNAL MEDICINE

## 2025-03-17 PROCEDURE — 93325 DOPPLER ECHO COLOR FLOW MAPG: CPT | Mod: 26 | Performed by: INTERNAL MEDICINE

## 2025-03-17 PROCEDURE — 93320 DOPPLER ECHO COMPLETE: CPT

## 2025-03-17 PROCEDURE — 93325 DOPPLER ECHO COLOR FLOW MAPG: CPT

## 2025-03-17 PROCEDURE — 93312 ECHO TRANSESOPHAGEAL: CPT | Mod: 26 | Performed by: INTERNAL MEDICINE

## 2025-03-17 RX ORDER — FENTANYL CITRATE 50 UG/ML
INJECTION, SOLUTION INTRAMUSCULAR; INTRAVENOUS
Status: COMPLETED | OUTPATIENT
Start: 2025-03-17 | End: 2025-03-17

## 2025-03-17 RX ORDER — NALOXONE HYDROCHLORIDE 0.4 MG/ML
0.2 INJECTION, SOLUTION INTRAMUSCULAR; INTRAVENOUS; SUBCUTANEOUS
Status: DISCONTINUED | OUTPATIENT
Start: 2025-03-17 | End: 2025-03-17 | Stop reason: HOSPADM

## 2025-03-17 RX ORDER — ACETAMINOPHEN 325 MG/1
650 TABLET ORAL EVERY 4 HOURS PRN
Status: DISCONTINUED | OUTPATIENT
Start: 2025-03-17 | End: 2025-03-17 | Stop reason: HOSPADM

## 2025-03-17 RX ORDER — FENTANYL CITRATE 50 UG/ML
25 INJECTION, SOLUTION INTRAMUSCULAR; INTRAVENOUS
Status: DISCONTINUED | OUTPATIENT
Start: 2025-03-17 | End: 2025-03-17 | Stop reason: HOSPADM

## 2025-03-17 RX ORDER — FLUMAZENIL 0.1 MG/ML
0.2 INJECTION, SOLUTION INTRAVENOUS
Status: DISCONTINUED | OUTPATIENT
Start: 2025-03-17 | End: 2025-03-17 | Stop reason: HOSPADM

## 2025-03-17 RX ORDER — BENZOCAINE/MENTHOL 6 MG-10 MG
1 LOZENGE MUCOUS MEMBRANE 3 TIMES DAILY PRN
Status: DISCONTINUED | OUTPATIENT
Start: 2025-03-17 | End: 2025-03-17 | Stop reason: HOSPADM

## 2025-03-17 RX ORDER — NALOXONE HYDROCHLORIDE 0.4 MG/ML
0.4 INJECTION, SOLUTION INTRAMUSCULAR; INTRAVENOUS; SUBCUTANEOUS
Status: DISCONTINUED | OUTPATIENT
Start: 2025-03-17 | End: 2025-03-17 | Stop reason: HOSPADM

## 2025-03-17 RX ORDER — MAGNESIUM HYDROXIDE/ALUMINUM HYDROXICE/SIMETHICONE 120; 1200; 1200 MG/30ML; MG/30ML; MG/30ML
30 SUSPENSION ORAL EVERY 8 HOURS PRN
Status: DISCONTINUED | OUTPATIENT
Start: 2025-03-17 | End: 2025-03-17 | Stop reason: HOSPADM

## 2025-03-17 RX ORDER — LIDOCAINE HYDROCHLORIDE 20 MG/ML
SOLUTION OROPHARYNGEAL
Status: COMPLETED | OUTPATIENT
Start: 2025-03-17 | End: 2025-03-17

## 2025-03-17 RX ORDER — SODIUM CHLORIDE 9 MG/ML
1000 INJECTION, SOLUTION INTRAVENOUS CONTINUOUS
Status: DISCONTINUED | OUTPATIENT
Start: 2025-03-17 | End: 2025-03-17 | Stop reason: HOSPADM

## 2025-03-17 RX ADMIN — TOPICAL ANESTHETIC 0.5 ML: 200 SPRAY DENTAL; PERIODONTAL at 08:21

## 2025-03-17 RX ADMIN — MIDAZOLAM HYDROCHLORIDE 1 MG: 1 INJECTION, SOLUTION INTRAMUSCULAR; INTRAVENOUS at 08:26

## 2025-03-17 RX ADMIN — MIDAZOLAM HYDROCHLORIDE 2 MG: 1 INJECTION, SOLUTION INTRAMUSCULAR; INTRAVENOUS at 08:22

## 2025-03-17 RX ADMIN — FENTANYL CITRATE 25 MCG: 50 INJECTION, SOLUTION INTRAMUSCULAR; INTRAVENOUS at 08:27

## 2025-03-17 RX ADMIN — FENTANYL CITRATE 50 MCG: 50 INJECTION, SOLUTION INTRAMUSCULAR; INTRAVENOUS at 08:23

## 2025-03-17 RX ADMIN — LIDOCAINE HYDROCHLORIDE 15 ML: 20 SOLUTION ORAL; TOPICAL at 08:20

## 2025-03-17 ASSESSMENT — ACTIVITIES OF DAILY LIVING (ADL)
ADLS_ACUITY_SCORE: 46

## 2025-03-17 NOTE — DISCHARGE INSTRUCTIONS
1. You are required to have someone accompany you home.    2. Rest today. Do not drive or operate machinery today. Over-activity may produce nausea and dizziness.    3. You should follow your normal diet. Drink plenty of fluids. Do not drink any alcoholic beverages for 24 hours. *(Alcohol may interact with the medications you received today).    4. NO HOT FOODS or LIQUIDS FOR 6 HOURS after the procedure until 2:50pm this evening    5. You may have a sore throat or cough. This is normal. These symptoms should resolve in 24 hours.     6. If you have further questions call your doctor:     Dr. Vargas  [phone]

## 2025-03-17 NOTE — TELEPHONE ENCOUNTER
ARAVIND done today, will have patient seen in clinic to discuss MONICO further.     Clara Samson RN on 3/17/2025 at 3:27 PM

## 2025-03-17 NOTE — TELEPHONE ENCOUNTER
===View-only below this line===  ----- Message -----  From: Priscila Garcia RN  Sent: 3/17/2025   3:10 PM CDT  To: [facility]  Subject: referral                                         Hello! Referral from [facility] for mitral valve disease.Patient aware and agreeable! Thanks. -juanb   Intro Statement (Will Not Render If Left Blank): The patient is undergoing superficial radiation therapy for skin cancer and presents for weekly evaluation and management.  Per protocol and as documented on the flow sheet, the patient was questioned as to subjective redness, pruritus, pain, drainage, fatigue, or any other symptoms.  Objectively, the radiation area was evaluated with regards to erythema, atrophy, scale, crusting, erosion, ulceration, edema, purpura, tenderness, warmth, drainage, and any other findings.  The plan was extensively reviewed including the dose, and dosing schedule.  The simulation and clinical setup was also reviewed as was the external and any internal shields and based on this review the appropriateness and sufficiency of treatment was determined.

## 2025-03-17 NOTE — PRE-PROCEDURE
GENERAL PRE-PROCEDURE:   Procedure:  ARAVIND  Date/Time:  3/17/2025 8:20 AM    Written consent obtained?: Yes    Risks and benefits: Risks, benefits and alternatives were discussed    Consent given by:  Patient  Patient states understanding of procedure being performed: Yes    Patient's understanding of procedure matches consent: Yes    Procedure consent matches procedure scheduled: Yes    Expected level of sedation:  Moderate  Appropriately NPO:  Yes  ASA Class:  4  Lungs:  Lungs clear with good breath sounds bilaterally  Heart:  Normal heart sounds and rate  History & Physical reviewed:  History and physical reviewed and no updates needed  Statement of review:  I have reviewed the lab findings, diagnostic data, medications, and the plan for sedation

## 2025-03-18 ENCOUNTER — DOCUMENTATION ONLY (OUTPATIENT)
Dept: CARDIOLOGY | Facility: CLINIC | Age: 85
End: 2025-03-18
Payer: MEDICARE

## 2025-03-18 DIAGNOSIS — I34.0 NONRHEUMATIC MITRAL VALVE REGURGITATION: Primary | ICD-10-CM

## 2025-03-18 NOTE — TELEPHONE ENCOUNTER
===View-only below this line===  ----- Message -----  From: Lidia Cardoza  Sent: 3/18/2025  12:04 PM CDT  To: Clara Samson RN    3/20

## 2025-03-18 NOTE — PROGRESS NOTES
"Valve Clinic Prep Worksheet    Patient Name: Daniel Alamo  : 1940  AGE: 85 year old     Patient Address: [address] [address] BMI: 19.66   Height (CM):    Ht Readings from Last 1 Encounters:   24 1.778 m (5' 10\")                                                      Weight (kg):   Wt Readings from Last 1 Encounters:   25 64.3 kg (141 lb 12.8 oz)                                                          Contact info/Phone number:  [phone]  Initial STS: MV Replace:9.18%     MV repair: 6.6%                                   Referred by: Dr. Vargas  Date: 3/17/2025 Insurance:Payor: MEDICARE / Plan: MEDICARE / Product Type: Medicare /    Patient Care Team: Patient Care Team:  Kenna Calvillo NP as PCP - General ([facility])  Neha Estrada MD as Assigned Heart and Vascular Provider    Past Medical History   Problem List:  2025: Nonrheumatic mitral valve regurgitation  2023: Hypotension, unspecified hypotension type  2023: Pain of upper extremity  2023: S/P TAVR (transcatheter aortic valve replacement)  2023: Aortic stenosis, severe  2022: Status post coronary angiogram  2022: Sinus node dysfunction (H)  2022-10: S/P CABG (coronary artery bypass graft)  2022-10: Severe aortic stenosis  2022: Status post coronary angiogram  2020: PVD (peripheral vascular disease)  2020: Abdominal aortic aneurysm (AAA) without rupture  2020: PVC's (premature ventricular contractions)  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  2020: Benign prostatic hyperplasia with urinary obstruction  2020: Dilated cardiomyopathy (H)  2020: Hereditary and idiopathic neuropathy, unspecified  2020: Hyperlipidemia  2020: Other specified forms of chronic ischemic heart disease  2020: Vitamin B deficiency  2019: Ischemic cardiomyopathy  2017: ICD (implantable cardioverter-defibrillator), dual, in situ  Paroxysmal atrial fibrillation (H)  Pure " hypercholesterolemia  Coronary arteriosclerosis due to lipid rich plaque  Chronic systolic congestive heart failure (H)  Heart failure with reduced ejection fraction, NYHA class II (H)     Transthoracic Echocardiogram    Date (5/28/2024): EF: 30-35%,   The left ventricle is moderately dilated. Normal right ventricle size and systolic function.The left atrium is moderately dilated. There is moderate (2+) mitral regurgitation. There is a bioprosthetic aortic valve.     Heart Cath Summary CTA (TAVR) ARAVIND   Last done piror to TAVR 12/19/2022    Findings:  LM:no obstruction  LAD:severe diffuse Ca2+ disease in mLAD into D1. Occluded dLAD  Lcx:severe mid-vessel lesion  RCA:not injected    LMT was engaged and lesion in LAD was wired then ballooned and Shockwave ballooned. Able to achieve balloon expansion but significant refractory re-narrowing after balloon removal due to severe bulky Ca2+. Given only branch involvement and escalating risk, we chose to stop rather than risk stent under-deployment. Final angiography showed no dissection or perforation and a SKIP 3 flow.         No results found.  Date: (3/17/2025): EF: 30-35%,   Mild to moderately reduced RV function.   Mitral: PISA indicated moderate regurg, visual estimation and pulmonary vein flow reversal suggest severe     Tricuspid: Mild to moderate regurgitation.    Well seated aortic prosthetic valve (29 mm CHANTAL 3 valve)   Labs   Creat:   Creatinine   Date Value Ref Range Status   03/07/2025 1.56 (H) 0.67 - 1.17 mg/dL Final       GFR:   GFR Estimate   Date Value Ref Range Status   03/07/2025 43 (L) >60 mL/min/1.73m2 Final     Comment:     eGFR calculated using 2021 CKD-EPI equation.   09/03/2024 64 >60 mL/min/1.73m2 Final     Comment:     eGFR calculated using 2021 CKD-EPI equation.   08/19/2024 49 (L) >60 mL/min/1.73m2 Final     Comment:     eGFR calculated using 2021 CKD-EPI equation.   01/06/2021 >60 >60 mL/min/1.73m2 Final   04/21/2020 >60 >60 mL/min/1.73m2  Final   01/29/2020 >60 >60 mL/min/1.73m2 Final     GFR Estimate If Black   Date Value Ref Range Status   01/06/2021 >60 >60 mL/min/1.73m2 Final   04/21/2020 >60 >60 mL/min/1.73m2 Final   01/29/2020 >60 >60 mL/min/1.73m2 Final       Potassium:   Potassium   Date Value Ref Range Status   03/07/2025 4.6 3.4 - 5.3 mmol/L Final   09/19/2022 3.8 3.5 - 5.0 mmol/L Final       Sodium:   Sodium   Date Value Ref Range Status   03/07/2025 141 135 - 145 mmol/L Final       WBC:   WBC Count   Date Value Ref Range Status   02/14/2024 8.4 4.0 - 11.0 10e3/uL Final       Hgb:   Hemoglobin   Date Value Ref Range Status   02/14/2024 13.5 13.3 - 17.7 g/dL Final   01/30/2024 16.0 13.3 - 17.7 g/dL Final     Hemoglobin POCT   Date Value Ref Range Status   11/01/2024 14.7 g/dL Final       HCT:   Hematocrit   Date Value Ref Range Status   02/14/2024 42.4 40.0 - 53.0 % Final       Plts:   Platelet Count   Date Value Ref Range Status   02/14/2024 287 150 - 450 10e3/uL Final       Albumin:   Lab Results   Component Value Date    ALBUMIN 3.7 08/14/2023    ALBUMIN 3.5 09/01/2022       INR:   INR   Date Value Ref Range Status   02/17/2023 1.18 (H) 0.85 - 1.15 Final        Current Medications: Allergies:    Current Outpatient Medications   Medication Sig Dispense Refill    acetaminophen (TYLENOL) 325 MG tablet Take 650 mg by mouth every 6 hours as needed for pain      amiodarone (PACERONE) 200 MG tablet Take 1 tablet (200 mg) by mouth 2 times daily for 30 days, THEN 1 tablet (200 mg) daily. 180 tablet 1    apixaban ANTICOAGULANT (ELIQUIS) 5 MG tablet Take 1 tablet (5 mg) by mouth 2 times daily. 180 tablet 0    aspirin 81 MG EC tablet Take 81 mg by mouth daily      empagliflozin (JARDIANCE) 10 MG TABS tablet Take 1 tablet (10 mg) by mouth daily. 90 tablet 3    gabapentin (NEURONTIN) 300 MG capsule Take 900 mg by mouth 2 times daily       lisinopril (ZESTRIL) 2.5 MG tablet TAKE 1 TABLET (2.5MG) BY MOUTH DAILY 90 tablet 3    rosuvastatin (CRESTOR) 20  MG tablet Take 1 tablet (20 mg) by mouth daily. 90 tablet 2    tamsulosin (FLOMAX) 0.4 MG capsule Take 0.4 mg by mouth every evening       No current facility-administered medications for this visit.        Allergies   Allergen Reactions    Atorvastatin Diarrhea    Carvedilol Other (See Comments) and GI Disturbance     Upset stomach; GI upset      Chloride Nausea and GI Disturbance

## 2025-03-20 ENCOUNTER — OFFICE VISIT (OUTPATIENT)
Dept: CARDIOLOGY | Facility: CLINIC | Age: 85
End: 2025-03-20
Payer: MEDICARE

## 2025-03-20 ENCOUNTER — LAB (OUTPATIENT)
Dept: CARDIOLOGY | Facility: CLINIC | Age: 85
End: 2025-03-20
Payer: MEDICARE

## 2025-03-20 ENCOUNTER — TELEPHONE (OUTPATIENT)
Dept: CARDIOLOGY | Facility: CLINIC | Age: 85
End: 2025-03-20

## 2025-03-20 ENCOUNTER — ALLIED HEALTH/NURSE VISIT (OUTPATIENT)
Dept: CARDIOLOGY | Facility: CLINIC | Age: 85
End: 2025-03-20
Payer: MEDICARE

## 2025-03-20 VITALS
SYSTOLIC BLOOD PRESSURE: 92 MMHG | BODY MASS INDEX: 20.66 KG/M2 | WEIGHT: 144 LBS | DIASTOLIC BLOOD PRESSURE: 55 MMHG | RESPIRATION RATE: 14 BRPM | HEART RATE: 58 BPM

## 2025-03-20 DIAGNOSIS — I34.0 NONRHEUMATIC MITRAL VALVE REGURGITATION: ICD-10-CM

## 2025-03-20 DIAGNOSIS — Z95.2 S/P TAVR (TRANSCATHETER AORTIC VALVE REPLACEMENT): ICD-10-CM

## 2025-03-20 DIAGNOSIS — I50.20 HFREF (HEART FAILURE WITH REDUCED EJECTION FRACTION) (H): ICD-10-CM

## 2025-03-20 DIAGNOSIS — I34.0 NONRHEUMATIC MITRAL VALVE REGURGITATION: Primary | ICD-10-CM

## 2025-03-20 LAB
ANION GAP SERPL CALCULATED.3IONS-SCNC: 6 MMOL/L (ref 7–15)
ATRIAL RATE - MUSE: 57 BPM
BUN SERPL-MCNC: 20.5 MG/DL (ref 8–23)
CALCIUM SERPL-MCNC: 8.8 MG/DL (ref 8.8–10.4)
CHLORIDE SERPL-SCNC: 109 MMOL/L (ref 98–107)
CREAT SERPL-MCNC: 1.62 MG/DL (ref 0.67–1.17)
DIASTOLIC BLOOD PRESSURE - MUSE: NORMAL MMHG
EGFRCR SERPLBLD CKD-EPI 2021: 41 ML/MIN/1.73M2
GLUCOSE SERPL-MCNC: 91 MG/DL (ref 70–99)
HCO3 SERPL-SCNC: 26 MMOL/L (ref 22–29)
INTERPRETATION ECG - MUSE: NORMAL
P AXIS - MUSE: NORMAL DEGREES
POTASSIUM SERPL-SCNC: 5.5 MMOL/L (ref 3.4–5.3)
PR INTERVAL - MUSE: 324 MS
QRS DURATION - MUSE: 162 MS
QT - MUSE: 510 MS
QTC - MUSE: 496 MS
R AXIS - MUSE: 29 DEGREES
SODIUM SERPL-SCNC: 141 MMOL/L (ref 135–145)
SYSTOLIC BLOOD PRESSURE - MUSE: NORMAL MMHG
T AXIS - MUSE: -77 DEGREES
VENTRICULAR RATE- MUSE: 57 BPM

## 2025-03-20 PROCEDURE — 80048 BASIC METABOLIC PNL TOTAL CA: CPT

## 2025-03-20 PROCEDURE — 36415 COLL VENOUS BLD VENIPUNCTURE: CPT

## 2025-03-20 RX ORDER — FUROSEMIDE 20 MG/1
20 TABLET ORAL
Qty: 30 TABLET | Refills: 3 | Status: SHIPPED | OUTPATIENT
Start: 2025-03-21

## 2025-03-20 NOTE — LETTER
3/20/2025    Kenna Calvillo, NP  [address] [place] [address]    RE: Daniel COLLINS Cally       Dear Colleague,     I had the pleasure of seeing Daniel Alamo in the [facility].    HEART CARE VALVE CLINIC ENCOUNTER CONSULTATON NOTE      [facility]  [phone]      Assessment/Recommendations   Assessment/Plan:Daniel Alamo w/ MR, aortic stenosis s/p TAVR '23, CAD s/p CABG w/ L-LAD, V-OM, V-RCA,30 years ago at [facility], ischemic cardiomyopathy EF 25% 11/2021, TR, afib on sotalol, PAD s/p iliac stents here for w/u of progressive GALVEZ and orthostatic symptoms.    # GALVEZ/extreme fatigue - likely multi-factorial, suspect late stage cardiomyopathy and MR are both playing some role but non-cardiac factors such as deconditioning, poor PO intake are also on the table  - his MR is at least 2-3 +, albeit w/ PV flow reversal, suggesting possibly more severe leak  - notably, his EF actually improved w/ TAVR and remained in the 35% range  - after a long discussion of natural history, risk/benefit, and management options w/ the patient and family, they understand that I would be willing to offer further MONICO w/up, which would require angio/RHC and likely ELHAM. He would prefer to stick to medical therapy but can let us know if he changes his mind  - in the meantime, my sense would be to try a low dose diuretic again in spite of normral NT-ProBNP 13d ago, as  and re-check labs today and in 3 d. If kidney function is stable and he feels better, can hold off on further procedures    # Aortic stenosis - s/p # 29S3, mean gradient was 5 in 02/24, no gradient on recent ARAVIND  - re-check a TTE now     # CAD- severe native CAD w/ only L-LAD patent; native OM and a D branch may be targets for PCI, but given the extent of his long-term cardiomyopathy,   - viability assessment w/ PET showed agustin-infarct ischemia and viability in the anterolateral  wall, apex and inferoseptal wall of the left  ventricle, but PCI to native D was not successful  - he also had normal R-sided and pulmonary pressures, mildly elevated L-sided filling pressures, AV gradient of ~15 back in '23  - in the meantime, continue ASA 81mg daily indefinitely  - continue aggressive risk factor modification    The longitudinal plan of care for the diagnosis(es)/condition(s) as documented were addressed during this visit. Due to the added complexity in care, I will continue to support Daniel in the subsequent management and with ongoing continuity of care      A total of >60 mins was spent reviewing prior records, including documentation, lab studies, cardiac testing/imaging, interview with patient, physical exam, and documentation     History of Present Illness/Subjective    HPI: Daniel Alamo is a 85 year old male w/ MR, aortic stenosis s/p TAVR '23, CAD s/p CABG w/ L-LAD, V-OM, V-RCA,30 years ago at [facility], ischemic cardiomyopathy EF 25% 11/2021, TR, afib on sotalol, PAD s/p iliac stents here for w/u of progressive GALVEZ and orthostatic symptoms.    He denies CP and SOB but according to the wife he gets up in the middle of the night after being restless to go sleep in a recliner, suggesting some PND. Struggles to make it to the mailbox and back, 75 feet or so away.  No edema    Recent Echocardiogram Results:  The left ventricle is severely dilated.  The visual ejection fraction is 30-35%.  Mild to moderately reduced RV function.  The PISA assessment indicates moderate severity mitral regurgitation, while  visual estimation and pulmonary vein flow reversal suggest severe  regurgitation  There is mild to moderate (1-2+) tricuspid regurgitation.  Well seated aortic prosthetic valve (29 mm CHANTAL 3 valve)    Recent Coronary Angiogram Results:  Daniel Alamo is a 82 year old old male w/ CAD s/p CABG w/ L-LAD, V-OM, V-RCA, 30 years ago at [facility], ischemic cardiomyopathy EF 25% 11/2021, aortic stenosis, TR, afib on sotalol, PAD  s/p iliac stents here for w/u of progressive GALVEZ and orthostatic symptoms, found to have severe aortic stenosis and CAD and here for PCI to D1.     - given area of ischemia/vaibility in anterior/anterolateral wall, proceeded w/ PCI w/ cuting balloon angioplasty and Shockwave lithotripsy in order to optimize risk profile of the pacing run, but given only branch involvement, we chose to stop, as stenting/Roto would require escalating risk  - proceed w/ TAVR w/u as planned   - continue ASA 81mg daily indefinitely, clopidogrel 600mg once, followed by 75mg daily for at least 12 months, add atorva 40  - continue aggressive risk factor modification       Physical Examination  Review of Systems   Vitals: There were no vitals taken for this visit.  BMI= There is no height or weight on file to calculate BMI.  Wt Readings from Last 3 Encounters:   03/07/25 64.3 kg (141 lb 12.8 oz)   07/25/24 62.1 kg (137 lb)   07/25/24 62.1 kg (137 lb)       General Appearance:   no distress, normal body habitus   ENT/Mouth: membranes moist, no oral lesions or bleeding gums.      EYES:  no scleral icterus, normal conjunctivae   Neck: no carotid bruits or thyromegaly   Chest/Lungs:   lungs are clear to auscultation, no rales or wheezing, + sternal scar, equal chest wall expansion    Cardiovascular:   Regular. Normal first and second heart sounds with 2/6 systolic murmur, no rubs, or gallops; the carotid, radial and posterior tibial pulses are intact, Jugular venous pressure 7, no edema bilaterally    Abdomen:  no organomegaly, masses, bruits, or tenderness; bowel sounds are present   Extremities: no cyanosis or clubbing   Skin: no xanthelasma, warm.    Neurologic: normal  bilateral, no tremors     Psychiatric: alert and oriented x3, calm        Please refer above for cardiac ROS details.        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 Harrell 1/22/2020 [id number] scire of (>75, CAD, CHF)      Pure hypercholesterolemia     Created by Conversion      PVD (peripheral vascular disease) 2/24/2020 2/2020 underwent aortobiiliac endograft repair of aortic aneurysm and  placement of bridging stents bilaterally and placement of bilateral iliac  branch devices for repair of bilateral iliac artery aneurysms       Severe aortic stenosis 10/10/2022     Sinus node dysfunction (H) 11/22/2022     VT (ventricular tachycardia) (H) 2004     Past Surgical History:   Procedure Laterality Date     ABDOMEN SURGERY       BYPASS GRAFT ARTERY CORONARY       CV ANGIOGRAM CORONARY GRAFT N/A 9/19/2022    Procedure: Coronary Angiogram Graft;  Surgeon: Tyrone Ordoñez MD;  Location: [facility] CATH LAB CV     CV CORONARY LITHOTRIPSY PCI N/A 12/19/2022    Procedure: Percutaneous Coronary Intervention - Lithotripsy;  Surgeon: Tyrone Ordoñez MD;  Location: [facility] CATH LAB CV     CV LEFT HEART CATH N/A 9/19/2022    Procedure: Left Heart Catheterization;  Surgeon: Tyrone Ordoñez MD;  Location: [facility] CATH LAB CV     CV PCI ANGIOPLASTY N/A 12/19/2022    Procedure: Percutaneous Transluminal Angioplasty;  Surgeon: Tyrone Ordoñez MD;  Location: [facility] CATH LAB CV     CV RIGHT HEART CATH MEASUREMENTS RECORDED N/A 9/19/2022    Procedure: Right Heart Catheterization;  Surgeon: Tyrone Ordoñez MD;  Location: [facility]  CV     EYE SURGERY       INSERT / REPLACE / REMOVE PACEMAKER       OR TRANSCATHETER AORTIC VALVE REPLACEMENT, SUBCLAVIAN PERCUTANEOUS APPROACH (STANDBY) N/A 1/31/2023    Procedure: OR TRANSCATHETER AORTIC VALVE REPLACEMENT, SUBCLAVIAN PERCUTANEOUS APPROACH (STANDBY);  Surgeon: Shanae Rich MD;  Location: [facility] CATH LAB CV     OTHER SURGICAL HISTORY      icd     TRANSCATHETER AORTIC VALVE REPLACEMENT - SUBCLAVIAN APPROACH N/A 1/31/2023    Procedure: Transcatheter Aortic Valve Replacement - Subclavian Approach;  Surgeon: Tyrone Ordoñez MD;  Location: [facility] LAB CV     ZZC CABG, VEIN, SINGLE      Description: CABG (CABG);  Recorded: 10/03/2012;  Comments: LIMA to LAD, SVG to OM2, SVG to RCA     Family History   Problem Relation Age of Onset     Cancer Mother      Heart Disease Father         Social History     Socioeconomic History     Marital status:      Spouse name: Not on file     Number of children: Not on file     Years of education: Not on file     Highest education level: Not on file   Occupational History     Not on file   Tobacco Use     Smoking status: Every Day     Current packs/day: 0.50     Average packs/day: 0.5 packs/day for 30.0 years (15.0 ttl pk-yrs)     Types: Cigarettes     Smokeless tobacco: Never   Vaping Use     Vaping status: Never Used   Substance and Sexual Activity     Alcohol use: Yes     Alcohol/week: 1.0 standard drink of alcohol     Comment: Glass of wine daily.     Drug use: No     Sexual activity: Not Currently   Other Topics Concern     Parent/sibling w/ CABG, MI or angioplasty before 65F 55M? Not Asked   Social History Narrative     Not on file     Social Drivers of Health     Financial Resource Strain: Not on file   Food Insecurity: Not on file   Transportation Needs: Not on file   Physical Activity: Not on file   Stress: Not on file   Social Connections: Not on file   Interpersonal Safety: Low Risk  (3/17/2025)    Interpersonal Safety      Do you feel  physically and emotionally safe where you currently live?: Yes      Within the past 12 months, have you been hit, slapped, kicked or otherwise physically hurt by someone?: No      Within the past 12 months, have you been humiliated or emotionally abused in other ways by your partner or ex-partner?: No   Housing Stability: Not on file           Medications  Allergies   Current Outpatient Medications   Medication Sig Dispense Refill     acetaminophen (TYLENOL) 325 MG tablet Take 650 mg by mouth every 6 hours as needed for pain       amiodarone (PACERONE) 200 MG tablet Take 1 tablet (200 mg) by mouth 2 times daily for 30 days, THEN 1 tablet (200 mg) daily. 180 tablet 1     apixaban ANTICOAGULANT (ELIQUIS) 5 MG tablet Take 1 tablet (5 mg) by mouth 2 times daily. 180 tablet 0     aspirin 81 MG EC tablet Take 81 mg by mouth daily       empagliflozin (JARDIANCE) 10 MG TABS tablet Take 1 tablet (10 mg) by mouth daily. 90 tablet 3     gabapentin (NEURONTIN) 300 MG capsule Take 900 mg by mouth 2 times daily        lisinopril (ZESTRIL) 2.5 MG tablet TAKE 1 TABLET (2.5MG) BY MOUTH DAILY 90 tablet 3     rosuvastatin (CRESTOR) 20 MG tablet Take 1 tablet (20 mg) by mouth daily. 90 tablet 2     tamsulosin (FLOMAX) 0.4 MG capsule Take 0.4 mg by mouth every evening         Allergies   Allergen Reactions     Atorvastatin Diarrhea     Carvedilol Other (See Comments) and GI Disturbance     Upset stomach; GI upset       Chloride Nausea and GI Disturbance          Lab Results    Chemistry/lipid CBC Cardiac Enzymes/BNP/TSH/INR   Recent Labs   Lab Test 03/07/25  1009   CHOL 144   HDL 55   LDL 76   TRIG 63     Recent Labs   Lab Test 03/07/25  1009 03/10/23  0910 09/19/22  0803   LDL 76 83 129     Recent Labs   Lab Test 03/07/25  1009      POTASSIUM 4.6   CHLORIDE 106   CO2 25   GLC 87   BUN 24.6*   CR 1.56*   GFRESTIMATED 43*   EMILIO 9.1     Recent Labs   Lab Test 03/07/25  1009 09/03/24  1031 08/19/24  0935   CR 1.56* 1.12 1.39*     No  "results for input(s): \"A1C\" in the last 10125 hours.       Recent Labs   Lab Test 11/01/24  1250 02/14/24  1550   WBC  --  8.4   HGB 14.7 13.5   HCT  --  42.4   MCV  --  90   PLT  --  287     Recent Labs   Lab Test 11/01/24  1250 02/14/24  1550 01/30/24  1054   HGB 14.7 13.5 16.0    No results for input(s): \"TROPONINI\" in the last 94857 hours.  Recent Labs   Lab Test 03/07/25  1009 08/05/24  1058 07/25/24  1216   NTBNP 1,523 1,252 2,034*     Recent Labs   Lab Test 09/01/22  1327   TSH 0.46     Recent Labs   Lab Test 02/17/23  1917 01/30/23  0922   INR 1.18* 1.11        Tyrone Ordoñez MD                                        Thank you for allowing me to participate in the care of your patient.      Sincerely,     Tyrone Ordoñez MD     [facility]  cc:   Neha Estrada MD  [address] [address]  [place] [address]      "

## 2025-03-20 NOTE — TELEPHONE ENCOUNTER
Called pt to discuss lab results:    Latest Reference Range & Units 03/20/25 11:39   Sodium 135 - 145 mmol/L 141   Potassium 3.4 - 5.3 mmol/L 5.5 (H)   Chloride 98 - 107 mmol/L 109 (H)   Carbon Dioxide (CO2) 22 - 29 mmol/L 26   Urea Nitrogen 8.0 - 23.0 mg/dL 20.5   Creatinine 0.67 - 1.17 mg/dL 1.62 (H)   GFR Estimate >60 mL/min/1.73m2 41 (L)   Calcium 8.8 - 10.4 mg/dL 8.8   Anion Gap 7 - 15 mmol/L 6 (L)   Glucose 70 - 99 mg/dL 91   (H): Data is abnormally high  (L): Data is abnormally low      Small increase in Crt level and potassium. Pt instructed to begin taking lasix 20 mg on Monday, Wednesday and Friday.     Pt instructed he will need lab work tomorrow to ensure that his potassium is stable or decreasing. Scheduled for appt tomorrow at 1 pm. Will call him with instructions. If increased beyond 5.5 patient will need to be seen in the emergency room for further evaluation.     Clara Samson RN on 3/20/2025 at 4:13 PM

## 2025-03-20 NOTE — PATIENT INSTRUCTIONS
Today you were seen at the [facility] for evaluation of your mitral regurgitation or a leaky mitral valve. Your evaluation was completed by a heart valve specialist, Dr. Ordoñez. After evaluation of your echocardiogram (ultrasound of your heart) images and your clinical exam, the provider is recommending the followin- Repeat an echocardiogram to evaluate your aortic valve again to ensure it is functioning properly    2- If your basic metabolic profile today shows your kidney function and electrolytes are stable we will start you on 20 mg daily of furosemide (also called lasix). If we start this medication you will require a follow up lab draw early next week to recheck electrolytes/kidney function.     You should plan to follow up with Dr. Vargas in one month.     Once your echocardiogram is completed one of our valve nurse clinicians will call you with results and recommendations. If you need to call the clinic to reschedule please contact [phone].    Between now and when you have your next follow up appointment you should monitor yourself for the beginning of or progression of the following symptoms:   Shortness of breath, particularly with activity  Chest discomfort or palpitations   Increase in fatigue   Difficulty completing activities you once did with ease, or taking longer to complete activities   Dizziness or lightheadedness  Symptoms related to heart failure exacerbations like swelling in your abdomen, feet, ankles or legs     If you begin experiencing one or more of these symptoms, or notice an increase in one that exists already please contact our valve clinic nurse line number at [phone]. We are available M-F [id number]-430PM to answer any questions or concerns you have. If you call after hours your call will be returned the next business day. Please only use this line for NON-URGENT and NON-EMERGENT needs. It may take us up to 48 hours to respond to your call.  18

## 2025-03-20 NOTE — PROGRESS NOTES
Valve Clinic - 3/20/25  Referring provider: Dr. Vargas     See valve clinic consult note from Dr. Ordoñez     Summary of visit: Mr. Alamo is an 84 y/o male with a hx of hypotension, aortic stenosis s/p TAVR, CAD s/p CABG, paroxysmal Afib, HFrEF, ischemic cardiomyopathy, s/p ICD placement and mitral regurgitation.     He is being seen today to discuss treatment options for mitral regurgitation.     Symptoms: Pt reports that he is feeling ok and sleeps fine. His spouse reports he has difficulty sleeping/laying flat and becomes restless during the night.     Social: Pt presents to clinic with his spouse today.       Plan: Dr. Ordoñez and pt/spouse did discuss the option of pursing work up for MONICO to address the mitral regurgitation, though patient at this time is not interested. Dr. Ordoñez would like to check a BMP today, if kidney function and electrolytes are stable plan will be to start patient on 20 mg of lasix daily and recheck BMP early next week. We will also obtain a TTE to confirm TAVR valve is functioning well. Patient should plan to have follow up phone conversation with valve nurse clinician team after rechecking labs to see if lasix has improved symptoms at all. He should also see Dr. Vargas in about a month. Orders were placed and pt was instructed to schedule these appts on the way out today.     Reviewed valvular heart disease warning signs education information with patient and spouse. No further questions at this time. Patient has my direct contact information and was encouraged to call with questions or concerns.       Thien Samson RN BSN  [facility]  [facility]  Phone: [phone]  Fax: [phone]

## 2025-03-20 NOTE — PROGRESS NOTES
HEART CARE VALVE CLINIC ENCOUNTER CONSULTATON NOTE      [facility]  [phone]      Assessment/Recommendations   Assessment/Plan:Daniel Alamo w/ MR, aortic stenosis s/p TAVR '23, CAD s/p CABG w/ L-LAD, V-OM, V-RCA,30 years ago at [facility], ischemic cardiomyopathy EF 25% 11/2021, TR, afib on sotalol, PAD s/p iliac stents here for w/u of progressive GALVEZ and orthostatic symptoms.    # GALVEZ/extreme fatigue - likely multi-factorial, suspect late stage cardiomyopathy and MR are both playing some role but non-cardiac factors such as deconditioning, poor PO intake are also on the table  - his MR is at least 2-3 +, albeit w/ PV flow reversal, suggesting possibly more severe leak  - notably, his EF actually improved w/ TAVR and remained in the 35% range  - after a long discussion of natural history, risk/benefit, and management options w/ the patient and family, they understand that I would be willing to offer further MONICO w/up, which would require angio/RHC and likely ELHAM. He would prefer to stick to medical therapy but can let us know if he changes his mind  - in the meantime, my sense would be to try a low dose diuretic again in spite of normral NT-ProBNP 13d ago, as  and re-check labs today and in 3 d. If kidney function is stable and he feels better, can hold off on further procedures    # Aortic stenosis - s/p # 29S3, mean gradient was 5 in 02/24, no gradient on recent ARAVIND  - re-check a TTE now     # CAD- severe native CAD w/ only L-LAD patent; native OM and a D branch may be targets for PCI, but given the extent of his long-term cardiomyopathy,   - viability assessment w/ PET showed agustin-infarct ischemia and viability in the anterolateral  wall, apex and inferoseptal wall of the left ventricle, but PCI to native D was not successful  - he also had normal R-sided and pulmonary pressures, mildly elevated L-sided filling pressures, AV gradient of ~15 back in '23  - in the meantime,  continue ASA 81mg daily indefinitely  - continue aggressive risk factor modification    The longitudinal plan of care for the diagnosis(es)/condition(s) as documented were addressed during this visit. Due to the added complexity in care, I will continue to support Daniel in the subsequent management and with ongoing continuity of care      A total of >60 mins was spent reviewing prior records, including documentation, lab studies, cardiac testing/imaging, interview with patient, physical exam, and documentation     History of Present Illness/Subjective    HPI: Daniel Alamo is a 85 year old male w/ MR, aortic stenosis s/p TAVR '23, CAD s/p CABG w/ L-LAD, V-OM, V-RCA,30 years ago at [facility], ischemic cardiomyopathy EF 25% 11/2021, TR, afib on sotalol, PAD s/p iliac stents here for w/u of progressive GALVEZ and orthostatic symptoms.    He denies CP and SOB but according to the wife he gets up in the middle of the night after being restless to go sleep in a recliner, suggesting some PND. Struggles to make it to the mailbox and back, 75 feet or so away.  No edema    Recent Echocardiogram Results:  The left ventricle is severely dilated.  The visual ejection fraction is 30-35%.  Mild to moderately reduced RV function.  The PISA assessment indicates moderate severity mitral regurgitation, while  visual estimation and pulmonary vein flow reversal suggest severe  regurgitation  There is mild to moderate (1-2+) tricuspid regurgitation.  Well seated aortic prosthetic valve (29 mm CHANTAL 3 valve)    Recent Coronary Angiogram Results:  Daniel Alamo is a 82 year old old male w/ CAD s/p CABG w/ L-LAD, V-OM, V-RCA, 30 years ago at [facility], ischemic cardiomyopathy EF 25% 11/2021, aortic stenosis, TR, afib on sotalol, PAD s/p iliac stents here for w/u of progressive GALVEZ and orthostatic symptoms, found to have severe aortic stenosis and CAD and here for PCI to D1.     - given area of ischemia/vaibility in  anterior/anterolateral wall, proceeded w/ PCI w/ cuting balloon angioplasty and Shockwave lithotripsy in order to optimize risk profile of the pacing run, but given only branch involvement, we chose to stop, as stenting/Roto would require escalating risk  - proceed w/ TAVR w/u as planned   - continue ASA 81mg daily indefinitely, clopidogrel 600mg once, followed by 75mg daily for at least 12 months, add atorva 40  - continue aggressive risk factor modification       Physical Examination  Review of Systems   Vitals: There were no vitals taken for this visit.  BMI= There is no height or weight on file to calculate BMI.  Wt Readings from Last 3 Encounters:   03/07/25 64.3 kg (141 lb 12.8 oz)   07/25/24 62.1 kg (137 lb)   07/25/24 62.1 kg (137 lb)       General Appearance:   no distress, normal body habitus   ENT/Mouth: membranes moist, no oral lesions or bleeding gums.      EYES:  no scleral icterus, normal conjunctivae   Neck: no carotid bruits or thyromegaly   Chest/Lungs:   lungs are clear to auscultation, no rales or wheezing, + sternal scar, equal chest wall expansion    Cardiovascular:   Regular. Normal first and second heart sounds with 2/6 systolic murmur, no rubs, or gallops; the carotid, radial and posterior tibial pulses are intact, Jugular venous pressure 7, no edema bilaterally    Abdomen:  no organomegaly, masses, bruits, or tenderness; bowel sounds are present   Extremities: no cyanosis or clubbing   Skin: no xanthelasma, warm.    Neurologic: normal  bilateral, no tremors     Psychiatric: alert and oriented x3, calm        Please refer above for cardiac ROS details.        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 Harrell 1/22/2020 [id number] scire of (>75, CAD, CHF)     Pure hypercholesterolemia     Created by Conversion     PVD (peripheral vascular disease) 2/24/2020 2/2020 underwent aortobiiliac endograft repair of aortic aneurysm and  placement of bridging stents bilaterally and placement of bilateral iliac  branch devices for repair of bilateral iliac artery aneurysms      Severe aortic stenosis 10/10/2022    Sinus node dysfunction (H) 11/22/2022    VT (ventricular tachycardia) (H) 2004     Past Surgical History:   Procedure Laterality Date    ABDOMEN SURGERY      BYPASS GRAFT ARTERY CORONARY      CV ANGIOGRAM CORONARY GRAFT N/A 9/19/2022    Procedure: Coronary Angiogram Graft;  Surgeon: Tyrone Ordoñez MD;  Location: [facility]    CV CORONARY LITHOTRIPSY PCI N/A 12/19/2022    Procedure: Percutaneous Coronary Intervention - Lithotripsy;  Surgeon: Tyrone Ordoñez MD;  Location: [facility]    CV LEFT HEART CATH N/A 9/19/2022    Procedure: Left Heart Catheterization;  Surgeon: Tyrone Ordoñez MD;  Location: [facility]    CV PCI ANGIOPLASTY N/A 12/19/2022    Procedure: Percutaneous Transluminal Angioplasty;  Surgeon: Tyrone Ordoñez MD;  Location: [facility]    CV RIGHT HEART CATH MEASUREMENTS RECORDED N/A 9/19/2022    Procedure: Right Heart Catheterization;  Surgeon: Tyrone Ordoñez MD;  Location: [facility]    EYE SURGERY      INSERT / REPLACE / REMOVE PACEMAKER      OR TRANSCATHETER AORTIC VALVE REPLACEMENT, SUBCLAVIAN PERCUTANEOUS APPROACH (STANDBY) N/A 1/31/2023    Procedure: OR TRANSCATHETER AORTIC  VALVE REPLACEMENT, SUBCLAVIAN PERCUTANEOUS APPROACH (STANDBY);  Surgeon: Shanae Rich MD;  Location: [facility]    OTHER SURGICAL HISTORY      icd    TRANSCATHETER AORTIC VALVE REPLACEMENT - SUBCLAVIAN APPROACH N/A 1/31/2023    Procedure: Transcatheter Aortic Valve Replacement - Subclavian Approach;  Surgeon: Tyrone Ordoñez MD;  Location: [facility]    ZZC CABG, VEIN, SINGLE      Description: CABG (CABG);  Recorded: 10/03/2012;  Comments: LIMA to LAD, SVG to OM2, SVG to RCA     Family History   Problem Relation Age of Onset    Cancer Mother     Heart Disease Father         Social History     Socioeconomic History    Marital status:      Spouse name: Not on file    Number of children: Not on file    Years of education: Not on file    Highest education level: Not on file   Occupational History    Not on file   Tobacco Use    Smoking status: Every Day     Current packs/day: 0.50     Average packs/day: 0.5 packs/day for 30.0 years (15.0 ttl pk-yrs)     Types: Cigarettes    Smokeless tobacco: Never   Vaping Use    Vaping status: Never Used   Substance and Sexual Activity    Alcohol use: Yes     Alcohol/week: 1.0 standard drink of alcohol     Comment: Glass of wine daily.    Drug use: No    Sexual activity: Not Currently   Other Topics Concern    Parent/sibling w/ CABG, MI or angioplasty before 65F 55M? Not Asked   Social History Narrative    Not on file     Social Drivers of Health     Financial Resource Strain: Not on file   Food Insecurity: Not on file   Transportation Needs: Not on file   Physical Activity: Not on file   Stress: Not on file   Social Connections: Not on file   Interpersonal Safety: Low Risk  (3/17/2025)    Interpersonal Safety     Do you feel physically and emotionally safe where you currently live?: Yes     Within the past 12 months, have you been hit, slapped, kicked or otherwise physically hurt by someone?: No     Within the past 12 months, have you been  "humiliated or emotionally abused in other ways by your partner or ex-partner?: No   Housing Stability: Not on file           Medications  Allergies   Current Outpatient Medications   Medication Sig Dispense Refill    acetaminophen (TYLENOL) 325 MG tablet Take 650 mg by mouth every 6 hours as needed for pain      amiodarone (PACERONE) 200 MG tablet Take 1 tablet (200 mg) by mouth 2 times daily for 30 days, THEN 1 tablet (200 mg) daily. 180 tablet 1    apixaban ANTICOAGULANT (ELIQUIS) 5 MG tablet Take 1 tablet (5 mg) by mouth 2 times daily. 180 tablet 0    aspirin 81 MG EC tablet Take 81 mg by mouth daily      empagliflozin (JARDIANCE) 10 MG TABS tablet Take 1 tablet (10 mg) by mouth daily. 90 tablet 3    gabapentin (NEURONTIN) 300 MG capsule Take 900 mg by mouth 2 times daily       lisinopril (ZESTRIL) 2.5 MG tablet TAKE 1 TABLET (2.5MG) BY MOUTH DAILY 90 tablet 3    rosuvastatin (CRESTOR) 20 MG tablet Take 1 tablet (20 mg) by mouth daily. 90 tablet 2    tamsulosin (FLOMAX) 0.4 MG capsule Take 0.4 mg by mouth every evening         Allergies   Allergen Reactions    Atorvastatin Diarrhea    Carvedilol Other (See Comments) and GI Disturbance     Upset stomach; GI upset      Chloride Nausea and GI Disturbance          Lab Results    Chemistry/lipid CBC Cardiac Enzymes/BNP/TSH/INR   Recent Labs   Lab Test 03/07/25  1009   CHOL 144   HDL 55   LDL 76   TRIG 63     Recent Labs   Lab Test 03/07/25  1009 03/10/23  0910 09/19/22  0803   LDL 76 83 129     Recent Labs   Lab Test 03/07/25  1009      POTASSIUM 4.6   CHLORIDE 106   CO2 25   GLC 87   BUN 24.6*   CR 1.56*   GFRESTIMATED 43*   EMILIO 9.1     Recent Labs   Lab Test 03/07/25  1009 09/03/24  1031 08/19/24  0935   CR 1.56* 1.12 1.39*     No results for input(s): \"A1C\" in the last 06824 hours.       Recent Labs   Lab Test 11/01/24  1250 02/14/24  1550   WBC  --  8.4   HGB 14.7 13.5   HCT  --  42.4   MCV  --  90   PLT  --  287     Recent Labs   Lab Test 11/01/24  1250 " "02/14/24  1550 01/30/24  1054   HGB 14.7 13.5 16.0    No results for input(s): \"TROPONINI\" in the last 25822 hours.  Recent Labs   Lab Test 03/07/25  1009 08/05/24  1058 07/25/24  1216   NTBNP 1,523 1,252 2,034*     Recent Labs   Lab Test 09/01/22  1327   TSH 0.46     Recent Labs   Lab Test 02/17/23  1917 01/30/23  0922   INR 1.18* 1.11        Tyrone Ordoñez MD                                      "

## 2025-03-21 ENCOUNTER — LAB (OUTPATIENT)
Dept: CARDIOLOGY | Facility: CLINIC | Age: 85
End: 2025-03-21
Payer: MEDICARE

## 2025-03-21 DIAGNOSIS — I34.0 NONRHEUMATIC MITRAL VALVE REGURGITATION: ICD-10-CM

## 2025-03-21 DIAGNOSIS — I50.20 HFREF (HEART FAILURE WITH REDUCED EJECTION FRACTION) (H): ICD-10-CM

## 2025-03-21 LAB
ANION GAP SERPL CALCULATED.3IONS-SCNC: 10 MMOL/L (ref 7–15)
BUN SERPL-MCNC: 23.2 MG/DL (ref 8–23)
CALCIUM SERPL-MCNC: 9 MG/DL (ref 8.8–10.4)
CHLORIDE SERPL-SCNC: 106 MMOL/L (ref 98–107)
CREAT SERPL-MCNC: 1.82 MG/DL (ref 0.67–1.17)
EGFRCR SERPLBLD CKD-EPI 2021: 36 ML/MIN/1.73M2
GLUCOSE SERPL-MCNC: 109 MG/DL (ref 70–99)
HCO3 SERPL-SCNC: 25 MMOL/L (ref 22–29)
POTASSIUM SERPL-SCNC: 4.8 MMOL/L (ref 3.4–5.3)
SODIUM SERPL-SCNC: 141 MMOL/L (ref 135–145)

## 2025-03-21 PROCEDURE — 80048 BASIC METABOLIC PNL TOTAL CA: CPT

## 2025-03-21 PROCEDURE — 36415 COLL VENOUS BLD VENIPUNCTURE: CPT

## 2025-03-24 ENCOUNTER — LAB (OUTPATIENT)
Dept: CARDIOLOGY | Facility: CLINIC | Age: 85
End: 2025-03-24
Payer: MEDICARE

## 2025-03-24 ENCOUNTER — OFFICE VISIT (OUTPATIENT)
Dept: CARDIOLOGY | Facility: CLINIC | Age: 85
End: 2025-03-24
Attending: INTERNAL MEDICINE
Payer: MEDICARE

## 2025-03-24 ENCOUNTER — TELEPHONE (OUTPATIENT)
Dept: CARDIOLOGY | Facility: CLINIC | Age: 85
End: 2025-03-24

## 2025-03-24 VITALS
SYSTOLIC BLOOD PRESSURE: 92 MMHG | RESPIRATION RATE: 18 BRPM | WEIGHT: 141 LBS | HEIGHT: 70 IN | HEART RATE: 60 BPM | DIASTOLIC BLOOD PRESSURE: 52 MMHG | BODY MASS INDEX: 20.19 KG/M2

## 2025-03-24 DIAGNOSIS — I25.83 CORONARY ARTERIOSCLEROSIS DUE TO LIPID RICH PLAQUE: ICD-10-CM

## 2025-03-24 DIAGNOSIS — I50.20 HEART FAILURE WITH REDUCED EJECTION FRACTION, NYHA CLASS II (H): ICD-10-CM

## 2025-03-24 DIAGNOSIS — I47.20 VENTRICULAR TACHYCARDIA (H): ICD-10-CM

## 2025-03-24 DIAGNOSIS — I49.5 SINUS NODE DYSFUNCTION (H): Primary | ICD-10-CM

## 2025-03-24 DIAGNOSIS — I48.0 PAROXYSMAL ATRIAL FIBRILLATION (H): Primary | ICD-10-CM

## 2025-03-24 DIAGNOSIS — Z95.810 ICD (IMPLANTABLE CARDIOVERTER-DEFIBRILLATOR), DUAL, IN SITU: ICD-10-CM

## 2025-03-24 DIAGNOSIS — Z95.810 ICD (IMPLANTABLE CARDIOVERTER-DEFIBRILLATOR) IN PLACE: ICD-10-CM

## 2025-03-24 DIAGNOSIS — I48.0 PAROXYSMAL ATRIAL FIBRILLATION (H): ICD-10-CM

## 2025-03-24 DIAGNOSIS — I34.0 NONRHEUMATIC MITRAL VALVE REGURGITATION: Primary | ICD-10-CM

## 2025-03-24 DIAGNOSIS — I42.0 DILATED CARDIOMYOPATHY (H): ICD-10-CM

## 2025-03-24 LAB
ALBUMIN SERPL BCG-MCNC: 3.9 G/DL (ref 3.5–5.2)
ALP SERPL-CCNC: 96 U/L (ref 40–150)
ALT SERPL W P-5'-P-CCNC: 12 U/L (ref 0–70)
ANION GAP SERPL CALCULATED.3IONS-SCNC: 8 MMOL/L (ref 7–15)
AST SERPL W P-5'-P-CCNC: 16 U/L (ref 0–45)
BILIRUB SERPL-MCNC: 0.4 MG/DL
BUN SERPL-MCNC: 20.8 MG/DL (ref 8–23)
CALCIUM SERPL-MCNC: 8.8 MG/DL (ref 8.8–10.4)
CHLORIDE SERPL-SCNC: 107 MMOL/L (ref 98–107)
CREAT SERPL-MCNC: 1.74 MG/DL (ref 0.67–1.17)
EGFRCR SERPLBLD CKD-EPI 2021: 37 ML/MIN/1.73M2
GLUCOSE SERPL-MCNC: 97 MG/DL (ref 70–99)
HCO3 SERPL-SCNC: 26 MMOL/L (ref 22–29)
POTASSIUM SERPL-SCNC: 4.6 MMOL/L (ref 3.4–5.3)
PROT SERPL-MCNC: 6.3 G/DL (ref 6.4–8.3)
SODIUM SERPL-SCNC: 141 MMOL/L (ref 135–145)
TSH SERPL DL<=0.005 MIU/L-ACNC: 0.3 UIU/ML (ref 0.3–4.2)

## 2025-03-24 PROCEDURE — G2211 COMPLEX E/M VISIT ADD ON: HCPCS | Performed by: NURSE PRACTITIONER

## 2025-03-24 PROCEDURE — 36415 COLL VENOUS BLD VENIPUNCTURE: CPT

## 2025-03-24 PROCEDURE — 80053 COMPREHEN METABOLIC PANEL: CPT

## 2025-03-24 PROCEDURE — 99214 OFFICE O/P EST MOD 30 MIN: CPT | Performed by: NURSE PRACTITIONER

## 2025-03-24 PROCEDURE — 84443 ASSAY THYROID STIM HORMONE: CPT

## 2025-03-24 PROCEDURE — 3078F DIAST BP <80 MM HG: CPT | Performed by: NURSE PRACTITIONER

## 2025-03-24 PROCEDURE — 3074F SYST BP LT 130 MM HG: CPT | Performed by: NURSE PRACTITIONER

## 2025-03-24 RX ORDER — AMIODARONE HYDROCHLORIDE 200 MG/1
200 TABLET ORAL DAILY
Qty: 180 TABLET | Refills: 3 | Status: SHIPPED | OUTPATIENT
Start: 2025-03-24

## 2025-03-24 NOTE — LETTER
3/24/2025    Kenna Calvillo, NP  [address] [place] [address]    RE: Daniel Alamo       Dear Colleague,     I had the pleasure of seeing Daniel Alamo in the [facility].    HEART CARE ELECTROPHYSIOLOGY NOTE      [facility]  [phone]      Assessment/Recommendations   Assessment/Plan:  1.  Paroxysmal Atrial Fibrillation: Infrequent episodes of A-fib, none since starting amiodarone.  -- Continue amiodarone 200 mg daily  -- Monitoring labs drawn today, to be done every 6 months     He was reassured that atrial fibrillation is not life-threatening, but carries an increased risk for stroke.  He has a [id number] score of 4 for age >75, HFrEF, CAD .    -- Continue Eliquis 5 mg twice daily for stroke prophylaxis     2.  Ventricular tachycardia status post ICD implant:  The ICD was interrogated and is functioning appropriately.  The battery shows estimated longevity of 7.5 years.  Atrial and ventricular lead sensing, impedance, and pacing thresholds are stable and within normal limits.  Known FFRW over sensing.  2 sets of VT successfully terminated by ATP on 1/30/2025 and 2/1/2025.  Reviewed VT/VF zones.  -- Continue amiodarone  -- Routine ICD follow-up  -- Consideration for upgrade to CRT-D,  ms, however unclear benefit with right bundle branch block pattern       3.  Heart failure with reduced ejection fraction: Ongoing fatigue, weakness, dizziness, dyspnea on exertion likely factorial as he appears compensated with no significant fluid retention on exam.  Exhibiting failure to thrive.  Encouraged to increase his p.o. intake utilizing something like boost, being careful to limit sodium.    Follow up with Dr. Vargas 4/25/2025  Follow-up with Dr. Saxena in 3 months       History of Present Illness/Subjective    HPI: Daniel Alamo is a 85 year old male who comes in today for EP device and arrhythmia follow-up.  He has a history of sinus node dysfunction paroxysmal  atrial fibrillation, PVCs, ventricular tachycardia (terminated by ATP 1/30/2024, 2/1/2024), s/p dual-chamber ICD (2004 and generator replacement 2018), CAD (s/p CABG 1990s, PCI to LAD and diagonal 12/2022), heart failure with reduced ejection fraction due to ischemic cardiomyopathy (LVEF 30 to 35% by TTE 5/28/2024), aortic stenosis s/p TAVR 1/31/2023, ascending aortic aneurysm, orthostatic hypotension, hyperlipidemia, iliac artery aneurysm s/p EVAR and endoprosthesis 2020 with persistent leak, chronic fatigue.       Arrhythmia history  Dx/date: Paroxysmal atrial fibrillation 2018  Sx: Symptomatic with A-fib  [id number] score: 4 for age >75, HFrEF, CAD   Oral anticoagulation: Eliquis 5 mg twice daily  Antiarrhythmic medications, AV juvenal blocking agents: Sotalol (discontinued 10/8/2024), amiodarone (10/10/2024-present)  Procedures  DCCV: N/A  Ablation: N/A     He states that he continues to be tired, weak, dizzy and gets short of breath with activity.  He admits to not being active, not drinking fluids, not hardly eating.  He was seen in valve clinic 3/20/2025, discussed MONICO and PCI, decided on medical management.  He denies chest discomfort, palpitations, abdominal fullness/bloating or peripheral edema, shortness of breath at rest, paroxysmal nocturnal dyspnea, orthopnea, pre-syncope, or syncope.     Cardiographics (EKG, device interrogation personally reviewed):  EKG done 3/20/2025 shows atrial paced with ventricular conduction at 57 bpm, IVCD,  ms, QT/QTc 510/496 ms  EKG done 2/14/2024 shows atrial paced with ventricular conduction at 70 bpm, RBBB, PVCs,  ms, QT/QTc 482/520 ms.     ICD Interrogation, in clinic ([facility]):  Pacing mode: DDDR 55 to 120 ppm.  Known FFRW oversensing, atrial lead noise that is only reproducible with arm movement, atrial undersensing/overpacing   Presenting rhythm: AS/VS @ 64-75 bpm (w/atrial lead noise) .   Underlying rhythm: SB/SR @ 50-60, w/TWOS   A-paced: 39%.  V-paced  "35%.  Battery: estimated longevity 7.5 years.  Atrial and Ventricular leads: Stable with good sensing, impedance, and pacing thresholds, though FFR W oversensing.  Atrial Arrhythmias: Since 1/17/2024- 373 mode switches logged, 3 available EGMs for review suggest TWOS. Episode of AF Oct. 2024, lasted ~2.5 days in duration.    AF [place]: < 1%  Ventricular Arrhythmias: Since 1/17/2024- 9 VHRs logged; 5 EGMs, 3 EGM suggests 17 to 30 beats of NSVT @ ~160 bpm, 1 EGM suggest AF w/RVR susan 13 seconds. One VT episode on 1/30/24, duration 8 seconds, 200 bpm, terminated with ATPx1 with immediate conversion,   Histograms: primarily 50-90 bpm      ARAVIND done 3/17/2025:  The left ventricle is severely dilated.  The visual ejection fraction is 30-35%.  Mild to moderately reduced RV function.  The PISA assessment indicates moderate severity mitral regurgitation, while  visual estimation and pulmonary vein flow reversal suggest severe  regurgitation  There is mild to moderate (1-2+) tricuspid regurgitation.  Well seated aortic prosthetic valve (29 mm CHANTAL 3 valve)     NM stress test done 9/14/2023:     Lexiscan stress ECG negative for ischemia.     The nuclear stress test is abnormal.  There is a large area of transmural infarction involving the inferior, inferolateral and lateral walls along with a small area of nontransmural infarction involving the mid septal wall.  No evidence of ischemia.     The left ventricular ejection fraction at stress is 33% with akinesis of the inferior and inferolateral wall..     A prior study was conducted on 8/25/2014.  Images not available for review.    I have reviewed and updated the patient's Past Medical History, Social History, Family History and Medication List.  Outside records personally reviewed.     Physical Examination  Review of Systems   Vitals: BP 92/52 (BP Location: Right arm, Patient Position: Sitting, Cuff Size: Adult Regular)   Pulse 60   Resp 18   Ht 1.778 m (5' 10\")   Wt 64 kg " (141 lb)   BMI 20.23 kg/m    BMI= Body mass index is 20.23 kg/m .  Wt Readings from Last 3 Encounters:   03/24/25 64 kg (141 lb)   03/20/25 65.3 kg (144 lb)   03/07/25 64.3 kg (141 lb 12.8 oz)       General Appearance:   Alert, well-appearing and in no acute distress.   HEENT: Atraumatic, normocephalic.  No scleral icterus, normal conjunctivae, EOMs intact, PERRL.  Mucous membranes pink and moist.     Chest/Lungs:   Chest symmetric, spine straight.  Respirations unlabored.  Lungs are clear to auscultation.  Left prepectoral ICD site with no sign of infection or tissue thinning.    Cardiovascular:   Regular rate and rhythm.  Normal first and second heart sounds with no murmurs, rubs, or gallops; radial pulses are intact, No edema.   Abdomen:  Soft, nondistended   Extremities: No cyanosis or clubbing.   Musculoskeletal: Moves all extremities.     Skin: Warm, dry, intact.    Neurologic: Mood and affect are appropriate.  Alert and oriented to person, place, time, and situation.     ROS: 10 point ROS neg other than the symptoms noted above in the HPI.         Medical History  Surgical History Family History Social History   Past Medical History:   Diagnosis Date     Abdominal aortic aneurysm (AAA) without rupture 2/24/2020 2/2020 underwent aortobiiliac endograft repair of aortic aneurysm and  placement of bridging stents bilaterally and placement of bilateral iliac  branch devices for repair of bilateral iliac artery aneurysms       Aneurysm of iliac artery 2/4/2020     Aortic stenosis, severe 1/31/2023     Coronary artery disease      Heart disease      Heart failure with reduced ejection fraction, NYHA class II (H)      Ischemic cardiomyopathy 12/31/2019     Paroxysmal atrial fibrillation (H)     Device interrogation, started on AC by Dr. Harrell 1/22/2020 [id number] scire of (>75, CAD, CHF)      Pure hypercholesterolemia     Created by Conversion      PVD (peripheral vascular disease) 2/24/2020 2/2020 underwent  aortobiiliac endograft repair of aortic aneurysm and  placement of bridging stents bilaterally and placement of bilateral iliac  branch devices for repair of bilateral iliac artery aneurysms       Severe aortic stenosis 10/10/2022     Sinus node dysfunction (H) 11/22/2022     VT (ventricular tachycardia) (H) 2004     Past Surgical History:   Procedure Laterality Date     ABDOMEN SURGERY       BYPASS GRAFT ARTERY CORONARY       CV ANGIOGRAM CORONARY GRAFT N/A 9/19/2022    Procedure: Coronary Angiogram Graft;  Surgeon: Tyrone Ordoñez MD;  Location: [facility]     CV CORONARY LITHOTRIPSY PCI N/A 12/19/2022    Procedure: Percutaneous Coronary Intervention - Lithotripsy;  Surgeon: Tyrone Ordoñez MD;  Location: [facility]     CV LEFT HEART CATH N/A 9/19/2022    Procedure: Left Heart Catheterization;  Surgeon: Tyrone Ordñoez MD;  Location: [facility]     CV PCI ANGIOPLASTY N/A 12/19/2022    Procedure: Percutaneous Transluminal Angioplasty;  Surgeon: Tyrone Ordoñez MD;  Location: [facility]     CV RIGHT HEART CATH MEASUREMENTS RECORDED N/A 9/19/2022    Procedure: Right Heart Catheterization;  Surgeon: Tyrone Ordoñez MD;  Location: [facility]     EYE SURGERY       INSERT / REPLACE / REMOVE PACEMAKER       OR TRANSCATHETER AORTIC VALVE REPLACEMENT, SUBCLAVIAN PERCUTANEOUS APPROACH (STANDBY) N/A 1/31/2023    Procedure: OR TRANSCATHETER AORTIC VALVE REPLACEMENT, SUBCLAVIAN PERCUTANEOUS APPROACH (STANDBY);  Surgeon: Shanae Rich MD;  Location: [facility]     OTHER SURGICAL HISTORY      icd     TRANSCATHETER AORTIC VALVE REPLACEMENT - SUBCLAVIAN APPROACH N/A 1/31/2023    Procedure: Transcatheter Aortic Valve Replacement - Subclavian Approach;  Surgeon: Tyrone Ordoñez MD;  Location: [facility]     ZZC CABG, VEIN, SINGLE      Description: CABG (CABG);  Recorded: 10/03/2012;  Comments: LIMA to LAD, SVG to OM2, SVG to RCA     Family  History   Problem Relation Age of Onset     Cancer Mother      Heart Disease Father         Social History     Socioeconomic History     Marital status:      Spouse name: Not on file     Number of children: Not on file     Years of education: Not on file     Highest education level: Not on file   Occupational History     Not on file   Tobacco Use     Smoking status: Every Day     Current packs/day: 0.50     Average packs/day: 0.5 packs/day for 30.0 years (15.0 ttl pk-yrs)     Types: Cigarettes     Smokeless tobacco: Never   Vaping Use     Vaping status: Never Used   Substance and Sexual Activity     Alcohol use: Yes     Alcohol/week: 1.0 standard drink of alcohol     Comment: Glass of wine daily.     Drug use: No     Sexual activity: Not Currently   Other Topics Concern     Parent/sibling w/ CABG, MI or angioplasty before 65F 55M? Not Asked   Social History Narrative     Not on file     Social Drivers of Health     Financial Resource Strain: Not on file   Food Insecurity: Not on file   Transportation Needs: Not on file   Physical Activity: Not on file   Stress: Not on file   Social Connections: Not on file   Interpersonal Safety: Low Risk  (3/17/2025)    Interpersonal Safety      Do you feel physically and emotionally safe where you currently live?: Yes      Within the past 12 months, have you been hit, slapped, kicked or otherwise physically hurt by someone?: No      Within the past 12 months, have you been humiliated or emotionally abused in other ways by your partner or ex-partner?: No   Housing Stability: Not on file           Medications  Allergies   Current Outpatient Medications   Medication Sig Dispense Refill     acetaminophen (TYLENOL) 325 MG tablet Take 650 mg by mouth every 6 hours as needed for pain       amiodarone (PACERONE) 200 MG tablet Take 1 tablet (200 mg) by mouth daily. 180 tablet 3     apixaban ANTICOAGULANT (ELIQUIS) 5 MG tablet Take 1 tablet (5 mg) by mouth 2 times daily. 180 tablet 3  "    aspirin 81 MG EC tablet Take 81 mg by mouth daily       empagliflozin (JARDIANCE) 10 MG TABS tablet Take 1 tablet (10 mg) by mouth daily. 90 tablet 3     furosemide (LASIX) 20 MG tablet Take 1 tablet (20 mg) by mouth Every Mon, Wed, Fri Morning. 30 tablet 3     gabapentin (NEURONTIN) 300 MG capsule Take 900 mg by mouth 2 times daily        lisinopril (ZESTRIL) 2.5 MG tablet TAKE 1 TABLET (2.5MG) BY MOUTH DAILY 90 tablet 3     rosuvastatin (CRESTOR) 20 MG tablet Take 1 tablet (20 mg) by mouth daily. 90 tablet 2     tamsulosin (FLOMAX) 0.4 MG capsule Take 0.4 mg by mouth every evening         Allergies   Allergen Reactions     Atorvastatin Diarrhea     Carvedilol Other (See Comments) and GI Disturbance     Upset stomach; GI upset       Chloride Nausea and GI Disturbance          Lab Results    Chemistry/lipid CBC Cardiac Enzymes/BNP/TSH/INR   Recent Labs   Lab Test 03/07/25  1009   CHOL 144   HDL 55   LDL 76   TRIG 63     Recent Labs   Lab Test 03/07/25  1009 03/10/23  0910 09/19/22  0803   LDL 76 83 129     Recent Labs   Lab Test 03/21/25  1316      POTASSIUM 4.8   CHLORIDE 106   CO2 25   *   BUN 23.2*   CR 1.82*   GFRESTIMATED 36*   EMILIO 9.0     Recent Labs   Lab Test 03/21/25  1316 03/20/25  1139 03/07/25  1009   CR 1.82* 1.62* 1.56*     CMP, TSH/T4 today, results pending   Recent Labs   Lab Test 11/01/24  1250 02/14/24  1550   WBC  --  8.4   HGB 14.7 13.5   HCT  --  42.4   MCV  --  90   PLT  --  287     Recent Labs   Lab Test 11/01/24  1250 02/14/24  1550 01/30/24  1054   HGB 14.7 13.5 16.0    No results for input(s): \"TROPONINI\" in the last 91192 hours.  Recent Labs   Lab Test 03/07/25  1009 08/05/24  1058 07/25/24  1216   NTBNP 1,523 1,252 2,034*     Recent Labs   Lab Test 09/01/22  1327   TSH 0.46     Recent Labs   Lab Test 02/17/23  1917 01/30/23  0922   INR 1.18* 1.11      The longitudinal plan of care for the diagnosis(es)/condition(s) as documented were addressed during this visit. Due to " the added complexity in care, I will continue to support Daniel in the subsequent management and with ongoing continuity of care.                                            Thank you for allowing me to participate in the care of your patient.      Sincerely,     CIALIN Evans [facility]  cc:   Christel Saxena MD  [address]  [place] [address]

## 2025-03-24 NOTE — TELEPHONE ENCOUNTER
----- Message -----  From: Josephine Lopez PA-C  Sent: 3/24/2025   3:00 PM CDT  To: [facility] ED Valve [facility]    Given his hypokalemia (K 5.5) was an isolated episode, I would lean more towards lab error than being real.     I would stop the lasix, as this is not helping, and as per Airam's note today, he seems euvolemic on exam, so probably dry if anything.      He seems to be very fragile so less is more for him I think     We should recheck his BMP in 1-2 weeks    Josephine Tony  ----- Message -----  From: Jackie Baugh, CYNDIE  Sent: 3/24/2025   1:57 PM CDT  To: Josephine Lopez PA-C    Hey,   MY saw this patient last Thursday and wanted to trial him on M,W,F Lasix. His potassium on Thursday was 5.5 so MY instructed him to take Fridays dose but come in for labs later in the day. His K was better but his Cr and GFR were both worse on Friday. MY said hold lasix and recheck today (Monday). Recheck today only looks a little better. Wondering what your recs are for [place] lasix from here.     Thanks  Jackie

## 2025-03-24 NOTE — PATIENT INSTRUCTIONS
Daniel Alamo,    It was a pleasure to see you today at the [facility].     My recommendations after this visit include:    Try to eat and drink more--recommend Boost, or similar (low sodium)    No medication changes recommended today    Device functioning well--heart rhythm stable  Follow up with Dr. Saxena in 3 months    Follow up with Dr. Vargas 4/25/2025    Airam Malin, [facility], Electrophysiology  [phone]  EP nurses [phone]

## 2025-03-24 NOTE — PROGRESS NOTES
HEART CARE ELECTROPHYSIOLOGY NOTE      [facility]  [phone]      Assessment/Recommendations   Assessment/Plan:  1.  Paroxysmal Atrial Fibrillation: Infrequent episodes of A-fib, none since starting amiodarone.  -- Continue amiodarone 200 mg daily  -- Monitoring labs drawn today, to be done every 6 months     He was reassured that atrial fibrillation is not life-threatening, but carries an increased risk for stroke.  He has a [id number] score of 4 for age >75, HFrEF, CAD .    -- Continue Eliquis 5 mg twice daily for stroke prophylaxis     2.  Ventricular tachycardia status post ICD implant:  The ICD was interrogated and is functioning appropriately.  The battery shows estimated longevity of 7.5 years.  Atrial and ventricular lead sensing, impedance, and pacing thresholds are stable and within normal limits.  Known FFRW over sensing.  2 sets of VT successfully terminated by ATP on 1/30/2025 and 2/1/2025.  Reviewed VT/VF zones.  -- Continue amiodarone  -- Routine ICD follow-up  -- Consideration for upgrade to CRT-D,  ms, however unclear benefit with right bundle branch block pattern       3.  Heart failure with reduced ejection fraction: Ongoing fatigue, weakness, dizziness, dyspnea on exertion likely factorial as he appears compensated with no significant fluid retention on exam.  Exhibiting failure to thrive.  Encouraged to increase his p.o. intake utilizing something like boost, being careful to limit sodium.    Follow up with Dr. Vargas 4/25/2025  Follow-up with Dr. Saxena in 3 months       History of Present Illness/Subjective    HPI: Daniel Alamo is a 85 year old male who comes in today for EP device and arrhythmia follow-up.  He has a history of sinus node dysfunction paroxysmal atrial fibrillation, PVCs, ventricular tachycardia (terminated by ATP 1/30/2024, 2/1/2024), s/p dual-chamber ICD (2004 and generator replacement 2018), CAD (s/p CABG 1990s, PCI to LAD and diagonal  12/2022), heart failure with reduced ejection fraction due to ischemic cardiomyopathy (LVEF 30 to 35% by TTE 5/28/2024), aortic stenosis s/p TAVR 1/31/2023, ascending aortic aneurysm, orthostatic hypotension, hyperlipidemia, iliac artery aneurysm s/p EVAR and endoprosthesis 2020 with persistent leak, chronic fatigue.       Arrhythmia history  Dx/date: Paroxysmal atrial fibrillation 2018  Sx: Symptomatic with A-fib  [id number] score: 4 for age >75, HFrEF, CAD   Oral anticoagulation: Eliquis 5 mg twice daily  Antiarrhythmic medications, AV juvenal blocking agents: Sotalol (discontinued 10/8/2024), amiodarone (10/10/2024-present)  Procedures  DCCV: N/A  Ablation: N/A     He states that he continues to be tired, weak, dizzy and gets short of breath with activity.  He admits to not being active, not drinking fluids, not hardly eating.  He was seen in valve clinic 3/20/2025, discussed MONICO and PCI, decided on medical management.  He denies chest discomfort, palpitations, abdominal fullness/bloating or peripheral edema, shortness of breath at rest, paroxysmal nocturnal dyspnea, orthopnea, pre-syncope, or syncope.     Cardiographics (EKG, device interrogation personally reviewed):  EKG done 3/20/2025 shows atrial paced with ventricular conduction at 57 bpm, IVCD,  ms, QT/QTc 510/496 ms  EKG done 2/14/2024 shows atrial paced with ventricular conduction at 70 bpm, RBBB, PVCs,  ms, QT/QTc 482/520 ms.     ICD Interrogation, in clinic ([facility]):  Pacing mode: DDDR 55 to 120 ppm.  Known FFRW oversensing, atrial lead noise that is only reproducible with arm movement, atrial undersensing/overpacing   Presenting rhythm: AS/VS @ 64-75 bpm (w/atrial lead noise) .   Underlying rhythm: SB/SR @ 50-60, w/TWOS   A-paced: 39%.  V-paced 35%.  Battery: estimated longevity 7.5 years.  Atrial and Ventricular leads: Stable with good sensing, impedance, and pacing thresholds, though FFR W oversensing.  Atrial Arrhythmias: Since  "1/17/2024- 373 mode switches logged, 3 available EGMs for review suggest TWOS. Episode of AF Oct. 2024, lasted ~2.5 days in duration.    AF [place]: < 1%  Ventricular Arrhythmias: Since 1/17/2024- 9 VHRs logged; 5 EGMs, 3 EGM suggests 17 to 30 beats of NSVT @ ~160 bpm, 1 EGM suggest AF w/RVR susan 13 seconds. One VT episode on 1/30/24, duration 8 seconds, 200 bpm, terminated with ATPx1 with immediate conversion,   Histograms: primarily 50-90 bpm      ARAVIND done 3/17/2025:  The left ventricle is severely dilated.  The visual ejection fraction is 30-35%.  Mild to moderately reduced RV function.  The PISA assessment indicates moderate severity mitral regurgitation, while  visual estimation and pulmonary vein flow reversal suggest severe  regurgitation  There is mild to moderate (1-2+) tricuspid regurgitation.  Well seated aortic prosthetic valve (29 mm CHANTAL 3 valve)     NM stress test done 9/14/2023:    Lexiscan stress ECG negative for ischemia.    The nuclear stress test is abnormal.  There is a large area of transmural infarction involving the inferior, inferolateral and lateral walls along with a small area of nontransmural infarction involving the mid septal wall.  No evidence of ischemia.    The left ventricular ejection fraction at stress is 33% with akinesis of the inferior and inferolateral wall..    A prior study was conducted on 8/25/2014.  Images not available for review.    I have reviewed and updated the patient's Past Medical History, Social History, Family History and Medication List.  Outside records personally reviewed.     Physical Examination  Review of Systems   Vitals: BP 92/52 (BP Location: Right arm, Patient Position: Sitting, Cuff Size: Adult Regular)   Pulse 60   Resp 18   Ht 1.778 m (5' 10\")   Wt 64 kg (141 lb)   BMI 20.23 kg/m    BMI= Body mass index is 20.23 kg/m .  Wt Readings from Last 3 Encounters:   03/24/25 64 kg (141 lb)   03/20/25 65.3 kg (144 lb)   03/07/25 64.3 kg (141 lb 12.8 oz) "       General Appearance:   Alert, well-appearing and in no acute distress.   HEENT: Atraumatic, normocephalic.  No scleral icterus, normal conjunctivae, EOMs intact, PERRL.  Mucous membranes pink and moist.     Chest/Lungs:   Chest symmetric, spine straight.  Respirations unlabored.  Lungs are clear to auscultation.  Left prepectoral ICD site with no sign of infection or tissue thinning.    Cardiovascular:   Regular rate and rhythm.  Normal first and second heart sounds with no murmurs, rubs, or gallops; radial pulses are intact, No edema.   Abdomen:  Soft, nondistended   Extremities: No cyanosis or clubbing.   Musculoskeletal: Moves all extremities.     Skin: Warm, dry, intact.    Neurologic: Mood and affect are appropriate.  Alert and oriented to person, place, time, and situation.     ROS: 10 point ROS neg other than the symptoms noted above in the HPI.         Medical History  Surgical History Family History Social History   Past Medical History:   Diagnosis Date    Abdominal aortic aneurysm (AAA) without rupture 2/24/2020 2/2020 underwent aortobiiliac endograft repair of aortic aneurysm and  placement of bridging stents bilaterally and placement of bilateral iliac  branch devices for repair of bilateral iliac artery aneurysms      Aneurysm of iliac artery 2/4/2020    Aortic stenosis, severe 1/31/2023    Coronary artery disease     Heart disease     Heart failure with reduced ejection fraction, NYHA class II (H)     Ischemic cardiomyopathy 12/31/2019    Paroxysmal atrial fibrillation (H)     Device interrogation, started on AC by Dr. Harrell 1/22/2020 [id number] scire of (>75, CAD, CHF)     Pure hypercholesterolemia     Created by Conversion     PVD (peripheral vascular disease) 2/24/2020 2/2020 underwent aortobiiliac endograft repair of aortic aneurysm and  placement of bridging stents bilaterally and placement of bilateral iliac  branch devices for repair of bilateral iliac artery aneurysms      Severe  aortic stenosis 10/10/2022    Sinus node dysfunction (H) 11/22/2022    VT (ventricular tachycardia) (H) 2004     Past Surgical History:   Procedure Laterality Date    ABDOMEN SURGERY      BYPASS GRAFT ARTERY CORONARY      CV ANGIOGRAM CORONARY GRAFT N/A 9/19/2022    Procedure: Coronary Angiogram Graft;  Surgeon: Tyrone Ordoñez MD;  Location: [facility] CV    CV CORONARY LITHOTRIPSY PCI N/A 12/19/2022    Procedure: Percutaneous Coronary Intervention - Lithotripsy;  Surgeon: Tyrone Ordoñez MD;  Location: [facility] CV    CV LEFT HEART CATH N/A 9/19/2022    Procedure: Left Heart Catheterization;  Surgeon: Tyrone Ordoñez MD;  Location: [facility]     CV PCI ANGIOPLASTY N/A 12/19/2022    Procedure: Percutaneous Transluminal Angioplasty;  Surgeon: Tyrone Ordoñez MD;  Location: [facility]    CV RIGHT HEART CATH MEASUREMENTS RECORDED N/A 9/19/2022    Procedure: Right Heart Catheterization;  Surgeon: Tyrone Ordoñez MD;  Location: [facility]    EYE SURGERY      INSERT / REPLACE / REMOVE PACEMAKER      OR TRANSCATHETER AORTIC VALVE REPLACEMENT, SUBCLAVIAN PERCUTANEOUS APPROACH (STANDBY) N/A 1/31/2023    Procedure: OR TRANSCATHETER AORTIC VALVE REPLACEMENT, SUBCLAVIAN PERCUTANEOUS APPROACH (STANDBY);  Surgeon: Shanae Rich MD;  Location: [facility]    OTHER SURGICAL HISTORY      icd    TRANSCATHETER AORTIC VALVE REPLACEMENT - SUBCLAVIAN APPROACH N/A 1/31/2023    Procedure: Transcatheter Aortic Valve Replacement - Subclavian Approach;  Surgeon: Tyrone Ordoñez MD;  Location: [facility]    ZZ CABG, VEIN, SINGLE      Description: CABG (CABG);  Recorded: 10/03/2012;  Comments: LIMA to LAD, SVG to OM2, SVG to RCA     Family History   Problem Relation Age of Onset    Cancer Mother     Heart Disease Father         Social History     Socioeconomic History    Marital status:      Spouse name: Not on file    Number of children: Not on  file    Years of education: Not on file    Highest education level: Not on file   Occupational History    Not on file   Tobacco Use    Smoking status: Every Day     Current packs/day: 0.50     Average packs/day: 0.5 packs/day for 30.0 years (15.0 ttl pk-yrs)     Types: Cigarettes    Smokeless tobacco: Never   Vaping Use    Vaping status: Never Used   Substance and Sexual Activity    Alcohol use: Yes     Alcohol/week: 1.0 standard drink of alcohol     Comment: Glass of wine daily.    Drug use: No    Sexual activity: Not Currently   Other Topics Concern    Parent/sibling w/ CABG, MI or angioplasty before 65F 55M? Not Asked   Social History Narrative    Not on file     Social Drivers of Health     Financial Resource Strain: Not on file   Food Insecurity: Not on file   Transportation Needs: Not on file   Physical Activity: Not on file   Stress: Not on file   Social Connections: Not on file   Interpersonal Safety: Low Risk  (3/17/2025)    Interpersonal Safety     Do you feel physically and emotionally safe where you currently live?: Yes     Within the past 12 months, have you been hit, slapped, kicked or otherwise physically hurt by someone?: No     Within the past 12 months, have you been humiliated or emotionally abused in other ways by your partner or ex-partner?: No   Housing Stability: Not on file           Medications  Allergies   Current Outpatient Medications   Medication Sig Dispense Refill    acetaminophen (TYLENOL) 325 MG tablet Take 650 mg by mouth every 6 hours as needed for pain      amiodarone (PACERONE) 200 MG tablet Take 1 tablet (200 mg) by mouth daily. 180 tablet 3    apixaban ANTICOAGULANT (ELIQUIS) 5 MG tablet Take 1 tablet (5 mg) by mouth 2 times daily. 180 tablet 3    aspirin 81 MG EC tablet Take 81 mg by mouth daily      empagliflozin (JARDIANCE) 10 MG TABS tablet Take 1 tablet (10 mg) by mouth daily. 90 tablet 3    furosemide (LASIX) 20 MG tablet Take 1 tablet (20 mg) by mouth Every Mon, Wed, Fri  "Morning. 30 tablet 3    gabapentin (NEURONTIN) 300 MG capsule Take 900 mg by mouth 2 times daily       lisinopril (ZESTRIL) 2.5 MG tablet TAKE 1 TABLET (2.5MG) BY MOUTH DAILY 90 tablet 3    rosuvastatin (CRESTOR) 20 MG tablet Take 1 tablet (20 mg) by mouth daily. 90 tablet 2    tamsulosin (FLOMAX) 0.4 MG capsule Take 0.4 mg by mouth every evening         Allergies   Allergen Reactions    Atorvastatin Diarrhea    Carvedilol Other (See Comments) and GI Disturbance     Upset stomach; GI upset      Chloride Nausea and GI Disturbance          Lab Results    Chemistry/lipid CBC Cardiac Enzymes/BNP/TSH/INR   Recent Labs   Lab Test 03/07/25  1009   CHOL 144   HDL 55   LDL 76   TRIG 63     Recent Labs   Lab Test 03/07/25  1009 03/10/23  0910 09/19/22  0803   LDL 76 83 129     Recent Labs   Lab Test 03/21/25  1316      POTASSIUM 4.8   CHLORIDE 106   CO2 25   *   BUN 23.2*   CR 1.82*   GFRESTIMATED 36*   EMILIO 9.0     Recent Labs   Lab Test 03/21/25  1316 03/20/25  1139 03/07/25  1009   CR 1.82* 1.62* 1.56*     CMP, TSH/T4 today, results pending   Recent Labs   Lab Test 11/01/24  1250 02/14/24  1550   WBC  --  8.4   HGB 14.7 13.5   HCT  --  42.4   MCV  --  90   PLT  --  287     Recent Labs   Lab Test 11/01/24  1250 02/14/24  1550 01/30/24  1054   HGB 14.7 13.5 16.0    No results for input(s): \"TROPONINI\" in the last 23722 hours.  Recent Labs   Lab Test 03/07/25  1009 08/05/24  1058 07/25/24  1216   NTBNP 1,523 1,252 2,034*     Recent Labs   Lab Test 09/01/22  1327   TSH 0.46     Recent Labs   Lab Test 02/17/23  1917 01/30/23  0922   INR 1.18* 1.11      The longitudinal plan of care for the diagnosis(es)/condition(s) as documented were addressed during this visit. Due to the added complexity in care, I will continue to support Daniel in the subsequent management and with ongoing continuity of care.                                        "

## 2025-03-24 NOTE — TELEPHONE ENCOUNTER
Valve Clinic RN Phone Call:  Call made on 3/24/2025 at 3:28 PM by Jackie Baugh RN    Reason for call: review lab results from this morning    Summary of conversation: Called Daniel to let him know that his kidney function looks better than it did on Friday but it still is not back to baseline. Josephine Peters PA-C recommended for him to discontinue the lasix completely and repeat BMP next week. Daniel was agreeable to this and will have his BMP drawn at the walk in clinic at [place] next week.     Additional comments/Actions: none.     Instructions given to patient: Discontinue Lasix, BMP recheck next week. Also notify us if he has any significant evidence of fluid retention like increased SOB, swelling in legs or abdomen.     Jackie Baugh RN on 3/24/2025 at 3:33 PM

## 2025-03-25 LAB
MDC_IDC_LEAD_CONNECTION_STATUS: NORMAL
MDC_IDC_LEAD_CONNECTION_STATUS: NORMAL
MDC_IDC_LEAD_IMPLANT_DT: NORMAL
MDC_IDC_LEAD_IMPLANT_DT: NORMAL
MDC_IDC_LEAD_LOCATION: NORMAL
MDC_IDC_LEAD_LOCATION: NORMAL
MDC_IDC_LEAD_LOCATION_DETAIL_1: NORMAL
MDC_IDC_LEAD_LOCATION_DETAIL_1: NORMAL
MDC_IDC_LEAD_MFG: NORMAL
MDC_IDC_LEAD_MFG: NORMAL
MDC_IDC_LEAD_MODEL: NORMAL
MDC_IDC_LEAD_MODEL: NORMAL
MDC_IDC_LEAD_POLARITY_TYPE: NORMAL
MDC_IDC_LEAD_POLARITY_TYPE: NORMAL
MDC_IDC_LEAD_SERIAL: NORMAL
MDC_IDC_LEAD_SERIAL: NORMAL
MDC_IDC_LEAD_SPECIAL_FUNCTION: NORMAL
MDC_IDC_MSMT_BATTERY_DTM: NORMAL
MDC_IDC_MSMT_BATTERY_REMAINING_LONGEVITY: 90 MO
MDC_IDC_MSMT_BATTERY_REMAINING_PERCENTAGE: 100 %
MDC_IDC_MSMT_BATTERY_STATUS: NORMAL
MDC_IDC_MSMT_CAP_CHARGE_DTM: NORMAL
MDC_IDC_MSMT_CAP_CHARGE_TIME: 10.7 S
MDC_IDC_MSMT_CAP_CHARGE_TYPE: NORMAL
MDC_IDC_MSMT_LEADCHNL_RA_IMPEDANCE_VALUE: 781 OHM
MDC_IDC_MSMT_LEADCHNL_RA_IMPEDANCE_VALUE: 781 OHM
MDC_IDC_MSMT_LEADCHNL_RA_LEAD_CHANNEL_STATUS: NORMAL
MDC_IDC_MSMT_LEADCHNL_RA_PACING_THRESHOLD_AMPLITUDE: 1.4 V
MDC_IDC_MSMT_LEADCHNL_RA_PACING_THRESHOLD_AMPLITUDE: 1.5 V
MDC_IDC_MSMT_LEADCHNL_RA_PACING_THRESHOLD_PULSEWIDTH: 0.7 MS
MDC_IDC_MSMT_LEADCHNL_RA_PACING_THRESHOLD_PULSEWIDTH: 0.7 MS
MDC_IDC_MSMT_LEADCHNL_RA_SENSING_INTR_AMPL: 0.4 MV
MDC_IDC_MSMT_LEADCHNL_RV_IMPEDANCE_VALUE: 565 OHM
MDC_IDC_MSMT_LEADCHNL_RV_IMPEDANCE_VALUE: 565 OHM
MDC_IDC_MSMT_LEADCHNL_RV_PACING_THRESHOLD_AMPLITUDE: 1 V
MDC_IDC_MSMT_LEADCHNL_RV_PACING_THRESHOLD_AMPLITUDE: 1 V
MDC_IDC_MSMT_LEADCHNL_RV_PACING_THRESHOLD_PULSEWIDTH: 0.4 MS
MDC_IDC_MSMT_LEADCHNL_RV_PACING_THRESHOLD_PULSEWIDTH: 0.4 MS
MDC_IDC_MSMT_LEADCHNL_RV_SENSING_INTR_AMPL: 20.5 MV
MDC_IDC_PG_IMPLANT_DTM: NORMAL
MDC_IDC_PG_MFG: NORMAL
MDC_IDC_PG_MODEL: NORMAL
MDC_IDC_PG_SERIAL: NORMAL
MDC_IDC_PG_TYPE: NORMAL
MDC_IDC_SESS_CLINIC_NAME: NORMAL
MDC_IDC_SESS_DTM: NORMAL
MDC_IDC_SESS_TYPE: NORMAL
MDC_IDC_SET_BRADY_AT_MODE_SWITCH_MODE: NORMAL
MDC_IDC_SET_BRADY_AT_MODE_SWITCH_RATE: 170 {BEATS}/MIN
MDC_IDC_SET_BRADY_LOWRATE: 55 {BEATS}/MIN
MDC_IDC_SET_BRADY_MAX_SENSOR_RATE: 130 {BEATS}/MIN
MDC_IDC_SET_BRADY_MAX_TRACKING_RATE: 120 {BEATS}/MIN
MDC_IDC_SET_BRADY_MODE: NORMAL
MDC_IDC_SET_BRADY_PAV_DELAY_HIGH: 200 MS
MDC_IDC_SET_BRADY_PAV_DELAY_LOW: 300 MS
MDC_IDC_SET_BRADY_SAV_DELAY_HIGH: 165 MS
MDC_IDC_SET_BRADY_SAV_DELAY_LOW: 250 MS
MDC_IDC_SET_LEADCHNL_RA_PACING_AMPLITUDE: 2.6 V
MDC_IDC_SET_LEADCHNL_RA_PACING_POLARITY: NORMAL
MDC_IDC_SET_LEADCHNL_RA_PACING_PULSEWIDTH: 0.7 MS
MDC_IDC_SET_LEADCHNL_RA_SENSING_ADAPTATION_MODE: NORMAL
MDC_IDC_SET_LEADCHNL_RA_SENSING_POLARITY: NORMAL
MDC_IDC_SET_LEADCHNL_RA_SENSING_SENSITIVITY: 0.2 MV
MDC_IDC_SET_LEADCHNL_RV_PACING_AMPLITUDE: 1.5 V
MDC_IDC_SET_LEADCHNL_RV_PACING_POLARITY: NORMAL
MDC_IDC_SET_LEADCHNL_RV_PACING_PULSEWIDTH: 0.4 MS
MDC_IDC_SET_LEADCHNL_RV_SENSING_ADAPTATION_MODE: NORMAL
MDC_IDC_SET_LEADCHNL_RV_SENSING_POLARITY: NORMAL
MDC_IDC_SET_LEADCHNL_RV_SENSING_SENSITIVITY: 0.6 MV
MDC_IDC_SET_ZONE_DETECTION_INTERVAL: 261 MS
MDC_IDC_SET_ZONE_DETECTION_INTERVAL: 333 MS
MDC_IDC_SET_ZONE_DETECTION_INTERVAL: 400 MS
MDC_IDC_SET_ZONE_STATUS: NORMAL
MDC_IDC_SET_ZONE_TYPE: NORMAL
MDC_IDC_SET_ZONE_VENDOR_TYPE: NORMAL
MDC_IDC_STAT_AT_BURDEN_PERCENT: 2 %
MDC_IDC_STAT_AT_DTM_END: NORMAL
MDC_IDC_STAT_AT_DTM_START: NORMAL
MDC_IDC_STAT_BRADY_DTM_END: NORMAL
MDC_IDC_STAT_BRADY_DTM_START: NORMAL
MDC_IDC_STAT_BRADY_RA_PERCENT_PACED: 29 %
MDC_IDC_STAT_BRADY_RV_PERCENT_PACED: 35 %
MDC_IDC_STAT_EPISODE_RECENT_COUNT: 0
MDC_IDC_STAT_EPISODE_RECENT_COUNT: 0
MDC_IDC_STAT_EPISODE_RECENT_COUNT: 2
MDC_IDC_STAT_EPISODE_RECENT_COUNT: 4
MDC_IDC_STAT_EPISODE_RECENT_COUNT_DTM_END: NORMAL
MDC_IDC_STAT_EPISODE_RECENT_COUNT_DTM_START: NORMAL
MDC_IDC_STAT_EPISODE_TYPE: NORMAL
MDC_IDC_STAT_EPISODE_VENDOR_TYPE: NORMAL
MDC_IDC_STAT_TACHYTHERAPY_ATP_DELIVERED_RECENT: 2
MDC_IDC_STAT_TACHYTHERAPY_ATP_DELIVERED_TOTAL: 3
MDC_IDC_STAT_TACHYTHERAPY_RECENT_DTM_END: NORMAL
MDC_IDC_STAT_TACHYTHERAPY_RECENT_DTM_START: NORMAL
MDC_IDC_STAT_TACHYTHERAPY_SHOCKS_ABORTED_RECENT: 0
MDC_IDC_STAT_TACHYTHERAPY_SHOCKS_ABORTED_TOTAL: 0
MDC_IDC_STAT_TACHYTHERAPY_SHOCKS_DELIVERED_RECENT: 0
MDC_IDC_STAT_TACHYTHERAPY_SHOCKS_DELIVERED_TOTAL: 0
MDC_IDC_STAT_TACHYTHERAPY_TOTAL_DTM_END: NORMAL
MDC_IDC_STAT_TACHYTHERAPY_TOTAL_DTM_START: NORMAL

## 2025-03-25 PROCEDURE — 93283 PRGRMG EVAL IMPLANTABLE DFB: CPT | Performed by: INTERNAL MEDICINE

## 2025-03-28 ENCOUNTER — LAB (OUTPATIENT)
Dept: LAB | Facility: HOSPITAL | Age: 85
End: 2025-03-28
Payer: MEDICARE

## 2025-03-28 DIAGNOSIS — I50.20 HFREF (HEART FAILURE WITH REDUCED EJECTION FRACTION) (H): ICD-10-CM

## 2025-03-28 LAB
ANION GAP SERPL CALCULATED.3IONS-SCNC: 9 MMOL/L (ref 7–15)
BUN SERPL-MCNC: 24.1 MG/DL (ref 8–23)
CALCIUM SERPL-MCNC: 8.8 MG/DL (ref 8.8–10.4)
CHLORIDE SERPL-SCNC: 109 MMOL/L (ref 98–107)
CREAT SERPL-MCNC: 1.69 MG/DL (ref 0.67–1.17)
EGFRCR SERPLBLD CKD-EPI 2021: 39 ML/MIN/1.73M2
GLUCOSE SERPL-MCNC: 88 MG/DL (ref 70–99)
HCO3 SERPL-SCNC: 24 MMOL/L (ref 22–29)
POTASSIUM SERPL-SCNC: 4.8 MMOL/L (ref 3.4–5.3)
SODIUM SERPL-SCNC: 142 MMOL/L (ref 135–145)

## 2025-03-28 PROCEDURE — 82435 ASSAY OF BLOOD CHLORIDE: CPT

## 2025-03-28 PROCEDURE — 80048 BASIC METABOLIC PNL TOTAL CA: CPT

## 2025-03-28 PROCEDURE — 36415 COLL VENOUS BLD VENIPUNCTURE: CPT

## 2025-03-31 DIAGNOSIS — I50.20 HEART FAILURE WITH REDUCED EJECTION FRACTION, NYHA CLASS II (H): Primary | ICD-10-CM

## 2025-04-08 ENCOUNTER — LAB (OUTPATIENT)
Dept: LAB | Facility: HOSPITAL | Age: 85
End: 2025-04-08
Payer: MEDICARE

## 2025-04-08 DIAGNOSIS — I48.0 PAROXYSMAL ATRIAL FIBRILLATION (H): ICD-10-CM

## 2025-04-08 LAB
ALBUMIN SERPL BCG-MCNC: 3.7 G/DL (ref 3.5–5.2)
ALP SERPL-CCNC: 87 U/L (ref 40–150)
ALT SERPL W P-5'-P-CCNC: 10 U/L (ref 0–70)
ANION GAP SERPL CALCULATED.3IONS-SCNC: 12 MMOL/L (ref 7–15)
AST SERPL W P-5'-P-CCNC: 11 U/L (ref 0–45)
BILIRUB SERPL-MCNC: 0.3 MG/DL
BUN SERPL-MCNC: 26.3 MG/DL (ref 8–23)
CALCIUM SERPL-MCNC: 8.9 MG/DL (ref 8.8–10.4)
CHLORIDE SERPL-SCNC: 108 MMOL/L (ref 98–107)
CREAT SERPL-MCNC: 1.67 MG/DL (ref 0.67–1.17)
EGFRCR SERPLBLD CKD-EPI 2021: 39 ML/MIN/1.73M2
GLUCOSE SERPL-MCNC: 99 MG/DL (ref 70–99)
HCO3 SERPL-SCNC: 21 MMOL/L (ref 22–29)
POTASSIUM SERPL-SCNC: 4.5 MMOL/L (ref 3.4–5.3)
PROT SERPL-MCNC: 6.4 G/DL (ref 6.4–8.3)
SODIUM SERPL-SCNC: 141 MMOL/L (ref 135–145)
TSH SERPL DL<=0.005 MIU/L-ACNC: 0.44 UIU/ML (ref 0.3–4.2)

## 2025-04-08 PROCEDURE — 84443 ASSAY THYROID STIM HORMONE: CPT

## 2025-04-08 PROCEDURE — 80053 COMPREHEN METABOLIC PANEL: CPT

## 2025-04-08 PROCEDURE — 36415 COLL VENOUS BLD VENIPUNCTURE: CPT

## 2025-04-22 ENCOUNTER — TELEPHONE (OUTPATIENT)
Dept: CARDIOLOGY | Facility: CLINIC | Age: 85
End: 2025-04-22
Payer: MEDICARE

## 2025-04-22 NOTE — TELEPHONE ENCOUNTER
Received below request for medication holds prior to epidural steroid injection at [facility] pain clinic.  Will route to [facility] for review. [place] [place]   Eliquis used for Afib- request for 3 day hold.     Asa: used for CAD/S/P TAVR - request for 6 day hold.     ---------------------------------------------------------------------        Dr. Vargas,  OK to hold above medications as outlined for upcoming epidural steroid injection? If so, I have the form to sign at my desk.  Thanks,  Dalton

## 2025-04-23 ENCOUNTER — HOSPITAL ENCOUNTER (OUTPATIENT)
Dept: CARDIOLOGY | Facility: HOSPITAL | Age: 85
Discharge: HOME | End: 2025-04-23
Attending: INTERNAL MEDICINE
Payer: MEDICARE

## 2025-04-23 DIAGNOSIS — Z95.2 S/P TAVR (TRANSCATHETER AORTIC VALVE REPLACEMENT): ICD-10-CM

## 2025-04-23 LAB — LVEF ECHO: NORMAL

## 2025-04-23 PROCEDURE — 93306 TTE W/DOPPLER COMPLETE: CPT

## 2025-04-23 PROCEDURE — 93306 TTE W/DOPPLER COMPLETE: CPT | Mod: 26 | Performed by: STUDENT IN AN ORGANIZED HEALTH CARE EDUCATION/TRAINING PROGRAM

## 2025-04-23 NOTE — TELEPHONE ENCOUNTER
Medication hold  (Newest Message First)  View All Conversations on this Encounter  Ama Vargas MD  You15 hours ago (4:55 PM)     LF  Agree with a hold, I can sign when I am in [place] tomorrow.  LF           --noted. Form signed and faxed to [facility] pain clinic. Copy sent to HIS to scan to chart. -[facility] [place]

## 2025-04-25 ENCOUNTER — TELEPHONE (OUTPATIENT)
Dept: CARDIOLOGY | Facility: CLINIC | Age: 85
End: 2025-04-25

## 2025-04-25 DIAGNOSIS — I48.0 PAROXYSMAL ATRIAL FIBRILLATION (H): ICD-10-CM

## 2025-04-25 DIAGNOSIS — I50.20 HEART FAILURE WITH REDUCED EJECTION FRACTION, NYHA CLASS II (H): Primary | ICD-10-CM

## 2025-04-25 NOTE — TELEPHONE ENCOUNTER
----- Message from GIO GOMES sent at 4/25/2025  9:54 AM CDT -----  A little help with this relatively unfortunate man.  Can we arrange for palliative care consult, also let his primary know that he request this, does not really want to talk to his primary care provider.  In addition, I would like him to come in next week for renal profile and can we get a blood pressure check the same time?  LF

## 2025-05-02 ENCOUNTER — LAB (OUTPATIENT)
Dept: LAB | Facility: HOSPITAL | Age: 85
End: 2025-05-02
Payer: MEDICARE

## 2025-05-02 DIAGNOSIS — I50.20 HEART FAILURE WITH REDUCED EJECTION FRACTION, NYHA CLASS II (H): ICD-10-CM

## 2025-05-02 LAB
ANION GAP SERPL CALCULATED.3IONS-SCNC: 10 MMOL/L (ref 7–15)
BUN SERPL-MCNC: 20.9 MG/DL (ref 8–23)
CALCIUM SERPL-MCNC: 8.5 MG/DL (ref 8.8–10.4)
CHLORIDE SERPL-SCNC: 110 MMOL/L (ref 98–107)
CREAT SERPL-MCNC: 1.66 MG/DL (ref 0.67–1.17)
EGFRCR SERPLBLD CKD-EPI 2021: 40 ML/MIN/1.73M2
GLUCOSE SERPL-MCNC: 99 MG/DL (ref 70–99)
HCO3 SERPL-SCNC: 22 MMOL/L (ref 22–29)
POTASSIUM SERPL-SCNC: 4.4 MMOL/L (ref 3.4–5.3)
SODIUM SERPL-SCNC: 142 MMOL/L (ref 135–145)

## 2025-05-02 PROCEDURE — 80048 BASIC METABOLIC PNL TOTAL CA: CPT

## 2025-05-02 PROCEDURE — 36415 COLL VENOUS BLD VENIPUNCTURE: CPT

## 2025-05-05 NOTE — TELEPHONE ENCOUNTER
Ama Vargas MD [place], Mallory J RN  Caller: Unspecified (5 days ago, 10:25 AM)  Yes, it should be 2.5 mg of Eliquis twice daily.  No palliative care if he is seeing his pain docs.  Lastly, would like a blood pressure and pulse check, maybe his pain medicine clinic can do this?  LF      =================================        Called Daniel and updated on above. He will take the Eliquis 2.5 mg twice daily. He has a large supply of the 5 mg tablets so he would prefer to just use this up and then call for the 2.5 mg strength. He will check BP at home and report back numbers. -[facility] [place]

## 2025-05-05 NOTE — TELEPHONE ENCOUNTER
BMP ordered. Pt had at outpatient [facility] lab- so no BP checked.     Called patient to discuss the palliative care consult. Pt was confused by this and said he just talked about a plan for pain pump with his pain team. He had a recent appt and states that this is in the works for him. He did not feel like discussion of goals of care were needed, just his chronic pain management, which is being addressed. Apologized for confusion .    He also clarified medications during this call : he is taking 1/2 of Lisinopril 2.5 mg tablet. He cut his Eliquis in half but is only taking it daily.. chart notes look like it should be at 2.5 mg twice daily. Will update LBF on this and also update on palliative message. He met with his pain team 5/1. -[facility] [place] [place]        Per 4/25 OV note with LBF:    nstructions    Mr Daniel Alamo,  I enjoyed visiting with you and your wife again today.  I am sorry to hear of the weakness.  Per our conversation call me with the dose of the LISINOPRIL at [phone] but regardless cut it in 1/2 a day.  Confirm the ELIQUIS is 5 mg and if so cut that in 1/2 for 2.5 mg a day.  We will check a blood test in 1 week and blood pressure in 1 week.  I will plan on seeing you 3 months.  Corey Vargas

## 2025-05-08 NOTE — TELEPHONE ENCOUNTER
From OV notes on 4/25/25:  (I48.0) Paroxysmal atrial fibrillation (H)  Comment: Sotalol was lowered from 80 to 40 and discontinued, atrial fibrillation burden did go up, he had no difference in his symptoms, went back up on sotalol and symptoms did not worsen.  Has subsequently been switched over to amiodarone.  Amiodarone blood work is stable, however his creatinine is now increased and therefore will lower Eliquis from 5 mg to 2.5 mg twice daily.    I-vent updated and closed.    Clovis Robles, RN, BSN  [facility]

## 2025-05-30 ENCOUNTER — TRANSFERRED RECORDS (OUTPATIENT)
Dept: HEALTH INFORMATION MANAGEMENT | Facility: CLINIC | Age: 85
End: 2025-05-30

## 2025-05-30 ENCOUNTER — LAB REQUISITION (OUTPATIENT)
Dept: LAB | Facility: CLINIC | Age: 85
End: 2025-05-30
Payer: MEDICARE

## 2025-05-30 DIAGNOSIS — I50.22 CHRONIC SYSTOLIC (CONGESTIVE) HEART FAILURE (H): ICD-10-CM

## 2025-05-30 DIAGNOSIS — E53.9 VITAMIN B DEFICIENCY, UNSPECIFIED: ICD-10-CM

## 2025-05-30 LAB
BASOPHILS # BLD AUTO: 0.1 10E3/UL (ref 0–0.2)
BASOPHILS NFR BLD AUTO: 1 %
EOSINOPHIL # BLD AUTO: 0.1 10E3/UL (ref 0–0.7)
EOSINOPHIL NFR BLD AUTO: 1 %
ERYTHROCYTE [DISTWIDTH] IN BLOOD BY AUTOMATED COUNT: 15.8 % (ref 10–15)
HCT VFR BLD AUTO: 25.7 % (ref 40–53)
HGB BLD-MCNC: 7.3 G/DL (ref 13.3–17.7)
IMM GRANULOCYTES # BLD: 0 10E3/UL
IMM GRANULOCYTES NFR BLD: 1 %
LYMPHOCYTES # BLD AUTO: 1.3 10E3/UL (ref 0.8–5.3)
LYMPHOCYTES NFR BLD AUTO: 15 %
MCH RBC QN AUTO: 21.1 PG (ref 26.5–33)
MCHC RBC AUTO-ENTMCNC: 28.4 G/DL (ref 31.5–36.5)
MCV RBC AUTO: 74 FL (ref 78–100)
MONOCYTES # BLD AUTO: 0.8 10E3/UL (ref 0–1.3)
MONOCYTES NFR BLD AUTO: 9 %
NEUTROPHILS # BLD AUTO: 6.4 10E3/UL (ref 1.6–8.3)
NEUTROPHILS NFR BLD AUTO: 73 %
NRBC # BLD AUTO: 0 10E3/UL
NRBC BLD AUTO-RTO: 0 /100
PLATELET # BLD AUTO: 396 10E3/UL (ref 150–450)
RBC # BLD AUTO: 3.46 10E6/UL (ref 4.4–5.9)
WBC # BLD AUTO: 8.7 10E3/UL (ref 4–11)

## 2025-05-30 PROCEDURE — 82728 ASSAY OF FERRITIN: CPT | Mod: ORL | Performed by: NURSE PRACTITIONER

## 2025-05-30 PROCEDURE — 84480 ASSAY TRIIODOTHYRONINE (T3): CPT | Mod: ORL | Performed by: NURSE PRACTITIONER

## 2025-05-30 PROCEDURE — 82607 VITAMIN B-12: CPT | Mod: ORL | Performed by: NURSE PRACTITIONER

## 2025-05-30 PROCEDURE — 80053 COMPREHEN METABOLIC PANEL: CPT | Mod: ORL | Performed by: NURSE PRACTITIONER

## 2025-05-30 PROCEDURE — 84439 ASSAY OF FREE THYROXINE: CPT | Mod: ORL | Performed by: NURSE PRACTITIONER

## 2025-05-30 PROCEDURE — 85025 COMPLETE CBC W/AUTO DIFF WBC: CPT | Mod: ORL | Performed by: NURSE PRACTITIONER

## 2025-05-30 PROCEDURE — 83550 IRON BINDING TEST: CPT | Mod: ORL | Performed by: NURSE PRACTITIONER

## 2025-05-30 PROCEDURE — 84443 ASSAY THYROID STIM HORMONE: CPT | Mod: ORL | Performed by: NURSE PRACTITIONER

## 2025-05-31 LAB
ALBUMIN SERPL BCG-MCNC: 3.8 G/DL (ref 3.5–5.2)
ALP SERPL-CCNC: 93 U/L (ref 40–150)
ALT SERPL W P-5'-P-CCNC: 14 U/L (ref 0–70)
ANION GAP SERPL CALCULATED.3IONS-SCNC: 12 MMOL/L (ref 7–15)
AST SERPL W P-5'-P-CCNC: 15 U/L (ref 0–45)
BILIRUB SERPL-MCNC: 0.3 MG/DL
BUN SERPL-MCNC: 29.4 MG/DL (ref 8–23)
CALCIUM SERPL-MCNC: 8.9 MG/DL (ref 8.8–10.4)
CHLORIDE SERPL-SCNC: 105 MMOL/L (ref 98–107)
CREAT SERPL-MCNC: 1.81 MG/DL (ref 0.67–1.17)
EGFRCR SERPLBLD CKD-EPI 2021: 36 ML/MIN/1.73M2
FERRITIN SERPL-MCNC: 20 NG/ML (ref 31–409)
GLUCOSE SERPL-MCNC: 105 MG/DL (ref 70–99)
HCO3 SERPL-SCNC: 20 MMOL/L (ref 22–29)
IRON BINDING CAPACITY (ROCHE): 438 UG/DL (ref 240–430)
IRON SATN MFR SERPL: 3 % (ref 15–46)
IRON SERPL-MCNC: 11 UG/DL (ref 61–157)
POTASSIUM SERPL-SCNC: 4.2 MMOL/L (ref 3.4–5.3)
PROT SERPL-MCNC: 6.6 G/DL (ref 6.4–8.3)
SODIUM SERPL-SCNC: 137 MMOL/L (ref 135–145)
T4 FREE SERPL-MCNC: 2.03 NG/DL (ref 0.9–1.7)
TSH SERPL DL<=0.005 MIU/L-ACNC: 0.24 UIU/ML (ref 0.3–4.2)
VIT B12 SERPL-MCNC: 254 PG/ML (ref 232–1245)

## 2025-06-02 ENCOUNTER — LAB REQUISITION (OUTPATIENT)
Dept: LAB | Facility: CLINIC | Age: 85
End: 2025-06-02
Payer: MEDICARE

## 2025-06-02 ENCOUNTER — MEDICAL CORRESPONDENCE (OUTPATIENT)
Dept: HEALTH INFORMATION MANAGEMENT | Facility: CLINIC | Age: 85
End: 2025-06-02
Payer: MEDICARE

## 2025-06-02 ENCOUNTER — TRANSCRIBE ORDERS (OUTPATIENT)
Dept: OTHER | Age: 85
End: 2025-06-02

## 2025-06-02 DIAGNOSIS — R26.9 GAIT DISTURBANCE: Primary | ICD-10-CM

## 2025-06-03 ENCOUNTER — MEDICAL CORRESPONDENCE (OUTPATIENT)
Dept: HEALTH INFORMATION MANAGEMENT | Facility: CLINIC | Age: 85
End: 2025-06-03
Payer: MEDICARE

## 2025-06-03 LAB — T3 SERPL-MCNC: 60 NG/DL (ref 85–202)

## 2025-06-04 DIAGNOSIS — D50.0 IRON DEFICIENCY ANEMIA SECONDARY TO BLOOD LOSS (CHRONIC): Primary | ICD-10-CM

## 2025-06-04 RX ORDER — MEPERIDINE HYDROCHLORIDE 25 MG/ML
25 INJECTION INTRAMUSCULAR; INTRAVENOUS; SUBCUTANEOUS
OUTPATIENT
Start: 2025-06-05

## 2025-06-04 RX ORDER — ALBUTEROL SULFATE 0.83 MG/ML
2.5 SOLUTION RESPIRATORY (INHALATION)
OUTPATIENT
Start: 2025-06-05

## 2025-06-04 RX ORDER — METHYLPREDNISOLONE SODIUM SUCCINATE 40 MG/ML
40 INJECTION INTRAMUSCULAR; INTRAVENOUS
Start: 2025-06-05

## 2025-06-04 RX ORDER — HEPARIN SODIUM (PORCINE) LOCK FLUSH IV SOLN 100 UNIT/ML 100 UNIT/ML
5 SOLUTION INTRAVENOUS
OUTPATIENT
Start: 2025-06-05

## 2025-06-04 RX ORDER — HEPARIN SODIUM,PORCINE 10 UNIT/ML
5-20 VIAL (ML) INTRAVENOUS DAILY PRN
OUTPATIENT
Start: 2025-06-05

## 2025-06-04 RX ORDER — DIPHENHYDRAMINE HYDROCHLORIDE 50 MG/ML
25 INJECTION, SOLUTION INTRAMUSCULAR; INTRAVENOUS
Start: 2025-06-05

## 2025-06-04 RX ORDER — ALBUTEROL SULFATE 90 UG/1
1-2 INHALANT RESPIRATORY (INHALATION)
Start: 2025-06-05

## 2025-06-04 RX ORDER — EPINEPHRINE 1 MG/ML
0.3 INJECTION, SOLUTION, CONCENTRATE INTRAVENOUS EVERY 5 MIN PRN
OUTPATIENT
Start: 2025-06-05

## 2025-06-04 RX ORDER — DIPHENHYDRAMINE HYDROCHLORIDE 50 MG/ML
50 INJECTION, SOLUTION INTRAMUSCULAR; INTRAVENOUS
Start: 2025-06-05

## 2025-06-11 ENCOUNTER — TRANSFERRED RECORDS (OUTPATIENT)
Dept: HEALTH INFORMATION MANAGEMENT | Facility: CLINIC | Age: 85
End: 2025-06-11
Payer: MEDICARE

## 2025-06-23 ENCOUNTER — INFUSION THERAPY VISIT (OUTPATIENT)
Dept: INFUSION THERAPY | Facility: HOSPITAL | Age: 85
End: 2025-06-23
Payer: MEDICARE

## 2025-06-23 VITALS
SYSTOLIC BLOOD PRESSURE: 91 MMHG | HEART RATE: 55 BPM | DIASTOLIC BLOOD PRESSURE: 47 MMHG | RESPIRATION RATE: 16 BRPM | TEMPERATURE: 97.8 F | OXYGEN SATURATION: 98 %

## 2025-06-23 DIAGNOSIS — D50.0 IRON DEFICIENCY ANEMIA SECONDARY TO BLOOD LOSS (CHRONIC): Primary | ICD-10-CM

## 2025-06-23 PROCEDURE — 250N000011 HC RX IP 250 OP 636: Performed by: NURSE PRACTITIONER

## 2025-06-23 PROCEDURE — 96365 THER/PROPH/DIAG IV INF INIT: CPT

## 2025-06-23 PROCEDURE — 258N000003 HC RX IP 258 OP 636: Performed by: NURSE PRACTITIONER

## 2025-06-23 RX ORDER — ALBUTEROL SULFATE 0.83 MG/ML
2.5 SOLUTION RESPIRATORY (INHALATION)
Status: CANCELLED | OUTPATIENT
Start: 2025-06-25

## 2025-06-23 RX ORDER — ALBUTEROL SULFATE 90 UG/1
1-2 INHALANT RESPIRATORY (INHALATION)
Status: DISCONTINUED | OUTPATIENT
Start: 2025-06-23 | End: 2025-06-23 | Stop reason: HOSPADM

## 2025-06-23 RX ORDER — ALBUTEROL SULFATE 90 UG/1
1-2 INHALANT RESPIRATORY (INHALATION)
Status: CANCELLED
Start: 2025-06-25

## 2025-06-23 RX ORDER — DIPHENHYDRAMINE HYDROCHLORIDE 50 MG/ML
50 INJECTION, SOLUTION INTRAMUSCULAR; INTRAVENOUS
Status: CANCELLED
Start: 2025-06-25

## 2025-06-23 RX ORDER — ALBUTEROL SULFATE 0.83 MG/ML
2.5 SOLUTION RESPIRATORY (INHALATION)
Status: DISCONTINUED | OUTPATIENT
Start: 2025-06-23 | End: 2025-06-23 | Stop reason: HOSPADM

## 2025-06-23 RX ORDER — DIPHENHYDRAMINE HYDROCHLORIDE 50 MG/ML
25 INJECTION, SOLUTION INTRAMUSCULAR; INTRAVENOUS
Status: DISCONTINUED | OUTPATIENT
Start: 2025-06-23 | End: 2025-06-23 | Stop reason: HOSPADM

## 2025-06-23 RX ORDER — EPINEPHRINE 1 MG/ML
0.3 INJECTION, SOLUTION INTRAMUSCULAR; SUBCUTANEOUS EVERY 5 MIN PRN
Status: DISCONTINUED | OUTPATIENT
Start: 2025-06-23 | End: 2025-06-23 | Stop reason: HOSPADM

## 2025-06-23 RX ORDER — HEPARIN SODIUM,PORCINE 10 UNIT/ML
5-20 VIAL (ML) INTRAVENOUS DAILY PRN
Status: CANCELLED | OUTPATIENT
Start: 2025-06-25

## 2025-06-23 RX ORDER — MEPERIDINE HYDROCHLORIDE 25 MG/ML
25 INJECTION INTRAMUSCULAR; INTRAVENOUS; SUBCUTANEOUS
Status: DISCONTINUED | OUTPATIENT
Start: 2025-06-23 | End: 2025-06-23 | Stop reason: HOSPADM

## 2025-06-23 RX ORDER — MEPERIDINE HYDROCHLORIDE 25 MG/ML
25 INJECTION INTRAMUSCULAR; INTRAVENOUS; SUBCUTANEOUS
Status: CANCELLED | OUTPATIENT
Start: 2025-06-25

## 2025-06-23 RX ORDER — DIPHENHYDRAMINE HYDROCHLORIDE 50 MG/ML
25 INJECTION, SOLUTION INTRAMUSCULAR; INTRAVENOUS
Status: CANCELLED
Start: 2025-06-25

## 2025-06-23 RX ORDER — METHYLPREDNISOLONE SODIUM SUCCINATE 40 MG/ML
40 INJECTION INTRAMUSCULAR; INTRAVENOUS
Status: DISCONTINUED | OUTPATIENT
Start: 2025-06-23 | End: 2025-06-23 | Stop reason: HOSPADM

## 2025-06-23 RX ORDER — DIPHENHYDRAMINE HYDROCHLORIDE 50 MG/ML
50 INJECTION, SOLUTION INTRAMUSCULAR; INTRAVENOUS
Status: DISCONTINUED | OUTPATIENT
Start: 2025-06-23 | End: 2025-06-23 | Stop reason: HOSPADM

## 2025-06-23 RX ORDER — EPINEPHRINE 1 MG/ML
0.3 INJECTION, SOLUTION INTRAMUSCULAR; SUBCUTANEOUS EVERY 5 MIN PRN
Status: CANCELLED | OUTPATIENT
Start: 2025-06-25

## 2025-06-23 RX ORDER — METHYLPREDNISOLONE SODIUM SUCCINATE 40 MG/ML
40 INJECTION INTRAMUSCULAR; INTRAVENOUS
Status: CANCELLED
Start: 2025-06-25

## 2025-06-23 RX ORDER — HEPARIN SODIUM (PORCINE) LOCK FLUSH IV SOLN 100 UNIT/ML 100 UNIT/ML
5 SOLUTION INTRAVENOUS
Status: CANCELLED | OUTPATIENT
Start: 2025-06-25

## 2025-06-23 RX ADMIN — SODIUM CHLORIDE 250 ML: 0.9 INJECTION, SOLUTION INTRAVENOUS at 12:47

## 2025-06-23 RX ADMIN — IRON SUCROSE 300 MG: 20 INJECTION, SOLUTION INTRAVENOUS at 12:47

## 2025-06-23 NOTE — PROGRESS NOTES
Infusion Nursing Note:  Daniel Alamo presents today for Venofer 300 mg. Dose 1 of 3    Patient seen by provider today: No   present during visit today: Not Applicable.    Note: Daniel comes in today for his first dose of Venofer. He has never had a Iron infusion before. Daniel is very [place] but does use a pocket talker and can hear if you talk louder. I went over the plan of care and information about the medication with Daniel and his wife. BP on left side was 79/49 on his left side and 90/44 on his right side.He did verbalize understanding. PIV placed in his right AC with great blood return throughout. Venofer given over 90 minutes and then flushed with saline for 15 minutes. Daniel tolerated with no sign or symptom of a reaction.BP held steady at 91/47 at the end of his observation. PIV removed and covered with gauze and coban. AVS printed and reviewed. Daniel left via WC with his wife to the [facility] and plans on returning on 06/25/25.      Intravenous Access:  Peripheral IV placed.    Treatment Conditions:  Not Applicable.      Post Infusion Assessment:  Patient tolerated infusion without incident.  Patient observed for 20 minutes post.  Blood return noted pre and post infusion.  Site patent and intact, free from redness, edema or discomfort.  No evidence of extravasations.  Access discontinued per protocol.       Discharge Plan:   Discharge instructions reviewed with: Patient and Family.  Copy of AVS reviewed with patient and/or family.  Patient will return 06/25/25 for next appointment.  Patient discharged in stable condition accompanied by: wife.  Departure Mode: Wheelchair.      LEX JONES RN

## 2025-06-24 ENCOUNTER — OFFICE VISIT (OUTPATIENT)
Dept: CARDIOLOGY | Facility: CLINIC | Age: 85
End: 2025-06-24
Attending: NURSE PRACTITIONER
Payer: MEDICARE

## 2025-06-24 ENCOUNTER — ANCILLARY PROCEDURE (OUTPATIENT)
Dept: CARDIOLOGY | Facility: CLINIC | Age: 85
End: 2025-06-24
Attending: INTERNAL MEDICINE
Payer: MEDICARE

## 2025-06-24 VITALS
HEART RATE: 60 BPM | OXYGEN SATURATION: 96 % | SYSTOLIC BLOOD PRESSURE: 97 MMHG | RESPIRATION RATE: 16 BRPM | DIASTOLIC BLOOD PRESSURE: 62 MMHG

## 2025-06-24 DIAGNOSIS — Z95.810 ICD (IMPLANTABLE CARDIOVERTER-DEFIBRILLATOR), DUAL, IN SITU: ICD-10-CM

## 2025-06-24 DIAGNOSIS — I47.20 VENTRICULAR TACHYCARDIA (H): Primary | ICD-10-CM

## 2025-06-24 DIAGNOSIS — I48.0 PAROXYSMAL ATRIAL FIBRILLATION (H): ICD-10-CM

## 2025-06-24 DIAGNOSIS — Z95.810 ICD (IMPLANTABLE CARDIOVERTER-DEFIBRILLATOR) IN PLACE: ICD-10-CM

## 2025-06-24 PROCEDURE — 3078F DIAST BP <80 MM HG: CPT | Performed by: INTERNAL MEDICINE

## 2025-06-24 PROCEDURE — 99204 OFFICE O/P NEW MOD 45 MIN: CPT | Performed by: INTERNAL MEDICINE

## 2025-06-24 PROCEDURE — 3074F SYST BP LT 130 MM HG: CPT | Performed by: INTERNAL MEDICINE

## 2025-06-24 PROCEDURE — G2211 COMPLEX E/M VISIT ADD ON: HCPCS | Performed by: INTERNAL MEDICINE

## 2025-06-24 RX ORDER — AMIODARONE HYDROCHLORIDE 100 MG/1
100 TABLET ORAL DAILY
Qty: 90 TABLET | Refills: 3 | Status: SHIPPED | OUTPATIENT
Start: 2025-06-24

## 2025-06-24 NOTE — LETTER
2025    Kenna Calvillo, NP  [address] [place] [address]    RE: Daniel Alamo       Dear Colleague,     I had the pleasure of seeing Daniel Alamo in the [facility].     [facility]  Cardiac Electrophysiology  [address]  [place] [address]   Office: [phone]  Fax: [phone]     Patient: Daniel Alamo   : 1940       CHIEF COMPLAINT/REASON FOR VISIT  Dual chamber ICD in-situ ([employer], 2004, generator replacement 10/15/2018)  Ventricular tachycardia  Chronic systolic heart failure related to ischemic cardiomyopathy (LVEF 30-35% by 2022)  Paroxysmal atrial fibrillation  Sinus node dysfunction    Assessment/Recommendations     Dual chamber ICD in-situ - [employer], 2004, generator replacement 10/15/2018.  Device check today shows normal lead and device function - known far-field ventricular oversensing on atrial channel  60% RV pacing, up for 26% ~1yr ago (possible related to amiodarone 10/2024-present).  LV function has remained stable  - ongoing routine ICD follow-up and yearly TTEs - in case of declining ventricular function in the setting of RV pacing, may need to consider CSP/CS lead upgrade  - the longitudinal plan of care was addressed during this visit. Due to the added complexity in care, our team will remain engaged subsequent management of this condition and ongoing continuity of care    Ventricular tachycardia - 2 instances of fast VT treated with ATP 2024, 2024  Amiodarone (10/2024-present)  - reduce amiodarone from 200mg to 100mg daily - follow liver, thyroid, lung, eye function while on amiodarone    Paroxysmal atrial fibrillation - <1% burden, though possibly overestimated due to far field V oversensing  [id number]  - continue amiodarone 200mg daily  - continue apixaban 5mg twice daily (age>80 though wt>60kg and Cr<1.5)    Chronic systolic heart failure related to ischemic  cardiomyopathy - LVEF 30-35% by 4/23/2025 TTE, moderate-severe mitral regurgitation  - continue heart failure medical therapies and evaluation    Follow up: as above         History of Present Illness   Daniel Alamo is a 85 year old male with chronic systolic heart failure related to ischemic cardiomyopathy (LVEF 30-35% by 4/23/2025 TTE), ventricular tachycardia, paroxysmal atrial fibrillation, sinus node dysfunction, dual chamber ICD in-situ ([employer], 4/22/2004, generator replacement 10/15/2018), 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    Mr. Alamo had 2 instances of fast VT treated with ATP 1/30/2024, 2/1/2024 and had a number of additional slower self-limited VT episodes.  He had previously been maintained on sotalol for AF - this was discontinued and amiodarone initiated 10/2024.    He reports no issues at his ICD implantation site.  He is enrolled in remote monitoring.  He notes ongoing fatigue - this is chronic and unchanged on/off sotalol, amiodarone.  He has not had any bleeding issues on apixaban.  He is receiving iron infusions for iron deficiency anemia.  He has not had recent admissions for heart failure.      He denies chest pain, dyspnea, syncope.         Physical Examination  Review of Systems   VITALS: BP 97/62 (BP Location: Left arm, Patient Position: Sitting, Cuff Size: Adult Regular)   Pulse 60   Resp 16   SpO2 96%   Wt Readings from Last 3 Encounters:   04/25/25 64.7 kg (142 lb 9.6 oz)   03/24/25 64 kg (141 lb)   03/20/25 65.3 kg (144 lb)     CONSTITUTIONAL: well nourished, comfortable, no distress  EYES:  Conjunctivae pink, sclerae clear.    E/N/T:  Oral mucosa  pink  RESPIRATORY:  Respiratory effort is normal  CARDIOVASCULAR:  normal S1 and S2  GASTROINTESTINAL:  Abdomen without masses or tenderness  EXTREMITIES:  No clubbing or cyanosis.    MUSCULOSKELETAL:  Overall grossly normal muscle strength  SKIN:  Overall, skin warm and dry, no lesions.  NEURO/PSYCH:  Oriented x 3 with normal affect.   Constitutional:  No weight loss or loss of appetite    Eyes:  No difficulty with vision, no double vision, no dry eyes  ENT:  No sore throat, difficulty swallowing; changes in hearing or tinnitus  Cardiovascular: As detailed above  Respiratory:  No cough  Musculoskeletal  No joint pain, muscle aches  Neurologic:  No syncope, lightheadedness, fainting spells   Hematologic: No easy bruising, excessive bleeding tendency   Gastrointestinal:  No jaundice, abdominal pain or abdominal bloating  Genitourinary: No changes in urinary habits, no trouble urinating    Psychiatric: No anxiety or depression      Medical History  Surgical History   Past Medical History:   Diagnosis Date     Abdominal aortic aneurysm (AAA) without rupture 2/24/2020 2/2020 underwent aortobiiliac endograft repair of aortic aneurysm and  placement of bridging stents bilaterally and placement of bilateral iliac  branch devices for repair of bilateral iliac artery aneurysms       Aneurysm of iliac artery 2/4/2020     Aortic stenosis, severe 1/31/2023     Coronary artery disease      Heart disease      Heart failure with reduced ejection fraction, NYHA class II (H)      Ischemic cardiomyopathy 12/31/2019     Paroxysmal atrial fibrillation (H)     Device interrogation, started on AC by Dr. Harrell 1/22/2020 [id number] scire of (>75, CAD, CHF)      Pure hypercholesterolemia     Created by Conversion      PVD (peripheral vascular disease) 2/24/2020 2/2020 underwent aortobiiliac endograft repair of aortic aneurysm and  placement of bridging stents bilaterally and placement of bilateral iliac  branch devices for repair of  bilateral iliac artery aneurysms       Severe aortic stenosis 10/10/2022     Sinus node dysfunction (H) 11/22/2022     VT (ventricular tachycardia) (H) 2004    Past Surgical History:   Procedure Laterality Date     ABDOMEN SURGERY       BYPASS GRAFT ARTERY CORONARY       CV ANGIOGRAM CORONARY GRAFT N/A 9/19/2022    Procedure: Coronary Angiogram Graft;  Surgeon: Tyrone Ordoñez MD;  Location: [facility]     CV CORONARY LITHOTRIPSY PCI N/A 12/19/2022    Procedure: Percutaneous Coronary Intervention - Lithotripsy;  Surgeon: Tyrone Ordoñez MD;  Location: [facility]     CV LEFT HEART CATH N/A 9/19/2022    Procedure: Left Heart Catheterization;  Surgeon: Tyrone Ordoñez MD;  Location: [facility]     CV PCI ANGIOPLASTY N/A 12/19/2022    Procedure: Percutaneous Transluminal Angioplasty;  Surgeon: Tyrone Ordoñez MD;  Location: [facility]     CV RIGHT HEART CATH MEASUREMENTS RECORDED N/A 9/19/2022    Procedure: Right Heart Catheterization;  Surgeon: Tyrone Ordoñez MD;  Location: [facility]     EYE SURGERY       INSERT / REPLACE / REMOVE PACEMAKER       OR TRANSCATHETER AORTIC VALVE REPLACEMENT, SUBCLAVIAN PERCUTANEOUS APPROACH (STANDBY) N/A 1/31/2023    Procedure: OR TRANSCATHETER AORTIC VALVE REPLACEMENT, SUBCLAVIAN PERCUTANEOUS APPROACH (STANDBY);  Surgeon: Shanae Rich MD;  Location: [facility]     OTHER SURGICAL HISTORY      icd     TRANSCATHETER AORTIC VALVE REPLACEMENT - SUBCLAVIAN APPROACH N/A 1/31/2023    Procedure: Transcatheter Aortic Valve Replacement - Subclavian Approach;  Surgeon: Tyrone Ordoñez MD;  Location: [facility]     ZZC CABG, VEIN, SINGLE      Description: CABG (CABG);  Recorded: 10/03/2012;  Comments: LIMA to LAD, SVG to OM2, SVG to RCA         Family History Social History   Family History   Problem Relation Age of Onset     Cancer Mother      Heart Disease Father         Social History     Tobacco Use      Smoking status: Every Day     Current packs/day: 0.50     Average packs/day: 0.5 packs/day for 30.0 years (15.0 ttl pk-yrs)     Types: Cigarettes     Smokeless tobacco: Never   Vaping Use     Vaping status: Never Used   Substance Use Topics     Alcohol use: Yes     Alcohol/week: 1.0 standard drink of alcohol     Comment: Glass of wine daily.     Drug use: No         Medications  Allergies     Current Outpatient Medications:      acetaminophen (TYLENOL) 325 MG tablet, Take 650 mg by mouth every 6 hours as needed for pain, Disp: , Rfl:      amiodarone (PACERONE) 200 MG tablet, Take 1 tablet (200 mg) by mouth daily., Disp: 180 tablet, Rfl: 3     apixaban ANTICOAGULANT (ELIQUIS) 2.5 MG tablet, Take 1 tablet (2.5 mg) by mouth 2 times daily., Disp: , Rfl:      aspirin 81 MG EC tablet, Take 81 mg by mouth daily, Disp: , Rfl:      empagliflozin (JARDIANCE) 10 MG TABS tablet, Take 1 tablet (10 mg) by mouth daily., Disp: 90 tablet, Rfl: 3     gabapentin (NEURONTIN) 300 MG capsule, Take 900 mg by mouth 2 times daily , Disp: , Rfl:      lisinopril (ZESTRIL) 2.5 MG tablet, TAKE 1 TABLET (2.5MG) BY MOUTH DAILY, Disp: 90 tablet, Rfl: 3     rosuvastatin (CRESTOR) 20 MG tablet, Take 1 tablet (20 mg) by mouth daily., Disp: 90 tablet, Rfl: 2     tamsulosin (FLOMAX) 0.4 MG capsule, Take 0.4 mg by mouth every evening, Disp: , Rfl:   No current facility-administered medications for this visit.     Allergies   Allergen Reactions     Atorvastatin Diarrhea     Carvedilol Other (See Comments) and GI Disturbance     Upset stomach; GI upset       Chloride Nausea and GI Disturbance          Lab Results    Chemistry CBC Cardiac Enzymes/BNP/TSH/INR   Recent Labs   Lab Test 05/30/25  1429      POTASSIUM 4.2   CHLORIDE 105   CO2 20*   *   BUN 29.4*   CR 1.81*   GFRESTIMATED 36*   EMILIO 8.9     Recent Labs   Lab Test 05/30/25  1429 05/02/25  1318 04/08/25  1313   CR 1.81* 1.66* 1.67*          Recent Labs   Lab Test 05/30/25  1439  "  WBC 8.7   HGB 7.3*   HCT 25.7*   MCV 74*        Recent Labs   Lab Test 05/30/25  1439 11/01/24  1250 02/14/24  1550   HGB 7.3* 14.7 13.5    No results for input(s): \"TROPONINI\" in the last 81206 hours.  Recent Labs   Lab Test 03/07/25  1009 08/05/24  1058 07/25/24  1216   NTBNP 1,523 1,252 2,034*     Recent Labs   Lab Test 05/30/25  1429   TSH 0.24*     Recent Labs   Lab Test 02/17/23  1917 01/30/23  0922   INR 1.18* 1.11         Data Review    ECGs (tracings independently reviewed)  3/20/2025 - ApVs 57bpm, UT 324ms, RBBB QRS 162ms  9/19/2022 - ApV2 59bpm, RBBB QRS 140ms, inferior Qw, QT/QTc 482/477ms    6/24/2025 ICD check (independently reviewed)  Normal lead and device function - underlying SR with AV conduction 380ms  1215 AMS - available EGMS show FFRWOS  No VHR events  49% RA pacing, 60% RV pacing  Estimated remaining battery longevity 7.5yrs    RV pacing  6/24/2025 - 60%  10/23/2024 - 33%  4/10/2024 - 26%    4/23/2025 TTE  The left ventricle is moderately dilated. There is mild concentric left  ventricular hypertrophy.  Left ventricular function is decreased. The ejection fraction is 30-35%  (moderately reduced). There is diffuse hypokinesis of the left ventricle.  The right ventricle is mild to moderately dilated. The right ventricular  systolic function is normal.  The left atrium is severely dilated. The right atrium is severely dilated.  There is mod-severe to severe (3-4+) mitral regurgitation.  There is a 29 mm CHANTAL 3 valve in the aortic position (date of implant  1/31/2023) which appears well seated. There is no significant regurgitation or  stenosis. Mean pressure gradient 7 mmHg, VMax 1.9 m/s.  IVC diameter and respiratory changes fall into an intermediate range  suggesting an RA pressure of 8 mmHg.  Aortic root dilatation is present measuring 4.1 cm.  Compared to prior study, there is no significant change.    9/19/2022 coronary angiography and RHC  LM:no obstruction  LAD:severe disease " mid-vessel, supplying a moderate diagonal branch w/ Ca2+ 70% disease but TIMI3 antegrade flow and some flow via LIMA. Distal LAD fills via patent L-LAD  Lcx:OM1 w/ 70-80% ostial Ca2+ disease but SKIP 3 flow. Distal small OM fills via L-L bridging collaterals   RCA:100% occluded proximally     L-LAD: patent, fills the LAD territory including diseased diagonal branch, with some collaterals to Lcx and RCA territory  V-OM: occluded  V-RCA: not visualized, presumed occluded     LVEDP:21  AV gradient by pullback: 12-15  RHC:  RA:4   RV:28/4  PA:33/8 (18)  PCWP:8  SvO2:68     CO/CI:  Peggy:4.42/2.45       Cc: Corey Vargas MD, Kenna Calvillo, NP    Christel Saxena MD  6/24/2025  3:50 PM        Thank you for allowing me to participate in the care of your patient.      Sincerely,     Christel Saxena MD     [facility]  cc:   CAILIN Evans CNP  [address], [address]  [place] [address]

## 2025-06-24 NOTE — PROGRESS NOTES
[facility]  Cardiac Electrophysiology  [address]  [place] [address]   Office: [phone]  Fax: [phone]     Patient: Daniel Alamo   : 1940       CHIEF COMPLAINT/REASON FOR VISIT  Dual chamber ICD in-situ ([employer], 2004, generator replacement 10/15/2018)  Ventricular tachycardia  Chronic systolic heart failure related to ischemic cardiomyopathy (LVEF 30-35% by 2022)  Paroxysmal atrial fibrillation  Sinus node dysfunction    Assessment/Recommendations     Dual chamber ICD in-situ - [employer], 2004, generator replacement 10/15/2018.  Device check today shows normal lead and device function - known far-field ventricular oversensing on atrial channel  60% RV pacing, up for 26% ~1yr ago (possible related to amiodarone 10/2024-present).  LV function has remained stable  - ongoing routine ICD follow-up and yearly TTEs - in case of declining ventricular function in the setting of RV pacing, may need to consider CSP/CS lead upgrade  - the longitudinal plan of care was addressed during this visit. Due to the added complexity in care, our team will remain engaged subsequent management of this condition and ongoing continuity of care    Ventricular tachycardia - 2 instances of fast VT treated with ATP 2024, 2024  Amiodarone (10/2024-present)  - reduce amiodarone from 200mg to 100mg daily - follow liver, thyroid, lung, eye function while on amiodarone    Paroxysmal atrial fibrillation - <1% burden, though possibly overestimated due to far field V oversensing  [id number] 4  - continue amiodarone 200mg daily  - continue apixaban 5mg twice daily (age>80 though wt>60kg and Cr<1.5)    Chronic systolic heart failure related to ischemic cardiomyopathy - LVEF 30-35% by 2025 TTE, moderate-severe mitral regurgitation  - continue heart failure medical therapies and evaluation    Follow up: as above         History of Present Illness    Daniel Alamo is a 85 year old male with chronic systolic heart failure related to ischemic cardiomyopathy (LVEF 30-35% by 4/23/2025 TTE), ventricular tachycardia, paroxysmal atrial fibrillation, sinus node dysfunction, dual chamber ICD in-situ ([employer], 4/22/2004, generator replacement 10/15/2018), 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    Mr. Alamo had 2 instances of fast VT treated with ATP 1/30/2024, 2/1/2024 and had a number of additional slower self-limited VT episodes.  He had previously been maintained on sotalol for AF - this was discontinued and amiodarone initiated 10/2024.    He reports no issues at his ICD implantation site.  He is enrolled in remote monitoring.  He notes ongoing fatigue - this is chronic and unchanged on/off sotalol, amiodarone.  He has not had any bleeding issues on apixaban.  He is receiving iron infusions for iron deficiency anemia.  He has not had recent admissions for heart failure.      He denies chest pain, dyspnea, syncope.         Physical Examination  Review of Systems   VITALS: BP 97/62 (BP Location: Left arm, Patient Position: Sitting, Cuff Size: Adult Regular)   Pulse 60   Resp 16   SpO2 96%   Wt Readings from Last 3 Encounters:   04/25/25 64.7 kg (142 lb 9.6 oz)   03/24/25 64 kg (141 lb)   03/20/25 65.3 kg (144 lb)     CONSTITUTIONAL: well nourished, comfortable, no distress  EYES:  Conjunctivae pink, sclerae clear.    E/N/T:  Oral mucosa pink  RESPIRATORY:  Respiratory effort is normal  CARDIOVASCULAR:  normal S1 and S2  GASTROINTESTINAL:  Abdomen without masses or tenderness  EXTREMITIES:  No clubbing or cyanosis.    MUSCULOSKELETAL:  Overall grossly  normal muscle strength  SKIN:  Overall, skin warm and dry, no lesions.  NEURO/PSYCH:  Oriented x 3 with normal affect.   Constitutional:  No weight loss or loss of appetite    Eyes:  No difficulty with vision, no double vision, no dry eyes  ENT:  No sore throat, difficulty swallowing; changes in hearing or tinnitus  Cardiovascular: As detailed above  Respiratory:  No cough  Musculoskeletal  No joint pain, muscle aches  Neurologic:  No syncope, lightheadedness, fainting spells   Hematologic: No easy bruising, excessive bleeding tendency   Gastrointestinal:  No jaundice, abdominal pain or abdominal bloating  Genitourinary: No changes in urinary habits, no trouble urinating    Psychiatric: No anxiety or depression      Medical History  Surgical History   Past Medical History:   Diagnosis Date    Abdominal aortic aneurysm (AAA) without rupture 2/24/2020 2/2020 underwent aortobiiliac endograft repair of aortic aneurysm and  placement of bridging stents bilaterally and placement of bilateral iliac  branch devices for repair of bilateral iliac artery aneurysms      Aneurysm of iliac artery 2/4/2020    Aortic stenosis, severe 1/31/2023    Coronary artery disease     Heart disease     Heart failure with reduced ejection fraction, NYHA class II (H)     Ischemic cardiomyopathy 12/31/2019    Paroxysmal atrial fibrillation (H)     Device interrogation, started on AC by Dr. Harrell 1/22/2020 [id number] scire of (>75, CAD, CHF)     Pure hypercholesterolemia     Created by Conversion     PVD (peripheral vascular disease) 2/24/2020 2/2020 underwent aortobiiliac endograft repair of aortic aneurysm and  placement of bridging stents bilaterally and placement of bilateral iliac  branch devices for repair of bilateral iliac artery aneurysms      Severe aortic stenosis 10/10/2022    Sinus node dysfunction (H) 11/22/2022    VT (ventricular tachycardia) (H) 2004    Past Surgical History:   Procedure Laterality Date    ABDOMEN SURGERY       BYPASS GRAFT ARTERY CORONARY      CV ANGIOGRAM CORONARY GRAFT N/A 9/19/2022    Procedure: Coronary Angiogram Graft;  Surgeon: Tyrone Ordoñez MD;  Location: [facility]    CV CORONARY LITHOTRIPSY PCI N/A 12/19/2022    Procedure: Percutaneous Coronary Intervention - Lithotripsy;  Surgeon: Tyrone Ordoñez MD;  Location: [facility]    CV LEFT HEART CATH N/A 9/19/2022    Procedure: Left Heart Catheterization;  Surgeon: Tyrone Ordoñez MD;  Location: [facility]    CV PCI ANGIOPLASTY N/A 12/19/2022    Procedure: Percutaneous Transluminal Angioplasty;  Surgeon: Tyrone Ordoñez MD;  Location: [facility]    CV RIGHT HEART CATH MEASUREMENTS RECORDED N/A 9/19/2022    Procedure: Right Heart Catheterization;  Surgeon: Tyrone Ordoñez MD;  Location: [facility]    EYE SURGERY      INSERT / REPLACE / REMOVE PACEMAKER      OR TRANSCATHETER AORTIC VALVE REPLACEMENT, SUBCLAVIAN PERCUTANEOUS APPROACH (STANDBY) N/A 1/31/2023    Procedure: OR TRANSCATHETER AORTIC VALVE REPLACEMENT, SUBCLAVIAN PERCUTANEOUS APPROACH (STANDBY);  Surgeon: Shanae Rich MD;  Location: [facility]    OTHER SURGICAL HISTORY      icd    TRANSCATHETER AORTIC VALVE REPLACEMENT - SUBCLAVIAN APPROACH N/A 1/31/2023    Procedure: Transcatheter Aortic Valve Replacement - Subclavian Approach;  Surgeon: Tyrone Ordoñez MD;  Location: [facility]    ZZC CABG, VEIN, SINGLE      Description: CABG (CABG);  Recorded: 10/03/2012;  Comments: LIMA to LAD, SVG to OM2, SVG to RCA         Family History Social History   Family History   Problem Relation Age of Onset    Cancer Mother     Heart Disease Father         Social History     Tobacco Use    Smoking status: Every Day     Current packs/day: 0.50     Average packs/day: 0.5 packs/day for 30.0 years (15.0 ttl pk-yrs)     Types: Cigarettes    Smokeless tobacco: Never   Vaping Use    Vaping status: Never Used   Substance Use Topics     "Alcohol use: Yes     Alcohol/week: 1.0 standard drink of alcohol     Comment: Glass of wine daily.    Drug use: No         Medications  Allergies     Current Outpatient Medications:     acetaminophen (TYLENOL) 325 MG tablet, Take 650 mg by mouth every 6 hours as needed for pain, Disp: , Rfl:     amiodarone (PACERONE) 200 MG tablet, Take 1 tablet (200 mg) by mouth daily., Disp: 180 tablet, Rfl: 3    apixaban ANTICOAGULANT (ELIQUIS) 2.5 MG tablet, Take 1 tablet (2.5 mg) by mouth 2 times daily., Disp: , Rfl:     aspirin 81 MG EC tablet, Take 81 mg by mouth daily, Disp: , Rfl:     empagliflozin (JARDIANCE) 10 MG TABS tablet, Take 1 tablet (10 mg) by mouth daily., Disp: 90 tablet, Rfl: 3    gabapentin (NEURONTIN) 300 MG capsule, Take 900 mg by mouth 2 times daily , Disp: , Rfl:     lisinopril (ZESTRIL) 2.5 MG tablet, TAKE 1 TABLET (2.5MG) BY MOUTH DAILY, Disp: 90 tablet, Rfl: 3    rosuvastatin (CRESTOR) 20 MG tablet, Take 1 tablet (20 mg) by mouth daily., Disp: 90 tablet, Rfl: 2    tamsulosin (FLOMAX) 0.4 MG capsule, Take 0.4 mg by mouth every evening, Disp: , Rfl:   No current facility-administered medications for this visit.     Allergies   Allergen Reactions    Atorvastatin Diarrhea    Carvedilol Other (See Comments) and GI Disturbance     Upset stomach; GI upset      Chloride Nausea and GI Disturbance          Lab Results    Chemistry CBC Cardiac Enzymes/BNP/TSH/INR   Recent Labs   Lab Test 05/30/25  1429      POTASSIUM 4.2   CHLORIDE 105   CO2 20*   *   BUN 29.4*   CR 1.81*   GFRESTIMATED 36*   EMILIO 8.9     Recent Labs   Lab Test 05/30/25  1429 05/02/25  1318 04/08/25  1313   CR 1.81* 1.66* 1.67*          Recent Labs   Lab Test 05/30/25  1439   WBC 8.7   HGB 7.3*   HCT 25.7*   MCV 74*        Recent Labs   Lab Test 05/30/25  1439 11/01/24  1250 02/14/24  1550   HGB 7.3* 14.7 13.5    No results for input(s): \"TROPONINI\" in the last 42603 hours.  Recent Labs   Lab Test 03/07/25  1009 08/05/24  1058 " 07/25/24  1216   NTBNP 1,523 1,252 2,034*     Recent Labs   Lab Test 05/30/25  1429   TSH 0.24*     Recent Labs   Lab Test 02/17/23  1917 01/30/23  0922   INR 1.18* 1.11         Data Review    ECGs (tracings independently reviewed)  3/20/2025 - ApVs 57bpm, NC 324ms, RBBB QRS 162ms  9/19/2022 - ApV2 59bpm, RBBB QRS 140ms, inferior Qw, QT/QTc 482/477ms    6/24/2025 ICD check (independently reviewed)  Normal lead and device function - underlying SR with AV conduction 380ms  1215 AMS - available EGMS show FFRWOS  No VHR events  49% RA pacing, 60% RV pacing  Estimated remaining battery longevity 7.5yrs    RV pacing  6/24/2025 - 60%  10/23/2024 - 33%  4/10/2024 - 26%    4/23/2025 TTE  The left ventricle is moderately dilated. There is mild concentric left  ventricular hypertrophy.  Left ventricular function is decreased. The ejection fraction is 30-35%  (moderately reduced). There is diffuse hypokinesis of the left ventricle.  The right ventricle is mild to moderately dilated. The right ventricular  systolic function is normal.  The left atrium is severely dilated. The right atrium is severely dilated.  There is mod-severe to severe (3-4+) mitral regurgitation.  There is a 29 mm CHANTAL 3 valve in the aortic position (date of implant  1/31/2023) which appears well seated. There is no significant regurgitation or  stenosis. Mean pressure gradient 7 mmHg, VMax 1.9 m/s.  IVC diameter and respiratory changes fall into an intermediate range  suggesting an RA pressure of 8 mmHg.  Aortic root dilatation is present measuring 4.1 cm.  Compared to prior study, there is no significant change.    9/19/2022 coronary angiography and RHC  LM:no obstruction  LAD:severe disease mid-vessel, supplying a moderate diagonal branch w/ Ca2+ 70% disease but TIMI3 antegrade flow and some flow via LIMA. Distal LAD fills via patent L-LAD  Lcx:OM1 w/ 70-80% ostial Ca2+ disease but SKIP 3 flow. Distal small OM fills via L-L bridging collaterals    RCA:100% occluded proximally     L-LAD: patent, fills the LAD territory including diseased diagonal branch, with some collaterals to Lcx and RCA territory  V-OM: occluded  V-RCA: not visualized, presumed occluded     LVEDP:21  AV gradient by pullback: 12-15  RHC:  RA:4   RV:28/4  PA:33/8 (18)  PCWP:8  SvO2:68     CO/CI:  Peggy:4.42/2.45       Cc: Corey Vargas MD, Kenna Calvillo, LINDSAY Saxena MD  6/24/2025  3:50 PM

## 2025-06-24 NOTE — PATIENT INSTRUCTIONS
[facility]  Cardiac Electrophysiology  [address]  [place] [address]   Office: [phone]  Fax: [phone]       Thank you for seeing us in clinic today - it is a pleasure to be a part of your care team.  Below is a summary of our plan from today's visit.      You have chronic heart failure, had a defibrillator in place, and have had ventricular tachycardia and paroxysmal atrial fibrillation.  Your defibrillator system shows normal lead and device (your right atrial lead has been known to have some far-field ventricular oversensing).      We will plan for the following:  - reduce amiodarone from 200mg to 100mg daily - follow liver, thyroid, lung, eye function while on amiodarone  - continue apixaban  - ongoing routine follow-up of your ICD    Please do not hesitate to be in touch with our office at [phone] with any questions that may arise.      Thank you for trusting us with your care,    Christel Saxena MD  Clinical Cardiac Electrophysiology  [facility]  [address]  [place] [address]   Office: [phone]  Fax: [phone]

## 2025-06-25 ENCOUNTER — INFUSION THERAPY VISIT (OUTPATIENT)
Dept: INFUSION THERAPY | Facility: HOSPITAL | Age: 85
End: 2025-06-25
Payer: MEDICARE

## 2025-06-25 VITALS
RESPIRATION RATE: 14 BRPM | OXYGEN SATURATION: 100 % | SYSTOLIC BLOOD PRESSURE: 112 MMHG | DIASTOLIC BLOOD PRESSURE: 54 MMHG | HEART RATE: 65 BPM | TEMPERATURE: 97.8 F

## 2025-06-25 DIAGNOSIS — D50.0 IRON DEFICIENCY ANEMIA SECONDARY TO BLOOD LOSS (CHRONIC): Primary | ICD-10-CM

## 2025-06-25 PROCEDURE — 250N000011 HC RX IP 250 OP 636: Performed by: NURSE PRACTITIONER

## 2025-06-25 PROCEDURE — 96365 THER/PROPH/DIAG IV INF INIT: CPT

## 2025-06-25 PROCEDURE — 258N000003 HC RX IP 258 OP 636: Performed by: NURSE PRACTITIONER

## 2025-06-25 PROCEDURE — 96366 THER/PROPH/DIAG IV INF ADDON: CPT

## 2025-06-25 RX ORDER — METHYLPREDNISOLONE SODIUM SUCCINATE 40 MG/ML
40 INJECTION INTRAMUSCULAR; INTRAVENOUS
Start: 2025-06-27

## 2025-06-25 RX ORDER — MEPERIDINE HYDROCHLORIDE 25 MG/ML
25 INJECTION INTRAMUSCULAR; INTRAVENOUS; SUBCUTANEOUS
OUTPATIENT
Start: 2025-06-27

## 2025-06-25 RX ORDER — DIPHENHYDRAMINE HYDROCHLORIDE 50 MG/ML
25 INJECTION, SOLUTION INTRAMUSCULAR; INTRAVENOUS
Start: 2025-06-27

## 2025-06-25 RX ORDER — EPINEPHRINE 1 MG/ML
0.3 INJECTION, SOLUTION INTRAMUSCULAR; SUBCUTANEOUS EVERY 5 MIN PRN
OUTPATIENT
Start: 2025-06-27

## 2025-06-25 RX ORDER — ALBUTEROL SULFATE 90 UG/1
1-2 INHALANT RESPIRATORY (INHALATION)
Start: 2025-06-27

## 2025-06-25 RX ORDER — DIPHENHYDRAMINE HYDROCHLORIDE 50 MG/ML
50 INJECTION, SOLUTION INTRAMUSCULAR; INTRAVENOUS
Start: 2025-06-27

## 2025-06-25 RX ORDER — ALBUTEROL SULFATE 0.83 MG/ML
2.5 SOLUTION RESPIRATORY (INHALATION)
OUTPATIENT
Start: 2025-06-27

## 2025-06-25 RX ORDER — HEPARIN SODIUM (PORCINE) LOCK FLUSH IV SOLN 100 UNIT/ML 100 UNIT/ML
5 SOLUTION INTRAVENOUS
OUTPATIENT
Start: 2025-06-27

## 2025-06-25 RX ORDER — HEPARIN SODIUM,PORCINE 10 UNIT/ML
5-20 VIAL (ML) INTRAVENOUS DAILY PRN
OUTPATIENT
Start: 2025-06-27

## 2025-06-25 RX ADMIN — IRON SUCROSE 300 MG: 20 INJECTION, SOLUTION INTRAVENOUS at 11:55

## 2025-06-25 RX ADMIN — SODIUM CHLORIDE 250 ML: 0.9 INJECTION, SOLUTION INTRAVENOUS at 11:43

## 2025-06-25 NOTE — PROGRESS NOTES
Infusion Nursing Note:  Daniel DENNIS Greeraraceli presents today for venofer infusion.    Patient seen by provider today: No   present during visit today: Not Applicable.    Note: N/A.      Intravenous Access:  Peripheral IV placed.    Treatment Conditions:  Not Applicable.      Post Infusion Assessment:  Patient tolerated infusion without incident.  Site patent and intact, free from redness, edema or discomfort.  No evidence of extravasations.  Access discontinued per protocol.       Discharge Plan:   Discharge instructions reviewed with: Patient and Family.  Patient and/or family verbalized understanding of discharge instructions and all questions answered.  Patient discharged in stable condition accompanied by: wife.  Departure Mode: Ambulatory.      Karina Arce RN

## 2025-06-26 LAB
MDC_IDC_LEAD_CONNECTION_STATUS: NORMAL
MDC_IDC_LEAD_CONNECTION_STATUS: NORMAL
MDC_IDC_LEAD_IMPLANT_DT: NORMAL
MDC_IDC_LEAD_IMPLANT_DT: NORMAL
MDC_IDC_LEAD_LOCATION: NORMAL
MDC_IDC_LEAD_LOCATION: NORMAL
MDC_IDC_LEAD_LOCATION_DETAIL_1: NORMAL
MDC_IDC_LEAD_LOCATION_DETAIL_1: NORMAL
MDC_IDC_LEAD_MFG: NORMAL
MDC_IDC_LEAD_MFG: NORMAL
MDC_IDC_LEAD_MODEL: NORMAL
MDC_IDC_LEAD_MODEL: NORMAL
MDC_IDC_LEAD_POLARITY_TYPE: NORMAL
MDC_IDC_LEAD_POLARITY_TYPE: NORMAL
MDC_IDC_LEAD_SERIAL: NORMAL
MDC_IDC_LEAD_SERIAL: NORMAL
MDC_IDC_LEAD_SPECIAL_FUNCTION: NORMAL
MDC_IDC_MSMT_BATTERY_DTM: NORMAL
MDC_IDC_MSMT_BATTERY_REMAINING_LONGEVITY: 90 MO
MDC_IDC_MSMT_BATTERY_REMAINING_PERCENTAGE: 100 %
MDC_IDC_MSMT_BATTERY_STATUS: NORMAL
MDC_IDC_MSMT_CAP_CHARGE_DTM: NORMAL
MDC_IDC_MSMT_CAP_CHARGE_TIME: 10.7 S
MDC_IDC_MSMT_CAP_CHARGE_TYPE: NORMAL
MDC_IDC_MSMT_LEADCHNL_RA_IMPEDANCE_VALUE: 697 OHM
MDC_IDC_MSMT_LEADCHNL_RA_IMPEDANCE_VALUE: 697 OHM
MDC_IDC_MSMT_LEADCHNL_RA_LEAD_CHANNEL_STATUS: NORMAL
MDC_IDC_MSMT_LEADCHNL_RA_PACING_THRESHOLD_AMPLITUDE: 1.5 V
MDC_IDC_MSMT_LEADCHNL_RA_PACING_THRESHOLD_PULSEWIDTH: 0.7 MS
MDC_IDC_MSMT_LEADCHNL_RV_IMPEDANCE_VALUE: 503 OHM
MDC_IDC_MSMT_LEADCHNL_RV_IMPEDANCE_VALUE: 503 OHM
MDC_IDC_MSMT_LEADCHNL_RV_PACING_THRESHOLD_AMPLITUDE: 1 V
MDC_IDC_MSMT_LEADCHNL_RV_PACING_THRESHOLD_PULSEWIDTH: 0.4 MS
MDC_IDC_PG_IMPLANT_DTM: NORMAL
MDC_IDC_PG_MFG: NORMAL
MDC_IDC_PG_MODEL: NORMAL
MDC_IDC_PG_SERIAL: NORMAL
MDC_IDC_PG_TYPE: NORMAL
MDC_IDC_SESS_CLINIC_NAME: NORMAL
MDC_IDC_SESS_DTM: NORMAL
MDC_IDC_SESS_TYPE: NORMAL
MDC_IDC_SET_BRADY_AT_MODE_SWITCH_MODE: NORMAL
MDC_IDC_SET_BRADY_AT_MODE_SWITCH_RATE: 170 {BEATS}/MIN
MDC_IDC_SET_BRADY_LOWRATE: 55 {BEATS}/MIN
MDC_IDC_SET_BRADY_MAX_SENSOR_RATE: 130 {BEATS}/MIN
MDC_IDC_SET_BRADY_MAX_TRACKING_RATE: 120 {BEATS}/MIN
MDC_IDC_SET_BRADY_MODE: NORMAL
MDC_IDC_SET_BRADY_PAV_DELAY_HIGH: 200 MS
MDC_IDC_SET_BRADY_PAV_DELAY_LOW: 300 MS
MDC_IDC_SET_BRADY_SAV_DELAY_HIGH: 165 MS
MDC_IDC_SET_BRADY_SAV_DELAY_LOW: 250 MS
MDC_IDC_SET_LEADCHNL_RA_PACING_AMPLITUDE: 2.6 V
MDC_IDC_SET_LEADCHNL_RA_PACING_POLARITY: NORMAL
MDC_IDC_SET_LEADCHNL_RA_PACING_PULSEWIDTH: 0.7 MS
MDC_IDC_SET_LEADCHNL_RA_SENSING_ADAPTATION_MODE: NORMAL
MDC_IDC_SET_LEADCHNL_RA_SENSING_POLARITY: NORMAL
MDC_IDC_SET_LEADCHNL_RA_SENSING_SENSITIVITY: 0.2 MV
MDC_IDC_SET_LEADCHNL_RV_PACING_AMPLITUDE: 1.5 V
MDC_IDC_SET_LEADCHNL_RV_PACING_POLARITY: NORMAL
MDC_IDC_SET_LEADCHNL_RV_PACING_PULSEWIDTH: 0.4 MS
MDC_IDC_SET_LEADCHNL_RV_SENSING_ADAPTATION_MODE: NORMAL
MDC_IDC_SET_LEADCHNL_RV_SENSING_POLARITY: NORMAL
MDC_IDC_SET_LEADCHNL_RV_SENSING_SENSITIVITY: 0.6 MV
MDC_IDC_SET_ZONE_DETECTION_INTERVAL: 261 MS
MDC_IDC_SET_ZONE_DETECTION_INTERVAL: 333 MS
MDC_IDC_SET_ZONE_DETECTION_INTERVAL: 400 MS
MDC_IDC_SET_ZONE_STATUS: NORMAL
MDC_IDC_SET_ZONE_TYPE: NORMAL
MDC_IDC_SET_ZONE_VENDOR_TYPE: NORMAL
MDC_IDC_STAT_AT_BURDEN_PERCENT: 1 %
MDC_IDC_STAT_AT_DTM_END: NORMAL
MDC_IDC_STAT_AT_DTM_START: NORMAL
MDC_IDC_STAT_BRADY_DTM_END: NORMAL
MDC_IDC_STAT_BRADY_DTM_START: NORMAL
MDC_IDC_STAT_BRADY_RA_PERCENT_PACED: 49 %
MDC_IDC_STAT_BRADY_RV_PERCENT_PACED: 60 %
MDC_IDC_STAT_EPISODE_RECENT_COUNT: 0
MDC_IDC_STAT_EPISODE_RECENT_COUNT_DTM_END: NORMAL
MDC_IDC_STAT_EPISODE_RECENT_COUNT_DTM_START: NORMAL
MDC_IDC_STAT_EPISODE_TYPE: NORMAL
MDC_IDC_STAT_EPISODE_VENDOR_TYPE: NORMAL
MDC_IDC_STAT_TACHYTHERAPY_ATP_DELIVERED_RECENT: 0
MDC_IDC_STAT_TACHYTHERAPY_ATP_DELIVERED_TOTAL: 3
MDC_IDC_STAT_TACHYTHERAPY_RECENT_DTM_END: NORMAL
MDC_IDC_STAT_TACHYTHERAPY_RECENT_DTM_START: NORMAL
MDC_IDC_STAT_TACHYTHERAPY_SHOCKS_ABORTED_RECENT: 0
MDC_IDC_STAT_TACHYTHERAPY_SHOCKS_ABORTED_TOTAL: 0
MDC_IDC_STAT_TACHYTHERAPY_SHOCKS_DELIVERED_RECENT: 0
MDC_IDC_STAT_TACHYTHERAPY_SHOCKS_DELIVERED_TOTAL: 0
MDC_IDC_STAT_TACHYTHERAPY_TOTAL_DTM_END: NORMAL
MDC_IDC_STAT_TACHYTHERAPY_TOTAL_DTM_START: NORMAL

## 2025-06-26 PROCEDURE — 93283 PRGRMG EVAL IMPLANTABLE DFB: CPT | Performed by: INTERNAL MEDICINE

## 2025-07-16 ENCOUNTER — PATIENT OUTREACH (OUTPATIENT)
Dept: CARE COORDINATION | Facility: CLINIC | Age: 85
End: 2025-07-16
Payer: MEDICARE

## 2025-07-16 ENCOUNTER — LAB REQUISITION (OUTPATIENT)
Dept: LAB | Facility: CLINIC | Age: 85
End: 2025-07-16
Payer: MEDICARE

## 2025-07-16 DIAGNOSIS — D50.0 IRON DEFICIENCY ANEMIA SECONDARY TO BLOOD LOSS (CHRONIC): ICD-10-CM

## 2025-07-16 DIAGNOSIS — R79.89 OTHER SPECIFIED ABNORMAL FINDINGS OF BLOOD CHEMISTRY: ICD-10-CM

## 2025-07-16 PROCEDURE — 84443 ASSAY THYROID STIM HORMONE: CPT | Mod: ORL | Performed by: NURSE PRACTITIONER

## 2025-07-16 PROCEDURE — 84480 ASSAY TRIIODOTHYRONINE (T3): CPT | Mod: ORL | Performed by: NURSE PRACTITIONER

## 2025-07-16 PROCEDURE — 82728 ASSAY OF FERRITIN: CPT | Mod: ORL | Performed by: NURSE PRACTITIONER

## 2025-07-16 PROCEDURE — 83540 ASSAY OF IRON: CPT | Mod: ORL | Performed by: NURSE PRACTITIONER

## 2025-07-16 NOTE — PROGRESS NOTES
Clinic Care Coordination Contact  Care Team Conversations    Patient identified for care management outreach, however, patient is not part of a value-based contact therefore outreach cannot be completed by  CC. Will escalate to clinic staff if specific needs or resources are indicated.    Marge Santos, [facility]  Social Work Care Coordinator - [facility]  Care Coordination  [email or web address]  Cell Phone: [phone]  Gender pronouns: she/her  Employed by [employer]

## 2025-07-17 LAB
FERRITIN SERPL-MCNC: 101 NG/ML (ref 31–409)
IRON SERPL-MCNC: 18 UG/DL (ref 61–157)
T3 SERPL-MCNC: 58 NG/DL (ref 85–202)
TSH SERPL DL<=0.005 MIU/L-ACNC: 0.35 UIU/ML (ref 0.3–4.2)

## 2025-07-19 ENCOUNTER — HEALTH MAINTENANCE LETTER (OUTPATIENT)
Age: 85
End: 2025-07-19

## 2025-07-23 ENCOUNTER — MEDICAL CORRESPONDENCE (OUTPATIENT)
Dept: HEALTH INFORMATION MANAGEMENT | Facility: CLINIC | Age: 85
End: 2025-07-23
Payer: MEDICARE

## 2025-07-28 DIAGNOSIS — D50.0 IRON DEFICIENCY ANEMIA SECONDARY TO BLOOD LOSS (CHRONIC): Primary | ICD-10-CM

## 2025-07-28 RX ORDER — EPINEPHRINE 1 MG/ML
0.3 INJECTION, SOLUTION, CONCENTRATE INTRAVENOUS EVERY 5 MIN PRN
OUTPATIENT
Start: 2025-07-28

## 2025-07-28 RX ORDER — HEPARIN SODIUM (PORCINE) LOCK FLUSH IV SOLN 100 UNIT/ML 100 UNIT/ML
5 SOLUTION INTRAVENOUS
OUTPATIENT
Start: 2025-07-28

## 2025-07-28 RX ORDER — HEPARIN SODIUM,PORCINE 10 UNIT/ML
5-20 VIAL (ML) INTRAVENOUS DAILY PRN
OUTPATIENT
Start: 2025-07-28

## 2025-07-28 RX ORDER — DIPHENHYDRAMINE HYDROCHLORIDE 50 MG/ML
50 INJECTION, SOLUTION INTRAMUSCULAR; INTRAVENOUS
Start: 2025-07-28

## 2025-07-28 RX ORDER — MEPERIDINE HYDROCHLORIDE 25 MG/ML
25 INJECTION INTRAMUSCULAR; INTRAVENOUS; SUBCUTANEOUS
OUTPATIENT
Start: 2025-07-28

## 2025-07-28 RX ORDER — METHYLPREDNISOLONE SODIUM SUCCINATE 40 MG/ML
40 INJECTION INTRAMUSCULAR; INTRAVENOUS
Start: 2025-07-28

## 2025-07-28 RX ORDER — DIPHENHYDRAMINE HYDROCHLORIDE 50 MG/ML
25 INJECTION, SOLUTION INTRAMUSCULAR; INTRAVENOUS
Start: 2025-07-28

## 2025-07-28 RX ORDER — ALBUTEROL SULFATE 0.83 MG/ML
2.5 SOLUTION RESPIRATORY (INHALATION)
OUTPATIENT
Start: 2025-07-28

## 2025-07-28 RX ORDER — ALBUTEROL SULFATE 90 UG/1
1-2 INHALANT RESPIRATORY (INHALATION)
Start: 2025-07-28

## 2025-07-31 ENCOUNTER — OFFICE VISIT (OUTPATIENT)
Dept: CARDIOLOGY | Facility: CLINIC | Age: 85
End: 2025-07-31
Payer: MEDICARE

## 2025-07-31 VITALS
WEIGHT: 131.6 LBS | DIASTOLIC BLOOD PRESSURE: 56 MMHG | HEART RATE: 57 BPM | SYSTOLIC BLOOD PRESSURE: 100 MMHG | RESPIRATION RATE: 16 BRPM | HEIGHT: 70 IN | BODY MASS INDEX: 18.84 KG/M2

## 2025-07-31 DIAGNOSIS — E78.00 PURE HYPERCHOLESTEROLEMIA: ICD-10-CM

## 2025-07-31 DIAGNOSIS — I25.83 CORONARY ARTERIOSCLEROSIS DUE TO LIPID RICH PLAQUE: Primary | ICD-10-CM

## 2025-07-31 DIAGNOSIS — I25.5 ISCHEMIC CARDIOMYOPATHY: ICD-10-CM

## 2025-07-31 DIAGNOSIS — Z95.810 ICD (IMPLANTABLE CARDIOVERTER-DEFIBRILLATOR), DUAL, IN SITU: ICD-10-CM

## 2025-07-31 DIAGNOSIS — Z95.1 S/P CABG (CORONARY ARTERY BYPASS GRAFT): ICD-10-CM

## 2025-07-31 DIAGNOSIS — I50.20 HEART FAILURE WITH REDUCED EJECTION FRACTION, NYHA CLASS II (H): ICD-10-CM

## 2025-07-31 DIAGNOSIS — I34.0 NONRHEUMATIC MITRAL VALVE REGURGITATION: ICD-10-CM

## 2025-07-31 DIAGNOSIS — I95.9 HYPOTENSION, UNSPECIFIED HYPOTENSION TYPE: ICD-10-CM

## 2025-07-31 DIAGNOSIS — Z95.2 S/P TAVR (TRANSCATHETER AORTIC VALVE REPLACEMENT): ICD-10-CM

## 2025-07-31 DIAGNOSIS — I48.0 PAROXYSMAL ATRIAL FIBRILLATION (H): ICD-10-CM

## 2025-07-31 PROBLEM — I42.0 DILATED CARDIOMYOPATHY (H): Status: RESOLVED | Noted: 2020-02-04 | Resolved: 2025-07-31

## 2025-07-31 RX ORDER — TIOTROPIUM BROMIDE 18 UG/1
CAPSULE ORAL; RESPIRATORY (INHALATION)
COMMUNITY
Start: 2025-07-16 | End: 2026-07-11

## 2025-07-31 RX ORDER — HYDROCODONE BITARTRATE AND ACETAMINOPHEN 5; 325 MG/1; MG/1
1 TABLET ORAL EVERY 6 HOURS PRN
COMMUNITY
Start: 2025-07-23

## 2025-07-31 NOTE — LETTER
7/31/2025    Kenna Calvillo, NP  [address] [place] [address]    RE: Daniel Alamo       Dear Colleague,     I had the pleasure of seeing Daniel Alamo in the [facility].      [facility]  Heart Care Clinic Follow-up Note    Assessment & Plan        (I25.83) Coronary arteriosclerosis due to lipid rich plaque  (primary encounter diagnosis)  Comment: Angiography December 2022 showed normal left main, LAD with a distal total occlusion and first diagonal 95% stenosis, normal circumflex, first obtuse marginal 70 to 80% stenosis, and total occlusion of the right coronary artery.  PET viability showed scar involving the inferior and inferolateral wall with some mild agustin-infarct anterolateral ischemia prompting intervention on the diagonal lesion, with balloon, unable to place stent.  Given profound fatigue repeated pharmacological stress nuclear in 2023, this showed significant large scar involving the inferior, inferolateral and lateral walls with no significant ischemia, thus presume no no further revascularization needed, continue medical therapy.       (Z95.1) S/P CABG (coronary artery bypass graft)  Comment: Approximately 30 years ago he had a LIMA to the LAD which is patent, vein graft to the OM which is occluded and vein graft to the right coronary which is occluded.  As above medical therapy.     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     (I34.0) Nonrheumatic mitral valve regurgitation  Comment: Based on ARAVIND 4+ severe, seen by structural clinic and patient not interested in MONICO of mitral valve.     (Z95.810) ICD (implantable cardioverter-defibrillator), dual, in situ  Comment: [employer]  device with [employer] right atrial and right ventricular leads with 23% atrial pacing and 33% ventricular pacing. Leads are stable.  Defer to EP about potential rate related changes.     (I25.5) Ischemic cardiomyopathy  Comment: Ejection fraction 30 to 35% and was on appropriate metoprolol as well as lisinopril.  Due to fatigue metoprolol was discontinued, no better, and currently only taking low-dose lisinopril as well as Jardiance.  Blood pressure will not permit other goal-directed medical therapy.     (I48.0) Paroxysmal atrial fibrillation (H)  Comment: Sotalol was lowered from 80 to 40 and discontinued, atrial fibrillation burden did go up, he had no difference in his symptoms, went back up on sotalol and symptoms did not worsen.  Has subsequently been switched over to amiodarone.  Amiodarone blood work is stable, however his creatinine is now increased and Eliquis 2.5 mg twice daily.    (I50.20) Heart failure with reduced ejection fraction, NYHA class II (H)  Comment: No significant signs or symptoms, felt to be dehydrated, tried furosemide however 20 mg and after 1 dose his creatinine went up.  Will consider restarting furosemide 20 mg only if he shows significant volume overload.     (E78.00) Pure hypercholesterolemia  Comment: Total cholesterol 139 with LDL of 83 which is acceptable on Crestor 20 mg a day.     (N40.1,  N13.8) Benign prostatic hyperplasia with urinary obstruction  Comment: On tamsulosin with no significant symptoms.     (I95.9) Hypotension, unspecified hypotension type  Comment: Suspect secondary to medications and will have him cut his lisinopril in half, he thinks he is on 10 so we will only be 5, our records suggest 2.5 so would only be 1.25.  Will call today to confirm medications.     (M79.601,  M79.602) Pain in both upper extremities  Comment: He is wife and he are interested in a pain pump but this did not occur, now his biggest issue is sciatica and he may be getting an  injection.    Plan  1.  Continue current medications although he will call today to confirm his dose of amiodarone and lisinopril.  2.  Encouraged exercise.  3.  Encouraged high caloric meals.  4.  Follow with me in 6 months or sooner if needed.    The longitudinal plan of care for the diagnosis(es)/condition(s) as documented were addressed during this visit. Due to the added complexity in care, I will continue to support Daniel in the subsequent management and with ongoing continuity of care.     Subjective  CC: 85-year-old white gentleman here for 3-month follow-up with his wife.  Still living independently in a two-story house although he only stays in 1 story.  According to his wife he is not exercising and not doing much activity and sleeps in the chair.  He tells me he feels much better getting iron infusions.  Generally fatigued but no significant syncope, presyncope, falls, chest pains, palpitations, shortness of breath, PND, orthopnea or peripheral edema.    Medications  Current Outpatient Medications   Medication Sig Dispense Refill     acetaminophen (TYLENOL) 325 MG tablet Take 650 mg by mouth every 6 hours as needed for pain       amiodarone (PACERONE) 100 MG TABS tablet Take 1 tablet (100 mg) by mouth daily. (Patient taking differently: Take 200 mg by mouth 2 times daily.) 90 tablet 3     apixaban ANTICOAGULANT (ELIQUIS) 2.5 MG tablet Take 1 tablet (2.5 mg) by mouth 2 times daily.       aspirin 81 MG EC tablet Take 81 mg by mouth daily       cyanocobalamin (VITAMIN B-12) 500 MCG SUBL sublingual tablet Place 1,000 mcg under the tongue daily.       empagliflozin (JARDIANCE) 10 MG TABS tablet Take 1 tablet (10 mg) by mouth daily. 90 tablet 3     gabapentin (NEURONTIN) 300 MG capsule Take 900 mg by mouth 2 times daily        HYDROcodone-acetaminophen (NORCO) 5-325 MG tablet Take 1 tablet by mouth every 6 hours as needed.       iron sucrose (VENOFER) 300 mg in sodium chloride 0.9 % via HOMEPUMP Infuse 300 mg  "into the vein.       lisinopril (ZESTRIL) 2.5 MG tablet TAKE 1 TABLET (2.5MG) BY MOUTH DAILY (Patient taking differently: Take 2.5 mg by mouth daily.) 90 tablet 3     rosuvastatin (CRESTOR) 20 MG tablet Take 1 tablet (20 mg) by mouth daily. 90 tablet 2     SPIRIVA HANDIHALER 18 MCG inhaled capsule 1 capsule by inhaling the contents of the capsule using the HandiHaler device Inhalation Once a day; Duration: 90 days       tamsulosin (FLOMAX) 0.4 MG capsule Take 0.4 mg by mouth every evening         Objective  /56 (BP Location: Right arm, Patient Position: Sitting, Cuff Size: Adult Regular)   Pulse 57   Resp 16   Ht 1.778 m (5' 10\")   Wt 59.7 kg (131 lb 9.6 oz)   BMI 18.88 kg/m      General Appearance:    Alert, cooperative, no distress, appears stated age   Head:    Normocephalic, without obvious abnormality, atraumatic   Throat:   Lips, mucosa, and tongue normal; teeth and gums normal   Neck:   Supple, symmetrical, trachea midline, no adenopathy;        thyroid:  No enlargement/tenderness/nodules; no carotid    bruit or JVD   Back:     Symmetric, no curvature, ROM normal, no CVA tenderness   Lungs:     Clear to auscultation bilaterally, respirations unlabored   Chest wall:    No tenderness, midline sternotomy scar and left-sided ICD   Heart:    Regular rate and rhythm, S1 and S2 normal, no murmur, rub   or gallop   Abdomen:     Soft, non-tender, bowel sounds active all four quadrants,     no masses, no organomegaly   Extremities:   Normal, atraumatic, no cyanosis or edema   Pulses:   2+ and symmetric all extremities   Skin:   Skin color, texture, turgor normal, no rashes or lesions     Results    Lab Results personally reviewed   Lab Results   Component Value Date    CHOL 144 03/07/2025    CHOL 139 03/10/2023     Lab Results   Component Value Date    HDL 55 03/07/2025    HDL 38 (L) 03/10/2023     No components found for: \"LDLCALC\"  Lab Results   Component Value Date    TRIG 63 03/07/2025    TRIG 89 " 03/10/2023     Lab Results   Component Value Date    WBC 8.7 05/30/2025    HGB 7.3 (L) 05/30/2025    HCT 25.7 (L) 05/30/2025     05/30/2025     Lab Results   Component Value Date    BUN 29.4 (H) 05/30/2025     05/30/2025    CO2 20 (L) 05/30/2025             Thank you for allowing me to participate in the care of your patient.      Sincerely,     GIO VARGAS MD     [facility]  cc:   Ama Vargas MD  [address], [address]  [place] [address]

## 2025-07-31 NOTE — PROGRESS NOTES
[facility]  Heart Care Clinic Follow-up Note    Assessment & Plan        (I25.83) Coronary arteriosclerosis due to lipid rich plaque  (primary encounter diagnosis)  Comment: Angiography December 2022 showed normal left main, LAD with a distal total occlusion and first diagonal 95% stenosis, normal circumflex, first obtuse marginal 70 to 80% stenosis, and total occlusion of the right coronary artery.  PET viability showed scar involving the inferior and inferolateral wall with some mild agustin-infarct anterolateral ischemia prompting intervention on the diagonal lesion, with balloon, unable to place stent.  Given profound fatigue repeated pharmacological stress nuclear in 2023, this showed significant large scar involving the inferior, inferolateral and lateral walls with no significant ischemia, thus presume no no further revascularization needed, continue medical therapy.       (Z95.1) S/P CABG (coronary artery bypass graft)  Comment: Approximately 30 years ago he had a LIMA to the LAD which is patent, vein graft to the OM which is occluded and vein graft to the right coronary which is occluded.  As above medical therapy.     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     (I34.0) Nonrheumatic mitral valve regurgitation  Comment: Based on ARAVIND 4+ severe, seen by structural clinic and patient not interested in MONICO of mitral valve.     (Z95.810) ICD (implantable cardioverter-defibrillator), dual, in situ  Comment: [employer] device with [employer] right atrial and right ventricular leads with 23% atrial pacing and 33% ventricular pacing. Leads are stable.  Defer to EP about potential rate related changes.      (I25.5) Ischemic cardiomyopathy  Comment: Ejection fraction 30 to 35% and was on appropriate metoprolol as well as lisinopril.  Due to fatigue metoprolol was discontinued, no better, and currently only taking low-dose lisinopril as well as Jardiance.  Blood pressure will not permit other goal-directed medical therapy.     (I48.0) Paroxysmal atrial fibrillation (H)  Comment: Sotalol was lowered from 80 to 40 and discontinued, atrial fibrillation burden did go up, he had no difference in his symptoms, went back up on sotalol and symptoms did not worsen.  Has subsequently been switched over to amiodarone.  Amiodarone blood work is stable, however his creatinine is now increased and Eliquis 2.5 mg twice daily.    (I50.20) Heart failure with reduced ejection fraction, NYHA class II (H)  Comment: No significant signs or symptoms, felt to be dehydrated, tried furosemide however 20 mg and after 1 dose his creatinine went up.  Will consider restarting furosemide 20 mg only if he shows significant volume overload.     (E78.00) Pure hypercholesterolemia  Comment: Total cholesterol 139 with LDL of 83 which is acceptable on Crestor 20 mg a day.     (N40.1,  N13.8) Benign prostatic hyperplasia with urinary obstruction  Comment: On tamsulosin with no significant symptoms.     (I95.9) Hypotension, unspecified hypotension type  Comment: Suspect secondary to medications and will have him cut his lisinopril in half, he thinks he is on 10 so we will only be 5, our records suggest 2.5 so would only be 1.25.  Will call today to confirm medications.     (M79.601,  M79.602) Pain in both upper extremities  Comment: He is wife and he are interested in a pain pump but this did not occur, now his biggest issue is sciatica and he may be getting an injection.    Plan  1.  Continue current medications although he will call today to confirm his dose of amiodarone and lisinopril.  2.  Encouraged exercise.  3.  Encouraged high caloric meals.  4.   Follow with me in 6 months or sooner if needed.    The longitudinal plan of care for the diagnosis(es)/condition(s) as documented were addressed during this visit. Due to the added complexity in care, I will continue to support Daniel in the subsequent management and with ongoing continuity of care.     Subjective  CC: 85-year-old white gentleman here for 3-month follow-up with his wife.  Still living independently in a two-story house although he only stays in 1 story.  According to his wife he is not exercising and not doing much activity and sleeps in the chair.  He tells me he feels much better getting iron infusions.  Generally fatigued but no significant syncope, presyncope, falls, chest pains, palpitations, shortness of breath, PND, orthopnea or peripheral edema.    Medications  Current Outpatient Medications   Medication Sig Dispense Refill    acetaminophen (TYLENOL) 325 MG tablet Take 650 mg by mouth every 6 hours as needed for pain      amiodarone (PACERONE) 100 MG TABS tablet Take 1 tablet (100 mg) by mouth daily. (Patient taking differently: Take 200 mg by mouth 2 times daily.) 90 tablet 3    apixaban ANTICOAGULANT (ELIQUIS) 2.5 MG tablet Take 1 tablet (2.5 mg) by mouth 2 times daily.      aspirin 81 MG EC tablet Take 81 mg by mouth daily      cyanocobalamin (VITAMIN B-12) 500 MCG SUBL sublingual tablet Place 1,000 mcg under the tongue daily.      empagliflozin (JARDIANCE) 10 MG TABS tablet Take 1 tablet (10 mg) by mouth daily. 90 tablet 3    gabapentin (NEURONTIN) 300 MG capsule Take 900 mg by mouth 2 times daily       HYDROcodone-acetaminophen (NORCO) 5-325 MG tablet Take 1 tablet by mouth every 6 hours as needed.      iron sucrose (VENOFER) 300 mg in sodium chloride 0.9 % via HOMEPUMP Infuse 300 mg into the vein.      lisinopril (ZESTRIL) 2.5 MG tablet TAKE 1 TABLET (2.5MG) BY MOUTH DAILY (Patient taking differently: Take 2.5 mg by mouth daily.) 90 tablet 3    rosuvastatin (CRESTOR) 20 MG tablet Take 1  "tablet (20 mg) by mouth daily. 90 tablet 2    SPIRIVA HANDIHALER 18 MCG inhaled capsule 1 capsule by inhaling the contents of the capsule using the HandiHaler device Inhalation Once a day; Duration: 90 days      tamsulosin (FLOMAX) 0.4 MG capsule Take 0.4 mg by mouth every evening         Objective  /56 (BP Location: Right arm, Patient Position: Sitting, Cuff Size: Adult Regular)   Pulse 57   Resp 16   Ht 1.778 m (5' 10\")   Wt 59.7 kg (131 lb 9.6 oz)   BMI 18.88 kg/m      General Appearance:    Alert, cooperative, no distress, appears stated age   Head:    Normocephalic, without obvious abnormality, atraumatic   Throat:   Lips, mucosa, and tongue normal; teeth and gums normal   Neck:   Supple, symmetrical, trachea midline, no adenopathy;        thyroid:  No enlargement/tenderness/nodules; no carotid    bruit or JVD   Back:     Symmetric, no curvature, ROM normal, no CVA tenderness   Lungs:     Clear to auscultation bilaterally, respirations unlabored   Chest wall:    No tenderness, midline sternotomy scar and left-sided ICD   Heart:    Regular rate and rhythm, S1 and S2 normal, no murmur, rub   or gallop   Abdomen:     Soft, non-tender, bowel sounds active all four quadrants,     no masses, no organomegaly   Extremities:   Normal, atraumatic, no cyanosis or edema   Pulses:   2+ and symmetric all extremities   Skin:   Skin color, texture, turgor normal, no rashes or lesions     Results    Lab Results personally reviewed   Lab Results   Component Value Date    CHOL 144 03/07/2025    CHOL 139 03/10/2023     Lab Results   Component Value Date    HDL 55 03/07/2025    HDL 38 (L) 03/10/2023     No components found for: \"LDLCALC\"  Lab Results   Component Value Date    TRIG 63 03/07/2025    TRIG 89 03/10/2023     Lab Results   Component Value Date    WBC 8.7 05/30/2025    HGB 7.3 (L) 05/30/2025    HCT 25.7 (L) 05/30/2025     05/30/2025     Lab Results   Component Value Date    BUN 29.4 (H) 05/30/2025    NA " 137 05/30/2025    CO2 20 (L) 05/30/2025

## 2025-07-31 NOTE — PATIENT INSTRUCTIONS
Mr Daniel COLLINS Cally,  I enjoyed visiting with you and your wife again today.  I am glad to hear you are doing well but would like you to try to exercise as much as possible and eat whatever you want.  Per our conversation look at this list and confirm for me taking 2 of the 100 mg amiodarone a day, and 1/2 of the lisinopril 2.5 a day.  I will plan on seeing you 6 months but if breathing gets worse let us know as will start a water pill.  Corey Vargas

## 2025-08-25 ENCOUNTER — INFUSION THERAPY VISIT (OUTPATIENT)
Dept: INFUSION THERAPY | Facility: HOSPITAL | Age: 85
End: 2025-08-25
Payer: MEDICARE

## 2025-08-25 VITALS
HEART RATE: 55 BPM | RESPIRATION RATE: 16 BRPM | TEMPERATURE: 97.8 F | DIASTOLIC BLOOD PRESSURE: 56 MMHG | OXYGEN SATURATION: 92 % | SYSTOLIC BLOOD PRESSURE: 115 MMHG

## 2025-08-25 DIAGNOSIS — D50.0 IRON DEFICIENCY ANEMIA SECONDARY TO BLOOD LOSS (CHRONIC): Primary | ICD-10-CM

## 2025-08-25 PROCEDURE — 250N000011 HC RX IP 250 OP 636: Performed by: NURSE PRACTITIONER

## 2025-08-25 PROCEDURE — 96365 THER/PROPH/DIAG IV INF INIT: CPT

## 2025-08-25 PROCEDURE — 258N000003 HC RX IP 258 OP 636: Performed by: NURSE PRACTITIONER

## 2025-08-25 PROCEDURE — 96366 THER/PROPH/DIAG IV INF ADDON: CPT

## 2025-08-25 RX ORDER — METHYLPREDNISOLONE SODIUM SUCCINATE 40 MG/ML
40 INJECTION INTRAMUSCULAR; INTRAVENOUS
Status: CANCELLED
Start: 2025-08-26

## 2025-08-25 RX ORDER — HEPARIN SODIUM,PORCINE 10 UNIT/ML
5-20 VIAL (ML) INTRAVENOUS DAILY PRN
Status: CANCELLED | OUTPATIENT
Start: 2025-08-26

## 2025-08-25 RX ORDER — MEPERIDINE HYDROCHLORIDE 25 MG/ML
25 INJECTION INTRAMUSCULAR; INTRAVENOUS; SUBCUTANEOUS
Status: CANCELLED | OUTPATIENT
Start: 2025-08-26

## 2025-08-25 RX ORDER — METHYLPREDNISOLONE SODIUM SUCCINATE 40 MG/ML
40 INJECTION INTRAMUSCULAR; INTRAVENOUS
Status: DISCONTINUED | OUTPATIENT
Start: 2025-08-25 | End: 2025-08-25 | Stop reason: HOSPADM

## 2025-08-25 RX ORDER — ALBUTEROL SULFATE 0.83 MG/ML
2.5 SOLUTION RESPIRATORY (INHALATION)
Status: CANCELLED | OUTPATIENT
Start: 2025-08-26

## 2025-08-25 RX ORDER — HEPARIN SODIUM (PORCINE) LOCK FLUSH IV SOLN 100 UNIT/ML 100 UNIT/ML
5 SOLUTION INTRAVENOUS
Status: CANCELLED | OUTPATIENT
Start: 2025-08-26

## 2025-08-25 RX ORDER — ALBUTEROL SULFATE 90 UG/1
1-2 INHALANT RESPIRATORY (INHALATION)
Status: CANCELLED
Start: 2025-08-26

## 2025-08-25 RX ORDER — DIPHENHYDRAMINE HYDROCHLORIDE 50 MG/ML
50 INJECTION, SOLUTION INTRAMUSCULAR; INTRAVENOUS
Status: CANCELLED
Start: 2025-08-26

## 2025-08-25 RX ORDER — ALBUTEROL SULFATE 90 UG/1
1-2 INHALANT RESPIRATORY (INHALATION)
Status: DISCONTINUED | OUTPATIENT
Start: 2025-08-25 | End: 2025-08-25 | Stop reason: HOSPADM

## 2025-08-25 RX ORDER — DIPHENHYDRAMINE HYDROCHLORIDE 50 MG/ML
25 INJECTION, SOLUTION INTRAMUSCULAR; INTRAVENOUS
Status: DISCONTINUED | OUTPATIENT
Start: 2025-08-25 | End: 2025-08-25 | Stop reason: HOSPADM

## 2025-08-25 RX ORDER — DIPHENHYDRAMINE HYDROCHLORIDE 50 MG/ML
25 INJECTION, SOLUTION INTRAMUSCULAR; INTRAVENOUS
Status: CANCELLED
Start: 2025-08-26

## 2025-08-25 RX ORDER — EPINEPHRINE 1 MG/ML
0.3 INJECTION, SOLUTION INTRAMUSCULAR; SUBCUTANEOUS EVERY 5 MIN PRN
Status: CANCELLED | OUTPATIENT
Start: 2025-08-26

## 2025-08-25 RX ORDER — MEPERIDINE HYDROCHLORIDE 25 MG/ML
25 INJECTION INTRAMUSCULAR; INTRAVENOUS; SUBCUTANEOUS
Status: DISCONTINUED | OUTPATIENT
Start: 2025-08-25 | End: 2025-08-25 | Stop reason: HOSPADM

## 2025-08-25 RX ORDER — ALBUTEROL SULFATE 0.83 MG/ML
2.5 SOLUTION RESPIRATORY (INHALATION)
Status: DISCONTINUED | OUTPATIENT
Start: 2025-08-25 | End: 2025-08-25 | Stop reason: HOSPADM

## 2025-08-25 RX ORDER — DIPHENHYDRAMINE HYDROCHLORIDE 50 MG/ML
50 INJECTION, SOLUTION INTRAMUSCULAR; INTRAVENOUS
Status: DISCONTINUED | OUTPATIENT
Start: 2025-08-25 | End: 2025-08-25 | Stop reason: HOSPADM

## 2025-08-25 RX ORDER — EPINEPHRINE 1 MG/ML
0.3 INJECTION, SOLUTION INTRAMUSCULAR; SUBCUTANEOUS EVERY 5 MIN PRN
Status: DISCONTINUED | OUTPATIENT
Start: 2025-08-25 | End: 2025-08-25 | Stop reason: HOSPADM

## 2025-08-25 RX ADMIN — IRON SUCROSE 300 MG: 20 INJECTION, SOLUTION INTRAVENOUS at 10:11

## 2025-08-25 RX ADMIN — SODIUM CHLORIDE 250 ML: 0.9 INJECTION, SOLUTION INTRAVENOUS at 10:11

## 2025-08-27 ENCOUNTER — INFUSION THERAPY VISIT (OUTPATIENT)
Dept: INFUSION THERAPY | Facility: HOSPITAL | Age: 85
End: 2025-08-27
Attending: NURSE PRACTITIONER
Payer: MEDICARE

## 2025-08-27 VITALS
DIASTOLIC BLOOD PRESSURE: 58 MMHG | HEART RATE: 55 BPM | RESPIRATION RATE: 18 BRPM | OXYGEN SATURATION: 97 % | SYSTOLIC BLOOD PRESSURE: 103 MMHG | TEMPERATURE: 97.6 F

## 2025-08-27 DIAGNOSIS — D50.0 IRON DEFICIENCY ANEMIA SECONDARY TO BLOOD LOSS (CHRONIC): Primary | ICD-10-CM

## 2025-08-27 PROCEDURE — 96366 THER/PROPH/DIAG IV INF ADDON: CPT

## 2025-08-27 PROCEDURE — 250N000011 HC RX IP 250 OP 636: Performed by: NURSE PRACTITIONER

## 2025-08-27 PROCEDURE — 96365 THER/PROPH/DIAG IV INF INIT: CPT

## 2025-08-27 PROCEDURE — 258N000003 HC RX IP 258 OP 636: Performed by: NURSE PRACTITIONER

## 2025-08-27 RX ORDER — ALBUTEROL SULFATE 0.83 MG/ML
2.5 SOLUTION RESPIRATORY (INHALATION)
Status: CANCELLED | OUTPATIENT
Start: 2025-08-27

## 2025-08-27 RX ORDER — DIPHENHYDRAMINE HYDROCHLORIDE 50 MG/ML
25 INJECTION, SOLUTION INTRAMUSCULAR; INTRAVENOUS
Status: CANCELLED
Start: 2025-08-27

## 2025-08-27 RX ORDER — DIPHENHYDRAMINE HYDROCHLORIDE 50 MG/ML
50 INJECTION, SOLUTION INTRAMUSCULAR; INTRAVENOUS
Status: CANCELLED
Start: 2025-08-27

## 2025-08-27 RX ORDER — HEPARIN SODIUM,PORCINE 10 UNIT/ML
5-20 VIAL (ML) INTRAVENOUS DAILY PRN
Status: CANCELLED | OUTPATIENT
Start: 2025-08-27

## 2025-08-27 RX ORDER — METHYLPREDNISOLONE SODIUM SUCCINATE 40 MG/ML
40 INJECTION INTRAMUSCULAR; INTRAVENOUS
Status: CANCELLED
Start: 2025-08-27

## 2025-08-27 RX ORDER — ALBUTEROL SULFATE 90 UG/1
1-2 INHALANT RESPIRATORY (INHALATION)
Status: CANCELLED
Start: 2025-08-27

## 2025-08-27 RX ORDER — MEPERIDINE HYDROCHLORIDE 25 MG/ML
25 INJECTION INTRAMUSCULAR; INTRAVENOUS; SUBCUTANEOUS
Status: CANCELLED | OUTPATIENT
Start: 2025-08-27

## 2025-08-27 RX ORDER — HEPARIN SODIUM (PORCINE) LOCK FLUSH IV SOLN 100 UNIT/ML 100 UNIT/ML
5 SOLUTION INTRAVENOUS
Status: CANCELLED | OUTPATIENT
Start: 2025-08-27

## 2025-08-27 RX ORDER — EPINEPHRINE 1 MG/ML
0.3 INJECTION, SOLUTION INTRAMUSCULAR; SUBCUTANEOUS EVERY 5 MIN PRN
Status: CANCELLED | OUTPATIENT
Start: 2025-08-27

## 2025-08-27 RX ADMIN — IRON SUCROSE 300 MG: 20 INJECTION, SOLUTION INTRAVENOUS at 10:28

## 2025-08-27 RX ADMIN — SODIUM CHLORIDE 250 ML: 0.9 INJECTION, SOLUTION INTRAVENOUS at 10:11

## (undated) DEVICE — SOL NACL 0.9% IRRIG 1000ML BOTTLE 2F7124

## (undated) DEVICE — GUIDEWIRE FORTE FLOPPY J TOP 34949-05J

## (undated) DEVICE — Device

## (undated) DEVICE — CATH ANGIO INFINITI JR4 4FRX100CM 538421

## (undated) DEVICE — TRANSDUCER W/MONITORING TRAY 42632-05

## (undated) DEVICE — SU PROLENE 5-0 RB-1DA 36"  8556H

## (undated) DEVICE — CUSTOM PACK CORONARY SAN5BCRHEA

## (undated) DEVICE — SOL WATER IRRIG 1000ML BOTTLE 2F7114

## (undated) DEVICE — SU MONOCRYL+ 4-0 18IN PS2 UND MCP496G

## (undated) DEVICE — DEVICE INFLATION SYR W/ HEMOSTASIS VALVE 12IN EXT IN4904

## (undated) DEVICE — SUCTION CANISTER MEDIVAC LINER 3000ML W/LID 65651-530

## (undated) DEVICE — GLOVE GAMMEX NEOPRENE ULTRA SZ 7 LF 8514

## (undated) DEVICE — INTRO MICRO MINI STICK 4FR STIFF NITINOL 45-753

## (undated) DEVICE — CUSTOM PACK PERIPH VASCULAR SCV5BPVHEA

## (undated) DEVICE — ELECTRODE ADULT PACING MULTI P-211-M1

## (undated) DEVICE — KIT HAND CONTROL ACIST 016795

## (undated) DEVICE — ADH SKIN CLOSURE PREMIERPRO EXOFIN 1.0ML 3470

## (undated) DEVICE — PLATE GROUNDING ADULT W/CORD 9165L

## (undated) DEVICE — CATH DIAG IMPULSE 6FR PIG 155 SINGLE

## (undated) DEVICE — SHEATH SUPERFLEX 8FR CL-07835

## (undated) DEVICE — CATH LAUNCHER 6FR JR 4.0 LA6JR40

## (undated) DEVICE — GUIDEWIRE VASC SAFARI2 0.035X275CM H74939406XS1

## (undated) DEVICE — INTRO SHEATH 6FRX10CM PINNACLE RSS602

## (undated) DEVICE — CATH ANGIO INFINITI VESTAN STAINLESS STEEL 155 D 534554S

## (undated) DEVICE — SYR LOCKING ATRION QL38

## (undated) DEVICE — PITCHER STERILE 1000ML  SSK9004A

## (undated) DEVICE — SLEEVE TR BAND RADIAL COMPRESSION DEVICE 24CM TRB24-REG

## (undated) DEVICE — 6 FR IMPULSE ANGIO DIAG CATH AL1  5 PACK

## (undated) DEVICE — VALVE HEMOSTASIS .096" COPILOT MECH 1003331

## (undated) DEVICE — CATH BALLOON NC EMERGE 2.00X12MM H7493926712200

## (undated) DEVICE — SYR ANGIOGRAPHY MULTIUSE KIT ACIST 014612

## (undated) DEVICE — GUIDEWIRE LUNDERQUIST 0.035X260MM CVD TSCMG-35-260-7-LES

## (undated) DEVICE — TAPE MICROFOAM 4" 1528-4

## (undated) DEVICE — SU PROLENE 6-0 C-1DA 30" M8706

## (undated) DEVICE — SYSTEM DELIVERY MECHANISM COMMANDER 29MM

## (undated) DEVICE — CATH BALLOON NC EMERGE 5.00X20MM H7493926720500

## (undated) DEVICE — CATH LAUNCHER 6FR EBU 3.5 LA6EBU35

## (undated) DEVICE — MANIFOLD KIT ANGIO AUTOMATED 014613

## (undated) DEVICE — INTRO TERUMO 6FRX25CM W/MARKER RSB603

## (undated) DEVICE — KIT HAND CONTROL ACIST 014644 AR-P54

## (undated) DEVICE — CATH CORONARY LITHOTRIPSY SHOCKWAVE 2.5X12MM C2IVL2512

## (undated) DEVICE — CATH ANGIO INFINITI JL4 4FRX100CM 538420

## (undated) DEVICE — EXCHANGE WIRE .035 260 STAR/JFC/035/260/ M001491681

## (undated) DEVICE — SHTH INTRO 0.021IN ID 6FR DIA

## (undated) DEVICE — 0.035IN X 150CM INQWIRE DIAGNOSTIC GUIDEWIRE, FIXED-CORE HEPARIN COATED, STRAIGHT TIP

## (undated) DEVICE — DRSG KERLIX FLUFFS X5

## (undated) DEVICE — VESSEL LOOP WHITE MAXI 30-721

## (undated) DEVICE — PREP CHLORAPREP 26ML TINTED HI-LITE ORANGE 930815

## (undated) DEVICE — 0.035IN X 260CM INQWIRE DIAGNOSTIC GUIDEWIRE, FIXED-CORE PTFE COATED, 3MM J-TIP

## (undated) DEVICE — CATH ANGIO INFINITI IM 4FRX100CM 538460

## (undated) DEVICE — SET CANNULATION TOUNIQUET TS-10061

## (undated) DEVICE — CATH PULM ART 7FR X 110CM, SWA

## (undated) DEVICE — INTRO TERUMO 7FRX10CM PINNACLE W/MARKER RSB702

## (undated) DEVICE — CATH ANGIO INFINITI AL1 4FRX100CM 538445

## (undated) DEVICE — DRAPE CV INCISE CVARTS 89466

## (undated) DEVICE — SHEATH 4FR ULTIMUM 407840

## (undated) RX ORDER — HYDRALAZINE HYDROCHLORIDE 20 MG/ML
INJECTION INTRAMUSCULAR; INTRAVENOUS
Status: DISPENSED
Start: 2022-12-19

## (undated) RX ORDER — VASOPRESSIN IN 0.9 % NACL 2 UNIT/2ML
SYRINGE (ML) INTRAVENOUS
Status: DISPENSED
Start: 2023-01-31

## (undated) RX ORDER — DIAZEPAM 5 MG
TABLET ORAL
Status: DISPENSED
Start: 2022-09-19

## (undated) RX ORDER — CEFAZOLIN SODIUM/WATER 2 G/20 ML
SYRINGE (ML) INTRAVENOUS
Status: DISPENSED
Start: 2023-01-31

## (undated) RX ORDER — DIAZEPAM 5 MG
TABLET ORAL
Status: DISPENSED
Start: 2022-12-19

## (undated) RX ORDER — ASPIRIN 325 MG
TABLET ORAL
Status: DISPENSED
Start: 2023-01-31

## (undated) RX ORDER — FENTANYL CITRATE-0.9 % NACL/PF 10 MCG/ML
PLASTIC BAG, INJECTION (ML) INTRAVENOUS
Status: DISPENSED
Start: 2023-01-31

## (undated) RX ORDER — HYDRALAZINE HYDROCHLORIDE 20 MG/ML
INJECTION INTRAMUSCULAR; INTRAVENOUS
Status: DISPENSED
Start: 2023-01-31

## (undated) RX ORDER — HYDRALAZINE HYDROCHLORIDE 20 MG/ML
INJECTION INTRAMUSCULAR; INTRAVENOUS
Status: DISPENSED
Start: 2022-09-19

## (undated) RX ORDER — KETAMINE HYDROCHLORIDE 10 MG/ML
INJECTION INTRAMUSCULAR; INTRAVENOUS
Status: DISPENSED
Start: 2023-01-31

## (undated) RX ORDER — ACETAMINOPHEN 325 MG/1
TABLET ORAL
Status: DISPENSED
Start: 2023-01-31

## (undated) RX ORDER — FENTANYL CITRATE 50 UG/ML
INJECTION, SOLUTION INTRAMUSCULAR; INTRAVENOUS
Status: DISPENSED
Start: 2023-01-31

## (undated) RX ORDER — HEPARIN SODIUM 1000 [USP'U]/ML
INJECTION, SOLUTION INTRAVENOUS; SUBCUTANEOUS
Status: DISPENSED
Start: 2022-09-19

## (undated) RX ORDER — CLOPIDOGREL 300 MG/1
TABLET, FILM COATED ORAL
Status: DISPENSED
Start: 2022-12-19